# Patient Record
Sex: FEMALE | Race: WHITE | NOT HISPANIC OR LATINO | Employment: OTHER | URBAN - METROPOLITAN AREA
[De-identification: names, ages, dates, MRNs, and addresses within clinical notes are randomized per-mention and may not be internally consistent; named-entity substitution may affect disease eponyms.]

---

## 2023-10-15 RX ORDER — PAROXETINE HYDROCHLORIDE 20 MG/1
TABLET, FILM COATED ORAL
COMMUNITY
End: 2023-12-28 | Stop reason: ALTCHOICE

## 2023-10-15 RX ORDER — ALBUTEROL SULFATE 90 UG/1
AEROSOL, METERED RESPIRATORY (INHALATION)
Status: ON HOLD | COMMUNITY
End: 2024-01-11 | Stop reason: ALTCHOICE

## 2023-10-15 RX ORDER — FERROUS SULFATE 325(65) MG
325 TABLET ORAL DAILY
COMMUNITY

## 2023-10-15 RX ORDER — SIMVASTATIN 20 MG/1
20 TABLET, FILM COATED ORAL NIGHTLY
COMMUNITY
End: 2024-02-02 | Stop reason: ENTERED-IN-ERROR

## 2023-10-15 RX ORDER — CALC/MAG/B COMPLEX/D3/HERB 61
TABLET ORAL
COMMUNITY
End: 2023-12-28 | Stop reason: ALTCHOICE

## 2023-10-15 RX ORDER — DONEPEZIL HYDROCHLORIDE 5 MG/1
TABLET, FILM COATED ORAL
COMMUNITY
End: 2023-12-28 | Stop reason: ALTCHOICE

## 2023-10-15 RX ORDER — PAROXETINE HYDROCHLORIDE HEMIHYDRATE 37.5 MG/1
1 TABLET, FILM COATED, EXTENDED RELEASE ORAL EVERY MORNING
COMMUNITY

## 2023-10-15 RX ORDER — LISINOPRIL 40 MG/1
10 TABLET ORAL EVERY MORNING
COMMUNITY
End: 2024-02-02 | Stop reason: ENTERED-IN-ERROR

## 2023-10-15 RX ORDER — TRAZODONE HYDROCHLORIDE 50 MG/1
100 TABLET ORAL NIGHTLY
COMMUNITY

## 2023-10-15 RX ORDER — ARIPIPRAZOLE 15 MG/1
15 TABLET ORAL NIGHTLY
COMMUNITY

## 2023-10-15 RX ORDER — CETIRIZINE HYDROCHLORIDE 10 MG/1
10 TABLET ORAL EVERY MORNING
COMMUNITY
Start: 2019-11-06

## 2023-10-17 ENCOUNTER — OFFICE VISIT (OUTPATIENT)
Dept: ORTHOPEDIC SURGERY | Facility: CLINIC | Age: 48
End: 2023-10-17
Payer: COMMERCIAL

## 2023-10-17 ENCOUNTER — HOSPITAL ENCOUNTER (OUTPATIENT)
Dept: RADIOLOGY | Facility: HOSPITAL | Age: 48
Discharge: HOME | End: 2023-10-17
Payer: COMMERCIAL

## 2023-10-17 DIAGNOSIS — M25.551 RIGHT HIP PAIN: Primary | ICD-10-CM

## 2023-10-17 DIAGNOSIS — M25.551 RIGHT HIP PAIN: ICD-10-CM

## 2023-10-17 PROCEDURE — 73502 X-RAY EXAM HIP UNI 2-3 VIEWS: CPT | Mod: RT,FY

## 2023-10-17 PROCEDURE — 99205 OFFICE O/P NEW HI 60 MIN: CPT | Performed by: ORTHOPAEDIC SURGERY

## 2023-10-17 PROCEDURE — 73502 X-RAY EXAM HIP UNI 2-3 VIEWS: CPT | Mod: RIGHT SIDE | Performed by: RADIOLOGY

## 2023-10-17 ASSESSMENT — PAIN SCALES - GENERAL: PAINLEVEL_OUTOF10: 9

## 2023-10-17 ASSESSMENT — PAIN - FUNCTIONAL ASSESSMENT: PAIN_FUNCTIONAL_ASSESSMENT: 0-10

## 2023-10-17 NOTE — LETTER
October 17, 2023     Henry Olmos DO  [id number] [facility] [address]    Patient: Blanca Hamilton   YOB: 1975   Date of Visit: 10/17/2023       Dear Dr. Henry Olmos DO:    Thank you for referring Blanca Hamilton to me for evaluation. Below are my notes for this consultation.  If you have questions, please do not hesitate to call me. I look forward to following your patient along with you.       Sincerely,     Felix Jalloh MD      CC: No Recipients  ______________________________________________________________________________________    This is a consultation from Dr. Henry Olmos DO for   Chief Complaint   Patient presents with   • Right Hip - Injury, Pain       This is a 47 y.o. female who presents for evaluation of right hip pain.  Patient states that she had a long history of right hip pain, it is gotten gradually worse over time.  She has had problems with her hips for many years.  She had her left hip done several years ago and states that is doing well.  Over the past several months has had severe exacerbation of her right hip pain, it is mostly anterior and into the groin and radiates into her anterior thigh.  It makes it very difficult for her to get around she is mostly dependent on a wheelchair.  She had extensive travel nonsurgical management for her right hip including use of anti-inflammatories physical therapy activity modification use of assistive devices.  She has never had surgery on her hip.  Despite nonsurgical management she has severe and worsening pain which impacts her quality of life and activities of daily living and she like to consider total hip    Physical Exam    There has been no interval change in this patient's past medical, surgical, medications, allergies, family history or social history since the most recent visit to a provider within our department. 14 point review of systems was performed, reviewed, and negative except for pertinent  positives documented in the history of present illness.     Constitutional: well developed, well nourished female in no acute distress  Psychiatric: normal mood, appropriate affect  Eyes: sclera anicteric  HENT: normocephalic/atraumatic  CV: regular rate and rhythm   Respiratory: non labored breathing  Integumentary: no rash  Neurological: moves all extremities    Right hip exam: skin normal, no abrasions, wounds, or lacerations. nttp over greater trochanter. negative log roll, negative joycelyn's test. flexion to 90 degrees without pain. no pain with flexion abduction and external rotation, reproduction of severe hip and groin pain with flexion adduction and internal rotation. neurovascularly intact distally        Xrays were ordered by me, they were reviewed and independently interpreted by me today, they show severe degenerative disease bone-on-bone arthritis extensive protrusio    Impression/Plan: This is a 47 y.o. female with severe right hip arthritis that has failed nonoperative management.  Patient elected to proceed with right total hip.    I had an extensive discussion with the patient regarding her condition and possible treatment options. Nonsurgical treatment for right hip arthritis includes activity modification, weight loss, use of a cane or other assistive device, pain medications and nonsteroidal anti-inflammatory medications, joint injections, and physical therapy. The patient has attempted non-surgical treatment for this condition for greater than 6 months and it has failed. We discussed the risks benefits and alternatives of total hip arthroplasty. Benefits of joint replacement include: Relief of pain, improvement of function, and improved quality of life. Alternatives include observation and watchful waiting, and the nonsurgical modalities noted above.   We discussed the risks of complications as well as the risks of morbidity and mortality related to surgical treatment with total joint  replacement. We reviewed the fact that total joint replacement is major elective surgery with significant associated surgical and procedural risk factors as detailed below.   Risks include: Pain, blood loss, damage to nearby anatomical structures including but not limited to nerves or blood vessels muscles tendons and bone, failure surgery to ameliorate symptoms, persistence of pain surrounding the affected joint, mechanical failure of the prosthesis including but not limited to loosening of the prosthesis from bone ,breakage of the prosthesis and dislocation of the prosthesis, change in length or appearance of a limb, infection possibly necessitating further surgery, removal of the prosthesis, or limb amputation, the need for additional surgery for other reasons, blood clots, amputation, and death. No guarantees were implied or given.  All questions were answered and the patient voiced their understanding.     One specific issue for this patient is smoking. Smoking increases the risk of complications including wound healing and infection. We discussed that reducing smoking will mitigate these risks and that smoking cessation is recommended. We discussed strategies for smoking cessation and that the patient should discuss with her primary care physician strategies for additional assistance with smoking cessation. The patient is aware of the risks and would still like to proceed.      We discussed the complex nature of the patient's deformity and hip problem.  Although this is a primary hip replacement it is a very complex primary given the extensive protrusio.  I discussed with the patient that this would require additional time for surgery and there are extensive additional risks including but not limited to additional risk of infection instability neurovascular damage and problems with fixation.  Patient is aware of all this and still like to proceed.    A complete set of surgical instructions was given to the  "patient. The patient was educated regarding preoperative nutrition, preparation of their home for postoperative rehabilitation, choosing a care partner, medical and dental clearance, the cessation of medications that can cause bleeding or presenting to risk for infection, chlorhexidine bath, nasal swab and decontamination, post operative follow up, and need for medical equipment. Patient was also educated regarding the possibility of same day surgery and related criteria and protocols. The patient will attend our joint class further education, and thereafter they will return for a preoperative visit. A presurgery education booklet was also given to the patient.     surgical plan: Right total hip  implants: Foley  approach: Posterior  special equipment: None  DVT ppx aspirin  drugs to stop none  allergy to abx non  post operative abx:  ancef +/- vanc (per protocol)  special clearance needed none    BMI Readings from Last 1 Encounters:   02/18/20 29.35 kg/m²      Lab Results   Component Value Date    CREATININE 0.45 (L) 03/13/2020     Tobacco Use: Not on file      Computed MELD 3.0 unavailable. Necessary lab results were not found in the last year.  Computed MELD-Na unavailable. Necessary lab results were not found in the last year.       No results found for: \"HGBA1C\"  No results found for: \"STAPHMRSASCR\"  "

## 2023-10-17 NOTE — PROGRESS NOTES
This is a consultation from Dr. Henry Olmos DO for   Chief Complaint   Patient presents with    Right Hip - Injury, Pain       This is a 47 y.o. female who presents for evaluation of right hip pain.  Patient states that she had a long history of right hip pain, it is gotten gradually worse over time.  She has had problems with her hips for many years.  She had her left hip done several years ago and states that is doing well.  Over the past several months has had severe exacerbation of her right hip pain, it is mostly anterior and into the groin and radiates into her anterior thigh.  It makes it very difficult for her to get around she is mostly dependent on a wheelchair.  She had extensive travel nonsurgical management for her right hip including use of anti-inflammatories physical therapy activity modification use of assistive devices.  She has never had surgery on her hip.  Despite nonsurgical management she has severe and worsening pain which impacts her quality of life and activities of daily living and she like to consider total hip    Physical Exam    There has been no interval change in this patient's past medical, surgical, medications, allergies, family history or social history since the most recent visit to a provider within our department. 14 point review of systems was performed, reviewed, and negative except for pertinent positives documented in the history of present illness.     Constitutional: well developed, well nourished female in no acute distress  Psychiatric: normal mood, appropriate affect  Eyes: sclera anicteric  HENT: normocephalic/atraumatic  CV: regular rate and rhythm   Respiratory: non labored breathing  Integumentary: no rash  Neurological: moves all extremities    Right hip exam: skin normal, no abrasions, wounds, or lacerations. nttp over greater trochanter. negative log roll, negative joycelyn's test. flexion to 90 degrees without pain. no pain with flexion abduction and  external rotation, reproduction of severe hip and groin pain with flexion adduction and internal rotation. neurovascularly intact distally        Xrays were ordered by me, they were reviewed and independently interpreted by me today, they show severe degenerative disease bone-on-bone arthritis extensive protrusio    Impression/Plan: This is a 47 y.o. female with severe right hip arthritis that has failed nonoperative management.  Patient elected to proceed with right total hip.    I had an extensive discussion with the patient regarding her condition and possible treatment options. Nonsurgical treatment for right hip arthritis includes activity modification, weight loss, use of a cane or other assistive device, pain medications and nonsteroidal anti-inflammatory medications, joint injections, and physical therapy. The patient has attempted non-surgical treatment for this condition for greater than 6 months and it has failed. We discussed the risks benefits and alternatives of total hip arthroplasty. Benefits of joint replacement include: Relief of pain, improvement of function, and improved quality of life. Alternatives include observation and watchful waiting, and the nonsurgical modalities noted above.   We discussed the risks of complications as well as the risks of morbidity and mortality related to surgical treatment with total joint replacement. We reviewed the fact that total joint replacement is major elective surgery with significant associated surgical and procedural risk factors as detailed below.   Risks include: Pain, blood loss, damage to nearby anatomical structures including but not limited to nerves or blood vessels muscles tendons and bone, failure surgery to ameliorate symptoms, persistence of pain surrounding the affected joint, mechanical failure of the prosthesis including but not limited to loosening of the prosthesis from bone ,breakage of the prosthesis and dislocation of the prosthesis,  change in length or appearance of a limb, infection possibly necessitating further surgery, removal of the prosthesis, or limb amputation, the need for additional surgery for other reasons, blood clots, amputation, and death. No guarantees were implied or given.  All questions were answered and the patient voiced their understanding.     One specific issue for this patient is smoking. Smoking increases the risk of complications including wound healing and infection. We discussed that reducing smoking will mitigate these risks and that smoking cessation is recommended. We discussed strategies for smoking cessation and that the patient should discuss with her primary care physician strategies for additional assistance with smoking cessation. The patient is aware of the risks and would still like to proceed.      We discussed the complex nature of the patient's deformity and hip problem.  Although this is a primary hip replacement it is a very complex primary given the extensive protrusio.  I discussed with the patient that this would require additional time for surgery and there are extensive additional risks including but not limited to additional risk of infection instability neurovascular damage and problems with fixation.  Patient is aware of all this and still like to proceed.    A complete set of surgical instructions was given to the patient. The patient was educated regarding preoperative nutrition, preparation of their home for postoperative rehabilitation, choosing a care partner, medical and dental clearance, the cessation of medications that can cause bleeding or presenting to risk for infection, chlorhexidine bath, nasal swab and decontamination, post operative follow up, and need for medical equipment. Patient was also educated regarding the possibility of same day surgery and related criteria and protocols. The patient will attend our joint class further education, and thereafter they will return for a  "preoperative visit. A presurgery education booklet was also given to the patient.     surgical plan: Right total hip  implants: Solitario  approach: Posterior  special equipment: None  DVT ppx aspirin  drugs to stop none  allergy to abx non  post operative abx:  ancef +/- vanc (per protocol)  special clearance needed none    BMI Readings from Last 1 Encounters:   02/18/20 29.35 kg/m²      Lab Results   Component Value Date    CREATININE 0.45 (L) 03/13/2020     Tobacco Use: Not on file      Computed MELD 3.0 unavailable. Necessary lab results were not found in the last year.  Computed MELD-Na unavailable. Necessary lab results were not found in the last year.       No results found for: \"HGBA1C\"  No results found for: \"STAPHMRSASCR\"  "

## 2023-10-18 PROBLEM — M25.551 RIGHT HIP PAIN: Status: ACTIVE | Noted: 2023-10-17

## 2023-12-19 ENCOUNTER — PRE-ADMISSION TESTING (OUTPATIENT)
Dept: PREADMISSION TESTING | Facility: HOSPITAL | Age: 48
End: 2023-12-19
Payer: COMMERCIAL

## 2023-12-19 DIAGNOSIS — Z01.818 PREOP EXAMINATION: Primary | ICD-10-CM

## 2023-12-21 ENCOUNTER — ANESTHESIA EVENT (OUTPATIENT)
Dept: OPERATING ROOM | Facility: HOSPITAL | Age: 48
DRG: 467 | End: 2023-12-21
Payer: COMMERCIAL

## 2023-12-26 ENCOUNTER — OFFICE VISIT (OUTPATIENT)
Dept: ORTHOPEDIC SURGERY | Facility: CLINIC | Age: 48
End: 2023-12-26
Payer: COMMERCIAL

## 2023-12-26 ENCOUNTER — HOSPITAL ENCOUNTER (OUTPATIENT)
Dept: RADIOLOGY | Facility: HOSPITAL | Age: 48
Discharge: HOME | End: 2023-12-26
Payer: COMMERCIAL

## 2023-12-26 DIAGNOSIS — M25.551 RIGHT HIP PAIN: Primary | ICD-10-CM

## 2023-12-26 DIAGNOSIS — M25.551 RIGHT HIP PAIN: ICD-10-CM

## 2023-12-26 PROCEDURE — 99213 OFFICE O/P EST LOW 20 MIN: CPT | Performed by: ORTHOPAEDIC SURGERY

## 2023-12-26 PROCEDURE — 72100 X-RAY EXAM L-S SPINE 2/3 VWS: CPT | Performed by: RADIOLOGY

## 2023-12-26 PROCEDURE — 72100 X-RAY EXAM L-S SPINE 2/3 VWS: CPT

## 2023-12-26 NOTE — PROGRESS NOTES
Chief Complaint   Patient presents with    Right Hip - Follow-up     PRE OP RT ALEXA         This is a 47 y.o. year old female who presents for preoperative visit for her right hip.  Patient has severe degenerative disease of the affected joint. The patient complains of severe pain in the area, the pain is gradually getting worse. She has failed extensive nonsurgical treatment including anti-inflammatories physical therapy use of assistive devices activity modification cortisone injections. Despite this interventions the patient is worsening pain which impacts her quality of life and activities of daily living and they would like to proceed with joint replacement.    Physical Exam    There has been no interval change in this patient's past medical, surgical, medications, allergies, family history or social history since the most recent visit to a provider within our department. 14 point review of systems was performed, reviewed, and negative except for pertinent positives documented in the history of present illness.     Constitutional: well developed, well nourished female in no acute distress  Psychiatric: normal mood, appropriate affect  Eyes: sclera anicteric  HENT: normocephalic/atraumatic  CV: regular rate and rhythm   Respiratory: non labored breathing  Integumentary: no rash  Neurological: moves all extremities    No visits with results within 1 Month(s) from this visit.   Latest known visit with results is:   Legacy Encounter on 03/13/2020   Component Date Value Ref Range Status    WBC 03/13/2020 7.2  4.4 - 11.3 x10E9/L Final    RBC 03/13/2020 4.43  4.00 - 5.20 x10E12/L Final    Hemoglobin 03/13/2020 9.3 (L)  12.0 - 16.0 g/dL Final    Hematocrit 03/13/2020 33.0 (L)  36.0 - 46.0 % Final    MCV 03/13/2020 74 (L)  80 - 100 fL Final    MCHC 03/13/2020 28.2 (L)  32.0 - 36.0 g/dL Final    Platelets 03/13/2020 361  150 - 450 x10E9/L Final    RDW 03/13/2020 20.4 (H)  11.5 - 14.5 % Final    Neutrophils % 03/13/2020  66.5  40.0 - 80.0 % Final    Immature Granulocytes %, Automated 03/13/2020 0.4  0.0 - 0.9 % Final    Comment:  Immature Granulocyte Count (IG) includes promyelocytes,    myelocytes and metamyelocytes but does not include bands.   Percent differential counts (%) should be interpreted in the   context of the absolute cell counts (cells/L).      Lymphocytes % 03/13/2020 23.1  13.0 - 44.0 % Final    Monocytes % 03/13/2020 6.1  2.0 - 10.0 % Final    Eosinophils % 03/13/2020 3.5  0.0 - 6.0 % Final    Basophils % 03/13/2020 0.4  0.0 - 2.0 % Final    Neutrophils Absolute 03/13/2020 4.79  1.20 - 7.70 x10E9/L Final    Lymphocytes Absolute 03/13/2020 1.66  1.20 - 4.80 x10E9/L Final    Monocytes Absolute 03/13/2020 0.44  0.10 - 1.00 x10E9/L Final    Eosinophils Absolute 03/13/2020 0.25  0.00 - 0.70 x10E9/L Final    Basophils Absolute 03/13/2020 0.03  0.00 - 0.10 x10E9/L Final    Glucose 03/13/2020 127 (H)  74 - 99 mg/dL Final    Sodium 03/13/2020 137  136 - 145 mmol/L Final    Potassium 03/13/2020 3.8  3.5 - 5.3 mmol/L Final    Chloride 03/13/2020 106  98 - 107 mmol/L Final    Bicarbonate 03/13/2020 25  21 - 32 mmol/L Final    Anion Gap 03/13/2020 10  10 - 20 mmol/L Final    Urea Nitrogen 03/13/2020 7  6 - 23 mg/dL Final    Creatinine 03/13/2020 0.45 (L)  0.50 - 1.05 mg/dL Final    GLOMERULAR FILTRATION RATE-NON AFR* 03/13/2020 >60  >60 mL/min/1.73m2 Final    GLOMERULAR FILTRATION RATE-* 03/13/2020 >60  >60 mL/min/1.73m2 Final    Comment:  CALCULATIONS OF ESTIMATED GFR ARE PERFORMED   USING THE MDRD STUDY EQUATION FOR THE   IDMS-TRACEABLE CREATININE METHODS.   CLIN CHEM 2007;53:766-72      Calcium 03/13/2020 8.6  8.6 - 10.3 mg/dL Final    Color, Urine 03/13/2020 YELLOW  STRAW,YELLOW Final    Appearance, Urine 03/13/2020 CLEAR  CLEAR Final    Specific Gravity, Urine 03/13/2020 1.020  1.005 - 1.035 Final    pH, Urine 03/13/2020 5.0  5.0 - 8.0 Final    Protein, Urine 03/13/2020 NEGATIVE  NEGATIVE mg/dL Final    Glucose,  Urine 03/13/2020 NEGATIVE  NEGATIVE mg/dL Final    Blood, Urine 03/13/2020 NEGATIVE  NEGATIVE Final    Ketones, Urine 03/13/2020 NEGATIVE  NEGATIVE mg/dL Final    Bilirubin, Urine 03/13/2020 NEGATIVE  NEGATIVE Final    Urobilinogen, Urine 03/13/2020 <2.0  0.0 - 1.9 mg/dL Final    Nitrite, Urine 03/13/2020 NEGATIVE  NEGATIVE Final    Leukocyte Esterase, Urine 03/13/2020 NEGATIVE  NEGATIVE Final    RBC Morphology 03/13/2020 See Below   Final    Teardrop Cells 03/13/2020 Few   Final    Stomatocytes 03/13/2020 Many   Final    ABO GROUP (TYPE) IN BLOOD 03/13/2020 A   Final    Rh 03/13/2020 POSITIVE   Final    ANTIBODY SCREEN 03/13/2020 NEGATIVE   Final         Right hip exam: skin normal, no abrasions, wounds, or lacerations. nttp over greater trochanter. negative log roll, negative joycelyn's test. flexion to 90 degrees without pain.  She has very limited range of motion significant pain with flexion abduction and external rotation, severe hip pain with flexion adduction and internal rotation. neurovascularly intact distally        Preoperative labs reviewed, no findings which would preclude surgery    Impression plan: This is a 47 y.o. yo femalewith severe end-stage degenerative disease of the right hip that has failed nonoperative management.  Once again I discussed with the patient in detail the risks benefits and alternatives of total joint replacement. For the full details of that discussion see my previous note. The patient has obtained appropriate medical and dental clearance, and her labs have been reviewed. We will plan to proceed with surgery.    BMI Readings from Last 1 Encounters:   02/18/20 29.35 kg/m²     Lab Results   Component Value Date    CREATININE 0.45 (L) 03/13/2020     Tobacco Use: Not on file      Computed MELD 3.0 unavailable. Necessary lab results were not found in the last year.  Computed MELD-Na unavailable. Necessary lab results were not found in the last year.       No results found for:  "\"HGBA1C\"  No results found for: \"STAPHMRSASCR\"  "

## 2023-12-28 ENCOUNTER — PHARMACY VISIT (OUTPATIENT)
Dept: PHARMACY | Facility: CLINIC | Age: 48
End: 2023-12-28

## 2023-12-28 ENCOUNTER — PRE-ADMISSION TESTING (OUTPATIENT)
Dept: PREADMISSION TESTING | Facility: HOSPITAL | Age: 48
End: 2023-12-28
Payer: COMMERCIAL

## 2023-12-28 VITALS
HEIGHT: 68 IN | SYSTOLIC BLOOD PRESSURE: 116 MMHG | DIASTOLIC BLOOD PRESSURE: 75 MMHG | TEMPERATURE: 97.5 F | RESPIRATION RATE: 18 BRPM | BODY MASS INDEX: 30.2 KG/M2 | HEART RATE: 86 BPM | OXYGEN SATURATION: 98 %

## 2023-12-28 DIAGNOSIS — Z01.818 PREOP EXAMINATION: Primary | ICD-10-CM

## 2023-12-28 LAB
ALBUMIN SERPL BCP-MCNC: 3.9 G/DL (ref 3.4–5)
ALP SERPL-CCNC: 97 U/L (ref 33–110)
ALT SERPL W P-5'-P-CCNC: 9 U/L (ref 7–45)
ANION GAP SERPL CALC-SCNC: 14 MMOL/L (ref 10–20)
APPEARANCE UR: ABNORMAL
AST SERPL W P-5'-P-CCNC: 15 U/L (ref 9–39)
BASOPHILS # BLD AUTO: 0.03 X10*3/UL (ref 0–0.1)
BASOPHILS NFR BLD AUTO: 0.5 %
BILIRUB SERPL-MCNC: 0.4 MG/DL (ref 0–1.2)
BILIRUB UR STRIP.AUTO-MCNC: NEGATIVE MG/DL
BUN SERPL-MCNC: 8 MG/DL (ref 6–23)
CALCIUM SERPL-MCNC: 8.8 MG/DL (ref 8.6–10.3)
CHLORIDE SERPL-SCNC: 103 MMOL/L (ref 98–107)
CO2 SERPL-SCNC: 25 MMOL/L (ref 21–32)
COLOR UR: YELLOW
CREAT SERPL-MCNC: 0.4 MG/DL (ref 0.5–1.05)
EOSINOPHIL # BLD AUTO: 0.19 X10*3/UL (ref 0–0.7)
EOSINOPHIL NFR BLD AUTO: 3.2 %
ERYTHROCYTE [DISTWIDTH] IN BLOOD BY AUTOMATED COUNT: 19.2 % (ref 11.5–14.5)
GFR SERPL CREATININE-BSD FRML MDRD: >90 ML/MIN/1.73M*2
GLUCOSE SERPL-MCNC: 70 MG/DL (ref 74–99)
GLUCOSE UR STRIP.AUTO-MCNC: NEGATIVE MG/DL
HCT VFR BLD AUTO: 33.3 % (ref 36–46)
HGB BLD-MCNC: 10.1 G/DL (ref 12–16)
IMM GRANULOCYTES # BLD AUTO: 0.02 X10*3/UL (ref 0–0.7)
IMM GRANULOCYTES NFR BLD AUTO: 0.3 % (ref 0–0.9)
KETONES UR STRIP.AUTO-MCNC: NEGATIVE MG/DL
LEUKOCYTE ESTERASE UR QL STRIP.AUTO: NEGATIVE
LYMPHOCYTES # BLD AUTO: 2.04 X10*3/UL (ref 1.2–4.8)
LYMPHOCYTES NFR BLD AUTO: 34.7 %
MCH RBC QN AUTO: 22.7 PG (ref 26–34)
MCHC RBC AUTO-ENTMCNC: 30.3 G/DL (ref 32–36)
MCV RBC AUTO: 75 FL (ref 80–100)
MONOCYTES # BLD AUTO: 0.57 X10*3/UL (ref 0.1–1)
MONOCYTES NFR BLD AUTO: 9.7 %
NEUTROPHILS # BLD AUTO: 3.03 X10*3/UL (ref 1.2–7.7)
NEUTROPHILS NFR BLD AUTO: 51.6 %
NITRITE UR QL STRIP.AUTO: NEGATIVE
NRBC BLD-RTO: 0 /100 WBCS (ref 0–0)
PH UR STRIP.AUTO: 7 [PH]
PLATELET # BLD AUTO: 322 X10*3/UL (ref 150–450)
POTASSIUM SERPL-SCNC: 4.5 MMOL/L (ref 3.5–5.3)
PROT SERPL-MCNC: 6.5 G/DL (ref 6.4–8.2)
PROT UR STRIP.AUTO-MCNC: ABNORMAL MG/DL
RBC # BLD AUTO: 4.44 X10*6/UL (ref 4–5.2)
RBC # UR STRIP.AUTO: NEGATIVE /UL
RBC #/AREA URNS AUTO: NORMAL /HPF
SODIUM SERPL-SCNC: 137 MMOL/L (ref 136–145)
SP GR UR STRIP.AUTO: 1.02
SQUAMOUS #/AREA URNS AUTO: NORMAL /HPF
UROBILINOGEN UR STRIP.AUTO-MCNC: 2 MG/DL
WBC # BLD AUTO: 5.9 X10*3/UL (ref 4.4–11.3)
WBC #/AREA URNS AUTO: NORMAL /HPF

## 2023-12-28 PROCEDURE — 99204 OFFICE O/P NEW MOD 45 MIN: CPT | Performed by: PHYSICIAN ASSISTANT

## 2023-12-28 PROCEDURE — 81001 URINALYSIS AUTO W/SCOPE: CPT

## 2023-12-28 PROCEDURE — 36415 COLL VENOUS BLD VENIPUNCTURE: CPT

## 2023-12-28 PROCEDURE — 85025 COMPLETE CBC W/AUTO DIFF WBC: CPT

## 2023-12-28 PROCEDURE — 93005 ELECTROCARDIOGRAM TRACING: CPT

## 2023-12-28 PROCEDURE — 80053 COMPREHEN METABOLIC PANEL: CPT

## 2023-12-28 PROCEDURE — RXMED WILLOW AMBULATORY MEDICATION CHARGE

## 2023-12-28 PROCEDURE — 87081 CULTURE SCREEN ONLY: CPT | Mod: GEALAB

## 2023-12-28 PROCEDURE — 93010 ELECTROCARDIOGRAM REPORT: CPT | Performed by: INTERNAL MEDICINE

## 2023-12-28 RX ORDER — PAROXETINE HYDROCHLORIDE HEMIHYDRATE 37.5 MG/1
37.5 TABLET, FILM COATED, EXTENDED RELEASE ORAL EVERY MORNING
COMMUNITY
End: 2024-02-02 | Stop reason: ENTERED-IN-ERROR

## 2023-12-28 RX ORDER — TRANEXAMIC ACID 100 MG/ML
1000 INJECTION, SOLUTION INTRAVENOUS ONCE
Status: CANCELLED | OUTPATIENT
Start: 2023-12-28 | End: 2023-12-28

## 2023-12-28 RX ORDER — OMEPRAZOLE 40 MG/1
40 CAPSULE, DELAYED RELEASE ORAL DAILY
COMMUNITY

## 2023-12-28 RX ORDER — MONTELUKAST SODIUM 10 MG/1
10 TABLET ORAL EVERY MORNING
COMMUNITY

## 2023-12-28 RX ORDER — CHLORHEXIDINE GLUCONATE ORAL RINSE 1.2 MG/ML
15 SOLUTION DENTAL DAILY
Qty: 473 ML | Refills: 0 | Status: SHIPPED | OUTPATIENT
Start: 2023-12-28 | End: 2024-01-08 | Stop reason: HOSPADM

## 2023-12-28 RX ORDER — CEFAZOLIN SODIUM 2 G/50ML
2 SOLUTION INTRAVENOUS ONCE
Status: CANCELLED | OUTPATIENT
Start: 2023-12-28 | End: 2023-12-28

## 2023-12-28 ASSESSMENT — CHADS2 SCORE
PRIOR STROKE OR TIA OR THROMBOEMBOLISM: NO
DIABETES: NO
CHF: NO
AGE GREATER THAN OR EQUAL TO 75: NO
HYPERTENSION: YES
CHADS2 SCORE: 1

## 2023-12-28 ASSESSMENT — DUKE ACTIVITY SCORE INDEX (DASI)
CAN YOU WALK INDOORS, SUCH AS AROUND YOUR HOUSE: YES
CAN YOU DO YARD WORK LIKE RAKING LEAVES, WEEDING OR PUSHING A MOWER: YES
TOTAL_SCORE: 23.45
CAN YOU CLIMB A FLIGHT OF STAIRS OR WALK UP A HILL: YES
CAN YOU HAVE SEXUAL RELATIONS: NO
DASI METS SCORE: 5.6
CAN YOU PARTICIPATE IN MODERATE RECREATIONAL ACTIVITIES LIKE GOLF, BOWLING, DANCING, DOUBLES TENNIS OR THROWING A BASEBALL OR FOOTBALL: NO
CAN YOU DO HEAVY WORK AROUND THE HOUSE LIKE SCRUBBING FLOORS OR LIFTING AND MOVING HEAVY FURNITURE: NO
CAN YOU RUN A SHORT DISTANCE: NO
CAN YOU DO LIGHT WORK AROUND THE HOUSE LIKE DUSTING OR WASHING DISHES: YES
CAN YOU DO MODERATE WORK AROUND THE HOUSE LIKE VACUUMING, SWEEPING FLOORS OR CARRYING GROCERIES: YES
CAN YOU PARTICIPATE IN STRENOUS SPORTS LIKE SWIMMING, SINGLES TENNIS, FOOTBALL, BASKETBALL, OR SKIING: NO
CAN YOU TAKE CARE OF YOURSELF (EAT, DRESS, BATHE, OR USE TOILET): YES
CAN YOU WALK A BLOCK OR TWO ON LEVEL GROUND: YES

## 2023-12-28 ASSESSMENT — ENCOUNTER SYMPTOMS
CONSTITUTIONAL NEGATIVE: 1
ARTHRALGIAS: 1
RESPIRATORY NEGATIVE: 1
INABILITY TO BEAR WEIGHT: 1
GASTROINTESTINAL NEGATIVE: 1
LOSS OF MOTION: 1
LIMITED RANGE OF MOTION: 1
NEUROLOGICAL NEGATIVE: 1
MYALGIAS: 1
NECK NEGATIVE: 1
CARDIOVASCULAR NEGATIVE: 1
HIP PAIN: 1

## 2023-12-28 ASSESSMENT — LIFESTYLE VARIABLES: SMOKING_STATUS: SMOKER

## 2023-12-28 NOTE — PREPROCEDURE INSTRUCTIONS
Medication List            Accurate as of December 28, 2023  2:21 PM. Always use your most recent med list.                Abilify 15 mg tablet  Generic drug: ARIPiprazole  Medication Adjustments for Surgery: Continue until night before surgery     chlorhexidine 0.12 % solution  Commonly known as: Peridex  Use 15 mL in the mouth or throat once daily. Swish and spit one capful the night before surgery and morning  of surgery     ferrous sulfate (325 mg ferrous sulfate) tablet  Medication Adjustments for Surgery: Stop 7 days before surgery     lisinopril 40 mg tablet  Medication Adjustments for Surgery: Stop 1 day before surgery     montelukast 10 mg tablet  Commonly known as: Singulair  Medication Adjustments for Surgery: Take morning of surgery with sip of water, no other fluids     omeprazole 40 mg DR capsule  Commonly known as: PriLOSEC  Medication Adjustments for Surgery: Take morning of surgery with sip of water, no other fluids     * Paxil CR 25 mg 24 hr tablet  Generic drug: PARoxetine CR  Medication Adjustments for Surgery: Continue until night before surgery     * PARoxetine CR 37.5 mg 24 hr tablet  Commonly known as: Paxil-CR  Medication Adjustments for Surgery: Take morning of surgery with sip of water, no other fluids     ProAir HFA 90 mcg/actuation inhaler  Generic drug: albuterol  Medication Adjustments for Surgery: Other (Comment)  Notes to patient: Take as needed     simvastatin 20 mg tablet  Commonly known as: Zocor  Medication Adjustments for Surgery: Continue until night before surgery     traZODone 50 mg tablet  Commonly known as: Desyrel  Medication Adjustments for Surgery: Continue until night before surgery     ZyrTEC 10 mg tablet  Generic drug: cetirizine  Medication Adjustments for Surgery: Stop 1 day before surgery           * This list has 2 medication(s) that are the same as other medications prescribed for you. Read the directions carefully, and ask your doctor or other care provider  to review them with you.                              SURGERY PRE-OPERATIVE INSTRUCTIONS    *You will receive a phone call the day before your procedure  after 2pm, (or the Friday before your surgery if scheduled on a Monday.) Generally the hospital will be calling you with this information after that time.    *You are not to eat after midnight the night before the surgery. You may have 8oz of a clear liquid up until 2 hours prior to arriving to the hospital. The exception is with medications you were instructed to take day of surgery.    *You may take tylenol for pain/discomfort as needed.     *Stop taking all aspirin products, ibuprofen (motrin/advil), naproxen (aleve/naprosyn) for one week prior to surgery.    *Stop taking all vitamins and supplements one week prior to surgery.     *You should not have alcoholic beverages for 24 hours before surgery.     *You should not smoke 24 hours prior to surgery.     *To help prevent surgical infections bathe/shower with Dial soap the evening before surgery.    *You can wear deodorant but no lotion, powder, or perfume/cologne. You should remove all make-up and nail polish at home.    *If you wear glasses, please bring a case for the glasses with you.    *You will be asked to remove dentures and contacts.     *Please leave all valuables at home.    *You should wear loose, comfortable clothing that will accommodate bandages and/or casts.    *You should notify your doctor of any change in your condition (fever, cold, rash, etc). Surgery may need to be re-scheduled until a time you are in better health.    *A responsible adult is required to accompany you to and from the hospital if you are receiving anesthesia or a sedative. Patients are not permitted to drive for 24 hours after anesthesia.     *You can use the [employer] parking if you wish.     *If you have any further questions please call [facility] [phone].
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
## 2023-12-28 NOTE — PREPROCEDURE INSTRUCTIONS
Medication List            Accurate as of December 28, 2023  2:23 PM. Always use your most recent med list.                Abilify 15 mg tablet  Generic drug: ARIPiprazole  Medication Adjustments for Surgery: Continue until night before surgery     chlorhexidine 0.12 % solution  Commonly known as: Peridex  Use 15 mL in the mouth or throat once daily. Swish and spit one capful the night before surgery and morning  of surgery     ferrous sulfate (325 mg ferrous sulfate) tablet  Medication Adjustments for Surgery: Stop 7 days before surgery     lisinopril 40 mg tablet  Medication Adjustments for Surgery: Stop 1 day before surgery     montelukast 10 mg tablet  Commonly known as: Singulair  Medication Adjustments for Surgery: Take morning of surgery with sip of water, no other fluids     omeprazole 40 mg DR capsule  Commonly known as: PriLOSEC  Medication Adjustments for Surgery: Take morning of surgery with sip of water, no other fluids     * Paxil CR 25 mg 24 hr tablet  Generic drug: PARoxetine CR  Medication Adjustments for Surgery: Continue until night before surgery     * PARoxetine CR 37.5 mg 24 hr tablet  Commonly known as: Paxil-CR  Medication Adjustments for Surgery: Take morning of surgery with sip of water, no other fluids     ProAir HFA 90 mcg/actuation inhaler  Generic drug: albuterol  Medication Adjustments for Surgery: Other (Comment)  Notes to patient: Take as needed     simvastatin 20 mg tablet  Commonly known as: Zocor  Medication Adjustments for Surgery: Continue until night before surgery     traZODone 50 mg tablet  Commonly known as: Desyrel  Medication Adjustments for Surgery: Continue until night before surgery     ZyrTEC 10 mg tablet  Generic drug: cetirizine  Medication Adjustments for Surgery: Stop 1 day before surgery           * This list has 2 medication(s) that are the same as other medications prescribed for you. Read the directions carefully, and ask your doctor or other care provider  to review them with you.                              SURGERY PRE-OPERATIVE INSTRUCTIONS    *You will receive a phone call the day before your procedure  after 2pm, (or the Friday before your surgery if scheduled on a Monday.) Generally the hospital will be calling you with this information after that time.    *You are not to eat after midnight the night before the surgery. You may have 8oz of a clear liquid up until 2 hours prior to arriving to the hospital. The exception is with medications you were instructed to take day of surgery.    *You may take tylenol for pain/discomfort as needed.     *Stop taking all aspirin products, ibuprofen (motrin/advil), naproxen (aleve/naprosyn) for one week prior to surgery.    *Stop taking all vitamins and supplements one week prior to surgery.     *You should not have alcoholic beverages for 24 hours before surgery.     *You should not smoke 24 hours prior to surgery.     *To help prevent surgical infections bathe/shower with Dial soap the evening before surgery.    *You can wear deodorant but no lotion, powder, or perfume/cologne. You should remove all make-up and nail polish at home.    *If you wear glasses, please bring a case for the glasses with you.    *You will be asked to remove dentures and contacts.     *Please leave all valuables at home.    *You should wear loose, comfortable clothing that will accommodate bandages and/or casts.    *You should notify your doctor of any change in your condition (fever, cold, rash, etc). Surgery may need to be re-scheduled until a time you are in better health.    *A responsible adult is required to accompany you to and from the hospital if you are receiving anesthesia or a sedative. Patients are not permitted to drive for 24 hours after anesthesia.     *You can use the [employer] if you wish.     *If you have any further questions please call Located within [facility] [phone].     CHG BODY WASH INSTRUCTIONS    *Begin using your CHG soap five days  prior to your scheduled surgery.  Allow the CHG soap to sit on skin for 3 minutes. Do not wash with regular soap after you have used the CHG soap. Pat yourself dry with a clean, fresh towel.    *Wash your face with normal soap and water. Apply the CHG solution to a clean, wet washcloth. Firmly lather your entire body from the neck down. Do not use on your face.     *Do not apply powders, deodorants, or lotions after using CHG wash.    *Dress in clean, freshly laundered night clothes.    *Be sure to sleep with clean, freshly laundered sheets.     *Be aware CHG wash may cause stains on fabrics. Rinse your washcloth and other linens that come in contact with CHG completely. Use non-chlorine detergents to launder items used.     *The morning of surgery is the fifth day, repeat the CHG wash and wear fresh laundered clothes.     *If you have any questions about the CHG soap, call [phone].MOUTHRINSE INSTRUCTIONS    *CHG oral rinse is used to kill a bacteria in the mouth known as Staphylococcus aureus. This reduces the risks of surgical site infections.     *Using dental rinse: use the CHG oral rinse after you brush your teeth the night before and the morning of the surgery. Follow all directions on your prescription label.     *Use 1 capful (15ml), swish and gargle for at least 30 seconds. Do not swallow. Spit rinse out.     *Do not rinse mouth with water, eat or drink after using CHG mouth rinse.     *Possible side effects: CHG rinse will stick to plaque on teeth. Brush and floss just before use. Teeth brushing will help to avoid staining of plaque during use.    *Any questions, please call [phone].           .
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
## 2023-12-29 LAB — HOLD SPECIMEN: NORMAL

## 2023-12-30 LAB — STAPHYLOCOCCUS SPEC CULT: NORMAL

## 2024-01-02 ENCOUNTER — APPOINTMENT (OUTPATIENT)
Dept: RADIOLOGY | Facility: HOSPITAL | Age: 49
DRG: 467 | End: 2024-01-02
Payer: COMMERCIAL

## 2024-01-02 ENCOUNTER — HOME HEALTH ADMISSION (OUTPATIENT)
Dept: HOME HEALTH SERVICES | Facility: HOME HEALTH | Age: 49
End: 2024-01-02
Payer: COMMERCIAL

## 2024-01-02 ENCOUNTER — HOSPITAL ENCOUNTER (INPATIENT)
Facility: HOSPITAL | Age: 49
LOS: 5 days | Discharge: HOME HEALTH | DRG: 467 | End: 2024-01-08
Attending: ORTHOPAEDIC SURGERY | Admitting: ORTHOPAEDIC SURGERY
Payer: COMMERCIAL

## 2024-01-02 ENCOUNTER — ANESTHESIA (OUTPATIENT)
Dept: OPERATING ROOM | Facility: HOSPITAL | Age: 49
DRG: 467 | End: 2024-01-02
Payer: COMMERCIAL

## 2024-01-02 DIAGNOSIS — M16.11 ARTHRITIS OF RIGHT HIP: ICD-10-CM

## 2024-01-02 DIAGNOSIS — M16.11 OSTEOARTHRITIS OF RIGHT HIP, UNSPECIFIED OSTEOARTHRITIS TYPE: Primary | ICD-10-CM

## 2024-01-02 DIAGNOSIS — M25.551 RIGHT HIP PAIN: ICD-10-CM

## 2024-01-02 PROBLEM — F32.A DEPRESSION: Status: ACTIVE | Noted: 2024-01-02

## 2024-01-02 PROBLEM — F41.9 ANXIETY: Status: ACTIVE | Noted: 2024-01-02

## 2024-01-02 PROBLEM — E78.5 HYPERLIPIDEMIA: Status: ACTIVE | Noted: 2024-01-02

## 2024-01-02 PROBLEM — I10 PRIMARY HYPERTENSION: Status: ACTIVE | Noted: 2024-01-02

## 2024-01-02 PROCEDURE — 2500000004 HC RX 250 GENERAL PHARMACY W/ HCPCS (ALT 636 FOR OP/ED): Performed by: ANESTHESIOLOGY

## 2024-01-02 PROCEDURE — 3600000018 HC OR TIME - INITIAL BASE CHARGE - PROCEDURE LEVEL SIX: Performed by: ORTHOPAEDIC SURGERY

## 2024-01-02 PROCEDURE — 7100000001 HC RECOVERY ROOM TIME - INITIAL BASE CHARGE: Performed by: ORTHOPAEDIC SURGERY

## 2024-01-02 PROCEDURE — 2500000002 HC RX 250 W HCPCS SELF ADMINISTERED DRUGS (ALT 637 FOR MEDICARE OP, ALT 636 FOR OP/ED): Performed by: NURSE PRACTITIONER

## 2024-01-02 PROCEDURE — RXMED WILLOW AMBULATORY MEDICATION CHARGE

## 2024-01-02 PROCEDURE — 2500000004 HC RX 250 GENERAL PHARMACY W/ HCPCS (ALT 636 FOR OP/ED): Performed by: NURSE ANESTHETIST, CERTIFIED REGISTERED

## 2024-01-02 PROCEDURE — 3700000002 HC GENERAL ANESTHESIA TIME - EACH INCREMENTAL 1 MINUTE: Performed by: ORTHOPAEDIC SURGERY

## 2024-01-02 PROCEDURE — A4217 STERILE WATER/SALINE, 500 ML: HCPCS | Performed by: ORTHOPAEDIC SURGERY

## 2024-01-02 PROCEDURE — 99221 1ST HOSP IP/OBS SF/LOW 40: CPT | Performed by: NURSE PRACTITIONER

## 2024-01-02 PROCEDURE — A27130 PR TOTAL HIP ARTHROPLASTY: Performed by: NURSE ANESTHETIST, CERTIFIED REGISTERED

## 2024-01-02 PROCEDURE — 2500000005 HC RX 250 GENERAL PHARMACY W/O HCPCS: Performed by: NURSE PRACTITIONER

## 2024-01-02 PROCEDURE — 2500000004 HC RX 250 GENERAL PHARMACY W/ HCPCS (ALT 636 FOR OP/ED): Performed by: NURSE PRACTITIONER

## 2024-01-02 PROCEDURE — 2780000003 HC OR 278 NO HCPCS: Performed by: ORTHOPAEDIC SURGERY

## 2024-01-02 PROCEDURE — 72170 X-RAY EXAM OF PELVIS: CPT | Mod: FOREIGN READ | Performed by: RADIOLOGY

## 2024-01-02 PROCEDURE — S4991 NICOTINE PATCH NONLEGEND: HCPCS | Performed by: NURSE PRACTITIONER

## 2024-01-02 PROCEDURE — C1776 JOINT DEVICE (IMPLANTABLE): HCPCS | Performed by: ORTHOPAEDIC SURGERY

## 2024-01-02 PROCEDURE — 2720000007 HC OR 272 NO HCPCS: Performed by: ORTHOPAEDIC SURGERY

## 2024-01-02 PROCEDURE — 2500000001 HC RX 250 WO HCPCS SELF ADMINISTERED DRUGS (ALT 637 FOR MEDICARE OP): Performed by: NURSE PRACTITIONER

## 2024-01-02 PROCEDURE — 27130 TOTAL HIP ARTHROPLASTY: CPT | Performed by: ORTHOPAEDIC SURGERY

## 2024-01-02 PROCEDURE — 2500000004 HC RX 250 GENERAL PHARMACY W/ HCPCS (ALT 636 FOR OP/ED): Performed by: ORTHOPAEDIC SURGERY

## 2024-01-02 PROCEDURE — 72170 X-RAY EXAM OF PELVIS: CPT | Mod: FR

## 2024-01-02 PROCEDURE — 7100000002 HC RECOVERY ROOM TIME - EACH INCREMENTAL 1 MINUTE: Performed by: ORTHOPAEDIC SURGERY

## 2024-01-02 PROCEDURE — 3700000001 HC GENERAL ANESTHESIA TIME - INITIAL BASE CHARGE: Performed by: ORTHOPAEDIC SURGERY

## 2024-01-02 PROCEDURE — C1713 ANCHOR/SCREW BN/BN,TIS/BN: HCPCS | Performed by: ORTHOPAEDIC SURGERY

## 2024-01-02 PROCEDURE — 0SR903Z REPLACEMENT OF RIGHT HIP JOINT WITH CERAMIC SYNTHETIC SUBSTITUTE, OPEN APPROACH: ICD-10-PCS | Performed by: ORTHOPAEDIC SURGERY

## 2024-01-02 PROCEDURE — 3600000017 HC OR TIME - EACH INCREMENTAL 1 MINUTE - PROCEDURE LEVEL SIX: Performed by: ORTHOPAEDIC SURGERY

## 2024-01-02 PROCEDURE — 7100000011 HC EXTENDED STAY RECOVERY HOURLY - NURSING UNIT

## 2024-01-02 PROCEDURE — 2500000005 HC RX 250 GENERAL PHARMACY W/O HCPCS: Performed by: NURSE ANESTHETIST, CERTIFIED REGISTERED

## 2024-01-02 DEVICE — 6.5MM LOW PROFILE HEX SCREW 20MM
Type: IMPLANTABLE DEVICE | Site: HIP | Status: FUNCTIONAL
Brand: TRIDENT II

## 2024-01-02 DEVICE — LINER- CEMENTLESS
Type: IMPLANTABLE DEVICE | Site: HIP | Status: FUNCTIONAL
Brand: MDM

## 2024-01-02 DEVICE — 127 DEGREE NECK ANGLE HIP STEM
Type: IMPLANTABLE DEVICE | Site: HIP | Status: FUNCTIONAL
Brand: ACCOLADE

## 2024-01-02 DEVICE — TRIDENT II TRITANIUM MULTIHOLE ACETABULAR SHELL 54E
Type: IMPLANTABLE DEVICE | Site: HIP | Status: FUNCTIONAL
Brand: TRIDENT II

## 2024-01-02 DEVICE — 6.5MM LOW PROFILE HEX SCREW 30MM
Type: IMPLANTABLE DEVICE | Site: HIP | Status: FUNCTIONAL
Brand: TRIDENT II

## 2024-01-02 DEVICE — CERAMIC V40 FEMORAL HEAD
Type: IMPLANTABLE DEVICE | Site: HIP | Status: FUNCTIONAL
Brand: BIOLOX

## 2024-01-02 RX ORDER — CEFADROXIL 500 MG/1
500 CAPSULE ORAL 2 TIMES DAILY
Qty: 14 CAPSULE | Refills: 0 | Status: SHIPPED | OUTPATIENT
Start: 2024-01-02 | End: 2024-01-13 | Stop reason: HOSPADM

## 2024-01-02 RX ORDER — DEXAMETHASONE SODIUM PHOSPHATE 4 MG/ML
INJECTION, SOLUTION INTRA-ARTICULAR; INTRALESIONAL; INTRAMUSCULAR; INTRAVENOUS; SOFT TISSUE AS NEEDED
Status: DISCONTINUED | OUTPATIENT
Start: 2024-01-02 | End: 2024-01-02

## 2024-01-02 RX ORDER — HYDROMORPHONE HYDROCHLORIDE 2 MG/ML
INJECTION, SOLUTION INTRAMUSCULAR; INTRAVENOUS; SUBCUTANEOUS AS NEEDED
Status: DISCONTINUED | OUTPATIENT
Start: 2024-01-02 | End: 2024-01-02

## 2024-01-02 RX ORDER — ONDANSETRON 4 MG/1
4 TABLET, FILM COATED ORAL EVERY 8 HOURS PRN
Status: DISCONTINUED | OUTPATIENT
Start: 2024-01-02 | End: 2024-01-08 | Stop reason: HOSPADM

## 2024-01-02 RX ORDER — NALOXONE HYDROCHLORIDE 0.4 MG/ML
0.2 INJECTION, SOLUTION INTRAMUSCULAR; INTRAVENOUS; SUBCUTANEOUS EVERY 5 MIN PRN
Status: DISCONTINUED | OUTPATIENT
Start: 2024-01-02 | End: 2024-01-08 | Stop reason: HOSPADM

## 2024-01-02 RX ORDER — ONDANSETRON HYDROCHLORIDE 2 MG/ML
4 INJECTION, SOLUTION INTRAVENOUS EVERY 8 HOURS PRN
Status: DISCONTINUED | OUTPATIENT
Start: 2024-01-02 | End: 2024-01-08 | Stop reason: HOSPADM

## 2024-01-02 RX ORDER — FERROUS SULFATE 325(65) MG
1 TABLET ORAL DAILY
Status: DISCONTINUED | OUTPATIENT
Start: 2024-01-03 | End: 2024-01-08 | Stop reason: HOSPADM

## 2024-01-02 RX ORDER — ONDANSETRON HYDROCHLORIDE 2 MG/ML
4 INJECTION, SOLUTION INTRAVENOUS ONCE AS NEEDED
Status: DISCONTINUED | OUTPATIENT
Start: 2024-01-02 | End: 2024-01-02 | Stop reason: HOSPADM

## 2024-01-02 RX ORDER — SODIUM CHLORIDE, SODIUM LACTATE, POTASSIUM CHLORIDE, CALCIUM CHLORIDE 600; 310; 30; 20 MG/100ML; MG/100ML; MG/100ML; MG/100ML
100 INJECTION, SOLUTION INTRAVENOUS CONTINUOUS
Status: DISCONTINUED | OUTPATIENT
Start: 2024-01-02 | End: 2024-01-02 | Stop reason: HOSPADM

## 2024-01-02 RX ORDER — PANTOPRAZOLE SODIUM 40 MG/1
40 TABLET, DELAYED RELEASE ORAL
Status: DISCONTINUED | OUTPATIENT
Start: 2024-01-03 | End: 2024-01-08 | Stop reason: HOSPADM

## 2024-01-02 RX ORDER — LIDOCAINE HYDROCHLORIDE 10 MG/ML
INJECTION, SOLUTION EPIDURAL; INFILTRATION; INTRACAUDAL; PERINEURAL AS NEEDED
Status: DISCONTINUED | OUTPATIENT
Start: 2024-01-02 | End: 2024-01-02

## 2024-01-02 RX ORDER — MONTELUKAST SODIUM 10 MG/1
10 TABLET ORAL NIGHTLY
Status: DISCONTINUED | OUTPATIENT
Start: 2024-01-02 | End: 2024-01-08 | Stop reason: HOSPADM

## 2024-01-02 RX ORDER — ACETAMINOPHEN 325 MG/1
650 TABLET ORAL EVERY 6 HOURS SCHEDULED
Status: DISCONTINUED | OUTPATIENT
Start: 2024-01-02 | End: 2024-01-08 | Stop reason: HOSPADM

## 2024-01-02 RX ORDER — TRANEXAMIC ACID 10 MG/ML
INJECTION, SOLUTION INTRAVENOUS AS NEEDED
Status: DISCONTINUED | OUTPATIENT
Start: 2024-01-02 | End: 2024-01-02

## 2024-01-02 RX ORDER — OXYCODONE HYDROCHLORIDE 10 MG/1
10 TABLET ORAL EVERY 4 HOURS PRN
Status: DISCONTINUED | OUTPATIENT
Start: 2024-01-02 | End: 2024-01-08 | Stop reason: HOSPADM

## 2024-01-02 RX ORDER — POLYETHYLENE GLYCOL 3350 17 G/17G
17 POWDER, FOR SOLUTION ORAL DAILY
Status: DISCONTINUED | OUTPATIENT
Start: 2024-01-02 | End: 2024-01-08 | Stop reason: HOSPADM

## 2024-01-02 RX ORDER — SODIUM CHLORIDE, SODIUM LACTATE, POTASSIUM CHLORIDE, CALCIUM CHLORIDE 600; 310; 30; 20 MG/100ML; MG/100ML; MG/100ML; MG/100ML
100 INJECTION, SOLUTION INTRAVENOUS CONTINUOUS
Status: ACTIVE | OUTPATIENT
Start: 2024-01-02 | End: 2024-01-03

## 2024-01-02 RX ORDER — CELECOXIB 400 MG/1
400 CAPSULE ORAL ONCE
Status: COMPLETED | OUTPATIENT
Start: 2024-01-02 | End: 2024-01-02

## 2024-01-02 RX ORDER — NAPROXEN SODIUM 220 MG/1
81 TABLET, FILM COATED ORAL 2 TIMES DAILY
Qty: 56 TABLET | Refills: 0 | Status: ON HOLD | OUTPATIENT
Start: 2024-01-02 | End: 2024-02-10

## 2024-01-02 RX ORDER — ONDANSETRON HYDROCHLORIDE 2 MG/ML
INJECTION, SOLUTION INTRAVENOUS AS NEEDED
Status: DISCONTINUED | OUTPATIENT
Start: 2024-01-02 | End: 2024-01-02

## 2024-01-02 RX ORDER — OXYCODONE AND ACETAMINOPHEN 5; 325 MG/1; MG/1
1 TABLET ORAL EVERY 4 HOURS PRN
Qty: 36 TABLET | Refills: 0 | Status: ON HOLD | OUTPATIENT
Start: 2024-01-02 | End: 2024-01-13

## 2024-01-02 RX ORDER — ROCURONIUM BROMIDE 10 MG/ML
INJECTION, SOLUTION INTRAVENOUS AS NEEDED
Status: DISCONTINUED | OUTPATIENT
Start: 2024-01-02 | End: 2024-01-02

## 2024-01-02 RX ORDER — ACETAMINOPHEN 325 MG/1
975 TABLET ORAL ONCE
Status: COMPLETED | OUTPATIENT
Start: 2024-01-02 | End: 2024-01-02

## 2024-01-02 RX ORDER — PROPOFOL 10 MG/ML
INJECTION, EMULSION INTRAVENOUS CONTINUOUS PRN
Status: DISCONTINUED | OUTPATIENT
Start: 2024-01-02 | End: 2024-01-02

## 2024-01-02 RX ORDER — OXYCODONE HYDROCHLORIDE 5 MG/1
5 TABLET ORAL EVERY 4 HOURS PRN
Status: DISCONTINUED | OUTPATIENT
Start: 2024-01-02 | End: 2024-01-02 | Stop reason: HOSPADM

## 2024-01-02 RX ORDER — DOCUSATE SODIUM 100 MG/1
100 CAPSULE, LIQUID FILLED ORAL 2 TIMES DAILY
Qty: 20 CAPSULE | Refills: 0 | Status: SHIPPED | OUTPATIENT
Start: 2024-01-02 | End: 2024-01-15

## 2024-01-02 RX ORDER — DOCUSATE SODIUM 100 MG/1
100 CAPSULE, LIQUID FILLED ORAL 2 TIMES DAILY
Status: DISCONTINUED | OUTPATIENT
Start: 2024-01-02 | End: 2024-01-08 | Stop reason: HOSPADM

## 2024-01-02 RX ORDER — ALBUTEROL SULFATE 90 UG/1
1 AEROSOL, METERED RESPIRATORY (INHALATION) EVERY 4 HOURS PRN
Status: DISCONTINUED | OUTPATIENT
Start: 2024-01-02 | End: 2024-01-08 | Stop reason: HOSPADM

## 2024-01-02 RX ORDER — PAROXETINE HYDROCHLORIDE 20 MG/1
40 TABLET, FILM COATED ORAL DAILY
Status: DISCONTINUED | OUTPATIENT
Start: 2024-01-02 | End: 2024-01-08 | Stop reason: HOSPADM

## 2024-01-02 RX ORDER — PAROXETINE HYDROCHLORIDE 20 MG/1
20 TABLET, FILM COATED ORAL DAILY
Status: DISCONTINUED | OUTPATIENT
Start: 2024-01-02 | End: 2024-01-08 | Stop reason: HOSPADM

## 2024-01-02 RX ORDER — LISINOPRIL 10 MG/1
10 TABLET ORAL DAILY
Status: DISCONTINUED | OUTPATIENT
Start: 2024-01-03 | End: 2024-01-08 | Stop reason: HOSPADM

## 2024-01-02 RX ORDER — SODIUM CHLORIDE, SODIUM LACTATE, POTASSIUM CHLORIDE, CALCIUM CHLORIDE 600; 310; 30; 20 MG/100ML; MG/100ML; MG/100ML; MG/100ML
100 INJECTION, SOLUTION INTRAVENOUS CONTINUOUS
Status: DISCONTINUED | OUTPATIENT
Start: 2024-01-02 | End: 2024-01-05

## 2024-01-02 RX ORDER — CEFAZOLIN 1 G/1
INJECTION, POWDER, FOR SOLUTION INTRAVENOUS AS NEEDED
Status: DISCONTINUED | OUTPATIENT
Start: 2024-01-02 | End: 2024-01-02

## 2024-01-02 RX ORDER — MIDAZOLAM HYDROCHLORIDE 1 MG/ML
INJECTION INTRAMUSCULAR; INTRAVENOUS AS NEEDED
Status: DISCONTINUED | OUTPATIENT
Start: 2024-01-02 | End: 2024-01-02

## 2024-01-02 RX ORDER — DROPERIDOL 2.5 MG/ML
0.62 INJECTION, SOLUTION INTRAMUSCULAR; INTRAVENOUS ONCE AS NEEDED
Status: DISCONTINUED | OUTPATIENT
Start: 2024-01-02 | End: 2024-01-02 | Stop reason: HOSPADM

## 2024-01-02 RX ORDER — SIMVASTATIN 20 MG/1
20 TABLET, FILM COATED ORAL NIGHTLY
Status: DISCONTINUED | OUTPATIENT
Start: 2024-01-02 | End: 2024-01-08 | Stop reason: HOSPADM

## 2024-01-02 RX ORDER — ASPIRIN 81 MG/1
81 TABLET ORAL 2 TIMES DAILY
Status: DISCONTINUED | OUTPATIENT
Start: 2024-01-03 | End: 2024-01-08 | Stop reason: HOSPADM

## 2024-01-02 RX ORDER — PROPOFOL 10 MG/ML
INJECTION, EMULSION INTRAVENOUS AS NEEDED
Status: DISCONTINUED | OUTPATIENT
Start: 2024-01-02 | End: 2024-01-02

## 2024-01-02 RX ORDER — IBUPROFEN 200 MG
1 TABLET ORAL DAILY
Status: DISCONTINUED | OUTPATIENT
Start: 2024-01-02 | End: 2024-01-08 | Stop reason: HOSPADM

## 2024-01-02 RX ORDER — FENTANYL CITRATE 50 UG/ML
INJECTION, SOLUTION INTRAMUSCULAR; INTRAVENOUS AS NEEDED
Status: DISCONTINUED | OUTPATIENT
Start: 2024-01-02 | End: 2024-01-02

## 2024-01-02 RX ORDER — LORATADINE 10 MG/1
10 TABLET ORAL DAILY
Status: DISCONTINUED | OUTPATIENT
Start: 2024-01-03 | End: 2024-01-08 | Stop reason: HOSPADM

## 2024-01-02 RX ORDER — OXYCODONE HYDROCHLORIDE 5 MG/1
5 TABLET ORAL EVERY 4 HOURS PRN
Status: DISCONTINUED | OUTPATIENT
Start: 2024-01-02 | End: 2024-01-08 | Stop reason: HOSPADM

## 2024-01-02 RX ORDER — NAPROXEN 500 MG/1
500 TABLET ORAL
Qty: 60 TABLET | Refills: 0 | Status: SHIPPED | OUTPATIENT
Start: 2024-01-02 | End: 2024-02-11 | Stop reason: HOSPADM

## 2024-01-02 RX ORDER — TRAZODONE HYDROCHLORIDE 50 MG/1
100 TABLET ORAL NIGHTLY
Status: DISCONTINUED | OUTPATIENT
Start: 2024-01-02 | End: 2024-01-08 | Stop reason: HOSPADM

## 2024-01-02 RX ORDER — CEFAZOLIN SODIUM 2 G/100ML
2 INJECTION, SOLUTION INTRAVENOUS EVERY 8 HOURS
Status: COMPLETED | OUTPATIENT
Start: 2024-01-02 | End: 2024-01-03

## 2024-01-02 RX ORDER — SODIUM CHLORIDE 0.9 G/100ML
IRRIGANT IRRIGATION AS NEEDED
Status: DISCONTINUED | OUTPATIENT
Start: 2024-01-02 | End: 2024-01-02 | Stop reason: HOSPADM

## 2024-01-02 RX ADMIN — SODIUM CHLORIDE 500 ML: 9 INJECTION, SOLUTION INTRAVENOUS at 17:55

## 2024-01-02 RX ADMIN — SODIUM CHLORIDE, POTASSIUM CHLORIDE, SODIUM LACTATE AND CALCIUM CHLORIDE 100 ML/HR: 600; 310; 30; 20 INJECTION, SOLUTION INTRAVENOUS at 17:58

## 2024-01-02 RX ADMIN — PAROXETINE HYDROCHLORIDE 20 MG: 20 TABLET, FILM COATED ORAL at 19:52

## 2024-01-02 RX ADMIN — PROPOFOL 50 MCG/KG/MIN: 10 INJECTION, EMULSION INTRAVENOUS at 13:00

## 2024-01-02 RX ADMIN — ROCURONIUM BROMIDE 20 MG: 10 INJECTION, SOLUTION INTRAVENOUS at 13:53

## 2024-01-02 RX ADMIN — ACETAMINOPHEN 975 MG: 325 TABLET ORAL at 10:40

## 2024-01-02 RX ADMIN — ARIPIPRAZOLE 15 MG: 5 TABLET ORAL at 19:52

## 2024-01-02 RX ADMIN — SODIUM CHLORIDE, POTASSIUM CHLORIDE, SODIUM LACTATE AND CALCIUM CHLORIDE: 600; 310; 30; 20 INJECTION, SOLUTION INTRAVENOUS at 13:19

## 2024-01-02 RX ADMIN — HYDROMORPHONE HYDROCHLORIDE 0.4 MG: 2 INJECTION, SOLUTION INTRAMUSCULAR; INTRAVENOUS; SUBCUTANEOUS at 12:55

## 2024-01-02 RX ADMIN — HYDROMORPHONE HYDROCHLORIDE 0.6 MG: 2 INJECTION, SOLUTION INTRAMUSCULAR; INTRAVENOUS; SUBCUTANEOUS at 12:48

## 2024-01-02 RX ADMIN — HYDROMORPHONE HYDROCHLORIDE 0.4 MG: 2 INJECTION, SOLUTION INTRAMUSCULAR; INTRAVENOUS; SUBCUTANEOUS at 14:57

## 2024-01-02 RX ADMIN — SUGAMMADEX 100 MG: 100 INJECTION, SOLUTION INTRAVENOUS at 14:37

## 2024-01-02 RX ADMIN — NICOTINE 1 PATCH: 14 PATCH, EXTENDED RELEASE TRANSDERMAL at 18:01

## 2024-01-02 RX ADMIN — TRANEXAMIC ACID 1000 MG: 10 INJECTION, SOLUTION INTRAVENOUS at 14:25

## 2024-01-02 RX ADMIN — DEXAMETHASONE SODIUM PHOSPHATE 8 MG: 4 INJECTION INTRA-ARTICULAR; INTRALESIONAL; INTRAMUSCULAR; INTRAVENOUS; SOFT TISSUE at 12:55

## 2024-01-02 RX ADMIN — SIMVASTATIN 20 MG: 20 TABLET, FILM COATED ORAL at 19:52

## 2024-01-02 RX ADMIN — SODIUM CHLORIDE, POTASSIUM CHLORIDE, SODIUM LACTATE AND CALCIUM CHLORIDE 100 ML/HR: 600; 310; 30; 20 INJECTION, SOLUTION INTRAVENOUS at 10:45

## 2024-01-02 RX ADMIN — TRANEXAMIC ACID 1000 MG: 10 INJECTION, SOLUTION INTRAVENOUS at 12:42

## 2024-01-02 RX ADMIN — SODIUM CHLORIDE, POTASSIUM CHLORIDE, SODIUM LACTATE AND CALCIUM CHLORIDE: 600; 310; 30; 20 INJECTION, SOLUTION INTRAVENOUS at 14:50

## 2024-01-02 RX ADMIN — SUGAMMADEX 100 MG: 100 INJECTION, SOLUTION INTRAVENOUS at 14:42

## 2024-01-02 RX ADMIN — FENTANYL CITRATE 100 MCG: 50 INJECTION, SOLUTION INTRAMUSCULAR; INTRAVENOUS at 12:30

## 2024-01-02 RX ADMIN — LIDOCAINE HYDROCHLORIDE 50 MG: 10 INJECTION, SOLUTION EPIDURAL; INFILTRATION; INTRACAUDAL; PERINEURAL at 12:30

## 2024-01-02 RX ADMIN — MIDAZOLAM HYDROCHLORIDE 2 MG: 1 INJECTION, SOLUTION INTRAMUSCULAR; INTRAVENOUS at 12:21

## 2024-01-02 RX ADMIN — MONTELUKAST 10 MG: 10 TABLET, FILM COATED ORAL at 19:52

## 2024-01-02 RX ADMIN — ACETAMINOPHEN 650 MG: 325 TABLET ORAL at 18:01

## 2024-01-02 RX ADMIN — PROPOFOL 150 MG: 10 INJECTION, EMULSION INTRAVENOUS at 12:30

## 2024-01-02 RX ADMIN — OXYCODONE HYDROCHLORIDE 10 MG: 10 TABLET ORAL at 19:50

## 2024-01-02 RX ADMIN — POVIDONE-IODINE 1 APPLICATION: 5 SOLUTION TOPICAL at 11:06

## 2024-01-02 RX ADMIN — ONDANSETRON 4 MG: 2 INJECTION, SOLUTION INTRAMUSCULAR; INTRAVENOUS at 12:30

## 2024-01-02 RX ADMIN — CELECOXIB 400 MG: 400 CAPSULE ORAL at 10:42

## 2024-01-02 RX ADMIN — ONDANSETRON 4 MG: 2 INJECTION, SOLUTION INTRAMUSCULAR; INTRAVENOUS at 14:33

## 2024-01-02 RX ADMIN — ROCURONIUM BROMIDE 100 MG: 10 INJECTION, SOLUTION INTRAVENOUS at 12:30

## 2024-01-02 RX ADMIN — CEFAZOLIN SODIUM 2 G: 2 INJECTION, SOLUTION INTRAVENOUS at 19:53

## 2024-01-02 RX ADMIN — CEFAZOLIN 2 G: 330 INJECTION, POWDER, FOR SOLUTION INTRAMUSCULAR; INTRAVENOUS at 12:36

## 2024-01-02 RX ADMIN — DOCUSATE SODIUM 100 MG: 100 CAPSULE, LIQUID FILLED ORAL at 19:52

## 2024-01-02 SDOH — SOCIAL STABILITY: SOCIAL INSECURITY: ARE YOU OR HAVE YOU BEEN THREATENED OR ABUSED PHYSICALLY, EMOTIONALLY, OR SEXUALLY BY ANYONE?: NO

## 2024-01-02 SDOH — SOCIAL STABILITY: SOCIAL INSECURITY: DO YOU FEEL ANYONE HAS EXPLOITED OR TAKEN ADVANTAGE OF YOU FINANCIALLY OR OF YOUR PERSONAL PROPERTY?: NO

## 2024-01-02 SDOH — SOCIAL STABILITY: SOCIAL INSECURITY: ARE THERE ANY APPARENT SIGNS OF INJURIES/BEHAVIORS THAT COULD BE RELATED TO ABUSE/NEGLECT?: NO

## 2024-01-02 SDOH — SOCIAL STABILITY: SOCIAL INSECURITY: DOES ANYONE TRY TO KEEP YOU FROM HAVING/CONTACTING OTHER FRIENDS OR DOING THINGS OUTSIDE YOUR HOME?: NO

## 2024-01-02 SDOH — HEALTH STABILITY: MENTAL HEALTH: CURRENT SMOKER: 0

## 2024-01-02 SDOH — SOCIAL STABILITY: SOCIAL INSECURITY: HAVE YOU HAD THOUGHTS OF HARMING ANYONE ELSE?: YES

## 2024-01-02 SDOH — SOCIAL STABILITY: SOCIAL INSECURITY: ABUSE: ADULT

## 2024-01-02 SDOH — SOCIAL STABILITY: SOCIAL INSECURITY: WERE YOU ABLE TO COMPLETE ALL THE BEHAVIORAL HEALTH SCREENINGS?: YES

## 2024-01-02 SDOH — SOCIAL STABILITY: SOCIAL INSECURITY: DO YOU FEEL UNSAFE GOING BACK TO THE PLACE WHERE YOU ARE LIVING?: NO

## 2024-01-02 SDOH — SOCIAL STABILITY: SOCIAL INSECURITY: HAS ANYONE EVER THREATENED TO HURT YOUR FAMILY OR YOUR PETS?: NO

## 2024-01-02 ASSESSMENT — LIFESTYLE VARIABLES
SUBSTANCE_ABUSE_PAST_12_MONTHS: NO
PRESCIPTION_ABUSE_PAST_12_MONTHS: NO
HOW OFTEN DO YOU HAVE A DRINK CONTAINING ALCOHOL: NEVER
SKIP TO QUESTIONS 9-10: 1
AUDIT-C TOTAL SCORE: 0
HOW MANY STANDARD DRINKS CONTAINING ALCOHOL DO YOU HAVE ON A TYPICAL DAY: PATIENT DOES NOT DRINK
AUDIT-C TOTAL SCORE: 0
HOW OFTEN DO YOU HAVE 6 OR MORE DRINKS ON ONE OCCASION: NEVER

## 2024-01-02 ASSESSMENT — COLUMBIA-SUICIDE SEVERITY RATING SCALE - C-SSRS
1. IN THE PAST MONTH, HAVE YOU WISHED YOU WERE DEAD OR WISHED YOU COULD GO TO SLEEP AND NOT WAKE UP?: NO
2. HAVE YOU ACTUALLY HAD ANY THOUGHTS OF KILLING YOURSELF?: NO
6. HAVE YOU EVER DONE ANYTHING, STARTED TO DO ANYTHING, OR PREPARED TO DO ANYTHING TO END YOUR LIFE?: NO

## 2024-01-02 ASSESSMENT — ENCOUNTER SYMPTOMS
JOINT SWELLING: 1
SHORTNESS OF BREATH: 0
MYALGIAS: 1
LIGHT-HEADEDNESS: 0
CONSTITUTIONAL NEGATIVE: 1
PSYCHIATRIC NEGATIVE: 1
NECK STIFFNESS: 0
PALPITATIONS: 0
DIZZINESS: 0

## 2024-01-02 ASSESSMENT — COGNITIVE AND FUNCTIONAL STATUS - GENERAL
WALKING IN HOSPITAL ROOM: A LITTLE
DAILY ACTIVITIY SCORE: 20
WALKING IN HOSPITAL ROOM: A LOT
PATIENT BASELINE BEDBOUND: NO
MOBILITY SCORE: 18
DRESSING REGULAR LOWER BODY CLOTHING: A LITTLE
HELP NEEDED FOR BATHING: A LITTLE
MOVING TO AND FROM BED TO CHAIR: A LITTLE
TOILETING: A LITTLE
DRESSING REGULAR LOWER BODY CLOTHING: A LOT
STANDING UP FROM CHAIR USING ARMS: A LITTLE
MOBILITY SCORE: 20
CLIMB 3 TO 5 STEPS WITH RAILING: A LITTLE
CLIMB 3 TO 5 STEPS WITH RAILING: A LOT
MOVING TO AND FROM BED TO CHAIR: A LITTLE
DAILY ACTIVITIY SCORE: 23
STANDING UP FROM CHAIR USING ARMS: A LITTLE

## 2024-01-02 ASSESSMENT — ACTIVITIES OF DAILY LIVING (ADL)
GROOMING: INDEPENDENT
HEARING - LEFT EAR: FUNCTIONAL
FEEDING YOURSELF: INDEPENDENT
LACK_OF_TRANSPORTATION: NO
PATIENT'S MEMORY ADEQUATE TO SAFELY COMPLETE DAILY ACTIVITIES?: YES
ADEQUATE_TO_COMPLETE_ADL: YES
BATHING: INDEPENDENT
JUDGMENT_ADEQUATE_SAFELY_COMPLETE_DAILY_ACTIVITIES: YES
TOILETING: INDEPENDENT
WALKS IN HOME: INDEPENDENT
HEARING - RIGHT EAR: FUNCTIONAL
ASSISTIVE_DEVICE: WHEELCHAIR
DRESSING YOURSELF: INDEPENDENT

## 2024-01-02 ASSESSMENT — PAIN SCALES - GENERAL
PAINLEVEL_OUTOF10: 0 - NO PAIN
PAINLEVEL_OUTOF10: 0 - NO PAIN
PAINLEVEL_OUTOF10: 8
PAINLEVEL_OUTOF10: 0 - NO PAIN
PAIN_LEVEL: 2
PAINLEVEL_OUTOF10: 0 - NO PAIN
PAINLEVEL_OUTOF10: 0 - NO PAIN
PAINLEVEL_OUTOF10: 4

## 2024-01-02 ASSESSMENT — PAIN - FUNCTIONAL ASSESSMENT: PAIN_FUNCTIONAL_ASSESSMENT: 0-10

## 2024-01-02 ASSESSMENT — PATIENT HEALTH QUESTIONNAIRE - PHQ9
1. LITTLE INTEREST OR PLEASURE IN DOING THINGS: NOT AT ALL
2. FEELING DOWN, DEPRESSED OR HOPELESS: NOT AT ALL
SUM OF ALL RESPONSES TO PHQ9 QUESTIONS 1 & 2: 0

## 2024-01-02 NOTE — DISCHARGE INSTRUCTIONS
TOTAL HIP AND KNEE REPLACEMENT DISCHARGE INSTRUCTIONS  Felix Jalloh MD      DRIVING & TRAVEL AFTER SURGERY   Patients should anticipate waiting at least 4-6 weeks before traveling long distances after surgery.  You will need to stop to walk around ever 1 hour during your travel to help with blood clot prevention.  Please call the office or your joint nurse to discuss prior to post-surgical travel.  Patients may not drive until cleared by the joint nurse or the office.    DENTAL PROCEDURES & CLEANINGS  You must wait a minimum of 3 months for elective dental appointments, including routine cleanings or dental work including bridges, crowns, extractions, etc..  For any dental visit - cleaning or dental procedures - patients must take an antibiotic 1 hour before the appointment.  Antibiotics are a lifelong need before dental appointments.  The antibiotic prescribed will be based on each patient's allergies.    WOUND CARE  If you experience continued drainage or bleeding, you may cover with abdominal pads (purchase at local drug store).  Knee replacements should wrap with an ace wrap.  Do not remove surgical dressing, it will be removed when you arrive in clinic for follow up visit.   Mesh dressing under the bandage will remain in place until it peels off on its own.  If it is loose, you may gently remove.  Do not remove if pulling causes resistance against the incision.      PAIN, SWELLING, BRUISING & CLICKING  Pain and swelling are a natural part of your recovery which is considered normal fur up to a year after surgery.  Symptoms may be treated with movement, ice, compression stockings, elevating your leg, and by following the pain medication regimen as prescribed.  Bruising is normal for several weeks after surgery and will run down the leg over time.  You may ice areas that are tender to help with discomfort.  You are required to wear the provided compression stockings, every day, for 4 weeks following surgery.   Remove the stockings at night and place them back on in the morning.  Pain and swelling may temporarily increase with an increase in activity or exercise.  Use ice after activity.  Audible clicking with movement or exercises is considered normal following joint replacement.  You may also feel decreased sensation or numbness near the incision site.  These are usually normal and may or may not fade over time.     PERSONAL HYGIENE  You may shower upon discharging from the hospital.  Soap and water is permitted to run over the surgical dressing, steri-strips and incision.  Do not scrub directly over these items.  DO NOT soak your incision in a bath, hot tub, pool or pond/lake for a minimum of 8 weeks following your surgery.  DO NOT use lotions, creams, ointments on your wound for a minimum of 6 weeks following your surgery. At that time you may use vitamin E to assist with softening of your incision.      RESTARTING HOME ROUTINE - DIET & MEDICATIONS  Post-operative constipation can result due to a combination of inactivity, anesthesia and pain medication. To help prevent this, you should increase your water and fiber intake. Physical activity such as walking will also help stimulate the bowels.   You may resume your normal diet when you discharge home.  Choose foods that help promote good bowel habits and prevent constipation, such as foods high in fiber.  You may restart your home medications the following day after your surgery UNLESS you have been given alternate instructions.  Follow the instructions given to you on your hospital discharge instructions for more information regarding your home medications.      IN-HOME PHYSICAL THERAPY & OUTPATIENT PHYSICAL THERAPY  In-home physical therapy will start 1-2 days after you get home from the hospital.    The home care agency will call within the first 24-48 hours to set up their first visit.  Please do not call your care team to inquire during this timeframe.  Continue  the exercises you were given in the hospital until you have been seen by in-home therapy.  Make sure to provide a phone number with the ability for the home care staff to leave a message if you do not answer your phone.    Outpatient physical therapy following knee replacement surgery should begin 2-3 weeks after surgery.  You should call to schedule this appointment ASAP if not already scheduled before surgery.  Waiting until you are ready for outpatient physical therapy will cause a delay in your care.  You may choose any outpatient physical therapy location.  Call the office for an order if needed.    EMERGENCIES - WHEN TO CONTACT THE SURGEON'S OFFICE IMMEDIATELY  Fever >101 with chills that has been present for at least 48 hours.   Excessive bleeding from incision that will not slow down. A small amount of drainage is normal and expected.  Once pressure is applied and the area is covered, do not continue to check the area regularly.  This will remove pressure and bleeding will continue.  Leave in place for 4-6 hours.  Signs of infection of incision-excessive drainage that is soaking through your dressing (especially if it is pus-like), redness that is spreading out from the edges of your incision, or increased warmth around the area.  Excruciating pain for which the pain medication, taken as instructed, is not helping.  Severe calf pain.  Go directly to the emergency room or call 911, if you are experiencing chest pain or difficulty breathing.    ICE & COLD THERAPY INSTRUCTIONS    To assist with pain control and post-op swelling, you should be using ice regularly throughout recovery, especially for the first 6 weeks, regardless of the cold therapy method you use.      Always make sure there is a layer of protection between the cold pad and your skin.    If you are using ICE PACKS or GEL PACKS, you will need to alternate 20 minutes on, 20 minutes off twice per hour.    If you are using an ICE MACHINE, please  follow the provided ice machine instructions.  These devices differ from ice or ice packs whereas the mechanism circulates water through tubing and a pad to provide longer periods of cold therapy to the desired site.  You can use your cold devices around the clock for optimal comfort.  We recommend using cold therapy after working with therapy or completing exercises on your own.  There is no set schedule in which you must follow while using cold therapy.  Below are a few points to remember when using a cold therapy device:    You do not need to need to use the 20 on, 20 off method.  Detach the pad from the cooler and ambulate at least once every hour.  You can check your skin under the pad at this time.  You may wear the cold therapy device during periods of sleep including overnight.  If you wake up during the night, you can check the skin at this time.  You do not need to wake up specifically to perform skin checks.  Empty the cooler and pad when device is not in use.  Follow 's instructions for cleaning your cold therapy device.      DISCHARGE MEDICATIONS    PAIN MEDICATION    __X__ Oxycodone-acetaminophen  Oxycodone-acetaminophen has been prescribed for post-operative pain control.    These medications may only be refilled if neededONCE every 7 days for a period of up to 6 weeks following surgery.  After 6 weeks, you will transition to acetaminophen and over -the- counter anti-inflammatories such as Ibuprofen, Advil or Aleve in conjunction with ICE/COLD THERAPY.   Side effects may be constipation and nausea, vomiting, sleepiness, dizziness, lightheadedness, headache, blurred vision, dry mouth sweating, itching (if you have itching, over-the -counter Benadryl can be used as needed).  You may NOT operate a motor vehicle while taking these medications or have been cleared by your care team.    Please call the office for a refill request.  The office staff and orthopedic nurses cannot refill  medications; messages should be left directly through the office.  Please do not call multiple times or call other members of the care team for medication needs, this will cause the refill to take longer.    Per State and Institutional policy, pain medications can only be refilled every 7 days for up to six weeks following surgery.   .    ___X____ Naproxen has been prescribed as an adjunct anti-inflammatory to assist in pain control.    Take one 500 mg tablet twice a day for 4 weeks.  You will not receive refills on this medication.   Side effects may include nausea.  May not be prescribed if you are on a more potent blood thinner than aspirin or have chronic kidney disease.    BLOOD THINNER    __X__ Blood Thinner   A blood thinner has been prescribed to prevent blood clots in your leg or lungs. Take as prescribed on the bottle for 4 weeks. You will not receive a refill on this medication.      STOOL SOFTENERS    __X__ Colace (Docusate Sodium)   Take this medication to help with constipation while using the Oxycodone for pain control.  You will not receive a refill on this medication.    Antibiotics    __X__ Cefadroxil or doxycycline  May be prescribed if needed for prophylaxis against infection   Take 7 day course of antibiotics until they are all gone  May not be prescribed if you do not meet criteria for elevated infection risk after surgery          You will not receive refills on the following medications:    Naproxen  Colace  Blood Thinner

## 2024-01-02 NOTE — ANESTHESIA PREPROCEDURE EVALUATION
Patient: Blanca Hamilton    Procedure Information       Date/Time: 01/02/24 1200    Procedure: ARTHROPLASTY TOTAL HIP POSTERIOR APPROACH (Right: Hip)    Location: [facility] / [facility]    Surgeons: Felix Jalloh MD            Relevant Problems   Anesthesia   (-) PONV (postoperative nausea and vomiting)      Cardiovascular   (+) Hyperlipidemia   (+) Primary hypertension      Neuro/Psych   (+) Anxiety   (+) Depression      Musculoskeletal   (+) Osteoarthritis of right hip, unspecified osteoarthritis type      Other   (+) Arthritis of right hip       Clinical information reviewed:                   NPO Detail:  No data recorded     Physical Exam    Airway  Mallampati: II  TM distance: >3 FB  Neck ROM: full     Cardiovascular - normal exam     Dental   (+) lower dentures, upper dentures     Pulmonary - normal exam     Abdominal - normal exam             Anesthesia Plan    ASA 3     spinal     The patient is not a current smoker.    Postoperative administration of opioids is intended.  Anesthetic plan and risks discussed with patient.  Use of blood products discussed with patient who.    Plan discussed with CRNA.

## 2024-01-02 NOTE — ANESTHESIA PROCEDURE NOTES
Airway  Date/Time: 1/2/2024 12:30 PM  Urgency: elective    Airway not difficult    Staffing  Performed: EUGENIA   Authorized by: ELVIRA Elizabeth    Performed by: ELVIRA Elizabeth  Patient location during procedure: OR    Indications and Patient Condition  Indications for airway management: anesthesia  Spontaneous Ventilation: absent  Sedation level: deep  Preoxygenated: yes  Patient position: sniffing  MILS maintained throughout  Mask difficulty assessment: 1 - vent by mask  Planned trial extubation    Final Airway Details  Final airway type: endotracheal airway      Successful airway: ETT  Cuffed: yes   Successful intubation technique: video laryngoscopy  Facilitating devices/methods: intubating stylet  Endotracheal tube insertion site: oral  Blade type: CRISTOBAL DEXTER.  Blade size: #3  Cormack-Lehane Classification: grade I - full view of glottis  Placement verified by: chest auscultation   Cuff volume (mL): 10  Measured from: teeth  ETT to teeth (cm): 21  Number of attempts at approach: 1  Number of other approaches attempted: 0

## 2024-01-02 NOTE — ANESTHESIA POSTPROCEDURE EVALUATION
Patient: Blanca Hamilton    Procedure Summary       Date: 01/02/24 Room / Location: [facility] / [facility]    Anesthesia Start: 1214 Anesthesia Stop: 1458    Procedure: ARTHROPLASTY TOTAL HIP POSTERIOR APPROACH (Right: Hip) Diagnosis:       Right hip pain      (Right hip pain [M25.551])    Surgeons: Felix Jalloh MD Responsible Provider: ELVIRA Elizabeth    Anesthesia Type: spinal ASA Status: 3            Anesthesia Type: spinal    Vitals Value Taken Time   /59 01/02/24 1516   Temp 36.1 °C (97 °F) 01/02/24 1500   Pulse 109 01/02/24 1525   Resp 16 01/02/24 1500   SpO2 94 % 01/02/24 1525   Vitals shown include unvalidated device data.    Anesthesia Post Evaluation    Patient location during evaluation: PACU  Patient participation: complete - patient participated  Level of consciousness: awake  Pain score: 2  Pain management: adequate  Multimodal analgesia pain management approach  Airway patency: patent  Two or more strategies used to mitigate risk of obstructive sleep apnea  Cardiovascular status: acceptable  Respiratory status: acceptable  Hydration status: acceptable  Postoperative Nausea and Vomiting: none    No notable events documented.

## 2024-01-02 NOTE — CONSULTS
Consults    Reason For Consult  Medical management     History Of Present Illness  Blanca Hamilton is a 48 y.o. female who underwent R ALEXA this morning. Patient had severe degenerative disease of the affected joint. She complained of severe pain in the area, the pain is gradually getting worse. She extensive nonsurgical treatment including anti-inflammatories physical therapy use of assistive devices activity modification cortisone injections. EBL 250cc. Mild tachycardia present after surgery. She is receiving IV fluids and denies lightheadedness, chest pain and SOB.        Past Medical History  She has a past medical history of Anxiety, Arthritis, Depression, GERD (gastroesophageal reflux disease), HTN (hypertension), Hyperlipidemia, Right hip pain, Sinusitis, and Transfusion history.    Surgical History  She has a past surgical history that includes Total hip arthroplasty (Left); Knee arthroscopy w/ debridement (Left); Tubal ligation; and Bladder suspension.     Social History  She reports that she has been smoking cigarettes. She has been smoking an average of 1 pack per day. She has never used smokeless tobacco. She reports that she does not currently use alcohol. She reports that she does not currently use drugs.    Family History  No family history on file.     Allergies  Patient has no known allergies.    Review of Systems  Review of Systems   Constitutional: Negative.    Respiratory:  Negative for shortness of breath.    Cardiovascular:  Negative for palpitations.   Musculoskeletal:  Positive for joint swelling and myalgias. Negative for neck stiffness.   Neurological:  Negative for dizziness and light-headedness.   Psychiatric/Behavioral: Negative.           Physical Exam  Physical Exam  HENT:      Head:      Comments: Excessive facial hair h/o virilization     Mouth/Throat:      Mouth: Mucous membranes are moist.   Eyes:      Extraocular Movements: Extraocular movements intact.   Cardiovascular:      Rate and  Rhythm: Tachycardia present.   Musculoskeletal:         General: Tenderness present.      Comments: Right hip surgical incision   Skin:     General: Skin is dry.   Neurological:      General: No focal deficit present.      Mental Status: She is alert.   Psychiatric:         Mood and Affect: Mood normal.             Last Recorded Vitals  /83   Pulse (!) 117   Temp 36.1 °C (97 °F)   Resp 16   Wt 89 kg (196 lb 3.4 oz)   SpO2 100%     Relevant Results  No results found for this or any previous visit (from the past 24 hour(s)).     === 12/26/23 ===    XR LUMBAR SPINE 2-3 VIEWS    - Impression -  1. As above.    MACRO:  None.    Signed by: Yolie Tate 12/27/2023 10:56 PM  Dictation workstation:   [id number]     Scheduled medications  acetaminophen, 650 mg, oral, q6h DARLENE  ARIPiprazole, 15 mg, oral, Daily  [START ON 1/3/2024] aspirin, 81 mg, oral, BID  ceFAZolin, 2 g, intravenous, q8h  docusate sodium, 100 mg, oral, BID  [START ON 1/3/2024] ferrous sulfate (325 mg ferrous sulfate), 1 tablet, oral, Daily  [START ON 1/3/2024] lisinopril, 10 mg, oral, Daily  [START ON 1/3/2024] loratadine, 10 mg, oral, Daily  montelukast, 10 mg, oral, Nightly  nicotine, 1 patch, transdermal, Daily  [START ON 1/3/2024] pantoprazole, 40 mg, oral, Daily before breakfast  PARoxetine, 20 mg, oral, Daily  PARoxetine, 40 mg, oral, Daily  polyethylene glycol, 17 g, oral, Daily  simvastatin, 20 mg, oral, Nightly  traZODone, 100 mg, oral, Nightly      Continuous medications  lactated Ringer's, 100 mL/hr, Last Rate: 100 mL/hr (01/02/24 1450)  lactated Ringer's, 100 mL/hr  oxygen, 2 L/min      PRN medications  PRN medications: albuterol, HYDROmorphone, naloxone, ondansetron **OR** ondansetron, oxyCODONE, oxyCODONE     Patient Active Problem List   Diagnosis    Primary hypertension    Anxiety    Depression    Hyperlipidemia          Assessment/Plan     Blanca Hamilton is a 48 y.o. female who underwent R ALEXA this morning. Patient had severe  degenerative disease of the affected joint. She complained of severe pain in the area, the pain is gradually getting worse. She extensive nonsurgical treatment including anti-inflammatories physical therapy use of assistive devices activity modification cortisone injections. EBL 250cc. Mild tachycardia present after surgery. She is receiving IV fluids and denies lightheadedness, chest pain and SOB.     #Osteoarthritis s/p R ALEXA   Pain management  IS  Patient is wheelchair bound and lives alone. PT/OT evaluation required.    #Sinus tachycardia  Epinephrine used per protocol during surgery  Will continue IV fluids and monitor; bolus added  Consider CTA to rule out PE if not better   ml  EKG if irregular  Repeat H&H    #Tobacco use  Nicotine patch and smoking cessation counseling    #HTN will continue lisinopril on 1/3/24 and monitor BP    #GERD continued PPI    #HLD continue statin    #DVT prophylaxis to be discussed with ortho            DEVON Rios-CNP

## 2024-01-02 NOTE — OP NOTE
ARTHROPLASTY TOTAL HIP POSTERIOR APPROACH (R) Operative Note     Date: 2024  OR Location: [facility] OR    Name: Blanca Hamilton, : 1975, Age: 48 y.o., MRN: [id number], Sex: female    Diagnosis  Pre-op Diagnosis     * Right hip pain [M25.551] Post-op Diagnosis     * Right hip pain [M25.551]     Procedures  ARTHROPLASTY TOTAL HIP POSTERIOR APPROACH  31037 - [place] ARTHRP ACETBLR/PROX FEM PROSTC AGRFT/ALGRFT      Surgeons      * Felix Jalloh - Primary    Resident/Fellow/Other Assistant:  Surgeon(s) and Role: Lauri Vaughn, PGY 5    Procedure Summary  Anesthesia: Spinal  ASA: III  Anesthesia Staff: CRNA: DEVON Elizabeth-CRNA  Estimated Blood Loss: 500 mL  Intra-op Medications:   Medication Name Total Dose   EPINEPHrine (Adrenalin) 0.2 mL, ketorolac (Toradol) 30 mg, morphine PF (Duramorph) 2.5 mg, ropivacaine (Naropin) 30 mL in sodium chloride 0.9% 20 mL syringe 56 mL   sodium chloride 0.9 % irrigation solution 250 mL   lactated Ringer's infusion 85 mL              Anesthesia Record               Intraprocedure I/O Totals          Intake    LR 1000.00 mL    Total Intake 1000 mL       Output    Urine 500 mL    Est. Blood Loss 500 mL    Total Output 1000 mL       Net    Net Volume 0 mL          Specimen: No specimens collected     Staff:   Circulator: Diamond Schwartz RN; iLdia Granados RN  Relief Scrub: Pavel Dominguez  Scrub Person: Octavia Paz RN         Drains and/or Catheters:   Urethral Catheter (Active)   Site Assessment Clean;Skin intact 24 1331   Collection Container Standard drainage bag 24 1331   Securement Method Leg strap 24 1331   Reason for Continuing Urinary Catheterization surgical procedures: urological/gynecological, pelvic oncology, anal, prolonged surgical procedure 24 1331   Irrigant Other (Comment) 24 1331       Tourniquet Times:         Implants:  Implants       Type Name Action Serial No.      Joint Hip LINER, COCR, MDM, 42MM E - SNA - [id number]  Implanted NA     Joint Hip LARISA TRIDENT II TRITANIUM MULTIHOLE ACETABULAR SHELL 54MM Implanted NA     Screw SCREW, LOW PROFILE HEX, 6.5 X 20 MM - SNA - [id number] Implanted NA     Screw SCREW, LOW PROFILE HEX, 6.5 X 30 MM - SNA - [id number] Implanted NA     Joint INSERT, MDM X3 RESTORATION, 42OD 28ID X 6.9MM - SNA - [id number] Implanted NA     Joint STEM, FEM ACCOLADE II, 127 DEG, SZ 8 - SNA - [id number] Implanted NA     Joint STEM, FEM ACCOLADE II, 127 DEG, SZ 8 - [id number] Implanted      Joint HEAD, FEMUR V40 28MM -4MM BIOLOX DELTA - SNA - [id number] Wasted NA     Joint HEAD, FEMUR V40 28MM 0MM BIOLOX DELTA - SNA - [id number] Implanted NA              Findings: Stemless, fracture into the medial calcar.  Acetabular cup stable    Indications: Blanca Hamilton is an 48 y.o. female who is having surgery for periprosthetic right femur fracture    The patient was seen in the preoperative area. The risks, benefits, complications, treatment options, non-operative alternatives, expected recovery and outcomes were discussed with the patient. The possibilities of reaction to medication, pulmonary aspiration, injury to surrounding structures, bleeding, recurrent infection, the need for additional procedures, failure to diagnose a condition, and creating a complication requiring transfusion or operation were discussed with the patient. The patient concurred with the proposed plan, giving informed consent.  The site of surgery was properly noted/marked if necessary per policy. The patient has been actively warmed in preoperative area. Preoperative antibiotics have been ordered and given within 1 hours of incision. Venous thrombosis prophylaxis have been ordered including bilateral sequential compression devices and chemical prophylaxis    Procedure Details: Statement of medical necessity: This is a 48-year-old female who is 3 days out from complex right total hip arthroplasty with severe protrusio and femoral flexion deformity.  She  suffered a periprosthetic fracture around the right femur.  The risks benefits and alternatives of revision of the total hip and fixation of the fracture were explained to the patient and she signed informed consent    Description of procedure: Patient was brought to the operating room placed on the operating table in the supine position all bony prominences were well-padded appropriate preoperative antibiotics were given anesthesia was induced by the anesthesia team patient was placed in the lateral decubitus position with the right hip up.  She was prepped and draped in usual sterile fashion a timeout was performed patient was identified by name and medical record number and laterality and site of surgery were confirmed by all present.    We given making it standard posterior approach to the hip, we used the patient's previous approach and cut through the sutures.  We remove the sutures and the tensor fascia as well as the posterior capsular repair.  We immediately noted that there was a fracture of the significant portion of the medial calcar extending about 5 cm distally.  The remainder of the femoral shaft including the greater trochanter appeared to be intact.  The stem was grossly loose and was removed.  The entire area was irrigated to remove fracture hematoma.  We debrided the area around the fracture removing any partially organized hematoma.  We then set about reducing the fracture.  We placed cables proximal and distal to the lesser trochanter.  We brought the hip into external rotation in order to allow the fracture to reduce we then tightened the cables achieving an excellent reduction of the fracture.  We tightened him further as we once we are able to bring it into internal rotation noting that we are able to maintain a good reduction of the fracture.  We then set about reconstructing the femur.  We did this with a diaphyseal fitting modular fluted tapered stem.  We began by reaming for a distal  stem.  The patient had very poor bone stock and very wide canal but were able to get excellent fixation at a size 24.  Once we had excellent reamer contact we placed our final distal stem.  We noted that it was longitudinally and rotationally stable once we had impacted it into position.  We then began trialing the cone body, we reamed for the cone body and then placed the cone body trial and an appropriate amount of anteversion.  We placed the head and reduced the hip.  With the hip reduced with noted restoration of the patient's leg length and offset and excellent stability anteriorly and posteriorly.  Again it was difficult to trial anterior stability given the patient's extraordinary flexion contracture of the hip which was similar in her previous procedure.  This did this was limiting to us in terms of the reconstruction but we were able to get a very solid stable construct.  We then took fluoroscopic films to ensure the reduction and distal stem placement.  We remove the trials, irrigated with normal saline.  We placed the final cone body and impacted into position at the same rotation that we had used for the trial.  We drove him the screw and torqued it with the torque limiting screwdriver.  We then cleaned and dried the trunnion and impacted the final head into position.  We reduced the hip took the range of motion once more and found that we had excellent stability.  We irrigated again with normal saline and Irrisept solution.  We then closed the posterior capsule, this posterior capsular closure was done with #5 Ethibond via the medius tendon with locking sutures in the posterior capsule short external rotators and piriformis as 1 single soft tissue sleeve.  As noted in the previous operative report were unable to completely bring that tissue up because it had been in the patient's position of previously significantly medialized hip center.  We then irrigated again with normal saline we closed the tensor  fascia with #1 Vicryl and a running #2 Quill.  We closed the skin with 2-0 Vicryl in the subcu layer followed by 2-0 nylon and staples in the skin..  A Prevena negative pressure wound therapy device was placed.  Patient was awoken from anesthesia by the anesthesia and brought to the PICU PACU in stable condition    Postoperative plan: Patient will bear weight at 50% weightbearing outside with strict posterior hip precautions.  IV antibiotics while in house, p.o. antibiotic course after discharge.  Other standard supportive postoperative care    Statement of staff presence: I was present and scrubbed for all critical portions of this procedure  Complications:  None; patient tolerated the procedure well.    Disposition: PACU - hemodynamically stable.  Condition: stable         Additional Details: none    Attending Attestation: I was present and scrubbed for the key portions of the procedure.    Felix Jalloh  Phone Number: [phone]

## 2024-01-02 NOTE — CARE PLAN
The patient's goals for the shift include pain control    The clinical goals for the shift include pain control    Over the shift, the patient did not make progress toward the following goals. Barriers to progression include recent surgery. Recommendations to address these barriers include support.

## 2024-01-03 ENCOUNTER — APPOINTMENT (OUTPATIENT)
Dept: RADIOLOGY | Facility: HOSPITAL | Age: 49
DRG: 467 | End: 2024-01-03
Payer: COMMERCIAL

## 2024-01-03 PROBLEM — M16.11 ARTHRITIS OF RIGHT HIP: Status: ACTIVE | Noted: 2023-10-18

## 2024-01-03 PROBLEM — M16.11 OSTEOARTHRITIS OF RIGHT HIP, UNSPECIFIED OSTEOARTHRITIS TYPE: Status: ACTIVE | Noted: 2024-01-03

## 2024-01-03 LAB
ANION GAP SERPL CALC-SCNC: 10 MMOL/L (ref 10–20)
BUN SERPL-MCNC: 10 MG/DL (ref 6–23)
CALCIUM SERPL-MCNC: 7.9 MG/DL (ref 8.6–10.3)
CHLORIDE SERPL-SCNC: 105 MMOL/L (ref 98–107)
CO2 SERPL-SCNC: 26 MMOL/L (ref 21–32)
CREAT SERPL-MCNC: 0.42 MG/DL (ref 0.5–1.05)
ERYTHROCYTE [DISTWIDTH] IN BLOOD BY AUTOMATED COUNT: 19.1 % (ref 11.5–14.5)
GFR SERPL CREATININE-BSD FRML MDRD: >90 ML/MIN/1.73M*2
GLUCOSE SERPL-MCNC: 133 MG/DL (ref 74–99)
HCT VFR BLD AUTO: 24.3 % (ref 36–46)
HCT VFR BLD AUTO: 25.7 % (ref 36–46)
HGB BLD-MCNC: 7.3 G/DL (ref 12–16)
HGB BLD-MCNC: 7.6 G/DL (ref 12–16)
MCH RBC QN AUTO: 22.6 PG (ref 26–34)
MCHC RBC AUTO-ENTMCNC: 29.6 G/DL (ref 32–36)
MCV RBC AUTO: 76 FL (ref 80–100)
NRBC BLD-RTO: 0 /100 WBCS (ref 0–0)
PLATELET # BLD AUTO: 249 X10*3/UL (ref 150–450)
POTASSIUM SERPL-SCNC: 3.8 MMOL/L (ref 3.5–5.3)
RBC # BLD AUTO: 3.37 X10*6/UL (ref 4–5.2)
SODIUM SERPL-SCNC: 137 MMOL/L (ref 136–145)
WBC # BLD AUTO: 8.5 X10*3/UL (ref 4.4–11.3)

## 2024-01-03 PROCEDURE — 80048 BASIC METABOLIC PNL TOTAL CA: CPT | Performed by: NURSE PRACTITIONER

## 2024-01-03 PROCEDURE — 99233 SBSQ HOSP IP/OBS HIGH 50: CPT | Performed by: NURSE PRACTITIONER

## 2024-01-03 PROCEDURE — 85027 COMPLETE CBC AUTOMATED: CPT | Performed by: NURSE PRACTITIONER

## 2024-01-03 PROCEDURE — 73700 CT LOWER EXTREMITY W/O DYE: CPT | Mod: RT

## 2024-01-03 PROCEDURE — 2500000002 HC RX 250 W HCPCS SELF ADMINISTERED DRUGS (ALT 637 FOR MEDICARE OP, ALT 636 FOR OP/ED): Performed by: NURSE PRACTITIONER

## 2024-01-03 PROCEDURE — 99232 SBSQ HOSP IP/OBS MODERATE 35: CPT | Performed by: NURSE PRACTITIONER

## 2024-01-03 PROCEDURE — 36415 COLL VENOUS BLD VENIPUNCTURE: CPT | Performed by: NURSE PRACTITIONER

## 2024-01-03 PROCEDURE — 2500000001 HC RX 250 WO HCPCS SELF ADMINISTERED DRUGS (ALT 637 FOR MEDICARE OP): Performed by: NURSE PRACTITIONER

## 2024-01-03 PROCEDURE — 73552 X-RAY EXAM OF FEMUR 2/>: CPT | Mod: RT

## 2024-01-03 PROCEDURE — 1100000001 HC PRIVATE ROOM DAILY

## 2024-01-03 PROCEDURE — 85014 HEMATOCRIT: CPT | Performed by: NURSE PRACTITIONER

## 2024-01-03 PROCEDURE — 2500000004 HC RX 250 GENERAL PHARMACY W/ HCPCS (ALT 636 FOR OP/ED): Performed by: NURSE PRACTITIONER

## 2024-01-03 PROCEDURE — 73552 X-RAY EXAM OF FEMUR 2/>: CPT | Mod: RIGHT SIDE | Performed by: RADIOLOGY

## 2024-01-03 PROCEDURE — S4991 NICOTINE PATCH NONLEGEND: HCPCS | Performed by: NURSE PRACTITIONER

## 2024-01-03 PROCEDURE — 2500000005 HC RX 250 GENERAL PHARMACY W/O HCPCS: Performed by: NURSE PRACTITIONER

## 2024-01-03 RX ORDER — ENOXAPARIN SODIUM 100 MG/ML
40 INJECTION SUBCUTANEOUS EVERY 24 HOURS
Status: DISCONTINUED | OUTPATIENT
Start: 2024-01-03 | End: 2024-01-08 | Stop reason: HOSPADM

## 2024-01-03 RX ORDER — FAMOTIDINE 20 MG/1
20 TABLET, FILM COATED ORAL 2 TIMES DAILY
Status: DISCONTINUED | OUTPATIENT
Start: 2024-01-03 | End: 2024-01-08 | Stop reason: HOSPADM

## 2024-01-03 RX ADMIN — ARIPIPRAZOLE 15 MG: 5 TABLET ORAL at 21:56

## 2024-01-03 RX ADMIN — NICOTINE 1 PATCH: 14 PATCH, EXTENDED RELEASE TRANSDERMAL at 08:20

## 2024-01-03 RX ADMIN — OXYCODONE HYDROCHLORIDE 10 MG: 10 TABLET ORAL at 05:25

## 2024-01-03 RX ADMIN — DOCUSATE SODIUM 100 MG: 100 CAPSULE, LIQUID FILLED ORAL at 08:19

## 2024-01-03 RX ADMIN — ONDANSETRON HYDROCHLORIDE 4 MG: 4 TABLET, FILM COATED ORAL at 12:09

## 2024-01-03 RX ADMIN — PANTOPRAZOLE SODIUM 40 MG: 40 TABLET, DELAYED RELEASE ORAL at 08:19

## 2024-01-03 RX ADMIN — FAMOTIDINE 20 MG: 20 TABLET ORAL at 21:56

## 2024-01-03 RX ADMIN — ACETAMINOPHEN 650 MG: 325 TABLET ORAL at 18:58

## 2024-01-03 RX ADMIN — SIMVASTATIN 20 MG: 20 TABLET, FILM COATED ORAL at 21:56

## 2024-01-03 RX ADMIN — DOCUSATE SODIUM 100 MG: 100 CAPSULE, LIQUID FILLED ORAL at 21:56

## 2024-01-03 RX ADMIN — CEFAZOLIN SODIUM 2 G: 2 INJECTION, SOLUTION INTRAVENOUS at 04:25

## 2024-01-03 RX ADMIN — LORATADINE 10 MG: 10 TABLET ORAL at 08:19

## 2024-01-03 RX ADMIN — FAMOTIDINE 20 MG: 20 TABLET ORAL at 14:47

## 2024-01-03 RX ADMIN — ACETAMINOPHEN 650 MG: 325 TABLET ORAL at 05:25

## 2024-01-03 RX ADMIN — FERROUS SULFATE TAB 325 MG (65 MG ELEMENTAL FE) 1 TABLET: 325 (65 FE) TAB at 08:20

## 2024-01-03 RX ADMIN — PAROXETINE HYDROCHLORIDE 20 MG: 20 TABLET, FILM COATED ORAL at 21:55

## 2024-01-03 RX ADMIN — LISINOPRIL 10 MG: 10 TABLET ORAL at 08:19

## 2024-01-03 RX ADMIN — ASPIRIN 81 MG: 81 TABLET, COATED ORAL at 08:19

## 2024-01-03 RX ADMIN — PAROXETINE HYDROCHLORIDE 40 MG: 20 TABLET, FILM COATED ORAL at 08:20

## 2024-01-03 RX ADMIN — MONTELUKAST 10 MG: 10 TABLET, FILM COATED ORAL at 21:56

## 2024-01-03 ASSESSMENT — COGNITIVE AND FUNCTIONAL STATUS - GENERAL
DAILY ACTIVITIY SCORE: 18
DAILY ACTIVITIY SCORE: 18
CLIMB 3 TO 5 STEPS WITH RAILING: A LOT
DRESSING REGULAR LOWER BODY CLOTHING: A LOT
DRESSING REGULAR UPPER BODY CLOTHING: A LITTLE
MOVING TO AND FROM BED TO CHAIR: A LITTLE
MOBILITY SCORE: 18
CLIMB 3 TO 5 STEPS WITH RAILING: A LOT
STANDING UP FROM CHAIR USING ARMS: A LITTLE
WALKING IN HOSPITAL ROOM: A LOT
MOVING TO AND FROM BED TO CHAIR: A LITTLE
HELP NEEDED FOR BATHING: A LOT
DRESSING REGULAR LOWER BODY CLOTHING: A LOT
WALKING IN HOSPITAL ROOM: A LOT
STANDING UP FROM CHAIR USING ARMS: A LITTLE
TOILETING: A LITTLE
MOBILITY SCORE: 18
DRESSING REGULAR UPPER BODY CLOTHING: A LITTLE
TOILETING: A LITTLE
HELP NEEDED FOR BATHING: A LOT

## 2024-01-03 ASSESSMENT — PAIN SCALES - GENERAL
PAINLEVEL_OUTOF10: 10 - WORST POSSIBLE PAIN
PAINLEVEL_OUTOF10: 5 - MODERATE PAIN
PAINLEVEL_OUTOF10: 5 - MODERATE PAIN
PAINLEVEL_OUTOF10: 0 - NO PAIN

## 2024-01-03 ASSESSMENT — PAIN - FUNCTIONAL ASSESSMENT
PAIN_FUNCTIONAL_ASSESSMENT: 0-10

## 2024-01-03 NOTE — PROGRESS NOTES
"Blanca Hamilton is a 48 y.o. female on day 0 of admission presenting with Right hip pain.    Subjective   Significant pain when attempting to ambulate to bathroom overnight.  Imaging reviewed with Dr. Jalloh and patient appears to have a fracture of calcar and subsidence of stem.     Objective     Physical Exam  Vitals reviewed.   HENT:      Head: Normocephalic and atraumatic.   Eyes:      Extraocular Movements: Extraocular movements intact.      Conjunctiva/sclera: Conjunctivae normal.      Pupils: Pupils are equal, round, and reactive to light.   Cardiovascular:      Rate and Rhythm: Normal rate and regular rhythm.      Pulses: Normal pulses.   Pulmonary:      Effort: Pulmonary effort is normal.      Breath sounds: Normal breath sounds.   Abdominal:      General: Bowel sounds are normal.      Palpations: Abdomen is soft.   Musculoskeletal:      Comments: Diffusely TTP over right lateral thigh.  Right posterior hip dressing C/D/I, surrounding tissues soft and compressible.  Motion and sensations intact, leg and foot warm and well perfused.     Skin:     General: Skin is warm and dry.      Capillary Refill: Capillary refill takes less than 2 seconds.   Neurological:      General: No focal deficit present.      Mental Status: She is alert and oriented to person, place, and time.   Psychiatric:         Mood and Affect: Mood normal.       Last Recorded Vitals  Blood pressure 106/63, pulse 83, temperature 36.3 °C (97.3 °F), temperature source Tympanic, resp. rate 16, height 1.727 m (5' 7.99\"), weight 89 kg (196 lb 3.4 oz), SpO2 99 %.    Intake/Output last 3 Shifts:  I/O last 3 completed shifts:  In: 2923.3 (32.8 mL/kg) [P.O.:240; I.V.:1983.3 (22.3 mL/kg); IV Piggyback:700]  Out: 350 (3.9 mL/kg) [Urine:100 (0 mL/kg/hr); Blood:250]  Weight: 89 kg     Relevant Results  XR femur right 2+ views    Result Date: 1/3/2024  Interpreted By:  Schoenberger, Joseph, STUDY: XR FEMUR RIGHT 2+ VIEWS; ;  1/3/2024 8:46 am   INDICATION: " Signs/Symptoms:concern for fracture in setting of right ALEXA.   COMPARISON: None.   ACCESSION NUMBER(S): [id number]   ORDERING CLINICIAN: NIK ERICKSON   FINDINGS: All the surgical changes associated with a recent right total hip arthroplasty are noted. The apparent satisfactory positioning of the prosthetic components. Soft tissue air in keeping with recent operative status. The findings are similar to the exam from the prior day but new from exam performed 10/17/2023. There is considerable acetabular protrusio on the prior exam though the positioning of the acetabular component on this exam appear satisfactory. There is slight angulation of the cortex at the inferomedial the or junction of the neck with the lesser trochanter/intra trochanteric femur. However, upon other views of the hip, a definite periprosthetic fracture is not convincingly identified.       Findings are similar tip the yesterday's exam. No definite more distal femur fracture is seen. Expected findings post total hip arthroplasty. Slight angulation at the cortex on the AP radiograph associated the lesser trochanter of uncertain significance. See discussion above.     MACRO: None   Signed by: Joseph Schoenberger 1/3/2024 9:10 AM Dictation workstation:   [id number]    XR pelvis 1-2 views    Result Date: 1/2/2024  STUDY: Pelvis Radiographs; 01/02/2024 2:37 PM INDICATION: Post-operative for right total hip arthroplasty. COMPARISON: None Available. ACCESSION NUMBER(S): [id number] ORDERING CLINICIAN: NIK ERICKSON TECHNIQUE:  One view(s) of the pelvis. FINDINGS:  This exam is positioned with both legs displaced to the right and the superior portion of the pelvis is not included.  There are bilateral total hip prosthesis.  Alignment for both prosthesis appears normal in this oblique view.    Bilateral total hip prosthesis in place.  No acute abnormalities are identified in this oblique view of the lower pelvis and hips.. Signed by Ross Caraballo  MD    ECG 12 Lead    Result Date: 12/29/2023  Undetermined rhythm Otherwise normal ECG When compared with ECG of 05-NOV-2019 13:54, Current undetermined rhythm precludes rhythm comparison, needs review    XR lumbar spine 2-3 views    Result Date: 12/27/2023  Interpreted By:  Yolie Tate, STUDY: Lumbar Spine, three views.   INDICATION: Signs/Symptoms:pre operative evaluation for spinal mobility.   COMPARISON: None.   ACCESSION NUMBER(S): [id number]   ORDERING CLINICIAN: JON HERNANDEZ   FINDINGS: Alignment is within normal limits. Mild L5-S1 spondylosis with mild disc height loss. Mild facet joint arthropathy from L3-4 to L5-S1 noted. Vertebral body heights are preserved. Posterior elements are intact.       1. As above.   MACRO: None.   Signed by: Yolie Tate 12/27/2023 10:56 PM Dictation workstation:   [id number]    Scheduled medications  acetaminophen, 650 mg, oral, q6h DARLENE  ARIPiprazole, 15 mg, oral, Daily  aspirin, 81 mg, oral, BID  docusate sodium, 100 mg, oral, BID  ferrous sulfate (325 mg ferrous sulfate), 1 tablet, oral, Daily  lisinopril, 10 mg, oral, Daily  loratadine, 10 mg, oral, Daily  montelukast, 10 mg, oral, Nightly  nicotine, 1 patch, transdermal, Daily  pantoprazole, 40 mg, oral, Daily before breakfast  PARoxetine, 20 mg, oral, Daily  PARoxetine, 40 mg, oral, Daily  polyethylene glycol, 17 g, oral, Daily  simvastatin, 20 mg, oral, Nightly  traZODone, 100 mg, oral, Nightly      Continuous medications  lactated Ringer's, 100 mL/hr, Last Rate: 100 mL/hr (01/02/24 1843)  lactated Ringer's, 100 mL/hr, Last Rate: 100 mL/hr (01/02/24 1843)  oxygen, 2 L/min      PRN medications  PRN medications: albuterol, HYDROmorphone, naloxone, ondansetron **OR** ondansetron, oxyCODONE, oxyCODONE    Results for orders placed or performed during the hospital encounter of 01/02/24 (from the past 24 hour(s))   CBC   Result Value Ref Range    WBC 8.5 4.4 - 11.3 x10*3/uL    nRBC 0.0 0.0 - 0.0 /100 WBCs    RBC  3.37 (L) 4.00 - 5.20 x10*6/uL    Hemoglobin 7.6 (L) 12.0 - 16.0 g/dL    Hematocrit 25.7 (L) 36.0 - 46.0 %    MCV 76 (L) 80 - 100 fL    MCH 22.6 (L) 26.0 - 34.0 pg    MCHC 29.6 (L) 32.0 - 36.0 g/dL    RDW 19.1 (H) 11.5 - 14.5 %    Platelets 249 150 - 450 x10*3/uL   Basic metabolic panel   Result Value Ref Range    Glucose 133 (H) 74 - 99 mg/dL    Sodium 137 136 - 145 mmol/L    Potassium 3.8 3.5 - 5.3 mmol/L    Chloride 105 98 - 107 mmol/L    Bicarbonate 26 21 - 32 mmol/L    Anion Gap 10 10 - 20 mmol/L    Urea Nitrogen 10 6 - 23 mg/dL    Creatinine 0.42 (L) 0.50 - 1.05 mg/dL    eGFR >90 >60 mL/min/1.73m*2    Calcium 7.9 (L) 8.6 - 10.3 mg/dL       Assessment/Plan   Active Problems:    Primary hypertension    Anxiety    Depression    Hyperlipidemia    48 year old female, POD 1 from right total hip arthroplasty, now with fracture where stem is located.    - imaging reviewed, labs reviewed HgB 7.6  - NWB RLE  - will obtain formal femur x-rays and CT scan of right hip  - pain control with scheduled Tylenol, PRN Oxycodone, breakthrough Dilaudid  - regular diet  - NPO at midnight on 1/4/24  - plan for revision on Friday 1/5/24 with Dr. Jalloh for fracture  - continue SCDs, Aspirin HELD for the OR         I spent 30 minutes in the professional and overall care of this patient.    Ester Fitch, APRN-CNP

## 2024-01-03 NOTE — PROGRESS NOTES
"Blanca Hamilton is a 48 y.o. female on day 0 of admission presenting with Right hip pain.      Subjective   The patient had an episode of emesis this morning. She reports vomiting when \"the acid builds up\".        Objective     Last Recorded Vitals  /69   Pulse 86   Temp 36.8 °C (98.2 °F)   Resp 16   Wt 89 kg (196 lb 3.4 oz)   SpO2 97%   Intake/Output last 3 Shifts:    Intake/Output Summary (Last 24 hours) at 1/3/2024 1608  Last data filed at 1/3/2024 0839  Gross per 24 hour   Intake 1543.34 ml   Output 100 ml   Net 1443.34 ml       Admission Weight  Weight:  (230 ibs) (01/02/24 1010)    Daily Weight  01/02/24 : 89 kg (196 lb 3.4 oz)    Image Results  CT hip right wo IV contrast  Narrative: Interpreted By:  Niesha Núñez,   STUDY:  CT HIP RIGHT WO IV CONTRAST;  1/3/2024 10:16 am      INDICATION:  Signs/Symptoms:concern for fracture in setting of post ALEXA.      COMPARISON:  Right femur radiographs dated 01/03/2024.      ACCESSION NUMBER(S):  [id number]      ORDERING CLINICIAN:  NIK ERICKSON      TECHNIQUE:  CT imaging of the  right hip was obtained  without administration of  intravenous contrast medium. Coronal and sagittal reformatted images  were performed. 3D reformatted images were created and reviewed.      FINDINGS:  OSSEOUS STRUCTURES:  Postsurgical changes of right total hip arthroplasty are noted. There  is an oblique spiral periprosthetic fracture extending from the  medial cortex of proximal femoral metaphysis below the level of  lesser trochanter and extends obliquely towards the posterior aspect  of the greater trochanter. There is associated subsidence of the  femoral stem component. However no significant perihardware lucency  is noted.      The acetabular screw tips extend beyond the posterosuperior  acetabular cortex into subjacent soft tissues (best seen on sagittal  image 66/129 and axial image 26/134). There is a prominent lucency in  the subchondral bone plate of superior acetabular " margin, suggestive  of prominent subchondral cyst. There is also significant thinning of  the medial acetabular wall with suggestion of focal cortical  discontinuity in the center (as seen on axial image 31/134). This is  similar compared to prior pre-surgical CT dated 01/20/2022 and is  likely related to significant osseous remodeling as a sequela of  chronic degenerative changes. Otherwise majority of the acetabular  component appears grossly intact. The rest of the visualized right  lateral pelvis appears grossly intact.      SOFT TISSUES:  There is reticular subcutaneous soft tissue edema throughout the  lateral aspect of proximal right thigh with moderate amount of  ill-defined fluid extending along the expected region of the incision  tract from the incision site into deeper soft tissues. There is large  amount of soft tissue gas extending throughout the visualized medial  compartment musculature of the proximal right thigh and also  extending along the iliopsoas musculature. This is likely favored to  be related to recent postsurgical status. There is ill-defined fluid  tracking along the deep fascia of the lateral aspect of proximal  thigh. Constellation of these findings are likely favored to be  related to recent postsurgical status. Partially included intrapelvic  soft tissues appear grossly unremarkable within limits of background  streak artifacts.      Impression: 1. Status post right total hip arthroplasty with an oblique spiral  periprosthetic (likely intertrochanteric) fracture extending through  proximal femoral metaphysis as described above.  2. Severe thinning of the medial wall of acetabulum with focal  cortical discontinuity and prominent subchondral cyst along the  superior margin of the acetabular rim as described above. These  changes are similar compared to immediate prior pre-surgical CT dated  02/20/2022 and is likely related to sequela of significant acetabular  remodeling from chronic  degenerative changes. There is protrusion of  acetabular screw tips beyond the posterosuperior cortex into  subjacent soft tissues. Recommend correlation with patient's  symptomatology associated with this.  3. Soft tissue changes related to recent postsurgical status with  small volume ill-defined fluid tracking along the subcutaneous soft  tissues of lateral thigh and diffuse soft tissue gas as described  above. This is most likely favored to be related to postsurgical  hematoma/seroma of indeterminate sterility, especially given recent  postsurgical status. Cellulitis with abscess formation can also be  considered in the differential in appropriate clinical setting.      MACRO:  Critical Finding:  See findings. Notification was initiated on  1/3/2024 at 11:04 am by Dr. Niesha Núñez.  (**-YCF-**)  Instructions:      Signed by: Niesha Núñez 1/3/2024 11:05 AM  Dictation workstation:   [id number]  XR femur right 2+ views  Narrative: Interpreted By:  Schoenberger, Joseph,   STUDY:  XR FEMUR RIGHT 2+ VIEWS; ;  1/3/2024 8:46 am      INDICATION:  Signs/Symptoms:concern for fracture in setting of right ALEXA.      COMPARISON:  None.      ACCESSION NUMBER(S):  [id number]      ORDERING CLINICIAN:  NIK ERICKSON      FINDINGS:  All the surgical changes associated with a recent right total hip  arthroplasty are noted. The apparent satisfactory positioning of the  prosthetic components. Soft tissue air in keeping with recent  operative status. The findings are similar to the exam from the prior  day but new from exam performed 10/17/2023. There is considerable  acetabular protrusio on the prior exam though the positioning of the  acetabular component on this exam appear satisfactory. There is  slight angulation of the cortex at the inferomedial the or junction  of the neck with the lesser trochanter/intra trochanteric femur.  However, upon other views of the hip, a definite periprosthetic  fracture is not convincingly  identified.      Impression: Findings are similar tip the yesterday's exam. No definite more  distal femur fracture is seen. Expected findings post total hip  arthroplasty. Slight angulation at the cortex on the AP radiograph  associated the lesser trochanter of uncertain significance. See  discussion above.          MACRO:  None      Signed by: Joseph Schoenberger 1/3/2024 9:10 AM  Dictation workstation:   [id number]      Physical Exam  Physical Exam  HENT:      Head:      Comments: Excessive facial hair h/o virilization     Mouth/Throat:      Mouth: Mucous membranes are moist.   Eyes:      Extraocular Movements: Extraocular movements intact.   GI: No abdominal pain, BS normoactive, no rebound tenderness  Cardiovascular:      Rate and Rhythm: Tachycardia present.   Musculoskeletal:         General: Tenderness present.      Comments: Right hip surgical incision   Skin:     General: Skin is dry.   Neurological:      General: No focal deficit present.      Mental Status: She is alert.   Psychiatric:         Mood and Affect: Mood normal.           Relevant Results             Results for orders placed or performed during the hospital encounter of 01/02/24 (from the past 24 hour(s))   CBC   Result Value Ref Range    WBC 8.5 4.4 - 11.3 x10*3/uL    nRBC 0.0 0.0 - 0.0 /100 WBCs    RBC 3.37 (L) 4.00 - 5.20 x10*6/uL    Hemoglobin 7.6 (L) 12.0 - 16.0 g/dL    Hematocrit 25.7 (L) 36.0 - 46.0 %    MCV 76 (L) 80 - 100 fL    MCH 22.6 (L) 26.0 - 34.0 pg    MCHC 29.6 (L) 32.0 - 36.0 g/dL    RDW 19.1 (H) 11.5 - 14.5 %    Platelets 249 150 - 450 x10*3/uL   Basic metabolic panel   Result Value Ref Range    Glucose 133 (H) 74 - 99 mg/dL    Sodium 137 136 - 145 mmol/L    Potassium 3.8 3.5 - 5.3 mmol/L    Chloride 105 98 - 107 mmol/L    Bicarbonate 26 21 - 32 mmol/L    Anion Gap 10 10 - 20 mmol/L    Urea Nitrogen 10 6 - 23 mg/dL    Creatinine 0.42 (L) 0.50 - 1.05 mg/dL    eGFR >90 >60 mL/min/1.73m*2    Calcium 7.9 (L) 8.6 - 10.3 mg/dL     Scheduled medications  acetaminophen, 650 mg, oral, q6h DARLENE  ARIPiprazole, 15 mg, oral, Daily  [Held by provider] aspirin, 81 mg, oral, BID  docusate sodium, 100 mg, oral, BID  famotidine, 20 mg, oral, BID  ferrous sulfate (325 mg ferrous sulfate), 1 tablet, oral, Daily  lisinopril, 10 mg, oral, Daily  loratadine, 10 mg, oral, Daily  montelukast, 10 mg, oral, Nightly  nicotine, 1 patch, transdermal, Daily  pantoprazole, 40 mg, oral, Daily before breakfast  PARoxetine, 20 mg, oral, Daily  PARoxetine, 40 mg, oral, Daily  polyethylene glycol, 17 g, oral, Daily  simvastatin, 20 mg, oral, Nightly  traZODone, 100 mg, oral, Nightly      Continuous medications  lactated Ringer's, 100 mL/hr, Last Rate: 100 mL/hr (01/02/24 1843)  lactated Ringer's, 100 mL/hr, Last Rate: 100 mL/hr (01/02/24 1843)  oxygen, 2 L/min      PRN medications  PRN medications: albuterol, HYDROmorphone, naloxone, ondansetron **OR** ondansetron, oxyCODONE, oxyCODONE     Assessment/Plan                  Active Problems:    Primary hypertension    Anxiety    Depression    Hyperlipidemia    Osteoarthritis of right hip, unspecified osteoarthritis type    Arthritis of right hip    Blanca Hamilton is a 48 y.o. female who underwent R ALEXA this morning. Patient had severe degenerative disease of the affected joint. She complained of severe pain in the area, the pain is gradually getting worse. She extensive nonsurgical treatment including anti-inflammatories physical therapy use of assistive devices activity modification cortisone injections. EBL 250cc. Mild tachycardia present after surgery. She is receiving IV fluids and denies lightheadedness, chest pain and SOB.      #Osteoarthritis s/p R ALEXA   Pain management  IS  Patient is wheelchair bound and lives alone. PT/OT evaluation required.  To return to OR on Friday due to a fracture where the stem is located.    #Acute blood loss anemia s/p R ALEXA  Type and screen    ml   Transfuse for hemoglobin  <7  Holding anticoagulation until the hemoglobin is stable     #Sinus tachycardia (Resolved)  Epinephrine used per protocol during surgery  Will continue IV fluids and monitor; bolus added  Consider CTA to rule out PE if not better  Blood loss as seen above  EKG if irregular      #Tobacco use  Nicotine patch and smoking cessation counseling     #HTN will continue lisinopril on 1/3/24 and monitor BP     #GERD continued PPI and added H2 blocker.      #HLD continue statin     #DVT prophylaxis- holding lovenox subcutaneous until hemoglobin stabilizes. Risk score is high at 12. Continue SCDs.    Dispo: Patient POD 1 from right total hip arthroplasty, now with fracture where stem is located. Plan for revision on Friday 1/5/24 with Dr. Jalloh for fracture.               Leonarda Bond, APRN-CNP

## 2024-01-03 NOTE — PROGRESS NOTES
Physical Therapy                 Therapy Communication Note    Patient Name: Blanca Hamilton  MRN: [id number]  Today's Date: 1/3/2024     Discipline: Physical Therapy    Missed Visit Reason: Missed Visit Reason: Patient placed on medical hold (Spoke with Pt's RN to clear Pt for PT, RN states that Pt is unable to stand with 2 person assist, states Pt scheduled to return to surgery 1/5 for furter intervention. No evaluation.)    Missed Time: Attempt    Comment:

## 2024-01-03 NOTE — CARE PLAN
Post op imaging reviewed. Fracture of calcar and subsidence of stem noted. Unfortunately will require revision of her stem and stabilization of fracture. NWB RLE for now, CT hip, XR femur, plan for return to OR fri.

## 2024-01-03 NOTE — PROGRESS NOTES
Occupational Therapy                 Therapy Communication Note    Patient Name: Blanca Hamilton  MRN: [id number]  Today's Date: 1/3/2024     Discipline: Occupational Therapy    Missed Visit Reason: Missed Visit Reason: Patient placed on medical hold (Per PT report, RN states that Pt is unable to stand with 2 person assist, and that Pt is scheduled to return to surgery on 1/5 for further intervention.)    Missed Time: Attempt  10:08 am

## 2024-01-03 NOTE — CARE PLAN
Problem: Pain - Adult  Goal: Verbalizes/displays adequate comfort level or baseline comfort level  Outcome: Progressing     Problem: Safety - Adult  Goal: Free from fall injury  Outcome: Progressing     Problem: Discharge Planning  Goal: Discharge to home or other facility with appropriate resources  Outcome: Progressing     Problem: Chronic Conditions and Co-morbidities  Goal: Patient's chronic conditions and co-morbidity symptoms are monitored and maintained or improved  Outcome: Progressing   The patient's goals for the shift include      The clinical goals for the shift include pain control and ambulation

## 2024-01-04 ENCOUNTER — ANESTHESIA EVENT (OUTPATIENT)
Dept: OPERATING ROOM | Facility: HOSPITAL | Age: 49
DRG: 467 | End: 2024-01-04
Payer: COMMERCIAL

## 2024-01-04 LAB
ABO GROUP (TYPE) IN BLOOD: NORMAL
ABO GROUP (TYPE) IN BLOOD: NORMAL
ANION GAP SERPL CALC-SCNC: 10 MMOL/L (ref 10–20)
ANTIBODY SCREEN: NORMAL
BLOOD EXPIRATION DATE: NORMAL
BUN SERPL-MCNC: 9 MG/DL (ref 6–23)
CALCIUM SERPL-MCNC: 7.9 MG/DL (ref 8.6–10.3)
CHLORIDE SERPL-SCNC: 102 MMOL/L (ref 98–107)
CO2 SERPL-SCNC: 27 MMOL/L (ref 21–32)
CREAT SERPL-MCNC: 0.35 MG/DL (ref 0.5–1.05)
DISPENSE STATUS: NORMAL
ERYTHROCYTE [DISTWIDTH] IN BLOOD BY AUTOMATED COUNT: 19.5 % (ref 11.5–14.5)
FERRITIN SERPL-MCNC: 36 NG/ML (ref 8–150)
GFR SERPL CREATININE-BSD FRML MDRD: >90 ML/MIN/1.73M*2
GLUCOSE SERPL-MCNC: 93 MG/DL (ref 74–99)
HCT VFR BLD AUTO: 24.7 % (ref 36–46)
HGB BLD-MCNC: 7.4 G/DL (ref 12–16)
IRON SATN MFR SERPL: 3 % (ref 25–45)
IRON SERPL-MCNC: 10 UG/DL (ref 35–150)
MCH RBC QN AUTO: 22.6 PG (ref 26–34)
MCHC RBC AUTO-ENTMCNC: 30 G/DL (ref 32–36)
MCV RBC AUTO: 75 FL (ref 80–100)
NRBC BLD-RTO: 0 /100 WBCS (ref 0–0)
PLATELET # BLD AUTO: 232 X10*3/UL (ref 150–450)
POTASSIUM SERPL-SCNC: 3.2 MMOL/L (ref 3.5–5.3)
PRODUCT BLOOD TYPE: 9500
PRODUCT CODE: NORMAL
RBC # BLD AUTO: 3.28 X10*6/UL (ref 4–5.2)
RH FACTOR (ANTIGEN D): NORMAL
RH FACTOR (ANTIGEN D): NORMAL
SODIUM SERPL-SCNC: 136 MMOL/L (ref 136–145)
TIBC SERPL-MCNC: 338 UG/DL (ref 240–445)
UIBC SERPL-MCNC: 328 UG/DL (ref 110–370)
UNIT ABO: NORMAL
UNIT NUMBER: NORMAL
UNIT RH: NORMAL
UNIT VOLUME: 350
WBC # BLD AUTO: 8.2 X10*3/UL (ref 4.4–11.3)
XM INTEP: NORMAL

## 2024-01-04 PROCEDURE — 2500000001 HC RX 250 WO HCPCS SELF ADMINISTERED DRUGS (ALT 637 FOR MEDICARE OP): Performed by: NURSE PRACTITIONER

## 2024-01-04 PROCEDURE — 86920 COMPATIBILITY TEST SPIN: CPT

## 2024-01-04 PROCEDURE — 36430 TRANSFUSION BLD/BLD COMPNT: CPT

## 2024-01-04 PROCEDURE — 2500000002 HC RX 250 W HCPCS SELF ADMINISTERED DRUGS (ALT 637 FOR MEDICARE OP, ALT 636 FOR OP/ED): Performed by: NURSE PRACTITIONER

## 2024-01-04 PROCEDURE — 2500000004 HC RX 250 GENERAL PHARMACY W/ HCPCS (ALT 636 FOR OP/ED): Performed by: NURSE PRACTITIONER

## 2024-01-04 PROCEDURE — 99232 SBSQ HOSP IP/OBS MODERATE 35: CPT | Performed by: NURSE PRACTITIONER

## 2024-01-04 PROCEDURE — P9016 RBC LEUKOCYTES REDUCED: HCPCS

## 2024-01-04 PROCEDURE — 85027 COMPLETE CBC AUTOMATED: CPT | Performed by: NURSE PRACTITIONER

## 2024-01-04 PROCEDURE — 99232 SBSQ HOSP IP/OBS MODERATE 35: CPT | Performed by: FAMILY MEDICINE

## 2024-01-04 PROCEDURE — 86901 BLOOD TYPING SEROLOGIC RH(D): CPT | Performed by: NURSE PRACTITIONER

## 2024-01-04 PROCEDURE — S4991 NICOTINE PATCH NONLEGEND: HCPCS | Performed by: NURSE PRACTITIONER

## 2024-01-04 PROCEDURE — 1100000001 HC PRIVATE ROOM DAILY

## 2024-01-04 PROCEDURE — 82374 ASSAY BLOOD CARBON DIOXIDE: CPT | Performed by: NURSE PRACTITIONER

## 2024-01-04 PROCEDURE — 83540 ASSAY OF IRON: CPT | Performed by: FAMILY MEDICINE

## 2024-01-04 PROCEDURE — 82728 ASSAY OF FERRITIN: CPT | Performed by: FAMILY MEDICINE

## 2024-01-04 PROCEDURE — 36415 COLL VENOUS BLD VENIPUNCTURE: CPT | Performed by: NURSE PRACTITIONER

## 2024-01-04 RX ORDER — SODIUM CHLORIDE, SODIUM LACTATE, POTASSIUM CHLORIDE, CALCIUM CHLORIDE 600; 310; 30; 20 MG/100ML; MG/100ML; MG/100ML; MG/100ML
100 INJECTION, SOLUTION INTRAVENOUS CONTINUOUS
Status: CANCELLED | OUTPATIENT
Start: 2024-01-04

## 2024-01-04 RX ORDER — POTASSIUM CHLORIDE 20 MEQ/1
40 TABLET, EXTENDED RELEASE ORAL ONCE
Status: COMPLETED | OUTPATIENT
Start: 2024-01-04 | End: 2024-01-04

## 2024-01-04 RX ADMIN — ACETAMINOPHEN 650 MG: 325 TABLET ORAL at 17:48

## 2024-01-04 RX ADMIN — TRAZODONE HYDROCHLORIDE 100 MG: 50 TABLET ORAL at 22:00

## 2024-01-04 RX ADMIN — FAMOTIDINE 20 MG: 20 TABLET ORAL at 22:00

## 2024-01-04 RX ADMIN — DOCUSATE SODIUM 100 MG: 100 CAPSULE, LIQUID FILLED ORAL at 22:00

## 2024-01-04 RX ADMIN — LORATADINE 10 MG: 10 TABLET ORAL at 09:20

## 2024-01-04 RX ADMIN — ACETAMINOPHEN 650 MG: 325 TABLET ORAL at 00:41

## 2024-01-04 RX ADMIN — PANTOPRAZOLE SODIUM 40 MG: 40 TABLET, DELAYED RELEASE ORAL at 06:00

## 2024-01-04 RX ADMIN — MONTELUKAST 10 MG: 10 TABLET, FILM COATED ORAL at 22:00

## 2024-01-04 RX ADMIN — FERROUS SULFATE TAB 325 MG (65 MG ELEMENTAL FE) 1 TABLET: 325 (65 FE) TAB at 09:20

## 2024-01-04 RX ADMIN — ACETAMINOPHEN 650 MG: 325 TABLET ORAL at 12:16

## 2024-01-04 RX ADMIN — POTASSIUM CHLORIDE 40 MEQ: 1500 TABLET, EXTENDED RELEASE ORAL at 09:19

## 2024-01-04 RX ADMIN — ACETAMINOPHEN 650 MG: 325 TABLET ORAL at 06:00

## 2024-01-04 RX ADMIN — PAROXETINE HYDROCHLORIDE 40 MG: 20 TABLET, FILM COATED ORAL at 09:20

## 2024-01-04 RX ADMIN — FAMOTIDINE 20 MG: 20 TABLET ORAL at 09:20

## 2024-01-04 RX ADMIN — DOCUSATE SODIUM 100 MG: 100 CAPSULE, LIQUID FILLED ORAL at 09:20

## 2024-01-04 RX ADMIN — ARIPIPRAZOLE 15 MG: 5 TABLET ORAL at 21:59

## 2024-01-04 RX ADMIN — SIMVASTATIN 20 MG: 20 TABLET, FILM COATED ORAL at 22:01

## 2024-01-04 RX ADMIN — NICOTINE 1 PATCH: 14 PATCH, EXTENDED RELEASE TRANSDERMAL at 09:16

## 2024-01-04 RX ADMIN — PAROXETINE HYDROCHLORIDE 20 MG: 20 TABLET, FILM COATED ORAL at 22:02

## 2024-01-04 ASSESSMENT — COGNITIVE AND FUNCTIONAL STATUS - GENERAL
MOVING TO AND FROM BED TO CHAIR: A LITTLE
DAILY ACTIVITIY SCORE: 18
TOILETING: A LITTLE
TURNING FROM BACK TO SIDE WHILE IN FLAT BAD: A LITTLE
DRESSING REGULAR UPPER BODY CLOTHING: A LITTLE
WALKING IN HOSPITAL ROOM: A LOT
CLIMB 3 TO 5 STEPS WITH RAILING: A LOT
MOBILITY SCORE: 18
HELP NEEDED FOR BATHING: A LOT
WALKING IN HOSPITAL ROOM: A LOT
DAILY ACTIVITIY SCORE: 18
STANDING UP FROM CHAIR USING ARMS: A LITTLE
CLIMB 3 TO 5 STEPS WITH RAILING: A LOT
STANDING UP FROM CHAIR USING ARMS: A LITTLE
HELP NEEDED FOR BATHING: A LOT
DRESSING REGULAR UPPER BODY CLOTHING: A LITTLE
DRESSING REGULAR LOWER BODY CLOTHING: A LOT
MOBILITY SCORE: 17
MOVING TO AND FROM BED TO CHAIR: A LITTLE
DRESSING REGULAR LOWER BODY CLOTHING: A LOT
TOILETING: A LITTLE

## 2024-01-04 ASSESSMENT — PAIN SCALES - GENERAL
PAINLEVEL_OUTOF10: 8
PAINLEVEL_OUTOF10: 5 - MODERATE PAIN
PAINLEVEL_OUTOF10: 4
PAINLEVEL_OUTOF10: 5 - MODERATE PAIN

## 2024-01-04 ASSESSMENT — PAIN DESCRIPTION - ORIENTATION
ORIENTATION: RIGHT
ORIENTATION: RIGHT

## 2024-01-04 ASSESSMENT — PAIN - FUNCTIONAL ASSESSMENT
PAIN_FUNCTIONAL_ASSESSMENT: 0-10

## 2024-01-04 ASSESSMENT — PAIN DESCRIPTION - LOCATION
LOCATION: HIP
LOCATION: HIP

## 2024-01-04 NOTE — CARE PLAN
Problem: Pain - Adult  Goal: Verbalizes/displays adequate comfort level or baseline comfort level  Outcome: Progressing     Problem: Safety - Adult  Goal: Free from fall injury  Outcome: Progressing     Problem: Discharge Planning  Goal: Discharge to home or other facility with appropriate resources  Outcome: Progressing     Problem: Chronic Conditions and Co-morbidities  Goal: Patient's chronic conditions and co-morbidity symptoms are monitored and maintained or improved  Outcome: Progressing     Problem: Fall/Injury  Goal: Not fall by end of shift  Outcome: Progressing  Goal: Be free from injury by end of the shift  Outcome: Progressing  Goal: Verbalize understanding of personal risk factors for fall in the hospital  Outcome: Progressing  Goal: Verbalize understanding of risk factor reduction measures to prevent injury from fall in the home  Outcome: Progressing  Goal: Use assistive devices by end of the shift  Outcome: Progressing  Goal: Pace activities to prevent fatigue by end of the shift  Outcome: Progressing   The patient's goals for the shift include  being able to sleep at least 6 hours    The clinical goals for the shift include pain well managed throughout shift    Over the shift, the patient did not make progress toward the following goals. Barriers to progression include pain. Recommendations to address these barriers include pain meds, repositioning, and rest.

## 2024-01-04 NOTE — CARE PLAN
The patient's goals for the shift include      The clinical goals for the shift include pain control and a rise in Hgb      Problem: Pain - Adult  Goal: Verbalizes/displays adequate comfort level or baseline comfort level  Outcome: Progressing     Problem: Safety - Adult  Goal: Free from fall injury  Outcome: Progressing     Problem: Discharge Planning  Goal: Discharge to home or other facility with appropriate resources  Outcome: Progressing

## 2024-01-04 NOTE — PROGRESS NOTES
Blanca Hamilton is a 48 y.o. female on day 1 of admission presenting with Right hip pain.      Subjective   Patient resting in bed, reports fatigue but otherwise feels ok    Objective     Last Recorded Vitals  BP 94/57   Pulse 102   Temp 36.5 °C (97.7 °F) (Temporal)   Resp 16   Wt 89 kg (196 lb 3.4 oz)   SpO2 94%   Intake/Output last 3 Shifts:    Intake/Output Summary (Last 24 hours) at 1/4/2024 1042  Last data filed at 1/4/2024 0604  Gross per 24 hour   Intake 120 ml   Output --   Net 120 ml       Admission Weight  Weight:  (230 ibs) (01/02/24 1010)    Daily Weight  01/02/24 : 89 kg (196 lb 3.4 oz)        Physical Exam  Constitutional: alert and oriented x 3, awake, cooperative, no acute distress  Skin: warm and dry  Head/Neck: Normocephalic, atraumatic  Eyes: clear sclera  ENMT: mucous membranes moist  Cardio: Regular rhythm, rapid rate  Resp: CTA bilaterally, good respiratory effort  Gastrointestinal: Soft, nontender, nondistended  Musculoskeletal: no joint swelling, ROM limited BLE RLE>LLE   Neuro: alert and oriented x 3  Psychological: Appropriate mood and behavior    Relevant Results  Scheduled medications  acetaminophen, 650 mg, oral, q6h DARLENE  ARIPiprazole, 15 mg, oral, Daily  [Held by provider] aspirin, 81 mg, oral, BID  docusate sodium, 100 mg, oral, BID  [Held by provider] enoxaparin, 40 mg, subcutaneous, q24h  famotidine, 20 mg, oral, BID  ferrous sulfate (325 mg ferrous sulfate), 1 tablet, oral, Daily  lisinopril, 10 mg, oral, Daily  loratadine, 10 mg, oral, Daily  montelukast, 10 mg, oral, Nightly  nicotine, 1 patch, transdermal, Daily  pantoprazole, 40 mg, oral, Daily before breakfast  PARoxetine, 20 mg, oral, Daily  PARoxetine, 40 mg, oral, Daily  polyethylene glycol, 17 g, oral, Daily  simvastatin, 20 mg, oral, Nightly  traZODone, 100 mg, oral, Nightly      Continuous medications  lactated Ringer's, 100 mL/hr, Last Rate: 100 mL/hr (01/02/24 1843)  oxygen, 2 L/min      PRN medications  PRN  medications: albuterol, HYDROmorphone, naloxone, ondansetron **OR** ondansetron, oxyCODONE, oxyCODONE    Results for orders placed or performed during the hospital encounter of 01/02/24 (from the past 24 hour(s))   Hemoglobin and hematocrit, blood   Result Value Ref Range    Hemoglobin 7.3 (L) 12.0 - 16.0 g/dL    Hematocrit 24.3 (L) 36.0 - 46.0 %   VERIFY ABO/Rh Group Test   Result Value Ref Range    ABO TYPE A     Rh TYPE POS    Type and screen   Result Value Ref Range    ABO TYPE A     Rh TYPE POS     ANTIBODY SCREEN NEG    CBC   Result Value Ref Range    WBC 8.2 4.4 - 11.3 x10*3/uL    nRBC 0.0 0.0 - 0.0 /100 WBCs    RBC 3.28 (L) 4.00 - 5.20 x10*6/uL    Hemoglobin 7.4 (L) 12.0 - 16.0 g/dL    Hematocrit 24.7 (L) 36.0 - 46.0 %    MCV 75 (L) 80 - 100 fL    MCH 22.6 (L) 26.0 - 34.0 pg    MCHC 30.0 (L) 32.0 - 36.0 g/dL    RDW 19.5 (H) 11.5 - 14.5 %    Platelets 232 150 - 450 x10*3/uL   Basic Metabolic Panel   Result Value Ref Range    Glucose 93 74 - 99 mg/dL    Sodium 136 136 - 145 mmol/L    Potassium 3.2 (L) 3.5 - 5.3 mmol/L    Chloride 102 98 - 107 mmol/L    Bicarbonate 27 21 - 32 mmol/L    Anion Gap 10 10 - 20 mmol/L    Urea Nitrogen 9 6 - 23 mg/dL    Creatinine 0.35 (L) 0.50 - 1.05 mg/dL    eGFR >90 >60 mL/min/1.73m*2    Calcium 7.9 (L) 8.6 - 10.3 mg/dL   Iron and TIBC   Result Value Ref Range    Iron 10 (L) 35 - 150 ug/dL    UIBC 328 110 - 370 ug/dL    TIBC 338 240 - 445 ug/dL    % Saturation 3 (L) 25 - 45 %   Ferritin   Result Value Ref Range    Ferritin 36 8 - 150 ng/mL   Prepare RBC: 1 Units   Result Value Ref Range    PRODUCT CODE J4545E29     Unit Number [id number]     Unit ABO O     Unit RH NEG     XM INTEP COMP     Dispense Status IS     Blood Expiration Date January 05, 2024 23:59 EST     PRODUCT BLOOD TYPE 9500     UNIT VOLUME 350           Assessment/Plan   49yo CF with PMH of HTN, HLD, GERD, tobacco abuse, and severe degenerative disease that presented for scheduled right ALEXA. Medicine was  consulted for medical management of chronic conditions.    Degenerative disease  - s/p right ALEXA on 1/2 complicated by periprosthetic femur fracture  - ortho following, plan to return to OR tomorrow for fracture stabilization  - per ortho, NWB RLE  - continue multimodal pain regimen  - mgmt per primary surgical service    Acute on chronic anemia  - in setting of above  - transfuse 1u pRBC in preparation for surgery tomorrow    Hypertension  - continue home lisinopril    Hyperlipidemia  - continue home statin    Tobacco abuse  - educated about tobacco cessation    DVT Proph: no pharmacologics in setting of above    Dispo: Patient requires close inpatient monitoring in setting of right ALEXA needing fracture stabilization, discharge planning pending post-op course.    Active Problems:    Primary hypertension    Anxiety    Depression    Hyperlipidemia    Osteoarthritis of right hip, unspecified osteoarthritis type    Arthritis of right hip      Carole Alas DO

## 2024-01-04 NOTE — PROGRESS NOTES
Occupational Therapy                 Therapy Communication Note    Patient Name: Blanca Hamilton  MRN: [id number]  Today's Date: 1/4/2024     Discipline: Occupational Therapy    Missed Visit Reason: Missed Visit Reason:  (Pt is going for surgery on 1/5. No OT eval until after.)    Missed Time: Attempt  Time: 08:30   Paramedian Forehead Flap Text: A decision was made to reconstruct the defect utilizing an interpolation axial flap and a staged reconstruction.  A telfa template was made of the defect.  This telfa template was then used to outline the paramedian forehead pedicle flap.  The donor area for the pedicle flap was then injected with anesthesia.  The flap was excised through the skin and subcutaneous tissue down to the layer of the underlying musculature.  The pedicle flap was carefully excised within this deep plane to maintain its blood supply.  The edges of the donor site were undermined.   The donor site was closed in a primary fashion.  The pedicle was then rotated into position and sutured.  Once the tube was sutured into place, adequate blood supply was confirmed with blanching and refill.  The pedicle was then wrapped with xeroform gauze and dressed appropriately with a telfa and gauze bandage to ensure continued blood supply and protect the attached pedicle.

## 2024-01-04 NOTE — PROGRESS NOTES
"Blanca Hamilton is a 48 y.o. female on day 1 of admission presenting with Right hip pain.    Subjective   No acute overnight events.  States pain is controlled.  Denies chest pain, shortness of breath.  Tachycardic with hypotension this morning 110.  Hemoglobin 7.3.  Planning to return to the OR tomorrow with ortho for fracture stabilization of right femur.       Objective     Physical Exam  HENT:      Head: Normocephalic and atraumatic.   Eyes:      Extraocular Movements: Extraocular movements intact.      Conjunctiva/sclera: Conjunctivae normal.      Pupils: Pupils are equal, round, and reactive to light.   Cardiovascular:      Rate and Rhythm: Regular rhythm. Tachycardia present.      Pulses: Normal pulses.   Pulmonary:      Breath sounds: Normal breath sounds.   Abdominal:      General: Bowel sounds are normal.      Palpations: Abdomen is soft.   Musculoskeletal:      Comments: Right hip dressing C/D/I, surrounding tissues soft and compressible.  TTP over right thigh.  Leg and foot warm and well perfused.     Skin:     General: Skin is warm and dry.      Capillary Refill: Capillary refill takes less than 2 seconds.   Neurological:      General: No focal deficit present.      Mental Status: She is alert and oriented to person, place, and time.         Last Recorded Vitals  Blood pressure 102/57, pulse 98, temperature 36.5 °C (97.7 °F), resp. rate 16, height 1.727 m (5' 7.99\"), weight 89 kg (196 lb 3.4 oz), SpO2 93 %.  Intake/Output last 3 Shifts:  I/O last 3 completed shifts:  In: 680 (7.6 mL/kg) [P.O.:480; IV Piggyback:200]  Out: 100 (1.1 mL/kg) [Urine:100 (0 mL/kg/hr)]  Weight: 89 kg     Relevant Results  CT hip right wo IV contrast    Result Date: 1/3/2024  Interpreted By:  Niesha Núñez, STUDY: CT HIP RIGHT WO IV CONTRAST;  1/3/2024 10:16 am   INDICATION: Signs/Symptoms:concern for fracture in setting of post ALEXA.   COMPARISON: Right femur radiographs dated 01/03/2024.   ACCESSION NUMBER(S): [id number]   " ORDERING CLINICIAN: NIK ERICKSON   TECHNIQUE: CT imaging of the  right hip was obtained  without administration of intravenous contrast medium. Coronal and sagittal reformatted images were performed. 3D reformatted images were created and reviewed.   FINDINGS: OSSEOUS STRUCTURES: Postsurgical changes of right total hip arthroplasty are noted. There is an oblique spiral periprosthetic fracture extending from the medial cortex of proximal femoral metaphysis below the level of lesser trochanter and extends obliquely towards the posterior aspect of the greater trochanter. There is associated subsidence of the femoral stem component. However no significant perihardware lucency is noted.   The acetabular screw tips extend beyond the posterosuperior acetabular cortex into subjacent soft tissues (best seen on sagittal image 66/129 and axial image 26/134). There is a prominent lucency in the subchondral bone plate of superior acetabular margin, suggestive of prominent subchondral cyst. There is also significant thinning of the medial acetabular wall with suggestion of focal cortical discontinuity in the center (as seen on axial image 31/134). This is similar compared to prior pre-surgical CT dated 01/20/2022 and is likely related to significant osseous remodeling as a sequela of chronic degenerative changes. Otherwise majority of the acetabular component appears grossly intact. The rest of the visualized right lateral pelvis appears grossly intact.   SOFT TISSUES: There is reticular subcutaneous soft tissue edema throughout the lateral aspect of proximal right thigh with moderate amount of ill-defined fluid extending along the expected region of the incision tract from the incision site into deeper soft tissues. There is large amount of soft tissue gas extending throughout the visualized medial compartment musculature of the proximal right thigh and also extending along the iliopsoas musculature. This is likely favored to  be related to recent postsurgical status. There is ill-defined fluid tracking along the deep fascia of the lateral aspect of proximal thigh. Constellation of these findings are likely favored to be related to recent postsurgical status. Partially included intrapelvic soft tissues appear grossly unremarkable within limits of background streak artifacts.       1. Status post right total hip arthroplasty with an oblique spiral periprosthetic (likely intertrochanteric) fracture extending through proximal femoral metaphysis as described above. 2. Severe thinning of the medial wall of acetabulum with focal cortical discontinuity and prominent subchondral cyst along the superior margin of the acetabular rim as described above. These changes are similar compared to immediate prior pre-surgical CT dated 02/20/2022 and is likely related to sequela of significant acetabular remodeling from chronic degenerative changes. There is protrusion of acetabular screw tips beyond the posterosuperior cortex into subjacent soft tissues. Recommend correlation with patient's symptomatology associated with this. 3. Soft tissue changes related to recent postsurgical status with small volume ill-defined fluid tracking along the subcutaneous soft tissues of lateral thigh and diffuse soft tissue gas as described above. This is most likely favored to be related to postsurgical hematoma/seroma of indeterminate sterility, especially given recent postsurgical status. Cellulitis with abscess formation can also be considered in the differential in appropriate clinical setting.   MACRO: Critical Finding:  See findings. Notification was initiated on 1/3/2024 at 11:04 am by Dr. Niesha Núñez.  (**-YCF-**) Instructions:   Signed by: Niesha Núñez 1/3/2024 11:05 AM Dictation workstation:   [id number]    XR femur right 2+ views    Result Date: 1/3/2024  Interpreted By:  Schoenberger, Joseph, STUDY: XR FEMUR RIGHT 2+ VIEWS; ;  1/3/2024 8:46 am    INDICATION: Signs/Symptoms:concern for fracture in setting of right ALEXA.   COMPARISON: None.   ACCESSION NUMBER(S): [id number]   ORDERING CLINICIAN: NIK ERICKSON   FINDINGS: All the surgical changes associated with a recent right total hip arthroplasty are noted. The apparent satisfactory positioning of the prosthetic components. Soft tissue air in keeping with recent operative status. The findings are similar to the exam from the prior day but new from exam performed 10/17/2023. There is considerable acetabular protrusio on the prior exam though the positioning of the acetabular component on this exam appear satisfactory. There is slight angulation of the cortex at the inferomedial the or junction of the neck with the lesser trochanter/intra trochanteric femur. However, upon other views of the hip, a definite periprosthetic fracture is not convincingly identified.       Findings are similar tip the yesterday's exam. No definite more distal femur fracture is seen. Expected findings post total hip arthroplasty. Slight angulation at the cortex on the AP radiograph associated the lesser trochanter of uncertain significance. See discussion above.     MACRO: None   Signed by: Joseph Schoenberger 1/3/2024 9:10 AM Dictation workstation:   [id number]    XR pelvis 1-2 views    Result Date: 1/2/2024  STUDY: Pelvis Radiographs; 01/02/2024 2:37 PM INDICATION: Post-operative for right total hip arthroplasty. COMPARISON: None Available. ACCESSION NUMBER(S): [id number] ORDERING CLINICIAN: NIK ERICKSON TECHNIQUE:  One view(s) of the pelvis. FINDINGS:  This exam is positioned with both legs displaced to the right and the superior portion of the pelvis is not included.  There are bilateral total hip prosthesis.  Alignment for both prosthesis appears normal in this oblique view.    Bilateral total hip prosthesis in place.  No acute abnormalities are identified in this oblique view of the lower pelvis and hips.. Signed by  Ross Caraballo MD    ECG 12 Lead    Result Date: 12/29/2023  Undetermined rhythm Otherwise normal ECG When compared with ECG of 05-NOV-2019 13:54, Current undetermined rhythm precludes rhythm comparison, needs review    XR lumbar spine 2-3 views    Result Date: 12/27/2023  Interpreted By:  Yolie Tate, STUDY: Lumbar Spine, three views.   INDICATION: Signs/Symptoms:pre operative evaluation for spinal mobility.   COMPARISON: None.   ACCESSION NUMBER(S): [id number]   ORDERING CLINICIAN: JON HERNANDEZ   FINDINGS: Alignment is within normal limits. Mild L5-S1 spondylosis with mild disc height loss. Mild facet joint arthropathy from L3-4 to L5-S1 noted. Vertebral body heights are preserved. Posterior elements are intact.       1. As above.   MACRO: None.   Signed by: Yolie Tate 12/27/2023 10:56 PM Dictation workstation:   [id number]    Scheduled medications  acetaminophen, 650 mg, oral, q6h DARLENE  ARIPiprazole, 15 mg, oral, Daily  [Held by provider] aspirin, 81 mg, oral, BID  docusate sodium, 100 mg, oral, BID  [Held by provider] enoxaparin, 40 mg, subcutaneous, q24h  famotidine, 20 mg, oral, BID  ferrous sulfate (325 mg ferrous sulfate), 1 tablet, oral, Daily  lisinopril, 10 mg, oral, Daily  loratadine, 10 mg, oral, Daily  montelukast, 10 mg, oral, Nightly  nicotine, 1 patch, transdermal, Daily  pantoprazole, 40 mg, oral, Daily before breakfast  PARoxetine, 20 mg, oral, Daily  PARoxetine, 40 mg, oral, Daily  polyethylene glycol, 17 g, oral, Daily  potassium chloride CR, 40 mEq, oral, Once  simvastatin, 20 mg, oral, Nightly  traZODone, 100 mg, oral, Nightly      Continuous medications  lactated Ringer's, 100 mL/hr, Last Rate: 100 mL/hr (01/02/24 1843)  oxygen, 2 L/min      PRN medications  PRN medications: albuterol, HYDROmorphone, naloxone, ondansetron **OR** ondansetron, oxyCODONE, oxyCODONE  Results for orders placed or performed during the hospital encounter of 01/02/24 (from the past 24 hour(s))    Hemoglobin and hematocrit, blood   Result Value Ref Range    Hemoglobin 7.3 (L) 12.0 - 16.0 g/dL    Hematocrit 24.3 (L) 36.0 - 46.0 %   Type and screen   Result Value Ref Range    ABO TYPE A     Rh TYPE POS     ANTIBODY SCREEN NEG    CBC   Result Value Ref Range    WBC 8.2 4.4 - 11.3 x10*3/uL    nRBC 0.0 0.0 - 0.0 /100 WBCs    RBC 3.28 (L) 4.00 - 5.20 x10*6/uL    Hemoglobin 7.4 (L) 12.0 - 16.0 g/dL    Hematocrit 24.7 (L) 36.0 - 46.0 %    MCV 75 (L) 80 - 100 fL    MCH 22.6 (L) 26.0 - 34.0 pg    MCHC 30.0 (L) 32.0 - 36.0 g/dL    RDW 19.5 (H) 11.5 - 14.5 %    Platelets 232 150 - 450 x10*3/uL   Basic Metabolic Panel   Result Value Ref Range    Glucose 93 74 - 99 mg/dL    Sodium 136 136 - 145 mmol/L    Potassium 3.2 (L) 3.5 - 5.3 mmol/L    Chloride 102 98 - 107 mmol/L    Bicarbonate 27 21 - 32 mmol/L    Anion Gap 10 10 - 20 mmol/L    Urea Nitrogen 9 6 - 23 mg/dL    Creatinine 0.35 (L) 0.50 - 1.05 mg/dL    eGFR >90 >60 mL/min/1.73m*2    Calcium 7.9 (L) 8.6 - 10.3 mg/dL       Assessment/Plan   Active Problems:    Primary hypertension    Anxiety    Depression    Hyperlipidemia    Osteoarthritis of right hip, unspecified osteoarthritis type    Arthritis of right hip    48 year old female, POD 2 from right ALEXA now with periprosthetic femur fracture.    - NPO at midnight for OR tomorrow with ortho for fracture stabilization  - NWB RLE  - transfuse 1 unit PRBC due to tachycardia/hypotension and significant hemoglobin drop from initial OR with plan to return to OR tomorrow  - replace KCL - ordered  - pain controlled on current regimen, will continue  - SCDs, hold DVT prophylaxis for OR tomorrow           I spent 30 minutes in the professional and overall care of this patient.    Ester Fitch, APRN-CNP

## 2024-01-04 NOTE — PROGRESS NOTES
01/04/24 1059   Discharge Planning   Living Arrangements Alone   Support Systems None   Assistance Needed walker, cane, does not drive uses medical transport   Type of Residence Private residence   Home or Post Acute Services Post acute facilities (Rehab/SNF/etc)   Patient expects to be discharged to: TBD, pending OR and PT/OT evals   Does the patient need discharge transport arranged? Yes   RoundTrip coordination needed? Yes   Has discharge transport been arranged? No     01/04/2024 1102: Plan for OR on 1/5, PT/OT evals postop.

## 2024-01-05 ENCOUNTER — PHARMACY VISIT (OUTPATIENT)
Dept: PHARMACY | Facility: CLINIC | Age: 49
End: 2024-01-05
Payer: COMMERCIAL

## 2024-01-05 ENCOUNTER — ANESTHESIA (OUTPATIENT)
Dept: OPERATING ROOM | Facility: HOSPITAL | Age: 49
DRG: 467 | End: 2024-01-05
Payer: COMMERCIAL

## 2024-01-05 ENCOUNTER — APPOINTMENT (OUTPATIENT)
Dept: RADIOLOGY | Facility: HOSPITAL | Age: 49
DRG: 467 | End: 2024-01-05
Payer: COMMERCIAL

## 2024-01-05 PROBLEM — M16.11 OSTEOARTHRITIS OF RIGHT HIP, UNSPECIFIED OSTEOARTHRITIS TYPE: Status: RESOLVED | Noted: 2024-01-03 | Resolved: 2024-01-05

## 2024-01-05 PROBLEM — M16.11 ARTHRITIS OF RIGHT HIP: Status: RESOLVED | Noted: 2023-10-18 | Resolved: 2024-01-05

## 2024-01-05 LAB
ALBUMIN SERPL BCP-MCNC: 2.9 G/DL (ref 3.4–5)
ANION GAP SERPL CALC-SCNC: 10 MMOL/L (ref 10–20)
APTT PPP: 26 SECONDS (ref 27–38)
BASOPHILS # BLD AUTO: 0.01 X10*3/UL (ref 0–0.1)
BASOPHILS NFR BLD AUTO: 0.1 %
BUN SERPL-MCNC: 10 MG/DL (ref 6–23)
CALCIUM SERPL-MCNC: 7.9 MG/DL (ref 8.6–10.3)
CHLORIDE SERPL-SCNC: 103 MMOL/L (ref 98–107)
CO2 SERPL-SCNC: 26 MMOL/L (ref 21–32)
CREAT SERPL-MCNC: 0.31 MG/DL (ref 0.5–1.05)
EOSINOPHIL # BLD AUTO: 0.03 X10*3/UL (ref 0–0.7)
EOSINOPHIL NFR BLD AUTO: 0.4 %
ERYTHROCYTE [DISTWIDTH] IN BLOOD BY AUTOMATED COUNT: 20.9 % (ref 11.5–14.5)
GFR SERPL CREATININE-BSD FRML MDRD: >90 ML/MIN/1.73M*2
GLUCOSE SERPL-MCNC: 89 MG/DL (ref 74–99)
HCT VFR BLD AUTO: 26.2 % (ref 36–46)
HGB BLD-MCNC: 7.9 G/DL (ref 12–16)
IMM GRANULOCYTES # BLD AUTO: 0.04 X10*3/UL (ref 0–0.7)
IMM GRANULOCYTES NFR BLD AUTO: 0.5 % (ref 0–0.9)
INR PPP: 1.2 (ref 0.9–1.1)
LYMPHOCYTES # BLD AUTO: 1.1 X10*3/UL (ref 1.2–4.8)
LYMPHOCYTES NFR BLD AUTO: 13 %
MAGNESIUM SERPL-MCNC: 1.69 MG/DL (ref 1.6–2.4)
MCH RBC QN AUTO: 23.7 PG (ref 26–34)
MCHC RBC AUTO-ENTMCNC: 30.2 G/DL (ref 32–36)
MCV RBC AUTO: 78 FL (ref 80–100)
MONOCYTES # BLD AUTO: 0.71 X10*3/UL (ref 0.1–1)
MONOCYTES NFR BLD AUTO: 8.4 %
NEUTROPHILS # BLD AUTO: 6.58 X10*3/UL (ref 1.2–7.7)
NEUTROPHILS NFR BLD AUTO: 77.6 %
NRBC BLD-RTO: 0 /100 WBCS (ref 0–0)
PHOSPHATE SERPL-MCNC: 1.8 MG/DL (ref 2.5–4.9)
PLATELET # BLD AUTO: 217 X10*3/UL (ref 150–450)
POTASSIUM SERPL-SCNC: 3.4 MMOL/L (ref 3.5–5.3)
PROTHROMBIN TIME: 13.3 SECONDS (ref 9.8–12.8)
RBC # BLD AUTO: 3.34 X10*6/UL (ref 4–5.2)
SODIUM SERPL-SCNC: 136 MMOL/L (ref 136–145)
WBC # BLD AUTO: 8.5 X10*3/UL (ref 4.4–11.3)

## 2024-01-05 PROCEDURE — 0SP90JZ REMOVAL OF SYNTHETIC SUBSTITUTE FROM RIGHT HIP JOINT, OPEN APPROACH: ICD-10-PCS | Performed by: ORTHOPAEDIC SURGERY

## 2024-01-05 PROCEDURE — 3600000017 HC OR TIME - EACH INCREMENTAL 1 MINUTE - PROCEDURE LEVEL SIX: Performed by: ORTHOPAEDIC SURGERY

## 2024-01-05 PROCEDURE — C1713 ANCHOR/SCREW BN/BN,TIS/BN: HCPCS | Performed by: ORTHOPAEDIC SURGERY

## 2024-01-05 PROCEDURE — 3700000001 HC GENERAL ANESTHESIA TIME - INITIAL BASE CHARGE: Performed by: ORTHOPAEDIC SURGERY

## 2024-01-05 PROCEDURE — 83735 ASSAY OF MAGNESIUM: CPT | Performed by: INTERNAL MEDICINE

## 2024-01-05 PROCEDURE — 76000 FLUOROSCOPY <1 HR PHYS/QHP: CPT

## 2024-01-05 PROCEDURE — C1776 JOINT DEVICE (IMPLANTABLE): HCPCS | Performed by: ORTHOPAEDIC SURGERY

## 2024-01-05 PROCEDURE — 2500000001 HC RX 250 WO HCPCS SELF ADMINISTERED DRUGS (ALT 637 FOR MEDICARE OP): Performed by: ORTHOPAEDIC SURGERY

## 2024-01-05 PROCEDURE — 2500000005 HC RX 250 GENERAL PHARMACY W/O HCPCS: Performed by: NURSE PRACTITIONER

## 2024-01-05 PROCEDURE — 0SR90JZ REPLACEMENT OF RIGHT HIP JOINT WITH SYNTHETIC SUBSTITUTE, OPEN APPROACH: ICD-10-PCS | Performed by: ORTHOPAEDIC SURGERY

## 2024-01-05 PROCEDURE — 7100000002 HC RECOVERY ROOM TIME - EACH INCREMENTAL 1 MINUTE: Performed by: ORTHOPAEDIC SURGERY

## 2024-01-05 PROCEDURE — 2500000004 HC RX 250 GENERAL PHARMACY W/ HCPCS (ALT 636 FOR OP/ED): Performed by: ORTHOPAEDIC SURGERY

## 2024-01-05 PROCEDURE — 1100000001 HC PRIVATE ROOM DAILY

## 2024-01-05 PROCEDURE — 72170 X-RAY EXAM OF PELVIS: CPT

## 2024-01-05 PROCEDURE — 80069 RENAL FUNCTION PANEL: CPT | Performed by: INTERNAL MEDICINE

## 2024-01-05 PROCEDURE — 3700000002 HC GENERAL ANESTHESIA TIME - EACH INCREMENTAL 1 MINUTE: Performed by: ORTHOPAEDIC SURGERY

## 2024-01-05 PROCEDURE — 72170 X-RAY EXAM OF PELVIS: CPT | Performed by: RADIOLOGY

## 2024-01-05 PROCEDURE — 2500000004 HC RX 250 GENERAL PHARMACY W/ HCPCS (ALT 636 FOR OP/ED): Performed by: NURSE PRACTITIONER

## 2024-01-05 PROCEDURE — 2500000005 HC RX 250 GENERAL PHARMACY W/O HCPCS: Performed by: NURSE ANESTHETIST, CERTIFIED REGISTERED

## 2024-01-05 PROCEDURE — 2500000004 HC RX 250 GENERAL PHARMACY W/ HCPCS (ALT 636 FOR OP/ED): Performed by: NURSE ANESTHETIST, CERTIFIED REGISTERED

## 2024-01-05 PROCEDURE — 85025 COMPLETE CBC W/AUTO DIFF WBC: CPT | Performed by: INTERNAL MEDICINE

## 2024-01-05 PROCEDURE — 99231 SBSQ HOSP IP/OBS SF/LOW 25: CPT | Performed by: NURSE PRACTITIONER

## 2024-01-05 PROCEDURE — 3600000018 HC OR TIME - INITIAL BASE CHARGE - PROCEDURE LEVEL SIX: Performed by: ORTHOPAEDIC SURGERY

## 2024-01-05 PROCEDURE — 99233 SBSQ HOSP IP/OBS HIGH 50: CPT | Performed by: ORTHOPAEDIC SURGERY

## 2024-01-05 PROCEDURE — RXMED WILLOW AMBULATORY MEDICATION CHARGE

## 2024-01-05 PROCEDURE — P9045 ALBUMIN (HUMAN), 5%, 250 ML: HCPCS | Mod: JZ | Performed by: NURSE ANESTHETIST, CERTIFIED REGISTERED

## 2024-01-05 PROCEDURE — 99233 SBSQ HOSP IP/OBS HIGH 50: CPT | Performed by: NURSE PRACTITIONER

## 2024-01-05 PROCEDURE — 85610 PROTHROMBIN TIME: CPT | Performed by: INTERNAL MEDICINE

## 2024-01-05 PROCEDURE — A27134 PR REVISE TOTAL HIP REPLACEMENT: Performed by: NURSE ANESTHETIST, CERTIFIED REGISTERED

## 2024-01-05 PROCEDURE — 7100000001 HC RECOVERY ROOM TIME - INITIAL BASE CHARGE: Performed by: ORTHOPAEDIC SURGERY

## 2024-01-05 PROCEDURE — 27138 REVISE HIP JOINT REPLACEMENT: CPT | Performed by: ORTHOPAEDIC SURGERY

## 2024-01-05 PROCEDURE — 2780000003 HC OR 278 NO HCPCS: Performed by: ORTHOPAEDIC SURGERY

## 2024-01-05 PROCEDURE — 2720000007 HC OR 272 NO HCPCS: Performed by: ORTHOPAEDIC SURGERY

## 2024-01-05 PROCEDURE — 36415 COLL VENOUS BLD VENIPUNCTURE: CPT | Performed by: INTERNAL MEDICINE

## 2024-01-05 DEVICE — IMPLANTABLE DEVICE
Type: IMPLANTABLE DEVICE | Site: HIP | Status: FUNCTIONAL
Brand: CABLE-READY®

## 2024-01-05 DEVICE — MODULAR HIP SYSTEM
Type: IMPLANTABLE DEVICE | Site: HIP | Status: FUNCTIONAL
Brand: RESTORATION

## 2024-01-05 DEVICE — RESTORATION ADM/MDM X3 INSERT
Type: IMPLANTABLE DEVICE | Site: HIP | Status: FUNCTIONAL
Brand: RESTORATION ADM/MDM X3 INSERT

## 2024-01-05 RX ORDER — ROCURONIUM BROMIDE 10 MG/ML
INJECTION, SOLUTION INTRAVENOUS AS NEEDED
Status: DISCONTINUED | OUTPATIENT
Start: 2024-01-05 | End: 2024-01-05

## 2024-01-05 RX ORDER — MAGNESIUM SULFATE HEPTAHYDRATE 40 MG/ML
2 INJECTION, SOLUTION INTRAVENOUS ONCE
Status: COMPLETED | OUTPATIENT
Start: 2024-01-05 | End: 2024-01-05

## 2024-01-05 RX ORDER — SODIUM CHLORIDE, SODIUM LACTATE, POTASSIUM CHLORIDE, CALCIUM CHLORIDE 600; 310; 30; 20 MG/100ML; MG/100ML; MG/100ML; MG/100ML
100 INJECTION, SOLUTION INTRAVENOUS CONTINUOUS
Status: DISCONTINUED | OUTPATIENT
Start: 2024-01-05 | End: 2024-01-05 | Stop reason: HOSPADM

## 2024-01-05 RX ORDER — ALBUTEROL SULFATE 0.83 MG/ML
2.5 SOLUTION RESPIRATORY (INHALATION) ONCE AS NEEDED
Status: DISCONTINUED | OUTPATIENT
Start: 2024-01-05 | End: 2024-01-05 | Stop reason: HOSPADM

## 2024-01-05 RX ORDER — FENTANYL CITRATE 50 UG/ML
INJECTION, SOLUTION INTRAMUSCULAR; INTRAVENOUS AS NEEDED
Status: DISCONTINUED | OUTPATIENT
Start: 2024-01-05 | End: 2024-01-05

## 2024-01-05 RX ORDER — SODIUM CHLORIDE, SODIUM LACTATE, POTASSIUM CHLORIDE, CALCIUM CHLORIDE 600; 310; 30; 20 MG/100ML; MG/100ML; MG/100ML; MG/100ML
100 INJECTION, SOLUTION INTRAVENOUS CONTINUOUS
Status: DISCONTINUED | OUTPATIENT
Start: 2024-01-05 | End: 2024-01-07

## 2024-01-05 RX ORDER — CEFAZOLIN SODIUM 2 G/100ML
2 INJECTION, SOLUTION INTRAVENOUS EVERY 8 HOURS
Status: DISCONTINUED | OUTPATIENT
Start: 2024-01-05 | End: 2024-01-08 | Stop reason: HOSPADM

## 2024-01-05 RX ORDER — ACETAMINOPHEN 325 MG/1
650 TABLET ORAL EVERY 4 HOURS PRN
Status: DISCONTINUED | OUTPATIENT
Start: 2024-01-05 | End: 2024-01-05 | Stop reason: HOSPADM

## 2024-01-05 RX ORDER — DROPERIDOL 2.5 MG/ML
0.62 INJECTION, SOLUTION INTRAMUSCULAR; INTRAVENOUS ONCE AS NEEDED
Status: DISCONTINUED | OUTPATIENT
Start: 2024-01-05 | End: 2024-01-05 | Stop reason: HOSPADM

## 2024-01-05 RX ORDER — LABETALOL HYDROCHLORIDE 5 MG/ML
5 INJECTION, SOLUTION INTRAVENOUS ONCE AS NEEDED
Status: DISCONTINUED | OUTPATIENT
Start: 2024-01-05 | End: 2024-01-05 | Stop reason: HOSPADM

## 2024-01-05 RX ORDER — NORETHINDRONE AND ETHINYL ESTRADIOL 0.5-0.035
KIT ORAL AS NEEDED
Status: DISCONTINUED | OUTPATIENT
Start: 2024-01-05 | End: 2024-01-05

## 2024-01-05 RX ORDER — CEFAZOLIN 1 G/1
INJECTION, POWDER, FOR SOLUTION INTRAVENOUS AS NEEDED
Status: DISCONTINUED | OUTPATIENT
Start: 2024-01-05 | End: 2024-01-05

## 2024-01-05 RX ORDER — HYDROMORPHONE HYDROCHLORIDE 2 MG/ML
INJECTION, SOLUTION INTRAMUSCULAR; INTRAVENOUS; SUBCUTANEOUS AS NEEDED
Status: DISCONTINUED | OUTPATIENT
Start: 2024-01-05 | End: 2024-01-05

## 2024-01-05 RX ORDER — DEXAMETHASONE SODIUM PHOSPHATE 4 MG/ML
INJECTION, SOLUTION INTRA-ARTICULAR; INTRALESIONAL; INTRAMUSCULAR; INTRAVENOUS; SOFT TISSUE AS NEEDED
Status: DISCONTINUED | OUTPATIENT
Start: 2024-01-05 | End: 2024-01-05

## 2024-01-05 RX ORDER — ONDANSETRON HYDROCHLORIDE 2 MG/ML
4 INJECTION, SOLUTION INTRAVENOUS ONCE AS NEEDED
Status: DISCONTINUED | OUTPATIENT
Start: 2024-01-05 | End: 2024-01-05 | Stop reason: HOSPADM

## 2024-01-05 RX ORDER — OXYCODONE HYDROCHLORIDE 5 MG/1
5 TABLET ORAL EVERY 4 HOURS PRN
Status: DISCONTINUED | OUTPATIENT
Start: 2024-01-05 | End: 2024-01-05 | Stop reason: HOSPADM

## 2024-01-05 RX ORDER — PROPOFOL 10 MG/ML
INJECTION, EMULSION INTRAVENOUS AS NEEDED
Status: DISCONTINUED | OUTPATIENT
Start: 2024-01-05 | End: 2024-01-05

## 2024-01-05 RX ORDER — PHENYLEPHRINE HCL IN 0.9% NACL 0.4MG/10ML
SYRINGE (ML) INTRAVENOUS AS NEEDED
Status: DISCONTINUED | OUTPATIENT
Start: 2024-01-05 | End: 2024-01-05

## 2024-01-05 RX ORDER — ONDANSETRON HYDROCHLORIDE 2 MG/ML
INJECTION, SOLUTION INTRAVENOUS AS NEEDED
Status: DISCONTINUED | OUTPATIENT
Start: 2024-01-05 | End: 2024-01-05

## 2024-01-05 RX ORDER — TRANEXAMIC ACID 10 MG/ML
INJECTION, SOLUTION INTRAVENOUS AS NEEDED
Status: DISCONTINUED | OUTPATIENT
Start: 2024-01-05 | End: 2024-01-05

## 2024-01-05 RX ORDER — HYDRALAZINE HYDROCHLORIDE 20 MG/ML
5 INJECTION INTRAMUSCULAR; INTRAVENOUS EVERY 30 MIN PRN
Status: DISCONTINUED | OUTPATIENT
Start: 2024-01-05 | End: 2024-01-05 | Stop reason: HOSPADM

## 2024-01-05 RX ORDER — ALBUMIN HUMAN 50 G/1000ML
SOLUTION INTRAVENOUS AS NEEDED
Status: DISCONTINUED | OUTPATIENT
Start: 2024-01-05 | End: 2024-01-05

## 2024-01-05 RX ORDER — OXYCODONE HYDROCHLORIDE 5 MG/1
10 TABLET ORAL EVERY 4 HOURS PRN
Status: DISCONTINUED | OUTPATIENT
Start: 2024-01-05 | End: 2024-01-05 | Stop reason: HOSPADM

## 2024-01-05 RX ORDER — MIDAZOLAM HYDROCHLORIDE 1 MG/ML
INJECTION INTRAMUSCULAR; INTRAVENOUS AS NEEDED
Status: DISCONTINUED | OUTPATIENT
Start: 2024-01-05 | End: 2024-01-05

## 2024-01-05 RX ORDER — LIDOCAINE HCL/PF 100 MG/5ML
SYRINGE (ML) INTRAVENOUS AS NEEDED
Status: DISCONTINUED | OUTPATIENT
Start: 2024-01-05 | End: 2024-01-05

## 2024-01-05 RX ADMIN — Medication 100 MCG: at 14:50

## 2024-01-05 RX ADMIN — Medication 40 MCG: at 13:53

## 2024-01-05 RX ADMIN — ROCURONIUM BROMIDE 10 MG: 10 INJECTION, SOLUTION INTRAVENOUS at 14:18

## 2024-01-05 RX ADMIN — MONTELUKAST 10 MG: 10 TABLET, FILM COATED ORAL at 20:45

## 2024-01-05 RX ADMIN — ARIPIPRAZOLE 15 MG: 5 TABLET ORAL at 20:45

## 2024-01-05 RX ADMIN — MAGNESIUM SULFATE HEPTAHYDRATE 2 G: 2 INJECTION, SOLUTION INTRAVENOUS at 08:18

## 2024-01-05 RX ADMIN — CEFAZOLIN 2 G: 1 INJECTION, POWDER, FOR SOLUTION INTRAMUSCULAR; INTRAVENOUS at 13:02

## 2024-01-05 RX ADMIN — Medication 80 MCG: at 13:48

## 2024-01-05 RX ADMIN — ALBUMIN HUMAN 250 ML: 0.05 INJECTION, SOLUTION INTRAVENOUS at 14:23

## 2024-01-05 RX ADMIN — ACETAMINOPHEN 650 MG: 325 TABLET ORAL at 06:24

## 2024-01-05 RX ADMIN — ACETAMINOPHEN 650 MG: 325 TABLET ORAL at 00:57

## 2024-01-05 RX ADMIN — SODIUM CHLORIDE, SODIUM LACTATE, POTASSIUM CHLORIDE, AND CALCIUM CHLORIDE: 600; 310; 30; 20 INJECTION, SOLUTION INTRAVENOUS at 12:33

## 2024-01-05 RX ADMIN — ONDANSETRON 4 MG: 2 INJECTION INTRAMUSCULAR; INTRAVENOUS at 14:48

## 2024-01-05 RX ADMIN — Medication 100 MCG: at 14:32

## 2024-01-05 RX ADMIN — DOCUSATE SODIUM 100 MG: 100 CAPSULE, LIQUID FILLED ORAL at 20:45

## 2024-01-05 RX ADMIN — ROCURONIUM BROMIDE 50 MG: 10 INJECTION, SOLUTION INTRAVENOUS at 12:55

## 2024-01-05 RX ADMIN — EPHEDRINE SULFATE 10 MG: 50 INJECTION, SOLUTION INTRAVENOUS at 14:53

## 2024-01-05 RX ADMIN — Medication 100 MCG: at 14:47

## 2024-01-05 RX ADMIN — PANTOPRAZOLE SODIUM 40 MG: 40 TABLET, DELAYED RELEASE ORAL at 06:24

## 2024-01-05 RX ADMIN — Medication 100 MCG: at 14:38

## 2024-01-05 RX ADMIN — TRANEXAMIC ACID 1000 MG: 10 INJECTION, SOLUTION INTRAVENOUS at 14:54

## 2024-01-05 RX ADMIN — SODIUM CHLORIDE, SODIUM LACTATE, POTASSIUM CHLORIDE, AND CALCIUM CHLORIDE: 600; 310; 30; 20 INJECTION, SOLUTION INTRAVENOUS at 14:47

## 2024-01-05 RX ADMIN — CEFAZOLIN SODIUM 2 G: 2 INJECTION, SOLUTION INTRAVENOUS at 20:45

## 2024-01-05 RX ADMIN — SIMVASTATIN 20 MG: 20 TABLET, FILM COATED ORAL at 20:45

## 2024-01-05 RX ADMIN — MIDAZOLAM HYDROCHLORIDE 2 MG: 1 INJECTION, SOLUTION INTRAMUSCULAR; INTRAVENOUS at 12:40

## 2024-01-05 RX ADMIN — PROPOFOL 30 MG: 10 INJECTION, EMULSION INTRAVENOUS at 15:21

## 2024-01-05 RX ADMIN — POTASSIUM PHOSPHATE, MONOBASIC POTASSIUM PHOSPHATE, DIBASIC 21 MMOL: 224; 236 INJECTION, SOLUTION, CONCENTRATE INTRAVENOUS at 09:52

## 2024-01-05 RX ADMIN — SODIUM CHLORIDE, POTASSIUM CHLORIDE, SODIUM LACTATE AND CALCIUM CHLORIDE 500 ML: 600; 310; 30; 20 INJECTION, SOLUTION INTRAVENOUS at 08:24

## 2024-01-05 RX ADMIN — FENTANYL CITRATE 50 MCG: 50 INJECTION, SOLUTION INTRAMUSCULAR; INTRAVENOUS at 12:55

## 2024-01-05 RX ADMIN — Medication 4 L/MIN: at 15:38

## 2024-01-05 RX ADMIN — HYDROMORPHONE HYDROCHLORIDE 0.4 MG: 2 INJECTION, SOLUTION INTRAMUSCULAR; INTRAVENOUS; SUBCUTANEOUS at 13:41

## 2024-01-05 RX ADMIN — Medication 80 MCG: at 13:32

## 2024-01-05 RX ADMIN — Medication 100 MCG: at 14:17

## 2024-01-05 RX ADMIN — PROPOFOL 140 MG: 10 INJECTION, EMULSION INTRAVENOUS at 12:55

## 2024-01-05 RX ADMIN — SODIUM CHLORIDE, POTASSIUM CHLORIDE, SODIUM LACTATE AND CALCIUM CHLORIDE 100 ML/HR: 600; 310; 30; 20 INJECTION, SOLUTION INTRAVENOUS at 08:25

## 2024-01-05 RX ADMIN — SUGAMMADEX 200 MG: 100 INJECTION, SOLUTION INTRAVENOUS at 15:18

## 2024-01-05 RX ADMIN — ACETAMINOPHEN 650 MG: 325 TABLET ORAL at 20:45

## 2024-01-05 RX ADMIN — TRANEXAMIC ACID 1000 MG: 10 INJECTION, SOLUTION INTRAVENOUS at 13:02

## 2024-01-05 RX ADMIN — Medication 120 MCG: at 13:27

## 2024-01-05 RX ADMIN — ROCURONIUM BROMIDE 20 MG: 10 INJECTION, SOLUTION INTRAVENOUS at 13:32

## 2024-01-05 RX ADMIN — LIDOCAINE HYDROCHLORIDE 50 MG: 20 INJECTION INTRAVENOUS at 12:55

## 2024-01-05 RX ADMIN — FENTANYL CITRATE 50 MCG: 50 INJECTION, SOLUTION INTRAMUSCULAR; INTRAVENOUS at 13:20

## 2024-01-05 RX ADMIN — Medication 100 MCG: at 14:20

## 2024-01-05 RX ADMIN — FAMOTIDINE 20 MG: 20 TABLET ORAL at 20:45

## 2024-01-05 RX ADMIN — DEXAMETHASONE SODIUM PHOSPHATE 8 MG: 4 INJECTION INTRA-ARTICULAR; INTRALESIONAL; INTRAMUSCULAR; INTRAVENOUS; SOFT TISSUE at 13:00

## 2024-01-05 RX ADMIN — Medication 100 MCG: at 14:34

## 2024-01-05 RX ADMIN — Medication 100 MCG: at 14:07

## 2024-01-05 RX ADMIN — HYDROMORPHONE HYDROCHLORIDE 0.6 MG: 2 INJECTION, SOLUTION INTRAMUSCULAR; INTRAVENOUS; SUBCUTANEOUS at 13:27

## 2024-01-05 RX ADMIN — SODIUM CHLORIDE, POTASSIUM CHLORIDE, SODIUM LACTATE AND CALCIUM CHLORIDE 100 ML/HR: 600; 310; 30; 20 INJECTION, SOLUTION INTRAVENOUS at 12:32

## 2024-01-05 RX ADMIN — Medication 100 MCG: at 14:29

## 2024-01-05 RX ADMIN — PROPOFOL 30 MG: 10 INJECTION, EMULSION INTRAVENOUS at 15:23

## 2024-01-05 RX ADMIN — TRAZODONE HYDROCHLORIDE 100 MG: 50 TABLET ORAL at 20:45

## 2024-01-05 RX ADMIN — Medication 80 MCG: at 13:56

## 2024-01-05 RX ADMIN — PAROXETINE HYDROCHLORIDE 20 MG: 20 TABLET, FILM COATED ORAL at 20:45

## 2024-01-05 RX ADMIN — ROCURONIUM BROMIDE 10 MG: 10 INJECTION, SOLUTION INTRAVENOUS at 14:39

## 2024-01-05 SDOH — HEALTH STABILITY: MENTAL HEALTH: CURRENT SMOKER: 0

## 2024-01-05 ASSESSMENT — COGNITIVE AND FUNCTIONAL STATUS - GENERAL
TOILETING: A LITTLE
PERSONAL GROOMING: A LITTLE
MOVING TO AND FROM BED TO CHAIR: A LITTLE
MOBILITY SCORE: 17
MOBILITY SCORE: 17
WALKING IN HOSPITAL ROOM: A LOT
DAILY ACTIVITIY SCORE: 17
CLIMB 3 TO 5 STEPS WITH RAILING: A LOT
HELP NEEDED FOR BATHING: A LOT
DRESSING REGULAR LOWER BODY CLOTHING: A LOT
DAILY ACTIVITIY SCORE: 18
CLIMB 3 TO 5 STEPS WITH RAILING: A LOT
TOILETING: A LITTLE
TURNING FROM BACK TO SIDE WHILE IN FLAT BAD: A LITTLE
DRESSING REGULAR LOWER BODY CLOTHING: A LOT
STANDING UP FROM CHAIR USING ARMS: A LITTLE
DRESSING REGULAR UPPER BODY CLOTHING: A LITTLE
DRESSING REGULAR UPPER BODY CLOTHING: A LITTLE
MOVING TO AND FROM BED TO CHAIR: A LITTLE
STANDING UP FROM CHAIR USING ARMS: A LITTLE
TURNING FROM BACK TO SIDE WHILE IN FLAT BAD: A LITTLE
HELP NEEDED FOR BATHING: A LOT
WALKING IN HOSPITAL ROOM: A LOT

## 2024-01-05 ASSESSMENT — PAIN - FUNCTIONAL ASSESSMENT
PAIN_FUNCTIONAL_ASSESSMENT: 0-10

## 2024-01-05 ASSESSMENT — PAIN SCALES - GENERAL
PAINLEVEL_OUTOF10: 0 - NO PAIN
PAINLEVEL_OUTOF10: 0 - NO PAIN
PAIN_LEVEL: 5
PAINLEVEL_OUTOF10: 1
PAINLEVEL_OUTOF10: 3
PAINLEVEL_OUTOF10: 0 - NO PAIN
PAINLEVEL_OUTOF10: 0 - NO PAIN
PAINLEVEL_OUTOF10: 8

## 2024-01-05 ASSESSMENT — PAIN DESCRIPTION - ORIENTATION: ORIENTATION: LEFT

## 2024-01-05 ASSESSMENT — PAIN DESCRIPTION - LOCATION: LOCATION: HIP

## 2024-01-05 NOTE — PROGRESS NOTES
Patient seen and examined this morning.  This is a 48-year-old female postop day 3 after complex primary right total hip with severe protrusio deformity and contracture complicated by periprosthetic femur fracture.  Patient complains of pain in the right lower extremity.  No numbness or tingling.  This is a new severe function or limb threatening issue for her.  She is unable to ambulate.    Physical exam: Right hip dressing in place no erythema no drainage, able to move all toes normal pulses neurovascular intact distally    X-rays and CT reviewed, they show subsidence of the femoral stem and medial calcar fracture.    Impression plan: 48-year-old female status post primary right total hip with severe protrusio deformity complicated by periprosthetic femoral calcar fracture.  I discussed the situation in detail with the patient including risks benefits alternatives of various treatment options.  Patient has severe osteopenia and deformed bone.  Unfortunately she is at high risk for fracture.  Nonsurgical management of this would likely result in long-term bedrest and is unlikely to result in a stable stem on that side.  It would avoid immediate risks of revision surgery but I would not recommend this.  The goal of revision surgery would be to establish a stable stem and revise the fixation and stabilize the fracture with a goal of function and ambulation.  Risks are significant, for list of risks as listed below however there is increased risk with revision surgery especially of infection instability periprosthetic fracture neurovascular damage.  Patient is aware of all this.  We discussed it in detail and she elected to proceed with revision of her right total hip.  We discussed that the acetabular component may need to be revised as well if needed.  After complete discussion of risks and benefits patient signed informed consent we will plan to proceed today.  We discussed the risks of complications as well as the  risks of morbidity and mortality related to surgical treatment with total joint replacement. We reviewed the fact that total joint replacement is major urgent surgery with significant associated surgical and procedural risk factors as detailed below.   Risks include: Pain, blood loss, damage to nearby anatomical structures including but not limited to nerves or blood vessels muscles tendons and bone, failure surgery to ameliorate symptoms, persistence of pain surrounding the affected joint, mechanical failure of the prosthesis including but not limited to loosening of the prosthesis from bone ,breakage of the prosthesis and dislocation of the prosthesis, change in length or appearance of a limb, infection possibly necessitating further surgery, removal of the prosthesis, or limb amputation, the need for additional surgery for other reasons, blood clots, amputation, and death. No guarantees were implied or given.  All questions were answered and the patient voiced their understanding.

## 2024-01-05 NOTE — PROGRESS NOTES
Blanca Hamilton is a 48 y.o. female on day 2 of admission presenting with Right hip pain.      Subjective   The patient has just returned from OR.    Objective     Last Recorded Vitals  /65   Pulse 101   Temp 36.5 °C (97.7 °F) (Temporal)   Resp 15   Wt 89 kg (196 lb 3.4 oz)   SpO2 98%   Intake/Output last 3 Shifts:    Intake/Output Summary (Last 24 hours) at 1/5/2024 1716  Last data filed at 1/5/2024 1600  Gross per 24 hour   Intake 2700 ml   Output 1000 ml   Net 1700 ml         Admission Weight  Weight:  (230 ibs) (01/02/24 1010)    Daily Weight  01/02/24 : 89 kg (196 lb 3.4 oz)        Physical Exam  Constitutional: alert and oriented x 3, awake, cooperative, no acute distress  Skin: warm and dry  Head/Neck: Normocephalic, atraumatic  Eyes: clear sclera  ENMT: mucous membranes moist  Cardio: Regular rhythm, rapid rate  Resp: CTA bilaterally, good respiratory effort  Gastrointestinal: Soft, nontender, nondistended  Musculoskeletal: no joint swelling, ROM limited BLE RLE>LLE   Neuro: alert and oriented x 3  Psychological: Appropriate mood and behavior    Relevant Results  Scheduled medications  acetaminophen, 650 mg, oral, q6h DARLENE  ARIPiprazole, 15 mg, oral, Daily  [Held by provider] aspirin, 81 mg, oral, BID  ceFAZolin, 2 g, intravenous, q8h  docusate sodium, 100 mg, oral, BID  [Held by provider] enoxaparin, 40 mg, subcutaneous, q24h  famotidine, 20 mg, oral, BID  ferrous sulfate (325 mg ferrous sulfate), 1 tablet, oral, Daily  lisinopril, 10 mg, oral, Daily  loratadine, 10 mg, oral, Daily  montelukast, 10 mg, oral, Nightly  nicotine, 1 patch, transdermal, Daily  pantoprazole, 40 mg, oral, Daily before breakfast  PARoxetine, 20 mg, oral, Daily  PARoxetine, 40 mg, oral, Daily  polyethylene glycol, 17 g, oral, Daily  simvastatin, 20 mg, oral, Nightly  traZODone, 100 mg, oral, Nightly      Continuous medications  lactated Ringer's, 100 mL/hr, Last Rate: 100 mL/hr (01/05/24 1232)  oxygen, 2 L/min, Last Rate: 4  L/min (01/05/24 1538)      PRN medications  PRN medications: albuterol, HYDROmorphone, naloxone, ondansetron **OR** ondansetron, oxyCODONE, oxyCODONE    Results for orders placed or performed during the hospital encounter of 01/02/24 (from the past 24 hour(s))   CBC and Auto Differential   Result Value Ref Range    WBC 8.5 4.4 - 11.3 x10*3/uL    nRBC 0.0 0.0 - 0.0 /100 WBCs    RBC 3.34 (L) 4.00 - 5.20 x10*6/uL    Hemoglobin 7.9 (L) 12.0 - 16.0 g/dL    Hematocrit 26.2 (L) 36.0 - 46.0 %    MCV 78 (L) 80 - 100 fL    MCH 23.7 (L) 26.0 - 34.0 pg    MCHC 30.2 (L) 32.0 - 36.0 g/dL    RDW 20.9 (H) 11.5 - 14.5 %    Platelets 217 150 - 450 x10*3/uL    Neutrophils % 77.6 40.0 - 80.0 %    Immature Granulocytes %, Automated 0.5 0.0 - 0.9 %    Lymphocytes % 13.0 13.0 - 44.0 %    Monocytes % 8.4 2.0 - 10.0 %    Eosinophils % 0.4 0.0 - 6.0 %    Basophils % 0.1 0.0 - 2.0 %    Neutrophils Absolute 6.58 1.20 - 7.70 x10*3/uL    Immature Granulocytes Absolute, Automated 0.04 0.00 - 0.70 x10*3/uL    Lymphocytes Absolute 1.10 (L) 1.20 - 4.80 x10*3/uL    Monocytes Absolute 0.71 0.10 - 1.00 x10*3/uL    Eosinophils Absolute 0.03 0.00 - 0.70 x10*3/uL    Basophils Absolute 0.01 0.00 - 0.10 x10*3/uL   Renal Function Panel   Result Value Ref Range    Glucose 89 74 - 99 mg/dL    Sodium 136 136 - 145 mmol/L    Potassium 3.4 (L) 3.5 - 5.3 mmol/L    Chloride 103 98 - 107 mmol/L    Bicarbonate 26 21 - 32 mmol/L    Anion Gap 10 10 - 20 mmol/L    Urea Nitrogen 10 6 - 23 mg/dL    Creatinine 0.31 (L) 0.50 - 1.05 mg/dL    eGFR >90 >60 mL/min/1.73m*2    Calcium 7.9 (L) 8.6 - 10.3 mg/dL    Phosphorus 1.8 (L) 2.5 - 4.9 mg/dL    Albumin 2.9 (L) 3.4 - 5.0 g/dL   Magnesium   Result Value Ref Range    Magnesium 1.69 1.60 - 2.40 mg/dL   Coagulation Screen   Result Value Ref Range    Protime 13.3 (H) 9.8 - 12.8 seconds    INR 1.2 (H) 0.9 - 1.1    aPTT 26 (L) 27 - 38 seconds          Assessment/Plan   49yo CF with PMH of HTN, HLD, GERD, tobacco abuse, and severe  degenerative disease that presented for scheduled right ALEXA. Medicine was consulted for medical management of chronic conditions.    Degenerative disease  - s/p right ALEXA on 1/2 complicated by periprosthetic femur fracture  - ortho following, plan to return to OR tomorrow for fracture stabilization  - per ortho, NWB RLE  - continue multimodal pain regimen  - mgmt per primary surgical service    Hypokalemia   -Replaced will repeat level    Acute on chronic anemia  - in setting of above  - Transfused PRBCs    Hypertension  - continue home lisinopril    Hyperlipidemia  - continue home statin    Tobacco abuse  - educated about tobacco cessation    DVT Proph: no pharmacologics in setting of above      Active Problems:    Primary hypertension    Anxiety    Depression    Hyperlipidemia      Leonarda Bond, APRN-CNP

## 2024-01-05 NOTE — PROGRESS NOTES
01/05/24 1031   Discharge Planning   Living Arrangements Alone   Support Systems None   Assistance Needed walker, cane, does not drive - uses a medical transport company   Type of Residence Private residence   Home or Post Acute Services Post acute facilities (Rehab/SNF/etc)   Patient expects to be discharged to: TBD, pending OR and PT/OT evals   Does the patient need discharge transport arranged? Yes   RoundTrip coordination needed? Yes   Has discharge transport been arranged? No     01/05/2024 1032: Plan for the patient to go to the OR today. PT/OT to evaluate postop.

## 2024-01-05 NOTE — ANESTHESIA PROCEDURE NOTES
Airway  Date/Time: 1/5/2024 12:59 PM  Urgency: elective    Airway not difficult    Staffing  Performed: CRNA   Authorized by: ELVIRA Mccray    Performed by: ELVIRA Mccray  Patient location during procedure: OR    Indications and Patient Condition  Indications for airway management: anesthesia  Spontaneous Ventilation: absent  Sedation level: deep  Preoxygenated: yes  Patient position: sniffing  Mask difficulty assessment: 1 - vent by mask    Final Airway Details  Final airway type: endotracheal airway      Successful airway: ETT  Cuffed: yes   Successful intubation technique: video laryngoscopy  Facilitating devices/methods: intubating stylet  Endotracheal tube insertion site: oral  Blade type: imedo.  Blade size: #4  ETT size (mm): 7.5  Cormack-Lehane Classification: grade I - full view of glottis  Placement verified by: chest auscultation, capnometry and palpation of cuff   Cuff volume (mL): 8  Measured from: lips  ETT to lips (cm): 21  Number of attempts at approach: 1    Additional Comments  Lips and teeth remain in pre-anesthetic condition s/p intubation.

## 2024-01-05 NOTE — CARE PLAN
The patient's goals for the shift include pain management      Problem: Pain - Adult  Goal: Verbalizes/displays adequate comfort level or baseline comfort level  Outcome: Progressing     Problem: Safety - Adult  Goal: Free from fall injury  Outcome: Progressing     Problem: Discharge Planning  Goal: Discharge to home or other facility with appropriate resources  Outcome: Progressing     Problem: Chronic Conditions and Co-morbidities  Goal: Patient's chronic conditions and co-morbidity symptoms are monitored and maintained or improved  Outcome: Progressing     Problem: Fall/Injury  Goal: Not fall by end of shift  Outcome: Progressing       The clinical goals for the shift include patient will have pain managed to allow for at least 5 hours of sleep.

## 2024-01-05 NOTE — PROGRESS NOTES
Physical Therapy                 Therapy Communication Note    Patient Name: Blanca Hamilton  MRN: [id number]  Today's Date: 1/5/2024     Discipline: Physical Therapy    Missed Visit Reason: Missed Visit Reason: Reschedule (Pt anticipting OR at 1230 this date, no evaluation completed prior to surgery. Hold until post-op for PT assessment. Discussed with RN at 0848)    Missed Time: Attempt    Comment:

## 2024-01-05 NOTE — ANESTHESIA PREPROCEDURE EVALUATION
Patient: Blanca Hamilton    Procedure Information       Date/Time: 01/05/24 1840    Procedure: REVISION ARTHROPLASTY TOTAL HIP (Right: Hip) - revision oanh, posterior approach, wang rest mod, cables    Location: [facility] / [facility]    Surgeons: Felix Jalloh MD            Relevant Problems   Anesthesia   (-) PONV (postoperative nausea and vomiting)      Cardiovascular   (+) Hyperlipidemia   (+) Primary hypertension      Neuro/Psych   (+) Anxiety   (+) Depression      Musculoskeletal  Right hip fracture s/p arthroplasty 3 days ago       Clinical information reviewed:   Tobacco  Allergies  Meds  Problems  Med Hx  Surg Hx   Fam Hx  Soc   Hx        NPO Detail:  No data recorded     Physical Exam    Airway  Mallampati: II  TM distance: <3 FB  Neck ROM: full     Cardiovascular    Dental    Pulmonary    Abdominal            Anesthesia Plan    ASA 3     general     The patient is not a current smoker.    intravenous induction   Anesthetic plan and risks discussed with patient.    Plan discussed with attending.

## 2024-01-05 NOTE — PROGRESS NOTES
"Blanca Hamilton is a 48 y.o. female on day 2 of admission presenting with Right hip pain.    Subjective   No acute overnight events.  Pain controlled.  NPO currently for the OR today for revision of right total hip arthroplasty.       Objective     Physical Exam  Vitals reviewed.   Constitutional:       Appearance: She is ill-appearing.   HENT:      Head: Normocephalic and atraumatic.      Mouth/Throat:      Mouth: Mucous membranes are moist.   Eyes:      Extraocular Movements: Extraocular movements intact.      Conjunctiva/sclera: Conjunctivae normal.      Pupils: Pupils are equal, round, and reactive to light.   Cardiovascular:      Rate and Rhythm: Normal rate and regular rhythm.      Pulses: Normal pulses.   Pulmonary:      Effort: Pulmonary effort is normal.      Breath sounds: Normal breath sounds.   Abdominal:      General: Bowel sounds are normal.      Palpations: Abdomen is soft.   Musculoskeletal:      Cervical back: Neck supple.      Comments: Right hip dressing C/D/I, surrounding tissues soft and compressible, leg and foot warm and well perfused, motion and sensations intact   Skin:     General: Skin is warm and dry.      Capillary Refill: Capillary refill takes less than 2 seconds.   Neurological:      General: No focal deficit present.      Mental Status: She is alert and oriented to person, place, and time.       Last Recorded Vitals  Blood pressure 101/64, pulse 95, temperature 36.3 °C (97.3 °F), temperature source Temporal, resp. rate 18, height 1.727 m (5' 7.99\"), weight 89 kg (196 lb 3.4 oz), SpO2 96 %.  Intake/Output last 3 Shifts:  I/O last 3 completed shifts:  In: 662 (7.4 mL/kg) [P.O.:220; Blood:442]  Out: - (0 mL/kg)   Weight: 89 kg     Relevant Results  CT hip right wo IV contrast    Result Date: 1/3/2024  Interpreted By:  Niesha Núñez, STUDY: CT HIP RIGHT WO IV CONTRAST;  1/3/2024 10:16 am   INDICATION: Signs/Symptoms:concern for fracture in setting of post ALEXA.   COMPARISON: Right femur " radiographs dated 01/03/2024.   ACCESSION NUMBER(S): [id number]   ORDERING CLINICIAN: NIK ERICKSON   TECHNIQUE: CT imaging of the  right hip was obtained  without administration of intravenous contrast medium. Coronal and sagittal reformatted images were performed. 3D reformatted images were created and reviewed.   FINDINGS: OSSEOUS STRUCTURES: Postsurgical changes of right total hip arthroplasty are noted. There is an oblique spiral periprosthetic fracture extending from the medial cortex of proximal femoral metaphysis below the level of lesser trochanter and extends obliquely towards the posterior aspect of the greater trochanter. There is associated subsidence of the femoral stem component. However no significant perihardware lucency is noted.   The acetabular screw tips extend beyond the posterosuperior acetabular cortex into subjacent soft tissues (best seen on sagittal image 66/129 and axial image 26/134). There is a prominent lucency in the subchondral bone plate of superior acetabular margin, suggestive of prominent subchondral cyst. There is also significant thinning of the medial acetabular wall with suggestion of focal cortical discontinuity in the center (as seen on axial image 31/134). This is similar compared to prior pre-surgical CT dated 01/20/2022 and is likely related to significant osseous remodeling as a sequela of chronic degenerative changes. Otherwise majority of the acetabular component appears grossly intact. The rest of the visualized right lateral pelvis appears grossly intact.   SOFT TISSUES: There is reticular subcutaneous soft tissue edema throughout the lateral aspect of proximal right thigh with moderate amount of ill-defined fluid extending along the expected region of the incision tract from the incision site into deeper soft tissues. There is large amount of soft tissue gas extending throughout the visualized medial compartment musculature of the proximal right thigh and also  extending along the iliopsoas musculature. This is likely favored to be related to recent postsurgical status. There is ill-defined fluid tracking along the deep fascia of the lateral aspect of proximal thigh. Constellation of these findings are likely favored to be related to recent postsurgical status. Partially included intrapelvic soft tissues appear grossly unremarkable within limits of background streak artifacts.       1. Status post right total hip arthroplasty with an oblique spiral periprosthetic (likely intertrochanteric) fracture extending through proximal femoral metaphysis as described above. 2. Severe thinning of the medial wall of acetabulum with focal cortical discontinuity and prominent subchondral cyst along the superior margin of the acetabular rim as described above. These changes are similar compared to immediate prior pre-surgical CT dated 02/20/2022 and is likely related to sequela of significant acetabular remodeling from chronic degenerative changes. There is protrusion of acetabular screw tips beyond the posterosuperior cortex into subjacent soft tissues. Recommend correlation with patient's symptomatology associated with this. 3. Soft tissue changes related to recent postsurgical status with small volume ill-defined fluid tracking along the subcutaneous soft tissues of lateral thigh and diffuse soft tissue gas as described above. This is most likely favored to be related to postsurgical hematoma/seroma of indeterminate sterility, especially given recent postsurgical status. Cellulitis with abscess formation can also be considered in the differential in appropriate clinical setting.   MACRO: Critical Finding:  See findings. Notification was initiated on 1/3/2024 at 11:04 am by Dr. Niesha Núñez.  (**-YCF-**) Instructions:   Signed by: Niesha Núñez 1/3/2024 11:05 AM Dictation workstation:   [id number]    XR femur right 2+ views    Result Date: 1/3/2024  Interpreted By:  Schoenberger,  Ross, STUDY: XR FEMUR RIGHT 2+ VIEWS; ;  1/3/2024 8:46 am   INDICATION: Signs/Symptoms:concern for fracture in setting of right ALEXA.   COMPARISON: None.   ACCESSION NUMBER(S): [id number]   ORDERING CLINICIAN: NIK ERICKSON   FINDINGS: All the surgical changes associated with a recent right total hip arthroplasty are noted. The apparent satisfactory positioning of the prosthetic components. Soft tissue air in keeping with recent operative status. The findings are similar to the exam from the prior day but new from exam performed 10/17/2023. There is considerable acetabular protrusio on the prior exam though the positioning of the acetabular component on this exam appear satisfactory. There is slight angulation of the cortex at the inferomedial the or junction of the neck with the lesser trochanter/intra trochanteric femur. However, upon other views of the hip, a definite periprosthetic fracture is not convincingly identified.       Findings are similar tip the yesterday's exam. No definite more distal femur fracture is seen. Expected findings post total hip arthroplasty. Slight angulation at the cortex on the AP radiograph associated the lesser trochanter of uncertain significance. See discussion above.     MACRO: None   Signed by: Joseph Schoenberger 1/3/2024 9:10 AM Dictation workstation:   [id number]    XR pelvis 1-2 views    Result Date: 1/2/2024  STUDY: Pelvis Radiographs; 01/02/2024 2:37 PM INDICATION: Post-operative for right total hip arthroplasty. COMPARISON: None Available. ACCESSION NUMBER(S): [id number] ORDERING CLINICIAN: NIK ERICKSON TECHNIQUE:  One view(s) of the pelvis. FINDINGS:  This exam is positioned with both legs displaced to the right and the superior portion of the pelvis is not included.  There are bilateral total hip prosthesis.  Alignment for both prosthesis appears normal in this oblique view.    Bilateral total hip prosthesis in place.  No acute abnormalities are identified in  this oblique view of the lower pelvis and hips.. Signed by Ross Caraballo MD    ECG 12 Lead    Result Date: 12/29/2023  Undetermined rhythm Otherwise normal ECG When compared with ECG of 05-NOV-2019 13:54, Current undetermined rhythm precludes rhythm comparison, needs review    XR lumbar spine 2-3 views    Result Date: 12/27/2023  Interpreted By:  Yolie Tate, STUDY: Lumbar Spine, three views.   INDICATION: Signs/Symptoms:pre operative evaluation for spinal mobility.   COMPARISON: None.   ACCESSION NUMBER(S): [id number]   ORDERING CLINICIAN: JON HERNANDEZ   FINDINGS: Alignment is within normal limits. Mild L5-S1 spondylosis with mild disc height loss. Mild facet joint arthropathy from L3-4 to L5-S1 noted. Vertebral body heights are preserved. Posterior elements are intact.       1. As above.   MACRO: None.   Signed by: Yolie Tate 12/27/2023 10:56 PM Dictation workstation:   [id number]      Scheduled medications  acetaminophen, 650 mg, oral, q6h DARLENE  ARIPiprazole, 15 mg, oral, Daily  [Held by provider] aspirin, 81 mg, oral, BID  docusate sodium, 100 mg, oral, BID  [Held by provider] enoxaparin, 40 mg, subcutaneous, q24h  famotidine, 20 mg, oral, BID  ferrous sulfate (325 mg ferrous sulfate), 1 tablet, oral, Daily  lisinopril, 10 mg, oral, Daily  loratadine, 10 mg, oral, Daily  montelukast, 10 mg, oral, Nightly  nicotine, 1 patch, transdermal, Daily  pantoprazole, 40 mg, oral, Daily before breakfast  PARoxetine, 20 mg, oral, Daily  PARoxetine, 40 mg, oral, Daily  polyethylene glycol, 17 g, oral, Daily  simvastatin, 20 mg, oral, Nightly  traZODone, 100 mg, oral, Nightly      Continuous medications  lactated Ringer's, 100 mL/hr, Last Rate: 100 mL/hr (01/02/24 1843)  oxygen, 2 L/min      PRN medications  PRN medications: albuterol, HYDROmorphone, naloxone, ondansetron **OR** ondansetron, oxyCODONE, oxyCODONE    Results for orders placed or performed during the hospital encounter of 01/02/24 (from the past  24 hour(s))   Prepare RBC: 1 Units   Result Value Ref Range    PRODUCT CODE P2553J15     Unit Number [id number]     Unit ABO O     Unit RH NEG     XM INTEP COMP     Dispense Status TR     Blood Expiration Date January 05, 2024 23:59 EST     PRODUCT BLOOD TYPE 9500     UNIT VOLUME 350    CBC and Auto Differential   Result Value Ref Range    WBC 8.5 4.4 - 11.3 x10*3/uL    nRBC 0.0 0.0 - 0.0 /100 WBCs    RBC 3.34 (L) 4.00 - 5.20 x10*6/uL    Hemoglobin 7.9 (L) 12.0 - 16.0 g/dL    Hematocrit 26.2 (L) 36.0 - 46.0 %    MCV 78 (L) 80 - 100 fL    MCH 23.7 (L) 26.0 - 34.0 pg    MCHC 30.2 (L) 32.0 - 36.0 g/dL    RDW 20.9 (H) 11.5 - 14.5 %    Platelets 217 150 - 450 x10*3/uL    Neutrophils %      Immature Granulocytes %, Automated      Lymphocytes %      Monocytes %      Eosinophils %      Basophils %      Neutrophils Absolute      Lymphocytes Absolute      Monocytes Absolute      Eosinophils Absolute      Basophils Absolute     Coagulation Screen   Result Value Ref Range    Protime 13.3 (H) 9.8 - 12.8 seconds    INR 1.2 (H) 0.9 - 1.1    aPTT 26 (L) 27 - 38 seconds       Assessment/Plan   Active Problems:    Primary hypertension    Anxiety    Depression    Hyperlipidemia    48 year old female, POD 3 from right ALEXA now with periprosthetic femur fracture.     - NPO since midnight for OR with ortho for fracture stabilization - revision of right total hip arthroplasty   - start LR at 100mL/hr  - NWB RLE   - 1 unit PRBC on 1/4/23 -> HgB 7.9 this morning  - pain controlled on current regimen, will continue  - SCDs, hold DVT prophylaxis for OR   - check CBC post-op  - can restart Aspirin post op day 1 if HgB stable  - can have regular diet post-op with BR    Discussed with Dr. Jalloh         I spent 25 minutes in the professional and overall care of this patient.    Ester Fitch, APRN-CNP

## 2024-01-05 NOTE — ANESTHESIA POSTPROCEDURE EVALUATION
Patient: Blanca Hamilton    Procedure Summary       Date: 01/05/24 Room / Location: [facility] OR 03 / [facility]    Anesthesia Start: 1233 Anesthesia Stop: 1542    Procedure: REVISION ARTHROPLASTY RIGHT TOTAL HIP WITH LARISA INSTRUMENTATION AND PROSTHESIS UNDER FLOUROSCOPY, ELVIS CABLES (Right: Hip) Diagnosis:       Arthritis of right hip      (Arthritis of right hip [M16.11])    Surgeons: Felix Jalloh MD Responsible Provider: ELVIRA Mccray    Anesthesia Type: general ASA Status: 3            Anesthesia Type: general    Vitals Value Taken Time   /65 01/05/24 1645   Temp 36.5 °C (97.7 °F) 01/05/24 1538   Pulse 101 01/05/24 1645   Resp 15 01/05/24 1645   SpO2 98 % 01/05/24 1645       Anesthesia Post Evaluation    Patient location during evaluation: PACU  Patient participation: complete - patient participated  Level of consciousness: awake and alert  Pain score: 5  Pain management: adequate  Airway patency: patent  Cardiovascular status: acceptable  Respiratory status: acceptable and nasal cannula  Hydration status: acceptable  Postoperative Nausea and Vomiting: none        There were no known notable events for this encounter.

## 2024-01-05 NOTE — PROGRESS NOTES
Occupational Therapy                 Therapy Communication Note    Patient Name: Blanca Hamilton  MRN: [id number]  Today's Date: 1/5/2024     Discipline: Occupational Therapy    Missed Visit Reason: Missed Visit Reason: Reschedule (Pt anticipting OR at 1230 this date, no evaluation completed prior to surgery. Hold until post-op for OT assessment.)    Missed Time: Attempt  Time: 10:11

## 2024-01-06 LAB
ANION GAP SERPL CALC-SCNC: 9 MMOL/L (ref 10–20)
BUN SERPL-MCNC: 8 MG/DL (ref 6–23)
CALCIUM SERPL-MCNC: 7.6 MG/DL (ref 8.6–10.3)
CHLORIDE SERPL-SCNC: 102 MMOL/L (ref 98–107)
CO2 SERPL-SCNC: 29 MMOL/L (ref 21–32)
CREAT SERPL-MCNC: 0.3 MG/DL (ref 0.5–1.05)
ERYTHROCYTE [DISTWIDTH] IN BLOOD BY AUTOMATED COUNT: 21.2 % (ref 11.5–14.5)
GFR SERPL CREATININE-BSD FRML MDRD: >90 ML/MIN/1.73M*2
GLUCOSE SERPL-MCNC: 85 MG/DL (ref 74–99)
HCT VFR BLD AUTO: 21.4 % (ref 36–46)
HGB BLD-MCNC: 6.5 G/DL (ref 12–16)
MCH RBC QN AUTO: 23.7 PG (ref 26–34)
MCHC RBC AUTO-ENTMCNC: 30.4 G/DL (ref 32–36)
MCV RBC AUTO: 78 FL (ref 80–100)
NRBC BLD-RTO: 0 /100 WBCS (ref 0–0)
PLATELET # BLD AUTO: 184 X10*3/UL (ref 150–450)
POTASSIUM SERPL-SCNC: 3.7 MMOL/L (ref 3.5–5.3)
RBC # BLD AUTO: 2.74 X10*6/UL (ref 4–5.2)
SODIUM SERPL-SCNC: 136 MMOL/L (ref 136–145)
WBC # BLD AUTO: 7.2 X10*3/UL (ref 4.4–11.3)

## 2024-01-06 PROCEDURE — P9040 RBC LEUKOREDUCED IRRADIATED: HCPCS

## 2024-01-06 PROCEDURE — 1100000001 HC PRIVATE ROOM DAILY

## 2024-01-06 PROCEDURE — 2500000001 HC RX 250 WO HCPCS SELF ADMINISTERED DRUGS (ALT 637 FOR MEDICARE OP): Performed by: ORTHOPAEDIC SURGERY

## 2024-01-06 PROCEDURE — 99024 POSTOP FOLLOW-UP VISIT: CPT | Performed by: ORTHOPAEDIC SURGERY

## 2024-01-06 PROCEDURE — 2500000004 HC RX 250 GENERAL PHARMACY W/ HCPCS (ALT 636 FOR OP/ED): Performed by: ORTHOPAEDIC SURGERY

## 2024-01-06 PROCEDURE — 80048 BASIC METABOLIC PNL TOTAL CA: CPT | Performed by: ORTHOPAEDIC SURGERY

## 2024-01-06 PROCEDURE — 36415 COLL VENOUS BLD VENIPUNCTURE: CPT | Performed by: ORTHOPAEDIC SURGERY

## 2024-01-06 PROCEDURE — 99233 SBSQ HOSP IP/OBS HIGH 50: CPT | Performed by: NURSE PRACTITIONER

## 2024-01-06 PROCEDURE — 36430 TRANSFUSION BLD/BLD COMPNT: CPT

## 2024-01-06 PROCEDURE — 85027 COMPLETE CBC AUTOMATED: CPT | Performed by: ORTHOPAEDIC SURGERY

## 2024-01-06 RX ADMIN — MONTELUKAST 10 MG: 10 TABLET, FILM COATED ORAL at 20:22

## 2024-01-06 RX ADMIN — FERROUS SULFATE TAB 325 MG (65 MG ELEMENTAL FE) 1 TABLET: 325 (65 FE) TAB at 08:07

## 2024-01-06 RX ADMIN — PAROXETINE HYDROCHLORIDE 20 MG: 20 TABLET, FILM COATED ORAL at 20:23

## 2024-01-06 RX ADMIN — TRAZODONE HYDROCHLORIDE 100 MG: 50 TABLET ORAL at 20:22

## 2024-01-06 RX ADMIN — FAMOTIDINE 20 MG: 20 TABLET ORAL at 08:07

## 2024-01-06 RX ADMIN — CEFAZOLIN SODIUM 2 G: 2 INJECTION, SOLUTION INTRAVENOUS at 12:21

## 2024-01-06 RX ADMIN — PAROXETINE HYDROCHLORIDE 40 MG: 20 TABLET, FILM COATED ORAL at 08:07

## 2024-01-06 RX ADMIN — ARIPIPRAZOLE 15 MG: 5 TABLET ORAL at 21:04

## 2024-01-06 RX ADMIN — FAMOTIDINE 20 MG: 20 TABLET ORAL at 20:23

## 2024-01-06 RX ADMIN — SODIUM CHLORIDE, POTASSIUM CHLORIDE, SODIUM LACTATE AND CALCIUM CHLORIDE 100 ML/HR: 600; 310; 30; 20 INJECTION, SOLUTION INTRAVENOUS at 23:12

## 2024-01-06 RX ADMIN — OXYCODONE HYDROCHLORIDE 5 MG: 5 TABLET ORAL at 08:05

## 2024-01-06 RX ADMIN — CEFAZOLIN SODIUM 2 G: 2 INJECTION, SOLUTION INTRAVENOUS at 20:24

## 2024-01-06 RX ADMIN — ACETAMINOPHEN 650 MG: 325 TABLET ORAL at 08:04

## 2024-01-06 RX ADMIN — POLYETHYLENE GLYCOL 3350 17 G: 17 POWDER, FOR SOLUTION ORAL at 08:07

## 2024-01-06 RX ADMIN — CEFAZOLIN SODIUM 2 G: 2 INJECTION, SOLUTION INTRAVENOUS at 05:18

## 2024-01-06 RX ADMIN — ACETAMINOPHEN 650 MG: 325 TABLET ORAL at 20:23

## 2024-01-06 RX ADMIN — DOCUSATE SODIUM 100 MG: 100 CAPSULE, LIQUID FILLED ORAL at 08:07

## 2024-01-06 RX ADMIN — SODIUM CHLORIDE, POTASSIUM CHLORIDE, SODIUM LACTATE AND CALCIUM CHLORIDE 100 ML/HR: 600; 310; 30; 20 INJECTION, SOLUTION INTRAVENOUS at 13:23

## 2024-01-06 RX ADMIN — ACETAMINOPHEN 650 MG: 325 TABLET ORAL at 13:21

## 2024-01-06 RX ADMIN — LORATADINE 10 MG: 10 TABLET ORAL at 08:07

## 2024-01-06 RX ADMIN — OXYCODONE HYDROCHLORIDE 10 MG: 10 TABLET ORAL at 13:22

## 2024-01-06 RX ADMIN — PANTOPRAZOLE SODIUM 40 MG: 40 TABLET, DELAYED RELEASE ORAL at 08:09

## 2024-01-06 RX ADMIN — SIMVASTATIN 20 MG: 20 TABLET, FILM COATED ORAL at 20:23

## 2024-01-06 RX ADMIN — SODIUM CHLORIDE, POTASSIUM CHLORIDE, SODIUM LACTATE AND CALCIUM CHLORIDE 100 ML/HR: 600; 310; 30; 20 INJECTION, SOLUTION INTRAVENOUS at 01:10

## 2024-01-06 ASSESSMENT — COGNITIVE AND FUNCTIONAL STATUS - GENERAL
CLIMB 3 TO 5 STEPS WITH RAILING: A LOT
DRESSING REGULAR LOWER BODY CLOTHING: A LOT
TOILETING: A LITTLE
MOBILITY SCORE: 17
STANDING UP FROM CHAIR USING ARMS: A LITTLE
DAILY ACTIVITIY SCORE: 17
HELP NEEDED FOR BATHING: A LOT
DRESSING REGULAR UPPER BODY CLOTHING: A LITTLE
PERSONAL GROOMING: A LITTLE
MOVING TO AND FROM BED TO CHAIR: A LITTLE
WALKING IN HOSPITAL ROOM: A LOT
TURNING FROM BACK TO SIDE WHILE IN FLAT BAD: A LITTLE

## 2024-01-06 ASSESSMENT — PAIN SCALES - GENERAL
PAINLEVEL_OUTOF10: 5 - MODERATE PAIN
PAINLEVEL_OUTOF10: 7
PAINLEVEL_OUTOF10: 5 - MODERATE PAIN
PAINLEVEL_OUTOF10: 7
PAINLEVEL_OUTOF10: 3

## 2024-01-06 ASSESSMENT — PAIN DESCRIPTION - ORIENTATION
ORIENTATION: RIGHT

## 2024-01-06 ASSESSMENT — PAIN DESCRIPTION - LOCATION
LOCATION: HIP

## 2024-01-06 NOTE — PROGRESS NOTES
Physical Therapy                 Therapy Communication Note    Patient Name: Blanca Hamilton  MRN: [id number]  Today's Date: 1/6/2024     Discipline: Physical Therapy    Missed Visit Reason: Reschedule. Pt with H/H of 6.5/21.4 and currently receiving transfusion. Will follow-up with pt this afternoon once transfusion is complete. No PT eval at this time. RN notified.     Missed Time: Attempt

## 2024-01-06 NOTE — CARE PLAN
The patient's goals for the shift include get a goo sleep.    The clinical goals for the shift include right hip dressing will remain dry & intact and pain will be controlled <4 this shift

## 2024-01-06 NOTE — OP NOTE
Felix Jalloh MD  Physician  Specialty: Orthopaedic Surgery     Op Note      Signed     Date of Service: 2024 12:54 PM            ARTHROPLASTY TOTAL HIP POSTERIOR APPROACH (R) Operative Note     Date: 2024                        OR Location: A OR     Name: Blanca Hamilton, : 1975, Age: 48 y.o., MRN: [id number], Sex: female     Diagnosis  Pre-op Diagnosis     * Right hip pain [M25.551] Post-op Diagnosis     * Right hip pain [M25.551]      Procedures  ARTHROPLASTY TOTAL HIP POSTERIOR APPROACH  11494 - GA ARTHRP ACETBLR/PROX FEM PROSTC AGRFT/ALGRFT        Surgeons      * Felix Jalloh - Primary     Resident/Fellow/Other Assistant:  Surgeon(s) and Role: Lauri Vaughn, PGY 5, Enrike Assistant     Procedure Summary  Anesthesia: Spinal                ASA: III  Anesthesia Staff: CRNA: DEVON Elizabeth-CRNA  Estimated Blood Loss: 250 mL  Intra-op Medications:   Medication Name Total Dose   EPINEPHrine (Adrenalin) 0.2 mL, ketorolac (Toradol) 30 mg, morphine PF (Duramorph) 2.5 mg, ropivacaine (Naropin) 30 mL in sodium chloride 0.9% 20 mL syringe 56 mL   sodium chloride 0.9 % irrigation solution 250 mL   lactated Ringer's infusion 85 mL                   Anesthesia Record                   Intraprocedure I/O Totals                    Intake     Propofol Drip 0.00 mL     The total shown is the total volume documented since Anesthesia Start was filed.     lactated Ringer's infusion 1000.00 mL     Total Intake 1000 mL           Output     Est. Blood Loss 250 mL     Total Output 250 mL           Net     Net Volume 750 mL             Specimen: No specimens collected      Staff:   Circulator: Diamond Schwartz RN; Lidia Granados RN  Scrub Person: Octavia Paz RN           Drains and/or Catheters: * None in log *     Tourniquet Times:          Implants:  Implants         Type Name Action Serial No.       Joint Hip LINER, COCR, MDM, 42MM E - [id number] Implanted         Joint Hip LARISA TRIDENT  II TRITANIUM MULTIHOLE ACETABULAR SHELL 54MM Implanted         Screw SCREW, LOW PROFILE HEX, 6.5 X 20 MM - [id number] Implanted         Screw SCREW, LOW PROFILE HEX, 6.5 X 30 MM - [id number] Implanted         Joint INSERT, MDM X3 RESTORATION, 42OD 28ID X 6.9MM - [id number] Implanted         Joint STEM, FEM ACCOLADE II, 127 DEG, SZ 8 - [id number] Implanted         Joint STEM, FEM ACCOLADE II, 127 DEG, SZ 8 - [id number] Implanted         Joint HEAD, FEMUR V40 28MM -4MM BIOLOX DELTA - [id number] Wasted         Joint HEAD, FEMUR V40 28MM 0MM BIOLOX DELTA - [id number] Implanted                    Findings: 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 female who is having surgery for Right hip pain [M25.551].      The patient was seen in the preoperative area. The risks, benefits, complications, treatment options, non-operative alternatives, expected recovery and outcomes were discussed with the patient. The possibilities of reaction to medication, pulmonary aspiration, injury to surrounding structures, bleeding, recurrent infection, the need for additional procedures, failure to diagnose a condition, and creating a complication requiring transfusion or operation were discussed with the patient. The patient concurred with the proposed plan, giving informed consent.  The site of surgery was properly noted/marked if necessary per policy. The patient has been actively warmed in preoperative area. Preoperative antibiotics have been ordered and given within 1 hours of incision. 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  The risks benefits and alternatives of total hip arthroplasty were explained  to the patient he signed informed consent.     Justification for 22 modifier: This patient has a severely deformed hip with severe protrusio acetabuli and a severe hip flexion contracture of greater than 40 degrees.  Each of these aspects made the surgery significantly more difficult than the usual and there was significant additional time and effort required especially with exposure as well as bony resections and component implantation.  Because of that extensive additional time and effort a 22 modifier is justified     Description of procedure: Patient was brought to the operating room placed on the operating table in supine position bony prominences well-padded propria preoperative antibiotics were given anesthesia was induced by the anesthesia team patient was prepped and draped in the usual sterile fashion in the lateral decubitus position with the right, operative hip up.  A timeout was performed patient was identified by name and medical record number and laterality and site of surgery were confirmed by all present.  Prior to beginning we noticed that the patient had a severe flexion contracture.  We made a standard posterior approach to the hip, we obtained hemostasis with Bovie electrocautery.  We incised the tensor fascia in line with the incision.  We began our posterior approach taking down the short external rotators and posterior capsule as 1 single soft tissue sleeve.  Because of the patient's protrusio, we were unable to get much internal rotation of the femur at the initial time of the approach.  Once bed completely taken down the posterior structures we are still unable to dislocate the hip, this would be expected given the degree of deformity.  Since you are unable to dislocate the hip safely we performed an in situ femoral neck osteotomy using a reciprocating saw.  Once that was completed we brought the hip into internal rotation and retracted the femur out of the way.  We were able to access the  cut end of the femoral head neck however even with a corkscrew device were still unable to remove the femoral head.  Unfortunate this required us to section it and take it out piece by piece.  We were able to get the femoral head out and once we did we placed periacetabular retractors with excellent visualization.  We noted that there was a significant deep cavity in the bone medially and based on her preoperative imaging this was the area that was medial to her ilio pectineal aligned.  We therefore began our reaming more lateral to that and an area which was anatomically where the acetabulum should be.  We reamed sequentially till we had excellent contact and cortical bite into the bone anterior and posterior.  We then used the patient's femoral head and neck metaphyseal bone to create a significant amount of autograft.  Autograft was placed in the medial defect.  We then impacted our final acetabular cup into position ensuring that we had excellent anteversion and abduction within excepted parameters.  We did have excellent initial fixation we augmented this with 2 periacetabular screws.  We then impacted the dual mobility liner into place.  We then turned our attention to the femur, we used a box osteotome and a Charnley awl to access the lateral femur and remove the lateral bone.  We then began sequential broaching up until we had our final broach which was longitudinally and rotationally stable.  We we reduced the hip, this took significant amount of effort given the patient's previous deformity however we were able to reduce the hip using a -4 head and once it was in place we took the hip through range of motion we did find excellent stability both anteriorly and posteriorly as well as increase in the patient's length and offset relative to her previous deformity which was expected and planned for.  On comparison with the contralateral limb, this limb did appear to be longer however again that was expected  given the significant deformity of that limb and shortening of it from previous procedures.  We also noted that there was remaining flexion contracture.  This was likely due to soft tissue contracture in the anterior structures and not something we could control during this procedure.  We were able to manipulate the limb to get it extended to almost neutral position but were not able to get it beyond that.  It is likely that this contracture will remain however we were able to get an excellent stable hip construct.  We then remove the trial components we irrigated with normal saline we impacted the final femoral stem of the place and it was stable.  We cleaned and dried the trunnion and impacted the final femoral head.  We then reduced the hip took the range of motion once more and found that we had excellent stability and kinematics of the hip.  We then irrigated with normal saline irrigated with Irrisept solution.  Performed a posterior capsular closure using #5 Ethibond through the medius tendon and the posterior capsule and short external rotators as 1 single soft tissue sleeve with a locking suture.  In the more proximal areas of the capsular repair we are unable to bring the capsule completely up to the greater trochanter.  This was expected given that tissue had contracted in the patient's previous position which was significantly more medialized.  We did complete the repair even with his gap.  We then injected the periacetabular and superficial soft tissues with a mixture of Duramorph epinephrine Toradol and ropivacaine.  We closed the tensor fascia with #1 Vicryl and a running #2 Quill.  We closed the skin with 2-0 Vicryl 4 oh V-Loc and Prineo mesh and glue dressing.  We placed a Mepilex Ag silver impregnated dressing over this.  Patient was then awoken from anesthesia by the anesthesia and brought to PACU in stable condition     Postoperative plan: Patient will bear weight as tolerated with strict  posterior hip precautions.  Antibiotics DVT prophylaxis pain control and other standard postoperative supportive care     Statement of medical necessity: I was present and scrubbed for all critical portions of this procedure  Complications:  None; patient tolerated the procedure well.    Disposition: PACU - hemodynamically stable.  Condition: stable            Additional Details: none     Attending Attestation: I was present and scrubbed for the key portions of the procedure.     Felix Jalloh  Phone Number: [phone]

## 2024-01-06 NOTE — PROGRESS NOTES
Blanca Hamilton is a 48 y.o. female on day 3 of admission presenting with Right hip pain.      Subjective   The patient has just returned from OR.    Objective     Last Recorded Vitals  BP 98/59   Pulse (!) 112   Temp 36.6 °C (97.9 °F) (Temporal)   Resp 18   Wt 89 kg (196 lb 3.4 oz)   SpO2 94%   Intake/Output last 3 Shifts:    Intake/Output Summary (Last 24 hours) at 1/6/2024 0841  Last data filed at 1/6/2024 0621  Gross per 24 hour   Intake 4856.67 ml   Output 1000 ml   Net 3856.67 ml         Admission Weight  Weight:  (230 ibs) (01/02/24 1010)    Daily Weight  01/02/24 : 89 kg (196 lb 3.4 oz)        Physical Exam  Constitutional: alert and oriented x 3, awake, cooperative, no acute distress  Skin: warm and dry  Head/Neck: Normocephalic, atraumatic  Eyes: clear sclera  ENMT: mucous membranes moist  Cardio: Regular rhythm, rapid rate  Resp: CTA bilaterally, good respiratory effort  Gastrointestinal: Soft, nontender, nondistended  Musculoskeletal: no joint swelling, ROM limited BLE RLE>LLE   Neuro: alert and oriented x 3  Psychological: Appropriate mood and behavior    Relevant Results  Scheduled medications  acetaminophen, 650 mg, oral, q6h DARLENE  ARIPiprazole, 15 mg, oral, Daily  [Held by provider] aspirin, 81 mg, oral, BID  ceFAZolin, 2 g, intravenous, q8h  docusate sodium, 100 mg, oral, BID  [Held by provider] enoxaparin, 40 mg, subcutaneous, q24h  famotidine, 20 mg, oral, BID  ferrous sulfate (325 mg ferrous sulfate), 1 tablet, oral, Daily  lisinopril, 10 mg, oral, Daily  loratadine, 10 mg, oral, Daily  montelukast, 10 mg, oral, Nightly  nicotine, 1 patch, transdermal, Daily  pantoprazole, 40 mg, oral, Daily before breakfast  PARoxetine, 20 mg, oral, Daily  PARoxetine, 40 mg, oral, Daily  polyethylene glycol, 17 g, oral, Daily  simvastatin, 20 mg, oral, Nightly  traZODone, 100 mg, oral, Nightly      Continuous medications  lactated Ringer's, 100 mL/hr, Last Rate: 100 mL/hr (01/06/24 0554)  oxygen, 2 L/min, Last  Rate: 4 L/min (01/05/24 1538)      PRN medications  PRN medications: albuterol, HYDROmorphone, naloxone, ondansetron **OR** ondansetron, oxyCODONE, oxyCODONE    Results for orders placed or performed during the hospital encounter of 01/02/24 (from the past 24 hour(s))   CBC   Result Value Ref Range    WBC 7.2 4.4 - 11.3 x10*3/uL    nRBC 0.0 0.0 - 0.0 /100 WBCs    RBC 2.74 (L) 4.00 - 5.20 x10*6/uL    Hemoglobin 6.5 (LL) 12.0 - 16.0 g/dL    Hematocrit 21.4 (L) 36.0 - 46.0 %    MCV 78 (L) 80 - 100 fL    MCH 23.7 (L) 26.0 - 34.0 pg    MCHC 30.4 (L) 32.0 - 36.0 g/dL    RDW 21.2 (H) 11.5 - 14.5 %    Platelets 184 150 - 450 x10*3/uL   Basic metabolic panel   Result Value Ref Range    Glucose 85 74 - 99 mg/dL    Sodium 136 136 - 145 mmol/L    Potassium 3.7 3.5 - 5.3 mmol/L    Chloride 102 98 - 107 mmol/L    Bicarbonate 29 21 - 32 mmol/L    Anion Gap 9 (L) 10 - 20 mmol/L    Urea Nitrogen 8 6 - 23 mg/dL    Creatinine 0.30 (L) 0.50 - 1.05 mg/dL    eGFR >90 >60 mL/min/1.73m*2    Calcium 7.6 (L) 8.6 - 10.3 mg/dL   Prepare RBC: 1 Units   Result Value Ref Range    PRODUCT CODE Q9011B75     Unit Number [id number]     Unit ABO A     Unit RH NEG     XM INTEP COMP     Dispense Status IS     Blood Expiration Date January 11, 2024 23:59 EST     PRODUCT BLOOD TYPE 0600     UNIT VOLUME 350           Assessment/Plan   49yo CF with PMH of HTN, HLD, GERD, tobacco abuse, and severe degenerative disease that presented for scheduled right ALEXA. Medicine was consulted for medical management of chronic conditions.    Degenerative disease  - s/p right ALEXA on 1/2/24 complicated by periprosthetic femur fracture  -s/p REVISION ARTHROPLASTY RIGHT TOTAL HIP WITH LARISA INSTRUMENTATION AND PROSTHESIS UNDER FLOUROSCOPY, ELVIS CABLES (Right) on 1/5/24  - ortho following, plan to return to OR tomorrow for fracture stabilization  - per ortho, NWB RLE  - continue multimodal pain regimen  - mgmt per primary surgical service, will take over as primary on  1/6/24  -Pending PT/OT evaluation; patient wants to go home but she is wheelchair bound and lives alone.     Acute on chronic anemia  - in setting of above  -Hypotensive and tachycardic this morning  -Hemoglobin is 6.5 this morning  - Transfusing 1 unit of PRBCs  -Repeat hemoglobin after the unit    Hypokalemia   -Replaced will repeat level    Hypertension  - continue home lisinopril    Hyperlipidemia  - continue home statin    Tobacco abuse  - educated about tobacco cessation    DVT Proph: no pharmacologics in setting of above      Active Problems:    Primary hypertension    Anxiety    Depression    Hyperlipidemia      Leonarda Bond, APRN-CNP

## 2024-01-06 NOTE — CARE PLAN
Uneventful night. Pt rested comfortably. Minimal c/o pain this shift. Right hip dressing remains dry & intact. Pt continues to progress towards meeting goals. Possible DC today. VSS. Will continue to monitor.

## 2024-01-06 NOTE — PROGRESS NOTES
48 y.o. female s/p Procedure(s) (LRB):  REVISION ARTHROPLASTY RIGHT TOTAL HIP WITH LARISA INSTRUMENTATION AND PROSTHESIS UNDER FLOUROSCOPY, ELVIS CABLES (Right)    Patient seen and examined  Pain controlled  No acute events      Physical exam  RLE  5/5 df/pf ankle/great toe  [place] s/s/sp/dp/t  2+ dp pulse    Post op XR reviewed, expected post operative changes    48 y.o. female s/p Procedure(s) (LRB):  REVISION ARTHROPLASTY RIGHT TOTAL HIP WITH LARISA INSTRUMENTATION AND PROSTHESIS UNDER FLOUROSCOPY, ELVIS CABLES (Right)  - 50% WB RLE  - Strict posterior precautions  - dvt ppx. Scd for now, ok to start pharmacologic anticoagulation today as long as h/h stable  - ancef q8h while in house  - discharge after clears PT/Medicine. Patient would like to go home. I discussed with her that a rehab stay might be ideal given the difficulties with ambulation she is likely to encounter post operatively. Will review further after PT evaluation

## 2024-01-07 LAB
ABO GROUP (TYPE) IN BLOOD: NORMAL
ANION GAP SERPL CALC-SCNC: 9 MMOL/L (ref 10–20)
ANTIBODY SCREEN: NORMAL
BLOOD EXPIRATION DATE: NORMAL
BUN SERPL-MCNC: 9 MG/DL (ref 6–23)
CALCIUM SERPL-MCNC: 7.3 MG/DL (ref 8.6–10.3)
CHLORIDE SERPL-SCNC: 102 MMOL/L (ref 98–107)
CO2 SERPL-SCNC: 28 MMOL/L (ref 21–32)
CREAT SERPL-MCNC: 0.28 MG/DL (ref 0.5–1.05)
DISPENSE STATUS: NORMAL
ERYTHROCYTE [DISTWIDTH] IN BLOOD BY AUTOMATED COUNT: 21.6 % (ref 11.5–14.5)
GFR SERPL CREATININE-BSD FRML MDRD: >90 ML/MIN/1.73M*2
GLUCOSE SERPL-MCNC: 83 MG/DL (ref 74–99)
HCT VFR BLD AUTO: 21.2 % (ref 36–46)
HGB BLD-MCNC: 6.4 G/DL (ref 12–16)
MAGNESIUM SERPL-MCNC: 1.61 MG/DL (ref 1.6–2.4)
MCH RBC QN AUTO: 24.2 PG (ref 26–34)
MCHC RBC AUTO-ENTMCNC: 30.2 G/DL (ref 32–36)
MCV RBC AUTO: 80 FL (ref 80–100)
NRBC BLD-RTO: 0 /100 WBCS (ref 0–0)
PLATELET # BLD AUTO: 204 X10*3/UL (ref 150–450)
POTASSIUM SERPL-SCNC: 3.4 MMOL/L (ref 3.5–5.3)
PRODUCT BLOOD TYPE: 600
PRODUCT CODE: NORMAL
RBC # BLD AUTO: 2.64 X10*6/UL (ref 4–5.2)
RH FACTOR (ANTIGEN D): NORMAL
SODIUM SERPL-SCNC: 136 MMOL/L (ref 136–145)
UNIT ABO: NORMAL
UNIT NUMBER: NORMAL
UNIT RH: NORMAL
UNIT VOLUME: 350
WBC # BLD AUTO: 6.3 X10*3/UL (ref 4.4–11.3)
XM INTEP: NORMAL

## 2024-01-07 PROCEDURE — 80048 BASIC METABOLIC PNL TOTAL CA: CPT | Performed by: NURSE PRACTITIONER

## 2024-01-07 PROCEDURE — 2500000004 HC RX 250 GENERAL PHARMACY W/ HCPCS (ALT 636 FOR OP/ED): Performed by: ORTHOPAEDIC SURGERY

## 2024-01-07 PROCEDURE — 86901 BLOOD TYPING SEROLOGIC RH(D): CPT | Performed by: NURSE PRACTITIONER

## 2024-01-07 PROCEDURE — 36415 COLL VENOUS BLD VENIPUNCTURE: CPT | Performed by: NURSE PRACTITIONER

## 2024-01-07 PROCEDURE — 83735 ASSAY OF MAGNESIUM: CPT | Performed by: STUDENT IN AN ORGANIZED HEALTH CARE EDUCATION/TRAINING PROGRAM

## 2024-01-07 PROCEDURE — 97161 PT EVAL LOW COMPLEX 20 MIN: CPT | Mod: GP

## 2024-01-07 PROCEDURE — 99232 SBSQ HOSP IP/OBS MODERATE 35: CPT | Performed by: STUDENT IN AN ORGANIZED HEALTH CARE EDUCATION/TRAINING PROGRAM

## 2024-01-07 PROCEDURE — 2500000001 HC RX 250 WO HCPCS SELF ADMINISTERED DRUGS (ALT 637 FOR MEDICARE OP): Performed by: ORTHOPAEDIC SURGERY

## 2024-01-07 PROCEDURE — 85027 COMPLETE CBC AUTOMATED: CPT | Performed by: NURSE PRACTITIONER

## 2024-01-07 PROCEDURE — P9016 RBC LEUKOCYTES REDUCED: HCPCS

## 2024-01-07 PROCEDURE — 1100000001 HC PRIVATE ROOM DAILY

## 2024-01-07 PROCEDURE — 99233 SBSQ HOSP IP/OBS HIGH 50: CPT | Performed by: NURSE PRACTITIONER

## 2024-01-07 PROCEDURE — 97166 OT EVAL MOD COMPLEX 45 MIN: CPT | Mod: GO

## 2024-01-07 PROCEDURE — 2500000004 HC RX 250 GENERAL PHARMACY W/ HCPCS (ALT 636 FOR OP/ED): Performed by: STUDENT IN AN ORGANIZED HEALTH CARE EDUCATION/TRAINING PROGRAM

## 2024-01-07 PROCEDURE — 36430 TRANSFUSION BLD/BLD COMPNT: CPT

## 2024-01-07 RX ORDER — POTASSIUM CHLORIDE 20 MEQ/1
40 TABLET, EXTENDED RELEASE ORAL ONCE
Status: COMPLETED | OUTPATIENT
Start: 2024-01-07 | End: 2024-01-07

## 2024-01-07 RX ORDER — POTASSIUM CHLORIDE 1.5 G/1.58G
40 POWDER, FOR SOLUTION ORAL DAILY
Status: DISCONTINUED | OUTPATIENT
Start: 2024-01-07 | End: 2024-01-08 | Stop reason: HOSPADM

## 2024-01-07 RX ADMIN — PAROXETINE HYDROCHLORIDE 40 MG: 20 TABLET, FILM COATED ORAL at 08:36

## 2024-01-07 RX ADMIN — OXYCODONE HYDROCHLORIDE 5 MG: 5 TABLET ORAL at 04:02

## 2024-01-07 RX ADMIN — ACETAMINOPHEN 650 MG: 325 TABLET ORAL at 08:36

## 2024-01-07 RX ADMIN — PANTOPRAZOLE SODIUM 40 MG: 40 TABLET, DELAYED RELEASE ORAL at 08:00

## 2024-01-07 RX ADMIN — FERROUS SULFATE TAB 325 MG (65 MG ELEMENTAL FE) 1 TABLET: 325 (65 FE) TAB at 09:00

## 2024-01-07 RX ADMIN — DOCUSATE SODIUM 100 MG: 100 CAPSULE, LIQUID FILLED ORAL at 20:07

## 2024-01-07 RX ADMIN — ACETAMINOPHEN 650 MG: 325 TABLET ORAL at 02:00

## 2024-01-07 RX ADMIN — OXYCODONE HYDROCHLORIDE 10 MG: 10 TABLET ORAL at 12:56

## 2024-01-07 RX ADMIN — LORATADINE 10 MG: 10 TABLET ORAL at 08:36

## 2024-01-07 RX ADMIN — SIMVASTATIN 20 MG: 20 TABLET, FILM COATED ORAL at 20:07

## 2024-01-07 RX ADMIN — OXYCODONE HYDROCHLORIDE 5 MG: 5 TABLET ORAL at 08:35

## 2024-01-07 RX ADMIN — ARIPIPRAZOLE 15 MG: 5 TABLET ORAL at 20:06

## 2024-01-07 RX ADMIN — ACETAMINOPHEN 650 MG: 325 TABLET ORAL at 20:06

## 2024-01-07 RX ADMIN — CEFAZOLIN SODIUM 2 G: 2 INJECTION, SOLUTION INTRAVENOUS at 12:23

## 2024-01-07 RX ADMIN — ACETAMINOPHEN 650 MG: 325 TABLET ORAL at 14:00

## 2024-01-07 RX ADMIN — POLYETHYLENE GLYCOL 3350 17 G: 17 POWDER, FOR SOLUTION ORAL at 08:36

## 2024-01-07 RX ADMIN — CEFAZOLIN SODIUM 2 G: 2 INJECTION, SOLUTION INTRAVENOUS at 05:37

## 2024-01-07 RX ADMIN — FAMOTIDINE 20 MG: 20 TABLET ORAL at 08:36

## 2024-01-07 RX ADMIN — FAMOTIDINE 20 MG: 20 TABLET ORAL at 20:06

## 2024-01-07 RX ADMIN — MONTELUKAST 10 MG: 10 TABLET, FILM COATED ORAL at 20:07

## 2024-01-07 RX ADMIN — POTASSIUM CHLORIDE 40 MEQ: 1500 TABLET, EXTENDED RELEASE ORAL at 09:29

## 2024-01-07 RX ADMIN — DOCUSATE SODIUM 100 MG: 100 CAPSULE, LIQUID FILLED ORAL at 08:36

## 2024-01-07 RX ADMIN — PAROXETINE HYDROCHLORIDE 20 MG: 20 TABLET, FILM COATED ORAL at 20:06

## 2024-01-07 RX ADMIN — CEFAZOLIN SODIUM 2 G: 2 INJECTION, SOLUTION INTRAVENOUS at 21:07

## 2024-01-07 RX ADMIN — TRAZODONE HYDROCHLORIDE 100 MG: 50 TABLET ORAL at 20:07

## 2024-01-07 ASSESSMENT — PAIN - FUNCTIONAL ASSESSMENT
PAIN_FUNCTIONAL_ASSESSMENT: 0-10

## 2024-01-07 ASSESSMENT — ACTIVITIES OF DAILY LIVING (ADL)
BATHING_ASSISTANCE: MODERATE
ADL_ASSISTANCE: INDEPENDENT

## 2024-01-07 ASSESSMENT — COGNITIVE AND FUNCTIONAL STATUS - GENERAL
WALKING IN HOSPITAL ROOM: TOTAL
STANDING UP FROM CHAIR USING ARMS: TOTAL
STANDING UP FROM CHAIR USING ARMS: A LITTLE
CLIMB 3 TO 5 STEPS WITH RAILING: TOTAL
CLIMB 3 TO 5 STEPS WITH RAILING: A LOT
MOBILITY SCORE: 12
TOILETING: A LITTLE
WALKING IN HOSPITAL ROOM: A LOT
TOILETING: A LITTLE
HELP NEEDED FOR BATHING: A LOT
DRESSING REGULAR UPPER BODY CLOTHING: A LITTLE
HELP NEEDED FOR BATHING: A LOT
PERSONAL GROOMING: A LITTLE
DRESSING REGULAR LOWER BODY CLOTHING: A LOT
DRESSING REGULAR LOWER BODY CLOTHING: A LOT
MOVING TO AND FROM BED TO CHAIR: TOTAL
TURNING FROM BACK TO SIDE WHILE IN FLAT BAD: A LITTLE
DAILY ACTIVITIY SCORE: 17
MOVING TO AND FROM BED TO CHAIR: A LITTLE
DRESSING REGULAR UPPER BODY CLOTHING: A LITTLE
PERSONAL GROOMING: A LITTLE

## 2024-01-07 ASSESSMENT — PAIN DESCRIPTION - LOCATION
LOCATION: HIP

## 2024-01-07 ASSESSMENT — PAIN SCALES - GENERAL
PAINLEVEL_OUTOF10: 5 - MODERATE PAIN
PAINLEVEL_OUTOF10: 4
PAINLEVEL_OUTOF10: 5 - MODERATE PAIN
PAINLEVEL_OUTOF10: 6
PAINLEVEL_OUTOF10: 1

## 2024-01-07 ASSESSMENT — PAIN DESCRIPTION - ORIENTATION
ORIENTATION: RIGHT

## 2024-01-07 NOTE — PROGRESS NOTES
Blanca Hamilton is a 48 y.o. female on day 4 of admission presenting with Right hip pain.      Subjective   The patient is sitting up in bed without complaints of lightheadedness, SOB and chest pain. Discussed inpatient    Objective     Last Recorded Vitals  /69   Pulse 89   Temp 36.5 °C (97.7 °F) (Temporal)   Resp 16   Wt 89 kg (196 lb 3.4 oz)   SpO2 95%   Intake/Output last 3 Shifts:    Intake/Output Summary (Last 24 hours) at 1/7/2024 1446  Last data filed at 1/7/2024 1000  Gross per 24 hour   Intake 290 ml   Output 1000 ml   Net -710 ml         Admission Weight  Weight:  (230 ibs) (01/02/24 1010)    Daily Weight  01/02/24 : 89 kg (196 lb 3.4 oz)        Physical Exam  Constitutional: alert and oriented x 3, awake, cooperative, no acute distress  Skin: warm and dry  Head/Neck: Normocephalic, atraumatic  Eyes: clear sclera  ENMT: mucous membranes moist  Cardio: Regular rhythm, rapid rate  Resp: CTA bilaterally, good respiratory effort  Gastrointestinal: Soft, nontender, nondistended  Musculoskeletal: no joint swelling, ROM limited BLE RLE>LLE, right hip with wound vac no hematoma or bleeding into the vac  Neuro: alert and oriented x 3  Psychological: Appropriate mood and behavior    Relevant Results  Scheduled medications  acetaminophen, 650 mg, oral, q6h DARLENE  ARIPiprazole, 15 mg, oral, Daily  [Held by provider] aspirin, 81 mg, oral, BID  ceFAZolin, 2 g, intravenous, q8h  docusate sodium, 100 mg, oral, BID  [Held by provider] enoxaparin, 40 mg, subcutaneous, q24h  famotidine, 20 mg, oral, BID  ferrous sulfate (325 mg ferrous sulfate), 1 tablet, oral, Daily  [Held by provider] lisinopril, 10 mg, oral, Daily  loratadine, 10 mg, oral, Daily  montelukast, 10 mg, oral, Nightly  nicotine, 1 patch, transdermal, Daily  pantoprazole, 40 mg, oral, Daily before breakfast  PARoxetine, 20 mg, oral, Daily  PARoxetine, 40 mg, oral, Daily  polyethylene glycol, 17 g, oral, Daily  potassium chloride, 40 mEq, oral,  Daily  simvastatin, 20 mg, oral, Nightly  traZODone, 100 mg, oral, Nightly      Continuous medications  oxygen, 2 L/min, Last Rate: 4 L/min (01/05/24 1538)      PRN medications  PRN medications: albuterol, HYDROmorphone, naloxone, ondansetron **OR** ondansetron, oxyCODONE, oxyCODONE    Results for orders placed or performed during the hospital encounter of 01/02/24 (from the past 24 hour(s))   CBC   Result Value Ref Range    WBC 6.3 4.4 - 11.3 x10*3/uL    nRBC 0.0 0.0 - 0.0 /100 WBCs    RBC 2.64 (L) 4.00 - 5.20 x10*6/uL    Hemoglobin 6.4 (LL) 12.0 - 16.0 g/dL    Hematocrit 21.2 (L) 36.0 - 46.0 %    MCV 80 80 - 100 fL    MCH 24.2 (L) 26.0 - 34.0 pg    MCHC 30.2 (L) 32.0 - 36.0 g/dL    RDW 21.6 (H) 11.5 - 14.5 %    Platelets 204 150 - 450 x10*3/uL   Basic Metabolic Panel   Result Value Ref Range    Glucose 83 74 - 99 mg/dL    Sodium 136 136 - 145 mmol/L    Potassium 3.4 (L) 3.5 - 5.3 mmol/L    Chloride 102 98 - 107 mmol/L    Bicarbonate 28 21 - 32 mmol/L    Anion Gap 9 (L) 10 - 20 mmol/L    Urea Nitrogen 9 6 - 23 mg/dL    Creatinine 0.28 (L) 0.50 - 1.05 mg/dL    eGFR >90 >60 mL/min/1.73m*2    Calcium 7.3 (L) 8.6 - 10.3 mg/dL   Magnesium   Result Value Ref Range    Magnesium 1.61 1.60 - 2.40 mg/dL   Prepare RBC: 2 Units   Result Value Ref Range    PRODUCT CODE R8687C81     Unit Number [id number]     Unit ABO A     Unit RH POS     XM INTEP COMP     Dispense Status IS     Blood Expiration Date January 31, 2024 23:59 EST     PRODUCT BLOOD TYPE 6200     UNIT VOLUME 350     PRODUCT CODE D8779L01     Unit Number [id number]     Unit ABO A     Unit RH POS     XM INTEP COMP     Dispense Status XM     Blood Expiration Date January 31, 2024 23:59 EST     PRODUCT BLOOD TYPE 6200     UNIT VOLUME 350    Type and screen   Result Value Ref Range    ABO TYPE A     Rh TYPE POS     ANTIBODY SCREEN NEG           Assessment/Plan   47yo CF with PMH of HTN, HLD, GERD, tobacco abuse, and severe degenerative disease that presented  for scheduled right ALEXA. Medicine was consulted for medical management of chronic conditions.    Degenerative disease  - s/p right ALEAX on 1/2/24 complicated by periprosthetic femur fracture  -s/p REVISION ARTHROPLASTY RIGHT TOTAL HIP WITH LARISA INSTRUMENTATION AND PROSTHESIS UNDER FLOUROSCOPY, ELVIS CABLES (Right) on 1/5/24  - ortho following, plan to return to OR tomorrow for fracture stabilization  - per ortho, NWB RLE  - mgmt per primary surgical service since 1/6/24  -PT/OT recommends moderate intensity therapy; patient wants to go home but she is wheelchair bound and lives alone. She continues to decline rehab and prefers C.    Acute on chronic anemia  - in setting of above  -Hypotensive and tachycardic this morning  -Hemoglobin is 6.4 this morning  - Transfusing 2 unit of PRBCs  -Repeat hemoglobin after the transfusion    Hypokalemia   -Replaced will repeat level    Hypertension  - continue home lisinopril    Hyperlipidemia  - continue home statin    Tobacco abuse  - educated about tobacco cessation    DVT Proph: no pharmacologics in setting of above    Dispo: Anticipate discharging to home with [facility] once stabilized.       Leonarda Bond, APRN-CNP

## 2024-01-07 NOTE — PROGRESS NOTES
Physical Therapy    Physical Therapy Evaluation    Patient Name: Blanca Hamilton  MRN: [id number]  Today's Date: 1/7/2024   Time Calculation  Start Time: 1229  Stop Time: 1247  Time Calculation (min): 18 min    Assessment/Plan   PT Assessment  PT Assessment Results: Decreased strength, Decreased endurance, Decreased mobility, Impaired judgement, Decreased safety awareness, Pain, Orthopedic restrictions  Rehab Prognosis: Good  Evaluation/Treatment Tolerance: Patient limited by pain  Medical Staff Made Aware: Yes  End of Session Communication: Bedside nurse  Assessment Comment: Pt is a 47 yo female who is s/o R ALEXA and revision during this hospital stay. She is now 50% Wbing in RLE and has strict posterior hip precautions. Prior to admission, pt was independent with mobility with use of WC for locomotion, and recieves assist 3x/week for household responsibilities. Pt presents with decreased strength, decreased mobility, and limitation secondary to R hip pain. Pt may benefit from PT services at this time for strengthening and mobility training to return towards prior level of function.  End of Session Patient Position: Bed, 3 rail up  IP OR SWING BED PT PLAN  Inpatient or Swing Bed: Inpatient  PT Plan  Treatment/Interventions: Bed mobility, Transfer training, Gait training, Therapeutic exercise, Therapeutic activity, Home exercise program  PT Plan: Skilled PT  PT Frequency: 4 times per week  PT Discharge Recommendations: Moderate intensity level of continued care  Equipment Recommended upon Discharge: Wheeled walker  PT Recommended Transfer Status: Total assist  PT - OK to Discharge: Yes      Subjective   General Visit Information:  General  Reason for Referral: S/p REVISION ARTHROPLASTY RIGHT TOTAL HIP WITH LARISA INSTRUMENTATION AND PROSTHESIS UNDER FLOUROSCOPY, ELVIS CABLES (Right) after initial R ALEXA on 1/2/23  Referred By: REVISION ARTHROPLASTY RIGHT TOTAL HIP WITH LARISA INSTRUMENTATION AND PROSTHESIS UNDER  FLOUROSCOPY, ELVIS CABLES (Right)  Past Medical History Relevant to Rehab: PMH:anxiety, depression, GERD, HTN, HLD  Family/Caregiver Present: No  Co-Treatment: OT  Co-Treatment Reason: To maximize pt mobility safely  Prior to Session Communication: Bedside nurse  Patient Position Received:  (Sitting EOB)  General Comment: pt very anxious with mobility secondary to increase in R hip pain. IV, wound vac present  Home Living:  Home Living  Type of Home: Apartment  Lives With: Alone  Home Adaptive Equipment: Wheelchair-manual, Walker rolling or standard  Home Layout: One level  Home Access: Level entry  Bathroom Shower/Tub: Walk-in shower  Bathroom Toilet: Adaptive toilet seating  Bathroom Equipment: Grab bars in shower, Shower chair with back  Home Living Comments: ADA compliant bathroom  Prior Level of Function:  Prior Function Per Pt/Caregiver Report  Receives Help From:  (home health for household tasks)  Ambulatory Assistance:  (Manual WC use for over 1 year. Pivot transfers to WC)  Precautions:  Precautions  LE Weight Bearing Status:  (RLE 50% Wbing)  Medical Precautions: Fall precautions  Post-Surgical Precautions: Right hip precautions (Pt verbalized understanding, did not demonstrate understanding)    Objective   Pain:  Pain Assessment  Pain Assessment: 0-10  Pain Score: 5 - Moderate pain  Pain Type: Surgical pain  Pain Location: Hip  Pain Orientation: Right  Pain Interventions: Repositioned  Cognition:  Cognition  Overall Cognitive Status: Within Functional Limits    General Assessments:  General Observation  General Observation: pt with forward leaning posture at hips sitting EOB, returned to bed laying on R side with hips flexed, max verbal education on posterior hip precautions provided    Functional Assessments:  Bed Mobility  Bed Mobility: Yes  Bed Mobility 1  Bed Mobility 1: Scooting  Level of Assistance 1: Close supervision  Bed Mobility 2  Bed Mobility  2: Sitting to supine  Level of Assistance 2:  Minimum assistance (x1)  Bed Mobility Comments 2: Assist at LE's    Transfers  Transfer: Yes  Transfer 1  Transfer From 1: Bed to, Sit to  Transfer to 1: Stand  Technique 1: Sit to stand  Transfer Device 1: Walker  Transfer Level of Assistance 1: Maximum assistance (x2)  Trials/Comments 1: pt unable to stand fully erect secondary to weakness and RLE pain         Outcome Measures:  [facility] Basic Mobility  Turning from your back to your side while in a flat bed without using bedrails: None  Moving from lying on your back to sitting on the side of a flat bed without using bedrails: None  Moving to and from bed to chair (including a wheelchair): Total  Standing up from a chair using your arms (e.g. wheelchair or bedside chair): Total  To walk in hospital room: Total  Climbing 3-5 steps with railing: Total  Basic Mobility - Total Score: 12    Encounter Problems       Encounter Problems (Active)       Mobility       STG - Patient will ambulate at least 5' with FWW maintaining Wbing precautions and posterior hip precautions, CGA, FWW use       Start:  01/07/24    Expected End:  01/21/24               Pain - Adult          Safety       LTG - Patient will adhere to hip precautions and 50% Wbing through RLE during ADL's and transfers       Start:  01/07/24    Expected End:  01/21/24               Transfers       STG - Transfer from bed to wheelchair with minAx1, FWW use       Start:  01/07/24    Expected End:  01/21/24            STG - Patient to transfer to and from sit to supine mod I       Start:  01/07/24    Expected End:  01/21/24            STG - Patient will transfer sit to and from stand with minAx2, FWW use       Start:  01/07/24    Expected End:  01/21/24                   Education Documentation  Handouts, taught by Joan Pabon PT at 1/7/2024  1:09 PM.  Learner: Patient  Readiness: Acceptance  Method: Explanation  Response: Verbalizes Understanding, Needs Reinforcement    Precautions, taught by Joan  Cm PT at 1/7/2024  1:09 PM.  Learner: Patient  Readiness: Acceptance  Method: Explanation  Response: Verbalizes Understanding, Needs Reinforcement    Body Mechanics, taught by Joan Pabon PT at 1/7/2024  1:09 PM.  Learner: Patient  Readiness: Acceptance  Method: Explanation  Response: Verbalizes Understanding, Needs Reinforcement    Mobility Training, taught by Joan Pabon, PT at 1/7/2024  1:09 PM.  Learner: Patient  Readiness: Acceptance  Method: Explanation  Response: Verbalizes Understanding, Needs Reinforcement    Education Comments  No comments found.

## 2024-01-07 NOTE — CARE PLAN
Problem: Pain - Adult  Goal: Verbalizes/displays adequate comfort level or baseline comfort level  Outcome: Progressing     Problem: Safety - Adult  Goal: Free from fall injury  Outcome: Progressing     Problem: Discharge Planning  Goal: Discharge to home or other facility with appropriate resources  Outcome: Progressing     Problem: Chronic Conditions and Co-morbidities  Goal: Patient's chronic conditions and co-morbidity symptoms are monitored and maintained or improved  Outcome: Progressing     Problem: Fall/Injury  Goal: Not fall by end of shift  Outcome: Progressing  Goal: Be free from injury by end of the shift  Outcome: Progressing  Goal: Verbalize understanding of personal risk factors for fall in the hospital  Outcome: Progressing  Goal: Verbalize understanding of risk factor reduction measures to prevent injury from fall in the home  Outcome: Progressing  Goal: Use assistive devices by end of the shift  Outcome: Progressing  Goal: Pace activities to prevent fatigue by end of the shift  Outcome: Progressing   The patient's goals for the shift include  being able to sleep this shift.     The clinical goals for the shift include Patient will have pain, well controlled this shift.     Over the shift, the patient did not make progress toward the following goals. Barriers to progression include pain. Recommendations to address these barriers include pain meds, repositioning, and rest.

## 2024-01-07 NOTE — PROGRESS NOTES
Occupational Therapy    Evaluation    Patient Name: Blanca Hamilton  MRN: [id number]  Today's Date: 1/7/2024  Time Calculation  Start Time: 1230  Stop Time: 1247  Time Calculation (min): 17 min    Assessment  IP OT Assessment  Evaluation/Treatment Tolerance: Patient limited by pain  Medical Staff Made Aware: Yes  End of Session Communication: Bedside nurse  End of Session Patient Position: Bed, 3 rail up  Assessment Comment: Pt is a 49 yo female who is s/o R ALEXA and revision during this hospital stay. She is now 50% Wbing in RLE and has strict posterior hip precautions. Prior to admission, pt was independent with mobility with use of WC, independent with ADLs,  and recieves assist 3x/week for household responsibilities. Pt presents with decreased strength, decreased mobility, and limitation secondary to R hip pain. Pt may benefit from OT services at this time for strengthening, functional mobility and ADL training to return towards prior level of function.   Plan:  Treatment Interventions: ADL retraining, Functional transfer training (Posterior precautions and use of adaptive equipment)  OT Frequency: 3 times per week  OT Discharge Recommendations: Moderate intensity level of continued care  OT - OK to Discharge: Yes    Subjective   Current Problem:  1. Osteoarthritis of right hip, unspecified osteoarthritis type  Referral to Home Health    aspirin 81 mg chewable tablet    docusate sodium (Colace) 100 mg capsule    oxyCODONE-acetaminophen (Percocet) 5-325 mg tablet    naproxen (Naprosyn) 500 mg tablet    cefadroxil (Duricef) 500 mg capsule    CANCELED: Case Request Operating Room: Revision Arthroplasty Total Hip    CANCELED: Case Request Operating Room: Revision Arthroplasty Total Hip      2. Right hip pain        3. Arthritis of right hip  Case Request Operating Room: Revision Arthroplasty Total Hip    Case Request Operating Room: Revision Arthroplasty Total Hip        General:  General  Reason for Referral: - s/p right  ALEXA on 1/2/24 complicated by periprosthetic femur fracture  -s/p REVISION ARTHROPLASTY RIGHT TOTAL HIP WITH LARISA INSTRUMENTATION AND PROSTHESIS UNDER FLOUROSCOPY, ELVIS CABLES (Right) on 1/5/24  Past Medical History Relevant to Rehab: past medical history of Anxiety, Arthritis, Depression, GERD (gastroesophageal reflux disease), HTN (hypertension), Hyperlipidemia, Right hip pain, Sinusitis, and Transfusion history  Missed Visit: Yes  Missed Visit Reason: Patient placed on medical hold (Per PT report, RN states that Pt is unable to stand with 2 person assist, and that Pt is scheduled to return to surgery on 1/5 for further intervention.)  Family/Caregiver Present: No  Co-Treatment: PT  Co-Treatment Reason: To maximize pt success and safety  Prior to Session Communication: Bedside nurse (Per RN report, Pt received blood transfusion this AM and is able to work with therapy)  Patient Position Received: Bed, 3 rail up (Sitting EOB)  Preferred Learning Style: auditory, written  General Comment: Pt pleasant and agreeable to assessment. Requires increased reinforcement of posterior hip precautions  Precautions:  LE Weight Bearing Status: Right Partial Weight Bearing (50%)  Medical Precautions: Fall precautions  Post-Surgical Precautions: Right hip precautions  Precautions Comment: Pt unable to recite posterior precautions, and requires increased verbal cueing to adhere by the precautions  Vital Signs:     Pain:  Pain Assessment  Pain Assessment: 0-10  Pain Score: 5 - Moderate pain  Pain Type: Surgical pain  Pain Location: Hip  Pain Orientation: Right    Objective   Cognition:  Overall Cognitive Status:  (A/Ox4)  Safety/Judgement: Exceptions to WFL           Home Living:  Type of Home: Apartment  Lives With: Alone  Home Adaptive Equipment: Walker rolling or standard, Wheelchair-manual, Reacher, Sock aid  Home Layout: One level  Home Access: Ramped entrance  Bathroom Shower/Tub: Walk-in shower  Bathroom Toilet: Adaptive  toilet seating  Bathroom Equipment: Grab bars in shower, Shower chair with back  Home Living Comments: ADA compliant bathroom   Prior Function:  Level of [place]: Independent with ADLs and functional transfers, Needs assistance with homemaking  Receives Help From:  (Home health aide assists with household tasks)  ADL Assistance: Independent  Homemaking Assistance: Needs assistance  Ambulatory Assistance:  (Manual WC use for over 1 year; pivot transfers to and from WC)  IADL History:     ADL:  Equipment: Reacher, Sock aid, Long-handled shoe horn, Long-handled sponge  Eating Assistance: Independent  Grooming Assistance: Stand by  Grooming Deficit: Setup  Bathing Assistance: Moderate  Bathing Deficit: Right lower leg including foot, Left lower leg including foot, Use of adaptive equipment  UE Dressing Assistance: Independent  LE Dressing Assistance: Moderate  LE Dressing Deficit:  (Use of AE when completing LE dressing to maintain hip precautions)  Toileting Assistance with Device: Moderate  Toileting Deficit: Clothing management up, Clothing management down, Bedside commode, Grab bar use  ADL Comments: Pt would benefit from hip kit trial to ensure proper and safe use while maintaining posterior precautions.  Activity Tolerance:     Bed Mobility/Transfers: Bed Mobility  Bed Mobility: Yes  Bed Mobility 1  Bed Mobility 1: Scooting  Level of Assistance 1: Close supervision  Bed Mobility 2  Bed Mobility  2: Sitting to supine  Level of Assistance 2: Minimum assistance (x1)  Bed Mobility Comments 2: Assist with BLEs    Transfers  Transfer: Yes  Transfer 1  Transfer From 1: Bed to, Sit to  Transfer to 1: Stand  Technique 1: Sit to stand  Transfer Device 1: Walker  Transfer Level of Assistance 1: Maximum assistance (x2)  Trials/Comments 1: Pt unable to stand fully erect secondary to weakness and RLE pain       Extremities: RUE   RUE : Within Functional Limits and LUE   LUE: Within Functional Limits    Outcome Measures:  [facility] Daily Activity  Putting on and taking off regular lower body clothing: A lot  Bathing (including washing, rinsing, drying): A lot  Putting on and taking off regular upper body clothing: A little  Toileting, which includes using toilet, bedpan or urinal: A little  Taking care of personal grooming such as brushing teeth: A little  Eating Meals: None  Daily Activity - Total Score: 17      Education Documentation  Handouts, taught by Portia Ramirez OT at 1/7/2024  1:14 PM.  Learner: Patient  Readiness: Acceptance  Method: Explanation  Response: Needs Reinforcement  Comment: Pt would benefit from further education, demonstration, and practice of functional mobility and use of AE for completing ADLs while maintaining posterior hip precautions    Body Mechanics, taught by Portia Ramirez OT at 1/7/2024  1:14 PM.  Learner: Patient  Readiness: Acceptance  Method: Explanation  Response: Needs Reinforcement  Comment: Pt would benefit from further education, demonstration, and practice of functional mobility and use of AE for completing ADLs while maintaining posterior hip precautions    Precautions, taught by Portia Ramirez OT at 1/7/2024  1:14 PM.  Learner: Patient  Readiness: Acceptance  Method: Explanation  Response: Needs Reinforcement  Comment: Pt would benefit from further education, demonstration, and practice of functional mobility and use of AE for completing ADLs while maintaining posterior hip precautions    ADL Training, taught by Portia Ramirez OT at 1/7/2024  1:14 PM.  Learner: Patient  Readiness: Acceptance  Method: Explanation  Response: Needs Reinforcement  Comment: Pt would benefit from further education, demonstration, and practice of functional mobility and use of AE for completing ADLs while maintaining posterior hip precautions      Goals:   Encounter Problems       Encounter Problems (Active)       ADLs       Patient with complete lower body dressing with stand by assist level of assistance oksana  and doffing all LE clothes  with reacher, shoe horn, and sock-aid while edge of bed  (Progressing)       Start:  01/07/24    Expected End:  01/21/24            Patient will complete toileting including hygiene clothing management/hygiene with stand by assist level of assistance and raised toilet seat, grab bars, and bedside commode. (Progressing)       Start:  01/07/24    Expected End:  01/21/24               COGNITION/SAFETY       Patient will recall and adhere to hip precautions during all functional mobility/ADL tasks in order to demonstrate improved understanding and promote healing post op (Progressing)       Start:  01/07/24    Expected End:  01/21/24            Patient will recall and adhere to weight bearing and /or ROM restrictions with all ADL and functional mobility in order to promote healing and safety with functional tasks (Progressing)       Start:  01/07/24    Expected End:  01/21/24               Mobility       STG - Patient will ambulate at least 5' with FWW maintaining Wbing precautions and posterior hip precautions, CGA, FWW use       Start:  01/07/24    Expected End:  01/21/24                     TRANSFERS       Patient will complete functional transfer to bedside chair/wheelchair with front wheeled walker with minimal assist  level of assistance. (Progressing)       Start:  01/07/24    Expected End:  01/21/24

## 2024-01-08 ENCOUNTER — DOCUMENTATION (OUTPATIENT)
Dept: HOME HEALTH SERVICES | Facility: HOME HEALTH | Age: 49
End: 2024-01-08
Payer: COMMERCIAL

## 2024-01-08 VITALS
TEMPERATURE: 97.7 F | RESPIRATION RATE: 18 BRPM | SYSTOLIC BLOOD PRESSURE: 102 MMHG | WEIGHT: 196.21 LBS | BODY MASS INDEX: 29.74 KG/M2 | HEART RATE: 89 BPM | OXYGEN SATURATION: 93 % | HEIGHT: 68 IN | DIASTOLIC BLOOD PRESSURE: 70 MMHG

## 2024-01-08 PROBLEM — E78.5 HYPERLIPIDEMIA: Status: RESOLVED | Noted: 2024-01-02 | Resolved: 2024-01-08

## 2024-01-08 PROBLEM — F41.9 ANXIETY: Status: RESOLVED | Noted: 2024-01-02 | Resolved: 2024-01-08

## 2024-01-08 PROBLEM — F32.A DEPRESSION: Status: RESOLVED | Noted: 2024-01-02 | Resolved: 2024-01-08

## 2024-01-08 PROBLEM — I10 PRIMARY HYPERTENSION: Status: RESOLVED | Noted: 2024-01-02 | Resolved: 2024-01-08

## 2024-01-08 LAB
ALBUMIN SERPL BCP-MCNC: 2.5 G/DL (ref 3.4–5)
ALP SERPL-CCNC: 56 U/L (ref 33–110)
ALT SERPL W P-5'-P-CCNC: 13 U/L (ref 7–45)
ANION GAP SERPL CALC-SCNC: 9 MMOL/L (ref 10–20)
AST SERPL W P-5'-P-CCNC: 23 U/L (ref 9–39)
BASOPHILS # BLD AUTO: 0.02 X10*3/UL (ref 0–0.1)
BASOPHILS NFR BLD AUTO: 0.3 %
BILIRUB SERPL-MCNC: 0.8 MG/DL (ref 0–1.2)
BLOOD EXPIRATION DATE: NORMAL
BUN SERPL-MCNC: 7 MG/DL (ref 6–23)
CALCIUM SERPL-MCNC: 7.5 MG/DL (ref 8.6–10.3)
CHLORIDE SERPL-SCNC: 104 MMOL/L (ref 98–107)
CO2 SERPL-SCNC: 26 MMOL/L (ref 21–32)
CREAT SERPL-MCNC: 0.3 MG/DL (ref 0.5–1.05)
DISPENSE STATUS: NORMAL
EOSINOPHIL # BLD AUTO: 0.22 X10*3/UL (ref 0–0.7)
EOSINOPHIL NFR BLD AUTO: 3 %
ERYTHROCYTE [DISTWIDTH] IN BLOOD BY AUTOMATED COUNT: 20.2 % (ref 11.5–14.5)
GFR SERPL CREATININE-BSD FRML MDRD: >90 ML/MIN/1.73M*2
GLUCOSE SERPL-MCNC: 85 MG/DL (ref 74–99)
HCT VFR BLD AUTO: 28.4 % (ref 36–46)
HGB BLD-MCNC: 9 G/DL (ref 12–16)
IMM GRANULOCYTES # BLD AUTO: 0.15 X10*3/UL (ref 0–0.7)
IMM GRANULOCYTES NFR BLD AUTO: 2.1 % (ref 0–0.9)
LYMPHOCYTES # BLD AUTO: 1.51 X10*3/UL (ref 1.2–4.8)
LYMPHOCYTES NFR BLD AUTO: 20.7 %
MCH RBC QN AUTO: 25.6 PG (ref 26–34)
MCHC RBC AUTO-ENTMCNC: 31.7 G/DL (ref 32–36)
MCV RBC AUTO: 81 FL (ref 80–100)
MONOCYTES # BLD AUTO: 0.78 X10*3/UL (ref 0.1–1)
MONOCYTES NFR BLD AUTO: 10.7 %
NEUTROPHILS # BLD AUTO: 4.63 X10*3/UL (ref 1.2–7.7)
NEUTROPHILS NFR BLD AUTO: 63.2 %
NRBC BLD-RTO: 0 /100 WBCS (ref 0–0)
PLATELET # BLD AUTO: 216 X10*3/UL (ref 150–450)
POLYCHROMASIA BLD QL SMEAR: NORMAL
POTASSIUM SERPL-SCNC: 4 MMOL/L (ref 3.5–5.3)
PRODUCT BLOOD TYPE: 6200
PRODUCT CODE: NORMAL
PROT SERPL-MCNC: 4.9 G/DL (ref 6.4–8.2)
RBC # BLD AUTO: 3.52 X10*6/UL (ref 4–5.2)
RBC MORPH BLD: NORMAL
SODIUM SERPL-SCNC: 135 MMOL/L (ref 136–145)
UNIT ABO: NORMAL
UNIT NUMBER: NORMAL
UNIT RH: NORMAL
UNIT VOLUME: 350
WBC # BLD AUTO: 7.3 X10*3/UL (ref 4.4–11.3)
XM INTEP: NORMAL

## 2024-01-08 PROCEDURE — 2500000001 HC RX 250 WO HCPCS SELF ADMINISTERED DRUGS (ALT 637 FOR MEDICARE OP): Performed by: ORTHOPAEDIC SURGERY

## 2024-01-08 PROCEDURE — 36415 COLL VENOUS BLD VENIPUNCTURE: CPT | Performed by: STUDENT IN AN ORGANIZED HEALTH CARE EDUCATION/TRAINING PROGRAM

## 2024-01-08 PROCEDURE — 85025 COMPLETE CBC W/AUTO DIFF WBC: CPT | Performed by: STUDENT IN AN ORGANIZED HEALTH CARE EDUCATION/TRAINING PROGRAM

## 2024-01-08 PROCEDURE — 2500000004 HC RX 250 GENERAL PHARMACY W/ HCPCS (ALT 636 FOR OP/ED): Performed by: ORTHOPAEDIC SURGERY

## 2024-01-08 PROCEDURE — 2500000001 HC RX 250 WO HCPCS SELF ADMINISTERED DRUGS (ALT 637 FOR MEDICARE OP): Performed by: NURSE PRACTITIONER

## 2024-01-08 PROCEDURE — 2500000002 HC RX 250 W HCPCS SELF ADMINISTERED DRUGS (ALT 637 FOR MEDICARE OP, ALT 636 FOR OP/ED): Performed by: ORTHOPAEDIC SURGERY

## 2024-01-08 PROCEDURE — 99232 SBSQ HOSP IP/OBS MODERATE 35: CPT | Performed by: NURSE PRACTITIONER

## 2024-01-08 PROCEDURE — 99239 HOSP IP/OBS DSCHRG MGMT >30: CPT | Performed by: NURSE PRACTITIONER

## 2024-01-08 PROCEDURE — 99239 HOSP IP/OBS DSCHRG MGMT >30: CPT | Performed by: STUDENT IN AN ORGANIZED HEALTH CARE EDUCATION/TRAINING PROGRAM

## 2024-01-08 PROCEDURE — S4991 NICOTINE PATCH NONLEGEND: HCPCS | Performed by: ORTHOPAEDIC SURGERY

## 2024-01-08 PROCEDURE — 80053 COMPREHEN METABOLIC PANEL: CPT | Performed by: STUDENT IN AN ORGANIZED HEALTH CARE EDUCATION/TRAINING PROGRAM

## 2024-01-08 RX ORDER — SENNOSIDES 8.6 MG/1
1 TABLET ORAL NIGHTLY
Status: DISCONTINUED | OUTPATIENT
Start: 2024-01-08 | End: 2024-01-08 | Stop reason: HOSPADM

## 2024-01-08 RX ADMIN — FERROUS SULFATE TAB 325 MG (65 MG ELEMENTAL FE) 1 TABLET: 325 (65 FE) TAB at 09:00

## 2024-01-08 RX ADMIN — PAROXETINE HYDROCHLORIDE 40 MG: 20 TABLET, FILM COATED ORAL at 08:33

## 2024-01-08 RX ADMIN — CEFAZOLIN SODIUM 2 G: 2 INJECTION, SOLUTION INTRAVENOUS at 05:03

## 2024-01-08 RX ADMIN — ACETAMINOPHEN 650 MG: 325 TABLET ORAL at 08:33

## 2024-01-08 RX ADMIN — PANTOPRAZOLE SODIUM 40 MG: 40 TABLET, DELAYED RELEASE ORAL at 08:41

## 2024-01-08 RX ADMIN — LORATADINE 10 MG: 10 TABLET ORAL at 08:33

## 2024-01-08 RX ADMIN — POTASSIUM CHLORIDE 40 MEQ: 1.5 POWDER, FOR SOLUTION ORAL at 08:33

## 2024-01-08 RX ADMIN — ACETAMINOPHEN 650 MG: 325 TABLET ORAL at 02:18

## 2024-01-08 RX ADMIN — ACETAMINOPHEN 650 MG: 325 TABLET ORAL at 13:37

## 2024-01-08 RX ADMIN — FAMOTIDINE 20 MG: 20 TABLET ORAL at 08:33

## 2024-01-08 RX ADMIN — OXYCODONE HYDROCHLORIDE 5 MG: 5 TABLET ORAL at 08:32

## 2024-01-08 RX ADMIN — NICOTINE 1 PATCH: 14 PATCH, EXTENDED RELEASE TRANSDERMAL at 08:34

## 2024-01-08 RX ADMIN — OXYCODONE HYDROCHLORIDE 5 MG: 5 TABLET ORAL at 13:37

## 2024-01-08 ASSESSMENT — COGNITIVE AND FUNCTIONAL STATUS - GENERAL
DAILY ACTIVITIY SCORE: 18
CLIMB 3 TO 5 STEPS WITH RAILING: A LOT
STANDING UP FROM CHAIR USING ARMS: A LITTLE
DRESSING REGULAR LOWER BODY CLOTHING: A LITTLE
DRESSING REGULAR UPPER BODY CLOTHING: A LITTLE
MOBILITY SCORE: 17
TURNING FROM BACK TO SIDE WHILE IN FLAT BAD: A LITTLE
MOVING TO AND FROM BED TO CHAIR: A LITTLE
HELP NEEDED FOR BATHING: A LOT
PERSONAL GROOMING: A LITTLE
WALKING IN HOSPITAL ROOM: A LOT
TOILETING: A LITTLE

## 2024-01-08 ASSESSMENT — PAIN SCALES - GENERAL
PAINLEVEL_OUTOF10: 3
PAINLEVEL_OUTOF10: 5 - MODERATE PAIN

## 2024-01-08 ASSESSMENT — PAIN DESCRIPTION - ORIENTATION: ORIENTATION: RIGHT

## 2024-01-08 ASSESSMENT — PAIN - FUNCTIONAL ASSESSMENT
PAIN_FUNCTIONAL_ASSESSMENT: 0-10

## 2024-01-08 ASSESSMENT — PAIN DESCRIPTION - LOCATION: LOCATION: HIP

## 2024-01-08 NOTE — PROGRESS NOTES
"Blanca Hamilton is a 48 y.o. female on day 5 of admission presenting with Right hip pain.    Subjective   Pain controlled, no paresthesia pain.  Has not moved her bowels recently.  Denies abdominal pain, nausea, or vomiting.    Objective     Physical Exam  HENT:      Head: Normocephalic and atraumatic.   Eyes:      Extraocular Movements: Extraocular movements intact.      Conjunctiva/sclera: Conjunctivae normal.      Pupils: Pupils are equal, round, and reactive to light.   Cardiovascular:      Rate and Rhythm: Normal rate and regular rhythm.      Pulses: Normal pulses.   Pulmonary:      Breath sounds: Normal breath sounds.   Abdominal:      General: Bowel sounds are normal.      Palpations: Abdomen is soft.   Musculoskeletal:      Comments: Right hip vac to continuous suction, no output, surrounding tissues soft and compressible.  Motion and sensations intact.  Leg and foot warm and well perfused   Skin:     General: Skin is warm and dry.      Capillary Refill: Capillary refill takes less than 2 seconds.   Neurological:      General: No focal deficit present.      Mental Status: She is alert and oriented to person, place, and time.       Last Recorded Vitals  Blood pressure 102/70, pulse 89, temperature 36.5 °C (97.7 °F), resp. rate 18, height 1.727 m (5' 7.99\"), weight 89 kg (196 lb 3.4 oz), SpO2 93 %.  Intake/Output last 3 Shifts:  I/O last 3 completed shifts:  In: 35954.7 (120.5 mL/kg) [P.O.:180; Blood:86987.7; IV Piggyback:400]  Out: 1850 (20.8 mL/kg) [Urine:1850 (0.6 mL/kg/hr)]  Weight: 89 kg     Relevant Results  XR pelvis 1-2 views    Result Date: 1/5/2024  Interpreted By:  Smooth Hernandez, STUDY: XR PELVIS 1-2 VIEWS; ;  1/5/2024 4:41 pm   INDICATION: Signs/Symptoms:post op right hip.   COMPARISON: 01/02/2024   ACCESSION NUMBER(S): [id number]   ORDERING CLINICIAN: NIK ERICKSON   FINDINGS: There is been interval cerclage wire fixation of right femoral stem component of patient's right hip arthroplasty for " previously-seen periprosthetic fracture.       Interval cerclage wire fixation of the femoral stem component of the patient's right hip arthroplasty for previously seen periprosthetic fracture.     MACRO: None   Signed by: Smooth Hernandez 1/5/2024 6:08 PM Dictation workstation:   [id number]    FL less than 1 hour    Result Date: 1/5/2024  These images are not reportable by radiology and will not be interpreted by  Radiologists.    CT hip right wo IV contrast    Result Date: 1/3/2024  Interpreted By:  Niesha Núñez, STUDY: CT HIP RIGHT WO IV CONTRAST;  1/3/2024 10:16 am   INDICATION: Signs/Symptoms:concern for fracture in setting of post ALEXA.   COMPARISON: Right femur radiographs dated 01/03/2024.   ACCESSION NUMBER(S): [id number]   ORDERING CLINICIAN: NIK ERICKSON   TECHNIQUE: CT imaging of the  right hip was obtained  without administration of intravenous contrast medium. Coronal and sagittal reformatted images were performed. 3D reformatted images were created and reviewed.   FINDINGS: OSSEOUS STRUCTURES: Postsurgical changes of right total hip arthroplasty are noted. There is an oblique spiral periprosthetic fracture extending from the medial cortex of proximal femoral metaphysis below the level of lesser trochanter and extends obliquely towards the posterior aspect of the greater trochanter. There is associated subsidence of the femoral stem component. However no significant perihardware lucency is noted.   The acetabular screw tips extend beyond the posterosuperior acetabular cortex into subjacent soft tissues (best seen on sagittal image 66/129 and axial image 26/134). There is a prominent lucency in the subchondral bone plate of superior acetabular margin, suggestive of prominent subchondral cyst. There is also significant thinning of the medial acetabular wall with suggestion of focal cortical discontinuity in the center (as seen on axial image 31/134). This is similar compared to prior pre-surgical  CT dated 01/20/2022 and is likely related to significant osseous remodeling as a sequela of chronic degenerative changes. Otherwise majority of the acetabular component appears grossly intact. The rest of the visualized right lateral pelvis appears grossly intact.   SOFT TISSUES: There is reticular subcutaneous soft tissue edema throughout the lateral aspect of proximal right thigh with moderate amount of ill-defined fluid extending along the expected region of the incision tract from the incision site into deeper soft tissues. There is large amount of soft tissue gas extending throughout the visualized medial compartment musculature of the proximal right thigh and also extending along the iliopsoas musculature. This is likely favored to be related to recent postsurgical status. There is ill-defined fluid tracking along the deep fascia of the lateral aspect of proximal thigh. Constellation of these findings are likely favored to be related to recent postsurgical status. Partially included intrapelvic soft tissues appear grossly unremarkable within limits of background streak artifacts.       1. Status post right total hip arthroplasty with an oblique spiral periprosthetic (likely intertrochanteric) fracture extending through proximal femoral metaphysis as described above. 2. Severe thinning of the medial wall of acetabulum with focal cortical discontinuity and prominent subchondral cyst along the superior margin of the acetabular rim as described above. These changes are similar compared to immediate prior pre-surgical CT dated 02/20/2022 and is likely related to sequela of significant acetabular remodeling from chronic degenerative changes. There is protrusion of acetabular screw tips beyond the posterosuperior cortex into subjacent soft tissues. Recommend correlation with patient's symptomatology associated with this. 3. Soft tissue changes related to recent postsurgical status with small volume ill-defined fluid  tracking along the subcutaneous soft tissues of lateral thigh and diffuse soft tissue gas as described above. This is most likely favored to be related to postsurgical hematoma/seroma of indeterminate sterility, especially given recent postsurgical status. Cellulitis with abscess formation can also be considered in the differential in appropriate clinical setting.   MACRO: Critical Finding:  See findings. Notification was initiated on 1/3/2024 at 11:04 am by Dr. Niesha Núñez.  (**-YCF-**) Instructions:   Signed by: Niesha Núñez 1/3/2024 11:05 AM Dictation workstation:   [id number]    XR femur right 2+ views    Result Date: 1/3/2024  Interpreted By:  Schoenberger, Joseph, STUDY: XR FEMUR RIGHT 2+ VIEWS; ;  1/3/2024 8:46 am   INDICATION: Signs/Symptoms:concern for fracture in setting of right ALEXA.   COMPARISON: None.   ACCESSION NUMBER(S): [id number]   ORDERING CLINICIAN: NIK ERICKSON   FINDINGS: All the surgical changes associated with a recent right total hip arthroplasty are noted. The apparent satisfactory positioning of the prosthetic components. Soft tissue air in keeping with recent operative status. The findings are similar to the exam from the prior day but new from exam performed 10/17/2023. There is considerable acetabular protrusio on the prior exam though the positioning of the acetabular component on this exam appear satisfactory. There is slight angulation of the cortex at the inferomedial the or junction of the neck with the lesser trochanter/intra trochanteric femur. However, upon other views of the hip, a definite periprosthetic fracture is not convincingly identified.       Findings are similar tip the yesterday's exam. No definite more distal femur fracture is seen. Expected findings post total hip arthroplasty. Slight angulation at the cortex on the AP radiograph associated the lesser trochanter of uncertain significance. See discussion above.     MACRO: None   Signed by: Ross  Schoenberger 1/3/2024 9:10 AM Dictation workstation:   [id number]    XR pelvis 1-2 views    Result Date: 1/2/2024  STUDY: Pelvis Radiographs; 01/02/2024 2:37 PM INDICATION: Post-operative for right total hip arthroplasty. COMPARISON: None Available. ACCESSION NUMBER(S): [id number] ORDERING CLINICIAN: NIK ERICKSON TECHNIQUE:  One view(s) of the pelvis. FINDINGS:  This exam is positioned with both legs displaced to the right and the superior portion of the pelvis is not included.  There are bilateral total hip prosthesis.  Alignment for both prosthesis appears normal in this oblique view.    Bilateral total hip prosthesis in place.  No acute abnormalities are identified in this oblique view of the lower pelvis and hips.. Signed by Ross Caraballo MD    ECG 12 Lead    Result Date: 12/29/2023  Undetermined rhythm Otherwise normal ECG When compared with ECG of 05-NOV-2019 13:54, Current undetermined rhythm precludes rhythm comparison, needs review    XR lumbar spine 2-3 views    Result Date: 12/27/2023  Interpreted By:  Yolie Tate, STUDY: Lumbar Spine, three views.   INDICATION: Signs/Symptoms:pre operative evaluation for spinal mobility.   COMPARISON: None.   ACCESSION NUMBER(S): [id number]   ORDERING CLINICIAN: JON HERNANDEZ   FINDINGS: Alignment is within normal limits. Mild L5-S1 spondylosis with mild disc height loss. Mild facet joint arthropathy from L3-4 to L5-S1 noted. Vertebral body heights are preserved. Posterior elements are intact.       1. As above.   MACRO: None.   Signed by: Yolie Tate 12/27/2023 10:56 PM Dictation workstation:   [id number]      Scheduled medications  acetaminophen, 650 mg, oral, q6h DARLENE  ARIPiprazole, 15 mg, oral, Daily  [Held by provider] aspirin, 81 mg, oral, BID  ceFAZolin, 2 g, intravenous, q8h  docusate sodium, 100 mg, oral, BID  [Held by provider] enoxaparin, 40 mg, subcutaneous, q24h  famotidine, 20 mg, oral, BID  ferrous sulfate (325 mg ferrous sulfate), 1  tablet, oral, Daily  [Held by provider] lisinopril, 10 mg, oral, Daily  loratadine, 10 mg, oral, Daily  montelukast, 10 mg, oral, Nightly  nicotine, 1 patch, transdermal, Daily  pantoprazole, 40 mg, oral, Daily before breakfast  PARoxetine, 20 mg, oral, Daily  PARoxetine, 40 mg, oral, Daily  polyethylene glycol, 17 g, oral, Daily  potassium chloride, 40 mEq, oral, Daily  simvastatin, 20 mg, oral, Nightly  traZODone, 100 mg, oral, Nightly      Continuous medications  oxygen, 2 L/min, Last Rate: 4 L/min (01/05/24 1538)      PRN medications  PRN medications: albuterol, HYDROmorphone, naloxone, ondansetron **OR** ondansetron, oxyCODONE, oxyCODONE    Results for orders placed or performed during the hospital encounter of 01/02/24 (from the past 24 hour(s))   Prepare RBC: 2 Units   Result Value Ref Range    PRODUCT CODE M0041Z42     Unit Number [id number]     Unit ABO A     Unit RH POS     XM INTEP COMP     Dispense Status IS     Blood Expiration Date January 31, 2024 23:59 EST     PRODUCT BLOOD TYPE 6200     UNIT VOLUME 350     PRODUCT CODE K0270Q93     Unit Number [id number]     Unit ABO A     Unit RH POS     XM INTEP COMP     Dispense Status RE     Blood Expiration Date January 31, 2024 23:59 EST     PRODUCT BLOOD TYPE 6200     UNIT VOLUME 350    Type and screen   Result Value Ref Range    ABO TYPE A     Rh TYPE POS     ANTIBODY SCREEN NEG    Comprehensive Metabolic Panel   Result Value Ref Range    Glucose 85 74 - 99 mg/dL    Sodium 135 (L) 136 - 145 mmol/L    Potassium 4.0 3.5 - 5.3 mmol/L    Chloride 104 98 - 107 mmol/L    Bicarbonate 26 21 - 32 mmol/L    Anion Gap 9 (L) 10 - 20 mmol/L    Urea Nitrogen 7 6 - 23 mg/dL    Creatinine 0.30 (L) 0.50 - 1.05 mg/dL    eGFR >90 >60 mL/min/1.73m*2    Calcium 7.5 (L) 8.6 - 10.3 mg/dL    Albumin 2.5 (L) 3.4 - 5.0 g/dL    Alkaline Phosphatase 56 33 - 110 U/L    Total Protein 4.9 (L) 6.4 - 8.2 g/dL    AST 23 9 - 39 U/L    Bilirubin, Total 0.8 0.0 - 1.2 mg/dL    ALT 13 7  - 45 U/L   CBC and Auto Differential   Result Value Ref Range    WBC 7.3 4.4 - 11.3 x10*3/uL    nRBC 0.0 0.0 - 0.0 /100 WBCs    RBC 3.52 (L) 4.00 - 5.20 x10*6/uL    Hemoglobin 9.0 (L) 12.0 - 16.0 g/dL    Hematocrit 28.4 (L) 36.0 - 46.0 %    MCV 81 80 - 100 fL    MCH 25.6 (L) 26.0 - 34.0 pg    MCHC 31.7 (L) 32.0 - 36.0 g/dL    RDW 20.2 (H) 11.5 - 14.5 %    Platelets 216 150 - 450 x10*3/uL    Neutrophils % 63.2 40.0 - 80.0 %    Immature Granulocytes %, Automated 2.1 (H) 0.0 - 0.9 %    Lymphocytes % 20.7 13.0 - 44.0 %    Monocytes % 10.7 2.0 - 10.0 %    Eosinophils % 3.0 0.0 - 6.0 %    Basophils % 0.3 0.0 - 2.0 %    Neutrophils Absolute 4.63 1.20 - 7.70 x10*3/uL    Immature Granulocytes Absolute, Automated 0.15 0.00 - 0.70 x10*3/uL    Lymphocytes Absolute 1.51 1.20 - 4.80 x10*3/uL    Monocytes Absolute 0.78 0.10 - 1.00 x10*3/uL    Eosinophils Absolute 0.22 0.00 - 0.70 x10*3/uL    Basophils Absolute 0.02 0.00 - 0.10 x10*3/uL   Morphology   Result Value Ref Range    RBC Morphology See Below     Polychromasia Mild        Assessment/Plan   Active Problems:    Primary hypertension    Anxiety    Depression    Hyperlipidemia    48 year old female, POD 3 from revision of right total hip arthroplasty    - 50 percent weight bearing RLE  - strict posterior hip precautions  - incisional VAC in place 10-14 days -> change over to Prevena before DC  - HgB stable this morning at 9.0  - will need 4 weeks DVT prophylaxis   - pain controlled on current regimen  - no BM since admission -> add Senna   - continue home medications  - may require rehab stay, pending further PT evaluation          I spent 25 minutes in the professional and overall care of this patient.    Ester Fitch, APRN-CNP

## 2024-01-08 NOTE — HH CARE COORDINATION
Home Care received a Referral for Physical Therapy. We have processed the referral for a Start of Care on 1/9.     If you have any questions or concerns, please feel free to contact us at [phone]. Follow the prompts, enter your five digit zip code, and you will be directed to your care team on [facility].

## 2024-01-08 NOTE — CARE PLAN
The patient's goals for the shift include  pain control and discharge    The clinical goals for the shift include Patient will have wel controlled pain at 5 5or less this shift.    Over the shift, the patient did not make progress toward the following goals. Barriers to progression include recent surgery. Recommendations to address these barriers include support and medication.

## 2024-01-08 NOTE — CARE PLAN
Problem: Pain - Adult  Goal: Verbalizes/displays adequate comfort level or baseline comfort level  Outcome: Progressing     Problem: Safety - Adult  Goal: Free from fall injury  Outcome: Progressing     Problem: Discharge Planning  Goal: Discharge to home or other facility with appropriate resources  Outcome: Progressing     Problem: Chronic Conditions and Co-morbidities  Goal: Patient's chronic conditions and co-morbidity symptoms are monitored and maintained or improved  Outcome: Progressing     Problem: Fall/Injury  Goal: Not fall by end of shift  Outcome: Progressing  Goal: Be free from injury by end of the shift  Outcome: Progressing  Goal: Verbalize understanding of personal risk factors for fall in the hospital  Outcome: Progressing  Goal: Verbalize understanding of risk factor reduction measures to prevent injury from fall in the home  Outcome: Progressing  Goal: Use assistive devices by end of the shift  Outcome: Progressing  Goal: Pace activities to prevent fatigue by end of the shift  Outcome: Progressing   The patient's goals for the shift include  Patient will be able to sleep at least 6 hours this shift.    The clinical goals for the shift include Pain will be well controlled at a 5 or less this hsift.     Over the shift, the patient did not make progress toward the following goals. Barriers to progression include pain Recommendations to address these barriers include pain meds, rest, and repositioning.

## 2024-01-08 NOTE — PROGRESS NOTES
Physical Therapy                 Therapy Communication Note    Patient Name: Blanca Hamilton  MRN: [id number]  Today's Date: 1/8/2024     Discipline: Physical Therapy    Missed Visit Reason: Missed Visit Reason: Other (Comment) (Per nurse patient is in the process of being DC. Patient was scheduled for DC at 2:30pm.)    Missed Time: Attempt  @ 1445    Comment:

## 2024-01-08 NOTE — DISCHARGE SUMMARY
Discharge Diagnosis  Right hip pain    Issues Requiring Follow-Up  Post op follow up     Discharge Meds     Your medication list        START taking these medications        Instructions Last Dose Given Next Dose Due   aspirin 81 mg chewable tablet      Chew 1 tablet (81 mg) 2 times a day for 28 days.       cefadroxil 500 mg capsule  Commonly known as: Duricef      Take 1 capsule (500 mg) by mouth 2 times a day for 7 days.       docusate sodium 100 mg capsule  Commonly known as: Colace      Take 1 capsule (100 mg) by mouth 2 times a day for 10 days.       naproxen 500 mg tablet  Commonly known as: Naprosyn      Take 1 tablet (500 mg) by mouth 2 times a day with meals.       oxyCODONE-acetaminophen 5-325 mg tablet  Commonly known as: Percocet      Take 1 tablet by mouth every 4 hours if needed for severe pain (7 - 10) for up to 6 days.              CONTINUE taking these medications        Instructions Last Dose Given Next Dose Due   Abilify 15 mg tablet  Generic drug: ARIPiprazole           ferrous sulfate (325 mg ferrous sulfate) tablet           lisinopril 40 mg tablet           montelukast 10 mg tablet  Commonly known as: Singulair           omeprazole 40 mg DR capsule  Commonly known as: PriLOSEC           Paxil CR 25 mg 24 hr tablet  Generic drug: PARoxetine CR           PARoxetine CR 37.5 mg 24 hr tablet  Commonly known as: Paxil-CR           ProAir HFA 90 mcg/actuation inhaler  Generic drug: albuterol           simvastatin 20 mg tablet  Commonly known as: Zocor           traZODone 50 mg tablet  Commonly known as: Desyrel           ZyrTEC 10 mg tablet  Generic drug: cetirizine                  STOP taking these medications      chlorhexidine 0.12 % solution  Commonly known as: Peridex                  Where to Get Your Medications        These medications were sent to [facility]  [id number] [facility] [address]      Hours: 9 AM to 5 PM Mon-Fri Phone: [phone]   aspirin 81 mg chewable  tablet  cefadroxil 500 mg capsule  docusate sodium 100 mg capsule  naproxen 500 mg tablet  oxyCODONE-acetaminophen 5-325 mg tablet         Test Results Pending At Discharge  Pending Labs       No current pending labs.            Hospital Course   47yo CF with PMH of HTN, HLD, GERD, tobacco abuse, and severe degenerative disease that presented for scheduled right ALEXA. Medicine was consulted for medical management of chronic conditions.     Degenerative disease  - s/p right ALEXA on 1/2/24 complicated by periprosthetic femur fracture  -s/p REVISION ARTHROPLASTY RIGHT TOTAL HIP WITH LARISA INSTRUMENTATION AND PROSTHESIS UNDER FLOUROSCOPY, ELVIS CABLES (Right) on 1/5/24  -Pain management  -Prevera wound vac for 10-14 days  -PT/OT recommends moderate intensity therapy; patient wants to go home but she is wheelchair bound and lives alone. She continues to decline rehab and request HHC/PT/OT.     Acute on chronic anemia  - in setting of above  -Hemoglobin is 9.0/28.4  - Transfused with 3 units of PRBCs    DVT Proph: Anticoagulation for one month       Pertinent Physical Exam At Time of Discharge  Physical Exam    Outpatient Follow-Up  Future Appointments   Date Time Provider Department Center   1/16/2024 10:30 AM AMY [id number] [facility]         DEVON Rios-CNP

## 2024-01-08 NOTE — PROGRESS NOTES
01/08/24 0947   Discharge Planning   Living Arrangements Alone   Support Systems Friends/neighbors   Assistance Needed walker, cane, WC, does not drive - uses medical transport company, shower chair   Type of Residence Private residence   Home or Post Acute Services In home services   Type of Home Care Services Home nursing visits;Home OT;Home PT   Patient expects to be discharged to: Home new [facility]   Does the patient need discharge transport arranged? Yes   RoundTrip coordination needed? Yes   Has discharge transport been arranged? No     01/08/2024 0949: Patient evaluated by PT/OT, [facility] 12/17, recommending moderate intensity. Spoke to the patient at the bedside and she is refusing SNF. Agreeable to [facility], preferenced [facility]. MD aware to send internal referral. Patient said she will arrange transport.     01/08/2024 1252: Patient now requesting [facility] to arrange transport. Call placed to [facility] via number patient provided. Per [facility], patient needs to call [facility] first. Transport requested via Roundtrip for 1315, confirmed pickup time of 1530.

## 2024-01-09 ENCOUNTER — HOME CARE VISIT (OUTPATIENT)
Dept: HOME HEALTH SERVICES | Facility: HOME HEALTH | Age: 49
End: 2024-01-09
Payer: COMMERCIAL

## 2024-01-09 VITALS
TEMPERATURE: 98.9 F | WEIGHT: 230 LBS | SYSTOLIC BLOOD PRESSURE: 120 MMHG | HEART RATE: 88 BPM | DIASTOLIC BLOOD PRESSURE: 65 MMHG | RESPIRATION RATE: 18 BRPM | HEIGHT: 68 IN | BODY MASS INDEX: 34.86 KG/M2 | OXYGEN SATURATION: 98 %

## 2024-01-09 PROCEDURE — G0151 HHCP-SERV OF PT,EA 15 MIN: HCPCS

## 2024-01-09 PROCEDURE — 0023 HH SOC

## 2024-01-09 ASSESSMENT — PAIN SCALES - PAIN ASSESSMENT IN ADVANCED DEMENTIA (PAINAD)
CONSOLABILITY: 0
CONSOLABILITY: 0 - NO NEED TO CONSOLE.

## 2024-01-09 ASSESSMENT — ACTIVITIES OF DAILY LIVING (ADL)
AMBULATION ASSISTANCE: ONE PERSON
CURRENT_FUNCTION: ONE PERSON
OASIS_M1830: 05
ENTERING_EXITING_HOME: TOTAL DEPENDENCE

## 2024-01-09 ASSESSMENT — ENCOUNTER SYMPTOMS
SUBJECTIVE PAIN PROGRESSION: GRADUALLY IMPROVING
HYPERTENSION: 1
PERSON REPORTING PAIN: PATIENT
LOWEST PAIN SEVERITY IN PAST 24 HOURS: 4/10
PAIN: 1
HIGHEST PAIN SEVERITY IN PAST 24 HOURS: 8/10
MUSCLE WEAKNESS: 1
PAIN SEVERITY GOAL: 0/10
LOWER EXTREMITY EDEMA: 1
PAIN LOCATION: RIGHT HIP
HYPOTENSION: 1

## 2024-01-09 NOTE — OP NOTE
ARTHROPLASTY TOTAL HIP POSTERIOR APPROACH (R) Operative Note     Date: 2024                        OR Location: A OR     Name: Blanca Hamilton, : 1975, Age: 48 y.o., MRN: [id number], Sex: female     Diagnosis  Pre-op Diagnosis     * Right hip pain [M25.551] Post-op Diagnosis     * Right hip pain [M25.551]      Procedures  ARTHROPLASTY TOTAL HIP POSTERIOR APPROACH  31700 - GA ARTHRP ACETBLR/PROX FEM PROSTC AGRFT/ALGRFT        Surgeons      * Felix Jalloh - Primary     Resident/Fellow/Other Assistant:  Surgeon(s) and Role: Lauri Vaughn, PGY 5, Enrike Assistant     Procedure Summary  Anesthesia: Spinal                ASA: III  Anesthesia Staff: CRNA: DEVON Elizabeth-CRNA  Estimated Blood Loss: 250 mL  Intra-op Medications:   Medication Name Total Dose   EPINEPHrine (Adrenalin) 0.2 mL, ketorolac (Toradol) 30 mg, morphine PF (Duramorph) 2.5 mg, ropivacaine (Naropin) 30 mL in sodium chloride 0.9% 20 mL syringe 56 mL   sodium chloride 0.9 % irrigation solution 250 mL   lactated Ringer's infusion 85 mL                   Anesthesia Record                   Intraprocedure I/O Totals                    Intake     Propofol Drip 0.00 mL     The total shown is the total volume documented since Anesthesia Start was filed.     lactated Ringer's infusion 1000.00 mL     Total Intake 1000 mL           Output     Est. Blood Loss 250 mL     Total Output 250 mL           Net     Net Volume 750 mL             Specimen: No specimens collected      Staff:   Circulator: Diamond Schwartz RN; Lidia Granados RN  Scrub Person: Octavia Paz RN           Drains and/or Catheters: * None in log *     Tourniquet Times:          Implants:  Implants         Type Name Action Serial No.       Joint Hip LINER, COCR, MDM, 42MM E - [id number] Implanted         Joint Hip LARISA TRIDENT II TRITANIUM MULTIHOLE ACETABULAR SHELL 54MM Implanted         Screw SCREW, LOW PROFILE HEX, 6.5 X 20 MM - [id number] Implanted         Screw SCREW,  LOW PROFILE HEX, 6.5 X 30 MM - [id number] Implanted         Joint INSERT, MDM X3 RESTORATION, 42OD 28ID X 6.9MM - [id number] Implanted         Joint STEM, FEM ACCOLADE II, 127 DEG, SZ 8 - [id number] Implanted         Joint STEM, FEM ACCOLADE II, 127 DEG, SZ 8 - [id number] Implanted         Joint HEAD, FEMUR V40 28MM -4MM BIOLOX DELTA - [id number] Wasted         Joint HEAD, FEMUR V40 28MM 0MM BIOLOX DELTA - [id number] Implanted                    Findings: 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 female who is having surgery for Right hip pain [M25.551].      The patient was seen in the preoperative area. The risks, benefits, complications, treatment options, non-operative alternatives, expected recovery and outcomes were discussed with the patient. The possibilities of reaction to medication, pulmonary aspiration, injury to surrounding structures, bleeding, recurrent infection, the need for additional procedures, failure to diagnose a condition, and creating a complication requiring transfusion or operation were discussed with the patient. The patient concurred with the proposed plan, giving informed consent.  The site of surgery was properly noted/marked if necessary per policy. The patient has been actively warmed in preoperative area. Preoperative antibiotics have been ordered and given within 1 hours of incision. 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  The risks benefits and alternatives of total hip arthroplasty were explained to the patient he signed informed consent.     Justification for 22 modifier: This patient has a severely deformed hip with severe protrusio  acetabuli and a severe hip flexion contracture of greater than 40 degrees.  Each of these aspects made the surgery significantly more difficult than the usual and there was significant additional time and effort required especially with exposure as well as bony resections and component implantation.  Because of that extensive additional time and effort a 22 modifier is justified     Description of procedure: Patient was brought to the operating room placed on the operating table in supine position bony prominences well-padded propria preoperative antibiotics were given anesthesia was induced by the anesthesia team patient was prepped and draped in the usual sterile fashion in the lateral decubitus position with the right, operative hip up.  A timeout was performed patient was identified by name and medical record number and laterality and site of surgery were confirmed by all present.  Prior to beginning we noticed that the patient had a severe flexion contracture.  We made a standard posterior approach to the hip, we obtained hemostasis with Bovie electrocautery.  We incised the tensor fascia in line with the incision.  We began our posterior approach taking down the short external rotators and posterior capsule as 1 single soft tissue sleeve.  Because of the patient's protrusio, we were unable to get much internal rotation of the femur at the initial time of the approach.  Once bed completely taken down the posterior structures we are still unable to dislocate the hip, this would be expected given the degree of deformity.  Since you are unable to dislocate the hip safely we performed an in situ femoral neck osteotomy using a reciprocating saw.  Once that was completed we brought the hip into internal rotation and retracted the femur out of the way.  We were able to access the cut end of the femoral head neck however even with a corkscrew device were still unable to remove the femoral head.  Unfortunate this  required us to section it and take it out piece by piece.  We were able to get the femoral head out and once we did we placed periacetabular retractors with excellent visualization.  We noted that there was a significant deep cavity in the bone medially and based on her preoperative imaging this was the area that was medial to her ilio pectineal aligned.  We therefore began our reaming more lateral to that and an area which was anatomically where the acetabulum should be.  We reamed sequentially till we had excellent contact and cortical bite into the bone anterior and posterior.  We then used the patient's femoral head and neck metaphyseal bone to create a significant amount of autograft.  Autograft was placed in the medial defect.  We then impacted our final acetabular cup into position ensuring that we had excellent anteversion and abduction within excepted parameters.  We did have excellent initial fixation we augmented this with 2 periacetabular screws.  We then impacted the dual mobility liner into place.  We then turned our attention to the femur, we used a box osteotome and a Charnley awl to access the lateral femur and remove the lateral bone.  We then began sequential broaching up until we had our final broach which was longitudinally and rotationally stable.  We we reduced the hip, this took significant amount of effort given the patient's previous deformity however we were able to reduce the hip using a -4 head and once it was in place we took the hip through range of motion we did find excellent stability both anteriorly and posteriorly as well as increase in the patient's length and offset relative to her previous deformity which was expected and planned for.  On comparison with the contralateral limb, this limb did appear to be longer however again that was expected given the significant deformity of that limb and shortening of it from previous procedures.  We also noted that there was remaining  flexion contracture.  This was likely due to soft tissue contracture in the anterior structures and not something we could control during this procedure.  We were able to manipulate the limb to get it extended to almost neutral position but were not able to get it beyond that.  It is likely that this contracture will remain however we were able to get an excellent stable hip construct.  We then remove the trial components we irrigated with normal saline we impacted the final femoral stem of the place and it was stable.  We cleaned and dried the trunnion and impacted the final femoral head.  We then reduced the hip took the range of motion once more and found that we had excellent stability and kinematics of the hip.  We then irrigated with normal saline irrigated with Irrisept solution.  Performed a posterior capsular closure using #5 Ethibond through the medius tendon and the posterior capsule and short external rotators as 1 single soft tissue sleeve with a locking suture.  In the more proximal areas of the capsular repair we are unable to bring the capsule completely up to the greater trochanter.  This was expected given that tissue had contracted in the patient's previous position which was significantly more medialized.  We did complete the repair even with his gap.  We then injected the periacetabular and superficial soft tissues with a mixture of Duramorph epinephrine Toradol and ropivacaine.  We closed the tensor fascia with #1 Vicryl and a running #2 Quill.  We closed the skin with 2-0 Vicryl 4 oh V-Loc and Prineo mesh and glue dressing.  We placed a Mepilex Ag silver impregnated dressing over this.  Patient was then awoken from anesthesia by the anesthesia and brought to PACU in stable condition     Postoperative plan: Patient will bear weight as tolerated with strict posterior hip precautions.  Antibiotics DVT prophylaxis pain control and other standard postoperative supportive care     Statement of  medical necessity: I was present and scrubbed for all critical portions of this procedure  Complications:  None; patient tolerated the procedure well.    Disposition: PACU - hemodynamically stable.  Condition: stable            Additional Details: none     Attending Attestation: I was present and scrubbed for the key portions of the procedure.     Felix Jalloh  Phone Number: [phone]

## 2024-01-09 NOTE — OP NOTE
REVISION ARTHROPLASTY RIGHT TOTAL HIP WITH LARISA INSTRUMENTATION AND PROSTHESIS UNDER FLOUROSCOPY, ELVIS CABLES (R) Operative Note     Date: 2024  OR Location: [facility] OR    Name: Blanca Hamilton, : 1975, Age: 48 y.o., MRN: [id number], Sex: female    Diagnosis  Pre-op Diagnosis     * Arthritis of right hip [M16.11] Post-op Diagnosis     * Arthritis of right hip [M16.11]     Procedures  REVISION ARTHROPLASTY RIGHT TOTAL HIP WITH LARISA INSTRUMENTATION AND PROSTHESIS UNDER FLOUROSCOPY, ELVIS CABLES  87247 - [place] REVJ TOT HIP ARTHRP [facility] W/WO AGRFT/ALGRFT      Surgeons      * Felix Jalloh - Primary    Resident/Fellow/Other Assistant:  Surgeon(s) and Role:    Procedure Summary  Anesthesia: General  ASA: III  Anesthesia Staff: CRNA: DEVON Mccray-CRNA  Estimated Blood Loss: 500mL  Intra-op Medications:   Medication Name Total Dose   potassium phosphates 21 mmol in dextrose 5 % in water (D5W) 250 mL IV Cannot be calculated   lactated Ringer's infusion 246.67 mL              Anesthesia Record               Intraprocedure I/O Totals          Intake    LR 1800.00 mL    albumin human 5 % 250.00 mL    tranexamic acid in NaCl,iso-os 1,000 mg/100 mL (10 mg/mL) 200.00 mL    Total Intake 2250 mL       Output    Urine 500 mL    Est. Blood Loss 500 mL    Total Output 1000 mL       Net    Net Volume 1250 mL          Specimen: No specimens collected     Staff:   Circulator: Portia Gomez RN         Drains and/or Catheters:   [REMOVED] Urethral Catheter (Removed)   Site Assessment Clean;Skin intact 24   Collection Container Standard drainage bag 24   Securement Method Securing device (Describe) 24   Reason for Continuing Urinary Catheterization surgical procedures: urological/gynecological, pelvic oncology, anal, prolonged surgical procedure 24   Output (mL) 400 mL 24 0400   Irrigant Other (Comment) 24 1331   Urethral Catheter Output (mL) 350 mL 24 0518        Tourniquet Times:         Implants:  Implants       Type Name Action Serial No.      Screw CABLE, CABLE-READY PLATE, GREATER TROCHANTER, 1.8 X 635 MM, COBALT CHROME - [id number] Implanted      Joint STEM, FEMUR DIST 24 X 155 CONICAL REST MOD - [id number] Implanted      Joint HEAD, FEMUR V40 28MM -4MM BIOLOX DELTA - [id number] Implanted      Joint INSERT, MDM X3 RESTORATION, 42OD 28ID X 6.9MM - [id number] Implanted      Joint BODY, CONE PROX 25/+10 REST MOD - [id number] Implanted               Findings: calcar fracture, stem loose    Indications: Blanca Hamilton is an 48 y.o. female who is having surgery for periprosthetic femur fracture right femur    The patient was seen in the preoperative area. The risks, benefits, complications, treatment options, non-operative alternatives, expected recovery and outcomes were discussed with the patient. The possibilities of reaction to medication, pulmonary aspiration, injury to surrounding structures, bleeding, recurrent infection, the need for additional procedures, failure to diagnose a condition, and creating a complication requiring transfusion or operation were discussed with the patient. The patient concurred with the proposed plan, giving informed consent.  The site of surgery was properly noted/marked if necessary per policy. The patient has been actively warmed in preoperative area. Preoperative antibiotics have been ordered and given within 1 hours of incision. Venous thrombosis prophylaxis have been ordered including bilateral sequential compression devices and chemical prophylaxis    Procedure Details: Procedure Details: Statement of medical necessity: This is a 48-year-old female who is 3 days out from complex right total hip arthroplasty with severe protrusio and femoral flexion deformity.  She suffered a periprosthetic fracture around the right femur.  The risks benefits and alternatives of revision of the total hip and fixation of the fracture were explained to  the patient and she signed informed consent     Description of procedure: Patient was brought to the operating room placed on the operating table in the supine position all bony prominences were well-padded appropriate preoperative antibiotics were given anesthesia was induced by the anesthesia team patient was placed in the lateral decubitus position with the right hip up.  She was prepped and draped in usual sterile fashion a timeout was performed patient was identified by name and medical record number and laterality and site of surgery were confirmed by all present.     We given making it standard posterior approach to the hip, we used the patient's previous approach and cut through the sutures.  We remove the sutures and the tensor fascia as well as the posterior capsular repair.  We immediately noted that there was a fracture of the significant portion of the medial calcar extending about 5 cm distally.  The remainder of the femoral shaft including the greater trochanter appeared to be intact.  The stem was grossly loose and was removed.  The entire area was irrigated to remove fracture hematoma.  We debrided the area around the fracture removing any partially organized hematoma.  We then set about reducing the fracture.  We placed cables proximal and distal to the lesser trochanter.  We brought the hip into external rotation in order to allow the fracture to reduce we then tightened the cables achieving an excellent reduction of the fracture.  We tightened him further as we once we are able to bring it into internal rotation noting that we are able to maintain a good reduction of the fracture.  We then set about reconstructing the femur.  We did this with a diaphyseal fitting modular fluted tapered stem.  We began by reaming for a distal stem.  The patient had very poor bone stock and very wide canal but were able to get excellent fixation at a size 24.  Once we had excellent reamer contact we placed our  final distal stem.  We noted that it was longitudinally and rotationally stable once we had impacted it into position.  We then began trialing the cone body, we reamed for the cone body and then placed the cone body trial and an appropriate amount of anteversion.  We placed the head and reduced the hip.  With the hip reduced with noted restoration of the patient's leg length and offset and excellent stability anteriorly and posteriorly.  Again it was difficult to trial anterior stability given the patient's extraordinary flexion contracture of the hip which was similar in her previous procedure.  This did this was limiting to us in terms of the reconstruction but we were able to get a very solid stable construct.  We then took fluoroscopic films to ensure the reduction and distal stem placement.  We remove the trials, irrigated with normal saline.  We placed the final cone body and impacted into position at the same rotation that we had used for the trial.  We drove him the screw and torqued it with the torque limiting screwdriver.  We then cleaned and dried the trunnion and impacted the final head into position.  We reduced the hip took the range of motion once more and found that we had excellent stability.  We irrigated again with normal saline and Irrisept solution.  We then closed the posterior capsule, this posterior capsular closure was done with #5 Ethibond via the medius tendon with locking sutures in the posterior capsule short external rotators and piriformis as 1 single soft tissue sleeve.  As noted in the previous operative report were unable to completely bring that tissue up because it had been in the patient's position of previously significantly medialized hip center.  We then irrigated again with normal saline we closed the tensor fascia with #1 Vicryl and a running #2 Quill.  We closed the skin with 2-0 Vicryl in the subcu layer followed by 2-0 nylon and staples in the skin..  A Prevena negative  pressure wound therapy device was placed.  Patient was awoken from anesthesia by the anesthesia and brought to the PICU PACU in stable condition     Postoperative plan: Patient will bear weight at 50% weightbearing outside with strict posterior hip precautions.  IV antibiotics while in house, p.o. antibiotic course after discharge.  Other standard supportive postoperative care     Statement of staff presence: I was present and scrubbed for all critical portions of this procedure  Complications:  None; patient tolerated the procedure well.    Disposition: PACU - hemodynamically stable.  Condition: stable         Additional Details: none    Attending Attestation: I was present and scrubbed for the entire procedure.    Felix Jalloh  Phone Number: [phone]

## 2024-01-10 ENCOUNTER — HOME CARE VISIT (OUTPATIENT)
Dept: HOME HEALTH SERVICES | Facility: HOME HEALTH | Age: 49
End: 2024-01-10
Payer: COMMERCIAL

## 2024-01-10 VITALS — DIASTOLIC BLOOD PRESSURE: 62 MMHG | TEMPERATURE: 99.1 F | SYSTOLIC BLOOD PRESSURE: 110 MMHG

## 2024-01-10 PROCEDURE — G0152 HHCP-SERV OF OT,EA 15 MIN: HCPCS

## 2024-01-10 ASSESSMENT — ENCOUNTER SYMPTOMS
PAIN SEVERITY GOAL: 0/10
PAIN LOCATION - PAIN SEVERITY: 5/10
PAIN LOCATION: RIGHT HIP
SUBJECTIVE PAIN PROGRESSION: UNCHANGED
PERSON REPORTING PAIN: PATIENT
LOWEST PAIN SEVERITY IN PAST 24 HOURS: 5/10
PAIN LOCATION - RELIEVING FACTORS: PAIN MEDS
PAIN LOCATION - PAIN QUALITY: ACHY
HIGHEST PAIN SEVERITY IN PAST 24 HOURS: 8/10
PAIN: 1

## 2024-01-11 ENCOUNTER — APPOINTMENT (OUTPATIENT)
Dept: CARDIOLOGY | Facility: HOSPITAL | Age: 49
End: 2024-01-11
Payer: COMMERCIAL

## 2024-01-11 ENCOUNTER — APPOINTMENT (OUTPATIENT)
Dept: RADIOLOGY | Facility: HOSPITAL | Age: 49
End: 2024-01-11
Payer: COMMERCIAL

## 2024-01-11 ENCOUNTER — HOME CARE VISIT (OUTPATIENT)
Dept: HOME HEALTH SERVICES | Facility: HOME HEALTH | Age: 49
End: 2024-01-11
Payer: COMMERCIAL

## 2024-01-11 ENCOUNTER — HOSPITAL ENCOUNTER (OUTPATIENT)
Facility: HOSPITAL | Age: 49
Setting detail: OBSERVATION
Discharge: NURSING, REHAB OR LONG-TERM CARE | End: 2024-01-13
Attending: INTERNAL MEDICINE | Admitting: NURSE PRACTITIONER
Payer: COMMERCIAL

## 2024-01-11 VITALS
BODY MASS INDEX: 35.25 KG/M2 | TEMPERATURE: 98.4 F | OXYGEN SATURATION: 99 % | SYSTOLIC BLOOD PRESSURE: 108 MMHG | WEIGHT: 238 LBS | HEIGHT: 69 IN | RESPIRATION RATE: 16 BRPM | DIASTOLIC BLOOD PRESSURE: 78 MMHG

## 2024-01-11 DIAGNOSIS — R00.0 TACHYCARDIA: ICD-10-CM

## 2024-01-11 DIAGNOSIS — Z74.09 IMPAIRED FUNCTIONAL MOBILITY AND ACTIVITY TOLERANCE: Primary | ICD-10-CM

## 2024-01-11 DIAGNOSIS — M16.11 OSTEOARTHRITIS OF RIGHT HIP, UNSPECIFIED OSTEOARTHRITIS TYPE: ICD-10-CM

## 2024-01-11 DIAGNOSIS — R94.31 ABNORMAL ECG: ICD-10-CM

## 2024-01-11 DIAGNOSIS — R53.81 PHYSICAL DECONDITIONING: ICD-10-CM

## 2024-01-11 DIAGNOSIS — R60.0 LOCALIZED EDEMA: ICD-10-CM

## 2024-01-11 DIAGNOSIS — Z98.890 STATUS POST HIP SURGERY: ICD-10-CM

## 2024-01-11 LAB
ALBUMIN SERPL BCP-MCNC: 3 G/DL (ref 3.4–5)
ALP SERPL-CCNC: 79 U/L (ref 33–110)
ALT SERPL W P-5'-P-CCNC: 13 U/L (ref 7–45)
ANION GAP SERPL CALC-SCNC: 10 MMOL/L (ref 10–20)
APPEARANCE UR: CLEAR
AST SERPL W P-5'-P-CCNC: 14 U/L (ref 9–39)
ATRIAL RATE: 87 BPM
BASOPHILS # BLD AUTO: 0.04 X10*3/UL (ref 0–0.1)
BASOPHILS NFR BLD AUTO: 0.4 %
BILIRUB SERPL-MCNC: 0.7 MG/DL (ref 0–1.2)
BILIRUB UR STRIP.AUTO-MCNC: ABNORMAL MG/DL
BUN SERPL-MCNC: 4 MG/DL (ref 6–23)
CALCIUM SERPL-MCNC: 7.9 MG/DL (ref 8.6–10.3)
CHLORIDE SERPL-SCNC: 103 MMOL/L (ref 98–107)
CO2 SERPL-SCNC: 25 MMOL/L (ref 21–32)
COLOR UR: ABNORMAL
CREAT SERPL-MCNC: 0.28 MG/DL (ref 0.5–1.05)
EGFRCR SERPLBLD CKD-EPI 2021: >90 ML/MIN/1.73M*2
EOSINOPHIL # BLD AUTO: 0.33 X10*3/UL (ref 0–0.7)
EOSINOPHIL NFR BLD AUTO: 3.6 %
ERYTHROCYTE [DISTWIDTH] IN BLOOD BY AUTOMATED COUNT: 23 % (ref 11.5–14.5)
FLUAV RNA RESP QL NAA+PROBE: NOT DETECTED
FLUBV RNA RESP QL NAA+PROBE: NOT DETECTED
GLUCOSE SERPL-MCNC: 91 MG/DL (ref 74–99)
GLUCOSE UR STRIP.AUTO-MCNC: NEGATIVE MG/DL
HCT VFR BLD AUTO: 32.4 % (ref 36–46)
HGB BLD-MCNC: 10.1 G/DL (ref 12–16)
IMM GRANULOCYTES # BLD AUTO: 0.38 X10*3/UL (ref 0–0.7)
IMM GRANULOCYTES NFR BLD AUTO: 4.2 % (ref 0–0.9)
KETONES UR STRIP.AUTO-MCNC: NEGATIVE MG/DL
LEUKOCYTE ESTERASE UR QL STRIP.AUTO: NEGATIVE
LYMPHOCYTES # BLD AUTO: 1.75 X10*3/UL (ref 1.2–4.8)
LYMPHOCYTES NFR BLD AUTO: 19.3 %
MAGNESIUM SERPL-MCNC: 1.87 MG/DL (ref 1.6–2.4)
MCH RBC QN AUTO: 25.9 PG (ref 26–34)
MCHC RBC AUTO-ENTMCNC: 31.2 G/DL (ref 32–36)
MCV RBC AUTO: 83 FL (ref 80–100)
MONOCYTES # BLD AUTO: 0.94 X10*3/UL (ref 0.1–1)
MONOCYTES NFR BLD AUTO: 10.4 %
NEUTROPHILS # BLD AUTO: 5.62 X10*3/UL (ref 1.2–7.7)
NEUTROPHILS NFR BLD AUTO: 62.1 %
NITRITE UR QL STRIP.AUTO: NEGATIVE
NRBC BLD-RTO: 0 /100 WBCS (ref 0–0)
P AXIS: 69 DEGREES
P OFFSET: 185 MS
P ONSET: 163 MS
PH UR STRIP.AUTO: 7 [PH]
PLATELET # BLD AUTO: 361 X10*3/UL (ref 150–450)
POTASSIUM SERPL-SCNC: 4 MMOL/L (ref 3.5–5.3)
PR INTERVAL: 112 MS
PROT SERPL-MCNC: 5.5 G/DL (ref 6.4–8.2)
PROT UR STRIP.AUTO-MCNC: NEGATIVE MG/DL
Q ONSET: 219 MS
QRS COUNT: 14 BEATS
QRS DURATION: 68 MS
QT INTERVAL: 354 MS
QTC CALCULATION(BAZETT): 425 MS
QTC FREDERICIA: 400 MS
R AXIS: 31 DEGREES
RBC # BLD AUTO: 3.9 X10*6/UL (ref 4–5.2)
RBC # UR STRIP.AUTO: NEGATIVE /UL
SARS-COV-2 RNA RESP QL NAA+PROBE: NOT DETECTED
SODIUM SERPL-SCNC: 134 MMOL/L (ref 136–145)
SP GR UR STRIP.AUTO: 1.02
T AXIS: 17 DEGREES
T OFFSET: 396 MS
UROBILINOGEN UR STRIP.AUTO-MCNC: 4 MG/DL
VENTRICULAR RATE: 87 BPM
WBC # BLD AUTO: 9.1 X10*3/UL (ref 4.4–11.3)

## 2024-01-11 PROCEDURE — 93971 EXTREMITY STUDY: CPT

## 2024-01-11 PROCEDURE — 96375 TX/PRO/DX INJ NEW DRUG ADDON: CPT

## 2024-01-11 PROCEDURE — 2500000004 HC RX 250 GENERAL PHARMACY W/ HCPCS (ALT 636 FOR OP/ED): Mod: MUE | Performed by: NURSE PRACTITIONER

## 2024-01-11 PROCEDURE — 93005 ELECTROCARDIOGRAM TRACING: CPT

## 2024-01-11 PROCEDURE — 93010 ELECTROCARDIOGRAM REPORT: CPT | Performed by: STUDENT IN AN ORGANIZED HEALTH CARE EDUCATION/TRAINING PROGRAM

## 2024-01-11 PROCEDURE — 87636 SARSCOV2 & INF A&B AMP PRB: CPT | Performed by: HEALTH CARE PROVIDER

## 2024-01-11 PROCEDURE — 81003 URINALYSIS AUTO W/O SCOPE: CPT | Performed by: HEALTH CARE PROVIDER

## 2024-01-11 PROCEDURE — G0299 HHS/HOSPICE OF RN EA 15 MIN: HCPCS

## 2024-01-11 PROCEDURE — 2500000001 HC RX 250 WO HCPCS SELF ADMINISTERED DRUGS (ALT 637 FOR MEDICARE OP): Performed by: NURSE PRACTITIONER

## 2024-01-11 PROCEDURE — 83735 ASSAY OF MAGNESIUM: CPT | Performed by: HEALTH CARE PROVIDER

## 2024-01-11 PROCEDURE — 80053 COMPREHEN METABOLIC PANEL: CPT | Performed by: HEALTH CARE PROVIDER

## 2024-01-11 PROCEDURE — 99285 EMERGENCY DEPT VISIT HI MDM: CPT

## 2024-01-11 PROCEDURE — 2500000004 HC RX 250 GENERAL PHARMACY W/ HCPCS (ALT 636 FOR OP/ED): Performed by: NURSE PRACTITIONER

## 2024-01-11 PROCEDURE — 85025 COMPLETE CBC W/AUTO DIFF WBC: CPT | Performed by: HEALTH CARE PROVIDER

## 2024-01-11 PROCEDURE — 36415 COLL VENOUS BLD VENIPUNCTURE: CPT | Performed by: HEALTH CARE PROVIDER

## 2024-01-11 PROCEDURE — G0378 HOSPITAL OBSERVATION PER HR: HCPCS

## 2024-01-11 RX ORDER — ONDANSETRON HYDROCHLORIDE 2 MG/ML
4 INJECTION, SOLUTION INTRAVENOUS EVERY 8 HOURS PRN
Status: DISCONTINUED | OUTPATIENT
Start: 2024-01-11 | End: 2024-01-14 | Stop reason: HOSPADM

## 2024-01-11 RX ORDER — CETIRIZINE HYDROCHLORIDE 10 MG/1
10 TABLET ORAL DAILY
Status: DISCONTINUED | OUTPATIENT
Start: 2024-01-12 | End: 2024-01-12

## 2024-01-11 RX ORDER — OMEPRAZOLE 40 MG/1
40 CAPSULE, DELAYED RELEASE ORAL DAILY
Status: DISCONTINUED | OUTPATIENT
Start: 2024-01-12 | End: 2024-01-12

## 2024-01-11 RX ORDER — DOCUSATE SODIUM 100 MG/1
100 CAPSULE, LIQUID FILLED ORAL 2 TIMES DAILY
Status: DISCONTINUED | OUTPATIENT
Start: 2024-01-11 | End: 2024-01-14 | Stop reason: HOSPADM

## 2024-01-11 RX ORDER — PANTOPRAZOLE SODIUM 40 MG/10ML
40 INJECTION, POWDER, LYOPHILIZED, FOR SOLUTION INTRAVENOUS
Status: DISCONTINUED | OUTPATIENT
Start: 2024-01-12 | End: 2024-01-11

## 2024-01-11 RX ORDER — MONTELUKAST SODIUM 10 MG/1
10 TABLET ORAL DAILY
Status: DISCONTINUED | OUTPATIENT
Start: 2024-01-12 | End: 2024-01-14 | Stop reason: HOSPADM

## 2024-01-11 RX ORDER — ACETAMINOPHEN 650 MG/1
650 SUPPOSITORY RECTAL EVERY 4 HOURS PRN
Status: DISCONTINUED | OUTPATIENT
Start: 2024-01-11 | End: 2024-01-14 | Stop reason: HOSPADM

## 2024-01-11 RX ORDER — ACETAMINOPHEN 325 MG/1
650 TABLET ORAL EVERY 4 HOURS PRN
Status: DISCONTINUED | OUTPATIENT
Start: 2024-01-11 | End: 2024-01-14 | Stop reason: HOSPADM

## 2024-01-11 RX ORDER — ONDANSETRON HYDROCHLORIDE 2 MG/ML
INJECTION, SOLUTION INTRAVENOUS
Status: COMPLETED
Start: 2024-01-11 | End: 2024-01-11

## 2024-01-11 RX ORDER — OXYCODONE AND ACETAMINOPHEN 5; 325 MG/1; MG/1
1 TABLET ORAL EVERY 4 HOURS PRN
Status: DISCONTINUED | OUTPATIENT
Start: 2024-01-11 | End: 2024-01-14 | Stop reason: HOSPADM

## 2024-01-11 RX ORDER — ACETAMINOPHEN 160 MG/5ML
650 SOLUTION ORAL EVERY 4 HOURS PRN
Status: DISCONTINUED | OUTPATIENT
Start: 2024-01-11 | End: 2024-01-14 | Stop reason: HOSPADM

## 2024-01-11 RX ORDER — NAPROXEN 250 MG/1
500 TABLET ORAL
Status: DISCONTINUED | OUTPATIENT
Start: 2024-01-12 | End: 2024-01-14 | Stop reason: HOSPADM

## 2024-01-11 RX ORDER — ARIPIPRAZOLE 5 MG/1
15 TABLET ORAL NIGHTLY
Status: DISCONTINUED | OUTPATIENT
Start: 2024-01-11 | End: 2024-01-14 | Stop reason: HOSPADM

## 2024-01-11 RX ORDER — CEFADROXIL 500 MG/1
500 CAPSULE ORAL 2 TIMES DAILY
Status: DISCONTINUED | OUTPATIENT
Start: 2024-01-11 | End: 2024-01-12

## 2024-01-11 RX ORDER — LISINOPRIL 10 MG/1
10 TABLET ORAL DAILY
Status: DISCONTINUED | OUTPATIENT
Start: 2024-01-12 | End: 2024-01-14 | Stop reason: HOSPADM

## 2024-01-11 RX ORDER — PAROXETINE HYDROCHLORIDE HEMIHYDRATE 25 MG/1
25 TABLET, FILM COATED, EXTENDED RELEASE ORAL NIGHTLY
Status: DISCONTINUED | OUTPATIENT
Start: 2024-01-11 | End: 2024-01-12

## 2024-01-11 RX ORDER — POLYETHYLENE GLYCOL 3350 17 G/17G
17 POWDER, FOR SOLUTION ORAL DAILY PRN
Status: DISCONTINUED | OUTPATIENT
Start: 2024-01-11 | End: 2024-01-14 | Stop reason: HOSPADM

## 2024-01-11 RX ORDER — TRAZODONE HYDROCHLORIDE 100 MG/1
100 TABLET ORAL NIGHTLY
Status: DISCONTINUED | OUTPATIENT
Start: 2024-01-11 | End: 2024-01-14 | Stop reason: HOSPADM

## 2024-01-11 RX ORDER — PAROXETINE HYDROCHLORIDE HEMIHYDRATE 37.5 MG/1
37.5 TABLET, FILM COATED, EXTENDED RELEASE ORAL EVERY MORNING
Status: DISCONTINUED | OUTPATIENT
Start: 2024-01-12 | End: 2024-01-12

## 2024-01-11 RX ORDER — ENOXAPARIN SODIUM 100 MG/ML
40 INJECTION SUBCUTANEOUS EVERY 24 HOURS
Status: DISCONTINUED | OUTPATIENT
Start: 2024-01-11 | End: 2024-01-14 | Stop reason: HOSPADM

## 2024-01-11 RX ORDER — ONDANSETRON 4 MG/1
4 TABLET, FILM COATED ORAL EVERY 8 HOURS PRN
Status: DISCONTINUED | OUTPATIENT
Start: 2024-01-11 | End: 2024-01-14 | Stop reason: HOSPADM

## 2024-01-11 RX ORDER — FERROUS SULFATE 325(65) MG
1 TABLET ORAL DAILY
Status: DISCONTINUED | OUTPATIENT
Start: 2024-01-12 | End: 2024-01-14 | Stop reason: HOSPADM

## 2024-01-11 RX ORDER — PANTOPRAZOLE SODIUM 40 MG/1
40 TABLET, DELAYED RELEASE ORAL
Status: DISCONTINUED | OUTPATIENT
Start: 2024-01-12 | End: 2024-01-11

## 2024-01-11 RX ORDER — SIMVASTATIN 10 MG/1
20 TABLET, FILM COATED ORAL NIGHTLY
Status: DISCONTINUED | OUTPATIENT
Start: 2024-01-11 | End: 2024-01-14 | Stop reason: HOSPADM

## 2024-01-11 RX ORDER — NAPROXEN SODIUM 220 MG/1
81 TABLET, FILM COATED ORAL 2 TIMES DAILY
Status: DISCONTINUED | OUTPATIENT
Start: 2024-01-11 | End: 2024-01-14 | Stop reason: HOSPADM

## 2024-01-11 RX ADMIN — ENOXAPARIN SODIUM 40 MG: 40 INJECTION SUBCUTANEOUS at 20:58

## 2024-01-11 RX ADMIN — OXYCODONE HYDROCHLORIDE AND ACETAMINOPHEN 1 TABLET: 5; 325 TABLET ORAL at 22:28

## 2024-01-11 RX ADMIN — ACETAMINOPHEN 650 MG: 325 TABLET ORAL at 20:58

## 2024-01-11 RX ADMIN — ONDANSETRON 4 MG: 2 INJECTION INTRAMUSCULAR; INTRAVENOUS at 20:52

## 2024-01-11 RX ADMIN — TRAZODONE HYDROCHLORIDE 100 MG: 100 TABLET ORAL at 22:28

## 2024-01-11 RX ADMIN — ARIPIPRAZOLE 15 MG: 5 TABLET ORAL at 22:27

## 2024-01-11 RX ADMIN — DOCUSATE SODIUM 100 MG: 100 CAPSULE, LIQUID FILLED ORAL at 22:28

## 2024-01-11 RX ADMIN — ASPIRIN 81 MG CHEWABLE TABLET 81 MG: 81 TABLET CHEWABLE at 22:28

## 2024-01-11 RX ADMIN — SIMVASTATIN 20 MG: 10 TABLET, FILM COATED ORAL at 22:28

## 2024-01-11 SDOH — ECONOMIC STABILITY: HOUSING INSECURITY
HOME SAFETY: THE APARTMENT IS SCARCE- ONLY ONE CHAIR IN LIVING ROOM, THERE IS EMPTY BAGS OF CHIPS, FOOD AND OPEN CANS OF FOOD WITH SILVERWARE SCATTERED THROUGH OUT THE LIVING AREA. PATIENT OPENED THE DOOR IN A WHEELCHAIR WITH A T-SHIRT THAT WAS COVERED WITH FOOD

## 2024-01-11 SDOH — ECONOMIC STABILITY: HOUSING INSECURITY: HOME SAFETY: STAINS, ONLY HAD A COAT LAID ACROSS HER LAP.

## 2024-01-11 SDOH — SOCIAL STABILITY: SOCIAL INSECURITY: DO YOU FEEL UNSAFE GOING BACK TO THE PLACE WHERE YOU ARE LIVING?: NO

## 2024-01-11 SDOH — SOCIAL STABILITY: SOCIAL INSECURITY: ABUSE: ADULT

## 2024-01-11 SDOH — SOCIAL STABILITY: SOCIAL INSECURITY: DO YOU FEEL ANYONE HAS EXPLOITED OR TAKEN ADVANTAGE OF YOU FINANCIALLY OR OF YOUR PERSONAL PROPERTY?: NO

## 2024-01-11 SDOH — SOCIAL STABILITY: SOCIAL INSECURITY: ARE YOU OR HAVE YOU BEEN THREATENED OR ABUSED PHYSICALLY, EMOTIONALLY, OR SEXUALLY BY ANYONE?: YES

## 2024-01-11 SDOH — SOCIAL STABILITY: SOCIAL INSECURITY: DOES ANYONE TRY TO KEEP YOU FROM HAVING/CONTACTING OTHER FRIENDS OR DOING THINGS OUTSIDE YOUR HOME?: NO

## 2024-01-11 SDOH — SOCIAL STABILITY: SOCIAL INSECURITY: ARE THERE ANY APPARENT SIGNS OF INJURIES/BEHAVIORS THAT COULD BE RELATED TO ABUSE/NEGLECT?: NO

## 2024-01-11 SDOH — SOCIAL STABILITY: SOCIAL INSECURITY: HAVE YOU HAD THOUGHTS OF HARMING ANYONE ELSE?: NO

## 2024-01-11 SDOH — SOCIAL STABILITY: SOCIAL INSECURITY: HAS ANYONE EVER THREATENED TO HURT YOUR FAMILY OR YOUR PETS?: NO

## 2024-01-11 SDOH — SOCIAL STABILITY: SOCIAL INSECURITY: WERE YOU ABLE TO COMPLETE ALL THE BEHAVIORAL HEALTH SCREENINGS?: YES

## 2024-01-11 ASSESSMENT — ENCOUNTER SYMPTOMS
PAIN LOCATION - RELIEVING FACTORS: PAIN MEDICATION
PAIN LOCATION - PAIN SEVERITY: 4/10
PERSON REPORTING PAIN: PATIENT
APPETITE LEVEL: FAIR
LOWER EXTREMITY EDEMA: 1
PAIN LOCATION - PAIN QUALITY: JUST THERE
MUSCLE WEAKNESS: 1
SUBJECTIVE PAIN PROGRESSION: GRADUALLY IMPROVING
PAIN: 1
PAIN LOCATION: RIGHT HIP
HIGHEST PAIN SEVERITY IN PAST 24 HOURS: 4/10

## 2024-01-11 ASSESSMENT — COGNITIVE AND FUNCTIONAL STATUS - GENERAL
TURNING FROM BACK TO SIDE WHILE IN FLAT BAD: A LOT
DRESSING REGULAR UPPER BODY CLOTHING: A LITTLE
PERSONAL GROOMING: A LITTLE
MOVING TO AND FROM BED TO CHAIR: A LOT
MOBILITY SCORE: 10
PATIENT BASELINE BEDBOUND: NO
TOILETING: A LOT
HELP NEEDED FOR BATHING: A LOT
CLIMB 3 TO 5 STEPS WITH RAILING: TOTAL
DRESSING REGULAR LOWER BODY CLOTHING: A LOT
STANDING UP FROM CHAIR USING ARMS: A LOT
MOVING FROM LYING ON BACK TO SITTING ON SIDE OF FLAT BED WITH BEDRAILS: A LOT
WALKING IN HOSPITAL ROOM: TOTAL
DAILY ACTIVITIY SCORE: 16

## 2024-01-11 ASSESSMENT — LIFESTYLE VARIABLES
PRESCIPTION_ABUSE_PAST_12_MONTHS: NO
HOW MANY STANDARD DRINKS CONTAINING ALCOHOL DO YOU HAVE ON A TYPICAL DAY: PATIENT DOES NOT DRINK
HOW OFTEN DO YOU HAVE 6 OR MORE DRINKS ON ONE OCCASION: NEVER
SUBSTANCE_ABUSE_PAST_12_MONTHS: NO
SKIP TO QUESTIONS 9-10: 1
AUDIT-C TOTAL SCORE: 0
HOW OFTEN DO YOU HAVE A DRINK CONTAINING ALCOHOL: NEVER
AUDIT-C TOTAL SCORE: 0

## 2024-01-11 ASSESSMENT — ACTIVITIES OF DAILY LIVING (ADL)
HEARING - RIGHT EAR: FUNCTIONAL
BATHING: NEEDS ASSISTANCE
JUDGMENT_ADEQUATE_SAFELY_COMPLETE_DAILY_ACTIVITIES: YES
BATHING: NEEDS ASSISTANCE
JUDGMENT_ADEQUATE_SAFELY_COMPLETE_DAILY_ACTIVITIES: YES
HEARING - LEFT EAR: FUNCTIONAL
ADEQUATE_TO_COMPLETE_ADL: YES
ASSISTIVE_DEVICE: EYEGLASSES;WHEELCHAIR
WALKS IN HOME: DEPENDENT
GROOMING: NEEDS ASSISTANCE
HEARING - LEFT EAR: FUNCTIONAL
WALKS IN HOME: NEEDS ASSISTANCE
TOILETING: NEEDS ASSISTANCE
FEEDING YOURSELF: NEEDS ASSISTANCE
GROOMING: NEEDS ASSISTANCE
PATIENT'S MEMORY ADEQUATE TO SAFELY COMPLETE DAILY ACTIVITIES?: YES
DRESSING YOURSELF: NEEDS ASSISTANCE
HEARING - RIGHT EAR: FUNCTIONAL
DRESSING YOURSELF: NEEDS ASSISTANCE
ADEQUATE_TO_COMPLETE_ADL: YES
PATIENT'S MEMORY ADEQUATE TO SAFELY COMPLETE DAILY ACTIVITIES?: YES
FEEDING YOURSELF: INDEPENDENT
LACK_OF_TRANSPORTATION: YES

## 2024-01-11 ASSESSMENT — PAIN SCALES - GENERAL
PAINLEVEL_OUTOF10: 3
PAINLEVEL_OUTOF10: 3
PAINLEVEL_OUTOF10: 0 - NO PAIN
PAINLEVEL_OUTOF10: 6

## 2024-01-11 ASSESSMENT — PATIENT HEALTH QUESTIONNAIRE - PHQ9
2. FEELING DOWN, DEPRESSED OR HOPELESS: NEARLY EVERY DAY
SUM OF ALL RESPONSES TO PHQ9 QUESTIONS 1 & 2: 6
1. LITTLE INTEREST OR PLEASURE IN DOING THINGS: NEARLY EVERY DAY

## 2024-01-11 ASSESSMENT — PAIN DESCRIPTION - LOCATION
LOCATION: HIP
LOCATION: HIP

## 2024-01-11 ASSESSMENT — COLUMBIA-SUICIDE SEVERITY RATING SCALE - C-SSRS
2. HAVE YOU ACTUALLY HAD ANY THOUGHTS OF KILLING YOURSELF?: NO
6. HAVE YOU EVER DONE ANYTHING, STARTED TO DO ANYTHING, OR PREPARED TO DO ANYTHING TO END YOUR LIFE?: NO
1. IN THE PAST MONTH, HAVE YOU WISHED YOU WERE DEAD OR WISHED YOU COULD GO TO SLEEP AND NOT WAKE UP?: NO

## 2024-01-11 ASSESSMENT — PAIN - FUNCTIONAL ASSESSMENT
PAIN_FUNCTIONAL_ASSESSMENT: 0-10

## 2024-01-11 ASSESSMENT — PAIN DESCRIPTION - ORIENTATION
ORIENTATION: RIGHT
ORIENTATION: RIGHT

## 2024-01-11 NOTE — ED NOTES
Patient comes to ED by ambulance wanting rehab on right hip. Has wound vac that has not been functional x2 days. Patient had right hip revision this month on the 2nd and 4th.     Harriet Larry RN  01/11/24 3506

## 2024-01-11 NOTE — PROGRESS NOTES
Patient evaluated at bedside. AAOX3. Lives alone in ground level apartment. There are no steps to enter the residence. DME: walker, cane, wheelchair, shower chair, grab bars, life alert button. She has a HHA 4 days a week for 3 hours per day through [facility]. They assist with bathing, shopping and cleaning. She is active with [facility] RN, PT/OT. She has a friend, Nanda, who will help her with dressing or other ADL's as needed when she is alone. She was recently at [facility] for a revision of her left hip. PT/OT recommended MOD level of therapy at that time and patient declined services deciding to go home with [facility]. Since that time, she has decided that she is not able to care for herself as she thought she would be able to and feels that she needs to go to SNF. Patient will be admitted Observation and evaluated in the morning my PT/OT. She will require a precert in order to go to SNF due to her payor source. Preference list given to patient and instructed to select her top 3 preferences for SNF.  Patient does not drive and relies on community transportation. Patient denies any falls within the past 6 months. PCP: Henry Olmos last seen > 1 year. Pharmacy: [facility] in [place].  Her medications come prefilled in bubble packs. Oncoming TCC to continue to follow for any transition care needs or changes in current plan.
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
## 2024-01-12 ENCOUNTER — APPOINTMENT (OUTPATIENT)
Dept: RADIOLOGY | Facility: HOSPITAL | Age: 49
End: 2024-01-12
Payer: COMMERCIAL

## 2024-01-12 ENCOUNTER — HOME CARE VISIT (OUTPATIENT)
Dept: HOME HEALTH SERVICES | Facility: HOME HEALTH | Age: 49
End: 2024-01-12
Payer: COMMERCIAL

## 2024-01-12 ENCOUNTER — APPOINTMENT (OUTPATIENT)
Dept: CARDIOLOGY | Facility: HOSPITAL | Age: 49
End: 2024-01-12
Payer: COMMERCIAL

## 2024-01-12 PROBLEM — F51.04 PSYCHOPHYSIOLOGICAL INSOMNIA: Status: ACTIVE | Noted: 2024-01-12

## 2024-01-12 PROBLEM — E78.49 OTHER HYPERLIPIDEMIA: Status: ACTIVE | Noted: 2024-01-02

## 2024-01-12 PROBLEM — J45.20 MILD INTERMITTENT ASTHMA WITHOUT COMPLICATION (HHS-HCC): Status: ACTIVE | Noted: 2024-01-12

## 2024-01-12 PROBLEM — F33.9 RECURRENT MAJOR DEPRESSIVE DISORDER (CMS-HCC): Status: ACTIVE | Noted: 2024-01-12

## 2024-01-12 PROBLEM — T78.40XA ALLERGIES: Status: ACTIVE | Noted: 2024-01-12

## 2024-01-12 PROBLEM — R00.0 TACHYCARDIA: Status: ACTIVE | Noted: 2024-01-12

## 2024-01-12 PROBLEM — K21.9 GASTROESOPHAGEAL REFLUX DISEASE WITHOUT ESOPHAGITIS: Status: ACTIVE | Noted: 2024-01-12

## 2024-01-12 PROBLEM — D50.8 IRON DEFICIENCY ANEMIA SECONDARY TO INADEQUATE DIETARY IRON INTAKE: Status: ACTIVE | Noted: 2024-01-12

## 2024-01-12 PROBLEM — Z98.890 STATUS POST HIP SURGERY: Status: ACTIVE | Noted: 2024-01-12

## 2024-01-12 PROBLEM — K59.09 OTHER CONSTIPATION: Status: ACTIVE | Noted: 2024-01-12

## 2024-01-12 LAB
ANION GAP SERPL CALC-SCNC: 13 MMOL/L (ref 10–20)
AORTIC VALVE PEAK VELOCITY: 1.67
AV PEAK GRADIENT: 11.2
AVA (PEAK VEL): 2.3
BNP SERPL-MCNC: 16 PG/ML (ref 0–99)
BUN SERPL-MCNC: 6 MG/DL (ref 6–23)
CALCIUM SERPL-MCNC: 7.9 MG/DL (ref 8.6–10.3)
CHLORIDE SERPL-SCNC: 103 MMOL/L (ref 98–107)
CO2 SERPL-SCNC: 27 MMOL/L (ref 21–32)
CREAT SERPL-MCNC: 0.31 MG/DL (ref 0.5–1.05)
CRP SERPL-MCNC: 4.87 MG/DL
EGFRCR SERPLBLD CKD-EPI 2021: >90 ML/MIN/1.73M*2
EJECTION FRACTION APICAL 4 CHAMBER: 68.6
EJECTION FRACTION: 66
ERYTHROCYTE [DISTWIDTH] IN BLOOD BY AUTOMATED COUNT: 23.2 % (ref 11.5–14.5)
ERYTHROCYTE [SEDIMENTATION RATE] IN BLOOD BY WESTERGREN METHOD: 9 MM/H (ref 0–20)
GLUCOSE SERPL-MCNC: 81 MG/DL (ref 74–99)
HCT VFR BLD AUTO: 30.6 % (ref 36–46)
HGB BLD-MCNC: 9.3 G/DL (ref 12–16)
HOLD SPECIMEN: NORMAL
LEFT ATRIUM VOLUME AREA LENGTH INDEX BSA: 16.8
LEFT VENTRICLE INTERNAL DIMENSION DIASTOLE: 4.15 (ref 3.5–6)
LEFT VENTRICULAR OUTFLOW TRACT DIAMETER: 2
MCH RBC QN AUTO: 26.3 PG (ref 26–34)
MCHC RBC AUTO-ENTMCNC: 30.4 G/DL (ref 32–36)
MCV RBC AUTO: 86 FL (ref 80–100)
MITRAL VALVE E/A RATIO: 1.26
MITRAL VALVE E/E' RATIO: 8.25
NRBC BLD-RTO: 0 /100 WBCS (ref 0–0)
PLATELET # BLD AUTO: 371 X10*3/UL (ref 150–450)
POTASSIUM SERPL-SCNC: 4.2 MMOL/L (ref 3.5–5.3)
RBC # BLD AUTO: 3.54 X10*6/UL (ref 4–5.2)
RIGHT VENTRICLE FREE WALL PEAK S': 17.4
SODIUM SERPL-SCNC: 139 MMOL/L (ref 136–145)
TRICUSPID ANNULAR PLANE SYSTOLIC EXCURSION: 2.7
WBC # BLD AUTO: 7.5 X10*3/UL (ref 4.4–11.3)

## 2024-01-12 PROCEDURE — 86140 C-REACTIVE PROTEIN: CPT | Performed by: PHYSICIAN ASSISTANT

## 2024-01-12 PROCEDURE — 85652 RBC SED RATE AUTOMATED: CPT | Performed by: PHYSICIAN ASSISTANT

## 2024-01-12 PROCEDURE — 73552 X-RAY EXAM OF FEMUR 2/>: CPT | Mod: RT

## 2024-01-12 PROCEDURE — G0378 HOSPITAL OBSERVATION PER HR: HCPCS

## 2024-01-12 PROCEDURE — 36415 COLL VENOUS BLD VENIPUNCTURE: CPT | Performed by: NURSE PRACTITIONER

## 2024-01-12 PROCEDURE — 2500000001 HC RX 250 WO HCPCS SELF ADMINISTERED DRUGS (ALT 637 FOR MEDICARE OP)

## 2024-01-12 PROCEDURE — 97166 OT EVAL MOD COMPLEX 45 MIN: CPT | Mod: GO | Performed by: OCCUPATIONAL THERAPIST

## 2024-01-12 PROCEDURE — 2500000004 HC RX 250 GENERAL PHARMACY W/ HCPCS (ALT 636 FOR OP/ED): Performed by: NURSE PRACTITIONER

## 2024-01-12 PROCEDURE — 93306 TTE W/DOPPLER COMPLETE: CPT

## 2024-01-12 PROCEDURE — 99223 1ST HOSP IP/OBS HIGH 75: CPT | Performed by: INTERNAL MEDICINE

## 2024-01-12 PROCEDURE — 85027 COMPLETE CBC AUTOMATED: CPT | Performed by: NURSE PRACTITIONER

## 2024-01-12 PROCEDURE — 2500000004 HC RX 250 GENERAL PHARMACY W/ HCPCS (ALT 636 FOR OP/ED)

## 2024-01-12 PROCEDURE — 73552 X-RAY EXAM OF FEMUR 2/>: CPT | Mod: RIGHT SIDE | Performed by: RADIOLOGY

## 2024-01-12 PROCEDURE — 93971 EXTREMITY STUDY: CPT | Performed by: RADIOLOGY

## 2024-01-12 PROCEDURE — 99222 1ST HOSP IP/OBS MODERATE 55: CPT | Performed by: SURGERY

## 2024-01-12 PROCEDURE — 83880 ASSAY OF NATRIURETIC PEPTIDE: CPT

## 2024-01-12 PROCEDURE — 80048 BASIC METABOLIC PNL TOTAL CA: CPT | Performed by: NURSE PRACTITIONER

## 2024-01-12 PROCEDURE — 96365 THER/PROPH/DIAG IV INF INIT: CPT

## 2024-01-12 PROCEDURE — 97162 PT EVAL MOD COMPLEX 30 MIN: CPT | Mod: GP | Performed by: PHYSICAL THERAPIST

## 2024-01-12 PROCEDURE — 2500000001 HC RX 250 WO HCPCS SELF ADMINISTERED DRUGS (ALT 637 FOR MEDICARE OP): Performed by: NURSE PRACTITIONER

## 2024-01-12 PROCEDURE — 93306 TTE W/DOPPLER COMPLETE: CPT | Performed by: INTERNAL MEDICINE

## 2024-01-12 RX ORDER — PANTOPRAZOLE SODIUM 40 MG/1
40 TABLET, DELAYED RELEASE ORAL
Status: DISCONTINUED | OUTPATIENT
Start: 2024-01-12 | End: 2024-01-14 | Stop reason: HOSPADM

## 2024-01-12 RX ORDER — PAROXETINE HYDROCHLORIDE 20 MG/1
30 TABLET, FILM COATED ORAL NIGHTLY
Status: DISCONTINUED | OUTPATIENT
Start: 2024-01-12 | End: 2024-01-14 | Stop reason: HOSPADM

## 2024-01-12 RX ORDER — METOPROLOL SUCCINATE 25 MG/1
25 TABLET, EXTENDED RELEASE ORAL DAILY
Status: DISCONTINUED | OUTPATIENT
Start: 2024-01-12 | End: 2024-01-14 | Stop reason: HOSPADM

## 2024-01-12 RX ORDER — CEFAZOLIN SODIUM 1 G/50ML
1 SOLUTION INTRAVENOUS EVERY 8 HOURS
Status: DISCONTINUED | OUTPATIENT
Start: 2024-01-12 | End: 2024-01-14 | Stop reason: HOSPADM

## 2024-01-12 RX ORDER — SODIUM CHLORIDE 9 MG/ML
INJECTION, SOLUTION INTRAVENOUS
Status: COMPLETED
Start: 2024-01-12 | End: 2024-01-12

## 2024-01-12 RX ORDER — LORATADINE 10 MG/1
10 TABLET ORAL DAILY
Status: DISCONTINUED | OUTPATIENT
Start: 2024-01-12 | End: 2024-01-14 | Stop reason: HOSPADM

## 2024-01-12 RX ORDER — PAROXETINE HYDROCHLORIDE 20 MG/1
40 TABLET, FILM COATED ORAL DAILY
Status: DISCONTINUED | OUTPATIENT
Start: 2024-01-12 | End: 2024-01-14 | Stop reason: HOSPADM

## 2024-01-12 RX ADMIN — PAROXETINE HYDROCHLORIDE 40 MG: 20 TABLET, FILM COATED ORAL at 09:27

## 2024-01-12 RX ADMIN — ARIPIPRAZOLE 15 MG: 5 TABLET ORAL at 22:21

## 2024-01-12 RX ADMIN — DOCUSATE SODIUM 100 MG: 100 CAPSULE, LIQUID FILLED ORAL at 22:21

## 2024-01-12 RX ADMIN — NAPROXEN 500 MG: 250 TABLET ORAL at 17:27

## 2024-01-12 RX ADMIN — FERROUS SULFATE TAB 325 MG (65 MG ELEMENTAL FE) 1 TABLET: 325 (65 FE) TAB at 05:44

## 2024-01-12 RX ADMIN — NAPROXEN 500 MG: 250 TABLET ORAL at 09:27

## 2024-01-12 RX ADMIN — ASPIRIN 81 MG CHEWABLE TABLET 81 MG: 81 TABLET CHEWABLE at 22:22

## 2024-01-12 RX ADMIN — LORATADINE 10 MG: 10 TABLET ORAL at 09:27

## 2024-01-12 RX ADMIN — METOPROLOL SUCCINATE 25 MG: 25 TABLET, EXTENDED RELEASE ORAL at 09:27

## 2024-01-12 RX ADMIN — PAROXETINE HYDROCHLORIDE 30 MG: 20 TABLET, FILM COATED ORAL at 22:21

## 2024-01-12 RX ADMIN — DOCUSATE SODIUM 100 MG: 100 CAPSULE, LIQUID FILLED ORAL at 09:27

## 2024-01-12 RX ADMIN — CEFAZOLIN SODIUM 1 G: 1 SOLUTION INTRAVENOUS at 12:10

## 2024-01-12 RX ADMIN — SIMVASTATIN 20 MG: 10 TABLET, FILM COATED ORAL at 22:22

## 2024-01-12 RX ADMIN — ASPIRIN 81 MG CHEWABLE TABLET 81 MG: 81 TABLET CHEWABLE at 09:27

## 2024-01-12 RX ADMIN — CEFAZOLIN SODIUM 1 G: 1 SOLUTION INTRAVENOUS at 22:21

## 2024-01-12 RX ADMIN — ENOXAPARIN SODIUM 40 MG: 40 INJECTION SUBCUTANEOUS at 22:22

## 2024-01-12 RX ADMIN — MONTELUKAST 10 MG: 10 TABLET, FILM COATED ORAL at 09:27

## 2024-01-12 RX ADMIN — PANTOPRAZOLE SODIUM 40 MG: 40 TABLET, DELAYED RELEASE ORAL at 09:27

## 2024-01-12 RX ADMIN — TRAZODONE HYDROCHLORIDE 100 MG: 100 TABLET ORAL at 22:22

## 2024-01-12 RX ADMIN — LISINOPRIL 10 MG: 10 TABLET ORAL at 09:27

## 2024-01-12 ASSESSMENT — ENCOUNTER SYMPTOMS
WEAKNESS: 1
JOINT SWELLING: 1
RESPIRATORY NEGATIVE: 1
FEVER: 0
NERVOUS/ANXIOUS: 1
LIGHT-HEADEDNESS: 0
BLOOD IN STOOL: 0
STRIDOR: 0
FREQUENCY: 0
CARDIOVASCULAR NEGATIVE: 1
WOUND: 1
ENDOCRINE NEGATIVE: 1
APPETITE CHANGE: 0
CHEST TIGHTNESS: 0
FATIGUE: 1
VOMITING: 0
ARTHRALGIAS: 1
POLYPHAGIA: 0
DIARRHEA: 0
WHEEZING: 0
PALPITATIONS: 0
APNEA: 0
COLOR CHANGE: 0
ABDOMINAL PAIN: 0
NECK STIFFNESS: 0
ACTIVITY CHANGE: 0
BACK PAIN: 0
GASTROINTESTINAL NEGATIVE: 1
NECK PAIN: 0
COUGH: 0
POLYDIPSIA: 0
MYALGIAS: 0
SHORTNESS OF BREATH: 0
NAUSEA: 0
FATIGUE: 0
HEMATOLOGIC/LYMPHATIC NEGATIVE: 1
CHILLS: 0
ABDOMINAL DISTENTION: 0
EYES NEGATIVE: 1
DIZZINESS: 0

## 2024-01-12 ASSESSMENT — COGNITIVE AND FUNCTIONAL STATUS - GENERAL
DRESSING REGULAR UPPER BODY CLOTHING: A LITTLE
DRESSING REGULAR LOWER BODY CLOTHING: TOTAL
TOILETING: A LOT
MOVING FROM LYING ON BACK TO SITTING ON SIDE OF FLAT BED WITH BEDRAILS: A LITTLE
EATING MEALS: A LITTLE
MOVING TO AND FROM BED TO CHAIR: TOTAL
STANDING UP FROM CHAIR USING ARMS: A LOT
MOBILITY SCORE: 11
TURNING FROM BACK TO SIDE WHILE IN FLAT BAD: A LITTLE
PERSONAL GROOMING: A LITTLE
CLIMB 3 TO 5 STEPS WITH RAILING: TOTAL
HELP NEEDED FOR BATHING: A LOT
WALKING IN HOSPITAL ROOM: TOTAL
DAILY ACTIVITIY SCORE: 14

## 2024-01-12 ASSESSMENT — ACTIVITIES OF DAILY LIVING (ADL)
ADL_ASSISTANCE: NEEDS ASSISTANCE
ADL_ASSISTANCE: INDEPENDENT

## 2024-01-12 ASSESSMENT — PAIN - FUNCTIONAL ASSESSMENT
PAIN_FUNCTIONAL_ASSESSMENT: 0-10

## 2024-01-12 ASSESSMENT — PAIN SCALES - GENERAL
PAINLEVEL_OUTOF10: 4
PAINLEVEL_OUTOF10: 0 - NO PAIN
PAINLEVEL_OUTOF10: 4
PAINLEVEL_OUTOF10: 0 - NO PAIN
PAINLEVEL_OUTOF10: 0 - NO PAIN

## 2024-01-12 NOTE — NURSING NOTE
1/11/24 1925: patient arrived to [facility] into room 216. AxOx4. C/o pain to Right hip. VSS. Bed alarm on, call bell in reach.     1/11/24 2228: pt given prn percocet for c/o pain in R hip  6/10. Wound vac dressing removed per LEXII Graham APRN-CNP for assessment of wound site. Placed ABD pad secured site for Dr. Srinivasan consult in morning.     1/11/24 2329: pt -130 at this time. No c/o chest pain, no c/o SOB. EKG done and sent to DAT Graham via secure chat. Cardiology consulted. DVT ultrasound ordered for pt R leg d/t increased edema to R leg 2+, compared to L leg 1+. Pt reported a decrease in pain.     1/12/24 0620: new dressing placed to R hip by Dr. Srinivasan. Small amount of serous sanguinous fluid draining from incision site.

## 2024-01-12 NOTE — CONSULTS
Inpatient consult to Cardiology  Consult performed by: DEVON Watts-CNP  Consult ordered by: Rosina Edwards MD  Reason for consult: tachycardia          Reason For Consult  tachycardia    History Of Present Illness  Blanca Hamilton is a 48 y.o. female presenting with hip pain. She had a right revision hip arthroplasty on 1/5/24 by Dr. Jalloh. She was discharged home with a suction dressing with home health care. She was unable to care for herself at home and called the squad to bring her to the ER. She presented to the ER tachycardic. She denies having any fever, chills, or undue pain at surgery site.   Upon arrival to the ER a 12 lead ECG was performed and showed NSR without ST elevation or depression. Her CBC showed WBC 9.1, H 10.1, H 32.4, Plt 361. Her BMP showed Na 134, K 4.0, Cl 103, BUN 10, Cr 0.28 and magnesium 1.87.     Her heart rate was 107 when eating breakfast, denied having any pain at that time    Past Medical History  She has a past medical history of Anxiety, Arthritis, Depression, GERD (gastroesophageal reflux disease), HTN (hypertension), Hyperlipidemia, Right hip pain, Sinusitis, and Transfusion history.    Surgical History  She has a past surgical history that includes Total hip arthroplasty (Left); Knee arthroscopy w/ debridement (Left); Tubal ligation; and Bladder suspension.     Social History  She reports that she has been smoking cigarettes. She has been smoking an average of 1 pack per day. She has never used smokeless tobacco. She reports that she does not currently use alcohol. She reports that she does not currently use drugs.    Family History  No family history on file.     Allergies  Patient has no known allergies.    Review of Systems  Review of Systems   Constitutional:  Negative for activity change, appetite change, chills, fatigue and fever.   HENT:  Negative for congestion.    Respiratory:  Negative for apnea, cough, chest tightness, shortness of breath, wheezing and  stridor.    Cardiovascular:  Negative for chest pain, palpitations and leg swelling.   Gastrointestinal:  Negative for abdominal distention, abdominal pain, blood in stool, diarrhea, nausea and vomiting.   Endocrine: Negative for cold intolerance, heat intolerance, polydipsia, polyphagia and polyuria.   Genitourinary:  Negative for frequency and urgency.   Musculoskeletal:  Positive for arthralgias, gait problem and joint swelling. Negative for back pain, myalgias, neck pain and neck stiffness.   Skin:  Negative for color change and pallor.   Neurological:  Negative for dizziness and light-headedness.   Psychiatric/Behavioral:  Negative for behavioral problems.          Physical Exam  Physical Exam  Constitutional:       Appearance: Normal appearance.   HENT:      Head: Normocephalic and atraumatic.      Nose: Nose normal. No congestion.   Eyes:      Extraocular Movements: Extraocular movements intact.      Pupils: Pupils are equal, round, and reactive to light.   Cardiovascular:      Rate and Rhythm: Regular rhythm. Tachycardia present.      Pulses: Normal pulses.      Heart sounds: No murmur heard.     No friction rub. No gallop.      Comments: Abnormal S1 sound  Pulmonary:      Effort: Pulmonary effort is normal. No tachypnea, bradypnea, accessory muscle usage or respiratory distress.      Breath sounds: Normal breath sounds. No stridor. No wheezing, rhonchi or rales.   Chest:      Chest wall: No tenderness.   Abdominal:      General: Bowel sounds are normal. There is no distension.      Palpations: Abdomen is soft.      Tenderness: There is no abdominal tenderness.   Musculoskeletal:         General: Swelling present. Normal range of motion.      Cervical back: Normal range of motion and neck supple.      Right lower leg: Edema present.      Comments: Dressing to right hip appears clean dry and intact   Skin:     General: Skin is warm and dry.      Capillary Refill: Capillary refill takes less than 2 seconds.    Neurological:      General: No focal deficit present.      Mental Status: She is alert and oriented to person, place, and time. Mental status is at baseline.   Psychiatric:         Mood and Affect: Mood normal.         Behavior: Behavior normal.             Last Recorded Vitals  /70 (BP Location: Right arm, Patient Position: Lying)   Pulse 94   Temp 36.1 °C (97 °F) (Temporal)   Resp 18   Wt 90.2 kg (198 lb 13.7 oz)   SpO2 97%     Relevant Results  Results for orders placed or performed during the hospital encounter of 01/11/24 (from the past 24 hour(s))   CBC and Auto Differential   Result Value Ref Range    WBC 9.1 4.4 - 11.3 x10*3/uL    nRBC 0.0 0.0 - 0.0 /100 WBCs    RBC 3.90 (L) 4.00 - 5.20 x10*6/uL    Hemoglobin 10.1 (L) 12.0 - 16.0 g/dL    Hematocrit 32.4 (L) 36.0 - 46.0 %    MCV 83 80 - 100 fL    MCH 25.9 (L) 26.0 - 34.0 pg    MCHC 31.2 (L) 32.0 - 36.0 g/dL    RDW 23.0 (H) 11.5 - 14.5 %    Platelets 361 150 - 450 x10*3/uL    Neutrophils % 62.1 40.0 - 80.0 %    Immature Granulocytes %, Automated 4.2 (H) 0.0 - 0.9 %    Lymphocytes % 19.3 13.0 - 44.0 %    Monocytes % 10.4 2.0 - 10.0 %    Eosinophils % 3.6 0.0 - 6.0 %    Basophils % 0.4 0.0 - 2.0 %    Neutrophils Absolute 5.62 1.20 - 7.70 x10*3/uL    Immature Granulocytes Absolute, Automated 0.38 0.00 - 0.70 x10*3/uL    Lymphocytes Absolute 1.75 1.20 - 4.80 x10*3/uL    Monocytes Absolute 0.94 0.10 - 1.00 x10*3/uL    Eosinophils Absolute 0.33 0.00 - 0.70 x10*3/uL    Basophils Absolute 0.04 0.00 - 0.10 x10*3/uL   Magnesium   Result Value Ref Range    Magnesium 1.87 1.60 - 2.40 mg/dL   Comprehensive metabolic panel   Result Value Ref Range    Glucose 91 74 - 99 mg/dL    Sodium 134 (L) 136 - 145 mmol/L    Potassium 4.0 3.5 - 5.3 mmol/L    Chloride 103 98 - 107 mmol/L    Bicarbonate 25 21 - 32 mmol/L    Anion Gap 10 10 - 20 mmol/L    Urea Nitrogen 4 (L) 6 - 23 mg/dL    Creatinine 0.28 (L) 0.50 - 1.05 mg/dL    eGFR >90 >60 mL/min/1.73m*2    Calcium 7.9 (L)  8.6 - 10.3 mg/dL    Albumin 3.0 (L) 3.4 - 5.0 g/dL    Alkaline Phosphatase 79 33 - 110 U/L    Total Protein 5.5 (L) 6.4 - 8.2 g/dL    AST 14 9 - 39 U/L    Bilirubin, Total 0.7 0.0 - 1.2 mg/dL    ALT 13 7 - 45 U/L   SARS-CoV-2 RT PCR   Result Value Ref Range    Coronavirus 2019, PCR Not Detected Not Detected   Influenza A, and B PCR   Result Value Ref Range    Flu A Result Not Detected Not Detected    Flu B Result Not Detected Not Detected   ECG 12 lead   Result Value Ref Range    Ventricular Rate 87 BPM    Atrial Rate 87 BPM    OH Interval 112 ms    QRS Duration 68 ms    QT Interval 354 ms    QTC Calculation(Bazett) 425 ms    P Axis 69 degrees    R Axis 31 degrees    T Axis 17 degrees    QRS Count 14 beats    Q Onset 219 ms    P Onset 163 ms    P Offset 185 ms    T Offset 396 ms    QTC Fredericia 400 ms   Urinalysis with Reflex Culture and Microscopic   Result Value Ref Range    Color, Urine Liz (N) Straw, Yellow    Appearance, Urine Clear Clear    Specific Gravity, Urine 1.018 1.005 - 1.035    pH, Urine 7.0 5.0, 5.5, 6.0, 6.5, 7.0, 7.5, 8.0    Protein, Urine NEGATIVE NEGATIVE mg/dL    Glucose, Urine NEGATIVE NEGATIVE mg/dL    Blood, Urine NEGATIVE NEGATIVE    Ketones, Urine NEGATIVE NEGATIVE mg/dL    Bilirubin, Urine SMALL (1+) (A) NEGATIVE    Urobilinogen, Urine 4.0 (N) <2.0 mg/dL    Nitrite, Urine NEGATIVE NEGATIVE    Leukocyte Esterase, Urine NEGATIVE NEGATIVE   Extra Urine Gray Tube   Result Value Ref Range    Extra Tube Hold for add-ons.         === 01/02/24 ===    XR PELVIS 1-2 VIEWS    - Impression -  Interval cerclage wire fixation of the femoral stem component of the  patient's right hip arthroplasty for previously seen periprosthetic  fracture.      MACRO:  None    Signed by: Smooth Hernandez 1/5/2024 6:08 PM  Dictation workstation:   [id number]     No echocardiogram results found for the past 12 months     Patient Active Problem List   Diagnosis    Impaired functional mobility and activity tolerance           Assessment/Plan     Degenerative disease  - s/p right ALEXA on 1/2 complicated by periprosthetic femur fracture  -s/p REVISION ARTHROPLASTY RIGHT TOTAL HIP WITH LARISA INSTRUMENTATION AND PROSTHESIS UNDER FLOUROSCOPY, ELVIS CABLES (Right) on 1/5/24  -Pain management  -Dressing to right hip  - continue multimodal pain regimen    Tachycardia  - heart rate 107 at rest  - denies pain  - Recommend echocardiogram  - monitor on telemetry  - Start metoprolol succinate 25 once daily  - echocardiogram pending     chronic anemia  - stable  - continue to monitor    Hypertension  - continue home lisinopril     Hyperlipidemia  - continue home statin     Tobacco abuse  - educated about tobacco cessation    Sujatha Michaels, APRN-CNP

## 2024-01-12 NOTE — PROGRESS NOTES
TCC spoke with patient regarding anticipation for Mod level/SNF placement. SNF choice (s) is/are:   [facility], [facility] and [facility].  Will proceed with placement if recommended MOD level by therapy.  TCC following.     9:59 am  Therapy recommends MOD level- DSC to send referral to SNF to above choices.  Patient ready to admit.  Once accepting facility, Precert will be initiated.  Will update team during IDT rounds. TCC following.    10:58 AM Patient wants to be able to smoke- DSC sending referrals to facilities that allow patient smoking:   [facility], [facility], [facility] - now with ADOD 1/13     11:45 AM  [facility] and [facility] [place] both can accept patient.  Patient prefers [facility] first.  Direct Submission team to initiate precert with ADOD for 1/13.  TCC following.     1:18 pm   Patient has authorization to admit to [facility] 1/13.  Saturday DSC will need to be contacted by provider after Concepción is signed by MD prior to discharge to send 7000 to SNF and set up transportation.  Updated Sandra Ovalles NP.      Nurse to nurse report:  [phone].     4:24 pm PASSR completed on patient since under observation.  No 7000 will be needed on discharge.  Contact weekend DSC to send final orders and signed [place].

## 2024-01-12 NOTE — CONSULTS
HPI  This consult quested on the patient with a right hip wound.  This is a 48-year-old patient who underwent a right revision hip arthroplasty on 1/5/2024 at [facility] by Dr. Felix Jalloh.  Postoperative she was discharged to home with a suction dressing with home health care.  She apparently was not able to manage at home and came to the emergency room.  She was complaining of some right lower extremity pain.  White blood cell count was 9.1.  CT scan performed on 1/3/2024 confirmed soft tissue changes within the medial compartment of the proximal right thigh with gas within the wound.  This was 2 days after her operation.  She was also mildly tachycardic.  She denies any fevers or chills.  She is having no undue pain in the right thigh or site of surgery.  Duplex performed 1/11/2024 was unremarkable for deep vein thrombosis.  Past Medical History:   Diagnosis Date    Anxiety     Arthritis     Depression     GERD (gastroesophageal reflux disease)     HTN (hypertension)     Hyperlipidemia     Right hip pain     Sinusitis     Transfusion history     s/p L ALEXA          Current Facility-Administered Medications:     acetaminophen (Tylenol) tablet 650 mg, 650 mg, oral, q4h PRN **OR** acetaminophen (Tylenol) oral liquid 650 mg, 650 mg, nasogastric tube, q4h PRN **OR** acetaminophen (Tylenol) suppository 650 mg, 650 mg, rectal, q4h PRN, DEVON Segura-CNP    acetaminophen (Tylenol) tablet 650 mg, 650 mg, oral, q4h PRN, 650 mg at 01/11/24 2058 **OR** acetaminophen (Tylenol) oral liquid 650 mg, 650 mg, oral, q4h PRN **OR** acetaminophen (Tylenol) suppository 650 mg, 650 mg, rectal, q4h PRN, DEVON Segura-CNP    ARIPiprazole (Abilify) tablet 15 mg, 15 mg, oral, Nightly, DEVON Holbrook-CNP, 15 mg at 01/11/24 2227    aspirin chewable tablet 81 mg, 81 mg, oral, BID, Gabby Graham, APRN-CNP, 81 mg at 01/11/24 2228    cefadroxil (Duricef) capsule 500 mg, 500 mg, oral, BID, Gabby Graham,  APRN-CNP    cetirizine (ZyrTEC) tablet 10 mg, 10 mg, oral, Daily, CHINTAN Holbrook    docusate sodium (Colace) capsule 100 mg, 100 mg, oral, BID, CHINTAN Holbrook, 100 mg at 01/11/24 2228    enoxaparin (Lovenox) syringe 40 mg, 40 mg, subcutaneous, q24h, CHINTAN Segura, 40 mg at 01/11/24 2058    ferrous sulfate (325 mg ferrous sulfate) tablet 1 tablet, 1 tablet, oral, Daily, CHINTAN Holbrook, 1 tablet at 01/12/24 0544    flu vaccine (IIV4) age 6 months and greater, preservative free, 0.5 mL, intramuscular, During hospitalization, CHINTAN Holbrook    lisinopril tablet 10 mg, 10 mg, oral, Daily, CHINTAN Holbrook    montelukast (Singulair) tablet 10 mg, 10 mg, oral, Daily, CHINTAN Holbrook    naproxen (Naprosyn) tablet 500 mg, 500 mg, oral, BID with meals, CHINTAN Holbrook    omeprazole (PriLOSEC) DR capsule 40 mg, 40 mg, oral, Daily, CHINTAN Holbrook    ondansetron (Zofran) tablet 4 mg, 4 mg, oral, q8h PRN **OR** ondansetron (Zofran) injection 4 mg, 4 mg, intravenous, q8h PRN, CHINTAN Segura, 4 mg at 01/11/24 2052    oxyCODONE-acetaminophen (Percocet) 5-325 mg per tablet 1 tablet, 1 tablet, oral, q4h PRN, CHINTAN Holbrook, 1 tablet at 01/11/24 2228    PARoxetine CR (Paxil-CR) 24 hr tablet 25 mg, 25 mg, oral, Nightly, CHINTAN Holbrook    PARoxetine CR (Paxil-CR) 24 hr tablet 37.5 mg, 37.5 mg, oral, q AM, CHINTAN Holbrook    pneumococcal conjugate vaccine, 13-valent (PREVNAR 13), 0.5 mL, intramuscular, During hospitalization, CHINTAN Holbrook    polyethylene glycol (Glycolax, Miralax) packet 17 g, 17 g, oral, Daily PRN, CHINTAN Segura    simvastatin (Zocor) tablet 20 mg, 20 mg, oral, Nightly, CHINTAN Holbrook, 20 mg at 01/11/24 2228    traZODone (Desyrel) tablet 100 mg, 100 mg, oral, Nightly, CHINTAN Holbrook, 100 mg at 01/11/24  2228     No Known Allergies     Review of Systems  Review of Systems   Skin:  Positive for wound.       Objective     Vital signs for last 24 hours:  Temp:  [36.1 °C (97 °F)-36.9 °C (98.4 °F)] 36.1 °C (97 °F)  Heart Rate:  [] 94  Resp:  [16-19] 18  BP: (106-128)/(53-79) 128/70    Intake/Output this shift:  I/O this shift:  In: -   Out: 1250 [Urine:1250]    Physical Exam  Physical Exam  Vitals reviewed. Exam conducted with a chaperone present.   Constitutional:       Appearance: Normal appearance.   HENT:      Head: Normocephalic.      Nose: Nose normal.      Mouth/Throat:      Pharynx: Oropharynx is clear.   Cardiovascular:      Rate and Rhythm: Normal rate and regular rhythm.      Heart sounds: Normal heart sounds.   Pulmonary:      Effort: Pulmonary effort is normal.      Breath sounds: Normal breath sounds.   Abdominal:      General: Abdomen is flat.      Palpations: Abdomen is soft. There is no mass.      Tenderness: There is no abdominal tenderness. There is no guarding.   Musculoskeletal:         General: Normal range of motion.      Cervical back: Normal range of motion.      Right lower leg: Swelling present. Edema present.      Comments: In place.  Removed.  Some edema of the wound.  Staples in place.  Minimally tender.  No obvious cellulitis.  Dressing in place.   Skin:     General: Skin is warm.   Neurological:      General: No focal deficit present.   Psychiatric:         Mood and Affect: Mood normal.         Labs & Radiology    IMPRESSION:  No deep venous thrombosis of the  right lower extremity.        Impression  Status post right revision hip arthroplasty with some edema of the wound.  No obvious evidence of infection.  Plan   Dressing change.  Recommend consulting orthopedic surgery to follow the patient and communicate with original surgeon.  I did make an attempt to contact him but the he is unavailable until January 22, 2024.  Recommend starting intravenous Ancef 1 g 3 times daily as  prophylaxis.

## 2024-01-12 NOTE — CONSULTS
Reason For Consult  Right hip surgical wound    History Of Present Illness  Blanca Hamilton is a 48 y.o. female admitted to [facility]  s/p total hip arthroplasty on 1/2 with Dr. Jalloh.  Patient had complication of periprosthetic femur fracture and had to undergo total hip revision on 1/5.  She was discharged home on oral antibiotics and a Prevena pump dressing.  She was recommended to go to rehab but insisted on home health.  She presented to the emergency room for help being accepted at a rehab facility because she realized she cannot take care of herself.  She has right hip pain.  3/10 in severity.  She denies any fevers or wound problems but states that the wound had been covered so she is not able to see if there is any drainage or dehiscence.  According to her her instructions were 50% weightbearing.  She had been doing this with home health for a bit but it was very difficult for her to walk even with a walker.  She was placed on IV antibiotics.   wanted orthopedic input and sent me a picture of the wound before applying new dressing Mepilex border this morning.     Past Medical History  She has a past medical history of Anxiety, Arthritis, Depression, GERD (gastroesophageal reflux disease), HTN (hypertension), Hyperlipidemia, Right hip pain, Sinusitis, and Transfusion history.    Surgical History  She has a past surgical history that includes Total hip arthroplasty (Left); Knee arthroscopy w/ debridement (Left); Tubal ligation; and Bladder suspension.     Social History  She reports that she has been smoking cigarettes. She has been smoking an average of 1 pack per day. She has never used smokeless tobacco. She reports that she does not currently use alcohol. She reports that she does not currently use drugs.    Family History  No family history on file.     Allergies  Patient has no known allergies.    Review of Systems    A complete review of systems was conducted, pertinent only to the HPI noted  "above.     Constitutional: None     Eyes: No additions to above history     Ears, Nose, Throat: No additions to above history     Cardiovascular: No additions to above history     Respiratory: No additions to above history     GI: No additions to above history     : No additions to above history     Skin/Neuro: No additions to above history     Endocrine/Heme/Lymph: No additions to above history     Immunologic: No additions to above history     Psychiatric: No additions to above history     Musculoskeletal: see above       Physical Exam  General: Well developed, awake/alert/oriented x3, no distress, alert and cooperative.    Skin: Warm and dry    Eyes: EOMI    Head/neck: No apparent injury    Cardiac: Palpable pulses    Respiratory: Normal rise and fall of chest    GI: No obvious abdominal distension    Extremities: No edema or deformity    Neuro: AxOx3    Psych: Appropriate mood    Focused Musculoskeletal:  Right hip: Posterior lateral incision appears well-approximated with staples and sutures, there is some serosanguineous oozing but no purulent drainage.  No surrounding erythema.  Mepilex border dressing intact, only slightly saturated.  Tenderness to palpation of the femur and buttock region.  ROM not significantly assessable due to patient apprehension and pain.  Distally she has 2+ pulses, no tenderness to palpation of the calf.  Sensation intact to light touch in all distributions.       Last Recorded Vitals  Blood pressure 128/70, pulse (!) 139, temperature 36.1 °C (97 °F), temperature source Temporal, resp. rate 18, height 1.753 m (5' 9\"), weight 90.2 kg (198 lb 13.7 oz), SpO2 97 %.    Relevant Results      Scheduled medications  ARIPiprazole, 15 mg, oral, Nightly  aspirin, 81 mg, oral, BID  ceFAZolin, 1 g, intravenous, q8h  docusate sodium, 100 mg, oral, BID  enoxaparin, 40 mg, subcutaneous, q24h  ferrous sulfate (325 mg ferrous sulfate), 1 tablet, oral, Daily  influenza, 0.5 mL, intramuscular, During " hospitalization  lisinopril, 10 mg, oral, Daily  loratadine, 10 mg, oral, Daily  metoprolol succinate XL, 25 mg, oral, Daily  montelukast, 10 mg, oral, Daily  naproxen, 500 mg, oral, BID with meals  pantoprazole, 40 mg, oral, Daily before breakfast  PARoxetine, 30 mg, oral, Nightly  PARoxetine, 40 mg, oral, Daily  pneumococcal conjugate, 0.5 mL, intramuscular, During hospitalization  simvastatin, 20 mg, oral, Nightly  traZODone, 100 mg, oral, Nightly      Continuous medications     PRN medications  PRN medications: acetaminophen **OR** acetaminophen **OR** acetaminophen, acetaminophen **OR** acetaminophen **OR** acetaminophen, ondansetron **OR** ondansetron, oxyCODONE-acetaminophen, polyethylene glycol  Results for orders placed or performed during the hospital encounter of 01/11/24 (from the past 24 hour(s))   CBC and Auto Differential   Result Value Ref Range    WBC 9.1 4.4 - 11.3 x10*3/uL    nRBC 0.0 0.0 - 0.0 /100 WBCs    RBC 3.90 (L) 4.00 - 5.20 x10*6/uL    Hemoglobin 10.1 (L) 12.0 - 16.0 g/dL    Hematocrit 32.4 (L) 36.0 - 46.0 %    MCV 83 80 - 100 fL    MCH 25.9 (L) 26.0 - 34.0 pg    MCHC 31.2 (L) 32.0 - 36.0 g/dL    RDW 23.0 (H) 11.5 - 14.5 %    Platelets 361 150 - 450 x10*3/uL    Neutrophils % 62.1 40.0 - 80.0 %    Immature Granulocytes %, Automated 4.2 (H) 0.0 - 0.9 %    Lymphocytes % 19.3 13.0 - 44.0 %    Monocytes % 10.4 2.0 - 10.0 %    Eosinophils % 3.6 0.0 - 6.0 %    Basophils % 0.4 0.0 - 2.0 %    Neutrophils Absolute 5.62 1.20 - 7.70 x10*3/uL    Immature Granulocytes Absolute, Automated 0.38 0.00 - 0.70 x10*3/uL    Lymphocytes Absolute 1.75 1.20 - 4.80 x10*3/uL    Monocytes Absolute 0.94 0.10 - 1.00 x10*3/uL    Eosinophils Absolute 0.33 0.00 - 0.70 x10*3/uL    Basophils Absolute 0.04 0.00 - 0.10 x10*3/uL   Magnesium   Result Value Ref Range    Magnesium 1.87 1.60 - 2.40 mg/dL   Comprehensive metabolic panel   Result Value Ref Range    Glucose 91 74 - 99 mg/dL    Sodium 134 (L) 136 - 145 mmol/L     Potassium 4.0 3.5 - 5.3 mmol/L    Chloride 103 98 - 107 mmol/L    Bicarbonate 25 21 - 32 mmol/L    Anion Gap 10 10 - 20 mmol/L    Urea Nitrogen 4 (L) 6 - 23 mg/dL    Creatinine 0.28 (L) 0.50 - 1.05 mg/dL    eGFR >90 >60 mL/min/1.73m*2    Calcium 7.9 (L) 8.6 - 10.3 mg/dL    Albumin 3.0 (L) 3.4 - 5.0 g/dL    Alkaline Phosphatase 79 33 - 110 U/L    Total Protein 5.5 (L) 6.4 - 8.2 g/dL    AST 14 9 - 39 U/L    Bilirubin, Total 0.7 0.0 - 1.2 mg/dL    ALT 13 7 - 45 U/L   SARS-CoV-2 RT PCR   Result Value Ref Range    Coronavirus 2019, PCR Not Detected Not Detected   Influenza A, and B PCR   Result Value Ref Range    Flu A Result Not Detected Not Detected    Flu B Result Not Detected Not Detected   ECG 12 lead   Result Value Ref Range    Ventricular Rate 87 BPM    Atrial Rate 87 BPM    CA Interval 112 ms    QRS Duration 68 ms    QT Interval 354 ms    QTC Calculation(Bazett) 425 ms    P Axis 69 degrees    R Axis 31 degrees    T Axis 17 degrees    QRS Count 14 beats    Q Onset 219 ms    P Onset 163 ms    P Offset 185 ms    T Offset 396 ms    QTC Fredericia 400 ms   Urinalysis with Reflex Culture and Microscopic   Result Value Ref Range    Color, Urine Liz (N) Straw, Yellow    Appearance, Urine Clear Clear    Specific Gravity, Urine 1.018 1.005 - 1.035    pH, Urine 7.0 5.0, 5.5, 6.0, 6.5, 7.0, 7.5, 8.0    Protein, Urine NEGATIVE NEGATIVE mg/dL    Glucose, Urine NEGATIVE NEGATIVE mg/dL    Blood, Urine NEGATIVE NEGATIVE    Ketones, Urine NEGATIVE NEGATIVE mg/dL    Bilirubin, Urine SMALL (1+) (A) NEGATIVE    Urobilinogen, Urine 4.0 (N) <2.0 mg/dL    Nitrite, Urine NEGATIVE NEGATIVE    Leukocyte Esterase, Urine NEGATIVE NEGATIVE   Extra Urine Gray Tube   Result Value Ref Range    Extra Tube Hold for add-ons.         Assessment/Plan     S/P ALEXA 1/2 and revision ALEXA 1/5    -No leukocytosis, will check ESR and CRP  -Wound does not appear acutely infected  -Will obtain x-rays to ensure no hardware displacement, periprosthetic  fractures, abscess, dislocation, etc.  -Some of her pain may just be attributed to 1 week postop for 2 major surgeries.  Continue naproxen, pain meds per medicine team.    -IV antibiotics prophylactically  -Mepilex border dressing intact, will change as needed  -Dr. Jalloh is out of office currently, will coordinate with on-call surgeon at [facility] and appreciate recommendations  -50% weightbearing/posterior hip precautions    MARGE JiménezC

## 2024-01-12 NOTE — CARE PLAN
The patient's goals for the shift include To try to stay calm.    The clinical goals for the shift include Pt will maintain temperature greater than 36 and less than 38 degrees celcius.    Over the shift, the patient remained safe, and VSS. Given tylenol, and percocet for c/o pain in R hip. New dressing placed, with small amount of sanguinous drainage. Incontinent of urine, and purwick placed. Given Zofran for vomiting when admitted earlier in the night.
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
## 2024-01-12 NOTE — PROGRESS NOTES
Occupational Therapy    Evaluation    Patient Name: Blanca Hamilton  MRN: [id number]  Today's Date: 1/12/2024  Time Calculation  Start Time: 0844  Stop Time: 0904  Time Calculation (min): 20 min    Assessment  IP OT Assessment  OT Assessment: Pt is 49 y/o female with complicated R hip repair and failing at home. Pt now in agreement that she requires higher intensity rehab to return safely to home environment. Pt presents with weakness in legs, decreased ability to dress/bath/toilet self, decreased healing requiring further rehab.  Prognosis: Good  Barriers to Discharge: Decreased caregiver support  Evaluation/Treatment Tolerance: Patient limited by pain  Medical Staff Made Aware: Yes  End of Session Communication: Bedside nurse  End of Session Patient Position: Bed, 2 rail up, Alarm on  Plan:  Treatment Interventions: ADL retraining, Functional transfer training, Endurance training, Patient/family training  OT Frequency: 4 times per week  OT Discharge Recommendations: Moderate intensity level of continued care  OT Recommended Transfer Status: Assist of 2  OT - OK to Discharge: Yes Based on completed evaluation and care plan recommendations, no barriers to discharge to next site of care      Subjective   Current Problem:  1. Impaired functional mobility and activity tolerance  Lower extremity venous duplex right    Lower extremity venous duplex right    Transthoracic Echo (TTE) Complete    Transthoracic Echo (TTE) Complete      2. Physical deconditioning  Lower extremity venous duplex right    Lower extremity venous duplex right      3. Localized edema  Lower extremity venous duplex right    Lower extremity venous duplex right      4. Abnormal ECG  Transthoracic Echo (TTE) Complete    Transthoracic Echo (TTE) Complete        General:  General  Reason for Referral: ADL and safety assessment, placement for SNF  Referred By: Gabby Flor CNP  Past Medical History Relevant to Rehab: HTN, HLD, GERD, tobacco use, severe  degenerative disease, chronic anemia, OA, anxiety, depression  Family/Caregiver Present: No  Co-Treatment: PT  Co-Treatment Reason: safety and assist level for maximum participation  Prior to Session Communication: Bedside nurse  Patient Position Received: Bed, 2 rail up, Alarm on  Preferred Learning Style: auditory, visual  General Comment: s/p R ALEXA 1/2/24 complicate by periprosthetic fx, revision 1/5, refused SNF recommentations and went home with HHC at 50% WB RLE and post hip prec with wound vac. HHC reported noncompliance with wound vac, WB and hip precautions. Pt not performing well at home and came to ER d/t weakness and placement in SNF  Precautions:  Hearing/Visual Limitations: glasses  LE Weight Bearing Status: Right Partial Weight Bearing (50%)  Medical Precautions: Fall precautions, Other (comment)  Post-Surgical Precautions: Right hip precautions (posterior)  Vital Signs:  Heart Rate: (!) 134 (nursing aware, stated patient was going to be placed on tele and had cardiology consult)  Heart Rate Source: Monitor  SpO2: 98 %  Pain:  Pain Assessment  Pain Assessment: 0-10  Pain Score: 4  Pain Type: Surgical pain  Pain Location: Hip  Pain Orientation: Right  Pain Interventions: Repositioned, Emotional support  Response to Interventions: pain reduced    Objective   Cognition:  Overall Cognitive Status: Within Functional Limits (tearful during session, extremely anxious and fearful for transfers)  Orientation Level: Oriented X4  Insight:  (poor insight)     Home Living:  Type of Home: Apartment  Lives With: Alone  Home Adaptive Equipment: Walker rolling or standard, Wheelchair-manual, Reacher, Long-handled sponge, Sock aid  Home Layout: One level  Home Access: Ramped entrance  Bathroom Shower/Tub: Walk-in shower  Bathroom Toilet: Handicapped height  Bathroom Equipment: Grab bars in shower, Shower chair with back, Grab bars around toilet  Home Living Comments: pt has caregiver 4x wk fo r3 hours a day to assist  with bathing, shopping, cleaning   Prior Function:  Level of [place]: Independent with ADLs and functional transfers, Needs assistance with homemaking  Receives Help From: Personal care attendant  ADL Assistance: Independent  Homemaking Assistance: Needs assistance  Ambulatory Assistance:  (nonambulatory for over a year)  Hand Dominance: Right  IADL History:  Current License: No  Mode of Transportation: Other (Comment) (community transport)  ADL:  Equipment: Reacher, Sock aid, Long-handled shoe horn  Eating Assistance: Stand by  Eating Deficit: Setup  Grooming Assistance: Stand by  Grooming Deficit: Setup  UE Dressing Assistance: Stand by  UE Dressing Deficit: Setup, Supervision/safety  LE Dressing Assistance: Total (mod A for pants, unable to pull up over hips in standing)  Toileting Assistance with Device: Total  ADL Comments: pt with B knee flexion d/t posture in wheelchair, fearful of movement  Activity Tolerance:  Endurance: Tolerates less than 10 min exercise with changes in vital signs  Bed Mobility/Transfers: Bed Mobility  Bed Mobility: Yes  Bed Mobility 1  Bed Mobility 1: Rolling left, Supine to sitting, Sitting to supine  Level of Assistance 1: Contact guard    Transfers  Transfer: Yes  Transfer 1  Technique 1: Sit to stand, Stand to sit  Transfer Device 1: Walker  Transfer Level of Assistance 1: Moderate assistance, +2, Maximum tactile cues, Maximum verbal cues    Sitting Balance:  Static Sitting Balance  Static Sitting-Balance Support: Bilateral upper extremity supported, Feet supported  Static Sitting-Level of Assistance: Close supervision (cues for avoiding hip flexion)    IADL's:   Current License: No  Mode of Transportation: Other (Comment) (community transport)  Vision: Vision - Basic Assessment  Current Vision: Wears glasses all the time  Sensation:  Light Touch: No apparent deficits    Hand Function:  Hand Function  Gross Grasp: Functional  Coordination: Functional  Extremities: RUE   RUE :  Within Functional Limits and LUE   LUE: Within Functional Limits    Outcome Measures: [facility] Daily Activity  Putting on and taking off regular lower body clothing: Total  Bathing (including washing, rinsing, drying): A lot  Putting on and taking off regular upper body clothing: A little  Toileting, which includes using toilet, bedpan or urinal: A lot  Taking care of personal grooming such as brushing teeth: A little  Eating Meals: A little  Daily Activity - Total Score: 14    Education Documentation  Body Mechanics, taught by Mick Brooks OT at 1/12/2024  9:40 AM.  Learner: Patient  Readiness: Acceptance  Method: Explanation, Demonstration  Response: Needs Reinforcement  Comment: educated on transfer tech for compliance with hip precautions, hip precautions and WB status    Precautions, taught by Mick Brooks OT at 1/12/2024  9:40 AM.  Learner: Patient  Readiness: Acceptance  Method: Explanation, Demonstration  Response: Needs Reinforcement  Comment: educated on transfer tech for compliance with hip precautions, hip precautions and WB status    Goals:   Encounter Problems       Encounter Problems (Active)       ADLs       Patient with complete lower body dressing with supervision level of assistance donning and doffing all LE clothes  with reacher, shoe horn, sock-aid, dressing stick , and elastic shoe laces        Start:  01/12/24    Expected End:  02/11/24            Patient will complete toileting including hygiene clothing management/hygiene with set-up and supervision level of assistance and raised toilet seat, grab bars, and shower chair.       Start:  01/12/24    Expected End:  02/11/24               COGNITION/SAFETY       Patient will recall and adhere to hip precautions during all functional mobility/ADL tasks in order to demonstrate improved understanding and promote healing post op       Start:  01/12/24    Expected End:  02/11/24            Patient will recall and adhere to weight bearing and /or ROM  restrictions with all ADL and functional mobility in order to promote healing and safety with functional tasks       Start:  01/12/24    Expected End:  02/11/24               TRANSFERS       Patient will perform bed mobility supervision level of assistance in order to improve safety and independence with mobility       Start:  01/12/24    Expected End:  02/11/24            Patient will complete functional transfer to chair/toilet/wheelchair with front wheeled walker with supervision and stand by assist level of assistance.       Start:  01/12/24    Expected End:  02/11/24

## 2024-01-12 NOTE — CARE PLAN
The patient's goals for the shift include To try to stay calm.    The clinical goals for the shift include Pt will maintain temperature greater than 36 and less than 38 degrees celcius.      Problem: Pain - Adult  Goal: Verbalizes/displays adequate comfort level or baseline comfort level  Outcome: Progressing     Problem: Safety - Adult  Goal: Free from fall injury  Outcome: Progressing     Problem: Discharge Planning  Goal: Discharge to home or other facility with appropriate resources  Outcome: Progressing     Problem: Chronic Conditions and Co-morbidities  Goal: Patient's chronic conditions and co-morbidity symptoms are monitored and maintained or improved  Outcome: Progressing     Problem: Nutrition  Goal: Less than 5 days NPO/clear liquids  Outcome: Progressing  Goal: Oral intake greater than 50%  Outcome: Progressing  Goal: Oral intake greater 75%  Outcome: Progressing  Goal: Consume prescribed supplement  Outcome: Progressing  Goal: Adequate PO fluid intake  Outcome: Progressing  Goal: Nutrition support goals are met within 48 hrs  Outcome: Progressing  Goal: Nutrition support is meeting 75% of nutrient needs  Outcome: Progressing  Goal: Tube feed tolerance  Outcome: Progressing  Goal: BG  mg/dL  Outcome: Progressing  Goal: Lab values WNL  Outcome: Progressing  Goal: Electrolytes WNL  Outcome: Progressing  Goal: Promote healing  Outcome: Progressing  Goal: Maintain stable weight  Outcome: Progressing  Goal: Reduce weight from edema/fluid  Outcome: Progressing  Goal: Gradual weight gain  Outcome: Progressing  Goal: Improve ostomy output  Outcome: Progressing     Problem: Skin  Goal: Decreased wound size/increased tissue granulation at next dressing change  Outcome: Progressing  Goal: Participates in plan/prevention/treatment measures  Outcome: Progressing  Goal: Prevent/manage excess moisture  Outcome: Progressing  Goal: Prevent/minimize sheer/friction injuries  Outcome: Progressing  Goal:  Promote/optimize nutrition  Outcome: Progressing  Goal: Promote skin healing  Outcome: Progressing     Problem: Fall/Injury  Goal: Not fall by end of shift  Outcome: Progressing  Goal: Be free from injury by end of the shift  Outcome: Progressing  Goal: Verbalize understanding of personal risk factors for fall in the hospital  Outcome: Progressing  Goal: Verbalize understanding of risk factor reduction measures to prevent injury from fall in the home  Outcome: Progressing  Goal: Use assistive devices by end of the shift  Outcome: Progressing  Goal: Pace activities to prevent fatigue by end of the shift  Outcome: Progressing     Problem: Pain  Goal: Takes deep breaths with improved pain control throughout the shift  Outcome: Progressing  Goal: Turns in bed with improved pain control throughout the shift  Outcome: Progressing  Goal: Walks with improved pain control throughout the shift  Outcome: Progressing  Goal: Performs ADL's with improved pain control throughout shift  Outcome: Progressing  Goal: Participates in PT with improved pain control throughout the shift  Outcome: Progressing  Goal: Free from opioid side effects throughout the shift  Outcome: Progressing  Goal: Free from acute confusion related to pain meds throughout the shift  Outcome: Progressing     Goal met. IV ATB therapy started. Patient tolerated ATB well. Changed dressing to Rt hip

## 2024-01-12 NOTE — PROGRESS NOTES
Physical Therapy    Physical Therapy Evaluation    Patient Name: Blanca Hamilton  MRN: [id number]  Today's Date: 1/12/2024   Time Calculation  Start Time: 0845  Stop Time: 0904  Time Calculation (min): 19 min    Assessment/Plan   PT Assessment  PT Assessment Results: Decreased strength, Decreased range of motion, Decreased endurance, Impaired balance, Decreased mobility, Decreased safety awareness, Pain  Rehab Prognosis: Good  Evaluation/Treatment Tolerance: Patient limited by pain, Other (Comment) (limited by fear of falling with standing)  Medical Staff Made Aware: Yes  End of Session Communication: Bedside nurse  IP OR SWING BED PT PLAN  Inpatient or Swing Bed: Inpatient  PT Plan  Treatment/Interventions: Bed mobility, Transfer training, Gait training, Balance training, Strengthening, Therapeutic exercise, Range of motion, Therapeutic activity  PT Plan: Skilled PT  PT Frequency: 4 times per week  PT Discharge Recommendations: Moderate intensity level of continued care  PT Recommended Transfer Status: Assist x2  PT - OK to Discharge: Yes Based on completed evaluation and care plan recommendations, no barriers to discharge to next site of care        Subjective   General Visit Information:  General  Reason for Referral: Impaired functional mobility and activity tolerance  Referred By: Gabby Flor CNP  Past Medical History Relevant to Rehab:  (PMH includes: Pt is s/p right ALEXA on 1-2-2024.  She sustained a periprosthetic fracture and had a ALEXA revision on 1-5-2024.HTN, HLD, tobacco use, severe degenerative disease, chronic anemia, OA, anxiety, depression)  Prior to Session Communication: Bedside nurse  Patient Position Received: Bed, 2 rail up  Home Living:  Home Living  Type of Home: Apartment  Lives With: Alone  Home Adaptive Equipment:  (RW, wheel chair, sock aide, reacher, long handled sponge, walk in shower with seat)  Home Layout: One level  Home Access: Ramped entrance  Bathroom Shower/Tub: Walk-in  shower  Prior Level of Function:  Prior Function Per Pt/Caregiver Report  Level of [place]:  (Prior to ALEXA surgery on 1-2-2024 patient was independent using wheelchair for mobility, independent with transfers and bed mobility.  Does not drive. She has a home health aide 4 times per week for 4 hours each day to assist with ADLs such)  Receives Help From: Other (Comment) (home health aide)  ADL Assistance: Needs assistance (bathing, cooking, shopping, laundry)  Dressing:  (independent)  Feeding:  (independent)  Hand Dominance: Right  Precautions:  Precautions  LE Weight Bearing Status: Right Partial Weight Bearing (50% RLE)  Post-Surgical Precautions: Right hip precautions (posterior)  Vital Signs:  Vital Signs  Heart Rate: (!) 139  Heart Rate Source: Other (Comment) ([place])  SpO2: 97 %    Objective   Pain:  Pain Assessment  Pain Assessment: 0-10  Pain Score: 4  Pain Location:  (right hip/surgical region)  Cognition:  Cognition  Overall Cognitive Status: Within Functional Limits (AOx3)  Attention: Within Functional Limits      Strength  Strength Comments:  (bilateral LEs grossly observed at least 3/5 during bed mobility and sit to stand, stand to sit)  Postural Control  Postural Control: Within Functional Limits    Dynamic Sitting Balance  Dynamic Sitting-Balance Support: Bilateral upper extremity supported    Static Standing Balance  Static Standing-Balance Support: Bilateral upper extremity supported  Static Standing-Level of Assistance: Moderate assistance (x 1-2)    Functional Assessments:     Bed Mobility  Bed Mobility: Yes  Bed Mobility 1  Bed Mobility 1: Supine to sitting  Level of Assistance 1: Minimum assistance, Contact guard  Bed Mobility Comments 1:  (verbal cues to maintain precautions)  Bed Mobility 2  Bed Mobility  2: Sitting to supine  Level of Assistance 2: Contact guard  Bed Mobility Comments 2:  (verbal cues to maintain precautions)  Bed Mobility 3  Bed Mobility 3: Rolling left  Level of  Assistance 3: Contact guard, Maximum assistance  Bed Mobility Comments 3:  (verbal cues to maintain precautions)    Transfers  Transfer: Yes  Transfer 1  Transfer From 1: Sit to  Transfer to 1: Stand  Transfer Device 1: Walker  Transfer Level of Assistance 1: Moderate assistance (1-2)  Trials/Comments 1:  (verbal cues to maintain precautions)  Transfers 2  Transfer From 2: Stand to  Transfer to 2: Sit  Transfer Device 2: Walker  Transfer Level of Assistance 2: Moderate assistance  Trials/Comments 2:  (verbal cues to maintain precautions)    Ambulation/Gait Training  Ambulation/Gait Training Performed:  (unable during evaluation)      Outcome Measures:  [facility] Basic Mobility  Turning from your back to your side while in a flat bed without using bedrails: A little  Moving from lying on your back to sitting on the side of a flat bed without using bedrails: A little  Moving to and from bed to chair (including a wheelchair): Total  Standing up from a chair using your arms (e.g. wheelchair or bedside chair): A lot  To walk in hospital room: Total  Climbing 3-5 steps with railing: Total  Basic Mobility - Total Score: 11    Encounter Problems       Encounter Problems (Active)       Mobility       Gait       Start:  01/12/24    Expected End:  01/26/24       Pt will ambulate at least 5 feet with walker and modAssist while maintaining weight bearing and hip precautions.            Pain - Adult          Safety       Safety       Start:  01/12/24    Expected End:  01/26/24       Patient will adhere to hip precautions during ADL's and transfers            Transfers       Transfer       Start:  01/12/24    Expected End:  01/26/24       Pt will be able to transfer from bed to chair with ModAssist and walker         Bed mobility       Start:  01/12/24    Expected End:  01/26/24       Patient will perform bed mobility independently         transfer       Start:  01/12/24    Expected End:  01/26/24       Patient will transfer sit to  and from stand with MinAssist and walker          hip precautions       Start:  01/12/24    Expected End:  01/26/24       Patient will follow hip precautions during transfers independently                Education Documentation  Precautions, taught by Cleo Rachel PT at 1/12/2024  9:37 AM.  Learner: Patient  Readiness: Acceptance  Method: Explanation, Demonstration  Response: Verbalizes Understanding, Demonstrated Understanding, Needs Reinforcement  Comment: Safe and proper use of walker with sit to stand, stand to sit.  Safety and following hip precautions with bed mobility, sit to stand and stand to sit.Hip precautions    Body Mechanics, taught by Cleo Rachel PT at 1/12/2024  9:37 AM.  Learner: Patient  Readiness: Acceptance  Method: Explanation, Demonstration  Response: Verbalizes Understanding, Demonstrated Understanding, Needs Reinforcement  Comment: Safe and proper use of walker with sit to stand, stand to sit.  Safety and following hip precautions with bed mobility, sit to stand and stand to sit.Hip precautions    Mobility Training, taught by Cleo Rachel PT at 1/12/2024  9:37 AM.  Learner: Patient  Readiness: Acceptance  Method: Explanation, Demonstration  Response: Verbalizes Understanding, Demonstrated Understanding, Needs Reinforcement  Comment: Safe and proper use of walker with sit to stand, stand to sit.  Safety and following hip precautions with bed mobility, sit to stand and stand to sit.Hip precautions               Complex Repair And Flap Additional Text (Will Appearing After The Standard Complex Repair Text): The complex repair was not sufficient to completely close the primary defect. The remaining additional defect was repaired with the flap mentioned below.

## 2024-01-13 ENCOUNTER — HOME CARE VISIT (OUTPATIENT)
Dept: HOME HEALTH SERVICES | Facility: HOME HEALTH | Age: 49
End: 2024-01-13
Payer: COMMERCIAL

## 2024-01-13 VITALS
WEIGHT: 198.85 LBS | BODY MASS INDEX: 29.45 KG/M2 | DIASTOLIC BLOOD PRESSURE: 53 MMHG | HEIGHT: 69 IN | SYSTOLIC BLOOD PRESSURE: 98 MMHG | TEMPERATURE: 98.2 F | RESPIRATION RATE: 19 BRPM | OXYGEN SATURATION: 95 % | HEART RATE: 93 BPM

## 2024-01-13 LAB
ANION GAP SERPL CALC-SCNC: 11 MMOL/L (ref 10–20)
BUN SERPL-MCNC: 10 MG/DL (ref 6–23)
CALCIUM SERPL-MCNC: 7.5 MG/DL (ref 8.6–10.3)
CHLORIDE SERPL-SCNC: 104 MMOL/L (ref 98–107)
CO2 SERPL-SCNC: 25 MMOL/L (ref 21–32)
CREAT SERPL-MCNC: 0.29 MG/DL (ref 0.5–1.05)
EGFRCR SERPLBLD CKD-EPI 2021: >90 ML/MIN/1.73M*2
ERYTHROCYTE [DISTWIDTH] IN BLOOD BY AUTOMATED COUNT: 22.9 % (ref 11.5–14.5)
GLUCOSE SERPL-MCNC: 88 MG/DL (ref 74–99)
HCT VFR BLD AUTO: 27.7 % (ref 36–46)
HGB BLD-MCNC: 8.5 G/DL (ref 12–16)
MCH RBC QN AUTO: 26.2 PG (ref 26–34)
MCHC RBC AUTO-ENTMCNC: 30.7 G/DL (ref 32–36)
MCV RBC AUTO: 86 FL (ref 80–100)
NRBC BLD-RTO: 0 /100 WBCS (ref 0–0)
PLATELET # BLD AUTO: 367 X10*3/UL (ref 150–450)
POTASSIUM SERPL-SCNC: 3.9 MMOL/L (ref 3.5–5.3)
RBC # BLD AUTO: 3.24 X10*6/UL (ref 4–5.2)
SODIUM SERPL-SCNC: 136 MMOL/L (ref 136–145)
WBC # BLD AUTO: 7.7 X10*3/UL (ref 4.4–11.3)

## 2024-01-13 PROCEDURE — 99238 HOSP IP/OBS DSCHRG MGMT 30/<: CPT | Performed by: INTERNAL MEDICINE

## 2024-01-13 PROCEDURE — G0378 HOSPITAL OBSERVATION PER HR: HCPCS

## 2024-01-13 PROCEDURE — 2500000004 HC RX 250 GENERAL PHARMACY W/ HCPCS (ALT 636 FOR OP/ED)

## 2024-01-13 PROCEDURE — 2500000001 HC RX 250 WO HCPCS SELF ADMINISTERED DRUGS (ALT 637 FOR MEDICARE OP): Performed by: NURSE PRACTITIONER

## 2024-01-13 PROCEDURE — 97535 SELF CARE MNGMENT TRAINING: CPT | Mod: GO

## 2024-01-13 PROCEDURE — 85027 COMPLETE CBC AUTOMATED: CPT | Performed by: NURSE PRACTITIONER

## 2024-01-13 PROCEDURE — 97110 THERAPEUTIC EXERCISES: CPT | Mod: GP,CQ

## 2024-01-13 PROCEDURE — 36415 COLL VENOUS BLD VENIPUNCTURE: CPT | Performed by: NURSE PRACTITIONER

## 2024-01-13 PROCEDURE — 80048 BASIC METABOLIC PNL TOTAL CA: CPT | Performed by: NURSE PRACTITIONER

## 2024-01-13 PROCEDURE — 2500000004 HC RX 250 GENERAL PHARMACY W/ HCPCS (ALT 636 FOR OP/ED): Performed by: NURSE PRACTITIONER

## 2024-01-13 RX ORDER — SODIUM CHLORIDE 9 MG/ML
INJECTION, SOLUTION INTRAVENOUS
Status: COMPLETED
Start: 2024-01-13 | End: 2024-01-13

## 2024-01-13 RX ORDER — OXYCODONE AND ACETAMINOPHEN 5; 325 MG/1; MG/1
1 TABLET ORAL EVERY 4 HOURS PRN
Qty: 36 TABLET | Refills: 0 | Status: ON HOLD
Start: 2024-01-13 | End: 2024-02-10 | Stop reason: SDUPTHER

## 2024-01-13 RX ORDER — METOPROLOL SUCCINATE 25 MG/1
25 TABLET, EXTENDED RELEASE ORAL DAILY
Qty: 30 TABLET | Refills: 0
Start: 2024-01-14 | End: 2024-02-14

## 2024-01-13 RX ORDER — CEFADROXIL 500 MG/1
500 CAPSULE ORAL 2 TIMES DAILY
Qty: 14 CAPSULE | Refills: 0
Start: 2024-01-13 | End: 2024-01-20

## 2024-01-13 RX ADMIN — ARIPIPRAZOLE 15 MG: 5 TABLET ORAL at 20:42

## 2024-01-13 RX ADMIN — CEFAZOLIN SODIUM 1 G: 1 SOLUTION INTRAVENOUS at 06:29

## 2024-01-13 RX ADMIN — NAPROXEN 500 MG: 250 TABLET ORAL at 16:51

## 2024-01-13 RX ADMIN — ASPIRIN 81 MG CHEWABLE TABLET 81 MG: 81 TABLET CHEWABLE at 09:04

## 2024-01-13 RX ADMIN — DOCUSATE SODIUM 100 MG: 100 CAPSULE, LIQUID FILLED ORAL at 20:41

## 2024-01-13 RX ADMIN — PAROXETINE HYDROCHLORIDE 40 MG: 20 TABLET, FILM COATED ORAL at 09:03

## 2024-01-13 RX ADMIN — PAROXETINE HYDROCHLORIDE 30 MG: 20 TABLET, FILM COATED ORAL at 20:41

## 2024-01-13 RX ADMIN — OXYCODONE HYDROCHLORIDE AND ACETAMINOPHEN 1 TABLET: 5; 325 TABLET ORAL at 08:36

## 2024-01-13 RX ADMIN — DOCUSATE SODIUM 100 MG: 100 CAPSULE, LIQUID FILLED ORAL at 09:03

## 2024-01-13 RX ADMIN — MONTELUKAST 10 MG: 10 TABLET, FILM COATED ORAL at 09:04

## 2024-01-13 RX ADMIN — PANTOPRAZOLE SODIUM 40 MG: 40 TABLET, DELAYED RELEASE ORAL at 05:56

## 2024-01-13 RX ADMIN — SIMVASTATIN 20 MG: 10 TABLET, FILM COATED ORAL at 20:42

## 2024-01-13 RX ADMIN — CEFAZOLIN SODIUM 1 G: 1 SOLUTION INTRAVENOUS at 15:08

## 2024-01-13 RX ADMIN — ASPIRIN 81 MG CHEWABLE TABLET 81 MG: 81 TABLET CHEWABLE at 20:42

## 2024-01-13 RX ADMIN — NAPROXEN 500 MG: 250 TABLET ORAL at 09:04

## 2024-01-13 RX ADMIN — FERROUS SULFATE TAB 325 MG (65 MG ELEMENTAL FE) 1 TABLET: 325 (65 FE) TAB at 05:56

## 2024-01-13 RX ADMIN — LORATADINE 10 MG: 10 TABLET ORAL at 09:11

## 2024-01-13 ASSESSMENT — PAIN - FUNCTIONAL ASSESSMENT
PAIN_FUNCTIONAL_ASSESSMENT: 0-10
PAIN_FUNCTIONAL_ASSESSMENT: 0-10

## 2024-01-13 ASSESSMENT — COGNITIVE AND FUNCTIONAL STATUS - GENERAL
HELP NEEDED FOR BATHING: A LOT
MOVING FROM LYING ON BACK TO SITTING ON SIDE OF FLAT BED WITH BEDRAILS: A LOT
STANDING UP FROM CHAIR USING ARMS: A LOT
WALKING IN HOSPITAL ROOM: A LOT
DRESSING REGULAR LOWER BODY CLOTHING: A LOT
TOILETING: TOTAL
MOBILITY SCORE: 11
TURNING FROM BACK TO SIDE WHILE IN FLAT BAD: A LOT
PERSONAL GROOMING: A LITTLE
DRESSING REGULAR UPPER BODY CLOTHING: A LITTLE
MOVING TO AND FROM BED TO CHAIR: A LOT
DAILY ACTIVITIY SCORE: 15
CLIMB 3 TO 5 STEPS WITH RAILING: TOTAL

## 2024-01-13 ASSESSMENT — PAIN SCALES - GENERAL
PAINLEVEL_OUTOF10: 4
PAINLEVEL_OUTOF10: 5 - MODERATE PAIN

## 2024-01-13 ASSESSMENT — ACTIVITIES OF DAILY LIVING (ADL)
HOME_MANAGEMENT_TIME_ENTRY: 50
BATHING_LEVEL_OF_ASSISTANCE: SETUP;MAXIMUM ASSISTANCE

## 2024-01-13 NOTE — CARE PLAN
The patient's goals for the shift include To try to stay calm.    The clinical goals for the shift include Patient will have tolerable pain level.    Over the shift, the patient did make progress toward the following goals.     0745 Assumed care of patient. Assessment completed. Right hip dressing saturated. New dressing applied. Sutures and staples intact. Patient tolerated well. Call light within reach.    0836 Patient medicated for pain. Physical therapy in room to see patient. Call light within reach.    0900 CNP at bedside. Discussed saturated dressing and Naprosyn order twice daily. Okay to continue with Naprosyn.     0930 AM medications given. Pain level now 4/10, down from 7/10. Metoprolol and Lisinopril re timed due to SBP of 99.    1100 SBP still 99. Discussed with patient, agrees to hold Metoprolol and Lisinopril at this time.     1300 Patient eating lunch. Call light within reach.    1500 Small amount of drainage on dressing. Denies concerns. Call light within reach.    1740 Called report to [facility], spoke to Isabella. Patient updated on plan.    1900 Awaiting transport to [facility]. Denies concerns.
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
## 2024-01-13 NOTE — NURSING NOTE
1/12/24 1956: pt BP 86/46 manually, HR 92. Patient is asymptomatic. DAT OSORIO-CNP notified, NNO.

## 2024-01-13 NOTE — CARE PLAN
The patient's goals for the shift include To try to stay calm.    The clinical goals for the shift include patient will tolerate IV antibiotics and remain afebrile    Over the shift, the patient remained safe, and VSS. No c/o pain during the night. Tolerated IV antibiotics, and remained afebrile.

## 2024-01-13 NOTE — TREATMENT PLAN
I am out of office today and did not physically see the patient.    S/P ALEXA 1/2 and revision ALEXA 1/5    Plan:     -CRP elevated, No leukocytosis, negative ESR  -Wound does not appear acutely infected, although oozy  -Dressing saturated per nursing, to be changed  -Xrays similar to post-op films, no concerns for complications  -Prophylactic IV antibiotics -recommend PO cefadroxil for additional  7 days on discharge  -Follow-up with Dr. Jalloh outpatient  -Daily PT   -50% weightbearing/posterior hip precautions  -I discussed this case with on call surgeon at [facility] Dr. Hathaway

## 2024-01-13 NOTE — PROGRESS NOTES
Blanca Hamilton is a 48 y.o. female on day 0 of admission presenting with Impaired functional mobility and activity tolerance.      Subjective     Blanca was sitting up in bed denies having any symptoms. Telemetry was reviewed, she is maintaining NSR.     Review of systems:  Constitutional: negative for fever, chills, or malaise  Neuro: negative for dizziness, headache, numbness, tingling  ENT: Negative for nasal congestion or sore throat  Resp: negative for shortness of breath, cough, or wheezing  CV: negative for chest pain, palpitations  GI: negative for abd pain, nausea, vomiting or diarrhea  : negative for dysuria, frequency, or urgency  Skin: negative for lesions, wounds, or rash  Musculoskeletal: Negative for weakness, myalgia, or arthralgia  Endocrine: Negative for polyuria or polydipsia         Objective   Constitutional: Well developed, awake/alert/oriented x3, no distress, alert and cooperative  Eyes: PERRL, EOMI, clear sclera  ENMT: mucous membranes moist, no apparent injury, no lesions seen  Head/Neck: Neck supple, no apparent injury, thyroid without mass or tenderness, No JVD, trachea midline, no bruits  Respiratory/Thorax: Patent airways, CTAB, normal breath sounds with good chest expansion, thorax symmetric  Cardiovascular: Regular, rate and rhythm, no murmurs, 2+ equal pulses of the extremities, normal S 1and S 2  Gastrointestinal: Nondistended, soft, non-tender, no rebound tenderness or guarding, no masses palpable, no organomegaly, +BS, no bruits  Musculoskeletal: ROM intact, no joint swelling, normal strength  Extremities: normal extremities, no cyanosis edema, contusions or wounds, no clubbing  Neurological: alert and oriented x3, intact senses, motor, response and reflexes, normal strength  Lymphatic: No significant lymphadenopathy  Psychological: Appropriate mood and behavior  Skin: Warm and dry, no lesions, no rashes      Last Recorded Vitals  BP 99/65   Pulse 85   Temp 36.7 °C (98.1 °F)  "(Temporal)   Resp 19   Ht 1.753 m (5' 9.02\")   Wt 90.2 kg (198 lb 13.7 oz)   SpO2 93%   BMI 29.35 kg/m²     Intake/Output last 3 Shifts:  I/O last 3 completed shifts:  In: 300 (3.3 mL/kg) [P.O.:200; IV Piggyback:100]  Out: 1550 (17.2 mL/kg) [Urine:1550 (0.5 mL/kg/hr)]  Weight: 90.2 kg   No intake/output data recorded.    Relevant Results  Scheduled medications  ARIPiprazole, 15 mg, oral, Nightly  aspirin, 81 mg, oral, BID  ceFAZolin, 1 g, intravenous, q8h  docusate sodium, 100 mg, oral, BID  enoxaparin, 40 mg, subcutaneous, q24h  ferrous sulfate (325 mg ferrous sulfate), 1 tablet, oral, Daily  influenza, 0.5 mL, intramuscular, During hospitalization  lisinopril, 10 mg, oral, Daily  loratadine, 10 mg, oral, Daily  metoprolol succinate XL, 25 mg, oral, Daily  montelukast, 10 mg, oral, Daily  naproxen, 500 mg, oral, BID with meals  pantoprazole, 40 mg, oral, Daily before breakfast  PARoxetine, 30 mg, oral, Nightly  PARoxetine, 40 mg, oral, Daily  pneumococcal conjugate, 0.5 mL, intramuscular, During hospitalization  simvastatin, 20 mg, oral, Nightly  traZODone, 100 mg, oral, Nightly      Continuous medications     PRN medications  PRN medications: acetaminophen **OR** acetaminophen **OR** acetaminophen, acetaminophen **OR** acetaminophen **OR** acetaminophen, ondansetron **OR** ondansetron, oxyCODONE-acetaminophen, polyethylene glycol    Results for orders placed or performed during the hospital encounter of 01/11/24 (from the past 24 hour(s))   Transthoracic Echo (TTE) Complete   Result Value Ref Range    AV pk cooper 1.67     LVOT diam 2.00     LV biplane EF 66     MV avg E/e' ratio 8.25     MV E/A ratio 1.26     LA vol index A/L 16.8     Tricuspid annular plane systolic excursion 2.7     RV free wall pk S' 17.40     LVIDd 4.15     Aortic Valve Area by Continuity of Peak Velocity 2.30     AV pk grad 11.2     LV A4C EF 68.6    CBC   Result Value Ref Range    WBC 7.7 4.4 - 11.3 x10*3/uL    nRBC 0.0 0.0 - 0.0 /100 " WBCs    RBC 3.24 (L) 4.00 - 5.20 x10*6/uL    Hemoglobin 8.5 (L) 12.0 - 16.0 g/dL    Hematocrit 27.7 (L) 36.0 - 46.0 %    MCV 86 80 - 100 fL    MCH 26.2 26.0 - 34.0 pg    MCHC 30.7 (L) 32.0 - 36.0 g/dL    RDW 22.9 (H) 11.5 - 14.5 %    Platelets 367 150 - 450 x10*3/uL   Basic metabolic panel   Result Value Ref Range    Glucose 88 74 - 99 mg/dL    Sodium 136 136 - 145 mmol/L    Potassium 3.9 3.5 - 5.3 mmol/L    Chloride 104 98 - 107 mmol/L    Bicarbonate 25 21 - 32 mmol/L    Anion Gap 11 10 - 20 mmol/L    Urea Nitrogen 10 6 - 23 mg/dL    Creatinine 0.29 (L) 0.50 - 1.05 mg/dL    eGFR >90 >60 mL/min/1.73m*2    Calcium 7.5 (L) 8.6 - 10.3 mg/dL       Transthoracic Echo (TTE) Complete   Final Result      XR femur right 2+ views   Final Result   Interval fixation of periprosthetic fracture through the proximal   femur.             MACRO:   None        Signed by: Regino Bell 1/13/2024 8:36 AM   Dictation workstation:   [id number]      Lower extremity venous duplex right   Final Result   No deep venous thrombosis of the  right lower extremity.        MACRO:   None        Signed by: Marlys Mahajan 1/12/2024 12:50 AM   Dictation workstation:   [id number]          Transthoracic Echo (TTE) Complete    Result Date: 1/12/2024   [facility], [address] [place] [address]              Tel [phone] and Fax [phone] TRANSTHORACIC ECHOCARDIOGRAM REPORT  Patient Name:      ZULEIKA Mcnamara Physician:    [id number] Jim Loredo MD Study Date:        1/12/2024            Ordering Provider:    [id number] RAMBO LEVI MRN/PID:           [id number]             Fellow: Accession#:        [id number]         Nurse: Date of Birth/Age: 1975 / 48      Sonographer:          Katherine Man RDCS                    years Gender:            F                    Additional Staff:  Height:            175.26 cm            Admit Date: Weight:            89.81 kg             Admission Status:     Inpatient -                                                               Routine BSA:               2.06 m2              Encounter#:           [id number]                                         Department Location:  [facility] Blood Pressure: 128 /70 mmHg Study Type:    TRANSTHORACIC ECHO (TTE) COMPLETE Diagnosis/ICD: Abnormal electrocardiogram [ECG] [EKG]-R94.31 Indication:    Impaired functional mobility CPT Code:      Echo Complete w Full Doppler-02481 Patient History: Pertinent History: HTN. Tachycardia. Study Detail: The following Echo studies were performed: 2D, M-Mode, Doppler and               color flow. Technically challenging study due to the patient's               lack of cooperation, poor acoustic windows and body habitus. The               patient was awake.  PHYSICIAN INTERPRETATION: Left Ventricle: The left ventricular systolic function is normal, with an estimated ejection fraction of 60-65%. There are no regional wall motion abnormalities. The left ventricular cavity size is normal. Spectral Doppler shows a normal pattern of left ventricular diastolic filling. Left Atrium: The left atrium is normal in size. Right Ventricle: The right ventricle is normal in size. There is normal right ventricular global systolic function. Right Atrium: The right atrium is normal in size. Aortic Valve: The aortic valve is trileaflet. There is mild aortic valve cusp calcification. There is mild to moderate aortic valve thickening. There is evidence of mildly elevated transaortic gradients consistent with sclerosis of the aortic valve. There is no evidence of aortic valve regurgitation. The peak instantaneous gradient of the aortic valve is 11.2 mmHg. Mitral Valve: The mitral valve is normal in structure. There is no evidence of  mitral valve regurgitation. Tricuspid Valve: The tricuspid valve is structurally normal. There is mild tricuspid regurgitation. Pulmonic Valve: The pulmonic valve is not well visualized. There is physiologic pulmonic valve regurgitation. Pericardium: There is no pericardial effusion noted. Aorta: The aortic root was not well visualized.  CONCLUSIONS:  1. Left ventricular systolic function is normal with a 60-65% estimated ejection fraction.  2. Aortic valve sclerosis. QUANTITATIVE DATA SUMMARY: 2D MEASUREMENTS:                          Normal Ranges: Ao Root d:     2.90 cm   (2.0-3.7cm) LAs:           2.50 cm   (2.7-4.0cm) IVSd:          0.96 cm   (0.6-1.1cm) LVPWd:         1.09 cm   (0.6-1.1cm) LVIDd:         4.15 cm   (3.9-5.9cm) LVIDs:         2.89 cm LV Mass Index: 67.8 g/m2 LV % FS        30.4 % LA VOLUME:                               Normal Ranges: LA Vol A4C:        35.4 ml    (22+/-6mL/m2) LA Vol A2C:        29.9 ml LA Vol BP:         34.5 ml LA Vol Index A4C:  17.2ml/m2 LA Vol Index A2C:  14.5 ml/m2 LA Vol Index BP:   16.8 ml/m2 LA Area A4C:       15.0 cm2 LA Area A2C:       13.0 cm2 LA Major Axis A4C: 5.4 cm LA Major Axis A2C: 4.8 cm LA Volume Index:   15.0 ml/m2 RA VOLUME BY A/L METHOD:                               Normal Ranges: RA Vol A4C:        39.8 ml    (8.3-19.5ml) RA Vol Index A4C:  19.4 ml/m2 RA Area A4C:       15.0 cm2 RA Major Axis A4C: 4.8 cm M-MODE MEASUREMENTS:                  Normal Ranges: Ao Root: 3.00 cm (2.0-3.7cm) LAs:     3.90 cm (2.7-4.0cm) AORTA MEASUREMENTS:                    Normal Ranges: Asc Ao, d: 3.10 cm (2.1-3.4cm) LV SYSTOLIC FUNCTION BY 2D PLANIMETRY (MOD):                     Normal Ranges: EF-A4C View: 68.6 % (>=55%) EF-A2C View: 62.4 % EF-Biplane:  66.3 % LV DIASTOLIC FUNCTION:                               Normal Ranges: MV Peak E:        0.82 m/s    (0.7-1.2 m/s) MV Peak A:        0.65 m/s    (0.42-0.7 m/s) E/A Ratio:        1.26        (1.0-2.2) MV e'              0.10 m/s    (>8.0) MV lateral e'     0.12 m/s MV medial e'      0.08 m/s MV A Dur:         92.00 msec E/e' Ratio:       8.25        (<8.0) PulmV Sys Fam:    47.60 cm/s PulmV Oro Fam:   32.30 cm/s PulmV S/D Fam:    1.50 PulmV A Revs Fam: 32.70 cm/s PulmV A Revs Dur: 100.00 msec MITRAL VALVE:                 Normal Ranges: MV DT: 170 msec (150-240msec) AORTIC VALVE:                          Normal Ranges: AoV Vmax:      1.67 m/s  (<=1.7m/s) AoV Peak P.2 mmHg (<20mmHg) LVOT Max Fam:  1.22 m/s  (<=1.1m/s) LVOT VTI:      27.60 cm LVOT Diameter: 2.00 cm   (1.8-2.4cm) AoV Area,Vmax: 2.30 cm2  (2.5-4.5cm2)  RIGHT VENTRICLE: RV Basal 3.50 cm RV Mid   2.40 cm RV Major 7.0 cm TAPSE:   27.0 mm RV s'    0.17 m/s PULMONIC VALVE:                      Normal Ranges: PV Max Fam: 1.2 m/s  (0.6-0.9m/s) PV Max P.3 mmHg Pulmonary Veins: PulmV A Revs Dur: 100.00 msec PulmV A Revs Fam: 32.70 cm/s PulmV Oro Fam:   32.30 cm/s PulmV S/D Fam:    1.50 PulmV Sys Fam:    47.60 cm/s  [id number] Jim Loredo MD Electronically signed on 2024 at 11:44:14 PM  ** Final **           Assessment/Plan   Principal Problem:    Impaired functional mobility and activity tolerance  Active Problems:    Primary hypertension    Other hyperlipidemia    Status post hip surgery    Psychophysiological insomnia    Recurrent major depressive disorder (CMS/HCC)    Gastroesophageal reflux disease without esophagitis    Allergies    Iron deficiency anemia secondary to inadequate dietary iron intake    Other constipation    Tachycardia    Mild intermittent asthma without complication      Degenerative disease  - s/p right ALEXA on  complicated by periprosthetic femur fracture  -s/p REVISION ARTHROPLASTY RIGHT TOTAL HIP WITH VISUALPLANT INSTRUMENTATION AND PROSTHESIS UNDER FLOUROSCOPY, ELVIS CABLES (Right) on 24  -Pain management  -Dressing to right hip  - continue multimodal pain regimen     Tachycardia  - heart rate 107 at rest  - denies pain  -  Recommend echocardiogram  - monitor on telemetry  - Start metoprolol succinate 25 once daily  - echocardiogram pending     chronic anemia  - stable  - continue to monitor     Hypertension  - continue home lisinopril     Hyperlipidemia  - continue home statin     Tobacco abuse  - educated about tobacco cessation    DEVON Choudhury-CNP

## 2024-01-13 NOTE — PROGRESS NOTES
Occupational Therapy    Occupational Therapy Treatment    Name: Blanca Hamilton  MRN: [id number]  : 1975  Date: 24  Time Calculation  Start Time: 08  Stop Time: 0855  Time Calculation (min): 50 min    Assessment:  OT Assessment: Good participation in self care with education on safety, ALEXA precautions, compensation techniques for self care & use of adaptive equipment(reacher & sock aide) for compliance with ALEXA precautions.  The pt. displayed inconsistencies with sit to stand transfers requiring modAx2 & cues with a FWW to modAx1 & cues with Close SBA of a second person with a FWW likely due to her increased anxiety during transfers/standing.  Minimal improvement with LE dressing with use of adaptive equipment, however the pt. requuired maxA & cues for clothing managment over her hips due to requiring B UE support with modA & cues for static standing balance at this time/.  Prognosis: Good  Evaluation/Treatment Tolerance: Pt. tolerated the session well with increased time & encouragement provided.  Medical Staff Made Aware: Yes  End of Session Communication: Bedside nurse  End of Session Patient Position: Bed, 2 rail up, Alarm off, not on at start of session  Plan:  Treatment Interventions: ADL retraining, Functional transfer training, Patient/family training, Equipment evaluation/education, Neuromuscular reeducation, Compensatory technique education, Continued evaluation  OT Frequency: 4 times per week  OT Discharge Recommendations: Moderate intensity level of continued care  OT Recommended Transfer Status: Assist of 2  OT - OK to Discharge: Yes Based on completed evaluation and care plan recommendations, no barriers to discharge to next site of care      Subjective   Previous Visit Info:  OT Last Visit  OT Received On: 24  General:  General  Reason for Referral: Impaired self care skills & functional transfers due to R ALEXA with periprosthetic fracture.  Past Medical History Relevant to Rehab: HTN,  HLD, GERD, tobacco use, severe degenerative disease, chronic anemia, OA, anxiety, depression  Family/Caregiver Present: No  Prior to Session Communication: Bedside nurse  Patient Position Received: Bed, 2 rail up, Alarm off, not on at start of session  General Comment: The pt.'s R hip dressing was observed intact, however with increased bleeding observed through the dressing.  Pt.'s nurse was notified of this & changed the pt.'s R hip dressing.  Precautions:  Precautions Comment: safety precautions, fall risk, posterio ALEXA precautions, PWB(50%) on R LE, telemetry  Vitals:  Vital Signs  Heart Rate: 102  Heart Rate Source: Monitor  SpO2: 97 %  Patient Position: Lying  Pain Assessment:  Pain Assessment  Pain Assessment: 0-10  Pain Score: 5 - Moderate pain  Pain Location: Hip  Pain Orientation:  (R hip)     Objective   Activities of Daily Living: Grooming  Grooming Level of Assistance: Setup  Grooming Where Assessed: Bed level    UE Bathing  UE Bathing Level of Assistance: Setup  UE Bathing Where Assessed: Bed level    LE Bathing  LE Bathing Level of Assistance: Setup, Maximum assistance  LE Bathing Where Assessed:  Edge of bed  LE Bathing Comments: The pt. was able to bathe her proximal B LE's with S & cues. Recommended a long handled sponge to bathe her distal LE's.  Pt. required asssitance for perineal hygiene care due to requiring modA & cues with B UE support for static standing balance with PWB on her R LE.  Verbal cues too facilitate a more upright posture.    UE Dressing  UE Dressing Level of Assistance: Minimum assistance  UE Dressing Where Assessed: Bed level  UE Dressing Comments: Patrice & cues to don a hospital gown    LE Dressing  LE Dressing: Yes  LE Dressing Adaptive Equipment: Reacher, Sock aide  Sock Level of Assistance: Close SBA & cues to don B slipper socks with use of the sock aide.  Adult Briefs Level of Assistance: Close SBA & cues to begin the pull up brief over her B LE's with a reacher.  Pt.  required maxA & cues to don the pull up brief over her hips.  LE Dressing Where Assessed: Edge of bed     Bed Mobility/Transfers: Bed Mobility  Bed Mobility: Yes  Bed Mobility 1  Bed Mobility 1: Supine to sitting, Sitting to supine  Level of Assistance 1: Supine to sit with Patrice & cuess for compliance with ALEXA precautions.  Patrice x2 & cues for sit to supine.    Transfer 1  Technique 1: Sit to stand, Stand to sit  Trials/Comments 1: Repeated sit to stand transfers from the bed with a FWW & PWB on R LE with modAx2 & cues for the first transfer & modAx1 & cues & Close SBA for the second transfer with a FWW    Sitting Balance:  Static Sitting Balance  Static Sitting-Comment/Number of Minutes: Close S static sitting on the edge of the bed during ADL performance.  Standing Balance:  Static Standing Balance  Static Standing-Comment/Number of Minutes: ModA & cues for static standing balance with PWB(50%) on R LE with a FWW    Outcome Measures:  [facility] Daily Activity  Putting on and taking off regular lower body clothing: A lot  Bathing (including washing, rinsing, drying): A lot  Putting on and taking off regular upper body clothing: A little  Toileting, which includes using toilet, bedpan or urinal: Total  Taking care of personal grooming such as brushing teeth: A little  Eating Meals: None  Daily Activity - Total Score: 15        Education Documentation  Body Mechanics, taught by Mera Donohue OT at 1/13/2024 11:58 AM.  Learner: Patient  Readiness: Acceptance  Method: Explanation, Demonstration  Response: Demonstrated Understanding, Needs Reinforcement    Precautions, taught by Mera Donohue OT at 1/13/2024 11:58 AM.  Learner: Patient  Readiness: Acceptance  Method: Explanation, Demonstration  Response: Demonstrated Understanding, Needs Reinforcement      Goals:  Encounter Problems       Encounter Problems (Active)       ADLs       Patient with complete lower body dressing with supervision level of assistance donning  and doffing all LE clothes  with reacher, shoe horn, sock-aid, dressing stick , and elastic shoe laces  (Progressing)       Start:  01/12/24    Expected End:  02/11/24            Patient will complete toileting including hygiene clothing management/hygiene with set-up and supervision level of assistance and raised toilet seat, grab bars, and shower chair. (Progressing)       Start:  01/12/24    Expected End:  02/11/24               COGNITION/SAFETY       Patient will recall and adhere to hip precautions during all functional mobility/ADL tasks in order to demonstrate improved understanding and promote healing post op (Progressing)       Start:  01/12/24    Expected End:  02/11/24            Patient will recall and adhere to weight bearing and /or ROM restrictions with all ADL and functional mobility in order to promote healing and safety with functional tasks (Progressing)       Start:  01/12/24    Expected End:  02/11/24

## 2024-01-13 NOTE — PROGRESS NOTES
Physical Therapy    Physical Therapy Treatment    Patient Name: Blanca Hamilton  MRN: [id number]  Today's Date: 1/13/2024  Time Calculation  Start Time: 0932  Stop Time: 0946  Time Calculation (min): 14 min       Assessment/Plan   PT Assessment  PT Assessment Results: Decreased strength, Decreased range of motion, Decreased mobility, Pain, Orthopedic restrictions  End of Session Communication: Bedside nurse  End of Session Patient Position: Bed, 3 rail up, Alarm off, not on at start of session (Attempted to turn on alarm, patient refused stating that it goes off with each little movement, states that it is on only at night, informed nurse.)  PT Plan  Inpatient/Swing Bed or Outpatient: Inpatient  PT Plan  Treatment/Interventions: Therapeutic exercise  PT Plan: Skilled PT  PT Frequency: 4 times per week  PT Discharge Recommendations: Moderate intensity level of continued care  PT Recommended Transfer Status: Assist x2  PT - OK to Discharge: Yes    General Visit Information:   PT  Visit  PT Received On: 01/13/24  General  General Comment: Patient agreeable to bed exercises, however, states that she was up with OT already and cannot handle getting up a second time today.  Precautions:  Precautions  LE Weight Bearing Status: Right Partial Weight Bearing (50/5 wt-bearing RLE)  Medical Precautions: Fall precautions  Post-Surgical Precautions: Right hip precautions  Precautions Comment: Reviewed hip precautions with patient, able to verbalize.    Objective   Pain:  Pain Assessment  Pain Assessment: 0-10  Pain Score: 5 - Moderate pain  Pain Location: Hip  Pain Orientation: Right  Pain Interventions:  (Patient was medicated prior to session.)  Cognition:  Cognition  Overall Cognitive Status: Within Functional Limits  Treatments:  Therapeutic Exercise  Therapeutic Exercise Performed: Yes  Therapeutic Exercise Activity 1: quad sets x 10 with verbal and tactile cues required for proper performance  Therapeutic Exercise Activity 2:  ankle pumps x 20  Therapeutic Exercise Activity 3: saqs x 10  Therapeutic Exercise Activity 4: heel slides supine with min assist required x 10  Therapeutic Exercise Activity 5: hip abduction/adduction supine x 10 with min assist required to perform    Outcome Measures:  [facility] Basic Mobility  Turning from your back to your side while in a flat bed without using bedrails: A lot  Moving from lying on your back to sitting on the side of a flat bed without using bedrails: A lot  Moving to and from bed to chair (including a wheelchair): A lot  Standing up from a chair using your arms (e.g. wheelchair or bedside chair): A lot  To walk in hospital room: A lot  Climbing 3-5 steps with railing: Total  Basic Mobility - Total Score: 11    Education Documentation  Home Exercise Program, taught by Belen Dover PTA at 1/13/2024 11:43 AM.  Learner: Patient  Readiness: Acceptance  Method: Explanation  Response: Verbalizes Understanding  Comment: Educated patient on proper performance of LE strengthening exercises, required min assist for 2 and vcs for the rest.    Precautions, taught by Belen Dover PTA at 1/13/2024 11:43 AM.  Learner: Patient  Readiness: Acceptance  Method: Explanation  Response: Verbalizes Understanding  Comment: Educated patient on right hip precautions, patient able to verbalize hip precautions without prompting.    Education Comments  No comments found.        Encounter Problems       Encounter Problems (Active)       Mobility       Gait       Start:  01/12/24    Expected End:  01/26/24       Pt will ambulate at least 5 feet with walker and modAssist while maintaining weight bearing and hip precautions.            Pain - Adult          Safety       Safety       Start:  01/12/24    Expected End:  01/26/24       Patient will adhere to hip precautions during ADL's and transfers            Transfers       Transfer       Start:  01/12/24    Expected End:  01/26/24       Pt will be able to transfer  from bed to chair with ModAssist and walker         Bed mobility       Start:  01/12/24    Expected End:  01/26/24       Patient will perform bed mobility independently         transfer       Start:  01/12/24    Expected End:  01/26/24       Patient will transfer sit to and from stand with MinAssist and walker          hip precautions       Start:  01/12/24    Expected End:  01/26/24       Patient will follow hip precautions during transfers independently

## 2024-01-13 NOTE — DISCHARGE SUMMARY
Discharge Diagnosis  Impaired functional mobility and activity tolerance    Issues Requiring Follow-Up  Follow up with Orthopedic Surgeon asap    Discharge Meds     Your medication list        START taking these medications        Instructions Last Dose Given Next Dose Due   metoprolol succinate XL 25 mg 24 hr tablet  Commonly known as: Toprol-XL  Start taking on: January 14, 2024      Take 1 tablet (25 mg) by mouth once daily. Do not crush or chew. Do not start before January 14, 2024.              CONTINUE taking these medications        Instructions Last Dose Given Next Dose Due   Abilify 15 mg tablet  Generic drug: ARIPiprazole           aspirin 81 mg chewable tablet      Chew 1 tablet (81 mg) 2 times a day for 28 days.       cefadroxil 500 mg capsule  Commonly known as: Duricef      Take 1 capsule (500 mg) by mouth 2 times a day for 7 days.       docusate sodium 100 mg capsule  Commonly known as: Colace      Take 1 capsule (100 mg) by mouth 2 times a day for 10 days.       ferrous sulfate (325 mg ferrous sulfate) tablet           lisinopril 40 mg tablet           montelukast 10 mg tablet  Commonly known as: Singulair           naproxen 500 mg tablet  Commonly known as: Naprosyn      Take 1 tablet (500 mg) by mouth 2 times a day with meals.       omeprazole 40 mg DR capsule  Commonly known as: PriLOSEC           oxyCODONE-acetaminophen 5-325 mg tablet  Commonly known as: Percocet      Take 1 tablet by mouth every 4 hours if needed for severe pain (7 - 10).       Paxil CR 25 mg 24 hr tablet  Generic drug: PARoxetine CR           PARoxetine CR 37.5 mg 24 hr tablet  Commonly known as: Paxil-CR           simvastatin 20 mg tablet  Commonly known as: Zocor           traZODone 50 mg tablet  Commonly known as: Desyrel           ZyrTEC 10 mg tablet  Generic drug: cetirizine                     Where to Get Your Medications        Information about where to get these medications is not yet available    Ask your nurse or  doctor about these medications  cefadroxil 500 mg capsule  metoprolol succinate XL 25 mg 24 hr tablet  oxyCODONE-acetaminophen 5-325 mg tablet         Test Results Pending At Discharge  Pending Labs       No current pending labs.            Hospital Course  #Impaired functional mobility and activity tolerance    #Status post hip surgery  - Naproxen 500 mg PO BID  - Percocet PRN PO severe pain  - Ancef 1 gm Q 8 hours (Day 2) > Changed to Duricef x 7 days PO      Active Problems:    #Primary hypertension    #Other hyperlipidemia    #Tachycardia  - Continue Lisinopril 10 mg PO Daily  - Started Metoprolol 25 mg PO Daily  - ECHO   1. Left ventricular systolic function is normal with a 60-65% estimated ejection fraction.   2. Aortic valve sclerosis.  - Continue home Zocor 20 mg PO HS  - Cardiology Consulted, appreciate recs          #Psychophysiological insomnia    #Recurrent major depressive disorder (CMS/HCC)  - Continue home Abilify 15 mg PO HS  - Continue home Paxil PO  - Continue home Trazodone 100 mg PO HS       #Gastroesophageal reflux disease without esophagitis  - Continue home Pepcid 20 mg PO BID  - Protonix 40 mg PO daily       #Allergies  - Continue home Claritin 10 mg PO daily       #Iron deficiency anemia secondary to inadequate dietary iron intake  - Continue home Iron 325 mg PO Daily       #Other constipation  - Continue home Colace 100 mg PO BID       #Asthma  - Continue home Singulair 10 mg PO daily        DVT Prophylaxis: Lovenox  Fluids: N/A  Nutrition: Regular     Code Status: Full Code    Pertinent Physical Exam At Time of Discharge  Physical Exam  Constitutional:       Appearance: She is obese.   HENT:      Head: Normocephalic and atraumatic.      Nose: Nose normal.      Mouth/Throat:      Mouth: Mucous membranes are moist.   Eyes:      Extraocular Movements: Extraocular movements intact.   Cardiovascular:      Rate and Rhythm: Normal rate and regular rhythm.      Pulses: Normal pulses.      Heart  sounds: Normal heart sounds.   Pulmonary:      Effort: Pulmonary effort is normal.      Breath sounds: Normal breath sounds.   Abdominal:      General: Bowel sounds are normal.      Palpations: Abdomen is soft.   Musculoskeletal:      Cervical back: Normal range of motion.      Right lower leg: Edema present.   Skin:     General: Skin is warm and dry.      Capillary Refill: Capillary refill takes less than 2 seconds.      Comments: Right Hip surgical incision   Neurological:      Mental Status: She is alert. Mental status is at baseline.      Motor: Weakness present.      Gait: Gait abnormal.   Psychiatric:         Mood and Affect: Mood normal.         Behavior: Behavior normal.         Outpatient Follow-Up  Future Appointments   Date Time Provider Department Center   1/15/2024 To Be Determined Reva Holdengallo, [facility]   1/15/2024 To Be Determined Taylor Schreiber, [facility]   1/16/2024 10:30 AM AM[facility] [id number]   1/17/2024 To Be Determined Reva Holdengallo, [facility]   1/18/2024 To Be Determined Taylor Schreiber, [facility]   1/22/2024 To Be Determined Reva Jacintohadley, [facility]   1/22/2024 To Be Determined Taylor Schreiber, [facility]   1/24/2024 To Be Determined Reva Vargas, [facility]   1/25/2024 To Be Determined Taylor Schreiber, [facility]   1/29/2024 To Be Determined Revawhitney Jacintohadley, [facility]   1/29/2024 To Be Determined Taylor Schreiber, [facility]   2/1/2024 To Be Determined Corinna Maurer, PT [facility]   2/2/2024 To Be Determined Gatito Garcia, OT [facility]       Disposition: Patient was stable to be discharged to [facility].  She was discharged on Duricef.  She will follow up with her orthopedic surgeon and have dressing changes until follow up.  Will also follow up with PCP.       Total cumulative time spent in preparation of this discharge including documentation  review, coordination of care with the medical team including PT/SW/care coordinators and treating consultants, discussion with patient and pertinent family members and finalization of prescriptions, follow-up appointments, and this discharge summary was approximately 45 minutes.       DEVON Harris-CNP

## 2024-01-14 NOTE — NURSING NOTE
Pt was discharged to [facility] via community care by stretcher. All belongings sent with patient. IV removed and heart monitor taken off.

## 2024-01-15 ENCOUNTER — HOME CARE VISIT (OUTPATIENT)
Dept: HOME HEALTH SERVICES | Facility: HOME HEALTH | Age: 49
End: 2024-01-15
Payer: COMMERCIAL

## 2024-01-15 DIAGNOSIS — Z96.641 STATUS POST TOTAL REPLACEMENT OF RIGHT HIP: Primary | ICD-10-CM

## 2024-01-15 LAB
ATRIAL RATE: 87 BPM
P AXIS: 69 DEGREES
P OFFSET: 185 MS
P ONSET: 163 MS
PR INTERVAL: 112 MS
Q ONSET: 219 MS
QRS COUNT: 14 BEATS
QRS DURATION: 68 MS
QT INTERVAL: 354 MS
QTC CALCULATION(BAZETT): 425 MS
QTC FREDERICIA: 400 MS
R AXIS: 31 DEGREES
T AXIS: 17 DEGREES
T OFFSET: 396 MS
VENTRICULAR RATE: 87 BPM

## 2024-01-16 ENCOUNTER — HOSPITAL ENCOUNTER (OUTPATIENT)
Dept: CARDIOLOGY | Facility: HOSPITAL | Age: 49
Discharge: HOME | End: 2024-01-16
Payer: COMMERCIAL

## 2024-01-16 PROCEDURE — 93005 ELECTROCARDIOGRAM TRACING: CPT

## 2024-01-18 LAB
ATRIAL RATE: 73 BPM
P AXIS: 43 DEGREES
P OFFSET: 193 MS
P ONSET: 152 MS
PR INTERVAL: 136 MS
Q ONSET: 220 MS
QRS COUNT: 12 BEATS
QRS DURATION: 70 MS
QT INTERVAL: 378 MS
QTC CALCULATION(BAZETT): 416 MS
QTC FREDERICIA: 403 MS
R AXIS: 34 DEGREES
T AXIS: 37 DEGREES
T OFFSET: 409 MS
VENTRICULAR RATE: 73 BPM

## 2024-01-25 DIAGNOSIS — Z98.890 STATUS POST HIP SURGERY: Primary | ICD-10-CM

## 2024-01-26 ENCOUNTER — OFFICE VISIT (OUTPATIENT)
Dept: ORTHOPEDIC SURGERY | Facility: CLINIC | Age: 49
End: 2024-01-26
Payer: COMMERCIAL

## 2024-01-26 ENCOUNTER — HOSPITAL ENCOUNTER (OUTPATIENT)
Dept: RADIOLOGY | Facility: HOSPITAL | Age: 49
Discharge: HOME | End: 2024-01-26
Payer: COMMERCIAL

## 2024-01-26 DIAGNOSIS — Z96.641 STATUS POST TOTAL REPLACEMENT OF RIGHT HIP: ICD-10-CM

## 2024-01-26 DIAGNOSIS — M25.551 RIGHT HIP PAIN: ICD-10-CM

## 2024-01-26 DIAGNOSIS — Z96.641 STATUS POST RIGHT HIP REPLACEMENT: ICD-10-CM

## 2024-01-26 PROCEDURE — 73502 X-RAY EXAM HIP UNI 2-3 VIEWS: CPT | Mod: RT

## 2024-01-26 PROCEDURE — 99024 POSTOP FOLLOW-UP VISIT: CPT

## 2024-01-26 PROCEDURE — 73502 X-RAY EXAM HIP UNI 2-3 VIEWS: CPT | Mod: RIGHT SIDE | Performed by: RADIOLOGY

## 2024-01-26 RX ORDER — DOXYCYCLINE 100 MG/1
100 CAPSULE ORAL 2 TIMES DAILY
Qty: 14 CAPSULE | Refills: 0 | Status: SHIPPED | OUTPATIENT
Start: 2024-01-26 | End: 2024-02-11 | Stop reason: HOSPADM

## 2024-01-26 NOTE — PROGRESS NOTES
History of Present Illness  Chief Complaint   Patient presents with    Right Hip - Post-op     1/5/24 RT TOTAL HIP REVISION     Patient returns today denying any significant pain in the hip joint, improved from prior.  Still has surgical pain.  She went home after surgery, but was struggling on her own so is now in a nursing facility.  She has been working with PT a the facility, she is partial weightbearing.     Review of Systems   GENERAL: Negative for malaise, significant weight loss, fever  MUSCULOSKELETAL: see HPI  NEURO:  Negative     Examination  Hip Musculoskeletal Exam  Gait    Assistive device: wheelchair    Inspection    Right      Erythema: moderate        Ecchymosis: mild        Edema: mild        Incision: clean, dry, intact and erythema      Incision comment: still areas where skin looks friable      Incisional drainage: none    Palpation    Right      Increased warmth: mild      Tenderness: none    Neurovascular    Right        Right hip neurovascular exam is normal.        Assessment:  Patient status post side: right total hip revision     Plan  Patient viewed with Dr Jalloh given issues she has had with mobility and healing.  Staples and sutures left in place, will removed next week.  Incision was redressed and instructions sent for dressing to be changed daily at the SNF.  Patient to continue to work with PT  Wound is healing slowly.  Course of doxycycline entered and prescription sent with patient to be filled.  Follow-up: in 1 week    Maggi Cowan PA-C  01/26/24

## 2024-01-31 LAB
ATRIAL RATE: 117 BPM
P AXIS: 18 DEGREES
P OFFSET: 197 MS
P ONSET: 168 MS
PR INTERVAL: 100 MS
Q ONSET: 218 MS
QRS COUNT: 19 BEATS
QRS DURATION: 70 MS
QT INTERVAL: 318 MS
QTC CALCULATION(BAZETT): 443 MS
QTC FREDERICIA: 397 MS
R AXIS: 43 DEGREES
T AXIS: 57 DEGREES
T OFFSET: 377 MS
VENTRICULAR RATE: 117 BPM

## 2024-02-02 ENCOUNTER — HOSPITAL ENCOUNTER (OUTPATIENT)
Dept: RADIOLOGY | Facility: HOSPITAL | Age: 49
Discharge: HOME | DRG: 466 | End: 2024-02-02
Payer: COMMERCIAL

## 2024-02-02 ENCOUNTER — APPOINTMENT (OUTPATIENT)
Dept: ORTHOPEDIC SURGERY | Facility: CLINIC | Age: 49
End: 2024-02-02
Payer: COMMERCIAL

## 2024-02-02 ENCOUNTER — HOSPITAL ENCOUNTER (INPATIENT)
Facility: HOSPITAL | Age: 49
LOS: 9 days | Discharge: NURSING, REHAB OR LONG-TERM CARE | DRG: 466 | End: 2024-02-11
Attending: EMERGENCY MEDICINE | Admitting: STUDENT IN AN ORGANIZED HEALTH CARE EDUCATION/TRAINING PROGRAM
Payer: COMMERCIAL

## 2024-02-02 ENCOUNTER — OFFICE VISIT (OUTPATIENT)
Dept: ORTHOPEDIC SURGERY | Facility: CLINIC | Age: 49
End: 2024-02-02
Payer: COMMERCIAL

## 2024-02-02 ENCOUNTER — APPOINTMENT (OUTPATIENT)
Dept: CARDIOLOGY | Facility: HOSPITAL | Age: 49
DRG: 466 | End: 2024-02-02
Payer: COMMERCIAL

## 2024-02-02 DIAGNOSIS — T81.49XA WOUND INFECTION AFTER SURGERY: Primary | ICD-10-CM

## 2024-02-02 DIAGNOSIS — M25.551 RIGHT HIP PAIN: Primary | ICD-10-CM

## 2024-02-02 DIAGNOSIS — M16.11 OSTEOARTHRITIS OF RIGHT HIP, UNSPECIFIED OSTEOARTHRITIS TYPE: ICD-10-CM

## 2024-02-02 DIAGNOSIS — M25.551 RIGHT HIP PAIN: ICD-10-CM

## 2024-02-02 DIAGNOSIS — K59.09 OTHER CONSTIPATION: ICD-10-CM

## 2024-02-02 DIAGNOSIS — F17.200 TOBACCO USE DISORDER: ICD-10-CM

## 2024-02-02 LAB
ALBUMIN SERPL BCP-MCNC: 3.3 G/DL (ref 3.4–5)
ALP SERPL-CCNC: 124 U/L (ref 33–110)
ALT SERPL W P-5'-P-CCNC: 12 U/L (ref 7–45)
ANION GAP SERPL CALC-SCNC: 12 MMOL/L (ref 10–20)
AST SERPL W P-5'-P-CCNC: 14 U/L (ref 9–39)
BASOPHILS # BLD MANUAL: 0 X10*3/UL (ref 0–0.1)
BASOPHILS NFR BLD MANUAL: 0 %
BILIRUB SERPL-MCNC: 0.5 MG/DL (ref 0–1.2)
BUN SERPL-MCNC: 18 MG/DL (ref 6–23)
CALCIUM SERPL-MCNC: 8.4 MG/DL (ref 8.6–10.3)
CHLORIDE SERPL-SCNC: 96 MMOL/L (ref 98–107)
CO2 SERPL-SCNC: 25 MMOL/L (ref 21–32)
CREAT SERPL-MCNC: 0.55 MG/DL (ref 0.5–1.05)
CRP SERPL-MCNC: 23.75 MG/DL
DOHLE BOD BLD QL SMEAR: PRESENT
EGFRCR SERPLBLD CKD-EPI 2021: >90 ML/MIN/1.73M*2
EOSINOPHIL # BLD MANUAL: 0.52 X10*3/UL (ref 0–0.7)
EOSINOPHIL NFR BLD MANUAL: 5 %
ERYTHROCYTE [DISTWIDTH] IN BLOOD BY AUTOMATED COUNT: 23 % (ref 11.5–14.5)
ERYTHROCYTE [SEDIMENTATION RATE] IN BLOOD BY WESTERGREN METHOD: 24 MM/H (ref 0–20)
GLUCOSE SERPL-MCNC: 107 MG/DL (ref 74–99)
HCT VFR BLD AUTO: 31.2 % (ref 36–46)
HGB BLD-MCNC: 9.6 G/DL (ref 12–16)
IMM GRANULOCYTES # BLD AUTO: 0.05 X10*3/UL (ref 0–0.7)
IMM GRANULOCYTES NFR BLD AUTO: 0.5 % (ref 0–0.9)
LYMPHOCYTES # BLD MANUAL: 0.1 X10*3/UL (ref 1.2–4.8)
LYMPHOCYTES NFR BLD MANUAL: 1 %
MAGNESIUM SERPL-MCNC: 1.65 MG/DL (ref 1.6–2.4)
MCH RBC QN AUTO: 28.7 PG (ref 26–34)
MCHC RBC AUTO-ENTMCNC: 30.8 G/DL (ref 32–36)
MCV RBC AUTO: 93 FL (ref 80–100)
METAMYELOCYTES # BLD MANUAL: 0.1 X10*3/UL
METAMYELOCYTES NFR BLD MANUAL: 1 %
MONOCYTES # BLD MANUAL: 0.42 X10*3/UL (ref 0.1–1)
MONOCYTES NFR BLD MANUAL: 4 %
NEUTROPHILS # BLD MANUAL: 9.26 X10*3/UL (ref 1.2–7.7)
NEUTS BAND # BLD MANUAL: 3.33 X10*3/UL (ref 0–0.7)
NEUTS BAND NFR BLD MANUAL: 32 %
NEUTS SEG # BLD MANUAL: 5.93 X10*3/UL (ref 1.2–7)
NEUTS SEG NFR BLD MANUAL: 57 %
NEUTS VAC BLD QL SMEAR: PRESENT
NRBC BLD-RTO: 0 /100 WBCS (ref 0–0)
OVALOCYTES BLD QL SMEAR: ABNORMAL
PHOSPHATE SERPL-MCNC: 2.5 MG/DL (ref 2.5–4.9)
PLATELET # BLD AUTO: 237 X10*3/UL (ref 150–450)
POTASSIUM SERPL-SCNC: 3.7 MMOL/L (ref 3.5–5.3)
PROT SERPL-MCNC: 5.9 G/DL (ref 6.4–8.2)
RBC # BLD AUTO: 3.34 X10*6/UL (ref 4–5.2)
RBC MORPH BLD: ABNORMAL
SODIUM SERPL-SCNC: 129 MMOL/L (ref 136–145)
STOMATOCYTES BLD QL SMEAR: ABNORMAL
TOTAL CELLS COUNTED BLD: 100
WBC # BLD AUTO: 10.4 X10*3/UL (ref 4.4–11.3)

## 2024-02-02 PROCEDURE — 36415 COLL VENOUS BLD VENIPUNCTURE: CPT

## 2024-02-02 PROCEDURE — 99222 1ST HOSP IP/OBS MODERATE 55: CPT | Performed by: NURSE PRACTITIONER

## 2024-02-02 PROCEDURE — 2500000004 HC RX 250 GENERAL PHARMACY W/ HCPCS (ALT 636 FOR OP/ED)

## 2024-02-02 PROCEDURE — 2500000004 HC RX 250 GENERAL PHARMACY W/ HCPCS (ALT 636 FOR OP/ED): Performed by: STUDENT IN AN ORGANIZED HEALTH CARE EDUCATION/TRAINING PROGRAM

## 2024-02-02 PROCEDURE — 84075 ASSAY ALKALINE PHOSPHATASE: CPT

## 2024-02-02 PROCEDURE — 85652 RBC SED RATE AUTOMATED: CPT

## 2024-02-02 PROCEDURE — 85007 BL SMEAR W/DIFF WBC COUNT: CPT

## 2024-02-02 PROCEDURE — 84100 ASSAY OF PHOSPHORUS: CPT

## 2024-02-02 PROCEDURE — 99285 EMERGENCY DEPT VISIT HI MDM: CPT | Performed by: EMERGENCY MEDICINE

## 2024-02-02 PROCEDURE — 2500000004 HC RX 250 GENERAL PHARMACY W/ HCPCS (ALT 636 FOR OP/ED): Performed by: NURSE PRACTITIONER

## 2024-02-02 PROCEDURE — 87205 SMEAR GRAM STAIN: CPT | Mod: GEALAB

## 2024-02-02 PROCEDURE — 93005 ELECTROCARDIOGRAM TRACING: CPT

## 2024-02-02 PROCEDURE — 2500000001 HC RX 250 WO HCPCS SELF ADMINISTERED DRUGS (ALT 637 FOR MEDICARE OP): Performed by: NURSE PRACTITIONER

## 2024-02-02 PROCEDURE — A4217 STERILE WATER/SALINE, 500 ML: HCPCS

## 2024-02-02 PROCEDURE — 99215 OFFICE O/P EST HI 40 MIN: CPT | Performed by: ORTHOPAEDIC SURGERY

## 2024-02-02 PROCEDURE — 99221 1ST HOSP IP/OBS SF/LOW 40: CPT | Performed by: STUDENT IN AN ORGANIZED HEALTH CARE EDUCATION/TRAINING PROGRAM

## 2024-02-02 PROCEDURE — 83735 ASSAY OF MAGNESIUM: CPT

## 2024-02-02 PROCEDURE — 96365 THER/PROPH/DIAG IV INF INIT: CPT

## 2024-02-02 PROCEDURE — 86140 C-REACTIVE PROTEIN: CPT

## 2024-02-02 PROCEDURE — 85027 COMPLETE CBC AUTOMATED: CPT

## 2024-02-02 PROCEDURE — 1100000001 HC PRIVATE ROOM DAILY

## 2024-02-02 RX ORDER — LORATADINE 10 MG/1
10 TABLET ORAL DAILY
Status: DISCONTINUED | OUTPATIENT
Start: 2024-02-02 | End: 2024-02-11 | Stop reason: HOSPADM

## 2024-02-02 RX ORDER — PAROXETINE HYDROCHLORIDE HEMIHYDRATE 25 MG/1
25 TABLET, FILM COATED, EXTENDED RELEASE ORAL EVERY MORNING
COMMUNITY
End: 2024-02-11 | Stop reason: HOSPADM

## 2024-02-02 RX ORDER — METOPROLOL SUCCINATE 25 MG/1
25 TABLET, EXTENDED RELEASE ORAL DAILY
Status: DISCONTINUED | OUTPATIENT
Start: 2024-02-02 | End: 2024-02-11 | Stop reason: HOSPADM

## 2024-02-02 RX ORDER — LISINOPRIL 10 MG/1
10 TABLET ORAL EVERY MORNING
COMMUNITY
End: 2024-03-05 | Stop reason: HOSPADM

## 2024-02-02 RX ORDER — MONTELUKAST SODIUM 10 MG/1
10 TABLET ORAL EVERY MORNING
Status: DISCONTINUED | OUTPATIENT
Start: 2024-02-03 | End: 2024-02-11 | Stop reason: HOSPADM

## 2024-02-02 RX ORDER — ENOXAPARIN SODIUM 100 MG/ML
40 INJECTION SUBCUTANEOUS EVERY 24 HOURS
Status: DISCONTINUED | OUTPATIENT
Start: 2024-02-02 | End: 2024-02-11 | Stop reason: HOSPADM

## 2024-02-02 RX ORDER — NAPROXEN SODIUM 220 MG/1
81 TABLET, FILM COATED ORAL 2 TIMES DAILY
Status: DISCONTINUED | OUTPATIENT
Start: 2024-02-02 | End: 2024-02-11 | Stop reason: HOSPADM

## 2024-02-02 RX ORDER — TRAZODONE HYDROCHLORIDE 50 MG/1
100 TABLET ORAL NIGHTLY
Status: DISCONTINUED | OUTPATIENT
Start: 2024-02-02 | End: 2024-02-11 | Stop reason: HOSPADM

## 2024-02-02 RX ORDER — LISINOPRIL 10 MG/1
10 TABLET ORAL EVERY MORNING
Status: DISCONTINUED | OUTPATIENT
Start: 2024-02-03 | End: 2024-02-11 | Stop reason: HOSPADM

## 2024-02-02 RX ORDER — IBUPROFEN 200 MG
1 TABLET ORAL DAILY
Status: DISCONTINUED | OUTPATIENT
Start: 2024-02-02 | End: 2024-02-11 | Stop reason: HOSPADM

## 2024-02-02 RX ORDER — LANOLIN ALCOHOL/MO/W.PET/CERES
400 CREAM (GRAM) TOPICAL DAILY
Status: DISCONTINUED | OUTPATIENT
Start: 2024-02-02 | End: 2024-02-11 | Stop reason: HOSPADM

## 2024-02-02 RX ORDER — SODIUM CHLORIDE 9 MG/ML
50 INJECTION, SOLUTION INTRAVENOUS CONTINUOUS
Status: DISCONTINUED | OUTPATIENT
Start: 2024-02-02 | End: 2024-02-02

## 2024-02-02 RX ORDER — ACETAMINOPHEN 325 MG/1
650 TABLET ORAL EVERY 4 HOURS PRN
Status: DISCONTINUED | OUTPATIENT
Start: 2024-02-02 | End: 2024-02-11 | Stop reason: HOSPADM

## 2024-02-02 RX ORDER — NAPROXEN 500 MG/1
500 TABLET ORAL
Status: DISCONTINUED | OUTPATIENT
Start: 2024-02-02 | End: 2024-02-11 | Stop reason: HOSPADM

## 2024-02-02 RX ORDER — FERROUS SULFATE 325(65) MG
1 TABLET ORAL DAILY
Status: DISCONTINUED | OUTPATIENT
Start: 2024-02-02 | End: 2024-02-11 | Stop reason: HOSPADM

## 2024-02-02 RX ORDER — POLYETHYLENE GLYCOL 3350 17 G/17G
17 POWDER, FOR SOLUTION ORAL DAILY
Status: DISCONTINUED | OUTPATIENT
Start: 2024-02-02 | End: 2024-02-11 | Stop reason: HOSPADM

## 2024-02-02 RX ORDER — ACETAMINOPHEN 160 MG/5ML
650 SOLUTION ORAL EVERY 4 HOURS PRN
Status: DISCONTINUED | OUTPATIENT
Start: 2024-02-02 | End: 2024-02-11 | Stop reason: HOSPADM

## 2024-02-02 RX ORDER — PAROXETINE HYDROCHLORIDE 20 MG/1
20 TABLET, FILM COATED ORAL DAILY
Status: DISCONTINUED | OUTPATIENT
Start: 2024-02-02 | End: 2024-02-03

## 2024-02-02 RX ORDER — CEFAZOLIN SODIUM 2 G/100ML
2 INJECTION, SOLUTION INTRAVENOUS EVERY 8 HOURS
Status: DISCONTINUED | OUTPATIENT
Start: 2024-02-02 | End: 2024-02-02

## 2024-02-02 RX ORDER — CEFTRIAXONE 1 G/50ML
1 INJECTION, SOLUTION INTRAVENOUS ONCE
Status: DISCONTINUED | OUTPATIENT
Start: 2024-02-02 | End: 2024-02-02

## 2024-02-02 RX ORDER — PAROXETINE HYDROCHLORIDE 20 MG/1
40 TABLET, FILM COATED ORAL DAILY
Status: DISCONTINUED | OUTPATIENT
Start: 2024-02-02 | End: 2024-02-11 | Stop reason: HOSPADM

## 2024-02-02 RX ORDER — ACETAMINOPHEN 325 MG/1
975 TABLET ORAL EVERY 8 HOURS
Status: DISCONTINUED | OUTPATIENT
Start: 2024-02-02 | End: 2024-02-11 | Stop reason: HOSPADM

## 2024-02-02 RX ORDER — ACETAMINOPHEN 650 MG/1
650 SUPPOSITORY RECTAL EVERY 4 HOURS PRN
Status: DISCONTINUED | OUTPATIENT
Start: 2024-02-02 | End: 2024-02-11 | Stop reason: HOSPADM

## 2024-02-02 RX ORDER — PAROXETINE HYDROCHLORIDE HEMIHYDRATE 25 MG/1
25 TABLET, FILM COATED, EXTENDED RELEASE ORAL NIGHTLY
Status: ON HOLD | COMMUNITY
End: 2024-02-02 | Stop reason: DRUGHIGH

## 2024-02-02 RX ORDER — OXYCODONE AND ACETAMINOPHEN 5; 325 MG/1; MG/1
1 TABLET ORAL EVERY 4 HOURS PRN
Status: DISCONTINUED | OUTPATIENT
Start: 2024-02-02 | End: 2024-02-11 | Stop reason: HOSPADM

## 2024-02-02 RX ORDER — VANCOMYCIN HYDROCHLORIDE 1 G/200ML
1000 INJECTION, SOLUTION INTRAVENOUS EVERY 12 HOURS
Status: DISCONTINUED | OUTPATIENT
Start: 2024-02-03 | End: 2024-02-04 | Stop reason: DRUGHIGH

## 2024-02-02 RX ORDER — PANTOPRAZOLE SODIUM 40 MG/1
40 TABLET, DELAYED RELEASE ORAL
Status: DISCONTINUED | OUTPATIENT
Start: 2024-02-03 | End: 2024-02-04

## 2024-02-02 RX ORDER — CEFTRIAXONE 2 G/50ML
2 INJECTION, SOLUTION INTRAVENOUS EVERY 24 HOURS
Status: DISCONTINUED | OUTPATIENT
Start: 2024-02-02 | End: 2024-02-04

## 2024-02-02 RX ADMIN — PAROXETINE HYDROCHLORIDE 40 MG: 20 TABLET, FILM COATED ORAL at 18:16

## 2024-02-02 RX ADMIN — LORATADINE 10 MG: 10 TABLET ORAL at 18:21

## 2024-02-02 RX ADMIN — METOPROLOL SUCCINATE 25 MG: 25 TABLET, EXTENDED RELEASE ORAL at 18:16

## 2024-02-02 RX ADMIN — PAROXETINE HYDROCHLORIDE 20 MG: 20 TABLET, FILM COATED ORAL at 18:21

## 2024-02-02 RX ADMIN — ASPIRIN 81 MG CHEWABLE TABLET 81 MG: 81 TABLET CHEWABLE at 20:42

## 2024-02-02 RX ADMIN — CEFTRIAXONE SODIUM 2 G: 2 INJECTION, SOLUTION INTRAVENOUS at 20:42

## 2024-02-02 RX ADMIN — TRAZODONE HYDROCHLORIDE 100 MG: 50 TABLET ORAL at 20:42

## 2024-02-02 RX ADMIN — ACETAMINOPHEN 975 MG: 325 TABLET ORAL at 15:09

## 2024-02-02 RX ADMIN — SODIUM CHLORIDE 1000 ML: 9 INJECTION, SOLUTION INTRAVENOUS at 15:12

## 2024-02-02 RX ADMIN — ACETAMINOPHEN 650 MG: 325 TABLET ORAL at 20:48

## 2024-02-02 RX ADMIN — VANCOMYCIN HYDROCHLORIDE 1500 MG: 5 INJECTION, POWDER, LYOPHILIZED, FOR SOLUTION INTRAVENOUS at 14:57

## 2024-02-02 RX ADMIN — CEFAZOLIN SODIUM 2 G: 2 INJECTION, SOLUTION INTRAVENOUS at 13:31

## 2024-02-02 RX ADMIN — FERROUS SULFATE TAB 325 MG (65 MG ELEMENTAL FE) 1 TABLET: 325 (65 FE) TAB at 18:16

## 2024-02-02 SDOH — SOCIAL STABILITY: SOCIAL INSECURITY: ARE YOU OR HAVE YOU BEEN THREATENED OR ABUSED PHYSICALLY, EMOTIONALLY, OR SEXUALLY BY ANYONE?: NO

## 2024-02-02 SDOH — SOCIAL STABILITY: SOCIAL INSECURITY: HAVE YOU HAD THOUGHTS OF HARMING ANYONE ELSE?: NO

## 2024-02-02 SDOH — SOCIAL STABILITY: SOCIAL INSECURITY: DO YOU FEEL UNSAFE GOING BACK TO THE PLACE WHERE YOU ARE LIVING?: NO

## 2024-02-02 SDOH — SOCIAL STABILITY: SOCIAL INSECURITY: HAS ANYONE EVER THREATENED TO HURT YOUR FAMILY OR YOUR PETS?: NO

## 2024-02-02 SDOH — SOCIAL STABILITY: SOCIAL INSECURITY: DO YOU FEEL ANYONE HAS EXPLOITED OR TAKEN ADVANTAGE OF YOU FINANCIALLY OR OF YOUR PERSONAL PROPERTY?: NO

## 2024-02-02 SDOH — SOCIAL STABILITY: SOCIAL INSECURITY: WERE YOU ABLE TO COMPLETE ALL THE BEHAVIORAL HEALTH SCREENINGS?: YES

## 2024-02-02 SDOH — SOCIAL STABILITY: SOCIAL INSECURITY: ARE THERE ANY APPARENT SIGNS OF INJURIES/BEHAVIORS THAT COULD BE RELATED TO ABUSE/NEGLECT?: NO

## 2024-02-02 SDOH — SOCIAL STABILITY: SOCIAL INSECURITY: ABUSE: ADULT

## 2024-02-02 SDOH — SOCIAL STABILITY: SOCIAL INSECURITY: DOES ANYONE TRY TO KEEP YOU FROM HAVING/CONTACTING OTHER FRIENDS OR DOING THINGS OUTSIDE YOUR HOME?: NO

## 2024-02-02 ASSESSMENT — PAIN SCALES - GENERAL
PAINLEVEL_OUTOF10: 6
PAINLEVEL_OUTOF10: 5 - MODERATE PAIN
PAINLEVEL_OUTOF10: 0 - NO PAIN

## 2024-02-02 ASSESSMENT — COGNITIVE AND FUNCTIONAL STATUS - GENERAL
DAILY ACTIVITIY SCORE: 16
DAILY ACTIVITIY SCORE: 16
HELP NEEDED FOR BATHING: A LOT
PERSONAL GROOMING: A LITTLE
EATING MEALS: A LITTLE
MOVING TO AND FROM BED TO CHAIR: A LOT
MOVING FROM LYING ON BACK TO SITTING ON SIDE OF FLAT BED WITH BEDRAILS: A LOT
STANDING UP FROM CHAIR USING ARMS: TOTAL
CLIMB 3 TO 5 STEPS WITH RAILING: TOTAL
EATING MEALS: A LITTLE
DRESSING REGULAR UPPER BODY CLOTHING: A LITTLE
DRESSING REGULAR UPPER BODY CLOTHING: A LITTLE
WALKING IN HOSPITAL ROOM: TOTAL
TOILETING: A LITTLE
MOBILITY SCORE: 9
PATIENT BASELINE BEDBOUND: NO
WALKING IN HOSPITAL ROOM: TOTAL
HELP NEEDED FOR BATHING: A LOT
TURNING FROM BACK TO SIDE WHILE IN FLAT BAD: A LOT
MOVING FROM LYING ON BACK TO SITTING ON SIDE OF FLAT BED WITH BEDRAILS: A LOT
DRESSING REGULAR LOWER BODY CLOTHING: A LOT
DRESSING REGULAR LOWER BODY CLOTHING: A LOT
STANDING UP FROM CHAIR USING ARMS: TOTAL
MOBILITY SCORE: 9
TURNING FROM BACK TO SIDE WHILE IN FLAT BAD: A LOT
MOVING TO AND FROM BED TO CHAIR: A LOT
TOILETING: A LITTLE
PERSONAL GROOMING: A LITTLE
CLIMB 3 TO 5 STEPS WITH RAILING: TOTAL

## 2024-02-02 ASSESSMENT — ENCOUNTER SYMPTOMS
FEVER: 0
CONSTIPATION: 0
FATIGUE: 0
NAUSEA: 0
ABDOMINAL PAIN: 0
DIARRHEA: 0
SHORTNESS OF BREATH: 0
CONFUSION: 0
APPETITE CHANGE: 0
NUMBNESS: 0
VOMITING: 0
SPEECH DIFFICULTY: 0
DYSURIA: 0
DIFFICULTY URINATING: 0
COLOR CHANGE: 1
HEADACHES: 0
WOUND: 1
ARTHRALGIAS: 1
CHOKING: 0

## 2024-02-02 ASSESSMENT — ACTIVITIES OF DAILY LIVING (ADL)
JUDGMENT_ADEQUATE_SAFELY_COMPLETE_DAILY_ACTIVITIES: YES
LACK_OF_TRANSPORTATION: NO
GROOMING: NEEDS ASSISTANCE
DRESSING YOURSELF: NEEDS ASSISTANCE
HEARING - LEFT EAR: FUNCTIONAL
PATIENT'S MEMORY ADEQUATE TO SAFELY COMPLETE DAILY ACTIVITIES?: YES
FEEDING YOURSELF: NEEDS ASSISTANCE
BATHING: NEEDS ASSISTANCE
ADEQUATE_TO_COMPLETE_ADL: YES
WALKS IN HOME: DEPENDENT
ASSISTIVE_DEVICE: EYEGLASSES
TOILETING: NEEDS ASSISTANCE
HEARING - RIGHT EAR: FUNCTIONAL
LACK_OF_TRANSPORTATION: NO

## 2024-02-02 ASSESSMENT — LIFESTYLE VARIABLES
HOW OFTEN DO YOU HAVE A DRINK CONTAINING ALCOHOL: NEVER
EVER FELT BAD OR GUILTY ABOUT YOUR DRINKING: NO
HAVE YOU EVER FELT YOU SHOULD CUT DOWN ON YOUR DRINKING: NO
HOW MANY STANDARD DRINKS CONTAINING ALCOHOL DO YOU HAVE ON A TYPICAL DAY: PATIENT DOES NOT DRINK
SKIP TO QUESTIONS 9-10: 1
AUDIT-C TOTAL SCORE: 0
HAVE PEOPLE ANNOYED YOU BY CRITICIZING YOUR DRINKING: NO
AUDIT-C TOTAL SCORE: 0
HOW OFTEN DO YOU HAVE 6 OR MORE DRINKS ON ONE OCCASION: NEVER
EVER HAD A DRINK FIRST THING IN THE MORNING TO STEADY YOUR NERVES TO GET RID OF A HANGOVER: NO

## 2024-02-02 ASSESSMENT — PAIN - FUNCTIONAL ASSESSMENT
PAIN_FUNCTIONAL_ASSESSMENT: 0-10

## 2024-02-02 ASSESSMENT — PAIN DESCRIPTION - DESCRIPTORS: DESCRIPTORS: ACHING

## 2024-02-02 ASSESSMENT — COLUMBIA-SUICIDE SEVERITY RATING SCALE - C-SSRS
1. IN THE PAST MONTH, HAVE YOU WISHED YOU WERE DEAD OR WISHED YOU COULD GO TO SLEEP AND NOT WAKE UP?: NO
6. HAVE YOU EVER DONE ANYTHING, STARTED TO DO ANYTHING, OR PREPARED TO DO ANYTHING TO END YOUR LIFE?: NO
2. HAVE YOU ACTUALLY HAD ANY THOUGHTS OF KILLING YOURSELF?: NO

## 2024-02-02 ASSESSMENT — PATIENT HEALTH QUESTIONNAIRE - PHQ9
2. FEELING DOWN, DEPRESSED OR HOPELESS: NOT AT ALL
1. LITTLE INTEREST OR PLEASURE IN DOING THINGS: NOT AT ALL
SUM OF ALL RESPONSES TO PHQ9 QUESTIONS 1 & 2: 0

## 2024-02-02 ASSESSMENT — PAIN DESCRIPTION - ORIENTATION: ORIENTATION: RIGHT

## 2024-02-02 ASSESSMENT — PAIN DESCRIPTION - LOCATION: LOCATION: HIP

## 2024-02-02 ASSESSMENT — PAIN DESCRIPTION - PAIN TYPE: TYPE: ACUTE PAIN

## 2024-02-02 NOTE — PROGRESS NOTES
This is a consultation from Dr. Henry Olmos DO for   Chief Complaint   Patient presents with    Right Hip - Follow-up     1/5/24 RT TOTAL HIP REVISION       This is a 48 y.o. female who presents for follow-up for her right hip.  Patient had a right total hip done about a month ago, it was complicated initially by periprosthetic fracture of the right femur.  She comes in for follow-up today.  Patient states has been drainage from the hip since I saw her last.  She has been on antibiotics.  She denies any fevers or chills.  Her pain is under control.  She has been nursing home.    Physical Exam    There has been no interval change in this patient's past medical, surgical, medications, allergies, family history or social history since the most recent visit to a provider within our department. 14 point review of systems was performed, reviewed, and negative except for pertinent positives documented in the history of present illness.     Constitutional: well developed, well nourished female in no acute distress  Psychiatric: normal mood, appropriate affect  Eyes: sclera anicteric  HENT: normocephalic/atraumatic  CV: regular rate and rhythm   Respiratory: non labored breathing  Integumentary: no rash  Neurological: moves all extremities    Right hip examination: Incision approximated approximately, there is purulent drainage from the distal aspect of the incision and some erythema surrounding that.  No significant pain with range of motion        Procedures      Impression/Plan: This is a 48 y.o. female status post revision right total hip for periprosthetic fracture now with purulent drainage and a superficial wound infection.  I discussed the situation in detail with the patient including the risk benefits and alternatives of various treatment options.  Given the significant drainage and the extensive surgery that she had at this point I recommend operative debridement in order to prevent worsening deep infection.   The benefit of this would be clearance of infection and prevention of further worsening infection and the necessity for further procedures.  The risks are extensive, the risks are listed below however there are significantly increased risks with revision surgery especially risk of recurrent or persistent infection periprosthetic fracture instability.  The patient is aware of this and she would still like to go ahead.  We discussed the risks of complications as well as the risks of morbidity and mortality related to surgical treatment with total joint replacement. We reviewed the fact that total joint replacement is major elective surgery with significant associated surgical and procedural risk factors as detailed below.   Risks include: Pain, blood loss, damage to nearby anatomical structures including but not limited to nerves or blood vessels muscles tendons and bone, failure surgery to ameliorate symptoms, persistence of pain surrounding the affected joint, mechanical failure of the prosthesis including but not limited to loosening of the prosthesis from bone ,breakage of the prosthesis and dislocation of the prosthesis, change in length or appearance of a limb, infection possibly necessitating further surgery, removal of the prosthesis, or limb amputation, the need for additional surgery for other reasons, blood clots, amputation, and death. No guarantees were implied or given.  All questions were answered and the patient voiced their understanding.     Will plan to get her admitted to the hospital and start IV antibiotics.  She may bear weight as tolerated.  Plan is for I&D and with exchange of modular components followed by extended antibiotics.    BMI Readings from Last 1 Encounters:   02/02/24 30.56 kg/m²      Lab Results   Component Value Date    CREATININE 0.29 (L) 01/13/2024     Tobacco Use: High Risk (2/2/2024)    Patient History     Smoking Tobacco Use: Every Day     Smokeless Tobacco Use: Never      "Passive Exposure: Not on file      Computed MELD 3.0 unavailable. Necessary lab results were not found in the last year.  Computed MELD-Na unavailable. Necessary lab results were not found in the last year.       No results found for: \"HGBA1C\"  Lab Results   Component Value Date    STAPHMRSASCR No Staphylococcus aureus isolated 12/28/2023     "
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
## 2024-02-02 NOTE — PROGRESS NOTES
02/02/24 1506   Discharge Planning   Living Arrangements Alone;Other (Comment)  (presented from [facility] skilled)   Support Systems Friends/neighbors   Assistance Needed A&OX3; requires assist with ADLs and wheelchair - patient stated she doesn't walk; room air at baseline and currently on room air   Type of Residence Skilled nursing facility;Private residence   Number of Stairs to Enter Residence 0   Number of Stairs Within Residence 0   Do you have animals or pets at home? Yes   Type of Animals or Pets Cat   Who is requesting discharge planning? Provider   Patient expects to be discharged to: return to [facility] upon precert (may need surgery? wound vac? IV antibiotic?)   Does the patient need discharge transport arranged? Yes   RoundTrip coordination needed? Yes   Has discharge transport been arranged? No   Financial Resource Strain   How hard is it for you to pay for the very basics like food, housing, medical care, and heating? Not hard   Housing Stability   In the last 12 months, was there a time when you were not able to pay the mortgage or rent on time? N   In the last 12 months, how many places have you lived? 1   In the last 12 months, was there a time when you did not have a steady place to sleep or slept in a shelter (including now)? N   Transportation Needs   In the past 12 months, has lack of transportation kept you from medical appointments or from getting medications? no   In the past 12 months, has lack of transportation kept you from meetings, work, or from getting things needed for daily living? No        02/05/24 1212   Discharge Planning   Living Arrangements Alone;Other (Comment)  (presented from [facility] skilled)   Support Systems Friends/neighbors   Assistance Needed A&OX3; requires assist with ADLs and wheelchair - patient stated she doesn't walk; room air at baseline and currently on room air   Type of  Residence Skilled nursing facility;Private residence   Number of Stairs to Enter Residence 0   Number of Stairs Within Residence 0   Do you have animals or pets at home? Yes   Type of Animals or Pets Cat   Who is requesting discharge planning? Provider   Financial Resource Strain   How hard is it for you to pay for the very basics like food, housing, medical care, and heating? Not hard   Housing Stability   In the last 12 months, was there a time when you were not able to pay the mortgage or rent on time? N   In the last 12 months, how many places have you lived? 1   In the last 12 months, was there a time when you did not have a steady place to sleep or slept in a shelter (including now)? N   Transportation Needs   In the past 12 months, has lack of transportation kept you from medical appointments or from getting medications? no   In the past 12 months, has lack of transportation kept you from meetings, work, or from getting things needed for daily living? No      02/09/24 0650   Discharge Planning   Living Arrangements Alone;Other (Comment)  (presented from [facility] skilled)   Support Systems Friends/neighbors   Assistance Needed A&OX3; requires assist with ADLs and wheelchair - patient stated she doesn't walk; room air at baseline and currently on room air   Type of Residence Skilled nursing facility;Private residence   Number of Stairs to Enter Residence 0   Number of Stairs Within Residence 0   Do you have animals or pets at home? Yes   Type of Animals or Pets Cat   Who is requesting discharge planning? Provider   Patient expects to be discharged to: [facility]   Financial Resource Strain   How hard is it for you to pay for the very basics like food, housing, medical care, and heating? Not hard   Housing Stability   In the last 12 months, was there a time when you were not able to pay the mortgage or rent on time? N   In the last 12 months, how many places have you  lived? 1   In the last 12 months, was there a time when you did not have a steady place to sleep or slept in a shelter (including now)? N   Transportation Needs   In the past 12 months, has lack of transportation kept you from medical appointments or from getting medications? no   In the past 12 months, has lack of transportation kept you from meetings, work, or from getting things needed for daily living? No     02/02/2024 1509pm  Spoke with patient in ED. Patient stated she is from [facility] skilled and verified by phone call to facility. Patient with [employer] insurance so precert needed to transition back to facility. Referral sent via [facility] to facility for them to follow for return.     02/05/2024 1212pm  Spoke with patient bedside. Patient willing to return to [facility] SN[facility] when medically ready. Will continue to follow.     02/09/2024 0652am  Precert started 02/08/2024 for return to [facility] SNF and auth obtained late yesterday. Per attending physicians, patient ready to transition back to facility today. In anticipation of needed discharge documentation and dc, transport requested for 11am stretcher transport. Prevena wound vac needs to be applied prior to discharge. Awaiting transport time conformation and discharge documentation.     02/09/2024 0815am  Per request of medical team, 11am transport cancelled and now requesting a 4pm stretcher.     02/09/2024 0836am  No discharge today per ortho request. Transport cancelled for today. Facility and floor made aware.

## 2024-02-02 NOTE — Clinical Note
Patient came to the ED from Dr. Jalloh's office with right hip arthroplasty on infection, effusion for high ESR CRP, hyponatremia, wound culture and blood culture ordered orthopedics wound care and ID consulted, 1 dose of vancomycin and cefazolin gi smiley with 1 L of normal saline bolus.

## 2024-02-02 NOTE — H&P
History Of Present Illness  Blanca Hamilton is a 48 y.o. female with past medical history significant for GERD, HTN, HLD who presented to the hospital at the recommendation of orthopedic surgery due to concern for infected right hip.  Patient underwent right total hip arthroplasty on 1/2/2024, postop course was complicated by femoral fracture requiring revision on 1/5/24.  She was discharged to SNF and had been doing okay.  She presented for postop follow-up at the end of January was started on doxycycline, she went back to the office today and was instructed to come to the ER for concern for joint infection.  She is complaining of pain.  She denies fever or chills.  No other significant symptoms.  She is having a large amount of drainage from the site.    Past medical history as above  Past surgical history left total hip arthroplasty, left periprosthetic femur repair, left knee arthroplasty    Past social history: Has been wheelchair dependent for several years.  She reports smoking 1 to 2 packs/day.  Denies alcohol or illicit drug use.  Currently living at skilled nursing facility during postoperative period    Family History  No family history on file.     Allergies  Patient has no known allergies.    Review of Systems   Constitutional:  Negative for appetite change, fatigue and fever.   HENT:  Negative for congestion.    Respiratory:  Negative for choking and shortness of breath.    Cardiovascular:  Negative for chest pain and leg swelling.   Gastrointestinal:  Negative for abdominal pain, constipation, diarrhea, nausea and vomiting.   Genitourinary:  Negative for difficulty urinating and dysuria.   Musculoskeletal:  Positive for arthralgias and gait problem.        Right hip pain, right hip incision drainage and erythema   Skin:  Positive for color change and wound.   Neurological:  Negative for speech difficulty, numbness and headaches.   Psychiatric/Behavioral:  Negative for confusion.         Physical  Exam  Constitutional: pt is restless, repositioning frequently due to pain   Eyes: clear sclera, making eye contact   Respiratory/Thorax: CTA bilaterally, no acute respiratory distress  Cardiovascular: mildly tachycardic, regular rhythm  Gastrointestinal: Nondistended, soft, non-tender  Musculoskeletal: chronic appearing edema BLE   Neurological: alert, answering questions appropriately, speech is clear and fluent   Psychological: Appropriate mood and behavior  Skin: Warm and dry, right hip incision with surrounding erythema and significant serosanguinous drainage    Last Recorded Vitals  /61   Pulse (!) 105   Temp 37 °C (98.6 °F) (Tympanic)   Resp (!) 22   Wt 83.3 kg (183 lb 10.3 oz)   SpO2 98%     Relevant Results  Scheduled medications  acetaminophen, 975 mg, oral, q8h  HYDROmorphone, 0.2 mg, intravenous, Once  sodium chloride, 1,000 mL, intravenous, Once  vancomycin, 1,500 mg, intravenous, Once      Continuous medications     PRN medications  PRN medications: HYDROmorphone    Results for orders placed or performed during the hospital encounter of 02/02/24 (from the past 24 hour(s))   CBC and Auto Differential   Result Value Ref Range    WBC 10.4 4.4 - 11.3 x10*3/uL    nRBC 0.0 0.0 - 0.0 /100 WBCs    RBC 3.34 (L) 4.00 - 5.20 x10*6/uL    Hemoglobin 9.6 (L) 12.0 - 16.0 g/dL    Hematocrit 31.2 (L) 36.0 - 46.0 %    MCV 93 80 - 100 fL    MCH 28.7 26.0 - 34.0 pg    MCHC 30.8 (L) 32.0 - 36.0 g/dL    RDW 23.0 (H) 11.5 - 14.5 %    Platelets 237 150 - 450 x10*3/uL    Immature Granulocytes %, Automated 0.5 0.0 - 0.9 %    Immature Granulocytes Absolute, Automated 0.05 0.00 - 0.70 x10*3/uL   Comprehensive metabolic panel   Result Value Ref Range    Glucose 107 (H) 74 - 99 mg/dL    Sodium 129 (L) 136 - 145 mmol/L    Potassium 3.7 3.5 - 5.3 mmol/L    Chloride 96 (L) 98 - 107 mmol/L    Bicarbonate 25 21 - 32 mmol/L    Anion Gap 12 10 - 20 mmol/L    Urea Nitrogen 18 6 - 23 mg/dL    Creatinine 0.55 0.50 - 1.05 mg/dL     eGFR >90 >60 mL/min/1.73m*2    Calcium 8.4 (L) 8.6 - 10.3 mg/dL    Albumin 3.3 (L) 3.4 - 5.0 g/dL    Alkaline Phosphatase 124 (H) 33 - 110 U/L    Total Protein 5.9 (L) 6.4 - 8.2 g/dL    AST 14 9 - 39 U/L    Bilirubin, Total 0.5 0.0 - 1.2 mg/dL    ALT 12 7 - 45 U/L   Phosphorus   Result Value Ref Range    Phosphorus 2.5 2.5 - 4.9 mg/dL   Magnesium   Result Value Ref Range    Magnesium 1.65 1.60 - 2.40 mg/dL   C-Reactive Protein   Result Value Ref Range    C-Reactive Protein 23.75 (H) <1.00 mg/dL   Sedimentation Rate   Result Value Ref Range    Sedimentation Rate 24 (H) 0 - 20 mm/h   Manual Differential   Result Value Ref Range    Neutrophils %, Manual 57.0 40.0 - 80.0 %    Bands %, Manual 32.0 0.0 - 5.0 %    Lymphocytes %, Manual 1.0 13.0 - 44.0 %    Monocytes %, Manual 4.0 2.0 - 10.0 %    Eosinophils %, Manual 5.0 0.0 - 6.0 %    Basophils %, Manual 0.0 0.0 - 2.0 %    Metamyelocytes %, Manual 1.0 0.0 - 0.0 %    Seg Neutrophils Absolute, Manual 5.93 1.20 - 7.00 x10*3/uL    Bands Absolute, Manual 3.33 (H) 0.00 - 0.70 x10*3/uL    Lymphocytes Absolute, Manual 0.10 (L) 1.20 - 4.80 x10*3/uL    Monocytes Absolute, Manual 0.42 0.10 - 1.00 x10*3/uL    Eosinophils Absolute, Manual 0.52 0.00 - 0.70 x10*3/uL    Basophils Absolute, Manual 0.00 0.00 - 0.10 x10*3/uL    Metamyelocytes Absolute, Manual 0.10 0.00 - 0.00 x10*3/uL    Total Cells Counted 100     Neutrophils Absolute, Manual 9.26 (H) 1.20 - 7.70 x10*3/uL    RBC Morphology See Below     Ovalocytes Few     Stomatocytes Few     Dohle Bodies Present     Vacuolated Neutrophils Present        ECG 12 lead    Result Date: 1/31/2024  Poor data quality, interpretation may be adversely affected Sinus tachycardia with short KY with occasional Premature ventricular complexes ST & T wave abnormality, consider lateral ischemia Abnormal ECG When compared with ECG of 11-JAN-2024 12:24, Premature ventricular complexes are now Present T wave inversion more evident in Lateral leads Confirmed  by Michael Nguyen ([id number]) on 1/31/2024 10:27:35 AM    XR hip right with pelvis when performed 2 or 3 views    Result Date: 1/27/2024  Interpreted By:  Yolie Tate, STUDY: Single view pelvis. Right hip, two views.   INDICATION: Signs/Symptoms:POST OP-RIGHT TOTAL HIP.   COMPARISON: 10/17/2023.   ACCESSION NUMBER(S): [id number]   ORDERING CLINICIAN: ETHEL MORALES   FINDINGS: Status post right total hip arthroplasty with cerclage wire fixation of the proximal femur. Hardware is intact without perihardware fractures or lucencies.   There is fragmentation of the greater trochanter which is likely postsurgical in etiology with attention on follow-up recommended.   Right acetabular protrusio. No malalignment.   Changes of left hip arthroplasty noted with prominent osteolysis of the medial acetabular wall similar to prior.       1. As above.   MACRO: None.   Signed by: Yolie Tate 1/27/2024 10:30 AM Dictation workstation:   [id number]  Lower extremity venous duplex right    Result Date: 1/12/2024  Interpreted By:  Marlys Mahajan, STUDY: [facility] US LOWER EXTREMITY VENOUS DUPLEX RIGHT  1/12/2024 12:10 am   INDICATION: 47 y/o   F with  Signs/Symptoms:RLE pain and edema. Status post right total hip arthroplasty.. LMP:  Unknown.   COMPARISON: None.   ACCESSION NUMBER(S): [id number]   ORDERING CLINICIAN: AMI BARNETT   TECHNIQUE: Routine ultrasound of the  right lower extremity was performed with duplex Doppler (color and spectral) evaluation.   Static images were obtained for remote interpretation.   FINDINGS: THIGH VEINS:  The common femoral, femoral, popliteal, proximal medial saphenous, and deep femoral veins are patent and free of thrombus. The veins are normally compressible.  They demonstrate normal phasic flow and augmentation response.   CALF VEINS:  The paired peroneal and posterior tibial calf veins are patent.       No deep venous thrombosis of the  right lower extremity.   MACRO: None   Signed by: Marlys  Zaina 1/12/2024 12:50 AM Dictation workstation:   [id number]     Assessment/Plan   Principal Problem:    Right hip pain  Active Problems:    Wound infection after surgery    Right prosthetic hip joint infection  Initial right ALEXA on 1/2/2024.  Revision on 1/5/2024 due to periprosthetic fracture.  Now with increased drainage and erythema from incision site.  CRP 24.  Wound culture sent from the emergency department  Significant drainage appreciated.  Wound care consult requested.  Orthopedic surgery planning joint washout next week, hopefully 2/5/24  Will consult ID for assistance with guidance of antibiotic choice and duration  Plan to continue with vancomycin and ceftriaxone for empiric coverage, started 2/2    Moderate hyponatremia, asymptomatic  Trend.  If further decline will obtain urine studies to assess etiology.    Pain regimen adjusted, suspect could be component of pain induced SIADH    Tobacco dependence  Continue nicotine patch.  Stress cessation to allow for adequate healing postop    Anxiety and depression  Dyslipidemia  GERD  Hypertension  Plan to continue home medications as appropriate once medication reconciliation is completed    CODE STATUS: Full code, confirmed on admission.    Dispo: >72 hours     Isabel Pantoja DO

## 2024-02-02 NOTE — CONSULTS
Reason For Consult  Right hip infection     History Of Present Illness  Blanca Hamilton is a 48 y.o. female with a past medical history of hypertension, GERD, hyperlipidemia, depression, anxiety who underwent a right total hip arthroplasty on 1/2/24 with Dr. Jalloh.  Post-operative course was complicated by a fracture of calcar and subsidence of stem.  She underwent a revision of that hip on 1/5/24.  She declined SNF at that time and was discharged home.  Per reports from PT documentation, patient with poor hygiene and living conditions.  She was then placed at SNF.  She was seen in the ortho clinic on 1/26/24 and was given course of Doxycycline.  She had a post-operative appointment with Dr. Jalloh today and purulent drainage was noted at the distal part of her incision.  There is moderate erythema surrounding incision, small amount of induration.  She was sent to the ER by Dr. Jalloh for admission and IV ABX.  Plan for washout on Monday or Tuesday next week.  Complaining of mild to moderate right hip and thigh pain.  Denies fever/chills, nausea/vomiting/chest pain/shortness of breath.      Past Medical History  She has a past medical history of Anxiety, Arthritis, Depression, GERD (gastroesophageal reflux disease), HTN (hypertension), Hyperlipidemia, Right hip pain, Sinusitis, and Transfusion history.    Surgical History  She has a past surgical history that includes Total hip arthroplasty (Left); Knee arthroscopy w/ debridement (Left); Tubal ligation; and Bladder suspension.     Social History  She reports that she has been smoking cigarettes. She has been smoking an average of 1 pack per day. She has never used smokeless tobacco. She reports that she does not currently use alcohol. She reports that she does not currently use drugs.    Family History  No family history on file.     Allergies  Patient has no known allergies.    Review of Systems  12 point ROS completed and no additional findings noted aside from what  is listed in the HPI.     Physical Exam  Physical Exam  Vitals reviewed.   HENT:      Head: Normocephalic and atraumatic.   Eyes:      Extraocular Movements: Extraocular movements intact.      Conjunctiva/sclera: Conjunctivae normal.      Pupils: Pupils are equal, round, and reactive to light.   Cardiovascular:      Rate and Rhythm: Normal rate and regular rhythm.      Pulses: Normal pulses.   Pulmonary:      Breath sounds: Normal breath sounds.   Abdominal:      General: Bowel sounds are normal.      Palpations: Abdomen is soft.   Musculoskeletal:      Comments: Right hip and lateral thigh incision with staples, moderate erythema, moderate amount of purulent drainage and malodorous small, warm to touch.  Leg and foot warm and well perfused, motion and sensations intact     Skin:     General: Skin is warm and dry.      Capillary Refill: Capillary refill takes less than 2 seconds.   Neurological:      General: No focal deficit present.      Mental Status: She is alert and oriented to person, place, and time.        Last Recorded Vitals  There were no vitals taken for this visit.    Relevant Results      Scheduled medications    Continuous medications    PRN medications    No results found for this or any previous visit (from the past 24 hour(s)).     Assessment/Plan   48 year old female, with right hip surgical site infection  - admit to medicine   - consult ID appreciate recs  - start [facility], pharmacy to dose  - Ancef 2 grams q8h   - plan for the OR for right hip wash out next week with Dr. Jalloh on Monday or Tuesday  - weight bearing as tolerated RLE  - will need extended antibiotics at DC  - ortho will continue to follow     Discussed with Dr. Shubham Fitch, APRN-CNP

## 2024-02-02 NOTE — PROGRESS NOTES
Pharmacy Medication History Review    Blanca Hamilton is a 48 y.o. female admitted for Right hip pain. Pharmacy reviewed the patient's [id number] medications and allergies for accuracy.    The list below reflectives the updated PTA list. Please review each medication in order reconciliation for additional clarification and justification.  Prior to Admission medications    Medication Sig Start Date End Date Taking? Authorizing Provider   ARIPiprazole (Abilify) 15 mg tablet Take 1 tablet (15 mg) by mouth once daily at bedtime.    Historical Provider, MD   aspirin 81 mg chewable tablet Chew 1 tablet (81 mg) 2 times a day for 28 days. 1/2/24 2/2/24  DEVON Bruno-CNP   cetirizine (ZyrTEC) 10 mg tablet Take 1 tablet (10 mg) by mouth once daily in the morning. 11/6/19   Historical Provider, MD   doxycycline (Vibramycin) 100 mg capsule Take 1 capsule (100 mg) by mouth 2 times a day for 7 days. Take with at least 8 ounces (large glass) of water, do not lie down for 30 minutes after  Patient taking differently: Take 1 capsule (100 mg) by mouth 2 times a day. Start date:1/27/24 x 7 days 1/26/24 2/2/24  Maggi Cowan PA-C   ferrous sulfate 325 (65 Fe) MG tablet Take 1 tablet by mouth early in the morning..    Historical Provider, MD   lisinopril 10 mg tablet Take 1 tablet (10 mg) by mouth once daily in the morning.    Historical Provider, MD   metoprolol succinate XL (Toprol-XL) 25 mg 24 hr tablet Take 1 tablet (25 mg) by mouth once daily. Do not crush or chew. Do not start before January 14, 2024. 1/14/24 2/13/24  DEVON Harris-CNP   montelukast (Singulair) 10 mg tablet Take 1 tablet (10 mg) by mouth once daily in the morning.    Historical Provider, MD   naproxen (Naprosyn) 500 mg tablet Take 1 tablet (500 mg) by mouth 2 times a day with meals. 1/2/24 2/4/24  DEVON Bruno-CNP   omeprazole (PriLOSEC) 40 mg DR capsule Take 1 capsule (40 mg) by mouth once daily. Do not crush or chew.    Historical  Provider, MD   oxyCODONE-acetaminophen (Percocet) 5-325 mg tablet Take 1 tablet by mouth every 4 hours if needed for severe pain (7 - 10). 1/13/24   DEVON Harris-CNP   PARoxetine CR (Paxil-CR) 25 mg 24 hr tablet Take 1 tablet (25 mg) by mouth once daily at bedtime. Do not crush, chew, or split.    Historical Provider, MD   PARoxetine CR (Paxil-CR) 37.5 mg 24 hr tablet Take 1 tablet (37.5 mg) by mouth once daily in the morning.    Historical Provider, MD   traZODone (Desyrel) 50 mg tablet Take 2 tablets (100 mg) by mouth once daily at bedtime.    Historical Provider, MD   lisinopril 40 mg tablet Take 10 mg by mouth once daily in the morning.  2/2/24  Historical Provider, MD   PARoxetine CR (Paxil-CR) 37.5 mg 24 hr tablet Take 1 tablet (37.5 mg) by mouth once daily in the morning. Do not crush, chew, or split.  2/2/24  Historical Provider, MD   simvastatin (Zocor) 20 mg tablet Take 1 tablet (20 mg) by mouth once daily at bedtime.  2/2/24  Historical Provider, MD        The list below reflectives the updated allergy list. Please review each documented allergy for additional clarification and justification.  Allergies  Reviewed by Chris Iniguez RN on 2/2/2024   No Known Allergies         Below are additional concerns with the patient's PTA list.      Adelina Yi CPhT

## 2024-02-02 NOTE — ED TRIAGE NOTES
Patient has a right total hip replacement 1 month ago, patient was at a follow up with the surgeon today and he sent her to the ED to get admitted for an infection at the surgical site.

## 2024-02-03 LAB
ANION GAP SERPL CALC-SCNC: 12 MMOL/L (ref 10–20)
ANION GAP SERPL CALC-SCNC: 8 MMOL/L (ref 10–20)
BUN SERPL-MCNC: 13 MG/DL (ref 6–23)
BUN SERPL-MCNC: 14 MG/DL (ref 6–23)
CALCIUM SERPL-MCNC: 7.2 MG/DL (ref 8.6–10.3)
CALCIUM SERPL-MCNC: 7.6 MG/DL (ref 8.6–10.3)
CHLORIDE SERPL-SCNC: 97 MMOL/L (ref 98–107)
CHLORIDE SERPL-SCNC: 99 MMOL/L (ref 98–107)
CHOLEST SERPL-MCNC: 91 MG/DL (ref 0–199)
CHOLESTEROL/HDL RATIO: 2.9
CO2 SERPL-SCNC: 24 MMOL/L (ref 21–32)
CO2 SERPL-SCNC: 24 MMOL/L (ref 21–32)
CREAT SERPL-MCNC: 0.35 MG/DL (ref 0.5–1.05)
CREAT SERPL-MCNC: 0.36 MG/DL (ref 0.5–1.05)
EGFRCR SERPLBLD CKD-EPI 2021: >90 ML/MIN/1.73M*2
EGFRCR SERPLBLD CKD-EPI 2021: >90 ML/MIN/1.73M*2
ERYTHROCYTE [DISTWIDTH] IN BLOOD BY AUTOMATED COUNT: 22 % (ref 11.5–14.5)
GLUCOSE SERPL-MCNC: 80 MG/DL (ref 74–99)
GLUCOSE SERPL-MCNC: 86 MG/DL (ref 74–99)
HCT VFR BLD AUTO: 29 % (ref 36–46)
HDLC SERPL-MCNC: 31.4 MG/DL
HGB BLD-MCNC: 9.1 G/DL (ref 12–16)
LDLC SERPL CALC-MCNC: 49 MG/DL
MAGNESIUM SERPL-MCNC: 1.51 MG/DL (ref 1.6–2.4)
MCH RBC QN AUTO: 28.2 PG (ref 26–34)
MCHC RBC AUTO-ENTMCNC: 31.4 G/DL (ref 32–36)
MCV RBC AUTO: 90 FL (ref 80–100)
NON HDL CHOLESTEROL: 60 MG/DL (ref 0–149)
NRBC BLD-RTO: 0.3 /100 WBCS (ref 0–0)
PLATELET # BLD AUTO: 205 X10*3/UL (ref 150–450)
POTASSIUM SERPL-SCNC: 3.4 MMOL/L (ref 3.5–5.3)
POTASSIUM SERPL-SCNC: 3.8 MMOL/L (ref 3.5–5.3)
RBC # BLD AUTO: 3.23 X10*6/UL (ref 4–5.2)
SODIUM SERPL-SCNC: 126 MMOL/L (ref 136–145)
SODIUM SERPL-SCNC: 131 MMOL/L (ref 136–145)
TRIGL SERPL-MCNC: 53 MG/DL (ref 0–149)
VLDL: 11 MG/DL (ref 0–40)
WBC # BLD AUTO: 7.6 X10*3/UL (ref 4.4–11.3)

## 2024-02-03 PROCEDURE — 99233 SBSQ HOSP IP/OBS HIGH 50: CPT | Performed by: NURSE PRACTITIONER

## 2024-02-03 PROCEDURE — 80048 BASIC METABOLIC PNL TOTAL CA: CPT | Performed by: NURSE PRACTITIONER

## 2024-02-03 PROCEDURE — 1100000001 HC PRIVATE ROOM DAILY

## 2024-02-03 PROCEDURE — 2500000004 HC RX 250 GENERAL PHARMACY W/ HCPCS (ALT 636 FOR OP/ED): Performed by: NURSE PRACTITIONER

## 2024-02-03 PROCEDURE — 2500000001 HC RX 250 WO HCPCS SELF ADMINISTERED DRUGS (ALT 637 FOR MEDICARE OP): Performed by: NURSE PRACTITIONER

## 2024-02-03 PROCEDURE — 36415 COLL VENOUS BLD VENIPUNCTURE: CPT | Performed by: STUDENT IN AN ORGANIZED HEALTH CARE EDUCATION/TRAINING PROGRAM

## 2024-02-03 PROCEDURE — 83735 ASSAY OF MAGNESIUM: CPT | Performed by: STUDENT IN AN ORGANIZED HEALTH CARE EDUCATION/TRAINING PROGRAM

## 2024-02-03 PROCEDURE — 2500000002 HC RX 250 W HCPCS SELF ADMINISTERED DRUGS (ALT 637 FOR MEDICARE OP, ALT 636 FOR OP/ED): Performed by: STUDENT IN AN ORGANIZED HEALTH CARE EDUCATION/TRAINING PROGRAM

## 2024-02-03 PROCEDURE — 2500000004 HC RX 250 GENERAL PHARMACY W/ HCPCS (ALT 636 FOR OP/ED): Performed by: STUDENT IN AN ORGANIZED HEALTH CARE EDUCATION/TRAINING PROGRAM

## 2024-02-03 PROCEDURE — 85027 COMPLETE CBC AUTOMATED: CPT | Performed by: STUDENT IN AN ORGANIZED HEALTH CARE EDUCATION/TRAINING PROGRAM

## 2024-02-03 PROCEDURE — 80061 LIPID PANEL: CPT | Performed by: NURSE PRACTITIONER

## 2024-02-03 PROCEDURE — 36415 COLL VENOUS BLD VENIPUNCTURE: CPT | Performed by: NURSE PRACTITIONER

## 2024-02-03 PROCEDURE — 80048 BASIC METABOLIC PNL TOTAL CA: CPT | Performed by: STUDENT IN AN ORGANIZED HEALTH CARE EDUCATION/TRAINING PROGRAM

## 2024-02-03 PROCEDURE — S4991 NICOTINE PATCH NONLEGEND: HCPCS | Performed by: STUDENT IN AN ORGANIZED HEALTH CARE EDUCATION/TRAINING PROGRAM

## 2024-02-03 RX ORDER — POTASSIUM CHLORIDE 1.5 G/1.58G
40 POWDER, FOR SOLUTION ORAL ONCE
Status: COMPLETED | OUTPATIENT
Start: 2024-02-03 | End: 2024-02-03

## 2024-02-03 RX ORDER — MAGNESIUM SULFATE HEPTAHYDRATE 40 MG/ML
2 INJECTION, SOLUTION INTRAVENOUS ONCE
Status: COMPLETED | OUTPATIENT
Start: 2024-02-03 | End: 2024-02-03

## 2024-02-03 RX ORDER — SODIUM CHLORIDE 9 MG/ML
75 INJECTION, SOLUTION INTRAVENOUS CONTINUOUS
Status: DISCONTINUED | OUTPATIENT
Start: 2024-02-03 | End: 2024-02-04

## 2024-02-03 RX ORDER — PAROXETINE HYDROCHLORIDE 20 MG/1
20 TABLET, FILM COATED ORAL DAILY
Status: DISCONTINUED | OUTPATIENT
Start: 2024-02-03 | End: 2024-02-03

## 2024-02-03 RX ADMIN — PAROXETINE HYDROCHLORIDE 40 MG: 20 TABLET, FILM COATED ORAL at 10:46

## 2024-02-03 RX ADMIN — VANCOMYCIN HYDROCHLORIDE 1000 MG: 1 INJECTION, SOLUTION INTRAVENOUS at 10:30

## 2024-02-03 RX ADMIN — SODIUM CHLORIDE 75 ML/HR: 9 INJECTION, SOLUTION INTRAVENOUS at 18:25

## 2024-02-03 RX ADMIN — ACETAMINOPHEN 975 MG: 325 TABLET ORAL at 18:22

## 2024-02-03 RX ADMIN — ENOXAPARIN SODIUM 40 MG: 40 INJECTION SUBCUTANEOUS at 18:23

## 2024-02-03 RX ADMIN — POTASSIUM CHLORIDE 40 MEQ: 1.5 POWDER, FOR SOLUTION ORAL at 10:47

## 2024-02-03 RX ADMIN — NAPROXEN 500 MG: 500 TABLET ORAL at 18:30

## 2024-02-03 RX ADMIN — MAGNESIUM SULFATE HEPTAHYDRATE 2 G: 2 INJECTION, SOLUTION INTRAVENOUS at 11:35

## 2024-02-03 RX ADMIN — PAROXETINE HYDROCHLORIDE 20 MG: 20 TABLET, FILM COATED ORAL at 09:27

## 2024-02-03 RX ADMIN — NAPROXEN 500 MG: 500 TABLET ORAL at 10:45

## 2024-02-03 RX ADMIN — LORATADINE 10 MG: 10 TABLET ORAL at 09:25

## 2024-02-03 RX ADMIN — MONTELUKAST 10 MG: 10 TABLET, FILM COATED ORAL at 09:26

## 2024-02-03 RX ADMIN — ASPIRIN 81 MG CHEWABLE TABLET 81 MG: 81 TABLET CHEWABLE at 22:30

## 2024-02-03 RX ADMIN — TRAZODONE HYDROCHLORIDE 100 MG: 50 TABLET ORAL at 22:26

## 2024-02-03 RX ADMIN — CEFTRIAXONE SODIUM 2 G: 2 INJECTION, SOLUTION INTRAVENOUS at 22:30

## 2024-02-03 RX ADMIN — ASPIRIN 81 MG CHEWABLE TABLET 81 MG: 81 TABLET CHEWABLE at 09:25

## 2024-02-03 RX ADMIN — VANCOMYCIN HYDROCHLORIDE 1000 MG: 1 INJECTION, SOLUTION INTRAVENOUS at 23:26

## 2024-02-03 RX ADMIN — NICOTINE 1 PATCH: 21 PATCH, EXTENDED RELEASE TRANSDERMAL at 09:27

## 2024-02-03 RX ADMIN — SODIUM CHLORIDE 1000 ML: 9 INJECTION, SOLUTION INTRAVENOUS at 09:36

## 2024-02-03 RX ADMIN — FERROUS SULFATE TAB 325 MG (65 MG ELEMENTAL FE) 1 TABLET: 325 (65 FE) TAB at 10:45

## 2024-02-03 RX ADMIN — SODIUM CHLORIDE 75 ML/HR: 9 INJECTION, SOLUTION INTRAVENOUS at 11:41

## 2024-02-03 RX ADMIN — PAROXETINE HYDROCHLORIDE 20 MG: 20 TABLET, FILM COATED ORAL at 09:00

## 2024-02-03 RX ADMIN — SODIUM CHLORIDE 1000 ML: 9 INJECTION, SOLUTION INTRAVENOUS at 15:10

## 2024-02-03 RX ADMIN — Medication 400 MG: at 09:25

## 2024-02-03 RX ADMIN — ARIPIPRAZOLE 15 MG: 5 TABLET ORAL at 22:26

## 2024-02-03 ASSESSMENT — COGNITIVE AND FUNCTIONAL STATUS - GENERAL
DRESSING REGULAR UPPER BODY CLOTHING: A LITTLE
STANDING UP FROM CHAIR USING ARMS: TOTAL
DRESSING REGULAR LOWER BODY CLOTHING: A LOT
EATING MEALS: A LITTLE
DAILY ACTIVITIY SCORE: 16
TURNING FROM BACK TO SIDE WHILE IN FLAT BAD: A LOT
MOBILITY SCORE: 9
WALKING IN HOSPITAL ROOM: TOTAL
MOVING FROM LYING ON BACK TO SITTING ON SIDE OF FLAT BED WITH BEDRAILS: A LOT
CLIMB 3 TO 5 STEPS WITH RAILING: TOTAL
HELP NEEDED FOR BATHING: A LOT
MOVING TO AND FROM BED TO CHAIR: A LOT
PERSONAL GROOMING: A LITTLE
TOILETING: A LITTLE

## 2024-02-03 ASSESSMENT — PAIN - FUNCTIONAL ASSESSMENT
PAIN_FUNCTIONAL_ASSESSMENT: 0-10
PAIN_FUNCTIONAL_ASSESSMENT: 0-10

## 2024-02-03 ASSESSMENT — PAIN SCALES - GENERAL
PAINLEVEL_OUTOF10: 0 - NO PAIN
PAINLEVEL_OUTOF10: 0 - NO PAIN

## 2024-02-03 NOTE — PROGRESS NOTES
"Blanca Hamilton is a 48 y.o. female on day 1 of admission presenting with Right hip pain.    Subjective   Interval History: no fever, no new complaints        Review of Systems    Objective   Range of Vitals (last 24 hours)  Heart Rate:  []   Temp:  [36.3 °C (97.3 °F)-37 °C (98.6 °F)]   Resp:  [15-25]   BP: ()/(50-70)   Height:  [165.1 cm (5' 5\")]   Weight:  [83.3 kg (183 lb 10.3 oz)]   SpO2:  [91 %-100 %]   Daily Weight  02/02/24 : 83.3 kg (183 lb 10.3 oz)    Body mass index is 30.56 kg/m².    Physical Exam  Constitutional:       Appearance: Normal appearance.   HENT:      Head: Normocephalic and atraumatic.      Mouth/Throat:      Mouth: Mucous membranes are moist.      Pharynx: Oropharynx is clear.   Eyes:      Pupils: Pupils are equal, round, and reactive to light.   Cardiovascular:      Rate and Rhythm: Normal rate and regular rhythm.      Heart sounds: Normal heart sounds.   Pulmonary:      Effort: Pulmonary effort is normal.      Breath sounds: Normal breath sounds.   Abdominal:      General: Abdomen is flat. Bowel sounds are normal.      Palpations: Abdomen is soft.   Musculoskeletal:      Cervical back: Normal range of motion.      Comments: Rt hip incision with some redness and drainage   Neurological:      Mental Status: She is alert.         Antibiotics  ceFAZolin in dextrose (iso-os) (Ancef) IVPB 2 g  vancomycin (Vancocin) in dextrose 5 % water (D5W) 500 mL IV 1,500 mg  cefTRIAXone (Rocephin) IVPB 1 g  sodium chloride 0.9% infusion  sodium chloride 0.9 % bolus 1,000 mL  HYDROmorphone (Dilaudid) injection 0.2 mg  lisonpril (Prinivil, Zestril) tablet  PARoxetine (Paxil-CR) 24 hr tablet  acetaminophen (Tylenol) tablet 650 mg  acetaminophen (Tylenol) oral liquid 650 mg  acetaminophen (Tylenol) suppository 650 mg  polyethylene glycol (Glycolax, Miralax) packet 17 g  enoxaparin (Lovenox) syringe 40 mg  cefTRIAXone (Rocephin) 2 g IV in dextrose 5% 50 mL  HYDROmorphone (Dilaudid) injection 0.2 " mg  acetaminophen (Tylenol) tablet 975 mg  magnesium oxide (Mag-Ox) tablet 400 mg  nicotine (Nicoderm CQ) 21 mg/24 hr patch 1 patch  ARIPiprazole (Abilify) tablet 15 mg  aspirin chewable tablet 81 mg  loratadine (Claritin) tablet 10 mg  ferrous sulfate (325 mg ferrous sulfate) tablet 1 tablet  lisinopril tablet 10 mg  metoprolol succinate XL (Toprol-XL) 24 hr tablet 25 mg  montelukast (Singulair) tablet 10 mg  naproxen (Naprosyn) tablet 500 mg  pantoprazole (ProtoNix) EC tablet 40 mg  oxyCODONE-acetaminophen (Percocet) 5-325 mg per tablet 1 tablet  PARoxetine (Paxil) tablet 20 mg  PARoxetine (Paxil) tablet 40 mg  traZODone (Desyrel) tablet 100 mg  PARoxetine (Paxil-CR) 24 hr tablet  vancomycin (Vancocin) 1,000 mg in dextrose 5% water 200 mL  PARoxetine (Paxil) tablet 20 mg  sodium chloride 0.9% infusion  sodium chloride 0.9 % bolus 1,000 mL  magnesium sulfate IV 2 g  potassium chloride (Klor-Con) packet 40 mEq      Relevant Results  Labs  Results from last 72 hours   Lab Units 02/03/24  0531 02/02/24  1255   WBC AUTO x10*3/uL 7.6 10.4   HEMOGLOBIN g/dL 9.1* 9.6*   HEMATOCRIT % 29.0* 31.2*   PLATELETS AUTO x10*3/uL 205 237   LYMPHO PCT MAN %  --  1.0   MONO PCT MAN %  --  4.0   EOSINO PCT MAN %  --  5.0     Results from last 72 hours   Lab Units 02/03/24  0531 02/02/24  1255   SODIUM mmol/L 126* 129*   POTASSIUM mmol/L 3.4* 3.7   CHLORIDE mmol/L 97* 96*   CO2 mmol/L 24 25   BUN mg/dL 14 18   CREATININE mg/dL 0.36* 0.55   GLUCOSE mg/dL 86 107*   CALCIUM mg/dL 7.6* 8.4*   ANION GAP mmol/L 8* 12   EGFR mL/min/1.73m*2 >90 >90   PHOSPHORUS mg/dL  --  2.5     Results from last 72 hours   Lab Units 02/02/24  1255   ALK PHOS U/L 124*   BILIRUBIN TOTAL mg/dL 0.5   PROTEIN TOTAL g/dL 5.9*   ALT U/L 12   AST U/L 14   ALBUMIN g/dL 3.3*     Estimated Creatinine Clearance: 125 mL/min (A) (by C-G formula based on SCr of 0.36 mg/dL (L)).  C-Reactive Protein   Date Value Ref Range Status   02/02/2024 23.75 (H) <1.00 mg/dL Final    01/12/2024 4.87 (H) <1.00 mg/dL Final     Microbiology  Reviewed  Imaging  reviewed        Assessment/Plan      Right hip surgical wound infection, for surgery     Recommendations :  Rocephin and Vancomycin pending the cultures  Discussed with the medical team    I spent minutes in the professional and overall care of this patient.      Frank Modi MD

## 2024-02-03 NOTE — CARE PLAN
The patient's goals for the shift include patient will get adequate sleep    The clinical goals for the shift include patient pain will be controlled

## 2024-02-03 NOTE — PROGRESS NOTES
Blanca Hamilton is a 48 y.o. female on day 1 of admission presenting with Right hip pain.      Subjective   The patient is in bed without complaints of pain or discomfort.        Objective     Last Recorded Vitals  BP 86/50   Pulse 104   Temp 35.8 °C (96.5 °F)   Resp 15   Wt 83.3 kg (183 lb 10.3 oz)   SpO2 94%   Intake/Output last 3 Shifts:    Intake/Output Summary (Last 24 hours) at 2/3/2024 1455  Last data filed at 2/3/2024 1359  Gross per 24 hour   Intake 1440 ml   Output --   Net 1440 ml       Admission Weight  Weight: 83.3 kg (183 lb 10.3 oz) (02/02/24 1210)    Daily Weight  02/02/24 : 83.3 kg (183 lb 10.3 oz)    Image Results      Physical Exam  HENT:      Mouth/Throat:      Mouth: Mucous membranes are dry.   Eyes:      Extraocular Movements: Extraocular movements intact.   Cardiovascular:      Rate and Rhythm: Regular rhythm.   Pulmonary:      Breath sounds: Normal breath sounds.   Abdominal:      Palpations: Abdomen is soft.   Musculoskeletal:      Cervical back: Normal range of motion.      Right hip: Tenderness present.      Comments: Open surgical incision region with purulent drainage, tenderness, erythema and warmth    Neurological:      Mental Status: She is alert and oriented to person, place, and time.   Psychiatric:         Mood and Affect: Mood normal.         Relevant Results      Results for orders placed or performed during the hospital encounter of 02/02/24 (from the past 24 hour(s))   Basic metabolic panel   Result Value Ref Range    Glucose 86 74 - 99 mg/dL    Sodium 126 (L) 136 - 145 mmol/L    Potassium 3.4 (L) 3.5 - 5.3 mmol/L    Chloride 97 (L) 98 - 107 mmol/L    Bicarbonate 24 21 - 32 mmol/L    Anion Gap 8 (L) 10 - 20 mmol/L    Urea Nitrogen 14 6 - 23 mg/dL    Creatinine 0.36 (L) 0.50 - 1.05 mg/dL    eGFR >90 >60 mL/min/1.73m*2    Calcium 7.6 (L) 8.6 - 10.3 mg/dL   CBC   Result Value Ref Range    WBC 7.6 4.4 - 11.3 x10*3/uL    nRBC 0.3 (H) 0.0 - 0.0 /100 WBCs    RBC 3.23 (L) 4.00 - 5.20  x10*6/uL    Hemoglobin 9.1 (L) 12.0 - 16.0 g/dL    Hematocrit 29.0 (L) 36.0 - 46.0 %    MCV 90 80 - 100 fL    MCH 28.2 26.0 - 34.0 pg    MCHC 31.4 (L) 32.0 - 36.0 g/dL    RDW 22.0 (H) 11.5 - 14.5 %    Platelets 205 150 - 450 x10*3/uL   Magnesium   Result Value Ref Range    Magnesium 1.51 (L) 1.60 - 2.40 mg/dL           Scheduled medications  acetaminophen, 975 mg, oral, q8h  ARIPiprazole, 15 mg, oral, Nightly  aspirin, 81 mg, oral, BID  cefTRIAXone, 2 g, intravenous, q24h  enoxaparin, 40 mg, subcutaneous, q24h  ferrous sulfate (325 mg ferrous sulfate), 1 tablet, oral, Daily  [Held by provider] lisinopril, 10 mg, oral, q AM  loratadine, 10 mg, oral, Daily  magnesium oxide, 400 mg, oral, Daily  [Held by provider] metoprolol succinate XL, 25 mg, oral, Daily  montelukast, 10 mg, oral, q AM  naproxen, 500 mg, oral, BID with meals  nicotine, 1 patch, transdermal, Daily  pantoprazole, 40 mg, oral, Daily before breakfast  PARoxetine, 40 mg, oral, Daily  polyethylene glycol, 17 g, oral, Daily  sodium chloride, 1,000 mL, intravenous, Once  traZODone, 100 mg, oral, Nightly  vancomycin, 1,000 mg, intravenous, q12h      Continuous medications  sodium chloride 0.9%, 75 mL/hr, Last Rate: 75 mL/hr (02/03/24 1141)      PRN medications  PRN medications: acetaminophen **OR** acetaminophen **OR** acetaminophen, HYDROmorphone, oxyCODONE-acetaminophen   Assessment/Plan                  Principal Problem:    Right hip pain  Active Problems:    Wound infection after surgery    Blanca Hamilton is a 48 y.o. female with past medical history significant for GERD, HTN, HLD who presented to the hospital at the recommendation of orthopedic surgery due to concern for infected right hip.       Right prosthetic hip joint infection  Initial right ALEXA on 1/2/2024.  Revision on 1/5/2024 due to periprosthetic fracture.  Now with increased drainage and erythema from incision site.  CRP 24.  Wound culture sent from the emergency department  Significant  drainage appreciated.    Wound care consult  Orthopedic surgery planning joint washout next week, hopefully 2/5/24  Will consult ID for assistance with guidance of antibiotic choice and duration  Plan to continue with vancomycin and ceftriaxone for empiric coverage, started 2/2    Hypotension   Likely multifactorial with the above and hypovolemia   The patient responded to 1 L NS bolus and after hours it returned. Adding an additional 1 liter normal saline bolus and continuous fluids until stabilized  Monitor for anemia with repeat H&H  Holding antihypertensives  Monitor for SIRS    Hypomagnesemia   Hypokalemia  Both replaced and scheduled  Repeat levels    Moderate hyponatremia, asymptomatic  Trend.  If further decline will obtain urine studies to assess etiology.    Pain regimen adjusted, suspect could be component of pain induced SIADH     Tobacco dependence  Continue nicotine patch.  Stress cessation to allow for adequate healing postop     Anxiety and depression  Continue home medication    Dyslipidemia  Lipid panel    GERD  Continue pantoprazole    DVT prophylaxis   Lovenox subcutaneous (hold prior to surgery)    Dispo: To OR on 2/5/24 or 2/6/24.               Leonarda Bond, APRN-CNP

## 2024-02-03 NOTE — PROGRESS NOTES
"Vancomycin Dosing by Pharmacy- INITIAL    Blanca Hamilton is a 48 y.o. year old female who Pharmacy has been consulted for vancomycin dosing for stable. Based on the patient's indication and renal status this patient will be dosed based on a goal AUC of 400-600.     Renal function is currently stable.    Visit Vitals  BP 88/50 (BP Location: Right arm, Patient Position: Lying)   Pulse 102   Temp 36.7 °C (98.1 °F)   Resp 15        Lab Results   Component Value Date    CREATININE 0.55 02/02/2024    CREATININE 0.29 (L) 01/13/2024    CREATININE 0.31 (L) 01/12/2024    CREATININE 0.28 (L) 01/11/2024        Patient weight is No results found for: \"PTWEIGHT\"    No results found for: \"CULTURE\"     No intake/output data recorded.  [unfilled]    No results found for: \"PATIENTTEMP\"       Assessment/Plan     Patient will not be given a loading dose.  Will initiate vancomycin maintenance,  1000 mg every 12 hours.    This dosing regimen is predicted by [employer] to result in the following pharmacokinetic parameters:  Spot[employer] was not working at the time.  1g q12h. Based on experience    Follow-up level will be ordered on 2/4 at am labs unless clinically indicated sooner.  Will continue to monitor renal function daily while on vancomycin and order serum creatinine at least every 48 hours if not already ordered.  Follow for continued vancomycin needs, clinical response, and signs/symptoms of toxicity.       Edward Neff, PharmD       "

## 2024-02-03 NOTE — CONSULTS
Consults  Referred by SABRA Pantoja MD: Henry Olmos DO    Reason For Consult  Surgical wound infection    History Of Present Illness  Blanca Hamilton is a 48 y.o. female, hx of HTN, hx og GERD, hx of Rt hip arthroplasty 1/2, she needed a revision 1/5 for periprosthetic fracture, she was recently started on Doxycycline for wound infection, she was admitted for increasing purulent drainage and wound redness, no fever, minimal pain, no sob, no emesis, no rash.     Past Medical History  She has a past medical history of Anxiety, Arthritis, Depression, GERD (gastroesophageal reflux disease), HTN (hypertension), Hyperlipidemia, Right hip pain, Sinusitis, and Transfusion history.    Surgical History  She has a past surgical history that includes Total hip arthroplasty (Left); Knee arthroscopy w/ debridement (Left); Tubal ligation; and Bladder suspension.     Social History     Occupational History    Not on file   Tobacco Use    Smoking status: Every Day     Packs/day: 1     Types: Cigarettes    Smokeless tobacco: Never   Substance and Sexual Activity    Alcohol use: Not Currently    Drug use: Not Currently    Sexual activity: Not on file     Travel History   Travel since 01/02/24    No documented travel since 01/02/24          Family History  No family history on file., no immunodeficiency  Allergies  Patient has no known allergies.     Immunization History   Administered Date(s) Administered    Flu vaccine, quadrivalent, no egg protein, age 6 month or greater (FLUCELVAX) 01/20/2023    Influenza, Unspecified 11/05/2018    Influenza, injectable, quadrivalent 10/19/2017    Tdap vaccine, age 7 year and older (BOOSTRIX, ADACEL) 10/19/2017     Medications  Home medications:  Medications Prior to Admission   Medication Sig Dispense Refill Last Dose    ARIPiprazole (Abilify) 15 mg tablet Take 1 tablet (15 mg) by mouth once daily at bedtime.       aspirin 81 mg chewable tablet Chew 1 tablet (81 mg) 2 times a day for 28  days. 56 tablet 0     cetirizine (ZyrTEC) 10 mg tablet Take 1 tablet (10 mg) by mouth once daily in the morning.       doxycycline (Vibramycin) 100 mg capsule Take 1 capsule (100 mg) by mouth 2 times a day for 7 days. Take with at least 8 ounces (large glass) of water, do not lie down for 30 minutes after (Patient taking differently: Take 1 capsule (100 mg) by mouth 2 times a day. Start date:1/27/24 x 7 days) 14 capsule 0     ferrous sulfate 325 (65 Fe) MG tablet Take 1 tablet by mouth early in the morning..       lisinopril 10 mg tablet Take 1 tablet (10 mg) by mouth once daily in the morning.       metoprolol succinate XL (Toprol-XL) 25 mg 24 hr tablet Take 1 tablet (25 mg) by mouth once daily. Do not crush or chew. Do not start before January 14, 2024. 30 tablet 0     montelukast (Singulair) 10 mg tablet Take 1 tablet (10 mg) by mouth once daily in the morning.       naproxen (Naprosyn) 500 mg tablet Take 1 tablet (500 mg) by mouth 2 times a day with meals. 60 tablet 0     omeprazole (PriLOSEC) 40 mg DR capsule Take 1 capsule (40 mg) by mouth once daily. Do not crush or chew.       oxyCODONE-acetaminophen (Percocet) 5-325 mg tablet Take 1 tablet by mouth every 4 hours if needed for severe pain (7 - 10). 36 tablet 0     PARoxetine CR (Paxil-CR) 25 mg 24 hr tablet Take 1 tablet (25 mg) by mouth once daily in the morning. Do not crush, chew, or split.       PARoxetine CR (Paxil-CR) 37.5 mg 24 hr tablet Take 1 tablet (37.5 mg) by mouth once daily in the morning.       traZODone (Desyrel) 50 mg tablet Take 2 tablets (100 mg) by mouth once daily at bedtime.        Current medications:  Scheduled medications  acetaminophen, 975 mg, oral, q8h  ARIPiprazole, 15 mg, oral, Nightly  aspirin, 81 mg, oral, BID  cefTRIAXone, 2 g, intravenous, q24h  enoxaparin, 40 mg, subcutaneous, q24h  ferrous sulfate (325 mg ferrous sulfate), 1 tablet, oral, Daily  HYDROmorphone, 0.2 mg, intravenous, Once  [START ON 2/3/2024] lisinopril, 10  "mg, oral, q AM  loratadine, 10 mg, oral, Daily  magnesium oxide, 400 mg, oral, Daily  metoprolol succinate XL, 25 mg, oral, Daily  [START ON 2/3/2024] montelukast, 10 mg, oral, q AM  naproxen, 500 mg, oral, BID with meals  nicotine, 1 patch, transdermal, Daily  [START ON 2/3/2024] pantoprazole, 40 mg, oral, Daily before breakfast  PARoxetine, 20 mg, oral, Daily  PARoxetine, 40 mg, oral, Daily  polyethylene glycol, 17 g, oral, Daily  traZODone, 100 mg, oral, Nightly      Continuous medications     PRN medications  PRN medications: acetaminophen **OR** acetaminophen **OR** acetaminophen, HYDROmorphone, oxyCODONE-acetaminophen    Review of Systems   All other systems reviewed and are negative.       Objective  Range of Vitals (last 24 hours)  Heart Rate:  []   Temp:  [36.7 °C (98.1 °F)-37 °C (98.6 °F)]   Resp:  [15-25]   BP: ()/(50-70)   Height:  [165.1 cm (5' 5\")]   Weight:  [83.3 kg (183 lb 10.3 oz)]   SpO2:  [94 %-100 %]   Daily Weight  02/02/24 : 83.3 kg (183 lb 10.3 oz)    Body mass index is 30.56 kg/m².     Physical Exam  Constitutional:       Appearance: Normal appearance.   HENT:      Head: Normocephalic and atraumatic.      Mouth/Throat:      Mouth: Mucous membranes are moist.      Pharynx: Oropharynx is clear.   Eyes:      Pupils: Pupils are equal, round, and reactive to light.   Cardiovascular:      Rate and Rhythm: Normal rate and regular rhythm.      Heart sounds: Normal heart sounds.   Pulmonary:      Effort: Pulmonary effort is normal.      Breath sounds: Normal breath sounds.   Abdominal:      General: Abdomen is flat. Bowel sounds are normal.      Palpations: Abdomen is soft.   Musculoskeletal:      Cervical back: Normal range of motion.      Comments: Rt hip lower incision redness with drainage   Neurological:      Mental Status: She is alert.          Relevant Results  Outside Hospital Results  reviewed  Labs  Results from last 72 hours   Lab Units 02/02/24  1255   WBC AUTO x10*3/uL 10.4 " "  HEMOGLOBIN g/dL 9.6*   HEMATOCRIT % 31.2*   PLATELETS AUTO x10*3/uL 237   LYMPHO PCT MAN % 1.0   MONO PCT MAN % 4.0   EOSINO PCT MAN % 5.0     Results from last 72 hours   Lab Units 02/02/24  1255   SODIUM mmol/L 129*   POTASSIUM mmol/L 3.7   CHLORIDE mmol/L 96*   CO2 mmol/L 25   BUN mg/dL 18   CREATININE mg/dL 0.55   GLUCOSE mg/dL 107*   CALCIUM mg/dL 8.4*   ANION GAP mmol/L 12   EGFR mL/min/1.73m*2 >90   PHOSPHORUS mg/dL 2.5     Results from last 72 hours   Lab Units 02/02/24  1255   ALK PHOS U/L 124*   BILIRUBIN TOTAL mg/dL 0.5   PROTEIN TOTAL g/dL 5.9*   ALT U/L 12   AST U/L 14   ALBUMIN g/dL 3.3*     Estimated Creatinine Clearance: 125 mL/min (by C-G formula based on SCr of 0.55 mg/dL).  C-Reactive Protein   Date Value Ref Range Status   02/02/2024 23.75 (H) <1.00 mg/dL Final   01/12/2024 4.87 (H) <1.00 mg/dL Final     Sedimentation Rate   Date Value Ref Range Status   02/02/2024 24 (H) 0 - 20 mm/h Final   01/12/2024 9 0 - 20 mm/h Final     No results found for: \"HIV1X2\", \"HIVCONF\", \"UPCKII0LB\"  No results found for: \"HEPCABINIT\", \"HEPCAB\", \"HCVPCRQUANT\"  Microbiology  Reviewed  Imaging  Reviewed       Assessment/Plan     Right hip surgical wound infection    Recommendations :  Rocephin and Vancomycin pending the cultures  I&D is planned    I spent  minutes in the professional and overall care of this patient.      Frank Modi MD  "

## 2024-02-04 ENCOUNTER — APPOINTMENT (OUTPATIENT)
Dept: RADIOLOGY | Facility: HOSPITAL | Age: 49
DRG: 466 | End: 2024-02-04
Payer: COMMERCIAL

## 2024-02-04 LAB
ABO GROUP (TYPE) IN BLOOD: NORMAL
ANION GAP SERPL CALC-SCNC: 11 MMOL/L (ref 10–20)
ANION GAP SERPL CALC-SCNC: 17 MMOL/L (ref 10–20)
ANTIBODY SCREEN: NORMAL
BUN SERPL-MCNC: 11 MG/DL (ref 6–23)
BUN SERPL-MCNC: 12 MG/DL (ref 6–23)
CALCIUM SERPL-MCNC: 7.4 MG/DL (ref 8.6–10.3)
CALCIUM SERPL-MCNC: 7.8 MG/DL (ref 8.6–10.3)
CHLORIDE SERPL-SCNC: 100 MMOL/L (ref 98–107)
CHLORIDE SERPL-SCNC: 104 MMOL/L (ref 98–107)
CO2 SERPL-SCNC: 21 MMOL/L (ref 21–32)
CO2 SERPL-SCNC: 23 MMOL/L (ref 21–32)
CREAT SERPL-MCNC: 0.35 MG/DL (ref 0.5–1.05)
CREAT SERPL-MCNC: 0.4 MG/DL (ref 0.5–1.05)
EGFRCR SERPLBLD CKD-EPI 2021: >90 ML/MIN/1.73M*2
EGFRCR SERPLBLD CKD-EPI 2021: >90 ML/MIN/1.73M*2
ERYTHROCYTE [DISTWIDTH] IN BLOOD BY AUTOMATED COUNT: 21.9 % (ref 11.5–14.5)
ERYTHROCYTE [DISTWIDTH] IN BLOOD BY AUTOMATED COUNT: 22 % (ref 11.5–14.5)
GLUCOSE BLD MANUAL STRIP-MCNC: 75 MG/DL (ref 74–99)
GLUCOSE SERPL-MCNC: 84 MG/DL (ref 74–99)
GLUCOSE SERPL-MCNC: 84 MG/DL (ref 74–99)
HCT VFR BLD AUTO: 28 % (ref 36–46)
HCT VFR BLD AUTO: 30.6 % (ref 36–46)
HGB BLD-MCNC: 8.7 G/DL (ref 12–16)
HGB BLD-MCNC: 9.4 G/DL (ref 12–16)
HGB RETIC QN: 23 PG (ref 28–38)
IMMATURE RETIC FRACTION: 25.2 %
LACTATE SERPL-SCNC: 0.8 MMOL/L (ref 0.4–2)
LACTATE SERPL-SCNC: 2.4 MMOL/L (ref 0.4–2)
MAGNESIUM SERPL-MCNC: 1.76 MG/DL (ref 1.6–2.4)
MCH RBC QN AUTO: 28.1 PG (ref 26–34)
MCH RBC QN AUTO: 28.2 PG (ref 26–34)
MCHC RBC AUTO-ENTMCNC: 30.7 G/DL (ref 32–36)
MCHC RBC AUTO-ENTMCNC: 31.1 G/DL (ref 32–36)
MCV RBC AUTO: 91 FL (ref 80–100)
MCV RBC AUTO: 91 FL (ref 80–100)
NRBC BLD-RTO: 0 /100 WBCS (ref 0–0)
NRBC BLD-RTO: 0 /100 WBCS (ref 0–0)
PLATELET # BLD AUTO: 161 X10*3/UL (ref 150–450)
PLATELET # BLD AUTO: 207 X10*3/UL (ref 150–450)
POTASSIUM SERPL-SCNC: 3.6 MMOL/L (ref 3.5–5.3)
POTASSIUM SERPL-SCNC: 3.8 MMOL/L (ref 3.5–5.3)
RBC # BLD AUTO: 3.08 X10*6/UL (ref 4–5.2)
RBC # BLD AUTO: 3.35 X10*6/UL (ref 4–5.2)
RETICS #: 0.06 X10*6/UL (ref 0.02–0.08)
RETICS/RBC NFR AUTO: 1.9 % (ref 0.5–2)
RH FACTOR (ANTIGEN D): NORMAL
SODIUM SERPL-SCNC: 134 MMOL/L (ref 136–145)
SODIUM SERPL-SCNC: 134 MMOL/L (ref 136–145)
VANCOMYCIN SERPL-MCNC: 6.9 UG/ML (ref 5–20)
WBC # BLD AUTO: 2.2 X10*3/UL (ref 4.4–11.3)
WBC # BLD AUTO: 5.3 X10*3/UL (ref 4.4–11.3)

## 2024-02-04 PROCEDURE — 80048 BASIC METABOLIC PNL TOTAL CA: CPT | Performed by: NURSE PRACTITIONER

## 2024-02-04 PROCEDURE — 2500000002 HC RX 250 W HCPCS SELF ADMINISTERED DRUGS (ALT 637 FOR MEDICARE OP, ALT 636 FOR OP/ED): Performed by: STUDENT IN AN ORGANIZED HEALTH CARE EDUCATION/TRAINING PROGRAM

## 2024-02-04 PROCEDURE — 82947 ASSAY GLUCOSE BLOOD QUANT: CPT

## 2024-02-04 PROCEDURE — 99233 SBSQ HOSP IP/OBS HIGH 50: CPT | Performed by: NURSE PRACTITIONER

## 2024-02-04 PROCEDURE — 36415 COLL VENOUS BLD VENIPUNCTURE: CPT | Performed by: NURSE PRACTITIONER

## 2024-02-04 PROCEDURE — 83735 ASSAY OF MAGNESIUM: CPT | Performed by: STUDENT IN AN ORGANIZED HEALTH CARE EDUCATION/TRAINING PROGRAM

## 2024-02-04 PROCEDURE — 85027 COMPLETE CBC AUTOMATED: CPT | Performed by: STUDENT IN AN ORGANIZED HEALTH CARE EDUCATION/TRAINING PROGRAM

## 2024-02-04 PROCEDURE — 85027 COMPLETE CBC AUTOMATED: CPT | Performed by: NURSE PRACTITIONER

## 2024-02-04 PROCEDURE — 74018 RADEX ABDOMEN 1 VIEW: CPT

## 2024-02-04 PROCEDURE — 2500000004 HC RX 250 GENERAL PHARMACY W/ HCPCS (ALT 636 FOR OP/ED): Performed by: NURSE PRACTITIONER

## 2024-02-04 PROCEDURE — 1100000001 HC PRIVATE ROOM DAILY

## 2024-02-04 PROCEDURE — 2500000004 HC RX 250 GENERAL PHARMACY W/ HCPCS (ALT 636 FOR OP/ED): Performed by: STUDENT IN AN ORGANIZED HEALTH CARE EDUCATION/TRAINING PROGRAM

## 2024-02-04 PROCEDURE — 83605 ASSAY OF LACTIC ACID: CPT | Performed by: NURSE PRACTITIONER

## 2024-02-04 PROCEDURE — 74018 RADEX ABDOMEN 1 VIEW: CPT | Performed by: RADIOLOGY

## 2024-02-04 PROCEDURE — 87040 BLOOD CULTURE FOR BACTERIA: CPT | Mod: GEALAB | Performed by: NURSE PRACTITIONER

## 2024-02-04 PROCEDURE — S4991 NICOTINE PATCH NONLEGEND: HCPCS | Performed by: STUDENT IN AN ORGANIZED HEALTH CARE EDUCATION/TRAINING PROGRAM

## 2024-02-04 PROCEDURE — 80048 BASIC METABOLIC PNL TOTAL CA: CPT | Performed by: STUDENT IN AN ORGANIZED HEALTH CARE EDUCATION/TRAINING PROGRAM

## 2024-02-04 PROCEDURE — 2500000001 HC RX 250 WO HCPCS SELF ADMINISTERED DRUGS (ALT 637 FOR MEDICARE OP): Performed by: STUDENT IN AN ORGANIZED HEALTH CARE EDUCATION/TRAINING PROGRAM

## 2024-02-04 PROCEDURE — 36415 COLL VENOUS BLD VENIPUNCTURE: CPT | Performed by: STUDENT IN AN ORGANIZED HEALTH CARE EDUCATION/TRAINING PROGRAM

## 2024-02-04 PROCEDURE — 85045 AUTOMATED RETICULOCYTE COUNT: CPT | Performed by: NURSE PRACTITIONER

## 2024-02-04 PROCEDURE — 86920 COMPATIBILITY TEST SPIN: CPT

## 2024-02-04 PROCEDURE — 80202 ASSAY OF VANCOMYCIN: CPT | Performed by: STUDENT IN AN ORGANIZED HEALTH CARE EDUCATION/TRAINING PROGRAM

## 2024-02-04 PROCEDURE — 86901 BLOOD TYPING SEROLOGIC RH(D): CPT | Performed by: NURSE PRACTITIONER

## 2024-02-04 PROCEDURE — C9113 INJ PANTOPRAZOLE SODIUM, VIA: HCPCS | Performed by: NURSE PRACTITIONER

## 2024-02-04 PROCEDURE — 2500000001 HC RX 250 WO HCPCS SELF ADMINISTERED DRUGS (ALT 637 FOR MEDICARE OP): Performed by: NURSE PRACTITIONER

## 2024-02-04 RX ORDER — FAMOTIDINE 20 MG/1
40 TABLET, FILM COATED ORAL 2 TIMES DAILY
Status: DISCONTINUED | OUTPATIENT
Start: 2024-02-04 | End: 2024-02-11 | Stop reason: HOSPADM

## 2024-02-04 RX ORDER — SODIUM CHLORIDE 9 MG/ML
100 INJECTION, SOLUTION INTRAVENOUS CONTINUOUS
Status: DISCONTINUED | OUTPATIENT
Start: 2024-02-04 | End: 2024-02-04

## 2024-02-04 RX ORDER — SODIUM CHLORIDE 9 MG/ML
100 INJECTION, SOLUTION INTRAVENOUS CONTINUOUS
Status: DISCONTINUED | OUTPATIENT
Start: 2024-02-04 | End: 2024-02-10

## 2024-02-04 RX ORDER — POTASSIUM CHLORIDE 1.5 G/1.58G
40 POWDER, FOR SOLUTION ORAL 2 TIMES DAILY
Status: DISCONTINUED | OUTPATIENT
Start: 2024-02-04 | End: 2024-02-08

## 2024-02-04 RX ORDER — PANTOPRAZOLE SODIUM 40 MG/10ML
40 INJECTION, POWDER, LYOPHILIZED, FOR SOLUTION INTRAVENOUS 2 TIMES DAILY
Status: DISCONTINUED | OUTPATIENT
Start: 2024-02-04 | End: 2024-02-11 | Stop reason: HOSPADM

## 2024-02-04 RX ADMIN — POLYETHYLENE GLYCOL 3350 17 G: 17 POWDER, FOR SOLUTION ORAL at 09:32

## 2024-02-04 RX ADMIN — FERROUS SULFATE TAB 325 MG (65 MG ELEMENTAL FE) 1 TABLET: 325 (65 FE) TAB at 09:37

## 2024-02-04 RX ADMIN — NAPROXEN 500 MG: 500 TABLET ORAL at 16:36

## 2024-02-04 RX ADMIN — ACETAMINOPHEN 650 MG: 160 SOLUTION ORAL at 21:41

## 2024-02-04 RX ADMIN — VANCOMYCIN HYDROCHLORIDE 1000 MG: 1 INJECTION, SOLUTION INTRAVENOUS at 11:23

## 2024-02-04 RX ADMIN — LORATADINE 10 MG: 10 TABLET ORAL at 09:32

## 2024-02-04 RX ADMIN — ASPIRIN 81 MG CHEWABLE TABLET 81 MG: 81 TABLET CHEWABLE at 21:40

## 2024-02-04 RX ADMIN — PANTOPRAZOLE SODIUM 40 MG: 40 TABLET, DELAYED RELEASE ORAL at 06:32

## 2024-02-04 RX ADMIN — ARIPIPRAZOLE 15 MG: 5 TABLET ORAL at 21:40

## 2024-02-04 RX ADMIN — Medication 400 MG: at 09:32

## 2024-02-04 RX ADMIN — NAPROXEN 500 MG: 500 TABLET ORAL at 09:39

## 2024-02-04 RX ADMIN — SODIUM CHLORIDE 75 ML/HR: 9 INJECTION, SOLUTION INTRAVENOUS at 09:39

## 2024-02-04 RX ADMIN — PANTOPRAZOLE SODIUM 40 MG: 40 INJECTION, POWDER, FOR SOLUTION INTRAVENOUS at 15:35

## 2024-02-04 RX ADMIN — POTASSIUM CHLORIDE 40 MEQ: 1.5 POWDER, FOR SOLUTION ORAL at 09:37

## 2024-02-04 RX ADMIN — PAROXETINE HYDROCHLORIDE 40 MG: 20 TABLET, FILM COATED ORAL at 09:32

## 2024-02-04 RX ADMIN — NICOTINE 1 PATCH: 21 PATCH, EXTENDED RELEASE TRANSDERMAL at 09:32

## 2024-02-04 RX ADMIN — TRAZODONE HYDROCHLORIDE 100 MG: 50 TABLET ORAL at 21:40

## 2024-02-04 RX ADMIN — VANCOMYCIN HYDROCHLORIDE 1.25 G: 10 INJECTION, POWDER, LYOPHILIZED, FOR SOLUTION INTRAVENOUS at 21:50

## 2024-02-04 RX ADMIN — FAMOTIDINE 40 MG: 20 TABLET ORAL at 21:40

## 2024-02-04 RX ADMIN — ASPIRIN 81 MG CHEWABLE TABLET 81 MG: 81 TABLET CHEWABLE at 09:32

## 2024-02-04 RX ADMIN — POTASSIUM CHLORIDE 40 MEQ: 1.5 POWDER, FOR SOLUTION ORAL at 21:40

## 2024-02-04 RX ADMIN — ACETAMINOPHEN 975 MG: 325 TABLET ORAL at 06:31

## 2024-02-04 RX ADMIN — PANTOPRAZOLE SODIUM 40 MG: 40 INJECTION, POWDER, FOR SOLUTION INTRAVENOUS at 21:41

## 2024-02-04 RX ADMIN — MONTELUKAST 10 MG: 10 TABLET, FILM COATED ORAL at 09:32

## 2024-02-04 ASSESSMENT — COGNITIVE AND FUNCTIONAL STATUS - GENERAL
MOVING TO AND FROM BED TO CHAIR: A LOT
DRESSING REGULAR UPPER BODY CLOTHING: A LITTLE
MOBILITY SCORE: 11
DRESSING REGULAR LOWER BODY CLOTHING: A LOT
DRESSING REGULAR UPPER BODY CLOTHING: A LITTLE
DAILY ACTIVITIY SCORE: 16
WALKING IN HOSPITAL ROOM: TOTAL
PERSONAL GROOMING: A LITTLE
TOILETING: A LITTLE
EATING MEALS: A LITTLE
WALKING IN HOSPITAL ROOM: TOTAL
MOVING FROM LYING ON BACK TO SITTING ON SIDE OF FLAT BED WITH BEDRAILS: A LITTLE
CLIMB 3 TO 5 STEPS WITH RAILING: TOTAL
CLIMB 3 TO 5 STEPS WITH RAILING: TOTAL
HELP NEEDED FOR BATHING: A LOT
MOVING TO AND FROM BED TO CHAIR: A LOT
PERSONAL GROOMING: A LITTLE
DRESSING REGULAR UPPER BODY CLOTHING: A LITTLE
TOILETING: A LITTLE
EATING MEALS: A LITTLE
TURNING FROM BACK TO SIDE WHILE IN FLAT BAD: A LOT
MOVING FROM LYING ON BACK TO SITTING ON SIDE OF FLAT BED WITH BEDRAILS: A LOT
PERSONAL GROOMING: A LITTLE
HELP NEEDED FOR BATHING: A LOT
TURNING FROM BACK TO SIDE WHILE IN FLAT BAD: A LOT
DRESSING REGULAR LOWER BODY CLOTHING: A LOT
MOVING FROM LYING ON BACK TO SITTING ON SIDE OF FLAT BED WITH BEDRAILS: A LOT
MOBILITY SCORE: 10
TURNING FROM BACK TO SIDE WHILE IN FLAT BAD: A LOT
STANDING UP FROM CHAIR USING ARMS: A LOT
MOVING TO AND FROM BED TO CHAIR: A LOT
MOBILITY SCORE: 10
WALKING IN HOSPITAL ROOM: TOTAL
DRESSING REGULAR LOWER BODY CLOTHING: A LOT
STANDING UP FROM CHAIR USING ARMS: A LOT
HELP NEEDED FOR BATHING: A LOT
DAILY ACTIVITIY SCORE: 16
STANDING UP FROM CHAIR USING ARMS: A LOT
CLIMB 3 TO 5 STEPS WITH RAILING: TOTAL
TOILETING: A LITTLE

## 2024-02-04 ASSESSMENT — PAIN SCALES - GENERAL
PAINLEVEL_OUTOF10: 0 - NO PAIN
PAINLEVEL_OUTOF10: 0 - NO PAIN

## 2024-02-04 ASSESSMENT — PAIN - FUNCTIONAL ASSESSMENT
PAIN_FUNCTIONAL_ASSESSMENT: 0-10
PAIN_FUNCTIONAL_ASSESSMENT: 0-10

## 2024-02-04 NOTE — NURSING NOTE
Patient seen in bed, denies pain. Leonarda SINGH packed wound today, no SOI per nurse, 4 cm approximately to packing.  Yaneli NP contacted, change daily, order placed. Wound RN to see patient on Monday.

## 2024-02-04 NOTE — PROGRESS NOTES
Blanca Hamilton is a 48 y.o. female on day 2 of admission presenting with Right hip pain.      Subjective   Called to the bedside due to tremors and projectile vomiting after an uneventful morning. Patient reports chills and denies abdominal pain        Objective     Last Recorded Vitals  /83 (BP Location: Left arm, Patient Position: Sitting)   Pulse 100   Temp 36.9 °C (98.4 °F) (Temporal)   Resp 18   Wt 83.3 kg (183 lb 10.3 oz)   SpO2 95%   Intake/Output last 3 Shifts:    Intake/Output Summary (Last 24 hours) at 2/4/2024 1602  Last data filed at 2/4/2024 1338  Gross per 24 hour   Intake 2680.5 ml   Output 150 ml   Net 2530.5 ml         Admission Weight  Weight: 83.3 kg (183 lb 10.3 oz) (02/02/24 1210)    Daily Weight  02/02/24 : 83.3 kg (183 lb 10.3 oz)    Image Results      Physical Exam  HENT:      Mouth/Throat:      Mouth: Mucous membranes are dry.   Eyes:      Extraocular Movements: Extraocular movements intact.   Cardiovascular:      Rate and Rhythm: Regular rhythm.   Pulmonary:      Breath sounds: Normal breath sounds.   Abdominal:      Palpations: Abdomen is soft.      Tenderness: There is no abdominal tenderness.   Musculoskeletal:      Cervical back: Normal range of motion.      Right hip: Tenderness present.      Comments: Open surgical incision region with purulent drainage, tenderness, erythema and warmth    Neurological:      Mental Status: She is alert and oriented to person, place, and time.   Psychiatric:         Mood and Affect: Mood normal.         Relevant Results      Results for orders placed or performed during the hospital encounter of 02/02/24 (from the past 24 hour(s))   Basic metabolic panel   Result Value Ref Range    Glucose 84 74 - 99 mg/dL    Sodium 134 (L) 136 - 145 mmol/L    Potassium 3.6 3.5 - 5.3 mmol/L    Chloride 104 98 - 107 mmol/L    Bicarbonate 23 21 - 32 mmol/L    Anion Gap 11 10 - 20 mmol/L    Urea Nitrogen 11 6 - 23 mg/dL    Creatinine 0.35 (L) 0.50 - 1.05 mg/dL     eGFR >90 >60 mL/min/1.73m*2    Calcium 7.4 (L) 8.6 - 10.3 mg/dL   CBC   Result Value Ref Range    WBC 5.3 4.4 - 11.3 x10*3/uL    nRBC 0.0 0.0 - 0.0 /100 WBCs    RBC 3.08 (L) 4.00 - 5.20 x10*6/uL    Hemoglobin 8.7 (L) 12.0 - 16.0 g/dL    Hematocrit 28.0 (L) 36.0 - 46.0 %    MCV 91 80 - 100 fL    MCH 28.2 26.0 - 34.0 pg    MCHC 31.1 (L) 32.0 - 36.0 g/dL    RDW 21.9 (H) 11.5 - 14.5 %    Platelets 207 150 - 450 x10*3/uL   Magnesium   Result Value Ref Range    Magnesium 1.76 1.60 - 2.40 mg/dL   Vancomycin   Result Value Ref Range    Vancomycin 6.9 5.0 - 20.0 ug/mL   POCT GLUCOSE   Result Value Ref Range    POCT Glucose 75 74 - 99 mg/dL   Lactate   Result Value Ref Range    Lactate 2.4 (H) 0.4 - 2.0 mmol/L   CBC   Result Value Ref Range    WBC 2.2 (L) 4.4 - 11.3 x10*3/uL    nRBC 0.0 0.0 - 0.0 /100 WBCs    RBC 3.35 (L) 4.00 - 5.20 x10*6/uL    Hemoglobin 9.4 (L) 12.0 - 16.0 g/dL    Hematocrit 30.6 (L) 36.0 - 46.0 %    MCV 91 80 - 100 fL    MCH 28.1 26.0 - 34.0 pg    MCHC 30.7 (L) 32.0 - 36.0 g/dL    RDW 22.0 (H) 11.5 - 14.5 %    Platelets 161 150 - 450 x10*3/uL   Basic Metabolic Panel   Result Value Ref Range    Glucose 84 74 - 99 mg/dL    Sodium 134 (L) 136 - 145 mmol/L    Potassium 3.8 3.5 - 5.3 mmol/L    Chloride 100 98 - 107 mmol/L    Bicarbonate 21 21 - 32 mmol/L    Anion Gap 17 10 - 20 mmol/L    Urea Nitrogen 12 6 - 23 mg/dL    Creatinine 0.40 (L) 0.50 - 1.05 mg/dL    eGFR >90 >60 mL/min/1.73m*2    Calcium 7.8 (L) 8.6 - 10.3 mg/dL   Type and screen   Result Value Ref Range    ABO TYPE A     Rh TYPE POS     ANTIBODY SCREEN NEG            Scheduled medications  acetaminophen, 975 mg, oral, q8h  ARIPiprazole, 15 mg, oral, Nightly  aspirin, 81 mg, oral, BID  [Held by provider] enoxaparin, 40 mg, subcutaneous, q24h  famotidine, 40 mg, oral, BID  ferrous sulfate (325 mg ferrous sulfate), 1 tablet, oral, Daily  [Held by provider] lisinopril, 10 mg, oral, q AM  loratadine, 10 mg, oral, Daily  magnesium oxide, 400 mg, oral,  Daily  [Held by provider] metoprolol succinate XL, 25 mg, oral, Daily  montelukast, 10 mg, oral, q AM  naproxen, 500 mg, oral, BID with meals  nicotine, 1 patch, transdermal, Daily  pantoprazole, 40 mg, intravenous, BID  PARoxetine, 40 mg, oral, Daily  polyethylene glycol, 17 g, oral, Daily  potassium chloride, 40 mEq, oral, BID  traZODone, 100 mg, oral, Nightly  vancomycin, 1,250 mg, intravenous, q8h      Continuous medications  sodium chloride 0.9%, 75 mL/hr, Last Rate: 75 mL/hr (02/04/24 1338)  sodium chloride 0.9%, 100 mL/hr      PRN medications  PRN medications: acetaminophen **OR** acetaminophen **OR** acetaminophen, HYDROmorphone, oxyCODONE-acetaminophen   Assessment/Plan                  Principal Problem:    Right hip pain  Active Problems:    Wound infection after surgery    Blanca Hamilton is a 48 y.o. female with past medical history significant for GERD, HTN, HLD who presented to the hospital at the recommendation of orthopedic surgery due to concern for infected right hip.       Right prosthetic hip joint infection  Initial right ALEXA on 1/2/2024.  Revision on 1/5/2024 due to periprosthetic fracture.  Now with increased drainage and erythema from incision site.  CRP 24.  Wound culture sent from the emergency department  Significant drainage appreciated.    Wound care consult  Orthopedic surgery planning joint washout next week, hopefully 2/5/24  Will consult ID for assistance with guidance of antibiotic choice and duration  Plan to continue with vancomycin and ceftriaxone for empiric coverage, started 2/2  NPO after MN for possible OR    Lactic acidosis  Emesis  Lactate level 2.4  Denies abdominal pain  Will continue antiemetic  Adding H2 inhibitor and continuing PPI  Normal saline bolus   Monitor for SIRS  Fluid liquid diet until MN  KUB added  Consider GI consult for chronic emesis and GERD treatment recommendations     Chronic anemia  Hemoglobin improved at 9.5, from this mornings 8.7  Monitor for  bleeding   Holding anticoagulation tonight for possible OR in the morning    Hypotension- improving   Likely multifactorial with the above and hypovolemia   The patient responded to 1 L NS bolus and after hours it returned. Adding an additional 1 liter normal saline bolus and continuous fluids until stabilized  Monitor for anemia with repeat H&H  Holding antihypertensives  Monitor for SIRS    Hypomagnesemia (resolved)  Hypokalemia (resolved)  Both scheduled  Repeat levels    Moderate hyponatremia, improved  Trend.  If further decline will obtain urine studies to assess etiology.    Pain regimen adjusted, suspect could be component of pain induced SIADH     Tobacco dependence  Continue nicotine patch.  Stress cessation to allow for adequate healing postop     Anxiety and depression  Continue home medication    Dyslipidemia  Lipid panel    GERD  Continue pantoprazole    DVT prophylaxis   Lovenox subcutaneous (hold prior to surgery)    Dispo: To OR on 2/5/24 or 2/6/24.               Leonarda Bond APRN-CNP

## 2024-02-04 NOTE — PROGRESS NOTES
Blanca Hamilton is a 48 y.o. female on day 2 of admission presenting with Right hip pain.    Subjective   Interval History: no fever, no new complaints        Review of Systems    Objective   Range of Vitals (last 24 hours)  Heart Rate:  []   Temp:  [35.8 °C (96.5 °F)-36.5 °C (97.7 °F)]   Resp:  [18]   BP: ()/(44-61)   SpO2:  [92 %-98 %]   Daily Weight  02/02/24 : 83.3 kg (183 lb 10.3 oz)    Body mass index is 30.56 kg/m².    Physical Exam  Constitutional:       Appearance: Normal appearance.   HENT:      Head: Normocephalic and atraumatic.      Mouth/Throat:      Mouth: Mucous membranes are moist.      Pharynx: Oropharynx is clear.   Eyes:      Pupils: Pupils are equal, round, and reactive to light.   Cardiovascular:      Rate and Rhythm: Normal rate and regular rhythm.      Heart sounds: Normal heart sounds.   Pulmonary:      Effort: Pulmonary effort is normal.      Breath sounds: Normal breath sounds.   Abdominal:      General: Abdomen is flat. Bowel sounds are normal.      Palpations: Abdomen is soft.   Musculoskeletal:      Cervical back: Normal range of motion.      Comments: Rt hip incision with some redness and drainage   Neurological:      Mental Status: She is alert.         Antibiotics  ceFAZolin in dextrose (iso-os) (Ancef) IVPB 2 g  vancomycin (Vancocin) in dextrose 5 % water (D5W) 500 mL IV 1,500 mg  cefTRIAXone (Rocephin) IVPB 1 g  sodium chloride 0.9% infusion  sodium chloride 0.9 % bolus 1,000 mL  HYDROmorphone (Dilaudid) injection 0.2 mg  lisonpril (Prinivil, Zestril) tablet  PARoxetine (Paxil-CR) 24 hr tablet  acetaminophen (Tylenol) tablet 650 mg  acetaminophen (Tylenol) oral liquid 650 mg  acetaminophen (Tylenol) suppository 650 mg  polyethylene glycol (Glycolax, Miralax) packet 17 g  enoxaparin (Lovenox) syringe 40 mg  cefTRIAXone (Rocephin) 2 g IV in dextrose 5% 50 mL  HYDROmorphone (Dilaudid) injection 0.2 mg  acetaminophen (Tylenol) tablet 975 mg  magnesium oxide (Mag-Ox) tablet 400  mg  nicotine (Nicoderm CQ) 21 mg/24 hr patch 1 patch  ARIPiprazole (Abilify) tablet 15 mg  aspirin chewable tablet 81 mg  loratadine (Claritin) tablet 10 mg  ferrous sulfate (325 mg ferrous sulfate) tablet 1 tablet  lisinopril tablet 10 mg  metoprolol succinate XL (Toprol-XL) 24 hr tablet 25 mg  montelukast (Singulair) tablet 10 mg  naproxen (Naprosyn) tablet 500 mg  pantoprazole (ProtoNix) EC tablet 40 mg  oxyCODONE-acetaminophen (Percocet) 5-325 mg per tablet 1 tablet  PARoxetine (Paxil) tablet 20 mg  PARoxetine (Paxil) tablet 40 mg  traZODone (Desyrel) tablet 100 mg  PARoxetine (Paxil-CR) 24 hr tablet  vancomycin (Vancocin) 1,000 mg in dextrose 5% water 200 mL  PARoxetine (Paxil) tablet 20 mg  sodium chloride 0.9% infusion  sodium chloride 0.9 % bolus 1,000 mL  magnesium sulfate IV 2 g  potassium chloride (Klor-Con) packet 40 mEq      Relevant Results  Labs  Results from last 72 hours   Lab Units 02/04/24  0557 02/03/24  0531 02/02/24  1255   WBC AUTO x10*3/uL 5.3 7.6 10.4   HEMOGLOBIN g/dL 8.7* 9.1* 9.6*   HEMATOCRIT % 28.0* 29.0* 31.2*   PLATELETS AUTO x10*3/uL 207 205 237   LYMPHO PCT MAN %  --   --  1.0   MONO PCT MAN %  --   --  4.0   EOSINO PCT MAN %  --   --  5.0       Results from last 72 hours   Lab Units 02/04/24  0557 02/03/24  1424 02/03/24  0531 02/02/24  1255   SODIUM mmol/L 134* 131* 126* 129*   POTASSIUM mmol/L 3.6 3.8 3.4* 3.7   CHLORIDE mmol/L 104 99 97* 96*   CO2 mmol/L 23 24 24 25   BUN mg/dL 11 13 14 18   CREATININE mg/dL 0.35* 0.35* 0.36* 0.55   GLUCOSE mg/dL 84 80 86 107*   CALCIUM mg/dL 7.4* 7.2* 7.6* 8.4*   ANION GAP mmol/L 11 12 8* 12   EGFR mL/min/1.73m*2 >90 >90 >90 >90   PHOSPHORUS mg/dL  --   --   --  2.5       Results from last 72 hours   Lab Units 02/02/24  1255   ALK PHOS U/L 124*   BILIRUBIN TOTAL mg/dL 0.5   PROTEIN TOTAL g/dL 5.9*   ALT U/L 12   AST U/L 14   ALBUMIN g/dL 3.3*       Estimated Creatinine Clearance: 125 mL/min (A) (by C-G formula based on SCr of 0.35 mg/dL  (L)).  C-Reactive Protein   Date Value Ref Range Status   02/02/2024 23.75 (H) <1.00 mg/dL Final   01/12/2024 4.87 (H) <1.00 mg/dL Final     Microbiology  Reviewed  Imaging  reviewed        Assessment/Plan      Right hip surgical wound infection, for surgery, the culture with Staph aureus (s) pending     Recommendations :  Continue Vancomycin pending the cultures  Discussed with the medical team    I spent minutes in the professional and overall care of this patient.      Frank Modi MD

## 2024-02-04 NOTE — PROGRESS NOTES
"Vancomycin Dosing by Pharmacy- FOLLOW UP    Blanca Hamilton is a 48 y.o. year old female who Pharmacy has been consulted for vancomycin dosing for other / surgical wound infection . Based on the patient's indication and renal status this patient is being dosed based on a goal AUC of 400-600.     Renal function is currently stable.    Current vancomycin dose: 1000 mg given every 12 hours    Estimated vancomycin AUC on current dose: 255 mg/L.hr     Visit Vitals  /58 (BP Location: Left arm, Patient Position: Sitting)   Pulse 99   Temp 36.1 °C (97 °F) (Temporal)   Resp 18        Lab Results   Component Value Date    CREATININE 0.35 (L) 02/04/2024    CREATININE 0.35 (L) 02/03/2024    CREATININE 0.36 (L) 02/03/2024    CREATININE 0.55 02/02/2024        Patient weight is 83.3 kg    No results found for: \"CULTURE\"     I/O last 3 completed shifts:  In: 4596.8 (55.2 mL/kg) [P.O.:480; I.V.:916.8 (11 mL/kg); IV Piggyback:3200]  Out: 150 (1.8 mL/kg) [Urine:150 (0.1 mL/kg/hr)]  Weight: 83.3 kg   [unfilled]    No results found for: \"PATIENTTEMP\"     Assessment/Plan    Below goal AUC. Orders placed for new vancomcyin regimen of 1250 every 8 hours to begin at 2/4 8pm.     This dosing regimen is predicted by [employer] to result in the following pharmacokinetic parameters:  Regimen: 1250 mg IV every 8 hours.  Start time: 19:23 on 02/04/2024  Exposure target: AUC24 (range)400-600 mg/L.hr   AUC24,ss: 478 mg/L.hr  Probability of AUC24 > 400: 84 %  Ctrough,ss: 12.2 mg/L  Probability of Ctrough,ss > 20: 2 %  Probability of nephrotoxicity (Lodise BRENDA 2009): 7 %      The next level will be obtained on 2/6 at 6am. May be obtained sooner if clinically indicated.   Will continue to monitor renal function daily while on vancomycin and order serum creatinine at least every 48 hours if not already ordered.  Follow for continued vancomycin needs, clinical response, and signs/symptoms of toxicity.       Laura Flores, [facility]           "

## 2024-02-04 NOTE — CARE PLAN
The patient's goals for the shift include pt will have controlled pain    The clinical goals for the shift include Pt. will be safe and free of injury through night      Problem: Skin  Goal: Prevent/minimize sheer/friction injuries  Outcome: Progressing  Goal: Promote/optimize nutrition  Outcome: Progressing  Goal: Promote skin healing  Outcome: Progressing  Flowsheets (Taken 2/4/2024 1027)  Promote skin healing: Turn/reposition every 2 hours/use positioning/transfer devices     Problem: Pain  Goal: My pain/discomfort is manageable  Outcome: Progressing     Problem: Daily Care  Goal: Daily care needs are met  Outcome: Progressing     Problem: Psychosocial Needs  Goal: Demonstrates ability to cope with hospitalization/illness  Outcome: Progressing  Goal: Collaborate with me, my family, and caregiver to identify my specific goals  Outcome: Progressing     Problem: Discharge Barriers  Goal: My discharge needs are met  Outcome: Progressing

## 2024-02-05 ENCOUNTER — ANESTHESIA EVENT (OUTPATIENT)
Dept: OPERATING ROOM | Facility: HOSPITAL | Age: 49
DRG: 466 | End: 2024-02-05
Payer: COMMERCIAL

## 2024-02-05 ENCOUNTER — ANESTHESIA (OUTPATIENT)
Dept: OPERATING ROOM | Facility: HOSPITAL | Age: 49
DRG: 466 | End: 2024-02-05
Payer: COMMERCIAL

## 2024-02-05 LAB
ANION GAP SERPL CALC-SCNC: 9 MMOL/L (ref 10–20)
BACTERIA SPEC CULT: ABNORMAL
BUN SERPL-MCNC: 7 MG/DL (ref 6–23)
CALCIUM SERPL-MCNC: 7.3 MG/DL (ref 8.6–10.3)
CHLORIDE SERPL-SCNC: 103 MMOL/L (ref 98–107)
CO2 SERPL-SCNC: 24 MMOL/L (ref 21–32)
CREAT SERPL-MCNC: 0.3 MG/DL (ref 0.5–1.05)
EGFRCR SERPLBLD CKD-EPI 2021: >90 ML/MIN/1.73M*2
ERYTHROCYTE [DISTWIDTH] IN BLOOD BY AUTOMATED COUNT: 21.6 % (ref 11.5–14.5)
GLUCOSE SERPL-MCNC: 89 MG/DL (ref 74–99)
GRAM STN SPEC: ABNORMAL
GRAM STN SPEC: ABNORMAL
HCT VFR BLD AUTO: 27.4 % (ref 36–46)
HGB BLD-MCNC: 8.4 G/DL (ref 12–16)
LACTATE SERPL-SCNC: 0.8 MMOL/L (ref 0.4–2)
MAGNESIUM SERPL-MCNC: 1.56 MG/DL (ref 1.6–2.4)
MCH RBC QN AUTO: 28 PG (ref 26–34)
MCHC RBC AUTO-ENTMCNC: 30.7 G/DL (ref 32–36)
MCV RBC AUTO: 91 FL (ref 80–100)
NRBC BLD-RTO: 0 /100 WBCS (ref 0–0)
PLATELET # BLD AUTO: 182 X10*3/UL (ref 150–450)
POTASSIUM SERPL-SCNC: 3.6 MMOL/L (ref 3.5–5.3)
RBC # BLD AUTO: 3 X10*6/UL (ref 4–5.2)
SODIUM SERPL-SCNC: 132 MMOL/L (ref 136–145)
WBC # BLD AUTO: 4.3 X10*3/UL (ref 4.4–11.3)

## 2024-02-05 PROCEDURE — 2500000004 HC RX 250 GENERAL PHARMACY W/ HCPCS (ALT 636 FOR OP/ED): Performed by: NURSE PRACTITIONER

## 2024-02-05 PROCEDURE — 2500000002 HC RX 250 W HCPCS SELF ADMINISTERED DRUGS (ALT 637 FOR MEDICARE OP, ALT 636 FOR OP/ED): Performed by: STUDENT IN AN ORGANIZED HEALTH CARE EDUCATION/TRAINING PROGRAM

## 2024-02-05 PROCEDURE — 2500000004 HC RX 250 GENERAL PHARMACY W/ HCPCS (ALT 636 FOR OP/ED): Performed by: INTERNAL MEDICINE

## 2024-02-05 PROCEDURE — 83605 ASSAY OF LACTIC ACID: CPT | Performed by: NURSE PRACTITIONER

## 2024-02-05 PROCEDURE — C9113 INJ PANTOPRAZOLE SODIUM, VIA: HCPCS | Performed by: NURSE PRACTITIONER

## 2024-02-05 PROCEDURE — 83735 ASSAY OF MAGNESIUM: CPT | Performed by: STUDENT IN AN ORGANIZED HEALTH CARE EDUCATION/TRAINING PROGRAM

## 2024-02-05 PROCEDURE — S4991 NICOTINE PATCH NONLEGEND: HCPCS | Performed by: STUDENT IN AN ORGANIZED HEALTH CARE EDUCATION/TRAINING PROGRAM

## 2024-02-05 PROCEDURE — 80048 BASIC METABOLIC PNL TOTAL CA: CPT | Performed by: STUDENT IN AN ORGANIZED HEALTH CARE EDUCATION/TRAINING PROGRAM

## 2024-02-05 PROCEDURE — 36415 COLL VENOUS BLD VENIPUNCTURE: CPT | Performed by: NURSE PRACTITIONER

## 2024-02-05 PROCEDURE — 2500000004 HC RX 250 GENERAL PHARMACY W/ HCPCS (ALT 636 FOR OP/ED): Performed by: STUDENT IN AN ORGANIZED HEALTH CARE EDUCATION/TRAINING PROGRAM

## 2024-02-05 PROCEDURE — 1100000001 HC PRIVATE ROOM DAILY

## 2024-02-05 PROCEDURE — 99233 SBSQ HOSP IP/OBS HIGH 50: CPT | Performed by: NURSE PRACTITIONER

## 2024-02-05 PROCEDURE — 2500000001 HC RX 250 WO HCPCS SELF ADMINISTERED DRUGS (ALT 637 FOR MEDICARE OP): Performed by: NURSE PRACTITIONER

## 2024-02-05 PROCEDURE — 85027 COMPLETE CBC AUTOMATED: CPT | Performed by: STUDENT IN AN ORGANIZED HEALTH CARE EDUCATION/TRAINING PROGRAM

## 2024-02-05 RX ORDER — MAGNESIUM SULFATE HEPTAHYDRATE 40 MG/ML
2 INJECTION, SOLUTION INTRAVENOUS ONCE
Status: COMPLETED | OUTPATIENT
Start: 2024-02-05 | End: 2024-02-05

## 2024-02-05 RX ORDER — CEFAZOLIN SODIUM 2 G/50ML
2 SOLUTION INTRAVENOUS EVERY 8 HOURS
Status: DISCONTINUED | OUTPATIENT
Start: 2024-02-05 | End: 2024-02-05

## 2024-02-05 RX ORDER — CEFAZOLIN SODIUM 2 G/100ML
2 INJECTION, SOLUTION INTRAVENOUS EVERY 8 HOURS
Status: DISCONTINUED | OUTPATIENT
Start: 2024-02-05 | End: 2024-02-11 | Stop reason: HOSPADM

## 2024-02-05 RX ADMIN — LORATADINE 10 MG: 10 TABLET ORAL at 09:09

## 2024-02-05 RX ADMIN — NAPROXEN 500 MG: 500 TABLET ORAL at 17:56

## 2024-02-05 RX ADMIN — ACETAMINOPHEN 975 MG: 325 TABLET ORAL at 15:53

## 2024-02-05 RX ADMIN — CEFAZOLIN SODIUM 2 G: 2 INJECTION, SOLUTION INTRAVENOUS at 19:55

## 2024-02-05 RX ADMIN — FAMOTIDINE 40 MG: 20 TABLET ORAL at 09:09

## 2024-02-05 RX ADMIN — MAGNESIUM SULFATE HEPTAHYDRATE 2 G: 2 INJECTION, SOLUTION INTRAVENOUS at 09:13

## 2024-02-05 RX ADMIN — ACETAMINOPHEN 975 MG: 325 TABLET ORAL at 21:37

## 2024-02-05 RX ADMIN — MONTELUKAST 10 MG: 10 TABLET, FILM COATED ORAL at 09:13

## 2024-02-05 RX ADMIN — SODIUM CHLORIDE 500 ML: 9 INJECTION, SOLUTION INTRAVENOUS at 00:39

## 2024-02-05 RX ADMIN — FAMOTIDINE 40 MG: 20 TABLET ORAL at 20:03

## 2024-02-05 RX ADMIN — TRAZODONE HYDROCHLORIDE 100 MG: 50 TABLET ORAL at 20:03

## 2024-02-05 RX ADMIN — ASPIRIN 81 MG CHEWABLE TABLET 81 MG: 81 TABLET CHEWABLE at 09:09

## 2024-02-05 RX ADMIN — POTASSIUM CHLORIDE 40 MEQ: 1.5 POWDER, FOR SOLUTION ORAL at 20:03

## 2024-02-05 RX ADMIN — PANTOPRAZOLE SODIUM 40 MG: 40 INJECTION, POWDER, FOR SOLUTION INTRAVENOUS at 09:08

## 2024-02-05 RX ADMIN — CEFAZOLIN SODIUM 2 G: 2 INJECTION, SOLUTION INTRAVENOUS at 11:37

## 2024-02-05 RX ADMIN — ARIPIPRAZOLE 15 MG: 5 TABLET ORAL at 20:03

## 2024-02-05 RX ADMIN — NAPROXEN 500 MG: 500 TABLET ORAL at 09:09

## 2024-02-05 RX ADMIN — VANCOMYCIN HYDROCHLORIDE 1.25 G: 10 INJECTION, POWDER, LYOPHILIZED, FOR SOLUTION INTRAVENOUS at 04:27

## 2024-02-05 RX ADMIN — NICOTINE 1 PATCH: 21 PATCH, EXTENDED RELEASE TRANSDERMAL at 10:24

## 2024-02-05 RX ADMIN — ASPIRIN 81 MG CHEWABLE TABLET 81 MG: 81 TABLET CHEWABLE at 20:03

## 2024-02-05 RX ADMIN — PANTOPRAZOLE SODIUM 40 MG: 40 INJECTION, POWDER, FOR SOLUTION INTRAVENOUS at 20:03

## 2024-02-05 RX ADMIN — PAROXETINE HYDROCHLORIDE 40 MG: 20 TABLET, FILM COATED ORAL at 09:08

## 2024-02-05 RX ADMIN — Medication 400 MG: at 09:09

## 2024-02-05 ASSESSMENT — COGNITIVE AND FUNCTIONAL STATUS - GENERAL
TURNING FROM BACK TO SIDE WHILE IN FLAT BAD: A LOT
DRESSING REGULAR UPPER BODY CLOTHING: A LITTLE
CLIMB 3 TO 5 STEPS WITH RAILING: TOTAL
PERSONAL GROOMING: A LITTLE
WALKING IN HOSPITAL ROOM: TOTAL
DRESSING REGULAR UPPER BODY CLOTHING: A LITTLE
WALKING IN HOSPITAL ROOM: TOTAL
MOVING FROM LYING ON BACK TO SITTING ON SIDE OF FLAT BED WITH BEDRAILS: A LITTLE
MOBILITY SCORE: 11
STANDING UP FROM CHAIR USING ARMS: A LOT
EATING MEALS: A LITTLE
HELP NEEDED FOR BATHING: A LOT
HELP NEEDED FOR BATHING: A LOT
MOVING TO AND FROM BED TO CHAIR: A LOT
DAILY ACTIVITIY SCORE: 16
TURNING FROM BACK TO SIDE WHILE IN FLAT BAD: A LOT
DRESSING REGULAR LOWER BODY CLOTHING: A LOT
TOILETING: A LITTLE
MOVING TO AND FROM BED TO CHAIR: A LOT
DAILY ACTIVITIY SCORE: 17
STANDING UP FROM CHAIR USING ARMS: A LOT
MOBILITY SCORE: 11
CLIMB 3 TO 5 STEPS WITH RAILING: TOTAL
DRESSING REGULAR LOWER BODY CLOTHING: A LOT
PERSONAL GROOMING: A LITTLE
TOILETING: A LITTLE
MOVING FROM LYING ON BACK TO SITTING ON SIDE OF FLAT BED WITH BEDRAILS: A LITTLE

## 2024-02-05 ASSESSMENT — PAIN SCALES - GENERAL
PAINLEVEL_OUTOF10: 0 - NO PAIN
PAINLEVEL_OUTOF10: 3
PAINLEVEL_OUTOF10: 0 - NO PAIN

## 2024-02-05 ASSESSMENT — ACTIVITIES OF DAILY LIVING (ADL): LACK_OF_TRANSPORTATION: NO

## 2024-02-05 ASSESSMENT — PAIN DESCRIPTION - LOCATION: LOCATION: HIP

## 2024-02-05 ASSESSMENT — PAIN DESCRIPTION - ORIENTATION: ORIENTATION: RIGHT

## 2024-02-05 NOTE — PROGRESS NOTES
Blanca Hamilton is a 48 y.o. female on day 3 of admission presenting with Right hip pain.    Subjective   Interval History: no fever, no new complaints        Review of Systems    Objective   Range of Vitals (last 24 hours)  Heart Rate:  []   Temp:  [36 °C (96.8 °F)-36.9 °C (98.4 °F)]   Resp:  [18]   BP: ()/(51-83)   SpO2:  [94 %-99 %]   Daily Weight  02/02/24 : 83.3 kg (183 lb 10.3 oz)    Body mass index is 30.56 kg/m².    Physical Exam  Constitutional:       Appearance: Normal appearance.   HENT:      Head: Normocephalic and atraumatic.      Mouth/Throat:      Mouth: Mucous membranes are moist.      Pharynx: Oropharynx is clear.   Eyes:      Pupils: Pupils are equal, round, and reactive to light.   Cardiovascular:      Rate and Rhythm: Normal rate and regular rhythm.      Heart sounds: Normal heart sounds.   Pulmonary:      Effort: Pulmonary effort is normal.      Breath sounds: Normal breath sounds.   Abdominal:      General: Abdomen is flat. Bowel sounds are normal.      Palpations: Abdomen is soft.   Musculoskeletal:      Cervical back: Normal range of motion.      Comments: Rt hip incision with some redness and drainage   Neurological:      Mental Status: She is alert.         Antibiotics  ceFAZolin in dextrose (iso-os) (Ancef) IVPB 2 g  vancomycin (Vancocin) in dextrose 5 % water (D5W) 500 mL IV 1,500 mg  cefTRIAXone (Rocephin) IVPB 1 g  sodium chloride 0.9% infusion  sodium chloride 0.9 % bolus 1,000 mL  HYDROmorphone (Dilaudid) injection 0.2 mg  lisonpril (Prinivil, Zestril) tablet  PARoxetine (Paxil-CR) 24 hr tablet  acetaminophen (Tylenol) tablet 650 mg  acetaminophen (Tylenol) oral liquid 650 mg  acetaminophen (Tylenol) suppository 650 mg  polyethylene glycol (Glycolax, Miralax) packet 17 g  enoxaparin (Lovenox) syringe 40 mg  cefTRIAXone (Rocephin) 2 g IV in dextrose 5% 50 mL  HYDROmorphone (Dilaudid) injection 0.2 mg  acetaminophen (Tylenol) tablet 975 mg  magnesium oxide (Mag-Ox) tablet 400  mg  nicotine (Nicoderm CQ) 21 mg/24 hr patch 1 patch  ARIPiprazole (Abilify) tablet 15 mg  aspirin chewable tablet 81 mg  loratadine (Claritin) tablet 10 mg  ferrous sulfate (325 mg ferrous sulfate) tablet 1 tablet  lisinopril tablet 10 mg  metoprolol succinate XL (Toprol-XL) 24 hr tablet 25 mg  montelukast (Singulair) tablet 10 mg  naproxen (Naprosyn) tablet 500 mg  pantoprazole (ProtoNix) EC tablet 40 mg  oxyCODONE-acetaminophen (Percocet) 5-325 mg per tablet 1 tablet  PARoxetine (Paxil) tablet 20 mg  PARoxetine (Paxil) tablet 40 mg  traZODone (Desyrel) tablet 100 mg  PARoxetine (Paxil-CR) 24 hr tablet  vancomycin (Vancocin) 1,000 mg in dextrose 5% water 200 mL  PARoxetine (Paxil) tablet 20 mg  sodium chloride 0.9% infusion  sodium chloride 0.9 % bolus 1,000 mL  magnesium sulfate IV 2 g  potassium chloride (Klor-Con) packet 40 mEq      Relevant Results  Labs  Results from last 72 hours   Lab Units 02/05/24 0517 02/04/24 1422 02/04/24  0557 02/03/24  0531 02/02/24  1255   WBC AUTO x10*3/uL 4.3* 2.2* 5.3   < > 10.4   HEMOGLOBIN g/dL 8.4* 9.4* 8.7*   < > 9.6*   HEMATOCRIT % 27.4* 30.6* 28.0*   < > 31.2*   PLATELETS AUTO x10*3/uL 182 161 207   < > 237   LYMPHO PCT MAN %  --   --   --   --  1.0   MONO PCT MAN %  --   --   --   --  4.0   EOSINO PCT MAN %  --   --   --   --  5.0    < > = values in this interval not displayed.       Results from last 72 hours   Lab Units 02/05/24 0517 02/04/24 1422 02/04/24  0557 02/03/24  0531 02/02/24  1255   SODIUM mmol/L 132* 134* 134*   < > 129*   POTASSIUM mmol/L 3.6 3.8 3.6   < > 3.7   CHLORIDE mmol/L 103 100 104   < > 96*   CO2 mmol/L 24 21 23   < > 25   BUN mg/dL 7 12 11   < > 18   CREATININE mg/dL 0.30* 0.40* 0.35*   < > 0.55   GLUCOSE mg/dL 89 84 84   < > 107*   CALCIUM mg/dL 7.3* 7.8* 7.4*   < > 8.4*   ANION GAP mmol/L 9* 17 11   < > 12   EGFR mL/min/1.73m*2 >90 >90 >90   < > >90   PHOSPHORUS mg/dL  --   --   --   --  2.5    < > = values in this interval not displayed.        Results from last 72 hours   Lab Units 02/02/24  1255   ALK PHOS U/L 124*   BILIRUBIN TOTAL mg/dL 0.5   PROTEIN TOTAL g/dL 5.9*   ALT U/L 12   AST U/L 14   ALBUMIN g/dL 3.3*       Estimated Creatinine Clearance: 125 mL/min (A) (by C-G formula based on SCr of 0.3 mg/dL (L)).  C-Reactive Protein   Date Value Ref Range Status   02/02/2024 23.75 (H) <1.00 mg/dL Final   01/12/2024 4.87 (H) <1.00 mg/dL Final     Microbiology  Reviewed  Imaging  reviewed        Assessment/Plan    Right hip surgical wound infection, for surgery, the culture with MSSA, for surgery  Emesis, xray no evidence of obstruction     Recommendations :  Will change the antibiotics to Cefazolin  Discussed with the medical team    I spent minutes in the professional and overall care of this patient.      Frank Modi MD

## 2024-02-05 NOTE — CARE PLAN
The patient's goals for the shift include patient will get adequate sleep    The clinical goals for the shift include pain control

## 2024-02-05 NOTE — PROGRESS NOTES
Blanca Hamilton is a 48 y.o. female on day 3 of admission presenting with Right hip pain.      Subjective   The patient has no complains of pain or discomfort this morning.      Objective     Last Recorded Vitals  /66 (BP Location: Left arm, Patient Position: Lying)   Pulse 90   Temp 36.3 °C (97.3 °F) (Temporal)   Resp 18   Wt 83.3 kg (183 lb 10.3 oz)   SpO2 98%   Intake/Output last 3 Shifts:    Intake/Output Summary (Last 24 hours) at 2/5/2024 1628  Last data filed at 2/5/2024 1207  Gross per 24 hour   Intake 2022.5 ml   Output --   Net 2022.5 ml         Admission Weight  Weight: 83.3 kg (183 lb 10.3 oz) (02/02/24 1210)    Daily Weight  02/02/24 : 83.3 kg (183 lb 10.3 oz)    Image Results      Physical Exam  HENT:      Mouth/Throat:      Mouth: Mucous membranes are dry.   Eyes:      Extraocular Movements: Extraocular movements intact.   Cardiovascular:      Rate and Rhythm: Regular rhythm.   Pulmonary:      Breath sounds: Normal breath sounds.   Abdominal:      Palpations: Abdomen is soft.      Tenderness: There is no abdominal tenderness.   Musculoskeletal:      Cervical back: Normal range of motion.      Right hip: Tenderness present.      Comments: Open surgical incision region with purulent drainage, tenderness, erythema and warmth    Neurological:      Mental Status: She is alert and oriented to person, place, and time.   Psychiatric:         Mood and Affect: Mood normal.         Relevant Results      Results for orders placed or performed during the hospital encounter of 02/02/24 (from the past 24 hour(s))   Lactate   Result Value Ref Range    Lactate 0.8 0.4 - 2.0 mmol/L   Basic metabolic panel   Result Value Ref Range    Glucose 89 74 - 99 mg/dL    Sodium 132 (L) 136 - 145 mmol/L    Potassium 3.6 3.5 - 5.3 mmol/L    Chloride 103 98 - 107 mmol/L    Bicarbonate 24 21 - 32 mmol/L    Anion Gap 9 (L) 10 - 20 mmol/L    Urea Nitrogen 7 6 - 23 mg/dL    Creatinine 0.30 (L) 0.50 - 1.05 mg/dL    eGFR >90 >60  mL/min/1.73m*2    Calcium 7.3 (L) 8.6 - 10.3 mg/dL   CBC   Result Value Ref Range    WBC 4.3 (L) 4.4 - 11.3 x10*3/uL    nRBC 0.0 0.0 - 0.0 /100 WBCs    RBC 3.00 (L) 4.00 - 5.20 x10*6/uL    Hemoglobin 8.4 (L) 12.0 - 16.0 g/dL    Hematocrit 27.4 (L) 36.0 - 46.0 %    MCV 91 80 - 100 fL    MCH 28.0 26.0 - 34.0 pg    MCHC 30.7 (L) 32.0 - 36.0 g/dL    RDW 21.6 (H) 11.5 - 14.5 %    Platelets 182 150 - 450 x10*3/uL   Magnesium   Result Value Ref Range    Magnesium 1.56 (L) 1.60 - 2.40 mg/dL   Lactate   Result Value Ref Range    Lactate 0.8 0.4 - 2.0 mmol/L           Scheduled medications  acetaminophen, 975 mg, oral, q8h  ARIPiprazole, 15 mg, oral, Nightly  aspirin, 81 mg, oral, BID  ceFAZolin, 2 g, intravenous, q8h  [Held by provider] enoxaparin, 40 mg, subcutaneous, q24h  famotidine, 40 mg, oral, BID  ferrous sulfate (325 mg ferrous sulfate), 1 tablet, oral, Daily  [Held by provider] lisinopril, 10 mg, oral, q AM  loratadine, 10 mg, oral, Daily  magnesium oxide, 400 mg, oral, Daily  [Held by provider] metoprolol succinate XL, 25 mg, oral, Daily  montelukast, 10 mg, oral, q AM  naproxen, 500 mg, oral, BID with meals  nicotine, 1 patch, transdermal, Daily  pantoprazole, 40 mg, intravenous, BID  PARoxetine, 40 mg, oral, Daily  polyethylene glycol, 17 g, oral, Daily  potassium chloride, 40 mEq, oral, BID  traZODone, 100 mg, oral, Nightly      Continuous medications  sodium chloride 0.9%, 100 mL/hr, Last Rate: 100 mL/hr (02/05/24 2178)      PRN medications  PRN medications: acetaminophen **OR** acetaminophen **OR** acetaminophen, HYDROmorphone, oxyCODONE-acetaminophen   Assessment/Plan          Principal Problem:    Right hip pain  Active Problems:    Wound infection after surgery    Blanca Hamilton is a 48 y.o. female with past medical history significant for GERD, HTN, HLD who presented to the hospital at the recommendation of orthopedic surgery due to concern for infected right hip.       Right prosthetic hip joint  infection  Initial right ALEXA on 1/2/2024.  Revision on 1/5/2024 due to periprosthetic fracture.  Now with increased drainage and erythema from incision site.  CRP 24.  Wound culture sent from the emergency department  Significant drainage appreciated.    Wound care consult  Orthopedic surgery planning joint washout next week, hopefully 2/5/24  Will consult ID for assistance with guidance of antibiotic choice and duration  Plan to continue with vancomycin and ceftriaxone for empiric coverage, started 2/2  NPO after MN for right hip revision in the morning    Lactic acidosis  Emesis  Lactate level 2.4  Denies abdominal pain  Will continue antiemetic  Adding H2 inhibitor and continuing PPI  Normal saline bolus   Monitor for SIRS  KUB unremarkable  Order CT abdomen if emesis returns after surgery    Chronic anemia  Hemoglobin improved at 9.5, from this mornings 8.7  Monitor for bleeding     Hypotension- improving   Likely multifactorial with the above and hypovolemia   The patient responded to 1 L NS bolus and after hours it returned. Adding an additional 1 liter normal saline bolus and continuous fluids until stabilized  Monitor for anemia with repeat H&H  Holding antihypertensives  Monitor for SIRS    Hypomagnesemia (resolved)  Hypokalemia (resolved)  Both scheduled  Repeat levels    Moderate hyponatremia, improved  Trend.  If further decline will obtain urine studies to assess etiology.    Pain regimen adjusted, suspect could be component of pain induced SIADH     Tobacco dependence  Continue nicotine patch.  Stress cessation to allow for adequate healing postop     Anxiety and depression  Continue home medication    Dyslipidemia  Lipid panel    GERD  Continue pantoprazole    DVT prophylaxis   Lovenox subcutaneous (hold prior to surgery)    Dispo: To OR for right hip revision on 2/6/24.              Leonarda Bond, APRN-CNP

## 2024-02-05 NOTE — SIGNIFICANT EVENT
Updated patient that we will are unable to get OR room for today and that her surgery will now be tomorrow 2/6/24.  Patient can eat and drink up until midnight.  She is to be NPO at midnight for the OR tomorrow with Dr. Jalloh for revision of right hip arthroplasty.  Patient verbalized understanding.     Ester Fitch, DEVON-CNP

## 2024-02-05 NOTE — CONSULTS
Vancomycin Dosing by Pharmacy- Cessation of Therapy    Consult to pharmacy for vancomycin dosing has been discontinued by the prescriber, pharmacy will sign off at this time.    Please call pharmacy if there are further questions or re-enter a consult if vancomycin is resumed.     Tere Robertson, PharmD

## 2024-02-05 NOTE — PROGRESS NOTES
Patient seen and examined this morning.  Continue drainage from right lower extremity.  Pain under control.    Physical exam: Right hip with drainage from the distal aspect of the incision and dehiscence.  Neurovascular intact distally    Plan is for I&D of the right hip with revision total hip exchange of modular components, placement of antibiotic beads and closure.  I discussed the situation in detail with the patient including the risks benefits and alternatives of the various treatment options and the surgery.  Full discussion documentation is in my note from Friday.  Patient signed informed consent to proceed.  Working on getting into the OR today or tomorrow if possible.

## 2024-02-06 LAB
ANION GAP SERPL CALC-SCNC: 9 MMOL/L (ref 10–20)
BUN SERPL-MCNC: 6 MG/DL (ref 6–23)
CALCIUM SERPL-MCNC: 7.2 MG/DL (ref 8.6–10.3)
CHLORIDE SERPL-SCNC: 106 MMOL/L (ref 98–107)
CO2 SERPL-SCNC: 25 MMOL/L (ref 21–32)
CREAT SERPL-MCNC: 0.24 MG/DL (ref 0.5–1.05)
EGFRCR SERPLBLD CKD-EPI 2021: >90 ML/MIN/1.73M*2
ERYTHROCYTE [DISTWIDTH] IN BLOOD BY AUTOMATED COUNT: 22 % (ref 11.5–14.5)
ERYTHROCYTE [DISTWIDTH] IN BLOOD BY AUTOMATED COUNT: 22.1 % (ref 11.5–14.5)
GLUCOSE SERPL-MCNC: 77 MG/DL (ref 74–99)
HCT VFR BLD AUTO: 26.9 % (ref 36–46)
HCT VFR BLD AUTO: 27.4 % (ref 36–46)
HGB BLD-MCNC: 8.4 G/DL (ref 12–16)
HGB BLD-MCNC: 8.6 G/DL (ref 12–16)
MCH RBC QN AUTO: 28.1 PG (ref 26–34)
MCH RBC QN AUTO: 28.3 PG (ref 26–34)
MCHC RBC AUTO-ENTMCNC: 30.7 G/DL (ref 32–36)
MCHC RBC AUTO-ENTMCNC: 32 G/DL (ref 32–36)
MCV RBC AUTO: 89 FL (ref 80–100)
MCV RBC AUTO: 92 FL (ref 80–100)
NRBC BLD-RTO: 0 /100 WBCS (ref 0–0)
NRBC BLD-RTO: 0 /100 WBCS (ref 0–0)
PLATELET # BLD AUTO: 216 X10*3/UL (ref 150–450)
PLATELET # BLD AUTO: 224 X10*3/UL (ref 150–450)
POTASSIUM SERPL-SCNC: 3.9 MMOL/L (ref 3.5–5.3)
RBC # BLD AUTO: 2.99 X10*6/UL (ref 4–5.2)
RBC # BLD AUTO: 3.04 X10*6/UL (ref 4–5.2)
SODIUM SERPL-SCNC: 136 MMOL/L (ref 136–145)
WBC # BLD AUTO: 2.6 X10*3/UL (ref 4.4–11.3)
WBC # BLD AUTO: 3.2 X10*3/UL (ref 4.4–11.3)

## 2024-02-06 PROCEDURE — 2500000004 HC RX 250 GENERAL PHARMACY W/ HCPCS (ALT 636 FOR OP/ED): Performed by: INTERNAL MEDICINE

## 2024-02-06 PROCEDURE — 0SP90JZ REMOVAL OF SYNTHETIC SUBSTITUTE FROM RIGHT HIP JOINT, OPEN APPROACH: ICD-10-PCS | Performed by: ORTHOPAEDIC SURGERY

## 2024-02-06 PROCEDURE — 2500000004 HC RX 250 GENERAL PHARMACY W/ HCPCS (ALT 636 FOR OP/ED): Performed by: NURSE ANESTHETIST, CERTIFIED REGISTERED

## 2024-02-06 PROCEDURE — 2500000001 HC RX 250 WO HCPCS SELF ADMINISTERED DRUGS (ALT 637 FOR MEDICARE OP): Performed by: NURSE PRACTITIONER

## 2024-02-06 PROCEDURE — 1100000001 HC PRIVATE ROOM DAILY

## 2024-02-06 PROCEDURE — C1763 CONN TISS, NON-HUMAN: HCPCS | Performed by: ORTHOPAEDIC SURGERY

## 2024-02-06 PROCEDURE — 2500000002 HC RX 250 W HCPCS SELF ADMINISTERED DRUGS (ALT 637 FOR MEDICARE OP, ALT 636 FOR OP/ED): Performed by: STUDENT IN AN ORGANIZED HEALTH CARE EDUCATION/TRAINING PROGRAM

## 2024-02-06 PROCEDURE — C9113 INJ PANTOPRAZOLE SODIUM, VIA: HCPCS | Performed by: NURSE PRACTITIONER

## 2024-02-06 PROCEDURE — 0QS604Z REPOSITION RIGHT UPPER FEMUR WITH INTERNAL FIXATION DEVICE, OPEN APPROACH: ICD-10-PCS | Performed by: ORTHOPAEDIC SURGERY

## 2024-02-06 PROCEDURE — 87186 SC STD MICRODIL/AGAR DIL: CPT | Mod: GEALAB | Performed by: ORTHOPAEDIC SURGERY

## 2024-02-06 PROCEDURE — 85027 COMPLETE CBC AUTOMATED: CPT | Performed by: NURSE PRACTITIONER

## 2024-02-06 PROCEDURE — 27134 REVISE HIP JOINT REPLACEMENT: CPT | Performed by: ORTHOPAEDIC SURGERY

## 2024-02-06 PROCEDURE — 2500000004 HC RX 250 GENERAL PHARMACY W/ HCPCS (ALT 636 FOR OP/ED): Performed by: NURSE PRACTITIONER

## 2024-02-06 PROCEDURE — 7100000002 HC RECOVERY ROOM TIME - EACH INCREMENTAL 1 MINUTE: Performed by: ORTHOPAEDIC SURGERY

## 2024-02-06 PROCEDURE — 2720000007 HC OR 272 NO HCPCS: Performed by: ORTHOPAEDIC SURGERY

## 2024-02-06 PROCEDURE — 3700000001 HC GENERAL ANESTHESIA TIME - INITIAL BASE CHARGE: Performed by: ORTHOPAEDIC SURGERY

## 2024-02-06 PROCEDURE — 99233 SBSQ HOSP IP/OBS HIGH 50: CPT | Performed by: STUDENT IN AN ORGANIZED HEALTH CARE EDUCATION/TRAINING PROGRAM

## 2024-02-06 PROCEDURE — P9045 ALBUMIN (HUMAN), 5%, 250 ML: HCPCS | Mod: JZ | Performed by: NURSE ANESTHETIST, CERTIFIED REGISTERED

## 2024-02-06 PROCEDURE — 2500000004 HC RX 250 GENERAL PHARMACY W/ HCPCS (ALT 636 FOR OP/ED): Performed by: STUDENT IN AN ORGANIZED HEALTH CARE EDUCATION/TRAINING PROGRAM

## 2024-02-06 PROCEDURE — 7100000001 HC RECOVERY ROOM TIME - INITIAL BASE CHARGE: Performed by: ORTHOPAEDIC SURGERY

## 2024-02-06 PROCEDURE — 85027 COMPLETE CBC AUTOMATED: CPT | Performed by: STUDENT IN AN ORGANIZED HEALTH CARE EDUCATION/TRAINING PROGRAM

## 2024-02-06 PROCEDURE — 2500000004 HC RX 250 GENERAL PHARMACY W/ HCPCS (ALT 636 FOR OP/ED): Performed by: ORTHOPAEDIC SURGERY

## 2024-02-06 PROCEDURE — 36415 COLL VENOUS BLD VENIPUNCTURE: CPT | Performed by: STUDENT IN AN ORGANIZED HEALTH CARE EDUCATION/TRAINING PROGRAM

## 2024-02-06 PROCEDURE — 3600000018 HC OR TIME - INITIAL BASE CHARGE - PROCEDURE LEVEL SIX: Performed by: ORTHOPAEDIC SURGERY

## 2024-02-06 PROCEDURE — 2500000005 HC RX 250 GENERAL PHARMACY W/O HCPCS: Performed by: NURSE ANESTHETIST, CERTIFIED REGISTERED

## 2024-02-06 PROCEDURE — C1713 ANCHOR/SCREW BN/BN,TIS/BN: HCPCS | Performed by: ORTHOPAEDIC SURGERY

## 2024-02-06 PROCEDURE — A4217 STERILE WATER/SALINE, 500 ML: HCPCS | Performed by: ORTHOPAEDIC SURGERY

## 2024-02-06 PROCEDURE — 36415 COLL VENOUS BLD VENIPUNCTURE: CPT | Performed by: NURSE PRACTITIONER

## 2024-02-06 PROCEDURE — 2780000003 HC OR 278 NO HCPCS: Performed by: ORTHOPAEDIC SURGERY

## 2024-02-06 PROCEDURE — C1776 JOINT DEVICE (IMPLANTABLE): HCPCS | Performed by: ORTHOPAEDIC SURGERY

## 2024-02-06 PROCEDURE — A27134 PR REVISE TOTAL HIP REPLACEMENT: Performed by: NURSE ANESTHETIST, CERTIFIED REGISTERED

## 2024-02-06 PROCEDURE — 2500000004 HC RX 250 GENERAL PHARMACY W/ HCPCS (ALT 636 FOR OP/ED): Mod: JZ | Performed by: STUDENT IN AN ORGANIZED HEALTH CARE EDUCATION/TRAINING PROGRAM

## 2024-02-06 PROCEDURE — 80048 BASIC METABOLIC PNL TOTAL CA: CPT | Performed by: NURSE PRACTITIONER

## 2024-02-06 PROCEDURE — S4991 NICOTINE PATCH NONLEGEND: HCPCS | Performed by: STUDENT IN AN ORGANIZED HEALTH CARE EDUCATION/TRAINING PROGRAM

## 2024-02-06 PROCEDURE — 0SR90JZ REPLACEMENT OF RIGHT HIP JOINT WITH SYNTHETIC SUBSTITUTE, OPEN APPROACH: ICD-10-PCS | Performed by: ORTHOPAEDIC SURGERY

## 2024-02-06 PROCEDURE — 0QB60ZZ EXCISION OF RIGHT UPPER FEMUR, OPEN APPROACH: ICD-10-PCS | Performed by: ORTHOPAEDIC SURGERY

## 2024-02-06 PROCEDURE — 3700000002 HC GENERAL ANESTHESIA TIME - EACH INCREMENTAL 1 MINUTE: Performed by: ORTHOPAEDIC SURGERY

## 2024-02-06 PROCEDURE — 3600000017 HC OR TIME - EACH INCREMENTAL 1 MINUTE - PROCEDURE LEVEL SIX: Performed by: ORTHOPAEDIC SURGERY

## 2024-02-06 PROCEDURE — P9045 ALBUMIN (HUMAN), 5%, 250 ML: HCPCS | Mod: JZ | Performed by: STUDENT IN AN ORGANIZED HEALTH CARE EDUCATION/TRAINING PROGRAM

## 2024-02-06 DEVICE — STIMULAN KIT, RAPID CURE, 10CC: Type: IMPLANTABLE DEVICE | Site: HIP | Status: FUNCTIONAL

## 2024-02-06 DEVICE — RESTORATION ADM/MDM X3 INSERT
Type: IMPLANTABLE DEVICE | Site: HIP | Status: FUNCTIONAL
Brand: RESTORATION ADM/MDM X3 INSERT

## 2024-02-06 DEVICE — USE ONLY WITH V40 TAPER STEMS
Type: IMPLANTABLE DEVICE | Site: HIP | Status: FUNCTIONAL
Brand: SLEEVE

## 2024-02-06 DEVICE — LINER- CEMENTLESS
Type: IMPLANTABLE DEVICE | Site: HIP | Status: FUNCTIONAL
Brand: MDM

## 2024-02-06 DEVICE — IMPLANTABLE DEVICE
Type: IMPLANTABLE DEVICE | Site: HIP | Status: FUNCTIONAL
Brand: CABLE-READY®

## 2024-02-06 DEVICE — CERAMIC C-TAPER FEMORAL HEAD
Type: IMPLANTABLE DEVICE | Site: HIP | Status: FUNCTIONAL
Brand: BIOLOX

## 2024-02-06 RX ORDER — SODIUM CHLORIDE, SODIUM LACTATE, POTASSIUM CHLORIDE, CALCIUM CHLORIDE 600; 310; 30; 20 MG/100ML; MG/100ML; MG/100ML; MG/100ML
100 INJECTION, SOLUTION INTRAVENOUS CONTINUOUS
Status: DISCONTINUED | OUTPATIENT
Start: 2024-02-06 | End: 2024-02-06 | Stop reason: HOSPADM

## 2024-02-06 RX ORDER — LIDOCAINE HCL/PF 100 MG/5ML
SYRINGE (ML) INTRAVENOUS AS NEEDED
Status: DISCONTINUED | OUTPATIENT
Start: 2024-02-06 | End: 2024-02-06

## 2024-02-06 RX ORDER — ROCURONIUM BROMIDE 10 MG/ML
INJECTION, SOLUTION INTRAVENOUS AS NEEDED
Status: DISCONTINUED | OUTPATIENT
Start: 2024-02-06 | End: 2024-02-06

## 2024-02-06 RX ORDER — PHENYLEPHRINE HCL IN 0.9% NACL 0.4MG/10ML
SYRINGE (ML) INTRAVENOUS AS NEEDED
Status: DISCONTINUED | OUTPATIENT
Start: 2024-02-06 | End: 2024-02-06

## 2024-02-06 RX ORDER — FENTANYL CITRATE 50 UG/ML
INJECTION, SOLUTION INTRAMUSCULAR; INTRAVENOUS AS NEEDED
Status: DISCONTINUED | OUTPATIENT
Start: 2024-02-06 | End: 2024-02-06

## 2024-02-06 RX ORDER — PROPOFOL 10 MG/ML
INJECTION, EMULSION INTRAVENOUS AS NEEDED
Status: DISCONTINUED | OUTPATIENT
Start: 2024-02-06 | End: 2024-02-06

## 2024-02-06 RX ORDER — DEXAMETHASONE SODIUM PHOSPHATE 4 MG/ML
INJECTION, SOLUTION INTRA-ARTICULAR; INTRALESIONAL; INTRAMUSCULAR; INTRAVENOUS; SOFT TISSUE AS NEEDED
Status: DISCONTINUED | OUTPATIENT
Start: 2024-02-06 | End: 2024-02-06

## 2024-02-06 RX ORDER — ONDANSETRON HYDROCHLORIDE 2 MG/ML
INJECTION, SOLUTION INTRAVENOUS AS NEEDED
Status: DISCONTINUED | OUTPATIENT
Start: 2024-02-06 | End: 2024-02-06

## 2024-02-06 RX ORDER — HYDROMORPHONE HYDROCHLORIDE 2 MG/ML
INJECTION, SOLUTION INTRAMUSCULAR; INTRAVENOUS; SUBCUTANEOUS AS NEEDED
Status: DISCONTINUED | OUTPATIENT
Start: 2024-02-06 | End: 2024-02-06

## 2024-02-06 RX ORDER — MIDAZOLAM HYDROCHLORIDE 1 MG/ML
INJECTION INTRAMUSCULAR; INTRAVENOUS AS NEEDED
Status: DISCONTINUED | OUTPATIENT
Start: 2024-02-06 | End: 2024-02-06

## 2024-02-06 RX ORDER — ALBUMIN HUMAN 50 G/1000ML
SOLUTION INTRAVENOUS AS NEEDED
Status: DISCONTINUED | OUTPATIENT
Start: 2024-02-06 | End: 2024-02-06

## 2024-02-06 RX ORDER — OXYCODONE HYDROCHLORIDE 5 MG/1
5 TABLET ORAL EVERY 4 HOURS PRN
Status: DISCONTINUED | OUTPATIENT
Start: 2024-02-06 | End: 2024-02-06 | Stop reason: HOSPADM

## 2024-02-06 RX ORDER — SODIUM CHLORIDE, SODIUM LACTATE, POTASSIUM CHLORIDE, CALCIUM CHLORIDE 600; 310; 30; 20 MG/100ML; MG/100ML; MG/100ML; MG/100ML
INJECTION, SOLUTION INTRAVENOUS CONTINUOUS PRN
Status: DISCONTINUED | OUTPATIENT
Start: 2024-02-06 | End: 2024-02-06

## 2024-02-06 RX ORDER — CEFAZOLIN 1 G/1
INJECTION, POWDER, FOR SOLUTION INTRAVENOUS AS NEEDED
Status: DISCONTINUED | OUTPATIENT
Start: 2024-02-06 | End: 2024-02-06

## 2024-02-06 RX ORDER — ONDANSETRON HYDROCHLORIDE 2 MG/ML
4 INJECTION, SOLUTION INTRAVENOUS ONCE AS NEEDED
Status: DISCONTINUED | OUTPATIENT
Start: 2024-02-06 | End: 2024-02-06 | Stop reason: HOSPADM

## 2024-02-06 RX ORDER — DROPERIDOL 2.5 MG/ML
0.62 INJECTION, SOLUTION INTRAMUSCULAR; INTRAVENOUS ONCE AS NEEDED
Status: DISCONTINUED | OUTPATIENT
Start: 2024-02-06 | End: 2024-02-06 | Stop reason: HOSPADM

## 2024-02-06 RX ORDER — ALBUMIN HUMAN 50 G/1000ML
12.5 SOLUTION INTRAVENOUS ONCE
Status: COMPLETED | OUTPATIENT
Start: 2024-02-06 | End: 2024-02-06

## 2024-02-06 RX ORDER — VANCOMYCIN HYDROCHLORIDE 1 G/20ML
INJECTION, POWDER, LYOPHILIZED, FOR SOLUTION INTRAVENOUS AS NEEDED
Status: DISCONTINUED | OUTPATIENT
Start: 2024-02-06 | End: 2024-02-06 | Stop reason: HOSPADM

## 2024-02-06 RX ADMIN — ACETAMINOPHEN 975 MG: 325 TABLET ORAL at 05:08

## 2024-02-06 RX ADMIN — LIDOCAINE HYDROCHLORIDE 40 MG: 20 INJECTION, SOLUTION INTRAVENOUS at 17:51

## 2024-02-06 RX ADMIN — TRAZODONE HYDROCHLORIDE 100 MG: 50 TABLET ORAL at 22:04

## 2024-02-06 RX ADMIN — ROCURONIUM BROMIDE 10 MG: 10 INJECTION, SOLUTION INTRAVENOUS at 16:54

## 2024-02-06 RX ADMIN — SODIUM CHLORIDE, POTASSIUM CHLORIDE, SODIUM LACTATE AND CALCIUM CHLORIDE: 600; 310; 30; 20 INJECTION, SOLUTION INTRAVENOUS at 18:16

## 2024-02-06 RX ADMIN — Medication 80 MCG: at 15:58

## 2024-02-06 RX ADMIN — SUGAMMADEX 200 MG: 100 INJECTION, SOLUTION INTRAVENOUS at 17:52

## 2024-02-06 RX ADMIN — DEXAMETHASONE SODIUM PHOSPHATE 8 MG: 4 INJECTION INTRA-ARTICULAR; INTRALESIONAL; INTRAMUSCULAR; INTRAVENOUS; SOFT TISSUE at 15:15

## 2024-02-06 RX ADMIN — SODIUM CHLORIDE, POTASSIUM CHLORIDE, SODIUM LACTATE AND CALCIUM CHLORIDE: 600; 310; 30; 20 INJECTION, SOLUTION INTRAVENOUS at 17:20

## 2024-02-06 RX ADMIN — SODIUM CHLORIDE 100 ML/HR: 9 INJECTION, SOLUTION INTRAVENOUS at 21:51

## 2024-02-06 RX ADMIN — Medication 400 MG: at 08:50

## 2024-02-06 RX ADMIN — ACETAMINOPHEN 975 MG: 325 TABLET ORAL at 22:04

## 2024-02-06 RX ADMIN — Medication 80 MCG: at 16:23

## 2024-02-06 RX ADMIN — ALBUMIN HUMAN 250 ML: 0.05 INJECTION, SOLUTION INTRAVENOUS at 15:55

## 2024-02-06 RX ADMIN — FENTANYL CITRATE 50 MCG: 50 INJECTION, SOLUTION INTRAMUSCULAR; INTRAVENOUS at 15:53

## 2024-02-06 RX ADMIN — CEFAZOLIN SODIUM 2 G: 2 INJECTION, SOLUTION INTRAVENOUS at 02:59

## 2024-02-06 RX ADMIN — ONDANSETRON 4 MG: 2 INJECTION, SOLUTION INTRAMUSCULAR; INTRAVENOUS at 15:13

## 2024-02-06 RX ADMIN — LIDOCAINE HYDROCHLORIDE 60 MG: 20 INJECTION, SOLUTION INTRAVENOUS at 15:14

## 2024-02-06 RX ADMIN — NICOTINE 1 PATCH: 21 PATCH, EXTENDED RELEASE TRANSDERMAL at 08:50

## 2024-02-06 RX ADMIN — PAROXETINE HYDROCHLORIDE 40 MG: 20 TABLET, FILM COATED ORAL at 08:50

## 2024-02-06 RX ADMIN — FAMOTIDINE 40 MG: 20 TABLET ORAL at 08:50

## 2024-02-06 RX ADMIN — MONTELUKAST 10 MG: 10 TABLET, FILM COATED ORAL at 08:50

## 2024-02-06 RX ADMIN — PROPOFOL 150 MG: 10 INJECTION, EMULSION INTRAVENOUS at 15:15

## 2024-02-06 RX ADMIN — HYDROMORPHONE HYDROCHLORIDE 0.2 MG: 2 INJECTION, SOLUTION INTRAMUSCULAR; INTRAVENOUS; SUBCUTANEOUS at 15:53

## 2024-02-06 RX ADMIN — ARIPIPRAZOLE 15 MG: 5 TABLET ORAL at 22:13

## 2024-02-06 RX ADMIN — CEFAZOLIN SODIUM 2 G: 2 INJECTION, SOLUTION INTRAVENOUS at 22:33

## 2024-02-06 RX ADMIN — ROCURONIUM BROMIDE 50 MG: 10 INJECTION, SOLUTION INTRAVENOUS at 15:15

## 2024-02-06 RX ADMIN — PANTOPRAZOLE SODIUM 40 MG: 40 INJECTION, POWDER, FOR SOLUTION INTRAVENOUS at 08:50

## 2024-02-06 RX ADMIN — Medication 80 MCG: at 16:16

## 2024-02-06 RX ADMIN — Medication 80 MCG: at 15:38

## 2024-02-06 RX ADMIN — CEFAZOLIN SODIUM 2 G: 2 INJECTION, SOLUTION INTRAVENOUS at 11:40

## 2024-02-06 RX ADMIN — LORATADINE 10 MG: 10 TABLET ORAL at 08:50

## 2024-02-06 RX ADMIN — FENTANYL CITRATE 25 MCG: 50 INJECTION, SOLUTION INTRAMUSCULAR; INTRAVENOUS at 15:14

## 2024-02-06 RX ADMIN — MIDAZOLAM HYDROCHLORIDE 2 MG: 1 INJECTION, SOLUTION INTRAMUSCULAR; INTRAVENOUS at 15:07

## 2024-02-06 RX ADMIN — FENTANYL CITRATE 25 MCG: 50 INJECTION, SOLUTION INTRAMUSCULAR; INTRAVENOUS at 15:07

## 2024-02-06 RX ADMIN — PANTOPRAZOLE SODIUM 40 MG: 40 INJECTION, POWDER, FOR SOLUTION INTRAVENOUS at 22:05

## 2024-02-06 RX ADMIN — SODIUM CHLORIDE, POTASSIUM CHLORIDE, SODIUM LACTATE AND CALCIUM CHLORIDE: 600; 310; 30; 20 INJECTION, SOLUTION INTRAVENOUS at 15:08

## 2024-02-06 RX ADMIN — FERROUS SULFATE TAB 325 MG (65 MG ELEMENTAL FE) 1 TABLET: 325 (65 FE) TAB at 05:07

## 2024-02-06 RX ADMIN — CEFAZOLIN 2 G: 330 INJECTION, POWDER, FOR SOLUTION INTRAMUSCULAR; INTRAVENOUS at 15:33

## 2024-02-06 RX ADMIN — ALBUMIN HUMAN 12.5 G: 0.05 INJECTION, SOLUTION INTRAVENOUS at 18:31

## 2024-02-06 RX ADMIN — POTASSIUM CHLORIDE 40 MEQ: 1.5 POWDER, FOR SOLUTION ORAL at 22:04

## 2024-02-06 RX ADMIN — Medication 80 MCG: at 16:43

## 2024-02-06 RX ADMIN — NAPROXEN 500 MG: 500 TABLET ORAL at 08:50

## 2024-02-06 RX ADMIN — FAMOTIDINE 40 MG: 20 TABLET ORAL at 22:04

## 2024-02-06 RX ADMIN — ALBUMIN HUMAN 250 ML: 0.05 INJECTION, SOLUTION INTRAVENOUS at 17:04

## 2024-02-06 ASSESSMENT — PAIN SCALES - GENERAL
PAINLEVEL_OUTOF10: 7
PAINLEVEL_OUTOF10: 0 - NO PAIN

## 2024-02-06 ASSESSMENT — COGNITIVE AND FUNCTIONAL STATUS - GENERAL
TURNING FROM BACK TO SIDE WHILE IN FLAT BAD: A LITTLE
DRESSING REGULAR UPPER BODY CLOTHING: A LITTLE
STANDING UP FROM CHAIR USING ARMS: A LOT
DRESSING REGULAR LOWER BODY CLOTHING: A LOT
MOBILITY SCORE: 13
TOILETING: A LOT
WALKING IN HOSPITAL ROOM: TOTAL
PERSONAL GROOMING: A LITTLE
DAILY ACTIVITIY SCORE: 16
MOVING TO AND FROM BED TO CHAIR: A LOT
DRESSING REGULAR UPPER BODY CLOTHING: A LITTLE
TOILETING: A LOT
MOVING TO AND FROM BED TO CHAIR: A LOT
CLIMB 3 TO 5 STEPS WITH RAILING: TOTAL
TURNING FROM BACK TO SIDE WHILE IN FLAT BAD: A LITTLE
STANDING UP FROM CHAIR USING ARMS: A LOT
HELP NEEDED FOR BATHING: A LOT
DRESSING REGULAR LOWER BODY CLOTHING: A LOT
HELP NEEDED FOR BATHING: A LOT
PERSONAL GROOMING: A LITTLE
WALKING IN HOSPITAL ROOM: TOTAL
CLIMB 3 TO 5 STEPS WITH RAILING: TOTAL

## 2024-02-06 ASSESSMENT — PAIN - FUNCTIONAL ASSESSMENT: PAIN_FUNCTIONAL_ASSESSMENT: 0-10

## 2024-02-06 NOTE — OP NOTE
REVISION ARTHROPLASTY TOTAL HIP (R) Operative Note     Date: 2024 - 2024  OR Location: [facility] OR    Name: Blanca Hamilton, : 1975, Age: 48 y.o., MRN: [id number], Sex: female    Diagnosis  Pre-op Diagnosis     * Right hip pain [M25.551] Post-op Diagnosis     * Right hip pain [M25.551]     Procedures  REVISION ARTHROPLASTY TOTAL HIP  69458 - CT REVJ TOT HIP ARTHRP [facility] W/WO AGRFT/ALGRFT  Open reduction internal fixation of a right greater trochanteric fracture    Surgeons      * Felix Jalloh - Primary    Resident/Fellow/Other Assistant:  Surgeon(s) and Role: Smooth    Procedure Summary  Anesthesia: Consult  ASA: III  Anesthesia Staff: CRNA: Susana Moralez, DEVON-CRNA; DEVON Flores-CRNA  Estimated Blood Loss: 500 mL  Intra-op Medications:   Administrations occurring from 1500 to 1730 on 24:   Medication Name Total Dose   gentamicin (Garamycin) 80 mg in sodium chloride 0.9 % 1,000 mL irrigation 80 mg   vancomycin (Vancocin) vial for injection 1 g              Anesthesia Record               Intraprocedure I/O Totals          Output    Urine 700 mL    Total Output 700 mL          Specimen:   ID Type Source Tests Collected by Time   A : RIGHT HIP CULTURE #1 Swab HIP ARTHROPLASTY RIGHT TISSUE/WOUND CULTURE/SMEAR Felix Jalloh MD 2024 1648   B : RIGHT HIP CULTURE #2 [place]ab HIP ARTHROPLASTY RIGHT TISSUE/WOUND CULTURE/SMEAR Felix Jalloh MD 2024 1648   C : RIGHT HIP CULTURE #3 Swab HIP ARTHROPLASTY RIGHT TISSUE/WOUND CULTURE/SMEAR Felix Jalloh MD 2024 1648        Staff:   Circulator: Lidia Granados RN  Relief Circulator: Mera Aguilar RN  Relief Scrub: Sophia Cummings; Tha Dave  Scrub Person: Pavel Dominguez         Drains and/or Catheters:   Urethral Catheter Straight-tip 16 Fr. (Active)       Tourniquet Times:         Implants:  Implants       Type Name Action Serial No.      Graft STIMULAN KIT, RAPID CURE, 10CC - [id number] Implanted      Joint Hip  LINER, COCR, MDM, 42MM E - [id number] Implanted      Joint SLEEVE FOR BIOLOX HEAD, TI - [id number] Implanted      Joint Hip BIOLOCDELTA CERAMIC C-TAPER FEMORAL HEAD Implanted      Joint INSERT, MDM X3 RESTORATION, 42OD 28ID X 6.9MM - [id number] Implanted      Screw CABLE ASSEMBLY, CABLE READY, 1.8 X 559 MM, STAINLESS STEEL - [id number] Implanted      Screw CABLE, CABLE-READY PLATE, GREATER TROCHANTER, 1.8 X 635 MM, COBALT CHROME - [id number] Implanted      Screw REATTACHMENT DEVICE, GREATER TROCHANTER, INTEGRAL, CABLE-READY, SHORT, 50.8 CM - [id number] Wasted               Findings: Extensive purulence tracking down to the joint, extensive necrosis of subcutaneous fat and dehiscence of fascia layer    Indications: Blanca Hamilton is an 48 y.o. female who is having surgery for periprosthetic joint infection after right total hip arthroplasty    The patient was seen in the preoperative area. The risks, benefits, complications, treatment options, non-operative alternatives, expected recovery and outcomes were discussed with the patient. The possibilities of reaction to medication, pulmonary aspiration, injury to surrounding structures, bleeding, recurrent infection, the need for additional procedures, failure to diagnose a condition, and creating a complication requiring transfusion or operation were discussed with the patient. The patient concurred with the proposed plan, giving informed consent.  The site of surgery was properly noted/marked if necessary per policy. The patient has been actively warmed in preoperative area. Preoperative antibiotics have been ordered and given within 1 hours of incision. 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   This was complicated by periprosthetic femur fracture which was fixed about 3 days later.  Following that we had recommended the patient go to a subacute nursing facility however she refused and went home.  Unfortunately her home situation was not conducive to healing and there were significant hygienic issues.  She was eventually brought into a nursing home up at that time there was significant drainage from her hip.  When she was seen in clinic last week drainage had increased and there was dehiscence of her distal part of her wound.  The risk benefits and alternatives of irrigation debridement and exchange of modular components for treatment of her acute infection were discussed with the patient and she signed informed consent.    Description of procedure: Patient was brought to the operating room placed on the operating table in supine position all bony prominences well-padded appropriate preoperative antibiotics were given anesthesia was induced by the anesthesia team patient was transitioned to the lateral decubitus position with the operative hip up.  She was prepped and draped in usual sterile fashion a timeout was performed patient was identified by name and medical record number and laterality and site of surgery confirmed by all present.  Began by making an incision through the patient's previous incision standard posterior approach to the hip.  Extensive purulence was noted tracking from the area of her dehiscence directly down to the joint with extensive dehiscence and rupture of the fascial closure.  This went directly down to the joint.  Also noted was fracturing of the greater trochanter which had contracted and pulled away anteriorly.  We took multiple cultures.  We then dislocated the hip remove the femoral head intact.  We remove the liner.  We then performed an extensive irrigation and debridement.  We irrigated extensively with about 9 L of normal saline via pulse lavage, additionally we did  various intervals and the saline used Irrisept solution back to sure solution and hydrogen peroxide each which was done separately and irrigated out.  We also extensively debrided the soft tissues and bone in the area using curettes and rongeurs.  We removed all nonviable tissue.  After this extensive debridement and irrigation we changed suction tips gloves.  We then retrial we placed and impacted a new liner into position.  We trialed ahead and transition to a 12+4 to get excellent fit.  Once the with the +4 head trial in place we had excellent stability anteriorly and posteriorly.  The patient still had extensive contracture of the hip as noted in previous findings.  We then passed 2 cables around the fractured greater trochanter.  We then cleaned and dried the trunnion and impacted the final femoral head into position.  We reduced the final femoral head took the range of motion once more noted that we had excellent stability.  We then tightened the cables and use this to reduce the greater trochanter to the extent that was it that was possible.  We did this only with cables in order to prevent adding too much additional hardware into this situation with her infection.  I felt this was a good way to improve reduction and prevent escape of the trochanter without introducing a large amount of hardware.  We then placed stimulant beads into the joint which were made with 1 g of vancomycin 80 mg gentamicin.  We were unable to repair anything in the posterior capsule, this tissue was completely nonviable.  Even after her previous surgeries we had been unable to repair this because of the extensive increase in offset by repairing and reconstructing her protrusio.  We closed the fascial layer with interrupted #1 PDS and #1 looped PDS in a running fashion.  We closed the subcu with 2-0 PDS and the skin with staples and nylon.  A Prevena negative pressure wound therapy device was placed.  Patient was woken up anesthesia by  the anesthesia team brought to the PACU in stable condition    Postoperative plan: Patient will weight-bear only for transfers initially.  Continue antibiotics driven by cultures.  Other standard postoperative supportive care    Same as staff presence: I was present and scrubbed for all critical portions of this procedure.    Complications:  None; patient tolerated the procedure well.    Disposition: PACU - hemodynamically stable.  Condition: stable         Additional Details: none    Attending Attestation: I was present and scrubbed for the key portions of the procedure.    Felix Jalloh  Phone Number: [phone]

## 2024-02-06 NOTE — ANESTHESIA PROCEDURE NOTES
Airway  Date/Time: 2/6/2024 3:17 PM  Urgency: elective    Airway not difficult    Staffing  Performed: CRNA   Authorized by: ELVIRA Betancur    Performed by: ELVIRA Betancur  Patient location during procedure: OR    Indications and Patient Condition  Indications for airway management: anesthesia and airway protection  Spontaneous Ventilation: absent  Preoxygenated: yes  Mask difficulty assessment: 1 - vent by mask  Planned trial extubation    Final Airway Details  Final airway type: endotracheal airway      Successful airway: ETT  Cuffed: yes   Successful intubation technique: video laryngoscopy  Facilitating devices/methods: intubating stylet  Endotracheal tube insertion site: oral  Blade size: #3 (Funes #3)  ETT size (mm): 7.0  Cormack-Lehane Classification: grade I - full view of glottis  Placement verified by: chest auscultation, capnometry and palpation of cuff   Measured from: lips  ETT to lips (cm): 21  Number of attempts at approach: 1    Additional Comments  Easy intubation.  Lips in preanesthetic condition.

## 2024-02-06 NOTE — PROGRESS NOTES
Blanca Hamilton is a 48 y.o. female on day 4 of admission presenting with Right hip pain.    Subjective   Interval History: no fever, no new complaints        Review of Systems    Objective   Range of Vitals (last 24 hours)  Heart Rate:  [82-90]   Temp:  [36.2 °C (97.2 °F)-36.6 °C (97.9 °F)]   Resp:  [16-18]   BP: (103-121)/(60-77)   SpO2:  [96 %-98 %]   Daily Weight  02/02/24 : 83.3 kg (183 lb 10.3 oz)    Body mass index is 30.56 kg/m².    Physical Exam  Constitutional:       Appearance: Normal appearance.   HENT:      Head: Normocephalic and atraumatic.      Mouth/Throat:      Mouth: Mucous membranes are moist.      Pharynx: Oropharynx is clear.   Eyes:      Pupils: Pupils are equal, round, and reactive to light.   Cardiovascular:      Rate and Rhythm: Normal rate and regular rhythm.      Heart sounds: Normal heart sounds.   Pulmonary:      Effort: Pulmonary effort is normal.      Breath sounds: Normal breath sounds.   Abdominal:      General: Abdomen is flat. Bowel sounds are normal.      Palpations: Abdomen is soft.   Musculoskeletal:      Cervical back: Normal range of motion.      Comments: Rt hip incision with some redness and drainage   Neurological:      Mental Status: She is alert.         Antibiotics  ceFAZolin in dextrose (iso-os) (Ancef) IVPB 2 g  vancomycin (Vancocin) in dextrose 5 % water (D5W) 500 mL IV 1,500 mg  cefTRIAXone (Rocephin) IVPB 1 g  sodium chloride 0.9% infusion  sodium chloride 0.9 % bolus 1,000 mL  HYDROmorphone (Dilaudid) injection 0.2 mg  lisonpril (Prinivil, Zestril) tablet  PARoxetine (Paxil-CR) 24 hr tablet  acetaminophen (Tylenol) tablet 650 mg  acetaminophen (Tylenol) oral liquid 650 mg  acetaminophen (Tylenol) suppository 650 mg  polyethylene glycol (Glycolax, Miralax) packet 17 g  enoxaparin (Lovenox) syringe 40 mg  cefTRIAXone (Rocephin) 2 g IV in dextrose 5% 50 mL  HYDROmorphone (Dilaudid) injection 0.2 mg  acetaminophen (Tylenol) tablet 975 mg  magnesium oxide (Mag-Ox) tablet  400 mg  nicotine (Nicoderm CQ) 21 mg/24 hr patch 1 patch  ARIPiprazole (Abilify) tablet 15 mg  aspirin chewable tablet 81 mg  loratadine (Claritin) tablet 10 mg  ferrous sulfate (325 mg ferrous sulfate) tablet 1 tablet  lisinopril tablet 10 mg  metoprolol succinate XL (Toprol-XL) 24 hr tablet 25 mg  montelukast (Singulair) tablet 10 mg  naproxen (Naprosyn) tablet 500 mg  pantoprazole (ProtoNix) EC tablet 40 mg  oxyCODONE-acetaminophen (Percocet) 5-325 mg per tablet 1 tablet  PARoxetine (Paxil) tablet 20 mg  PARoxetine (Paxil) tablet 40 mg  traZODone (Desyrel) tablet 100 mg  PARoxetine (Paxil-CR) 24 hr tablet  vancomycin (Vancocin) 1,000 mg in dextrose 5% water 200 mL  PARoxetine (Paxil) tablet 20 mg  sodium chloride 0.9% infusion  sodium chloride 0.9 % bolus 1,000 mL  magnesium sulfate IV 2 g  potassium chloride (Klor-Con) packet 40 mEq      Relevant Results  Labs  Results from last 72 hours   Lab Units 02/06/24  0719 02/05/24  0517 02/04/24  1422   WBC AUTO x10*3/uL 3.2* 4.3* 2.2*   HEMOGLOBIN g/dL 8.6* 8.4* 9.4*   HEMATOCRIT % 26.9* 27.4* 30.6*   PLATELETS AUTO x10*3/uL 216 182 161       Results from last 72 hours   Lab Units 02/06/24  0719 02/05/24  0517 02/04/24  1422   SODIUM mmol/L 136 132* 134*   POTASSIUM mmol/L 3.9 3.6 3.8   CHLORIDE mmol/L 106 103 100   CO2 mmol/L 25 24 21   BUN mg/dL 6 7 12   CREATININE mg/dL 0.24* 0.30* 0.40*   GLUCOSE mg/dL 77 89 84   CALCIUM mg/dL 7.2* 7.3* 7.8*   ANION GAP mmol/L 9* 9* 17   EGFR mL/min/1.73m*2 >90 >90 >90             Estimated Creatinine Clearance: 125 mL/min (A) (by C-G formula based on SCr of 0.24 mg/dL (L)).  C-Reactive Protein   Date Value Ref Range Status   02/02/2024 23.75 (H) <1.00 mg/dL Final   01/12/2024 4.87 (H) <1.00 mg/dL Final     Microbiology  Reviewed  Imaging  reviewed        Assessment/Plan    Right hip surgical wound infection, for surgery, the culture with MSSA, for surgery  Emesis, xray no evidence of obstruction     Recommendations :  Continue   Cefazolin  Discussed with the medical team    I spent minutes in the professional and overall care of this patient.      Frank Modi MD

## 2024-02-06 NOTE — ANESTHESIA POSTPROCEDURE EVALUATION
Patient: Blanca Hamilton    Procedure Summary       Date: 02/06/24 Room / Location: [facility] OR 03 / [facility]    Anesthesia Start: 1509 Anesthesia Stop: 1815    Procedure: REVISION ARTHROPLASTY TOTAL HIP (Right: Hip) Diagnosis:       Right hip pain      (Right hip pain [M25.551])    Surgeons: Felix Jalloh MD Responsible Provider: ELVIRA Betancur    Anesthesia Type: general ASA Status: 3            Anesthesia Type: general    Vitals Value Taken Time   /69 02/06/24 1812   Temp 36.2 02/06/24 1816   Pulse 115 02/06/24 1812   Resp 15 02/06/24 1816   SpO2 99 02/06/24 1816   Vitals shown include unvalidated device data.    Anesthesia Post Evaluation    No notable events documented.

## 2024-02-06 NOTE — PROGRESS NOTES
Patient: Blanca Hamilton  Room/bed: 133/133-A  Admitted on: 2/2/2024    Age: 48 y.o.   Gender: female  Code Status:  Full Code   Admitting Dx: Wound infection after surgery [T81.49XA]    MRN: [id number]  PCP: Henry Olmos DO       Subjective   Seen and examined in her room this AM. Awake and alert. No new complaints today. Denies N/V overnight. Anticipates OR this afternoon. No acute pain.    Objective    Physical Exam   Constitutional: A&O x 3; NAD; calm and cooperative  Eyes: EOM's intact  HEENT: Normocephalic, Atraumatic. Oral mucosa moist.   Neck: Supple. No JVD, lymphadenopathy.   Lungs: CTAB with fair air movement. Respirations even and unlabored on room air.   Heart: RRR  Abdomen: Softly distended with mild tenderness; +BS  MS/Extremities: LUND equally x 4 with RLE weakness related to hip infection. Right hip dressing is C/D/I.  No edema. Peripheral pulses intact bilaterally.   Neuro: A&O x3; no focal deficits; gross motor and sensation intact. Cognitive dysfunction.  Skin: Warm and dry. No rashes or lesions  Psych: Normal affect.      Temp:  [36.2 °C (97.2 °F)-36.6 °C (97.9 °F)] 36.6 °C (97.9 °F)  Heart Rate:  [82-90] 82  Resp:  [16-18] 16  BP: (103-121)/(60-77) 121/77    Vitals:    02/02/24 1210   Weight: 83.3 kg (183 lb 10.3 oz)     I/Os    Intake/Output Summary (Last 24 hours) at 2/6/2024 1146  Last data filed at 2/6/2024 0500  Gross per 24 hour   Intake 780 ml   Output 750 ml   Net 30 ml       Labs:   Results from last 72 hours   Lab Units 02/06/24  0719 02/05/24  0517 02/04/24  1422   SODIUM mmol/L 136 132* 134*   POTASSIUM mmol/L 3.9 3.6 3.8   CHLORIDE mmol/L 106 103 100   CO2 mmol/L 25 24 21   BUN mg/dL 6 7 12   CREATININE mg/dL 0.24* 0.30* 0.40*   GLUCOSE mg/dL 77 89 84   CALCIUM mg/dL 7.2* 7.3* 7.8*   ANION GAP mmol/L 9* 9* 17   EGFR mL/min/1.73m*2 >90 >90 >90      Results from last 72 hours   Lab Units 02/06/24  0719 02/05/24  0517 02/04/24  1422   WBC AUTO x10*3/uL 3.2* 4.3* 2.2*   HEMOGLOBIN g/dL  8.6* 8.4* 9.4*   HEMATOCRIT % 26.9* 27.4* 30.6*   PLATELETS AUTO x10*3/uL 216 182 161      Lab Results   Component Value Date    CALCIUM 7.2 (L) 02/06/2024    PHOS 2.5 02/02/2024      Lab Results   Component Value Date    CRP 23.75 (H) 02/02/2024        Micro/ID:   Susceptibility data from last 90 days.  Collected Specimen Info Organism Clindamycin Erythromycin Oxacillin Tetracycline Trimethoprim/Sulfamethoxazole Vancomycin   02/02/24 Tissue/Biopsy from Wound/Tissue Methicillin Susceptible Staphylococcus aureus (MSSA) S S S R S S     .ID  Lab Results   Component Value Date    BLOODCULT No growth at 1 day 02/04/2024    BLOODCULT No growth at 1 day 02/04/2024       Images:  KUB FINDINGS:  Nonobstructive bowel gas pattern. Partially visualized bilateral hip  arthroplasties.      Impression: 1.  Nonobstructive bowel gas pattern.     Meds    Scheduled medications  acetaminophen, 975 mg, oral, q8h  ARIPiprazole, 15 mg, oral, Nightly  [Held by provider] aspirin, 81 mg, oral, BID  ceFAZolin, 2 g, intravenous, q8h  [Held by provider] enoxaparin, 40 mg, subcutaneous, q24h  famotidine, 40 mg, oral, BID  ferrous sulfate (325 mg ferrous sulfate), 1 tablet, oral, Daily  [Held by provider] lisinopril, 10 mg, oral, q AM  loratadine, 10 mg, oral, Daily  magnesium oxide, 400 mg, oral, Daily  [Held by provider] metoprolol succinate XL, 25 mg, oral, Daily  montelukast, 10 mg, oral, q AM  naproxen, 500 mg, oral, BID with meals  nicotine, 1 patch, transdermal, Daily  pantoprazole, 40 mg, intravenous, BID  PARoxetine, 40 mg, oral, Daily  polyethylene glycol, 17 g, oral, Daily  potassium chloride, 40 mEq, oral, BID  traZODone, 100 mg, oral, Nightly      Continuous medications  sodium chloride 0.9%, 100 mL/hr, Last Rate: 100 mL/hr (02/05/24 0348)      PRN medications  PRN medications: acetaminophen **OR** acetaminophen **OR** acetaminophen, HYDROmorphone, oxyCODONE-acetaminophen     Assessment and Plan    Blanca Hamilton is a 48 y.o. female  with a medical history of GERD, HTN, and Depression who presented with a right hip prosthetic joint infection.     Right Prosthetic Hip Joint Infection  -S/p ALEXA on 1/2 with subsequent revision on 1/5 for prosthetic fracture  -Cultures positive for MSSA  -ID is following - on Cefazolin  -Today will undergo operative procedure with Dr. Jalloh - revision of right ALEXA  -Medicate for pain  -Preliminary BC negative.   -Monitor ESR, CRP  -Afebrile. No leukocytosis.     Abdominal Pain with N/V  -No recurrence of N/V overnight  -KUB unremarkable with nonobstructive bowel pattern  -Zofran as needed, PPI, Pepcid  -Will monitor    Hypertension  -Has been hypotensive and agents on hold (Lisinopril, Metoprolol)  -SBP has improved.   -Will continue to hold and resume postoperatively as BP supports.     Anxiety/Depression  -On home regimen: Paxil, Abilify, Trazadone    Acute on Chronic Anemia  -Likely related to infection, hemodilution  -Hgb is stable at 8.6  -Transfuse as needed to keep >7  -On Iron supplementation  -CBC in AM    Tobacco Use Disorder  -On Nicotine Patch    DVT Prophylaxis  -Lovenox    Fluids/Electrolytes/Nutrition  -Laboratory data reviewed.   -Electrolytes stable and replaced as needed.   -No current nutritional issues.     Disposition  -Plan of care discussed with attending, Dr. Ramírez.  -Discharge plan pending postop course, PT/OT evals, etc. From SNF.       DEVON Montalvo-CNP

## 2024-02-06 NOTE — CARE PLAN
The patient's goals for the shift include patient will get adequate sleep    The clinical goals for the shift include Patient shall hve pain well controlled throughout shift    Over the shift, the patient did not make progress toward the following goals. Barriers to progression include . Recommendations to address these barriers include   Problem: Skin  Goal: Prevent/minimize sheer/friction injuries  Outcome: Progressing  Goal: Promote/optimize nutrition  Outcome: Progressing  Goal: Promote skin healing  2/5/2024 2250 by Luis Miguel Hernandez RN  Outcome: Progressing  Flowsheets (Taken 2/5/2024 2250)  Promote skin healing: Assess skin/pad under line(s)/device(s)  2/5/2024 2249 by Luis Miguel Hernandez RN  Flowsheets (Taken 2/4/2024 1027 by Leonarda Hernandez LPN)  Promote skin healing: Turn/reposition every 2 hours/use positioning/transfer devices   .

## 2024-02-06 NOTE — ANESTHESIA PREPROCEDURE EVALUATION
Patient: Blanca Hamilton    Procedure Information       Date/Time: 02/06/24 1500    Procedure: REVISION ARTHROPLASTY TOTAL HIP (Right: Hip) - i/d with head liner exchange, wang components, posterior approach, stimulan beads with 1g vanc 80mg gent, pds nylon staples prevena    Location: [facility] / [facility]    Surgeons: Felix Jalloh MD            Relevant Problems   Anesthesia (within normal limits)      Cardiovascular   (+) Other hyperlipidemia   (+) Primary hypertension      GI   (+) Gastroesophageal reflux disease without esophagitis      Neuro/Psych   (+) Recurrent major depressive disorder (CMS/HCC)      Pulmonary   (+) Mild intermittent asthma without complication      Hematology   (+) Iron deficiency anemia secondary to inadequate dietary iron intake      Infectious Disease   (+) Wound infection after surgery       Clinical information reviewed:   Tobacco  Allergies  Meds   Med Hx  Surg Hx   Fam Hx  Soc Hx        NPO Detail:  No data recorded     Physical Exam    Airway  Mallampati: II  TM distance: >3 FB  Neck ROM: full     Cardiovascular   Rhythm: regular  Rate: normal     Dental - normal exam     Pulmonary - normal exam     Abdominal - normal exam             Anesthesia Plan    History of general anesthesia?: yes  History of complications of general anesthesia?: no    ASA 3     general     intravenous induction   Postoperative administration of opioids is intended.  Trial extubation is planned.  Anesthetic plan and risks discussed with patient.  Use of blood products discussed with patient who.    Plan discussed with CRNA and attending.

## 2024-02-06 NOTE — NURSING NOTE
Patient seen by Ortho team with plan for OR tomorrow.  Aleyda SINGH changed dressing.  Will follow.

## 2024-02-06 NOTE — PROGRESS NOTES
"Blanca Hamilton is a 48 y.o. female on day 4 of admission presenting with Right hip pain.    Subjective   48 Year old female patient of Dr. Jalloh scheduled for Right Total Hip Arthroplasty Revision today.        Objective     Physical Exam  Vitals reviewed.   HENT:      Head: Normocephalic and atraumatic.   Eyes:      Extraocular Movements: Extraocular movements intact.      Conjunctiva/sclera: Conjunctivae normal.      Pupils: Pupils are equal, round, and reactive to light.   Cardiovascular:      Rate and Rhythm: Normal rate and regular rhythm.      Pulses: Normal pulses.   Pulmonary:      Breath sounds: Normal breath sounds.   Abdominal:      General: Bowel sounds are normal.      Palpations: Abdomen is soft.   Musculoskeletal:      Comments: Dressing intact to R Hip with serosanguinous drainage , erythema surrounding site.    Skin:     General: Skin is warm and dry.      Capillary Refill: Capillary refill takes less than 2 seconds.   Neurological:      Mental Status: She is alert. Mental status is at baseline.   Psychiatric:         Mood and Affect: Mood normal.         Behavior: Behavior normal.         Thought Content: Thought content normal.         Last Recorded Vitals  Blood pressure 121/77, pulse 82, temperature 36.6 °C (97.9 °F), temperature source Tympanic, resp. rate 16, height 1.651 m (5' 5\"), weight 83.3 kg (183 lb 10.3 oz), SpO2 96 %.  Intake/Output last 3 Shifts:  I/O last 3 completed shifts:  In: 2471.7 (29.7 mL/kg) [P.O.:600; I.V.:1071.7 (12.9 mL/kg); IV Piggyback:800]  Out: 750 (9 mL/kg) [Urine:750 (0.3 mL/kg/hr)]  Weight: 83.3 kg     Relevant Results      Scheduled medications  acetaminophen, 975 mg, oral, q8h  ARIPiprazole, 15 mg, oral, Nightly  [Held by provider] aspirin, 81 mg, oral, BID  ceFAZolin, 2 g, intravenous, q8h  [Held by provider] enoxaparin, 40 mg, subcutaneous, q24h  famotidine, 40 mg, oral, BID  ferrous sulfate (325 mg ferrous sulfate), 1 tablet, oral, Daily  [Held by provider] " lisinopril, 10 mg, oral, q AM  loratadine, 10 mg, oral, Daily  magnesium oxide, 400 mg, oral, Daily  [Held by provider] metoprolol succinate XL, 25 mg, oral, Daily  montelukast, 10 mg, oral, q AM  naproxen, 500 mg, oral, BID with meals  nicotine, 1 patch, transdermal, Daily  pantoprazole, 40 mg, intravenous, BID  PARoxetine, 40 mg, oral, Daily  polyethylene glycol, 17 g, oral, Daily  potassium chloride, 40 mEq, oral, BID  traZODone, 100 mg, oral, Nightly      Continuous medications  sodium chloride 0.9%, 100 mL/hr, Last Rate: 100 mL/hr (02/05/24 0348)      PRN medications  PRN medications: acetaminophen **OR** acetaminophen **OR** acetaminophen, HYDROmorphone, oxyCODONE-acetaminophen  Results for orders placed or performed during the hospital encounter of 02/02/24 (from the past 24 hour(s))   Basic Metabolic Panel   Result Value Ref Range    Glucose 77 74 - 99 mg/dL    Sodium 136 136 - 145 mmol/L    Potassium 3.9 3.5 - 5.3 mmol/L    Chloride 106 98 - 107 mmol/L    Bicarbonate 25 21 - 32 mmol/L    Anion Gap 9 (L) 10 - 20 mmol/L    Urea Nitrogen 6 6 - 23 mg/dL    Creatinine 0.24 (L) 0.50 - 1.05 mg/dL    eGFR >90 >60 mL/min/1.73m*2    Calcium 7.2 (L) 8.6 - 10.3 mg/dL   CBC   Result Value Ref Range    WBC 3.2 (L) 4.4 - 11.3 x10*3/uL    nRBC 0.0 0.0 - 0.0 /100 WBCs    RBC 3.04 (L) 4.00 - 5.20 x10*6/uL    Hemoglobin 8.6 (L) 12.0 - 16.0 g/dL    Hematocrit 26.9 (L) 36.0 - 46.0 %    MCV 89 80 - 100 fL    MCH 28.3 26.0 - 34.0 pg    MCHC 32.0 32.0 - 36.0 g/dL    RDW 22.0 (H) 11.5 - 14.5 %    Platelets 216 150 - 450 x10*3/uL                            Assessment/Plan   Principal Problem:    Right hip pain  Active Problems:    Wound infection after surgery  Pain controlled  -NPO for OR today         I spent 30 minutes in the professional and overall care of this patient.      Leandra Hubbard, APRN-CNP

## 2024-02-06 NOTE — CARE PLAN
The patient's goals for the shift include patient will get adequate sleep    The clinical goals for the shift include patient's cellulitis and labs will be improved

## 2024-02-07 ENCOUNTER — APPOINTMENT (OUTPATIENT)
Dept: RADIOLOGY | Facility: HOSPITAL | Age: 49
DRG: 466 | End: 2024-02-07
Payer: COMMERCIAL

## 2024-02-07 LAB
ALBUMIN SERPL BCP-MCNC: 2.5 G/DL (ref 3.4–5)
ALP SERPL-CCNC: 79 U/L (ref 33–110)
ALT SERPL W P-5'-P-CCNC: 5 U/L (ref 7–45)
ANION GAP SERPL CALC-SCNC: 7 MMOL/L (ref 10–20)
AST SERPL W P-5'-P-CCNC: 7 U/L (ref 9–39)
ATRIAL RATE: 206 BPM
BILIRUB SERPL-MCNC: 0.2 MG/DL (ref 0–1.2)
BLOOD EXPIRATION DATE: NORMAL
BUN SERPL-MCNC: 7 MG/DL (ref 6–23)
CALCIUM SERPL-MCNC: 7.6 MG/DL (ref 8.6–10.3)
CHLORIDE SERPL-SCNC: 104 MMOL/L (ref 98–107)
CO2 SERPL-SCNC: 28 MMOL/L (ref 21–32)
CREAT SERPL-MCNC: 0.33 MG/DL (ref 0.5–1.05)
DISPENSE STATUS: NORMAL
EGFRCR SERPLBLD CKD-EPI 2021: >90 ML/MIN/1.73M*2
ERYTHROCYTE [DISTWIDTH] IN BLOOD BY AUTOMATED COUNT: 21.9 % (ref 11.5–14.5)
ERYTHROCYTE [DISTWIDTH] IN BLOOD BY AUTOMATED COUNT: 21.9 % (ref 11.5–14.5)
GLUCOSE SERPL-MCNC: 97 MG/DL (ref 74–99)
HCT VFR BLD AUTO: 22.9 % (ref 36–46)
HCT VFR BLD AUTO: 23.9 % (ref 36–46)
HGB BLD-MCNC: 7 G/DL (ref 12–16)
HGB BLD-MCNC: 7.5 G/DL (ref 12–16)
MCH RBC QN AUTO: 28 PG (ref 26–34)
MCH RBC QN AUTO: 28.1 PG (ref 26–34)
MCHC RBC AUTO-ENTMCNC: 30.6 G/DL (ref 32–36)
MCHC RBC AUTO-ENTMCNC: 31.4 G/DL (ref 32–36)
MCV RBC AUTO: 90 FL (ref 80–100)
MCV RBC AUTO: 92 FL (ref 80–100)
NRBC BLD-RTO: 0 /100 WBCS (ref 0–0)
NRBC BLD-RTO: 0.4 /100 WBCS (ref 0–0)
P AXIS: -4 DEGREES
P OFFSET: 221 MS
P ONSET: 152 MS
PLATELET # BLD AUTO: 234 X10*3/UL (ref 150–450)
PLATELET # BLD AUTO: 281 X10*3/UL (ref 150–450)
POTASSIUM SERPL-SCNC: 4.4 MMOL/L (ref 3.5–5.3)
PRODUCT BLOOD TYPE: 6200
PRODUCT CODE: NORMAL
PROT SERPL-MCNC: 4.3 G/DL (ref 6.4–8.2)
Q ONSET: 223 MS
QRS COUNT: 17 BEATS
QRS DURATION: 160 MS
QT INTERVAL: 510 MS
QTC CALCULATION(BAZETT): 668 MS
QTC FREDERICIA: 611 MS
R AXIS: -2 DEGREES
RBC # BLD AUTO: 2.5 X10*6/UL (ref 4–5.2)
RBC # BLD AUTO: 2.67 X10*6/UL (ref 4–5.2)
SODIUM SERPL-SCNC: 135 MMOL/L (ref 136–145)
T AXIS: -5 DEGREES
T OFFSET: 478 MS
UNIT ABO: NORMAL
UNIT NUMBER: NORMAL
UNIT RH: NORMAL
UNIT VOLUME: 350
VENTRICULAR RATE: 103 BPM
WBC # BLD AUTO: 4.1 X10*3/UL (ref 4.4–11.3)
WBC # BLD AUTO: 4.6 X10*3/UL (ref 4.4–11.3)
XM INTEP: NORMAL

## 2024-02-07 PROCEDURE — 2500000001 HC RX 250 WO HCPCS SELF ADMINISTERED DRUGS (ALT 637 FOR MEDICARE OP): Performed by: ORTHOPAEDIC SURGERY

## 2024-02-07 PROCEDURE — 73502 X-RAY EXAM HIP UNI 2-3 VIEWS: CPT | Mod: RIGHT SIDE | Performed by: RADIOLOGY

## 2024-02-07 PROCEDURE — 73502 X-RAY EXAM HIP UNI 2-3 VIEWS: CPT | Mod: RT

## 2024-02-07 PROCEDURE — 1100000001 HC PRIVATE ROOM DAILY

## 2024-02-07 PROCEDURE — C9113 INJ PANTOPRAZOLE SODIUM, VIA: HCPCS | Performed by: ORTHOPAEDIC SURGERY

## 2024-02-07 PROCEDURE — 85027 COMPLETE CBC AUTOMATED: CPT | Performed by: NURSE PRACTITIONER

## 2024-02-07 PROCEDURE — 2500000002 HC RX 250 W HCPCS SELF ADMINISTERED DRUGS (ALT 637 FOR MEDICARE OP, ALT 636 FOR OP/ED): Performed by: ORTHOPAEDIC SURGERY

## 2024-02-07 PROCEDURE — 99232 SBSQ HOSP IP/OBS MODERATE 35: CPT | Performed by: NURSE PRACTITIONER

## 2024-02-07 PROCEDURE — 99232 SBSQ HOSP IP/OBS MODERATE 35: CPT | Performed by: STUDENT IN AN ORGANIZED HEALTH CARE EDUCATION/TRAINING PROGRAM

## 2024-02-07 PROCEDURE — 2500000001 HC RX 250 WO HCPCS SELF ADMINISTERED DRUGS (ALT 637 FOR MEDICARE OP): Performed by: STUDENT IN AN ORGANIZED HEALTH CARE EDUCATION/TRAINING PROGRAM

## 2024-02-07 PROCEDURE — 36415 COLL VENOUS BLD VENIPUNCTURE: CPT | Performed by: NURSE PRACTITIONER

## 2024-02-07 PROCEDURE — 85027 COMPLETE CBC AUTOMATED: CPT | Performed by: ORTHOPAEDIC SURGERY

## 2024-02-07 PROCEDURE — 80053 COMPREHEN METABOLIC PANEL: CPT | Performed by: ORTHOPAEDIC SURGERY

## 2024-02-07 PROCEDURE — 2500000004 HC RX 250 GENERAL PHARMACY W/ HCPCS (ALT 636 FOR OP/ED): Performed by: ORTHOPAEDIC SURGERY

## 2024-02-07 PROCEDURE — 97162 PT EVAL MOD COMPLEX 30 MIN: CPT | Mod: GP

## 2024-02-07 PROCEDURE — S4991 NICOTINE PATCH NONLEGEND: HCPCS | Performed by: ORTHOPAEDIC SURGERY

## 2024-02-07 PROCEDURE — 36415 COLL VENOUS BLD VENIPUNCTURE: CPT | Performed by: ORTHOPAEDIC SURGERY

## 2024-02-07 RX ORDER — EAR PLUGS
1 EACH OTIC (EAR) 2 TIMES DAILY
Status: DISCONTINUED | OUTPATIENT
Start: 2024-02-07 | End: 2024-02-11 | Stop reason: HOSPADM

## 2024-02-07 RX ADMIN — FAMOTIDINE 40 MG: 20 TABLET ORAL at 21:57

## 2024-02-07 RX ADMIN — Medication 1 APPLICATION: at 21:00

## 2024-02-07 RX ADMIN — TRAZODONE HYDROCHLORIDE 100 MG: 50 TABLET ORAL at 21:55

## 2024-02-07 RX ADMIN — POTASSIUM CHLORIDE 40 MEQ: 1.5 POWDER, FOR SOLUTION ORAL at 13:03

## 2024-02-07 RX ADMIN — CEFAZOLIN SODIUM 2 G: 2 INJECTION, SOLUTION INTRAVENOUS at 13:03

## 2024-02-07 RX ADMIN — ACETAMINOPHEN 975 MG: 325 TABLET ORAL at 05:51

## 2024-02-07 RX ADMIN — FERROUS SULFATE TAB 325 MG (65 MG ELEMENTAL FE) 1 TABLET: 325 (65 FE) TAB at 05:52

## 2024-02-07 RX ADMIN — MONTELUKAST 10 MG: 10 TABLET, FILM COATED ORAL at 09:51

## 2024-02-07 RX ADMIN — NAPROXEN 500 MG: 500 TABLET ORAL at 09:51

## 2024-02-07 RX ADMIN — ACETAMINOPHEN 975 MG: 325 TABLET ORAL at 23:49

## 2024-02-07 RX ADMIN — POTASSIUM CHLORIDE 40 MEQ: 1.5 POWDER, FOR SOLUTION ORAL at 21:54

## 2024-02-07 RX ADMIN — SODIUM CHLORIDE 100 ML/HR: 9 INJECTION, SOLUTION INTRAVENOUS at 09:50

## 2024-02-07 RX ADMIN — ARIPIPRAZOLE 15 MG: 5 TABLET ORAL at 21:55

## 2024-02-07 RX ADMIN — SODIUM CHLORIDE 100 ML/HR: 9 INJECTION, SOLUTION INTRAVENOUS at 22:02

## 2024-02-07 RX ADMIN — FAMOTIDINE 40 MG: 20 TABLET ORAL at 09:51

## 2024-02-07 RX ADMIN — CEFAZOLIN SODIUM 2 G: 2 INJECTION, SOLUTION INTRAVENOUS at 22:05

## 2024-02-07 RX ADMIN — ACETAMINOPHEN 975 MG: 325 TABLET ORAL at 16:30

## 2024-02-07 RX ADMIN — PANTOPRAZOLE SODIUM 40 MG: 40 INJECTION, POWDER, FOR SOLUTION INTRAVENOUS at 22:02

## 2024-02-07 RX ADMIN — PANTOPRAZOLE SODIUM 40 MG: 40 INJECTION, POWDER, FOR SOLUTION INTRAVENOUS at 09:51

## 2024-02-07 RX ADMIN — LORATADINE 10 MG: 10 TABLET ORAL at 13:03

## 2024-02-07 RX ADMIN — Medication 400 MG: at 09:51

## 2024-02-07 RX ADMIN — NICOTINE 1 PATCH: 21 PATCH, EXTENDED RELEASE TRANSDERMAL at 09:50

## 2024-02-07 RX ADMIN — NAPROXEN 500 MG: 500 TABLET ORAL at 16:30

## 2024-02-07 RX ADMIN — CEFAZOLIN SODIUM 2 G: 2 INJECTION, SOLUTION INTRAVENOUS at 03:21

## 2024-02-07 RX ADMIN — PAROXETINE HYDROCHLORIDE 40 MG: 20 TABLET, FILM COATED ORAL at 09:51

## 2024-02-07 ASSESSMENT — COGNITIVE AND FUNCTIONAL STATUS - GENERAL
TURNING FROM BACK TO SIDE WHILE IN FLAT BAD: A LOT
WALKING IN HOSPITAL ROOM: TOTAL
WALKING IN HOSPITAL ROOM: TOTAL
CLIMB 3 TO 5 STEPS WITH RAILING: TOTAL
DRESSING REGULAR LOWER BODY CLOTHING: A LOT
HELP NEEDED FOR BATHING: A LOT
PERSONAL GROOMING: A LITTLE
MOVING FROM LYING ON BACK TO SITTING ON SIDE OF FLAT BED WITH BEDRAILS: A LOT
TOILETING: TOTAL
MOVING TO AND FROM BED TO CHAIR: A LOT
MOBILITY SCORE: 8
STANDING UP FROM CHAIR USING ARMS: A LOT
EATING MEALS: A LITTLE
CLIMB 3 TO 5 STEPS WITH RAILING: TOTAL
STANDING UP FROM CHAIR USING ARMS: TOTAL
DAILY ACTIVITIY SCORE: 13
MOBILITY SCORE: 10
MOVING TO AND FROM BED TO CHAIR: TOTAL
MOVING FROM LYING ON BACK TO SITTING ON SIDE OF FLAT BED WITH BEDRAILS: A LOT
DRESSING REGULAR UPPER BODY CLOTHING: A LOT
TURNING FROM BACK TO SIDE WHILE IN FLAT BAD: A LOT

## 2024-02-07 ASSESSMENT — PAIN - FUNCTIONAL ASSESSMENT
PAIN_FUNCTIONAL_ASSESSMENT: 0-10
PAIN_FUNCTIONAL_ASSESSMENT: 0-10

## 2024-02-07 ASSESSMENT — PAIN SCALES - GENERAL
PAINLEVEL_OUTOF10: 0 - NO PAIN
PAINLEVEL_OUTOF10: 3

## 2024-02-07 ASSESSMENT — ACTIVITIES OF DAILY LIVING (ADL): ADL_ASSISTANCE: NEEDS ASSISTANCE

## 2024-02-07 NOTE — PROGRESS NOTES
Patient: Blanca Hamilton  Room/bed: 133/133-A  Admitted on: 2/2/2024    Age: 48 y.o.   Gender: female  Code Status:  Full Code   Admitting Dx: Wound infection after surgery [T81.49XA]    MRN: [id number]  PCP: Henry Olmos DO       Subjective   Seen and examined in her room this AM. Awake and alert. Resting in bed. Reports right hip pain is minimal. She denies chest pain, breathing difficulties, abdominal pain, N/V/D/C, fever, or chills.      Objective    Physical Exam   Constitutional: A&O x 3; NAD; calm and cooperative  Eyes: EOM's intact  HEENT: Normocephalic, Atraumatic. Oral mucosa moist.   Neck: Supple. No JVD, lymphadenopathy.   Lungs: CTAB with fair air movement. Respirations even and unlabored on room air.   Heart: RRR  Abdomen: Softly distended; non-tender; +BS  : Slaughter is patent with yellow urine.   MS/Extremities: LUND equally x 4 with RLE weakness related to hip infection. Right hip VAC dressing is C/D/I.  No edema. Peripheral pulses intact bilaterally.   Neuro: A&O x3; no focal deficits; gross motor and sensation intact. Cognitive dysfunction.  Skin: Warm and dry. No rashes or lesions  Psych: Normal affect.      Temp:  [36.1 °C (97 °F)-36.7 °C (98.1 °F)] 36.1 °C (97 °F)  Heart Rate:  [] 103  Resp:  [13-18] 16  BP: (112-126)/(64-82) 112/66    Vitals:    02/02/24 1210   Weight: 83.3 kg (183 lb 10.3 oz)     I/Os    Intake/Output Summary (Last 24 hours) at 2/7/2024 1151  Last data filed at 2/7/2024 0400  Gross per 24 hour   Intake 7216.66 ml   Output 1675 ml   Net 5541.66 ml         Labs:   Results from last 72 hours   Lab Units 02/07/24  0650 02/06/24  0719 02/05/24  0517   SODIUM mmol/L 135* 136 132*   POTASSIUM mmol/L 4.4 3.9 3.6   CHLORIDE mmol/L 104 106 103   CO2 mmol/L 28 25 24   BUN mg/dL 7 6 7   CREATININE mg/dL 0.33* 0.24* 0.30*   GLUCOSE mg/dL 97 77 89   CALCIUM mg/dL 7.6* 7.2* 7.3*   ANION GAP mmol/L 7* 9* 9*   EGFR mL/min/1.73m*2 >90 >90 >90        Results from last 72 hours   Lab Units  02/07/24  0650 02/06/24  1829 02/06/24  0719   WBC AUTO x10*3/uL 4.1* 2.6* 3.2*   HEMOGLOBIN g/dL 7.0* 8.4* 8.6*   HEMATOCRIT % 22.9* 27.4* 26.9*   PLATELETS AUTO x10*3/uL 234 224 216        Lab Results   Component Value Date    CALCIUM 7.6 (L) 02/07/2024    PHOS 2.5 02/02/2024      Lab Results   Component Value Date    CRP 23.75 (H) 02/02/2024        Micro/ID:   Susceptibility data from last 90 days.  Collected Specimen Info Organism Clindamycin Erythromycin Oxacillin Tetracycline Trimethoprim/Sulfamethoxazole Vancomycin   02/02/24 Tissue/Biopsy from Wound/Tissue Methicillin Susceptible Staphylococcus aureus (MSSA) S S S R S S       .ID  Lab Results   Component Value Date    BLOODCULT No growth at 2 days 02/04/2024    BLOODCULT No growth at 2 days 02/04/2024       Images:  KUB FINDINGS:  Nonobstructive bowel gas pattern. Partially visualized bilateral hip  arthroplasties.      Impression: 1.  Nonobstructive bowel gas pattern.     Meds    Scheduled medications  acetaminophen, 975 mg, oral, q8h  ARIPiprazole, 15 mg, oral, Nightly  [Held by provider] aspirin, 81 mg, oral, BID  ceFAZolin, 2 g, intravenous, q8h  [Held by provider] enoxaparin, 40 mg, subcutaneous, q24h  famotidine, 40 mg, oral, BID  ferrous sulfate (325 mg ferrous sulfate), 1 tablet, oral, Daily  [Held by provider] lisinopril, 10 mg, oral, q AM  loratadine, 10 mg, oral, Daily  magnesium oxide, 400 mg, oral, Daily  [Held by provider] metoprolol succinate XL, 25 mg, oral, Daily  montelukast, 10 mg, oral, q AM  naproxen, 500 mg, oral, BID with meals  nicotine, 1 patch, transdermal, Daily  pantoprazole, 40 mg, intravenous, BID  PARoxetine, 40 mg, oral, Daily  polyethylene glycol, 17 g, oral, Daily  potassium chloride, 40 mEq, oral, BID  traZODone, 100 mg, oral, Nightly      Continuous medications  sodium chloride 0.9%, 100 mL/hr, Last Rate: 100 mL/hr (02/07/24 0950)      PRN medications  PRN medications: acetaminophen **OR** acetaminophen **OR**  acetaminophen, HYDROmorphone, oxyCODONE-acetaminophen     Assessment and Plan    Blanca Hamilton is a 48 y.o. female with a medical history of GERD, HTN, and Depression who presented with a right hip prosthetic joint infection.     Right Prosthetic Hip Joint Infection  -S/p ALEXA on 1/2 with subsequent revision on 1/5 for prosthetic fracture  -Cultures positive for MSSA  -ID is following - on Cefazolin  -Underwent ALEXA revision with Dr. Jalloh on 2/6  -Medicate for pain  -Preliminary BC negative.   -Monitor ESR, CRP  -Advised mobilization, IS use  -Maintain bowel regimen  -Monitor H&H for ABLA. EBL 500ml. Hgb 7.0 this AM. Repeat in PM and transfuse to keep Hgb>7.   -Afebrile. No leukocytosis.     Abdominal Pain with N/V  -No recurrence of N/V   -KUB unremarkable with nonobstructive bowel pattern  -Zofran as needed, PPI, Pepcid  -Will monitor    Hypertension  -Has been hypotensive and agents on hold (Lisinopril, Metoprolol)  -SBP has improved - 112-126 in past 24 hours.   -Will continue to hold and resume postoperatively as BP supports.     Anxiety/Depression  -On home regimen: Paxil, Abilify, Trazadone    Acute on Chronic Anemia  -Likely related to infection, hemodilution, ABLA  -Hgb dropped to 7.0 this AM (see above)  -Transfuse as needed to keep >7  -On Iron supplementation  -CBC in AM    Tobacco Use Disorder  -On Nicotine Patch    DVT Prophylaxis  -Lovenox    Fluids/Electrolytes/Nutrition  -Laboratory data reviewed.   -Electrolytes stable and replaced as needed.   -No current nutritional issues.     Disposition  -Plan of care discussed with attending, Dr. Ramírez.  -Discharge plan pending postop course, PT/OT evals, etc. From SNF. Will require precert.       DEVON Montalvo-CNP

## 2024-02-07 NOTE — PROGRESS NOTES
Blanca Hamilton is a 48 y.o. female on day 5 of admission presenting with Right hip pain.    Subjective   Interval History: no fever, no new complaints        Review of Systems    Objective   Range of Vitals (last 24 hours)  Heart Rate:  []   Temp:  [36.1 °C (97 °F)-36.7 °C (98.1 °F)]   Resp:  [13-18]   BP: (112-126)/(64-82)   SpO2:  [91 %-100 %]   Daily Weight  02/02/24 : 83.3 kg (183 lb 10.3 oz)    Body mass index is 30.56 kg/m².    Physical Exam  Constitutional:       Appearance: Normal appearance.   HENT:      Head: Normocephalic and atraumatic.      Mouth/Throat:      Mouth: Mucous membranes are moist.      Pharynx: Oropharynx is clear.   Eyes:      Pupils: Pupils are equal, round, and reactive to light.   Cardiovascular:      Rate and Rhythm: Normal rate and regular rhythm.      Heart sounds: Normal heart sounds.   Pulmonary:      Effort: Pulmonary effort is normal.      Breath sounds: Normal breath sounds.   Abdominal:      General: Abdomen is flat. Bowel sounds are normal.      Palpations: Abdomen is soft.   Musculoskeletal:      Cervical back: Normal range of motion.      Comments: Rt hip dressing   Neurological:      Mental Status: She is alert.         Antibiotics  ceFAZolin in dextrose (iso-os) (Ancef) IVPB 2 g  vancomycin (Vancocin) in dextrose 5 % water (D5W) 500 mL IV 1,500 mg  cefTRIAXone (Rocephin) IVPB 1 g  sodium chloride 0.9% infusion  sodium chloride 0.9 % bolus 1,000 mL  HYDROmorphone (Dilaudid) injection 0.2 mg  lisonpril (Prinivil, Zestril) tablet  PARoxetine (Paxil-CR) 24 hr tablet  acetaminophen (Tylenol) tablet 650 mg  acetaminophen (Tylenol) oral liquid 650 mg  acetaminophen (Tylenol) suppository 650 mg  polyethylene glycol (Glycolax, Miralax) packet 17 g  enoxaparin (Lovenox) syringe 40 mg  cefTRIAXone (Rocephin) 2 g IV in dextrose 5% 50 mL  HYDROmorphone (Dilaudid) injection 0.2 mg  acetaminophen (Tylenol) tablet 975 mg  magnesium oxide (Mag-Ox) tablet 400 mg  nicotine (Nicoderm CQ)  21 mg/24 hr patch 1 patch  ARIPiprazole (Abilify) tablet 15 mg  aspirin chewable tablet 81 mg  loratadine (Claritin) tablet 10 mg  ferrous sulfate (325 mg ferrous sulfate) tablet 1 tablet  lisinopril tablet 10 mg  metoprolol succinate XL (Toprol-XL) 24 hr tablet 25 mg  montelukast (Singulair) tablet 10 mg  naproxen (Naprosyn) tablet 500 mg  pantoprazole (ProtoNix) EC tablet 40 mg  oxyCODONE-acetaminophen (Percocet) 5-325 mg per tablet 1 tablet  PARoxetine (Paxil) tablet 20 mg  PARoxetine (Paxil) tablet 40 mg  traZODone (Desyrel) tablet 100 mg  PARoxetine (Paxil-CR) 24 hr tablet  vancomycin (Vancocin) 1,000 mg in dextrose 5% water 200 mL  PARoxetine (Paxil) tablet 20 mg  sodium chloride 0.9% infusion  sodium chloride 0.9 % bolus 1,000 mL  magnesium sulfate IV 2 g  potassium chloride (Klor-Con) packet 40 mEq      Relevant Results  Labs  Results from last 72 hours   Lab Units 02/07/24  0650 02/06/24  1829 02/06/24  0719   WBC AUTO x10*3/uL 4.1* 2.6* 3.2*   HEMOGLOBIN g/dL 7.0* 8.4* 8.6*   HEMATOCRIT % 22.9* 27.4* 26.9*   PLATELETS AUTO x10*3/uL 234 224 216       Results from last 72 hours   Lab Units 02/07/24  0650 02/06/24  0719 02/05/24  0517   SODIUM mmol/L 135* 136 132*   POTASSIUM mmol/L 4.4 3.9 3.6   CHLORIDE mmol/L 104 106 103   CO2 mmol/L 28 25 24   BUN mg/dL 7 6 7   CREATININE mg/dL 0.33* 0.24* 0.30*   GLUCOSE mg/dL 97 77 89   CALCIUM mg/dL 7.6* 7.2* 7.3*   ANION GAP mmol/L 7* 9* 9*   EGFR mL/min/1.73m*2 >90 >90 >90       Results from last 72 hours   Lab Units 02/07/24  0650   ALK PHOS U/L 79   BILIRUBIN TOTAL mg/dL 0.2   PROTEIN TOTAL g/dL 4.3*   ALT U/L 5*   AST U/L 7*   ALBUMIN g/dL 2.5*       Estimated Creatinine Clearance: 125 mL/min (A) (by C-G formula based on SCr of 0.33 mg/dL (L)).  C-Reactive Protein   Date Value Ref Range Status   02/02/2024 23.75 (H) <1.00 mg/dL Final   01/12/2024 4.87 (H) <1.00 mg/dL Final     Microbiology  Reviewed  Imaging  reviewed        Assessment/Plan    Right hip surgical  wound infection, for surgery, the culture with MSSA, sp surgery and partial hardware exchange  Emesis, xray no evidence of obstruction     Recommendations :  Continue  Cefazolin  Discussed with the medical team    I spent minutes in the professional and overall care of this patient.      Frank Modi MD

## 2024-02-07 NOTE — CARE PLAN
The patient's goals for the shift include patient will get adequate sleep    The clinical goals for the shift include Patients pain will be ncontrolled throughout the shift    Over the shift, the patient did not make progress toward the following goals. Barriers to progression include . Recommendations to address these barriers include   Problem: Skin  Goal: Prevent/minimize sheer/friction injuries  Outcome: Progressing  Flowsheets (Taken 2/6/2024 1818 by Galilea Rodríguez RN)  Prevent/minimize sheer/friction injuries:   Increase activity/out of bed for meals   Use pull sheet   Complete micro-shifts as needed if patient unable. Adjust patient position to relieve pressure points, not a full turn   HOB 30 degrees or less  Goal: Promote/optimize nutrition  Outcome: Progressing  Flowsheets (Taken 2/6/2024 9171)  Promote/optimize nutrition: Assist with feeding  Goal: Promote skin healing  Outcome: Progressing   .

## 2024-02-07 NOTE — PROGRESS NOTES
Physical Therapy    Physical Therapy Evaluation    Patient Name: Blanca Hamilton  MRN: [id number]  Today's Date: 2/7/2024   Time Calculation  Start Time: 1345  Stop Time: 1401  Time Calculation (min): 16 min    Assessment/Plan   PT Assessment  PT Assessment Results: Decreased strength, Decreased range of motion, Decreased endurance, Impaired balance, Decreased mobility  Rehab Prognosis: Fair  Evaluation/Treatment Tolerance: Patient tolerated treatment well  Medical Staff Made Aware: Yes  Strengths: Ability to acquire knowledge, Housing layout, Support of Caregivers  Barriers to Participation: Comorbidities  End of Session Communication: Bedside nurse  Assessment Comment: Patient agreeable to eval, limited with weight beraing status R LE, posterior hip precautions and wound vac. At baseline patient completes stand pivot transfer only however unable to complete at this time. Rec mod intensity PT intervention.  End of Session Patient Position: Bed, 3 rail up, Alarm on  IP OR SWING BED PT PLAN  Inpatient or Swing Bed: Inpatient  PT Plan  Treatment/Interventions: Bed mobility, Transfer training, Balance training, Strengthening, Endurance training  PT Plan: Skilled PT  PT Frequency: 3 times per week  PT Discharge Recommendations: Moderate intensity level of continued care  Equipment Recommended upon Discharge: Wheeled walker, Wheelchair (owns)  PT Recommended Transfer Status: Assist x2  PT - OK to Discharge: Yes (per PT POC)      Subjective   General Visit Information:  General  Reason for Referral: Impaired functional mobility; R ALEXA revision with hip exchange and abx bead placement  Referred By: Hanny Ramírez  Past Medical History Relevant to Rehab: GERD, HTN, HLD; 1/2 R ALEXA, 1/5 ORIF for periprosthetic femur fx  Family/Caregiver Present: No  Prior to Session Communication: Bedside nurse  Patient Position Received: Bed, 3 rail up, Alarm on  General Comment: Patient cooperative, agreeable to PT eval  Home Living:  Home  "Living  Type of Home: House  Lives With: Alone  Home Adaptive Equipment: Walker rolling or standard, Wheelchair-manual  Home Layout: One level  Home Access: Ramped entrance  Bathroom Shower/Tub: Walk-in shower  Home Living Comments: Aide to assist for ADLs/IADLs 3 hrs/day, 4 days/week  Prior Level of Function:  Prior Function Per Pt/Caregiver Report  Level of [place]: Independent with ADLs and functional transfers, Needs assistance with homemaking  ADL Assistance: Needs assistance  Homemaking Assistance: Needs assistance  Ambulatory Assistance:  (Nonambulatory at baseline, pivot transfers to w/c nd w/c mobile throughout home)  Precautions:  Precautions  LE Weight Bearing Status:  (TTWB R LE for transfers only)  Medical Precautions: Fall precautions (wound vac, IV, purewick)  Post-Surgical Precautions: Right hip precautions (Posterior)    Objective   Pain:  Pain Assessment  Pain Assessment: 0-10  Pain Score: 3  Pain Type: Surgical pain  Pain Location: Hip  Pain Orientation: Right  Pain Interventions: Repositioned  Cognition:  Cognition  Overall Cognitive Status: Within Functional Limits  Orientation Level: Oriented X4    General Assessments:    Activity Tolerance  Endurance: Tolerates less than 10 min exercise, no significant change in vital signs    Sensation  Light Touch: No apparent deficits    Strength  Strength Comments: R hip 3/5, R knee and ankle 3+/5; L LE 4-/5    Coordination  Movements are Fluid and Coordinated: Yes    Postural Control  Postural Control: Within Functional Limits    Static Sitting Balance  Static Sitting-Balance Support: Bilateral upper extremity supported, Feet supported  Static Sitting-Level of Assistance: Close supervision       Functional Assessments:       Bed Mobility  Bed Mobility: Yes  Bed Mobility 1  Bed Mobility 1: Supine to sitting, Sitting to supine  Level of Assistance 1: Maximum assistance    Transfers  Transfer: No (Patient unable to complete at weight bearing status \"I'm " "not going to be able to do that\" Patient encouraged would continue to attempt during future txs)    Outcome Measures:  [facility] Basic Mobility  Turning from your back to your side while in a flat bed without using bedrails: A lot  Moving from lying on your back to sitting on the side of a flat bed without using bedrails: A lot  Moving to and from bed to chair (including a wheelchair): Total  Standing up from a chair using your arms (e.g. wheelchair or bedside chair): Total  To walk in hospital room: Total  Climbing 3-5 steps with railing: Total  Basic Mobility - Total Score: 8    Encounter Problems       Encounter Problems (Active)       Balance       STG - Maintains dynamic sitting balance without upper extremity support x 10'  (Progressing)       Start:  02/07/24    Expected End:  02/21/24               Transfers       STG - Transfer from bed to chair CGA  (Progressing)       Start:  02/07/24    Expected End:  02/21/24            STG - Patient will perform bed mobility CGA  (Progressing)       Start:  02/07/24    Expected End:  02/21/24            STG - Patient will transfer sit to and from stand CGA  (Progressing)       Start:  02/07/24    Expected End:  02/21/24                   Education Documentation  Precautions, taught by Amy Montero PT at 2/7/2024  2:58 PM.  Learner: Patient  Readiness: Acceptance  Method: Explanation  Response: Verbalizes Understanding  Comment: Educated on posterior hip precautions, provided verbal education and demonstration    Body Mechanics, taught by Amy Montero PT at 2/7/2024  2:58 PM.  Learner: Patient  Readiness: Acceptance  Method: Explanation  Response: Verbalizes Understanding  Comment: Educated on posterior hip precautions, provided verbal education and demonstration    Mobility Training, taught by Amy Montero PT at 2/7/2024  2:58 PM.  Learner: Patient  Readiness: Acceptance  Method: Explanation  Response: Verbalizes Understanding  Comment: Educated on posterior hip " precautions, provided verbal education and demonstration    Education Comments  No comments found.

## 2024-02-07 NOTE — NURSING NOTE
Patient seen in bed, sitting, denies pain.  Prevena wound dressing intact, vac at -125 mm Hg.  Rocky hose straightened and made room for toes, Heels boggy, red, barely blanching- order obtained for zinc 40% to promote local blood flow and true lindsay boots placed to off load heels. Buttocks no redness, tender to patient on left side, Mepilex placed and turned to right side to relieve pressure.  Nursing team updated. Patient educated. Message if any questions.

## 2024-02-07 NOTE — NURSING NOTE
0730: assumed pt care    1249: ok to give 40 meq potassium even with potassium level of 4.4 today per ES Munoz, and pt has a hagan but no order, asking ES Munoz    1259: ok to take out hagan per ortho    1642: pt's lovenox is still on hold, no further orders to resume it per Cherry Michaels, NP

## 2024-02-07 NOTE — PROGRESS NOTES
"Blanca Hamilton is a 48 y.o. female on day 5 of admission presenting with Right hip pain.    Subjective   No acute overnight events.  Pain controlled.  Denies numbness and tingling of LLE.  Tolerating PO intake.         Objective     Physical Exam  HENT:      Head: Normocephalic and atraumatic.   Eyes:      Extraocular Movements: Extraocular movements intact.      Conjunctiva/sclera: Conjunctivae normal.      Pupils: Pupils are equal, round, and reactive to light.   Cardiovascular:      Rate and Rhythm: Normal rate and regular rhythm.      Pulses: Normal pulses.   Pulmonary:      Breath sounds: Normal breath sounds.   Abdominal:      General: Bowel sounds are normal.      Palpations: Abdomen is soft.   Musculoskeletal:      Comments: Right hip dressing C/D/I with wound VAC in place -125 suction, leg and foot warm and well perfused, motion and sensations intact distally   Skin:     General: Skin is warm and dry.      Capillary Refill: Capillary refill takes less than 2 seconds.   Neurological:      General: No focal deficit present.      Mental Status: She is alert and oriented to person, place, and time.       Last Recorded Vitals  Blood pressure 112/66, pulse 93, temperature 36.1 °C (97 °F), temperature source Temporal, resp. rate 16, height 1.651 m (5' 5\"), weight 83.3 kg (183 lb 10.3 oz), SpO2 95 %.  Intake/Output last 3 Shifts:  I/O last 3 completed shifts:  In: 7416.7 (89 mL/kg) [P.O.:240; I.V.:6526.7 (78.4 mL/kg); IV Piggyback:650]  Out: 2225 (26.7 mL/kg) [Urine:1725 (0.6 mL/kg/hr); Blood:500]  Weight: 83.3 kg     Relevant Results  ECG 12 lead    Result Date: 2/7/2024  Atrial flutter with 2:1 AV conduction Nonspecific intraventricular block Left ventricular hypertrophy ( R in aVL , Lyndon product ) Inferior infarct , age undetermined Abnormal ECG When compared with ECG of 11-JAN-2024 23:16, Significant changes have occurred    XR abdomen 1 view    Result Date: 2/5/2024  Interpreted By:  Richard Garcia, STUDY: " XR ABDOMEN 1 VIEW;  2/4/2024 6:47 pm   INDICATION: Signs/Symptoms:Lactic acidosis and emesis.   COMPARISON: None.   ACCESSION NUMBER(S): [id number]   ORDERING CLINICIAN: TABITHA MAYNARD   FINDINGS: Nonobstructive bowel gas pattern. Partially visualized bilateral hip arthroplasties.       1.  Nonobstructive bowel gas pattern.   MACRO: None   Signed by: Richard Garcia 2/5/2024 4:12 PM Dictation workstation:   [id number]    ECG 12 lead    Result Date: 1/31/2024  Poor data quality, interpretation may be adversely affected Sinus tachycardia with short WV with occasional Premature ventricular complexes ST & T wave abnormality, consider lateral ischemia Abnormal ECG When compared with ECG of 11-JAN-2024 12:24, Premature ventricular complexes are now Present T wave inversion more evident in Lateral leads Confirmed by Michael Nguyen ([id number]) on 1/31/2024 10:27:35 AM    XR hip right with pelvis when performed 2 or 3 views    Result Date: 1/27/2024  Interpreted By:  Yolie Tate, STUDY: Single view pelvis. Right hip, two views.   INDICATION: Signs/Symptoms:POST OP-RIGHT TOTAL HIP.   COMPARISON: 10/17/2023.   ACCESSION NUMBER(S): [id number]   ORDERING CLINICIAN: ETHEL MORALES   FINDINGS: Status post right total hip arthroplasty with cerclage wire fixation of the proximal femur. Hardware is intact without perihardware fractures or lucencies.   There is fragmentation of the greater trochanter which is likely postsurgical in etiology with attention on follow-up recommended.   Right acetabular protrusio. No malalignment.   Changes of left hip arthroplasty noted with prominent osteolysis of the medial acetabular wall similar to prior.       1. As above.   MACRO: None.   Signed by: Yolie Tate 1/27/2024 10:30 AM Dictation workstation:   [id number]    ECG 12 lead    Result Date: 1/15/2024  Normal sinus rhythm Nonspecific ST and T wave abnormality Abnormal ECG When compared with ECG of 28-DEC-2023 13:55, (unconfirmed) Previous ECG  has undetermined rhythm, needs review T wave inversion now evident in Lateral leads    XR femur right 2+ views    Result Date: 1/13/2024  Interpreted By:  Regino Bell, STUDY: XR FEMUR RIGHT 2+ VIEWS; ;  1/12/2024 8:44 am   INDICATION: Signs/Symptoms:rright hip post op.   COMPARISON: 01/03/2024   ACCESSION NUMBER(S): [id number]   ORDERING CLINICIAN: JAZMIN MOREL   FINDINGS: Right femur, two views   Interval fixation of right proximal femoral periprosthetic fracture with cerclage wires. There is no malalignment. Redemonstration of subluxed extrusion, similar to the prior. Right total hip arthroplasty in place.       Interval fixation of periprosthetic fracture through the proximal femur.     MACRO: None   Signed by: Regino Bell 1/13/2024 8:36 AM Dictation workstation:   [id number]    Transthoracic Echo (TTE) Complete    Result Date: 1/12/2024   [facility], [address] [place] [address]              Tel [phone] and Fax [phone] TRANSTHORACIC ECHOCARDIOGRAM REPORT  Patient Name:      ZULEIKAMAIRA Mcnamara Physician:    [id number] Jim Loredo MD Study Date:        1/12/2024            Ordering Provider:    [id number] RAMBO LEVI MRN/PID:           [id number]             Fellow: Accession#:        [id number]         Nurse: Date of Birth/Age: 1975 / 48      Sonographer:          Katherine Man RDCS                    years Gender:            F                    Additional Staff: Height:            175.26 cm            Admit Date: Weight:            89.81 kg             Admission Status:     Inpatient -                                                               Routine BSA:               2.06 m2              Encounter#:           [id number]                                         Department Location:  [facility]                                                                Floor Blood Pressure: 128 /70 mmHg Study Type:    TRANSTHORACIC ECHO (TTE) COMPLETE Diagnosis/ICD: Abnormal electrocardiogram [ECG] [EKG]-R94.31 Indication:    Impaired functional mobility CPT Code:      Echo Complete w Full Doppler-50931 Patient History: Pertinent History: HTN. Tachycardia. Study Detail: The following Echo studies were performed: 2D, M-Mode, Doppler and               color flow. Technically challenging study due to the patient's               lack of cooperation, poor acoustic windows and body habitus. The               patient was awake.  PHYSICIAN INTERPRETATION: Left Ventricle: The left ventricular systolic function is normal, with an estimated ejection fraction of 60-65%. There are no regional wall motion abnormalities. The left ventricular cavity size is normal. Spectral Doppler shows a normal pattern of left ventricular diastolic filling. Left Atrium: The left atrium is normal in size. Right Ventricle: The right ventricle is normal in size. There is normal right ventricular global systolic function. Right Atrium: The right atrium is normal in size. Aortic Valve: The aortic valve is trileaflet. There is mild aortic valve cusp calcification. There is mild to moderate aortic valve thickening. There is evidence of mildly elevated transaortic gradients consistent with sclerosis of the aortic valve. There is no evidence of aortic valve regurgitation. The peak instantaneous gradient of the aortic valve is 11.2 mmHg. Mitral Valve: The mitral valve is normal in structure. There is no evidence of mitral valve regurgitation. Tricuspid Valve: The tricuspid valve is structurally normal. There is mild tricuspid regurgitation. Pulmonic Valve: The pulmonic valve is not well visualized. There is physiologic pulmonic valve regurgitation. Pericardium: There is no pericardial effusion noted. Aorta: The aortic root was not well visualized.  CONCLUSIONS:  1. Left ventricular  systolic function is normal with a 60-65% estimated ejection fraction.  2. Aortic valve sclerosis. QUANTITATIVE DATA SUMMARY: 2D MEASUREMENTS:                          Normal Ranges: Ao Root d:     2.90 cm   (2.0-3.7cm) LAs:           2.50 cm   (2.7-4.0cm) IVSd:          0.96 cm   (0.6-1.1cm) LVPWd:         1.09 cm   (0.6-1.1cm) LVIDd:         4.15 cm   (3.9-5.9cm) LVIDs:         2.89 cm LV Mass Index: 67.8 g/m2 LV % FS        30.4 % LA VOLUME:                               Normal Ranges: LA Vol A4C:        35.4 ml    (22+/-6mL/m2) LA Vol A2C:        29.9 ml LA Vol BP:         34.5 ml LA Vol Index A4C:  17.2ml/m2 LA Vol Index A2C:  14.5 ml/m2 LA Vol Index BP:   16.8 ml/m2 LA Area A4C:       15.0 cm2 LA Area A2C:       13.0 cm2 LA Major Axis A4C: 5.4 cm LA Major Axis A2C: 4.8 cm LA Volume Index:   15.0 ml/m2 RA VOLUME BY A/L METHOD:                               Normal Ranges: RA Vol A4C:        39.8 ml    (8.3-19.5ml) RA Vol Index A4C:  19.4 ml/m2 RA Area A4C:       15.0 cm2 RA Major Axis A4C: 4.8 cm M-MODE MEASUREMENTS:                  Normal Ranges: Ao Root: 3.00 cm (2.0-3.7cm) LAs:     3.90 cm (2.7-4.0cm) AORTA MEASUREMENTS:                    Normal Ranges: Asc Ao, d: 3.10 cm (2.1-3.4cm) LV SYSTOLIC FUNCTION BY 2D PLANIMETRY (MOD):                     Normal Ranges: EF-A4C View: 68.6 % (>=55%) EF-A2C View: 62.4 % EF-Biplane:  66.3 % LV DIASTOLIC FUNCTION:                               Normal Ranges: MV Peak E:        0.82 m/s    (0.7-1.2 m/s) MV Peak A:        0.65 m/s    (0.42-0.7 m/s) E/A Ratio:        1.26        (1.0-2.2) MV e'             0.10 m/s    (>8.0) MV lateral e'     0.12 m/s MV medial e'      0.08 m/s MV A Dur:         92.00 msec E/e' Ratio:       8.25        (<8.0) PulmV Sys Fam:    47.60 cm/s PulmV Oro Fam:   32.30 cm/s PulmV S/D Fam:    1.50 PulmV A Revs Fam: 32.70 cm/s PulmV A Revs Dur: 100.00 msec MITRAL VALVE:                 Normal Ranges: MV DT: 170 msec (150-240msec) AORTIC VALVE:                           Normal Ranges: AoV Vmax:      1.67 m/s  (<=1.7m/s) AoV Peak P.2 mmHg (<20mmHg) LVOT Max Fam:  1.22 m/s  (<=1.1m/s) LVOT VTI:      27.60 cm LVOT Diameter: 2.00 cm   (1.8-2.4cm) AoV Area,Vmax: 2.30 cm2  (2.5-4.5cm2)  RIGHT VENTRICLE: RV Basal 3.50 cm RV Mid   2.40 cm RV Major 7.0 cm TAPSE:   27.0 mm RV s'    0.17 m/s PULMONIC VALVE:                      Normal Ranges: PV Max Fam: 1.2 m/s  (0.6-0.9m/s) PV Max P.3 mmHg Pulmonary Veins: PulmV A Revs Dur: 100.00 msec PulmV A Revs Fam: 32.70 cm/s PulmV Oro Fam:   32.30 cm/s PulmV S/D Fam:    1.50 PulmV Sys Fam:    47.60 cm/s  [id number] Jim Loredo MD Electronically signed on 2024 at 11:44:14 PM  ** Final **     Lower extremity venous duplex right    Result Date: 2024  Interpreted By:  Marlys Mahajan, STUDY: [facility] LOWER EXTREMITY VENOUS DUPLEX RIGHT  2024 12:10 am   INDICATION: 47 y/o   F with  Signs/Symptoms:RLE pain and edema. Status post right total hip arthroplasty.. LMP:  Unknown.   COMPARISON: None.   ACCESSION NUMBER(S): [id number]   ORDERING CLINICIAN: AMI BARNETT   TECHNIQUE: Routine ultrasound of the  right lower extremity was performed with duplex Doppler (color and spectral) evaluation.   Static images were obtained for remote interpretation.   FINDINGS: THIGH VEINS:  The common femoral, femoral, popliteal, proximal medial saphenous, and deep femoral veins are patent and free of thrombus. The veins are normally compressible.  They demonstrate normal phasic flow and augmentation response.   CALF VEINS:  The paired peroneal and posterior tibial calf veins are patent.       No deep venous thrombosis of the  right lower extremity.   MACRO: None   Signed by: Marlys Mahajan 2024 12:50 AM Dictation workstation:   [id number]    ECG 12 lead    Result Date: 2024  Normal sinus rhythm Nonspecific ST and T wave abnormality Abnormal ECG When compared with ECG of 28-DEC-2023 13:55, (unconfirmed) Previous  ECG has undetermined rhythm, needs review T wave inversion now evident in Lateral leads See ED provider note for full interpretation and clinical correlation Confirmed by Opal Matthews ([id number]) on 1/11/2024 12:33:55 PM      Scheduled medications  acetaminophen, 975 mg, oral, q8h  ARIPiprazole, 15 mg, oral, Nightly  [Held by provider] aspirin, 81 mg, oral, BID  ceFAZolin, 2 g, intravenous, q8h  [Held by provider] enoxaparin, 40 mg, subcutaneous, q24h  famotidine, 40 mg, oral, BID  ferrous sulfate (325 mg ferrous sulfate), 1 tablet, oral, Daily  [Held by provider] lisinopril, 10 mg, oral, q AM  loratadine, 10 mg, oral, Daily  magnesium oxide, 400 mg, oral, Daily  [Held by provider] metoprolol succinate XL, 25 mg, oral, Daily  montelukast, 10 mg, oral, q AM  naproxen, 500 mg, oral, BID with meals  nicotine, 1 patch, transdermal, Daily  pantoprazole, 40 mg, intravenous, BID  PARoxetine, 40 mg, oral, Daily  polyethylene glycol, 17 g, oral, Daily  potassium chloride, 40 mEq, oral, BID  traZODone, 100 mg, oral, Nightly      Continuous medications  sodium chloride 0.9%, 100 mL/hr, Last Rate: 100 mL/hr (02/06/24 4360)      PRN medications  PRN medications: acetaminophen **OR** acetaminophen **OR** acetaminophen, HYDROmorphone, oxyCODONE-acetaminophen  Results for orders placed or performed during the hospital encounter of 02/02/24 (from the past 24 hour(s))   Prepare RBC: 1 Units   Result Value Ref Range    PRODUCT CODE K2896V96     Unit Number [id number]     Unit ABO A     Unit RH POS     XM INTEP COMP     Dispense Status RE     Blood Expiration Date February 08, 2024 23:59 EST     PRODUCT BLOOD TYPE 6200     UNIT VOLUME 350    CBC   Result Value Ref Range    WBC 2.6 (L) 4.4 - 11.3 x10*3/uL    nRBC 0.0 0.0 - 0.0 /100 WBCs    RBC 2.99 (L) 4.00 - 5.20 x10*6/uL    Hemoglobin 8.4 (L) 12.0 - 16.0 g/dL    Hematocrit 27.4 (L) 36.0 - 46.0 %    MCV 92 80 - 100 fL    MCH 28.1 26.0 - 34.0 pg    MCHC 30.7 (L) 32.0 - 36.0 g/dL     RDW 22.1 (H) 11.5 - 14.5 %    Platelets 224 150 - 450 x10*3/uL   CBC   Result Value Ref Range    WBC 4.1 (L) 4.4 - 11.3 x10*3/uL    nRBC 0.0 0.0 - 0.0 /100 WBCs    RBC 2.50 (L) 4.00 - 5.20 x10*6/uL    Hemoglobin 7.0 (L) 12.0 - 16.0 g/dL    Hematocrit 22.9 (L) 36.0 - 46.0 %    MCV 92 80 - 100 fL    MCH 28.0 26.0 - 34.0 pg    MCHC 30.6 (L) 32.0 - 36.0 g/dL    RDW 21.9 (H) 11.5 - 14.5 %    Platelets 234 150 - 450 x10*3/uL               This patient has a urinary catheter   Reason for the urinary catheter remaining today? Urine catheter unnecessary, will be removed today      Assessment/Plan   Principal Problem:    Right hip pain  Active Problems:    Wound infection after surgery    48 year old female, POD 1 from revision of right hip arthroplasty   - AM labs reviewed  - WB for transfers only, PT evaluation  - will require a SNF at [place]  - continue ABX, pending intra-op cultures  - pain control per primary  - CBC at 1400 7.5,no indication for PRBC at time  - if AM Hgb stable ok for DVT prophylaxis   - DC hagan  - regular diet  - ortho will continue to follow    Discussed with Dr. Jalloh         I spent 30 minutes in the professional and overall care of this patient.    Ester Fitch, APRN-CNP

## 2024-02-07 NOTE — ANESTHESIA POSTPROCEDURE EVALUATION
Patient: Blanca Hamilton    Procedure Summary       Date: 02/06/24 Room / Location: [facility] OR 03 / [facility]    Anesthesia Start: 1509 Anesthesia Stop: 1815    Procedure: REVISION ARTHROPLASTY TOTAL HIP (Right: Hip) Diagnosis:       Right hip pain      (Right hip pain [M25.551])    Surgeons: Felix Jalloh MD Responsible Provider: ELVIRA Betancur    Anesthesia Type: general ASA Status: 3            Anesthesia Type: general    Vitals Value Taken Time   /64 02/06/24 1857   Temp 36.5 °C (97.7 °F) 02/06/24 1815   Pulse 96 02/06/24 1905   Resp 13 02/06/24 1815   SpO2 94 % 02/06/24 1905   Vitals shown include unvalidated device data.    Anesthesia Post Evaluation    Patient location during evaluation: PACU  Patient participation: complete - patient participated  Level of consciousness: awake  Pain management: adequate  Multimodal analgesia pain management approach  Airway patency: patent  Two or more strategies used to mitigate risk of obstructive sleep apnea  Cardiovascular status: acceptable  Respiratory status: acceptable  Hydration status: acceptable  Postoperative Nausea and Vomiting: none        No notable events documented.

## 2024-02-08 ENCOUNTER — APPOINTMENT (OUTPATIENT)
Dept: RADIOLOGY | Facility: HOSPITAL | Age: 49
DRG: 466 | End: 2024-02-08
Payer: COMMERCIAL

## 2024-02-08 LAB
ABO GROUP (TYPE) IN BLOOD: NORMAL
ALBUMIN SERPL BCP-MCNC: 2.4 G/DL (ref 3.4–5)
ALP SERPL-CCNC: 90 U/L (ref 33–110)
ALT SERPL W P-5'-P-CCNC: 6 U/L (ref 7–45)
ANION GAP SERPL CALC-SCNC: 9 MMOL/L (ref 10–20)
ANTIBODY SCREEN: NORMAL
AST SERPL W P-5'-P-CCNC: 12 U/L (ref 9–39)
BASOPHILS # BLD AUTO: 0.03 X10*3/UL (ref 0–0.1)
BASOPHILS NFR BLD AUTO: 0.6 %
BILIRUB SERPL-MCNC: 0.2 MG/DL (ref 0–1.2)
BUN SERPL-MCNC: 6 MG/DL (ref 6–23)
CALCIUM SERPL-MCNC: 7.6 MG/DL (ref 8.6–10.3)
CHLORIDE SERPL-SCNC: 103 MMOL/L (ref 98–107)
CO2 SERPL-SCNC: 28 MMOL/L (ref 21–32)
CREAT SERPL-MCNC: 0.39 MG/DL (ref 0.5–1.05)
EGFRCR SERPLBLD CKD-EPI 2021: >90 ML/MIN/1.73M*2
EOSINOPHIL # BLD AUTO: 0.78 X10*3/UL (ref 0–0.7)
EOSINOPHIL NFR BLD AUTO: 14.9 %
ERYTHROCYTE [DISTWIDTH] IN BLOOD BY AUTOMATED COUNT: 21.9 % (ref 11.5–14.5)
ERYTHROCYTE [DISTWIDTH] IN BLOOD BY AUTOMATED COUNT: 22.1 % (ref 11.5–14.5)
GLUCOSE SERPL-MCNC: 85 MG/DL (ref 74–99)
HCT VFR BLD AUTO: 21.8 % (ref 36–46)
HCT VFR BLD AUTO: 23.6 % (ref 36–46)
HGB BLD-MCNC: 6.8 G/DL (ref 12–16)
HGB BLD-MCNC: 7.4 G/DL (ref 12–16)
HYPOCHROMIA BLD QL SMEAR: NORMAL
IMM GRANULOCYTES # BLD AUTO: 0.24 X10*3/UL (ref 0–0.7)
IMM GRANULOCYTES NFR BLD AUTO: 4.6 % (ref 0–0.9)
LYMPHOCYTES # BLD AUTO: 1.28 X10*3/UL (ref 1.2–4.8)
LYMPHOCYTES NFR BLD AUTO: 24.5 %
MCH RBC QN AUTO: 28.3 PG (ref 26–34)
MCH RBC QN AUTO: 28.4 PG (ref 26–34)
MCHC RBC AUTO-ENTMCNC: 31.2 G/DL (ref 32–36)
MCHC RBC AUTO-ENTMCNC: 31.4 G/DL (ref 32–36)
MCV RBC AUTO: 90 FL (ref 80–100)
MCV RBC AUTO: 91 FL (ref 80–100)
MONOCYTES # BLD AUTO: 0.5 X10*3/UL (ref 0.1–1)
MONOCYTES NFR BLD AUTO: 9.6 %
NEUTROPHILS # BLD AUTO: 2.4 X10*3/UL (ref 1.2–7.7)
NEUTROPHILS NFR BLD AUTO: 45.8 %
NRBC BLD-RTO: 0 /100 WBCS (ref 0–0)
NRBC BLD-RTO: 0.6 /100 WBCS (ref 0–0)
PLATELET # BLD AUTO: 295 X10*3/UL (ref 150–450)
PLATELET # BLD AUTO: 302 X10*3/UL (ref 150–450)
POTASSIUM SERPL-SCNC: 4.5 MMOL/L (ref 3.5–5.3)
PROT SERPL-MCNC: 4.5 G/DL (ref 6.4–8.2)
RBC # BLD AUTO: 2.4 X10*6/UL (ref 4–5.2)
RBC # BLD AUTO: 2.61 X10*6/UL (ref 4–5.2)
RBC MORPH BLD: NORMAL
RH FACTOR (ANTIGEN D): NORMAL
SODIUM SERPL-SCNC: 135 MMOL/L (ref 136–145)
WBC # BLD AUTO: 5.2 X10*3/UL (ref 4.4–11.3)
WBC # BLD AUTO: 6.2 X10*3/UL (ref 4.4–11.3)

## 2024-02-08 PROCEDURE — 99232 SBSQ HOSP IP/OBS MODERATE 35: CPT | Performed by: STUDENT IN AN ORGANIZED HEALTH CARE EDUCATION/TRAINING PROGRAM

## 2024-02-08 PROCEDURE — 36430 TRANSFUSION BLD/BLD COMPNT: CPT

## 2024-02-08 PROCEDURE — 86920 COMPATIBILITY TEST SPIN: CPT

## 2024-02-08 PROCEDURE — 2500000004 HC RX 250 GENERAL PHARMACY W/ HCPCS (ALT 636 FOR OP/ED)

## 2024-02-08 PROCEDURE — 2500000001 HC RX 250 WO HCPCS SELF ADMINISTERED DRUGS (ALT 637 FOR MEDICARE OP): Performed by: ORTHOPAEDIC SURGERY

## 2024-02-08 PROCEDURE — 86901 BLOOD TYPING SEROLOGIC RH(D): CPT | Performed by: STUDENT IN AN ORGANIZED HEALTH CARE EDUCATION/TRAINING PROGRAM

## 2024-02-08 PROCEDURE — 85025 COMPLETE CBC W/AUTO DIFF WBC: CPT | Performed by: STUDENT IN AN ORGANIZED HEALTH CARE EDUCATION/TRAINING PROGRAM

## 2024-02-08 PROCEDURE — 87081 CULTURE SCREEN ONLY: CPT | Mod: GEALAB | Performed by: NURSE PRACTITIONER

## 2024-02-08 PROCEDURE — 2500000004 HC RX 250 GENERAL PHARMACY W/ HCPCS (ALT 636 FOR OP/ED): Performed by: ORTHOPAEDIC SURGERY

## 2024-02-08 PROCEDURE — 36415 COLL VENOUS BLD VENIPUNCTURE: CPT | Performed by: STUDENT IN AN ORGANIZED HEALTH CARE EDUCATION/TRAINING PROGRAM

## 2024-02-08 PROCEDURE — 1100000001 HC PRIVATE ROOM DAILY

## 2024-02-08 PROCEDURE — 84075 ASSAY ALKALINE PHOSPHATASE: CPT | Performed by: STUDENT IN AN ORGANIZED HEALTH CARE EDUCATION/TRAINING PROGRAM

## 2024-02-08 PROCEDURE — 97165 OT EVAL LOW COMPLEX 30 MIN: CPT | Mod: GO

## 2024-02-08 PROCEDURE — 05HY33Z INSERTION OF INFUSION DEVICE INTO UPPER VEIN, PERCUTANEOUS APPROACH: ICD-10-PCS | Performed by: INTERNAL MEDICINE

## 2024-02-08 PROCEDURE — 85027 COMPLETE CBC AUTOMATED: CPT | Performed by: NURSE PRACTITIONER

## 2024-02-08 PROCEDURE — S4991 NICOTINE PATCH NONLEGEND: HCPCS | Performed by: ORTHOPAEDIC SURGERY

## 2024-02-08 PROCEDURE — P9016 RBC LEUKOCYTES REDUCED: HCPCS

## 2024-02-08 PROCEDURE — 2500000001 HC RX 250 WO HCPCS SELF ADMINISTERED DRUGS (ALT 637 FOR MEDICARE OP): Performed by: NURSE PRACTITIONER

## 2024-02-08 PROCEDURE — 2500000002 HC RX 250 W HCPCS SELF ADMINISTERED DRUGS (ALT 637 FOR MEDICARE OP, ALT 636 FOR OP/ED): Performed by: ORTHOPAEDIC SURGERY

## 2024-02-08 PROCEDURE — C9113 INJ PANTOPRAZOLE SODIUM, VIA: HCPCS | Performed by: ORTHOPAEDIC SURGERY

## 2024-02-08 PROCEDURE — 99232 SBSQ HOSP IP/OBS MODERATE 35: CPT | Performed by: NURSE PRACTITIONER

## 2024-02-08 PROCEDURE — 2500000004 HC RX 250 GENERAL PHARMACY W/ HCPCS (ALT 636 FOR OP/ED): Performed by: NURSE PRACTITIONER

## 2024-02-08 PROCEDURE — 2500000004 HC RX 250 GENERAL PHARMACY W/ HCPCS (ALT 636 FOR OP/ED): Performed by: STUDENT IN AN ORGANIZED HEALTH CARE EDUCATION/TRAINING PROGRAM

## 2024-02-08 PROCEDURE — 36569 INSJ PICC 5 YR+ W/O IMAGING: CPT

## 2024-02-08 RX ORDER — ONDANSETRON HYDROCHLORIDE 2 MG/ML
4 INJECTION, SOLUTION INTRAVENOUS ONCE
Status: COMPLETED | OUTPATIENT
Start: 2024-02-08 | End: 2024-02-08

## 2024-02-08 RX ORDER — LIDOCAINE HYDROCHLORIDE 10 MG/ML
5 INJECTION, SOLUTION EPIDURAL; INFILTRATION; INTRACAUDAL; PERINEURAL ONCE
Status: DISCONTINUED | OUTPATIENT
Start: 2024-02-08 | End: 2024-02-11 | Stop reason: HOSPADM

## 2024-02-08 RX ORDER — POTASSIUM CHLORIDE 20 MEQ/1
40 TABLET, EXTENDED RELEASE ORAL 2 TIMES DAILY
Status: DISCONTINUED | OUTPATIENT
Start: 2024-02-08 | End: 2024-02-11 | Stop reason: HOSPADM

## 2024-02-08 RX ADMIN — CEFAZOLIN SODIUM 2 G: 2 INJECTION, SOLUTION INTRAVENOUS at 13:56

## 2024-02-08 RX ADMIN — FERROUS SULFATE TAB 325 MG (65 MG ELEMENTAL FE) 1 TABLET: 325 (65 FE) TAB at 05:52

## 2024-02-08 RX ADMIN — CEFAZOLIN SODIUM 2 G: 2 INJECTION, SOLUTION INTRAVENOUS at 20:10

## 2024-02-08 RX ADMIN — OXYCODONE HYDROCHLORIDE AND ACETAMINOPHEN 1 TABLET: 5; 325 TABLET ORAL at 20:30

## 2024-02-08 RX ADMIN — ENOXAPARIN SODIUM 40 MG: 40 INJECTION SUBCUTANEOUS at 16:30

## 2024-02-08 RX ADMIN — ONDANSETRON 4 MG: 2 INJECTION, SOLUTION INTRAMUSCULAR; INTRAVENOUS at 00:30

## 2024-02-08 RX ADMIN — NICOTINE 1 PATCH: 21 PATCH, EXTENDED RELEASE TRANSDERMAL at 10:24

## 2024-02-08 RX ADMIN — Medication 400 MG: at 10:24

## 2024-02-08 RX ADMIN — POLYETHYLENE GLYCOL 3350 17 G: 17 POWDER, FOR SOLUTION ORAL at 10:23

## 2024-02-08 RX ADMIN — SODIUM CHLORIDE 100 ML/HR: 9 INJECTION, SOLUTION INTRAVENOUS at 10:35

## 2024-02-08 RX ADMIN — METOPROLOL SUCCINATE 25 MG: 25 TABLET, EXTENDED RELEASE ORAL at 14:05

## 2024-02-08 RX ADMIN — ACETAMINOPHEN 975 MG: 325 TABLET ORAL at 10:24

## 2024-02-08 RX ADMIN — PAROXETINE HYDROCHLORIDE 40 MG: 20 TABLET, FILM COATED ORAL at 10:24

## 2024-02-08 RX ADMIN — FAMOTIDINE 40 MG: 20 TABLET ORAL at 20:11

## 2024-02-08 RX ADMIN — Medication 1 APPLICATION: at 20:16

## 2024-02-08 RX ADMIN — LORATADINE 10 MG: 10 TABLET ORAL at 10:24

## 2024-02-08 RX ADMIN — TRAZODONE HYDROCHLORIDE 100 MG: 50 TABLET ORAL at 20:11

## 2024-02-08 RX ADMIN — POTASSIUM CHLORIDE 40 MEQ: 1500 TABLET, EXTENDED RELEASE ORAL at 12:34

## 2024-02-08 RX ADMIN — CEFAZOLIN SODIUM 2 G: 2 INJECTION, SOLUTION INTRAVENOUS at 05:51

## 2024-02-08 RX ADMIN — PANTOPRAZOLE SODIUM 40 MG: 40 INJECTION, POWDER, FOR SOLUTION INTRAVENOUS at 10:24

## 2024-02-08 RX ADMIN — FAMOTIDINE 40 MG: 20 TABLET ORAL at 10:24

## 2024-02-08 RX ADMIN — Medication 1 APPLICATION: at 10:34

## 2024-02-08 RX ADMIN — ACETAMINOPHEN 975 MG: 325 TABLET ORAL at 16:31

## 2024-02-08 RX ADMIN — MONTELUKAST 10 MG: 10 TABLET, FILM COATED ORAL at 10:24

## 2024-02-08 RX ADMIN — ARIPIPRAZOLE 15 MG: 5 TABLET ORAL at 20:11

## 2024-02-08 RX ADMIN — PANTOPRAZOLE SODIUM 40 MG: 40 INJECTION, POWDER, FOR SOLUTION INTRAVENOUS at 20:11

## 2024-02-08 ASSESSMENT — COGNITIVE AND FUNCTIONAL STATUS - GENERAL
DRESSING REGULAR LOWER BODY CLOTHING: TOTAL
CLIMB 3 TO 5 STEPS WITH RAILING: TOTAL
HELP NEEDED FOR BATHING: A LOT
DRESSING REGULAR UPPER BODY CLOTHING: A LITTLE
MOBILITY SCORE: 10
STANDING UP FROM CHAIR USING ARMS: A LOT
DAILY ACTIVITIY SCORE: 14
PERSONAL GROOMING: A LITTLE
DRESSING REGULAR LOWER BODY CLOTHING: TOTAL
MOVING TO AND FROM BED TO CHAIR: A LOT
DAILY ACTIVITIY SCORE: 14
HELP NEEDED FOR BATHING: A LOT
MOVING FROM LYING ON BACK TO SITTING ON SIDE OF FLAT BED WITH BEDRAILS: A LOT
TOILETING: TOTAL
WALKING IN HOSPITAL ROOM: TOTAL
TURNING FROM BACK TO SIDE WHILE IN FLAT BAD: A LOT
DRESSING REGULAR UPPER BODY CLOTHING: A LITTLE
PERSONAL GROOMING: A LITTLE
TOILETING: TOTAL

## 2024-02-08 ASSESSMENT — PAIN SCALES - GENERAL
PAINLEVEL_OUTOF10: 0 - NO PAIN
PAINLEVEL_OUTOF10: 3
PAINLEVEL_OUTOF10: 5 - MODERATE PAIN

## 2024-02-08 ASSESSMENT — PAIN - FUNCTIONAL ASSESSMENT
PAIN_FUNCTIONAL_ASSESSMENT: 0-10

## 2024-02-08 ASSESSMENT — ACTIVITIES OF DAILY LIVING (ADL): BATHING_ASSISTANCE: MODERATE

## 2024-02-08 NOTE — PROGRESS NOTES
Occupational Therapy    Evaluation    Patient Name: Blanca Hamilton  MRN: [id number]  Today's Date: 2/8/2024  Time Calculation  Start Time: 1246  Stop Time: 1305  Time Calculation (min): 19 min    Assessment  IP OT Assessment  OT Assessment: Pt presents with decreased endurance, decreased ADL, decreased functional mobility. Continued skilled OT recommended to maximize pt safety and indepedence in order to return to PLOF.  Prognosis: Good  Barriers to Discharge: None  Evaluation/Treatment Tolerance: Patient limited by fatigue  Medical Staff Made Aware: Yes  End of Session Communication: Bedside nurse  End of Session Patient Position: Bed, 3 rail up, Alarm on  Plan:  Treatment Interventions: ADL retraining, Functional transfer training, UE strengthening/ROM, Endurance training, Compensatory technique education  OT Frequency: 3 times per week  OT Discharge Recommendations: Moderate intensity level of continued care  OT Recommended Transfer Status: Assist of 1  OT - OK to Discharge: Yes (per OT POC)    Subjective   Current Problem:  1. Wound infection after surgery        2. Right hip pain  Tissue/Wound Culture/Smear    Tissue/Wound Culture/Smear    Tissue/Wound Culture/Smear    Tissue/Wound Culture/Smear    Tissue/Wound Culture/Smear    Tissue/Wound Culture/Smear    CANCELED: Tissue/Wound Culture/Smear    CANCELED: Tissue/Wound Culture/Smear    CANCELED: Tissue/Wound Culture/Smear    CANCELED: Tissue/Wound Culture/Smear    CANCELED: Tissue/Wound Culture/Smear    CANCELED: Tissue/Wound Culture/Smear        General:  General  Reason for Referral: 47 yo female referred to OT for R ALEXA revision, impaired ADL, impaired mobility  Referred By: Hanny Ramírez  Past Medical History Relevant to Rehab: GERD, HTN, HLD; 1/2 R ALEXA, 1/5 ORIF for periprosthetic femur fx  Prior to Session Communication: Bedside nurse  Patient Position Received: Bed, 3 rail up, Alarm on  General Comment: Pt pleasant, agreeable to OT eval. Purewick, IV, wound vac,  SCDs in place  Precautions:  LE Weight Bearing Status: Right Toe-Touch Weight Bearing (with transfers ONLY)  Medical Precautions: Fall precautions  Post-Surgical Precautions: Right hip precautions (posterior)    Pain:  Pain Assessment  Pain Assessment: 0-10  Pain Score: 0 - No pain (at rest)    Objective   Cognition:  Overall Cognitive Status: Within Functional Limits  Orientation Level: Oriented X4     Home Living:  Type of Home: House  Lives With: Alone  Home Adaptive Equipment: Walker rolling or standard, Wheelchair-manual  Home Layout: One level  Home Access: Ramped entrance  Bathroom Shower/Tub: Walk-in shower  Bathroom Toilet: Adaptive toilet seating  Bathroom Equipment: Raised toilet seat with rails, Grab bars in shower, Shower chair with back   Prior Function:  Level of Barry: Needs assistance with ADLs, Needs assistance with homemaking  Prior Function Comments: pt has aide come by 3 days/wk to assist with LE ADL, household chores    ADL:  Eating Assistance: Independent  Grooming Assistance: Stand by  Bathing Assistance: Moderate  UE Dressing Assistance: Minimal  LE Dressing Assistance: Total  Toileting Assistance with Device: Maximal  Functional Assistance: Maximal  ADL Comments: ADL performance anticipated d/t current clinical presentation  Activity Tolerance:  Endurance: Tolerates less than 10 min exercise, no significant change in vital signs  Bed Mobility/Transfers: Bed Mobility  Bed Mobility: Yes  Bed Mobility 1  Bed Mobility 1: Supine to sitting  Level of Assistance 1: Moderate assistance  Bed Mobility Comments 1: with BLE, scooting towards EOB  Bed Mobility 2  Bed Mobility  2: Sitting to supine  Level of Assistance 2: Maximum assistance    Transfers  Transfer: No (pt declines, reports she will not be able to do that d/t weakness in non-surgical leg. Max ancouragment, education on benefits of participation provided)     Sitting Balance:  Static Sitting Balance  Static Sitting-Balance Support:  Feet supported, No upper extremity supported  Static Sitting-Level of Assistance: Contact guard    Strength:  Strength Comments: JUDY santana 4/5    Hand Function:  Hand Function  Gross Grasp: Functional  Extremities: RUE   RUE : Within Functional Limits and PUNEETE   LUE: Within Functional Limits    Outcome Measures: [facility] Daily Activity  Putting on and taking off regular lower body clothing: Total  Bathing (including washing, rinsing, drying): A lot  Putting on and taking off regular upper body clothing: A little  Toileting, which includes using toilet, bedpan or urinal: Total  Taking care of personal grooming such as brushing teeth: A little  Eating Meals: None  Daily Activity - Total Score: 14      Education Documentation  Body Mechanics, taught by Ross Summers OT at 2/8/2024  2:02 PM.  Learner: Patient  Readiness: Acceptance  Method: Explanation  Response: Verbalizes Understanding    Precautions, taught by Ross Summers OT at 2/8/2024  2:02 PM.  Learner: Patient  Readiness: Acceptance  Method: Explanation  Response: Verbalizes Understanding    Education Comments  No comments found.      Goals:   Encounter Problems       Encounter Problems (Active)       OT Goals       Pt will increase endurance to tolerate 15min of OOB activity with no more than 1 rest break in order to increase ability to engage in ADL completion.  (Progressing)       Start:  02/08/24    Expected End:  02/22/24            Pt will demo ADL routine and meaningful daily activities using modifications as needed  (Progressing)       Start:  02/08/24    Expected End:  02/22/24            Pt will demo increased functional mobility to tolerate tasks necessary to complete ADL routine.  (Progressing)       Start:  02/08/24    Expected End:  02/22/24            Pt will tolerate 10min stand during functional task completion with no more than 1 rest break in order to increase endurance for functional task completion.  (Progressing)       Start:   02/08/24    Expected End:  02/22/24            Pt will demo and/or verbalize 2-3 energy conservation techniques to incorporate into functional mobility or ADL to improve performance and increase independence.  (Progressing)       Start:  02/08/24    Expected End:  02/22/24

## 2024-02-08 NOTE — PROGRESS NOTES
Patient: Blanca Hamilton  Room/bed: 133/133-A  Admitted on: 2/2/2024    Age: 48 y.o.   Gender: female  Code Status:  Full Code   Admitting Dx: Wound infection after surgery [T81.49XA]    MRN: [id number]  PCP: Henry Olmos DO       Subjective   Seen and examined in her room this AM. Awake and alert. Resting in bed. Reports right hip pain is minimal. She denies chest pain, breathing difficulties, abdominal pain, N/V/D/C, fever, or chills.      Objective    Physical Exam   Constitutional: A&O x 3; NAD; calm and cooperative  Eyes: EOM's intact  HEENT: Normocephalic, Atraumatic. Oral mucosa moist.   Neck: Supple. No JVD, lymphadenopathy.   Lungs: CTAB with fair air movement. Respirations even and unlabored on room air.   Heart: RRR  Abdomen: Softly distended; non-tender; +BS  : Slaughter is patent with yellow urine.   MS/Extremities: LUND equally x 4 with RLE weakness related to hip infection. Right hip VAC dressing is C/D/I.  No edema. Peripheral pulses intact bilaterally.   Neuro: A&O x3; no focal deficits; gross motor and sensation intact. Cognitive dysfunction.  Skin: Warm and dry. No rashes or lesions  Psych: Normal affect.      Temp:  [36.1 °C (97 °F)-36.3 °C (97.3 °F)] 36.3 °C (97.3 °F)  Heart Rate:  [] 91  BP: (113-133)/(70-84) 133/84    Vitals:    02/02/24 1210   Weight: 83.3 kg (183 lb 10.3 oz)     I/Os    Intake/Output Summary (Last 24 hours) at 2/8/2024 1322  Last data filed at 2/8/2024 1300  Gross per 24 hour   Intake 2000 ml   Output 800 ml   Net 1200 ml         Labs:   Results from last 72 hours   Lab Units 02/08/24  0616 02/07/24  0650 02/06/24  0719   SODIUM mmol/L 135* 135* 136   POTASSIUM mmol/L 4.5 4.4 3.9   CHLORIDE mmol/L 103 104 106   CO2 mmol/L 28 28 25   BUN mg/dL 6 7 6   CREATININE mg/dL 0.39* 0.33* 0.24*   GLUCOSE mg/dL 85 97 77   CALCIUM mg/dL 7.6* 7.6* 7.2*   ANION GAP mmol/L 9* 7* 9*   EGFR mL/min/1.73m*2 >90 >90 >90        Results from last 72 hours   Lab Units 02/08/24  0616  02/07/24  1404 02/07/24  0650   WBC AUTO x10*3/uL 5.2 4.6 4.1*   HEMOGLOBIN g/dL 7.4* 7.5* 7.0*   HEMATOCRIT % 23.6* 23.9* 22.9*   PLATELETS AUTO x10*3/uL 295 281 234   NEUTROS PCT AUTO % 45.8  --   --    LYMPHS PCT AUTO % 24.5  --   --    MONOS PCT AUTO % 9.6  --   --    EOS PCT AUTO % 14.9  --   --         Lab Results   Component Value Date    CALCIUM 7.6 (L) 02/08/2024    PHOS 2.5 02/02/2024      Lab Results   Component Value Date    CRP 23.75 (H) 02/02/2024        Micro/ID:   Susceptibility data from last 90 days.  Collected Specimen Info Organism Clindamycin Erythromycin Oxacillin Tetracycline Trimethoprim/Sulfamethoxazole Vancomycin   02/06/24 Swab from HIP ARTHROPLASTY RIGHT Staphylococcus aureus         02/06/24 Swab from HIP ARTHROPLASTY RIGHT Staphylococcus aureus         02/06/24 Swab from HIP ARTHROPLASTY RIGHT Staphylococcus aureus         02/02/24 Tissue/Biopsy from Wound/Tissue Methicillin Susceptible Staphylococcus aureus (MSSA) S S S R S S       .ID  Lab Results   Component Value Date    BLOODCULT No growth at 3 days 02/04/2024    BLOODCULT No growth at 3 days 02/04/2024       Images:  KUB FINDINGS:  Nonobstructive bowel gas pattern. Partially visualized bilateral hip  arthroplasties.      Impression: 1.  Nonobstructive bowel gas pattern.     Meds    Scheduled medications  acetaminophen, 975 mg, oral, q8h  ARIPiprazole, 15 mg, oral, Nightly  aspirin, 81 mg, oral, BID  ceFAZolin, 2 g, intravenous, q8h  enoxaparin, 40 mg, subcutaneous, q24h  famotidine, 40 mg, oral, BID  ferrous sulfate (325 mg ferrous sulfate), 1 tablet, oral, Daily  lidocaine, 5 mL, infiltration, Once  lisinopril, 10 mg, oral, q AM  loratadine, 10 mg, oral, Daily  magnesium oxide, 400 mg, oral, Daily  [Held by provider] metoprolol succinate XL, 25 mg, oral, Daily  montelukast, 10 mg, oral, q AM  [Held by provider] naproxen, 500 mg, oral, BID with meals  nicotine, 1 patch, transdermal, Daily  pantoprazole, 40 mg, intravenous,  BID  PARoxetine, 40 mg, oral, Daily  polyethylene glycol, 17 g, oral, Daily  [Held by provider] potassium chloride CR, 40 mEq, oral, BID  traZODone, 100 mg, oral, Nightly  zinc oxide, 1 Application, Topical, BID      Continuous medications  sodium chloride 0.9%, 100 mL/hr, Last Rate: 100 mL/hr (02/08/24 1035)      PRN medications  PRN medications: acetaminophen **OR** acetaminophen **OR** acetaminophen, alteplase, HYDROmorphone, oxyCODONE-acetaminophen     Assessment and Plan    Blanca Hamilton is a 48 y.o. female with a medical history of GERD, HTN, and Depression who presented with a right hip prosthetic joint infection.     Right Prosthetic Hip Joint Infection  -S/p ALEXA on 1/2 with subsequent revision on 1/5 for prosthetic fracture  -Cultures positive for MSSA  -ID is following - on Cefazolin  -Underwent ALEXA revision with Dr. Jalloh on 2/6  -WB with transfers/pivets  -Medicate for pain  -Preliminary BC negative.   -Monitor ESR, CRP  -Advised mobilization, IS use  -Maintain bowel regimen  -Monitor H&H for ABLA. EBL 500ml. Hgb 7.4 this AM.   -Afebrile. No leukocytosis.   -Discussed with ID. Needs PICC line placed for 6 weeks of IV antibiotic therapy. Order placed in EMR.    Abdominal Pain with N/V  -No recurrence of N/V   -KUB unremarkable with nonobstructive bowel pattern  -Zofran as needed, PPI, Pepcid  -Will monitor    Hypertension  -Has been hypotensive and agents on hold (Lisinopril, Metoprolol)  -SBP has improved. Metoprolol resumed.   -Will continue to hold and resume Lisinopril as BP supports.     Anxiety/Depression  -On home regimen: Paxil, Abilify, Trazadone    Acute on Chronic Anemia  -Likely related to infection, hemodilution, ABLA  -Hgb dropped to 7.0 POD #1. Today is 7.4.  -Transfuse as needed to keep >7  -On Iron supplementation  -CBC in AM    Tobacco Use Disorder  -On Nicotine Patch    DVT Prophylaxis  -Lovenox    Fluids/Electrolytes/Nutrition  -Laboratory data reviewed.   -Electrolytes stable and  replaced as needed.   -No current nutritional issues.     Disposition  -Plan of care discussed with attending, DOMENIC Solorzano.   -Discharge plan pending postop course, PT/OT evals, etc. From SNF. Will require precert.   -Tentative plan for discharge tomorrow if precert obtained.       Cherry Michaels, DEVON-CNP

## 2024-02-08 NOTE — PROGRESS NOTES
"Blanca Hamliton is a 48 y.o. female on day 6 of admission presenting with Right hip pain.    Subjective   No acute overnight events.  Pain controlled.  Denies numbness and tingling of LLE.  Tolerating PO intake.      Objective     Physical Exam  HENT:      Head: Normocephalic and atraumatic.   Eyes:      Extraocular Movements: Extraocular movements intact.      Conjunctiva/sclera: Conjunctivae normal.      Pupils: Pupils are equal, round, and reactive to light.   Cardiovascular:      Rate and Rhythm: Normal rate and regular rhythm.      Pulses: Normal pulses.   Pulmonary:      Breath sounds: Normal breath sounds.   Abdominal:      General: Bowel sounds are normal.      Palpations: Abdomen is soft.   Musculoskeletal:      Comments: Right hip dressing C/D/I with wound VAC in place -125 suction, leg and foot warm and well perfused, motion and sensations intact distally   Skin:     General: Skin is warm and dry.      Capillary Refill: Capillary refill takes less than 2 seconds.   Neurological:      General: No focal deficit present.      Mental Status: She is alert and oriented to person, place, and time.       Last Recorded Vitals  Blood pressure 133/84, pulse 91, temperature 36.3 °C (97.3 °F), temperature source Temporal, resp. rate 16, height 1.651 m (5' 5\"), weight 83.3 kg (183 lb 10.3 oz), SpO2 92 %.  Intake/Output last 3 Shifts:  I/O last 3 completed shifts:  In: 3316.7 (39.8 mL/kg) [P.O.:880; I.V.:2136.7 (25.7 mL/kg); IV Piggyback:300]  Out: 1025 (12.3 mL/kg) [Urine:1025 (0.3 mL/kg/hr)]  Weight: 83.3 kg     Relevant Results  ECG 12 lead    Result Date: 2/7/2024  Atrial flutter with 2:1 AV conduction Nonspecific intraventricular block Left ventricular hypertrophy ( R in aVL , Lyndon product ) Inferior infarct , age undetermined Abnormal ECG When compared with ECG of 11-JAN-2024 23:16, Significant changes have occurred    XR abdomen 1 view    Result Date: 2/5/2024  Interpreted By:  Richard Garcia, STUDY: XR ABDOMEN " 1 VIEW;  2/4/2024 6:47 pm   INDICATION: Signs/Symptoms:Lactic acidosis and emesis.   COMPARISON: None.   ACCESSION NUMBER(S): [id number]   ORDERING CLINICIAN: TABITHA MAYNARD   FINDINGS: Nonobstructive bowel gas pattern. Partially visualized bilateral hip arthroplasties.       1.  Nonobstructive bowel gas pattern.   MACRO: None   Signed by: Richard Garcia 2/5/2024 4:12 PM Dictation workstation:   [id number]    ECG 12 lead    Result Date: 1/31/2024  Poor data quality, interpretation may be adversely affected Sinus tachycardia with short MA with occasional Premature ventricular complexes ST & T wave abnormality, consider lateral ischemia Abnormal ECG When compared with ECG of 11-JAN-2024 12:24, Premature ventricular complexes are now Present T wave inversion more evident in Lateral leads Confirmed by Michael Nguyen ([id number]) on 1/31/2024 10:27:35 AM    XR hip right with pelvis when performed 2 or 3 views    Result Date: 1/27/2024  Interpreted By:  Yolie Tate, STUDY: Single view pelvis. Right hip, two views.   INDICATION: Signs/Symptoms:POST OP-RIGHT TOTAL HIP.   COMPARISON: 10/17/2023.   ACCESSION NUMBER(S): [id number]   ORDERING CLINICIAN: ETHEL MORALES   FINDINGS: Status post right total hip arthroplasty with cerclage wire fixation of the proximal femur. Hardware is intact without perihardware fractures or lucencies.   There is fragmentation of the greater trochanter which is likely postsurgical in etiology with attention on follow-up recommended.   Right acetabular protrusio. No malalignment.   Changes of left hip arthroplasty noted with prominent osteolysis of the medial acetabular wall similar to prior.       1. As above.   MACRO: None.   Signed by: Yolie Tate 1/27/2024 10:30 AM Dictation workstation:   [id number]    ECG 12 lead    Result Date: 1/15/2024  Normal sinus rhythm Nonspecific ST and T wave abnormality Abnormal ECG When compared with ECG of 28-DEC-2023 13:55, (unconfirmed) Previous ECG has  undetermined rhythm, needs review T wave inversion now evident in Lateral leads    XR femur right 2+ views    Result Date: 1/13/2024  Interpreted By:  Regino Bell, STUDY: XR FEMUR RIGHT 2+ VIEWS; ;  1/12/2024 8:44 am   INDICATION: Signs/Symptoms:rright hip post op.   COMPARISON: 01/03/2024   ACCESSION NUMBER(S): [id number]   ORDERING CLINICIAN: JAZMIN MOREL   FINDINGS: Right femur, two views   Interval fixation of right proximal femoral periprosthetic fracture with cerclage wires. There is no malalignment. Redemonstration of subluxed extrusion, similar to the prior. Right total hip arthroplasty in place.       Interval fixation of periprosthetic fracture through the proximal femur.     MACRO: None   Signed by: Regino Bell 1/13/2024 8:36 AM Dictation workstation:   [id number]    Transthoracic Echo (TTE) Complete    Result Date: 1/12/2024   [facility], [address] [place] [address]              Tel [phone] and Fax [phone] TRANSTHORACIC ECHOCARDIOGRAM REPORT  Patient Name:      ZULEIKA Mcnamara Physician:    [id number] Jim Loredo MD Study Date:        1/12/2024            Ordering Provider:    [id number] RAMBO LEVI MRN/PID:           [id number]             Fellow: Accession#:        [id number]         Nurse: Date of Birth/Age: 1975 / 48      Sonographer:          Katherine Man RDCS                    years Gender:            F                    Additional Staff: Height:            175.26 cm            Admit Date: Weight:            89.81 kg             Admission Status:     Inpatient -                                                               Routine BSA:               2.06 m2              Encounter#:           [id number]                                         Department Location:  [facility]                                                                Floor Blood Pressure: 128 /70 mmHg Study Type:    TRANSTHORACIC ECHO (TTE) COMPLETE Diagnosis/ICD: Abnormal electrocardiogram [ECG] [EKG]-R94.31 Indication:    Impaired functional mobility CPT Code:      Echo Complete w Full Doppler-14412 Patient History: Pertinent History: HTN. Tachycardia. Study Detail: The following Echo studies were performed: 2D, M-Mode, Doppler and               color flow. Technically challenging study due to the patient's               lack of cooperation, poor acoustic windows and body habitus. The               patient was awake.  PHYSICIAN INTERPRETATION: Left Ventricle: The left ventricular systolic function is normal, with an estimated ejection fraction of 60-65%. There are no regional wall motion abnormalities. The left ventricular cavity size is normal. Spectral Doppler shows a normal pattern of left ventricular diastolic filling. Left Atrium: The left atrium is normal in size. Right Ventricle: The right ventricle is normal in size. There is normal right ventricular global systolic function. Right Atrium: The right atrium is normal in size. Aortic Valve: The aortic valve is trileaflet. There is mild aortic valve cusp calcification. There is mild to moderate aortic valve thickening. There is evidence of mildly elevated transaortic gradients consistent with sclerosis of the aortic valve. There is no evidence of aortic valve regurgitation. The peak instantaneous gradient of the aortic valve is 11.2 mmHg. Mitral Valve: The mitral valve is normal in structure. There is no evidence of mitral valve regurgitation. Tricuspid Valve: The tricuspid valve is structurally normal. There is mild tricuspid regurgitation. Pulmonic Valve: The pulmonic valve is not well visualized. There is physiologic pulmonic valve regurgitation. Pericardium: There is no pericardial effusion noted. Aorta: The aortic root was not well visualized.  CONCLUSIONS:  1. Left ventricular  systolic function is normal with a 60-65% estimated ejection fraction.  2. Aortic valve sclerosis. QUANTITATIVE DATA SUMMARY: 2D MEASUREMENTS:                          Normal Ranges: Ao Root d:     2.90 cm   (2.0-3.7cm) LAs:           2.50 cm   (2.7-4.0cm) IVSd:          0.96 cm   (0.6-1.1cm) LVPWd:         1.09 cm   (0.6-1.1cm) LVIDd:         4.15 cm   (3.9-5.9cm) LVIDs:         2.89 cm LV Mass Index: 67.8 g/m2 LV % FS        30.4 % LA VOLUME:                               Normal Ranges: LA Vol A4C:        35.4 ml    (22+/-6mL/m2) LA Vol A2C:        29.9 ml LA Vol BP:         34.5 ml LA Vol Index A4C:  17.2ml/m2 LA Vol Index A2C:  14.5 ml/m2 LA Vol Index BP:   16.8 ml/m2 LA Area A4C:       15.0 cm2 LA Area A2C:       13.0 cm2 LA Major Axis A4C: 5.4 cm LA Major Axis A2C: 4.8 cm LA Volume Index:   15.0 ml/m2 RA VOLUME BY A/L METHOD:                               Normal Ranges: RA Vol A4C:        39.8 ml    (8.3-19.5ml) RA Vol Index A4C:  19.4 ml/m2 RA Area A4C:       15.0 cm2 RA Major Axis A4C: 4.8 cm M-MODE MEASUREMENTS:                  Normal Ranges: Ao Root: 3.00 cm (2.0-3.7cm) LAs:     3.90 cm (2.7-4.0cm) AORTA MEASUREMENTS:                    Normal Ranges: Asc Ao, d: 3.10 cm (2.1-3.4cm) LV SYSTOLIC FUNCTION BY 2D PLANIMETRY (MOD):                     Normal Ranges: EF-A4C View: 68.6 % (>=55%) EF-A2C View: 62.4 % EF-Biplane:  66.3 % LV DIASTOLIC FUNCTION:                               Normal Ranges: MV Peak E:        0.82 m/s    (0.7-1.2 m/s) MV Peak A:        0.65 m/s    (0.42-0.7 m/s) E/A Ratio:        1.26        (1.0-2.2) MV e'             0.10 m/s    (>8.0) MV lateral e'     0.12 m/s MV medial e'      0.08 m/s MV A Dur:         92.00 msec E/e' Ratio:       8.25        (<8.0) PulmV Sys Fam:    47.60 cm/s PulmV Oro Fam:   32.30 cm/s PulmV S/D Fam:    1.50 PulmV A Revs Fam: 32.70 cm/s PulmV A Revs Dur: 100.00 msec MITRAL VALVE:                 Normal Ranges: MV DT: 170 msec (150-240msec) AORTIC VALVE:                           Normal Ranges: AoV Vmax:      1.67 m/s  (<=1.7m/s) AoV Peak P.2 mmHg (<20mmHg) LVOT Max Fam:  1.22 m/s  (<=1.1m/s) LVOT VTI:      27.60 cm LVOT Diameter: 2.00 cm   (1.8-2.4cm) AoV Area,Vmax: 2.30 cm2  (2.5-4.5cm2)  RIGHT VENTRICLE: RV Basal 3.50 cm RV Mid   2.40 cm RV Major 7.0 cm TAPSE:   27.0 mm RV s'    0.17 m/s PULMONIC VALVE:                      Normal Ranges: PV Max Fam: 1.2 m/s  (0.6-0.9m/s) PV Max P.3 mmHg Pulmonary Veins: PulmV A Revs Dur: 100.00 msec PulmV A Revs Fam: 32.70 cm/s PulmV Oro Fam:   32.30 cm/s PulmV S/D Fam:    1.50 PulmV Sys Fam:    47.60 cm/s  [id number] Jim Loredo MD Electronically signed on 2024 at 11:44:14 PM  ** Final **     Lower extremity venous duplex right    Result Date: 2024  Interpreted By:  Marlys Mahajan, STUDY: [facility] LOWER EXTREMITY VENOUS DUPLEX RIGHT  2024 12:10 am   INDICATION: 49 y/o   F with  Signs/Symptoms:RLE pain and edema. Status post right total hip arthroplasty.. LMP:  Unknown.   COMPARISON: None.   ACCESSION NUMBER(S): [id number]   ORDERING CLINICIAN: AMI BARNETT   TECHNIQUE: Routine ultrasound of the  right lower extremity was performed with duplex Doppler (color and spectral) evaluation.   Static images were obtained for remote interpretation.   FINDINGS: THIGH VEINS:  The common femoral, femoral, popliteal, proximal medial saphenous, and deep femoral veins are patent and free of thrombus. The veins are normally compressible.  They demonstrate normal phasic flow and augmentation response.   CALF VEINS:  The paired peroneal and posterior tibial calf veins are patent.       No deep venous thrombosis of the  right lower extremity.   MACRO: None   Signed by: Marlys Mahajan 2024 12:50 AM Dictation workstation:   [id number]    ECG 12 lead    Result Date: 2024  Normal sinus rhythm Nonspecific ST and T wave abnormality Abnormal ECG When compared with ECG of 28-DEC-2023 13:55, (unconfirmed) Previous  ECG has undetermined rhythm, needs review T wave inversion now evident in Lateral leads See ED provider note for full interpretation and clinical correlation Confirmed by Opal Matthews ([id number]) on 1/11/2024 12:33:55 PM      Scheduled medications  acetaminophen, 975 mg, oral, q8h  ARIPiprazole, 15 mg, oral, Nightly  [Held by provider] aspirin, 81 mg, oral, BID  ceFAZolin, 2 g, intravenous, q8h  [Held by provider] enoxaparin, 40 mg, subcutaneous, q24h  famotidine, 40 mg, oral, BID  ferrous sulfate (325 mg ferrous sulfate), 1 tablet, oral, Daily  [Held by provider] lisinopril, 10 mg, oral, q AM  loratadine, 10 mg, oral, Daily  magnesium oxide, 400 mg, oral, Daily  [Held by provider] metoprolol succinate XL, 25 mg, oral, Daily  montelukast, 10 mg, oral, q AM  naproxen, 500 mg, oral, BID with meals  nicotine, 1 patch, transdermal, Daily  pantoprazole, 40 mg, intravenous, BID  PARoxetine, 40 mg, oral, Daily  polyethylene glycol, 17 g, oral, Daily  potassium chloride, 40 mEq, oral, BID  traZODone, 100 mg, oral, Nightly  zinc oxide, 1 Application, Topical, BID      Continuous medications  sodium chloride 0.9%, 100 mL/hr, Last Rate: 100 mL/hr (02/07/24 2202)      PRN medications  PRN medications: acetaminophen **OR** acetaminophen **OR** acetaminophen, HYDROmorphone, oxyCODONE-acetaminophen  Results for orders placed or performed during the hospital encounter of 02/02/24 (from the past 24 hour(s))   CBC   Result Value Ref Range    WBC 4.6 4.4 - 11.3 x10*3/uL    nRBC 0.4 (H) 0.0 - 0.0 /100 WBCs    RBC 2.67 (L) 4.00 - 5.20 x10*6/uL    Hemoglobin 7.5 (L) 12.0 - 16.0 g/dL    Hematocrit 23.9 (L) 36.0 - 46.0 %    MCV 90 80 - 100 fL    MCH 28.1 26.0 - 34.0 pg    MCHC 31.4 (L) 32.0 - 36.0 g/dL    RDW 21.9 (H) 11.5 - 14.5 %    Platelets 281 150 - 450 x10*3/uL   Comprehensive Metabolic Panel   Result Value Ref Range    Glucose 85 74 - 99 mg/dL    Sodium 135 (L) 136 - 145 mmol/L    Potassium 4.5 3.5 - 5.3 mmol/L    Chloride 103 98  - 107 mmol/L    Bicarbonate 28 21 - 32 mmol/L    Anion Gap 9 (L) 10 - 20 mmol/L    Urea Nitrogen 6 6 - 23 mg/dL    Creatinine 0.39 (L) 0.50 - 1.05 mg/dL    eGFR >90 >60 mL/min/1.73m*2    Calcium 7.6 (L) 8.6 - 10.3 mg/dL    Albumin 2.4 (L) 3.4 - 5.0 g/dL    Alkaline Phosphatase 90 33 - 110 U/L    Total Protein 4.5 (L) 6.4 - 8.2 g/dL    AST 12 9 - 39 U/L    Bilirubin, Total 0.2 0.0 - 1.2 mg/dL    ALT 6 (L) 7 - 45 U/L   CBC and Auto Differential   Result Value Ref Range    WBC 5.2 4.4 - 11.3 x10*3/uL    nRBC 0.0 0.0 - 0.0 /100 WBCs    RBC 2.61 (L) 4.00 - 5.20 x10*6/uL    Hemoglobin 7.4 (L) 12.0 - 16.0 g/dL    Hematocrit 23.6 (L) 36.0 - 46.0 %    MCV 90 80 - 100 fL    MCH 28.4 26.0 - 34.0 pg    MCHC 31.4 (L) 32.0 - 36.0 g/dL    RDW 21.9 (H) 11.5 - 14.5 %    Platelets 295 150 - 450 x10*3/uL    Neutrophils % 45.8 40.0 - 80.0 %    Immature Granulocytes %, Automated 4.6 (H) 0.0 - 0.9 %    Lymphocytes % 24.5 13.0 - 44.0 %    Monocytes % 9.6 2.0 - 10.0 %    Eosinophils % 14.9 0.0 - 6.0 %    Basophils % 0.6 0.0 - 2.0 %    Neutrophils Absolute 2.40 1.20 - 7.70 x10*3/uL    Immature Granulocytes Absolute, Automated 0.24 0.00 - 0.70 x10*3/uL    Lymphocytes Absolute 1.28 1.20 - 4.80 x10*3/uL    Monocytes Absolute 0.50 0.10 - 1.00 x10*3/uL    Eosinophils Absolute 0.78 (H) 0.00 - 0.70 x10*3/uL    Basophils Absolute 0.03 0.00 - 0.10 x10*3/uL   Morphology   Result Value Ref Range    RBC Morphology See Below     Hypochromia Mild        Assessment/Plan   Principal Problem:    Right hip pain  Active Problems:    Wound infection after surgery    48 year old female, POD 1 from revision of right hip arthroplasty   - AM labs reviewed, hemoglobin stable 7.4   - WB for transfers only, PT evaluation  - will require a SNF at ID  - continue ABX, pending intra-op cultures  - pain control per primary  - regular diet  - ortho will continue to follow    Discussed with Dr. Jalloh         I spent 30 minutes in the professional and overall care of this  patient.    Ester Fitch, APRN-CNP

## 2024-02-08 NOTE — CARE PLAN
The patient's goals for the shift include patient will get adequate sleep    The clinical goals for the shift include Patients pain will be ncontrolled throughout the shift      Problem: Skin  Goal: Prevent/minimize sheer/friction injuries  Outcome: Progressing  Flowsheets (Taken 2/8/2024 0159)  Prevent/minimize sheer/friction injuries:   HOB 30 degrees or less   Turn/reposition every 2 hours/use positioning/transfer devices  Goal: Promote/optimize nutrition  Outcome: Progressing  Flowsheets (Taken 2/8/2024 0159)  Promote/optimize nutrition:   Consume > 50% meals/supplements   Offer water/supplements/favorite foods   Monitor/record intake including meals  Goal: Promote skin healing  Outcome: Progressing  Flowsheets (Taken 2/8/2024 0159)  Promote skin healing:   Assess skin/pad under line(s)/device(s)   Turn/reposition every 2 hours/use positioning/transfer devices   Rotate device position/do not position patient on device     Problem: Pain  Goal: My pain/discomfort is manageable  Outcome: Progressing     Problem: Daily Care  Goal: Daily care needs are met  Outcome: Progressing     Problem: Psychosocial Needs  Goal: Demonstrates ability to cope with hospitalization/illness  Outcome: Progressing  Goal: Collaborate with me, my family, and caregiver to identify my specific goals  Outcome: Progressing     Problem: Discharge Barriers  Goal: My discharge needs are met  Outcome: Progressing

## 2024-02-08 NOTE — PROGRESS NOTES
Blanca Hamilton is a 48 y.o. female on day 6 of admission presenting with Right hip pain.    Subjective   Interval History: no fever, no new complaints        Review of Systems    Objective   Range of Vitals (last 24 hours)  Heart Rate:  []   Temp:  [36.1 °C (97 °F)-36.3 °C (97.3 °F)]   BP: (113-133)/(70-84)   SpO2:  [92 %-95 %]   Daily Weight  02/02/24 : 83.3 kg (183 lb 10.3 oz)    Body mass index is 30.56 kg/m².    Physical Exam  Constitutional:       Appearance: Normal appearance.   HENT:      Head: Normocephalic and atraumatic.      Mouth/Throat:      Mouth: Mucous membranes are moist.      Pharynx: Oropharynx is clear.   Eyes:      Pupils: Pupils are equal, round, and reactive to light.   Cardiovascular:      Rate and Rhythm: Normal rate and regular rhythm.      Heart sounds: Normal heart sounds.   Pulmonary:      Effort: Pulmonary effort is normal.      Breath sounds: Normal breath sounds.   Abdominal:      General: Abdomen is flat. Bowel sounds are normal.      Palpations: Abdomen is soft.   Musculoskeletal:      Cervical back: Normal range of motion.      Comments: Rt hip dressing   Neurological:      Mental Status: She is alert.         Antibiotics  ceFAZolin in dextrose (iso-os) (Ancef) IVPB 2 g  vancomycin (Vancocin) in dextrose 5 % water (D5W) 500 mL IV 1,500 mg  cefTRIAXone (Rocephin) IVPB 1 g  sodium chloride 0.9% infusion  sodium chloride 0.9 % bolus 1,000 mL  HYDROmorphone (Dilaudid) injection 0.2 mg  lisonpril (Prinivil, Zestril) tablet  PARoxetine (Paxil-CR) 24 hr tablet  acetaminophen (Tylenol) tablet 650 mg  acetaminophen (Tylenol) oral liquid 650 mg  acetaminophen (Tylenol) suppository 650 mg  polyethylene glycol (Glycolax, Miralax) packet 17 g  enoxaparin (Lovenox) syringe 40 mg  cefTRIAXone (Rocephin) 2 g IV in dextrose 5% 50 mL  HYDROmorphone (Dilaudid) injection 0.2 mg  acetaminophen (Tylenol) tablet 975 mg  magnesium oxide (Mag-Ox) tablet 400 mg  nicotine (Nicoderm CQ) 21 mg/24 hr patch 1  patch  ARIPiprazole (Abilify) tablet 15 mg  aspirin chewable tablet 81 mg  loratadine (Claritin) tablet 10 mg  ferrous sulfate (325 mg ferrous sulfate) tablet 1 tablet  lisinopril tablet 10 mg  metoprolol succinate XL (Toprol-XL) 24 hr tablet 25 mg  montelukast (Singulair) tablet 10 mg  naproxen (Naprosyn) tablet 500 mg  pantoprazole (ProtoNix) EC tablet 40 mg  oxyCODONE-acetaminophen (Percocet) 5-325 mg per tablet 1 tablet  PARoxetine (Paxil) tablet 20 mg  PARoxetine (Paxil) tablet 40 mg  traZODone (Desyrel) tablet 100 mg  PARoxetine (Paxil-CR) 24 hr tablet  vancomycin (Vancocin) 1,000 mg in dextrose 5% water 200 mL  PARoxetine (Paxil) tablet 20 mg  sodium chloride 0.9% infusion  sodium chloride 0.9 % bolus 1,000 mL  magnesium sulfate IV 2 g  potassium chloride (Klor-Con) packet 40 mEq      Relevant Results  Labs  Results from last 72 hours   Lab Units 02/08/24  0616 02/07/24  1404 02/07/24  0650   WBC AUTO x10*3/uL 5.2 4.6 4.1*   HEMOGLOBIN g/dL 7.4* 7.5* 7.0*   HEMATOCRIT % 23.6* 23.9* 22.9*   PLATELETS AUTO x10*3/uL 295 281 234   NEUTROS PCT AUTO % 45.8  --   --    LYMPHS PCT AUTO % 24.5  --   --    MONOS PCT AUTO % 9.6  --   --    EOS PCT AUTO % 14.9  --   --        Results from last 72 hours   Lab Units 02/08/24  0616 02/07/24  0650 02/06/24  0719   SODIUM mmol/L 135* 135* 136   POTASSIUM mmol/L 4.5 4.4 3.9   CHLORIDE mmol/L 103 104 106   CO2 mmol/L 28 28 25   BUN mg/dL 6 7 6   CREATININE mg/dL 0.39* 0.33* 0.24*   GLUCOSE mg/dL 85 97 77   CALCIUM mg/dL 7.6* 7.6* 7.2*   ANION GAP mmol/L 9* 7* 9*   EGFR mL/min/1.73m*2 >90 >90 >90       Results from last 72 hours   Lab Units 02/08/24  0616 02/07/24  0650   ALK PHOS U/L 90 79   BILIRUBIN TOTAL mg/dL 0.2 0.2   PROTEIN TOTAL g/dL 4.5* 4.3*   ALT U/L 6* 5*   AST U/L 12 7*   ALBUMIN g/dL 2.4* 2.5*       Estimated Creatinine Clearance: 125 mL/min (A) (by C-G formula based on SCr of 0.39 mg/dL (L)).  C-Reactive Protein   Date Value Ref Range Status   02/02/2024 23.75  (H) <1.00 mg/dL Final   01/12/2024 4.87 (H) <1.00 mg/dL Final     Microbiology  Reviewed  Imaging  reviewed        Assessment/Plan    Right hip surgical wound infection, for surgery, the culture with MSSA, sp surgery and partial hardware exchange  Emesis, xray no evidence of obstruction     Recommendations :  Continue  Cefazolin  Discussed with the medical team    I spent minutes in the professional and overall care of this patient.      Frank Modi MD

## 2024-02-09 PROBLEM — R39.9 URINARY SYMPTOM OR SIGN: Status: ACTIVE | Noted: 2024-02-09

## 2024-02-09 PROBLEM — R52 INTRACTABLE PAIN: Status: ACTIVE | Noted: 2024-02-09

## 2024-02-09 LAB
ALBUMIN SERPL BCP-MCNC: 2.3 G/DL (ref 3.4–5)
ALP SERPL-CCNC: 93 U/L (ref 33–110)
ALT SERPL W P-5'-P-CCNC: 6 U/L (ref 7–45)
ANION GAP SERPL CALC-SCNC: 8 MMOL/L (ref 10–20)
AST SERPL W P-5'-P-CCNC: 11 U/L (ref 9–39)
BACTERIA BLD CULT: NORMAL
BACTERIA BLD CULT: NORMAL
BACTERIA SPEC CULT: ABNORMAL
BASOPHILS # BLD MANUAL: 0 X10*3/UL (ref 0–0.1)
BASOPHILS NFR BLD MANUAL: 0 %
BILIRUB SERPL-MCNC: 0.3 MG/DL (ref 0–1.2)
BITE CELLS BLD QL SMEAR: PRESENT
BLOOD EXPIRATION DATE: NORMAL
BUN SERPL-MCNC: 5 MG/DL (ref 6–23)
CALCIUM SERPL-MCNC: 7.4 MG/DL (ref 8.6–10.3)
CHLORIDE SERPL-SCNC: 103 MMOL/L (ref 98–107)
CO2 SERPL-SCNC: 28 MMOL/L (ref 21–32)
CREAT SERPL-MCNC: 0.3 MG/DL (ref 0.5–1.05)
DACRYOCYTES BLD QL SMEAR: ABNORMAL
DISPENSE STATUS: NORMAL
EGFRCR SERPLBLD CKD-EPI 2021: >90 ML/MIN/1.73M*2
EOSINOPHIL # BLD MANUAL: 0.62 X10*3/UL (ref 0–0.7)
EOSINOPHIL NFR BLD MANUAL: 10 %
ERYTHROCYTE [DISTWIDTH] IN BLOOD BY AUTOMATED COUNT: 21.4 % (ref 11.5–14.5)
GIANT PLATELETS BLD QL SMEAR: ABNORMAL
GLUCOSE SERPL-MCNC: 79 MG/DL (ref 74–99)
GRAM STN SPEC: ABNORMAL
HCT VFR BLD AUTO: 25 % (ref 36–46)
HGB BLD-MCNC: 8.1 G/DL (ref 12–16)
HYPOCHROMIA BLD QL SMEAR: ABNORMAL
IMM GRANULOCYTES # BLD AUTO: 0.55 X10*3/UL (ref 0–0.7)
IMM GRANULOCYTES NFR BLD AUTO: 8.9 % (ref 0–0.9)
LYMPHOCYTES # BLD MANUAL: 0.68 X10*3/UL (ref 1.2–4.8)
LYMPHOCYTES NFR BLD MANUAL: 11 %
MCH RBC QN AUTO: 28.6 PG (ref 26–34)
MCHC RBC AUTO-ENTMCNC: 32.4 G/DL (ref 32–36)
MCV RBC AUTO: 88 FL (ref 80–100)
METAMYELOCYTES # BLD MANUAL: 0.12 X10*3/UL
METAMYELOCYTES NFR BLD MANUAL: 2 %
MONOCYTES # BLD MANUAL: 0.62 X10*3/UL (ref 0.1–1)
MONOCYTES NFR BLD MANUAL: 10 %
MYELOCYTES # BLD MANUAL: 0.06 X10*3/UL
MYELOCYTES NFR BLD MANUAL: 1 %
NEUTROPHILS # BLD MANUAL: 4.03 X10*3/UL (ref 1.2–7.7)
NEUTS BAND # BLD MANUAL: 0.12 X10*3/UL (ref 0–0.7)
NEUTS BAND NFR BLD MANUAL: 2 %
NEUTS SEG # BLD MANUAL: 3.91 X10*3/UL (ref 1.2–7)
NEUTS SEG NFR BLD MANUAL: 63 %
NRBC BLD-RTO: 0.5 /100 WBCS (ref 0–0)
OVALOCYTES BLD QL SMEAR: ABNORMAL
PLATELET # BLD AUTO: 320 X10*3/UL (ref 150–450)
PLATELET CLUMP BLD QL SMEAR: PRESENT
POLYCHROMASIA BLD QL SMEAR: ABNORMAL
POTASSIUM SERPL-SCNC: 4.2 MMOL/L (ref 3.5–5.3)
PRODUCT BLOOD TYPE: 6200
PRODUCT CODE: NORMAL
PROT SERPL-MCNC: 4.4 G/DL (ref 6.4–8.2)
RBC # BLD AUTO: 2.83 X10*6/UL (ref 4–5.2)
RBC MORPH BLD: ABNORMAL
SODIUM SERPL-SCNC: 135 MMOL/L (ref 136–145)
SPHEROCYTES BLD QL SMEAR: ABNORMAL
TOTAL CELLS COUNTED BLD: 100
UNIT ABO: NORMAL
UNIT NUMBER: NORMAL
UNIT RH: NORMAL
UNIT VOLUME: 350
VARIANT LYMPHS # BLD MANUAL: 0.06 X10*3/UL (ref 0–0.5)
VARIANT LYMPHS NFR BLD: 1 %
WBC # BLD AUTO: 6.2 X10*3/UL (ref 4.4–11.3)
XM INTEP: NORMAL

## 2024-02-09 PROCEDURE — 2500000001 HC RX 250 WO HCPCS SELF ADMINISTERED DRUGS (ALT 637 FOR MEDICARE OP): Performed by: NURSE PRACTITIONER

## 2024-02-09 PROCEDURE — 99232 SBSQ HOSP IP/OBS MODERATE 35: CPT | Performed by: NURSE PRACTITIONER

## 2024-02-09 PROCEDURE — 2500000004 HC RX 250 GENERAL PHARMACY W/ HCPCS (ALT 636 FOR OP/ED): Performed by: ORTHOPAEDIC SURGERY

## 2024-02-09 PROCEDURE — 80053 COMPREHEN METABOLIC PANEL: CPT | Performed by: STUDENT IN AN ORGANIZED HEALTH CARE EDUCATION/TRAINING PROGRAM

## 2024-02-09 PROCEDURE — S4991 NICOTINE PATCH NONLEGEND: HCPCS | Performed by: ORTHOPAEDIC SURGERY

## 2024-02-09 PROCEDURE — C9113 INJ PANTOPRAZOLE SODIUM, VIA: HCPCS | Performed by: ORTHOPAEDIC SURGERY

## 2024-02-09 PROCEDURE — 97530 THERAPEUTIC ACTIVITIES: CPT | Mod: GP,CQ

## 2024-02-09 PROCEDURE — 85007 BL SMEAR W/DIFF WBC COUNT: CPT | Performed by: STUDENT IN AN ORGANIZED HEALTH CARE EDUCATION/TRAINING PROGRAM

## 2024-02-09 PROCEDURE — 2500000002 HC RX 250 W HCPCS SELF ADMINISTERED DRUGS (ALT 637 FOR MEDICARE OP, ALT 636 FOR OP/ED): Performed by: ORTHOPAEDIC SURGERY

## 2024-02-09 PROCEDURE — 2500000001 HC RX 250 WO HCPCS SELF ADMINISTERED DRUGS (ALT 637 FOR MEDICARE OP): Performed by: STUDENT IN AN ORGANIZED HEALTH CARE EDUCATION/TRAINING PROGRAM

## 2024-02-09 PROCEDURE — 99232 SBSQ HOSP IP/OBS MODERATE 35: CPT | Performed by: STUDENT IN AN ORGANIZED HEALTH CARE EDUCATION/TRAINING PROGRAM

## 2024-02-09 PROCEDURE — 1100000001 HC PRIVATE ROOM DAILY

## 2024-02-09 PROCEDURE — 85027 COMPLETE CBC AUTOMATED: CPT | Performed by: STUDENT IN AN ORGANIZED HEALTH CARE EDUCATION/TRAINING PROGRAM

## 2024-02-09 PROCEDURE — 2500000001 HC RX 250 WO HCPCS SELF ADMINISTERED DRUGS (ALT 637 FOR MEDICARE OP): Performed by: ORTHOPAEDIC SURGERY

## 2024-02-09 RX ORDER — ADHESIVE BANDAGE
30 BANDAGE TOPICAL ONCE
Status: COMPLETED | OUTPATIENT
Start: 2024-02-09 | End: 2024-02-09

## 2024-02-09 RX ORDER — SIMVASTATIN 20 MG/1
20 TABLET, FILM COATED ORAL NIGHTLY
Status: DISCONTINUED | OUTPATIENT
Start: 2024-02-09 | End: 2024-02-11 | Stop reason: HOSPADM

## 2024-02-09 RX ORDER — SIMVASTATIN 20 MG/1
20 TABLET, FILM COATED ORAL NIGHTLY
Status: ON HOLD | COMMUNITY
End: 2024-02-14

## 2024-02-09 RX ORDER — LACTULOSE 10 G/15ML
20 SOLUTION ORAL DAILY
Status: DISCONTINUED | OUTPATIENT
Start: 2024-02-09 | End: 2024-02-11 | Stop reason: HOSPADM

## 2024-02-09 RX ADMIN — MAGNESIUM HYDROXIDE 30 ML: 400 SUSPENSION ORAL at 16:03

## 2024-02-09 RX ADMIN — METOPROLOL SUCCINATE 25 MG: 25 TABLET, EXTENDED RELEASE ORAL at 09:54

## 2024-02-09 RX ADMIN — ACETAMINOPHEN 975 MG: 325 TABLET ORAL at 17:00

## 2024-02-09 RX ADMIN — PAROXETINE HYDROCHLORIDE 40 MG: 20 TABLET, FILM COATED ORAL at 09:54

## 2024-02-09 RX ADMIN — LORATADINE 10 MG: 10 TABLET ORAL at 09:54

## 2024-02-09 RX ADMIN — PANTOPRAZOLE SODIUM 40 MG: 40 INJECTION, POWDER, FOR SOLUTION INTRAVENOUS at 22:56

## 2024-02-09 RX ADMIN — NICOTINE 1 PATCH: 21 PATCH, EXTENDED RELEASE TRANSDERMAL at 09:53

## 2024-02-09 RX ADMIN — Medication 1 APPLICATION: at 22:57

## 2024-02-09 RX ADMIN — Medication 1 APPLICATION: at 09:53

## 2024-02-09 RX ADMIN — SODIUM CHLORIDE 100 ML/HR: 9 INJECTION, SOLUTION INTRAVENOUS at 16:03

## 2024-02-09 RX ADMIN — ARIPIPRAZOLE 15 MG: 5 TABLET ORAL at 22:58

## 2024-02-09 RX ADMIN — FAMOTIDINE 40 MG: 20 TABLET ORAL at 09:53

## 2024-02-09 RX ADMIN — LACTULOSE 20 G: 20 SOLUTION ORAL at 16:03

## 2024-02-09 RX ADMIN — FAMOTIDINE 40 MG: 20 TABLET ORAL at 22:56

## 2024-02-09 RX ADMIN — Medication 400 MG: at 09:54

## 2024-02-09 RX ADMIN — CEFAZOLIN SODIUM 2 G: 2 INJECTION, SOLUTION INTRAVENOUS at 04:32

## 2024-02-09 RX ADMIN — PANTOPRAZOLE SODIUM 40 MG: 40 INJECTION, POWDER, FOR SOLUTION INTRAVENOUS at 09:53

## 2024-02-09 RX ADMIN — ACETAMINOPHEN 975 MG: 325 TABLET ORAL at 10:01

## 2024-02-09 RX ADMIN — TRAZODONE HYDROCHLORIDE 100 MG: 50 TABLET ORAL at 22:56

## 2024-02-09 RX ADMIN — OXYCODONE HYDROCHLORIDE AND ACETAMINOPHEN 1 TABLET: 5; 325 TABLET ORAL at 09:55

## 2024-02-09 RX ADMIN — POLYETHYLENE GLYCOL 3350 17 G: 17 POWDER, FOR SOLUTION ORAL at 09:53

## 2024-02-09 RX ADMIN — MONTELUKAST 10 MG: 10 TABLET, FILM COATED ORAL at 09:54

## 2024-02-09 RX ADMIN — CEFAZOLIN SODIUM 2 G: 2 INJECTION, SOLUTION INTRAVENOUS at 22:57

## 2024-02-09 RX ADMIN — CEFAZOLIN SODIUM 2 G: 2 INJECTION, SOLUTION INTRAVENOUS at 13:04

## 2024-02-09 RX ADMIN — FERROUS SULFATE TAB 325 MG (65 MG ELEMENTAL FE) 1 TABLET: 325 (65 FE) TAB at 05:07

## 2024-02-09 ASSESSMENT — COGNITIVE AND FUNCTIONAL STATUS - GENERAL
STANDING UP FROM CHAIR USING ARMS: TOTAL
PERSONAL GROOMING: A LITTLE
MOVING FROM LYING ON BACK TO SITTING ON SIDE OF FLAT BED WITH BEDRAILS: A LITTLE
WALKING IN HOSPITAL ROOM: TOTAL
MOVING TO AND FROM BED TO CHAIR: TOTAL
MOBILITY SCORE: 10
MOVING FROM LYING ON BACK TO SITTING ON SIDE OF FLAT BED WITH BEDRAILS: A LOT
DRESSING REGULAR UPPER BODY CLOTHING: A LITTLE
STANDING UP FROM CHAIR USING ARMS: A LOT
DAILY ACTIVITIY SCORE: 14
HELP NEEDED FOR BATHING: A LOT
TOILETING: TOTAL
TURNING FROM BACK TO SIDE WHILE IN FLAT BAD: A LITTLE
WALKING IN HOSPITAL ROOM: TOTAL
MOBILITY SCORE: 10
TURNING FROM BACK TO SIDE WHILE IN FLAT BAD: A LOT
DRESSING REGULAR LOWER BODY CLOTHING: TOTAL
MOVING TO AND FROM BED TO CHAIR: A LOT
CLIMB 3 TO 5 STEPS WITH RAILING: TOTAL
CLIMB 3 TO 5 STEPS WITH RAILING: TOTAL

## 2024-02-09 ASSESSMENT — ACTIVITIES OF DAILY LIVING (ADL): LACK_OF_TRANSPORTATION: NO

## 2024-02-09 ASSESSMENT — PAIN SCALES - GENERAL
PAINLEVEL_OUTOF10: 0 - NO PAIN
PAINLEVEL_OUTOF10: 5 - MODERATE PAIN

## 2024-02-09 ASSESSMENT — PAIN - FUNCTIONAL ASSESSMENT
PAIN_FUNCTIONAL_ASSESSMENT: 0-10

## 2024-02-09 NOTE — PROGRESS NOTES
Blanca Hamilton is a 48 y.o. female on day 7 of admission presenting with Right hip pain.    Subjective   Interval History: no fever, no new complaints        Review of Systems    Objective   Range of Vitals (last 24 hours)  Heart Rate:  []   Temp:  [35.8 °C (96.4 °F)-36.7 °C (98.1 °F)]   Resp:  [14-19]   BP: ()/(59-87)   SpO2:  [93 %-96 %]   Daily Weight  02/02/24 : 83.3 kg (183 lb 10.3 oz)    Body mass index is 30.56 kg/m².    Physical Exam  Constitutional:       Appearance: Normal appearance.   HENT:      Head: Normocephalic and atraumatic.      Mouth/Throat:      Mouth: Mucous membranes are moist.      Pharynx: Oropharynx is clear.   Eyes:      Pupils: Pupils are equal, round, and reactive to light.   Cardiovascular:      Rate and Rhythm: Normal rate and regular rhythm.      Heart sounds: Normal heart sounds.   Pulmonary:      Effort: Pulmonary effort is normal.      Breath sounds: Normal breath sounds.   Abdominal:      General: Abdomen is flat. Bowel sounds are normal.      Palpations: Abdomen is soft.   Musculoskeletal:      Cervical back: Normal range of motion.      Comments: Rt hip dressing   Neurological:      Mental Status: She is alert.         Antibiotics  ceFAZolin in dextrose (iso-os) (Ancef) IVPB 2 g  vancomycin (Vancocin) in dextrose 5 % water (D5W) 500 mL IV 1,500 mg  cefTRIAXone (Rocephin) IVPB 1 g  sodium chloride 0.9% infusion  sodium chloride 0.9 % bolus 1,000 mL  HYDROmorphone (Dilaudid) injection 0.2 mg  lisonpril (Prinivil, Zestril) tablet  PARoxetine (Paxil-CR) 24 hr tablet  acetaminophen (Tylenol) tablet 650 mg  acetaminophen (Tylenol) oral liquid 650 mg  acetaminophen (Tylenol) suppository 650 mg  polyethylene glycol (Glycolax, Miralax) packet 17 g  enoxaparin (Lovenox) syringe 40 mg  cefTRIAXone (Rocephin) 2 g IV in dextrose 5% 50 mL  HYDROmorphone (Dilaudid) injection 0.2 mg  acetaminophen (Tylenol) tablet 975 mg  magnesium oxide (Mag-Ox) tablet 400 mg  nicotine (Nicoderm CQ)  21 mg/24 hr patch 1 patch  ARIPiprazole (Abilify) tablet 15 mg  aspirin chewable tablet 81 mg  loratadine (Claritin) tablet 10 mg  ferrous sulfate (325 mg ferrous sulfate) tablet 1 tablet  lisinopril tablet 10 mg  metoprolol succinate XL (Toprol-XL) 24 hr tablet 25 mg  montelukast (Singulair) tablet 10 mg  naproxen (Naprosyn) tablet 500 mg  pantoprazole (ProtoNix) EC tablet 40 mg  oxyCODONE-acetaminophen (Percocet) 5-325 mg per tablet 1 tablet  PARoxetine (Paxil) tablet 20 mg  PARoxetine (Paxil) tablet 40 mg  traZODone (Desyrel) tablet 100 mg  PARoxetine (Paxil-CR) 24 hr tablet  vancomycin (Vancocin) 1,000 mg in dextrose 5% water 200 mL  PARoxetine (Paxil) tablet 20 mg  sodium chloride 0.9% infusion  sodium chloride 0.9 % bolus 1,000 mL  magnesium sulfate IV 2 g  potassium chloride (Klor-Con) packet 40 mEq      Relevant Results  Labs  Results from last 72 hours   Lab Units 02/09/24  0622 02/08/24  1659 02/08/24  0616   WBC AUTO x10*3/uL 6.2 6.2 5.2   HEMOGLOBIN g/dL 8.1* 6.8* 7.4*   HEMATOCRIT % 25.0* 21.8* 23.6*   PLATELETS AUTO x10*3/uL 320 302 295   NEUTROS PCT AUTO %  --   --  45.8   LYMPHO PCT MAN % 11.0  --   --    LYMPHS PCT AUTO %  --   --  24.5   MONO PCT MAN % 10.0  --   --    MONOS PCT AUTO %  --   --  9.6   EOSINO PCT MAN % 10.0  --   --    EOS PCT AUTO %  --   --  14.9       Results from last 72 hours   Lab Units 02/09/24  0622 02/08/24  0616 02/07/24  0650   SODIUM mmol/L 135* 135* 135*   POTASSIUM mmol/L 4.2 4.5 4.4   CHLORIDE mmol/L 103 103 104   CO2 mmol/L 28 28 28   BUN mg/dL 5* 6 7   CREATININE mg/dL 0.30* 0.39* 0.33*   GLUCOSE mg/dL 79 85 97   CALCIUM mg/dL 7.4* 7.6* 7.6*   ANION GAP mmol/L 8* 9* 7*   EGFR mL/min/1.73m*2 >90 >90 >90       Results from last 72 hours   Lab Units 02/09/24  0622 02/08/24  0616 02/07/24  0650   ALK PHOS U/L 93 90 79   BILIRUBIN TOTAL mg/dL 0.3 0.2 0.2   PROTEIN TOTAL g/dL 4.4* 4.5* 4.3*   ALT U/L 6* 6* 5*   AST U/L 11 12 7*   ALBUMIN g/dL 2.3* 2.4* 2.5*        Estimated Creatinine Clearance: 125 mL/min (A) (by C-G formula based on SCr of 0.3 mg/dL (L)).  C-Reactive Protein   Date Value Ref Range Status   02/02/2024 23.75 (H) <1.00 mg/dL Final   01/12/2024 4.87 (H) <1.00 mg/dL Final     Microbiology  Reviewed  Imaging  reviewed        Assessment/Plan    Right hip surgical wound infection, for surgery, the culture with MSSA, sp surgery and partial hardware exchange  Emesis, xray no evidence of obstruction     Recommendations :  Continue  Cefazolin, plan on 6 weeks of therapy with weekly labs  Discussed with the medical team    I spent minutes in the professional and overall care of this patient.      Frank Modi MD

## 2024-02-09 NOTE — NURSING NOTE
0750: Pt lying in bed with HOB elevated. Wound vac dsg clean dry intact. Vacuum holding 125 mmHg, plugged in to wall. Level of drainage in collection canister at start of shift = 300mL sanguinous. Dr Jalloh and Dr Modi at bedside during assessment. Pt alert, oriented, pleasant.     ~1200. 150mL additional sanguinous drainage in collection canister of wound vac. Canister changed out by wound nurse Sonia.

## 2024-02-09 NOTE — NURSING NOTE
"1930- wound vac had 200ml from dayshift.  0000- pt refsued turning  0400- pt refused turning this am as well stating \"I'm comfortable this way\"  0700- wound vac had 300ml this am, throughout shift.  "

## 2024-02-09 NOTE — PROGRESS NOTES
"Blanca Hamilton is a 48 y.o. female on day 7 of admission presenting with Right hip pain.    Subjective   Received 1 unit of blood for HgB 6.8  (incremented to 8.1 this morning) due to increased sanguinous output from wound VAC.  Pain controlled, denies numbness and tingling.      Objective     Physical Exam  Vitals reviewed.   HENT:      Head: Normocephalic and atraumatic.   Eyes:      Extraocular Movements: Extraocular movements intact.      Conjunctiva/sclera: Conjunctivae normal.      Pupils: Pupils are equal, round, and reactive to light.   Cardiovascular:      Rate and Rhythm: Normal rate and regular rhythm.      Pulses: Normal pulses.   Pulmonary:      Breath sounds: Normal breath sounds.   Abdominal:      General: Bowel sounds are normal.      Palpations: Abdomen is soft.   Musculoskeletal:      Comments: Right hip dressing C/D/I, wound VAC in place with sanguinous output, now more serosanguinous, surrounding tissues soft and compressible, leg and foot warm and well perfused, motion and sensations intact distally    Skin:     General: Skin is warm and dry.      Capillary Refill: Capillary refill takes less than 2 seconds.   Neurological:      General: No focal deficit present.      Mental Status: She is alert and oriented to person, place, and time.       Last Recorded Vitals  Blood pressure 138/87, pulse 85, temperature 36 °C (96.8 °F), temperature source Temporal, resp. rate 16, height 1.651 m (5' 5\"), weight 83.3 kg (183 lb 10.3 oz), SpO2 95 %.  Intake/Output last 3 Shifts:  I/O last 3 completed shifts:  In: 1332 (16 mL/kg) [P.O.:940; Blood:292; IV Piggyback:100]  Out: 2000 (24 mL/kg) [Urine:2000 (0.7 mL/kg/hr)]  Weight: 83.3 kg     Relevant Results  Bedside PICC Imaging    Result Date: 2/8/2024  These images are not reportable by radiology and will not be interpreted by  Radiologists.    ECG 12 lead    Result Date: 2/7/2024  Atrial flutter with 2:1 AV conduction Nonspecific intraventricular block Left " ventricular hypertrophy ( R in aVL , [place] product ) Inferior infarct , age undetermined Abnormal ECG When compared with ECG of 11-JAN-2024 23:16, Significant changes have occurred See ED provider note for full interpretation and clinical correlation Confirmed by Vega Bruce ([id number]) on 2/7/2024 10:40:33 PM    XR hip right with pelvis when performed 2 or 3 views    Result Date: 2/7/2024  Interpreted By:  Richard Garcia, STUDY: XR HIP RIGHT WITH PELVIS WHEN PERFORMED 2 OR 3 VIEWS; ;  2/7/2024 9:25 am   INDICATION: Signs/Symptoms:s/p revision oanh.   COMPARISON: 01/26/2024   ACCESSION NUMBER(S): [id number]   ORDERING CLINICIAN: JON HERNANDEZ   FINDINGS: Total right hip arthroplasty with interval placement of antibiotic impregnated beads. Partially visualized left hip arthroplasty.       Interval placement of antibiotic impregnated beads in the right hip.     MACRO: None   Signed by: Richard Garcia 2/7/2024 5:00 PM Dictation workstation:   [id number]    XR abdomen 1 view    Result Date: 2/5/2024  Interpreted By:  Richard Garcia, STUDY: XR ABDOMEN 1 VIEW;  2/4/2024 6:47 pm   INDICATION: Signs/Symptoms:Lactic acidosis and emesis.   COMPARISON: None.   ACCESSION NUMBER(S): [id number]   ORDERING CLINICIAN: TABITHA MAYNARD   FINDINGS: Nonobstructive bowel gas pattern. Partially visualized bilateral hip arthroplasties.       1.  Nonobstructive bowel gas pattern.   MACRO: None   Signed by: Richard Garcia 2/5/2024 4:12 PM Dictation workstation:   [id number]    ECG 12 lead    Result Date: 1/31/2024  Poor data quality, interpretation may be adversely affected Sinus tachycardia with short VT with occasional Premature ventricular complexes ST & T wave abnormality, consider lateral ischemia Abnormal ECG When compared with ECG of 11-JAN-2024 12:24, Premature ventricular complexes are now Present T wave inversion more evident in Lateral leads Confirmed by Michael Nguyen ([id number]) on 1/31/2024 10:27:35 AM    XR hip right  with pelvis when performed 2 or 3 views    Result Date: 1/27/2024  Interpreted By:  Yolie Tate, STUDY: Single view pelvis. Right hip, two views.   INDICATION: Signs/Symptoms:POST OP-RIGHT TOTAL HIP.   COMPARISON: 10/17/2023.   ACCESSION NUMBER(S): [id number]   ORDERING CLINICIAN: ETHEL MORALES   FINDINGS: Status post right total hip arthroplasty with cerclage wire fixation of the proximal femur. Hardware is intact without perihardware fractures or lucencies.   There is fragmentation of the greater trochanter which is likely postsurgical in etiology with attention on follow-up recommended.   Right acetabular protrusio. No malalignment.   Changes of left hip arthroplasty noted with prominent osteolysis of the medial acetabular wall similar to prior.       1. As above.   MACRO: None.   Signed by: Yolie Tate 1/27/2024 10:30 AM Dictation workstation:   [id number]    ECG 12 lead    Result Date: 1/15/2024  Normal sinus rhythm Nonspecific ST and T wave abnormality Abnormal ECG When compared with ECG of 28-DEC-2023 13:55, (unconfirmed) Previous ECG has undetermined rhythm, needs review T wave inversion now evident in Lateral leads    XR femur right 2+ views    Result Date: 1/13/2024  Interpreted By:  Regino Bell, STUDY: XR FEMUR RIGHT 2+ VIEWS; ;  1/12/2024 8:44 am   INDICATION: Signs/Symptoms:rright hip post op.   COMPARISON: 01/03/2024   ACCESSION NUMBER(S): [id number]   ORDERING CLINICIAN: JAZMIN MOREL   FINDINGS: Right femur, two views   Interval fixation of right proximal femoral periprosthetic fracture with cerclage wires. There is no malalignment. Redemonstration of subluxed extrusion, similar to the prior. Right total hip arthroplasty in place.       Interval fixation of periprosthetic fracture through the proximal femur.     MACRO: None   Signed by: Regino Bell 1/13/2024 8:36 AM Dictation workstation:   [id number]    Transthoracic Echo (TTE) Complete    Result Date: 1/12/2024   [place]  [facility], [address] [place] [address]              Tel [phone] and Fax [phone] TRANSTHORACIC ECHOCARDIOGRAM REPORT  Patient Name:      ZULEIKAMAIRA Mcnamara Physician:    [id number] Jim Loredo MD Study Date:        1/12/2024            Ordering Provider:    [id number] RAMBO LEVI MRN/PID:           [id number]             Fellow: Accession#:        [id number]         Nurse: Date of Birth/Age: 1975 / 48      Sonographer:          Katherine Man RDCS                    years Gender:            F                    Additional Staff: Height:            175.26 cm            Admit Date: Weight:            89.81 kg             Admission Status:     Inpatient -                                                               Routine BSA:               2.06 m2              Encounter#:           [id number]                                         Department Location:  [facility] Blood Pressure: 128 /70 mmHg Study Type:    TRANSTHORACIC ECHO (TTE) COMPLETE Diagnosis/ICD: Abnormal electrocardiogram [ECG] [EKG]-R94.31 Indication:    Impaired functional mobility CPT Code:      Echo Complete w Full Doppler-48971 Patient History: Pertinent History: HTN. Tachycardia. Study Detail: The following Echo studies were performed: 2D, M-Mode, Doppler and               color flow. Technically challenging study due to the patient's               lack of cooperation, poor acoustic windows and body habitus. The               patient was awake.  PHYSICIAN INTERPRETATION: Left Ventricle: The left ventricular systolic function is normal, with an estimated ejection fraction of 60-65%. There are no regional wall motion abnormalities. The left ventricular cavity size is normal. Spectral Doppler shows a normal pattern of  left ventricular diastolic filling. Left Atrium: The left atrium is normal in size. Right Ventricle: The right ventricle is normal in size. There is normal right ventricular global systolic function. Right Atrium: The right atrium is normal in size. Aortic Valve: The aortic valve is trileaflet. There is mild aortic valve cusp calcification. There is mild to moderate aortic valve thickening. There is evidence of mildly elevated transaortic gradients consistent with sclerosis of the aortic valve. There is no evidence of aortic valve regurgitation. The peak instantaneous gradient of the aortic valve is 11.2 mmHg. Mitral Valve: The mitral valve is normal in structure. There is no evidence of mitral valve regurgitation. Tricuspid Valve: The tricuspid valve is structurally normal. There is mild tricuspid regurgitation. Pulmonic Valve: The pulmonic valve is not well visualized. There is physiologic pulmonic valve regurgitation. Pericardium: There is no pericardial effusion noted. Aorta: The aortic root was not well visualized.  CONCLUSIONS:  1. Left ventricular systolic function is normal with a 60-65% estimated ejection fraction.  2. Aortic valve sclerosis. QUANTITATIVE DATA SUMMARY: 2D MEASUREMENTS:                          Normal Ranges: Ao Root d:     2.90 cm   (2.0-3.7cm) LAs:           2.50 cm   (2.7-4.0cm) IVSd:          0.96 cm   (0.6-1.1cm) LVPWd:         1.09 cm   (0.6-1.1cm) LVIDd:         4.15 cm   (3.9-5.9cm) LVIDs:         2.89 cm LV Mass Index: 67.8 g/m2 LV % FS        30.4 % LA VOLUME:                               Normal Ranges: LA Vol A4C:        35.4 ml    (22+/-6mL/m2) LA Vol A2C:        29.9 ml LA Vol BP:         34.5 ml LA Vol Index A4C:  17.2ml/m2 LA Vol Index A2C:  14.5 ml/m2 LA Vol Index BP:   16.8 ml/m2 LA Area A4C:       15.0 cm2 LA Area A2C:       13.0 cm2 LA Major Axis A4C: 5.4 cm LA Major Axis A2C: 4.8 cm LA Volume Index:   15.0 ml/m2 RA VOLUME BY A/L METHOD:                                Normal Ranges: RA Vol A4C:        39.8 ml    (8.3-19.5ml) RA Vol Index A4C:  19.4 ml/m2 RA Area A4C:       15.0 cm2 RA Major Axis A4C: 4.8 cm M-MODE MEASUREMENTS:                  Normal Ranges: Ao Root: 3.00 cm (2.0-3.7cm) LAs:     3.90 cm (2.7-4.0cm) AORTA MEASUREMENTS:                    Normal Ranges: Asc Ao, d: 3.10 cm (2.1-3.4cm) LV SYSTOLIC FUNCTION BY 2D PLANIMETRY (MOD):                     Normal Ranges: EF-A4C View: 68.6 % (>=55%) EF-A2C View: 62.4 % EF-Biplane:  66.3 % LV DIASTOLIC FUNCTION:                               Normal Ranges: MV Peak E:        0.82 m/s    (0.7-1.2 m/s) MV Peak A:        0.65 m/s    (0.42-0.7 m/s) E/A Ratio:        1.26        (1.0-2.2) MV e'             0.10 m/s    (>8.0) MV lateral e'     0.12 m/s MV medial e'      0.08 m/s MV A Dur:         92.00 msec E/e' Ratio:       8.25        (<8.0) PulmV Sys Fam:    47.60 cm/s PulmV Oro Fam:   32.30 cm/s PulmV S/D Fam:    1.50 PulmV A Revs Fam: 32.70 cm/s PulmV A Revs Dur: 100.00 msec MITRAL VALVE:                 Normal Ranges: MV DT: 170 msec (150-240msec) AORTIC VALVE:                          Normal Ranges: AoV Vmax:      1.67 m/s  (<=1.7m/s) AoV Peak P.2 mmHg (<20mmHg) LVOT Max Fam:  1.22 m/s  (<=1.1m/s) LVOT VTI:      27.60 cm LVOT Diameter: 2.00 cm   (1.8-2.4cm) AoV Area,Vmax: 2.30 cm2  (2.5-4.5cm2)  RIGHT VENTRICLE: RV Basal 3.50 cm RV Mid   2.40 cm RV Major 7.0 cm TAPSE:   27.0 mm RV s'    0.17 m/s PULMONIC VALVE:                      Normal Ranges: PV Max Fam: 1.2 m/s  (0.6-0.9m/s) PV Max P.3 mmHg Pulmonary Veins: PulmV A Revs Dur: 100.00 msec PulmV A Revs Fam: 32.70 cm/s PulmV Oro Fam:   32.30 cm/s PulmV S/D Fam:    1.50 PulmV Sys Fam:    47.60 cm/s  [id number] Jim Loredo MD Electronically signed on 2024 at 11:44:14 PM  ** Final **     Lower extremity venous duplex right    Result Date: 2024  Interpreted By:  Marlys Mahajan, STUDY: [facility] [place]OWER EXTREMITY VENOUS DUPLEX RIGHT  2024 12:10 am    INDICATION: 49 y/o   F with  Signs/Symptoms:RLE pain and edema. Status post right total hip arthroplasty.. LMP:  Unknown.   COMPARISON: None.   ACCESSION NUMBER(S): [id number]   ORDERING CLINICIAN: AMI BARNETT   TECHNIQUE: Routine ultrasound of the  right lower extremity was performed with duplex Doppler (color and spectral) evaluation.   Static images were obtained for remote interpretation.   FINDINGS: THIGH VEINS:  The common femoral, femoral, popliteal, proximal medial saphenous, and deep femoral veins are patent and free of thrombus. The veins are normally compressible.  They demonstrate normal phasic flow and augmentation response.   CALF VEINS:  The paired peroneal and posterior tibial calf veins are patent.       No deep venous thrombosis of the  right lower extremity.   MACRO: None   Signed by: Marlys Mahajan 1/12/2024 12:50 AM Dictation workstation:   [id number]    ECG 12 lead    Result Date: 1/11/2024  Normal sinus rhythm Nonspecific ST and T wave abnormality Abnormal ECG When compared with ECG of 28-DEC-2023 13:55, (unconfirmed) Previous ECG has undetermined rhythm, needs review T wave inversion now evident in Lateral leads See ED provider note for full interpretation and clinical correlation Confirmed by Opal Matthews ([id number]) on 1/11/2024 12:33:55 PM      Scheduled medications  acetaminophen, 975 mg, oral, q8h  ARIPiprazole, 15 mg, oral, Nightly  [Held by provider] aspirin, 81 mg, oral, BID  ceFAZolin, 2 g, intravenous, q8h  [Held by provider] enoxaparin, 40 mg, subcutaneous, q24h  famotidine, 40 mg, oral, BID  ferrous sulfate (325 mg ferrous sulfate), 1 tablet, oral, Daily  lidocaine, 5 mL, infiltration, Once  [Held by provider] lisinopril, 10 mg, oral, q AM  loratadine, 10 mg, oral, Daily  magnesium oxide, 400 mg, oral, Daily  metoprolol succinate XL, 25 mg, oral, Daily  montelukast, 10 mg, oral, q AM  [Held by provider] naproxen, 500 mg, oral, BID with meals  nicotine, 1 patch,  transdermal, Daily  pantoprazole, 40 mg, intravenous, BID  PARoxetine, 40 mg, oral, Daily  polyethylene glycol, 17 g, oral, Daily  [Held by provider] potassium chloride CR, 40 mEq, oral, BID  traZODone, 100 mg, oral, Nightly  zinc oxide, 1 Application, Topical, BID      Continuous medications  sodium chloride 0.9%, 100 mL/hr, Last Rate: 100 mL/hr (02/08/24 1035)      PRN medications  PRN medications: acetaminophen **OR** acetaminophen **OR** acetaminophen, alteplase, HYDROmorphone, oxyCODONE-acetaminophen  Results for orders placed or performed during the hospital encounter of 02/02/24 (from the past 24 hour(s))   CBC   Result Value Ref Range    WBC 6.2 4.4 - 11.3 x10*3/uL    nRBC 0.6 (H) 0.0 - 0.0 /100 WBCs    RBC 2.40 (L) 4.00 - 5.20 x10*6/uL    Hemoglobin 6.8 (L) 12.0 - 16.0 g/dL    Hematocrit 21.8 (L) 36.0 - 46.0 %    MCV 91 80 - 100 fL    MCH 28.3 26.0 - 34.0 pg    MCHC 31.2 (L) 32.0 - 36.0 g/dL    RDW 22.1 (H) 11.5 - 14.5 %    Platelets 302 150 - 450 x10*3/uL   Type and Screen   Result Value Ref Range    ABO TYPE A     Rh TYPE POS     ANTIBODY SCREEN NEG    Prepare RBC: 1 Units   Result Value Ref Range    PRODUCT CODE T0059T10     Unit Number [id number]     Unit ABO A     Unit RH POS     XM INTEP COMP     Dispense Status TR     Blood Expiration Date February 13, 2024 23:59 EST     PRODUCT BLOOD TYPE 6200     UNIT VOLUME 350    Comprehensive Metabolic Panel   Result Value Ref Range    Glucose 79 74 - 99 mg/dL    Sodium 135 (L) 136 - 145 mmol/L    Potassium 4.2 3.5 - 5.3 mmol/L    Chloride 103 98 - 107 mmol/L    Bicarbonate 28 21 - 32 mmol/L    Anion Gap 8 (L) 10 - 20 mmol/L    Urea Nitrogen 5 (L) 6 - 23 mg/dL    Creatinine 0.30 (L) 0.50 - 1.05 mg/dL    eGFR >90 >60 mL/min/1.73m*2    Calcium 7.4 (L) 8.6 - 10.3 mg/dL    Albumin 2.3 (L) 3.4 - 5.0 g/dL    Alkaline Phosphatase 93 33 - 110 U/L    Total Protein 4.4 (L) 6.4 - 8.2 g/dL    AST 11 9 - 39 U/L    Bilirubin, Total 0.3 0.0 - 1.2 mg/dL    ALT 6 (L) 7 - 45  U/L   CBC and Auto Differential   Result Value Ref Range    WBC 6.2 4.4 - 11.3 x10*3/uL    nRBC 0.5 (H) 0.0 - 0.0 /100 WBCs    RBC 2.83 (L) 4.00 - 5.20 x10*6/uL    Hemoglobin 8.1 (L) 12.0 - 16.0 g/dL    Hematocrit 25.0 (L) 36.0 - 46.0 %    MCV 88 80 - 100 fL    MCH 28.6 26.0 - 34.0 pg    MCHC 32.4 32.0 - 36.0 g/dL    RDW 21.4 (H) 11.5 - 14.5 %    Platelets 320 150 - 450 x10*3/uL    Neutrophils %      Immature Granulocytes %, Automated      Lymphocytes %      Monocytes %      Eosinophils %      Basophils %      Neutrophils Absolute      Lymphocytes Absolute      Monocytes Absolute      Eosinophils Absolute      Basophils Absolute               This patient has a central line   Reason for the central line remaining today?  Antibiotics       Assessment/Plan   Principal Problem:    Right hip pain  Active Problems:    Wound infection after surgery    48 year old female, POD 3 from revision of right hip arthroplasty   - AM labs reviewed, SP 1 unit PRBC: 6.8 -> 8.1  - WB for transfers only, PT evaluation  - continue to monitor VAC output  - HOLD DVT prophylaxis in setting of increased sanguinous output, required PRBC  - will require a SNF at [place]  - continue Ancef, + staph  - PICC line placed  - pain control per primary  - regular diet  - ortho will continue to follow         I spent 30 minutes in the professional and overall care of this patient.    Ester Fitch, APRN-CNP

## 2024-02-09 NOTE — NURSING NOTE
Informed by Care Coordination Team patient's discharge has been postponed.  Vac Prevena to op site continues to drain goodly amounts cherry red drainage, her RN reports this to be sporadic and positional.   Therefore, will maintain patient on Vac Ulta until closer to discharge (Vac Prevena canisters hold much less).  The vac Prevena pump was set up and is ready to be attached when she does leave, extra canisters obtained to be sent with her to the SNF.  Plan reviewed with her RN, Najma.

## 2024-02-09 NOTE — PROGRESS NOTES
Patient: Blanca Hamilton  Room/bed: 133/133-A  Admitted on: 2/2/2024    Age: 48 y.o.   Gender: female  Code Status:  Full Code   Admitting Dx: Wound infection after surgery [T81.49XA]    MRN: [id number]  PCP: Henry Olmos DO       Subjective   Seen and examined in her room this AM. Awake and alert. Resting in bed. Reports right hip pain is minimal. She denies chest pain, breathing difficulties, abdominal pain, N/V/D/C, fever, or chills.  No BM postoperatively. Hgb dropped yesterday afternoon and was transfused 1 unit PRBC and was also noted to have increased bloody drainage in VAC canister.     Objective    Physical Exam   Constitutional: A&O x 3; NAD; calm and cooperative  Eyes: EOM's intact  HEENT: Normocephalic, Atraumatic. Oral mucosa moist.   Neck: Supple. No JVD, lymphadenopathy.   Lungs: CTAB with fair air movement. Respirations even and unlabored on room air.   Heart: RRR  Abdomen: Softly distended; non-tender; +BS  : Slaughter is patent with yellow urine.   MS/Extremities: LUND equally x 4 with RLE weakness related to hip infection. Right hip VAC dressing is C/D/I with bloody drainage. No edema. Peripheral pulses intact bilaterally.   Neuro: A&O x3; no focal deficits; gross motor and sensation intact. Cognitive dysfunction.  Skin: Warm and dry. No rashes or lesions  Psych: Normal affect.      Temp:  [35.8 °C (96.4 °F)-36.7 °C (98.1 °F)] 36.2 °C (97.2 °F)  Heart Rate:  [] 96  Resp:  [14-19] 16  BP: ()/(59-87) 106/67    Vitals:    02/02/24 1210   Weight: 83.3 kg (183 lb 10.3 oz)     I/Os    Intake/Output Summary (Last 24 hours) at 2/9/2024 1344  Last data filed at 2/9/2024 1200  Gross per 24 hour   Intake 912 ml   Output 1650 ml   Net -738 ml         Labs:   Results from last 72 hours   Lab Units 02/09/24  0622 02/08/24  0616 02/07/24  0650   SODIUM mmol/L 135* 135* 135*   POTASSIUM mmol/L 4.2 4.5 4.4   CHLORIDE mmol/L 103 103 104   CO2 mmol/L 28 28 28   BUN mg/dL 5* 6 7   CREATININE mg/dL 0.30* 0.39*  0.33*   GLUCOSE mg/dL 79 85 97   CALCIUM mg/dL 7.4* 7.6* 7.6*   ANION GAP mmol/L 8* 9* 7*   EGFR mL/min/1.73m*2 >90 >90 >90        Results from last 72 hours   Lab Units 02/09/24  0622 02/08/24  1659 02/08/24  0616   WBC AUTO x10*3/uL 6.2 6.2 5.2   HEMOGLOBIN g/dL 8.1* 6.8* 7.4*   HEMATOCRIT % 25.0* 21.8* 23.6*   PLATELETS AUTO x10*3/uL 320 302 295   NEUTROS PCT AUTO %  --   --  45.8   LYMPHO PCT MAN % 11.0  --   --    LYMPHS PCT AUTO %  --   --  24.5   MONO PCT MAN % 10.0  --   --    MONOS PCT AUTO %  --   --  9.6   EOSINO PCT MAN % 10.0  --   --    EOS PCT AUTO %  --   --  14.9        Lab Results   Component Value Date    CALCIUM 7.4 (L) 02/09/2024    PHOS 2.5 02/02/2024      Lab Results   Component Value Date    CRP 23.75 (H) 02/02/2024        Micro/ID:   Susceptibility data from last 90 days.  Collected Specimen Info Organism Clindamycin Erythromycin Oxacillin Tetracycline Trimethoprim/Sulfamethoxazole Vancomycin   02/06/24 Swab from HIP ARTHROPLASTY RIGHT Methicillin Susceptible Staphylococcus aureus (MSSA) S S S R S S   02/06/24 Swab from HIP ARTHROPLASTY RIGHT Staphylococcus aureus         02/06/24 Swab from HIP ARTHROPLASTY RIGHT Staphylococcus aureus         02/02/24 Tissue/Biopsy from Wound/Tissue Methicillin Susceptible Staphylococcus aureus (MSSA) S S S R S S       .ID  Lab Results   Component Value Date    BLOODCULT No growth at 4 days -  FINAL REPORT 02/04/2024    BLOODCULT No growth at 4 days -  FINAL REPORT 02/04/2024       Images:  KUB FINDINGS:  Nonobstructive bowel gas pattern. Partially visualized bilateral hip  arthroplasties.      Impression: 1.  Nonobstructive bowel gas pattern.     Meds    Scheduled medications  acetaminophen, 975 mg, oral, q8h  ARIPiprazole, 15 mg, oral, Nightly  [Held by provider] aspirin, 81 mg, oral, BID  ceFAZolin, 2 g, intravenous, q8h  [Held by provider] enoxaparin, 40 mg, subcutaneous, q24h  famotidine, 40 mg, oral, BID  ferrous sulfate (325 mg ferrous sulfate), 1  tablet, oral, Daily  lidocaine, 5 mL, infiltration, Once  [Held by provider] lisinopril, 10 mg, oral, q AM  loratadine, 10 mg, oral, Daily  magnesium oxide, 400 mg, oral, Daily  metoprolol succinate XL, 25 mg, oral, Daily  montelukast, 10 mg, oral, q AM  [Held by provider] naproxen, 500 mg, oral, BID with meals  nicotine, 1 patch, transdermal, Daily  pantoprazole, 40 mg, intravenous, BID  PARoxetine, 40 mg, oral, Daily  polyethylene glycol, 17 g, oral, Daily  [Held by provider] potassium chloride CR, 40 mEq, oral, BID  traZODone, 100 mg, oral, Nightly  zinc oxide, 1 Application, Topical, BID      Continuous medications  sodium chloride 0.9%, 100 mL/hr, Last Rate: 100 mL/hr (02/08/24 1035)      PRN medications  PRN medications: acetaminophen **OR** acetaminophen **OR** acetaminophen, alteplase, HYDROmorphone, oxyCODONE-acetaminophen     Assessment and Plan    Blanca Hamilton is a 48 y.o. female with a medical history of GERD, HTN, and Depression who presented with a right hip prosthetic joint infection.     Right Prosthetic Hip Joint Infection  -S/p ALEXA on 1/2 with subsequent revision on 1/5 for prosthetic fracture  -Cultures positive for MSSA  -ID is following - on Cefazolin  -Underwent ALEXA revision and partial hardware removal with Dr. Jalloh on 2/6  -WB only with transfers/pivets  -Medicate for pain  -Final BC negative.   -Monitor ESR, CRP  -Advised mobilization, IS use  -Maintain bowel regimen  -Monitor H&H for ABLA. EBL 500ml. Hgb 8.1 this AM.   -VAC dressing with bloody drainage. Discussed with orthopedics - feels this was possibly related to a hematoma.   -Afebrile. No leukocytosis.   -Discussed with ID. PICC line placed for 6 weeks of IV antibiotic therapy.   -Monitoring blood counts, drainage closely for the next 24 hours.     Abdominal Pain with N/V  -No recurrence of N/V   -KUB unremarkable with nonobstructive bowel pattern  -Zofran as needed, PPI, Pepcid  -Will monitor    Hypertension  -Has been hypotensive  and agents on hold (Lisinopril, Metoprolol)  -SBP has improved. Metoprolol resumed.   -Will continue to hold and resume Lisinopril as BP supports.     Anxiety/Depression  -On home regimen: Paxil, Abilify, Trazadone    Acute on Chronic Anemia  -Likely related to infection, hemodilution, ABLA  -Hgb dropped to 7.0 POD #1. Today is 8.1.  -Yesterday PM dropped to 6.8 - transfused 1 unit PRBC.  -Transfuse as needed to keep >7  -On Iron supplementation  -CBC in AM  -Hold Lovenox    Tobacco Use Disorder  -On Nicotine Patch    DVT Prophylaxis  -Lovenox    Fluids/Electrolytes/Nutrition  -Laboratory data reviewed.   -Electrolytes stable and replaced as needed.   -No current nutritional issues.     Disposition  -Plan of care discussed with attending, Dr. Ramírez, ID.   -Discharge plan pending postop course, PT/OT evals, etc. From SNF. Will require precert.   -Tentative plan for discharge tomorrow if Hgb stable. .       Cherry Michaels, APRN-CNP

## 2024-02-09 NOTE — PROGRESS NOTES
"Physical Therapy    Physical Therapy Treatment    Patient Name: Blanca Hmailton  MRN: [id number]  Today's Date: 2/9/2024          Assessment/Plan   PT Assessment  PT Assessment Results: Decreased strength, Decreased range of motion, Decreased endurance, Impaired balance, Decreased mobility  End of Session Communication: PCT/NA/CTA, Bedside nurse  End of Session Patient Position:  (Sitting EOB I, alarm on, call light in reach, nursing assistant in checking on patient also)     PT Plan  Treatment/Interventions: Bed mobility, Transfer training, Balance training, Strengthening, Endurance training  PT Plan: Skilled PT  PT Frequency: 3 times per week  PT Discharge Recommendations: Moderate intensity level of continued care  Equipment Recommended upon Discharge: Wheeled walker, Wheelchair  PT Recommended Transfer Status: Assist x2  PT - OK to Discharge: Yes      General Visit Information:   PT  Visit  PT Received On: 02/09/24  General  Reason for Referral: 47 yo female referred to PT for R ALEXA revision, impaired mobility  Past Medical History Relevant to Rehab: GERD, HTN, HLD; 1/2 R ALEXA, 1/5 ORIF for periprosthetic femur fx  Family/Caregiver Present: No  Prior to Session Communication: Bedside nurse  Patient Position Received: Bed, 3 rail up (HOB up and bilat knees flexed, \"they dont go straight and havnt gone straight for a long time\" stated patient.)  General Comment: AXOX3, pleasant and agreeable, wound vac to right hip, heel protectors, purewick external cath,    Subjective   Precautions:     Vital Signs:       Objective   Pain:  Pain Assessment  Pain Assessment: 0-10  Pain Score: 0 - No pain  Cognition:  Cognition  Overall Cognitive Status: Within Functional Limits  Orientation Level: Oriented X4  Postural Control:     Extremity/Trunk Assessments:    Activity Tolerance:     Treatments:  Therapeutic Exercise  Therapeutic Exercise Performed: No (\"I cant do that, which side are we going to\"  encouragement and instruction given " "to try but we would stay within her comfort zone \"no\".stated patient)    Therapeutic Activity  Therapeutic Activity Performed: Yes (static/dynamic sitting EOB x12 min with S/I)         Bed Mobility 1  Bed Mobility 1: Supine to sitting  Level of Assistance 1: Moderate assistance, Moderate verbal cues (HOB up per patient request)                             Outcome Measures:  [facility] Basic Mobility  Turning from your back to your side while in a flat bed without using bedrails: A little  Moving from lying on your back to sitting on the side of a flat bed without using bedrails: A little  Moving to and from bed to chair (including a wheelchair): Total  Standing up from a chair using your arms (e.g. wheelchair or bedside chair): Total  To walk in hospital room: Total  Climbing 3-5 steps with railing: Total  Basic Mobility - Total Score: 10    Education Documentation  Body Mechanics, taught by Adelina Duncan PTA at 2/9/2024  3:53 PM.  Learner: Patient  Readiness: Acceptance  Method: Explanation  Response: Needs Reinforcement    Precautions, taught by Adelina Duncan PTA at 2/9/2024  3:53 PM.  Learner: Patient  Readiness: Acceptance  Method: Explanation  Response: Needs Reinforcement    Precautions, taught by Adelina Duncan PTA at 2/9/2024  3:53 PM.  Learner: Patient  Readiness: Acceptance  Method: Explanation  Response: Needs Reinforcement    Body Mechanics, taught by Adelina Duncan PTA at 2/9/2024  3:53 PM.  Learner: Patient  Readiness: Acceptance  Method: Explanation  Response: Needs Reinforcement    Mobility Training, taught by Adelina Duncan PTA at 2/9/2024  3:53 PM.  Learner: Patient  Readiness: Acceptance  Method: Explanation  Response: Needs Reinforcement    Education Comments  No comments found.        OP EDUCATION:       Encounter Problems       Encounter Problems (Active)       Balance       STG - Maintains dynamic sitting balance without upper extremity support x 10'  (Progressing)       Start:  02/07/24    " Expected End:  02/21/24               Transfers       STG - Transfer from bed to chair CGA  (Progressing)       Start:  02/07/24    Expected End:  02/21/24            STG - Patient will perform bed mobility CGA  (Progressing)       Start:  02/07/24    Expected End:  02/21/24            STG - Patient will transfer sit to and from stand CGA  (Progressing)       Start:  02/07/24    Expected End:  02/21/24

## 2024-02-10 LAB
ALBUMIN SERPL BCP-MCNC: 2.3 G/DL (ref 3.4–5)
ALP SERPL-CCNC: 97 U/L (ref 33–110)
ALT SERPL W P-5'-P-CCNC: 5 U/L (ref 7–45)
ANION GAP SERPL CALC-SCNC: 7 MMOL/L (ref 10–20)
AST SERPL W P-5'-P-CCNC: 10 U/L (ref 9–39)
BASOPHILS # BLD AUTO: 0.04 X10*3/UL (ref 0–0.1)
BASOPHILS NFR BLD AUTO: 0.5 %
BILIRUB SERPL-MCNC: 0.2 MG/DL (ref 0–1.2)
BUN SERPL-MCNC: 4 MG/DL (ref 6–23)
CALCIUM SERPL-MCNC: 7.4 MG/DL (ref 8.6–10.3)
CHLORIDE SERPL-SCNC: 104 MMOL/L (ref 98–107)
CO2 SERPL-SCNC: 27 MMOL/L (ref 21–32)
CREAT SERPL-MCNC: 0.29 MG/DL (ref 0.5–1.05)
EGFRCR SERPLBLD CKD-EPI 2021: >90 ML/MIN/1.73M*2
EOSINOPHIL # BLD AUTO: 0.79 X10*3/UL (ref 0–0.7)
EOSINOPHIL NFR BLD AUTO: 10 %
ERYTHROCYTE [DISTWIDTH] IN BLOOD BY AUTOMATED COUNT: 21.8 % (ref 11.5–14.5)
GLUCOSE SERPL-MCNC: 82 MG/DL (ref 74–99)
HCT VFR BLD AUTO: 25.3 % (ref 36–46)
HGB BLD-MCNC: 8.1 G/DL (ref 12–16)
IMM GRANULOCYTES # BLD AUTO: 0.56 X10*3/UL (ref 0–0.7)
IMM GRANULOCYTES NFR BLD AUTO: 7.1 % (ref 0–0.9)
LYMPHOCYTES # BLD AUTO: 1.29 X10*3/UL (ref 1.2–4.8)
LYMPHOCYTES NFR BLD AUTO: 16.3 %
MCH RBC QN AUTO: 28.3 PG (ref 26–34)
MCHC RBC AUTO-ENTMCNC: 32 G/DL (ref 32–36)
MCV RBC AUTO: 89 FL (ref 80–100)
MONOCYTES # BLD AUTO: 0.68 X10*3/UL (ref 0.1–1)
MONOCYTES NFR BLD AUTO: 8.6 %
NEUTROPHILS # BLD AUTO: 4.57 X10*3/UL (ref 1.2–7.7)
NEUTROPHILS NFR BLD AUTO: 57.5 %
NRBC BLD-RTO: 0 /100 WBCS (ref 0–0)
PLATELET # BLD AUTO: 336 X10*3/UL (ref 150–450)
POTASSIUM SERPL-SCNC: 4 MMOL/L (ref 3.5–5.3)
PROT SERPL-MCNC: 4.4 G/DL (ref 6.4–8.2)
RBC # BLD AUTO: 2.86 X10*6/UL (ref 4–5.2)
SODIUM SERPL-SCNC: 134 MMOL/L (ref 136–145)
STAPHYLOCOCCUS SPEC CULT: NORMAL
WBC # BLD AUTO: 7.9 X10*3/UL (ref 4.4–11.3)

## 2024-02-10 PROCEDURE — 2500000004 HC RX 250 GENERAL PHARMACY W/ HCPCS (ALT 636 FOR OP/ED): Performed by: ORTHOPAEDIC SURGERY

## 2024-02-10 PROCEDURE — S4991 NICOTINE PATCH NONLEGEND: HCPCS | Performed by: ORTHOPAEDIC SURGERY

## 2024-02-10 PROCEDURE — 2500000002 HC RX 250 W HCPCS SELF ADMINISTERED DRUGS (ALT 637 FOR MEDICARE OP, ALT 636 FOR OP/ED): Performed by: ORTHOPAEDIC SURGERY

## 2024-02-10 PROCEDURE — 2500000001 HC RX 250 WO HCPCS SELF ADMINISTERED DRUGS (ALT 637 FOR MEDICARE OP): Performed by: NURSE PRACTITIONER

## 2024-02-10 PROCEDURE — 2500000001 HC RX 250 WO HCPCS SELF ADMINISTERED DRUGS (ALT 637 FOR MEDICARE OP): Performed by: ORTHOPAEDIC SURGERY

## 2024-02-10 PROCEDURE — C9113 INJ PANTOPRAZOLE SODIUM, VIA: HCPCS | Performed by: ORTHOPAEDIC SURGERY

## 2024-02-10 PROCEDURE — 2500000002 HC RX 250 W HCPCS SELF ADMINISTERED DRUGS (ALT 637 FOR MEDICARE OP, ALT 636 FOR OP/ED): Performed by: NURSE PRACTITIONER

## 2024-02-10 PROCEDURE — 85025 COMPLETE CBC W/AUTO DIFF WBC: CPT | Performed by: STUDENT IN AN ORGANIZED HEALTH CARE EDUCATION/TRAINING PROGRAM

## 2024-02-10 PROCEDURE — 1100000001 HC PRIVATE ROOM DAILY

## 2024-02-10 PROCEDURE — 80053 COMPREHEN METABOLIC PANEL: CPT | Performed by: STUDENT IN AN ORGANIZED HEALTH CARE EDUCATION/TRAINING PROGRAM

## 2024-02-10 PROCEDURE — 99232 SBSQ HOSP IP/OBS MODERATE 35: CPT | Performed by: STUDENT IN AN ORGANIZED HEALTH CARE EDUCATION/TRAINING PROGRAM

## 2024-02-10 RX ORDER — EAR PLUGS
1 EACH OTIC (EAR) 2 TIMES DAILY
Qty: 3 G | Refills: 0 | Status: SHIPPED | OUTPATIENT
Start: 2024-02-10 | End: 2024-03-05 | Stop reason: HOSPADM

## 2024-02-10 RX ORDER — POLYETHYLENE GLYCOL 3350 17 G/17G
17 POWDER, FOR SOLUTION ORAL DAILY
Start: 2024-02-10

## 2024-02-10 RX ORDER — NAPROXEN SODIUM 220 MG/1
81 TABLET, FILM COATED ORAL 2 TIMES DAILY
Start: 2024-02-10 | End: 2024-02-10 | Stop reason: SDUPTHER

## 2024-02-10 RX ORDER — ONDANSETRON HYDROCHLORIDE 2 MG/ML
4 INJECTION, SOLUTION INTRAVENOUS ONCE
Status: DISCONTINUED | OUTPATIENT
Start: 2024-02-10 | End: 2024-02-11 | Stop reason: HOSPADM

## 2024-02-10 RX ORDER — LANOLIN ALCOHOL/MO/W.PET/CERES
400 CREAM (GRAM) TOPICAL DAILY
Start: 2024-02-10

## 2024-02-10 RX ORDER — ONDANSETRON HYDROCHLORIDE 2 MG/ML
4 INJECTION, SOLUTION INTRAVENOUS EVERY 6 HOURS PRN
Status: DISCONTINUED | OUTPATIENT
Start: 2024-02-10 | End: 2024-02-11 | Stop reason: HOSPADM

## 2024-02-10 RX ORDER — IBUPROFEN 200 MG
1 TABLET ORAL DAILY
Start: 2024-02-10

## 2024-02-10 RX ORDER — NAPROXEN SODIUM 220 MG/1
81 TABLET, FILM COATED ORAL 2 TIMES DAILY
Start: 2024-02-10 | End: 2024-03-05 | Stop reason: HOSPADM

## 2024-02-10 RX ORDER — CEFAZOLIN SODIUM/D5W 2 G/100 ML
2 PLASTIC BAG, INJECTION (ML) INTRAVENOUS EVERY 8 HOURS
Start: 2024-02-10 | End: 2024-03-05 | Stop reason: HOSPADM

## 2024-02-10 RX ORDER — OXYCODONE AND ACETAMINOPHEN 5; 325 MG/1; MG/1
1 TABLET ORAL EVERY 6 HOURS PRN
Qty: 12 TABLET | Refills: 0 | Status: SHIPPED | OUTPATIENT
Start: 2024-02-10 | End: 2024-03-05 | Stop reason: HOSPADM

## 2024-02-10 RX ADMIN — FERROUS SULFATE TAB 325 MG (65 MG ELEMENTAL FE) 1 TABLET: 325 (65 FE) TAB at 05:37

## 2024-02-10 RX ADMIN — Medication 400 MG: at 10:26

## 2024-02-10 RX ADMIN — FAMOTIDINE 40 MG: 20 TABLET ORAL at 10:26

## 2024-02-10 RX ADMIN — LORATADINE 10 MG: 10 TABLET ORAL at 10:26

## 2024-02-10 RX ADMIN — SODIUM CHLORIDE 100 ML/HR: 9 INJECTION, SOLUTION INTRAVENOUS at 05:36

## 2024-02-10 RX ADMIN — METOPROLOL SUCCINATE 25 MG: 25 TABLET, EXTENDED RELEASE ORAL at 10:26

## 2024-02-10 RX ADMIN — CEFAZOLIN SODIUM 2 G: 2 INJECTION, SOLUTION INTRAVENOUS at 10:30

## 2024-02-10 RX ADMIN — PANTOPRAZOLE SODIUM 40 MG: 40 INJECTION, POWDER, FOR SOLUTION INTRAVENOUS at 21:02

## 2024-02-10 RX ADMIN — MONTELUKAST 10 MG: 10 TABLET, FILM COATED ORAL at 10:26

## 2024-02-10 RX ADMIN — FAMOTIDINE 40 MG: 20 TABLET ORAL at 20:46

## 2024-02-10 RX ADMIN — ACETAMINOPHEN 975 MG: 325 TABLET ORAL at 15:47

## 2024-02-10 RX ADMIN — ARIPIPRAZOLE 15 MG: 5 TABLET ORAL at 20:46

## 2024-02-10 RX ADMIN — OXYCODONE HYDROCHLORIDE AND ACETAMINOPHEN 1 TABLET: 5; 325 TABLET ORAL at 10:26

## 2024-02-10 RX ADMIN — Medication 1 APPLICATION: at 20:49

## 2024-02-10 RX ADMIN — PAROXETINE HYDROCHLORIDE 40 MG: 20 TABLET, FILM COATED ORAL at 10:27

## 2024-02-10 RX ADMIN — ACETAMINOPHEN 975 MG: 325 TABLET ORAL at 00:34

## 2024-02-10 RX ADMIN — Medication 1 APPLICATION: at 10:29

## 2024-02-10 RX ADMIN — SIMVASTATIN 20 MG: 20 TABLET, FILM COATED ORAL at 20:46

## 2024-02-10 RX ADMIN — OXYCODONE HYDROCHLORIDE AND ACETAMINOPHEN 1 TABLET: 5; 325 TABLET ORAL at 18:19

## 2024-02-10 RX ADMIN — POLYETHYLENE GLYCOL 3350 17 G: 17 POWDER, FOR SOLUTION ORAL at 10:30

## 2024-02-10 RX ADMIN — SIMVASTATIN 20 MG: 20 TABLET, FILM COATED ORAL at 00:35

## 2024-02-10 RX ADMIN — TRAZODONE HYDROCHLORIDE 100 MG: 50 TABLET ORAL at 20:46

## 2024-02-10 RX ADMIN — NICOTINE 1 PATCH: 21 PATCH, EXTENDED RELEASE TRANSDERMAL at 10:34

## 2024-02-10 RX ADMIN — CEFAZOLIN SODIUM 2 G: 2 INJECTION, SOLUTION INTRAVENOUS at 15:47

## 2024-02-10 ASSESSMENT — COGNITIVE AND FUNCTIONAL STATUS - GENERAL
MOVING TO AND FROM BED TO CHAIR: TOTAL
TURNING FROM BACK TO SIDE WHILE IN FLAT BAD: A LITTLE
MOVING TO AND FROM BED TO CHAIR: TOTAL
DAILY ACTIVITIY SCORE: 16
DRESSING REGULAR LOWER BODY CLOTHING: A LOT
PERSONAL GROOMING: A LITTLE
PERSONAL GROOMING: A LITTLE
CLIMB 3 TO 5 STEPS WITH RAILING: TOTAL
DRESSING REGULAR UPPER BODY CLOTHING: A LITTLE
TOILETING: A LOT
WALKING IN HOSPITAL ROOM: TOTAL
HELP NEEDED FOR BATHING: A LOT
WALKING IN HOSPITAL ROOM: TOTAL
DAILY ACTIVITIY SCORE: 16
STANDING UP FROM CHAIR USING ARMS: TOTAL
CLIMB 3 TO 5 STEPS WITH RAILING: TOTAL
DRESSING REGULAR UPPER BODY CLOTHING: A LITTLE
HELP NEEDED FOR BATHING: A LOT
TOILETING: A LOT
MOBILITY SCORE: 11
MOBILITY SCORE: 11
TURNING FROM BACK TO SIDE WHILE IN FLAT BAD: A LITTLE
STANDING UP FROM CHAIR USING ARMS: TOTAL
DRESSING REGULAR LOWER BODY CLOTHING: A LOT

## 2024-02-10 ASSESSMENT — PAIN DESCRIPTION - LOCATION
LOCATION: HIP
LOCATION: HIP

## 2024-02-10 ASSESSMENT — PAIN DESCRIPTION - ORIENTATION: ORIENTATION: RIGHT

## 2024-02-10 ASSESSMENT — PAIN - FUNCTIONAL ASSESSMENT
PAIN_FUNCTIONAL_ASSESSMENT: 0-10

## 2024-02-10 ASSESSMENT — PAIN SCALES - GENERAL
PAINLEVEL_OUTOF10: 2
PAINLEVEL_OUTOF10: 0 - NO PAIN
PAINLEVEL_OUTOF10: 6
PAINLEVEL_OUTOF10: 3
PAINLEVEL_OUTOF10: 5 - MODERATE PAIN

## 2024-02-10 NOTE — CARE PLAN
The patient's goals for the shift include pain will be controlled    The clinical goals for the shift include wound vac will stay intact    Over the shift, the patient did not make progress toward the following goals. Barriers to progression include large wound vac drainge. Recommendations to address these barriers include support and monitor  Problem: Pain  Goal: My pain/discomfort is manageable  Outcome: Progressing     Problem: Daily Care  Goal: Daily care needs are met  Outcome: Progressing     Problem: Safety - Adult  Goal: Free from fall injury  Outcome: Progressing   .

## 2024-02-10 NOTE — PROGRESS NOTES
Blanca Hamilton is a 48 y.o. female on day 8 of admission presenting with No Principal Problem: There is no principal problem currently on the Problem List. Please update the Problem List and refresh..    Subjective   Interval History: no fever, no new complaints        Review of Systems    Objective   Range of Vitals (last 24 hours)  Heart Rate:  [84-96]   Temp:  [35.8 °C (96.4 °F)-36.5 °C (97.7 °F)]   Resp:  [16-18]   BP: (106-131)/(67-79)   SpO2:  [93 %-96 %]   Daily Weight  02/02/24 : 83.3 kg (183 lb 10.3 oz)    Body mass index is 30.56 kg/m².    Physical Exam  Constitutional:       Appearance: Normal appearance.   HENT:      Head: Normocephalic and atraumatic.      Mouth/Throat:      Mouth: Mucous membranes are moist.      Pharynx: Oropharynx is clear.   Eyes:      Pupils: Pupils are equal, round, and reactive to light.   Cardiovascular:      Rate and Rhythm: Normal rate and regular rhythm.      Heart sounds: Normal heart sounds.   Pulmonary:      Effort: Pulmonary effort is normal.      Breath sounds: Normal breath sounds.   Abdominal:      General: Abdomen is flat. Bowel sounds are normal.      Palpations: Abdomen is soft.   Musculoskeletal:      Cervical back: Normal range of motion.      Comments: Rt hip dressing   Neurological:      Mental Status: She is alert.         Antibiotics  ceFAZolin in dextrose (iso-os) (Ancef) IVPB 2 g  vancomycin (Vancocin) in dextrose 5 % water (D5W) 500 mL IV 1,500 mg  cefTRIAXone (Rocephin) IVPB 1 g  sodium chloride 0.9% infusion  sodium chloride 0.9 % bolus 1,000 mL  HYDROmorphone (Dilaudid) injection 0.2 mg  lisonpril (Prinivil, Zestril) tablet  PARoxetine (Paxil-CR) 24 hr tablet  acetaminophen (Tylenol) tablet 650 mg  acetaminophen (Tylenol) oral liquid 650 mg  acetaminophen (Tylenol) suppository 650 mg  polyethylene glycol (Glycolax, Miralax) packet 17 g  enoxaparin (Lovenox) syringe 40 mg  cefTRIAXone (Rocephin) 2 g IV in dextrose 5% 50 mL  HYDROmorphone (Dilaudid) injection  0.2 mg  acetaminophen (Tylenol) tablet 975 mg  magnesium oxide (Mag-Ox) tablet 400 mg  nicotine (Nicoderm CQ) 21 mg/24 hr patch 1 patch  ARIPiprazole (Abilify) tablet 15 mg  aspirin chewable tablet 81 mg  loratadine (Claritin) tablet 10 mg  ferrous sulfate (325 mg ferrous sulfate) tablet 1 tablet  lisinopril tablet 10 mg  metoprolol succinate XL (Toprol-XL) 24 hr tablet 25 mg  montelukast (Singulair) tablet 10 mg  naproxen (Naprosyn) tablet 500 mg  pantoprazole (ProtoNix) EC tablet 40 mg  oxyCODONE-acetaminophen (Percocet) 5-325 mg per tablet 1 tablet  PARoxetine (Paxil) tablet 20 mg  PARoxetine (Paxil) tablet 40 mg  traZODone (Desyrel) tablet 100 mg  PARoxetine (Paxil-CR) 24 hr tablet  vancomycin (Vancocin) 1,000 mg in dextrose 5% water 200 mL  PARoxetine (Paxil) tablet 20 mg  sodium chloride 0.9% infusion  sodium chloride 0.9 % bolus 1,000 mL  magnesium sulfate IV 2 g  potassium chloride (Klor-Con) packet 40 mEq      Relevant Results  Labs  Results from last 72 hours   Lab Units 02/10/24  0544 02/09/24  0622 02/08/24  1659 02/08/24  0616   WBC AUTO x10*3/uL 7.9 6.2 6.2 5.2   HEMOGLOBIN g/dL 8.1* 8.1* 6.8* 7.4*   HEMATOCRIT % 25.3* 25.0* 21.8* 23.6*   PLATELETS AUTO x10*3/uL 336 320 302 295   NEUTROS PCT AUTO % 57.5  --   --  45.8   LYMPHO PCT MAN %  --  11.0  --   --    LYMPHS PCT AUTO % 16.3  --   --  24.5   MONO PCT MAN %  --  10.0  --   --    MONOS PCT AUTO % 8.6  --   --  9.6   EOSINO PCT MAN %  --  10.0  --   --    EOS PCT AUTO % 10.0  --   --  14.9       Results from last 72 hours   Lab Units 02/10/24  0544 02/09/24  0622 02/08/24  0616   SODIUM mmol/L 134* 135* 135*   POTASSIUM mmol/L 4.0 4.2 4.5   CHLORIDE mmol/L 104 103 103   CO2 mmol/L 27 28 28   BUN mg/dL 4* 5* 6   CREATININE mg/dL 0.29* 0.30* 0.39*   GLUCOSE mg/dL 82 79 85   CALCIUM mg/dL 7.4* 7.4* 7.6*   ANION GAP mmol/L 7* 8* 9*   EGFR mL/min/1.73m*2 >90 >90 >90       Results from last 72 hours   Lab Units 02/10/24  0544 02/09/24  0622 02/08/24  0616    ALK PHOS U/L 97 93 90   BILIRUBIN TOTAL mg/dL 0.2 0.3 0.2   PROTEIN TOTAL g/dL 4.4* 4.4* 4.5*   ALT U/L 5* 6* 6*   AST U/L 10 11 12   ALBUMIN g/dL 2.3* 2.3* 2.4*       Estimated Creatinine Clearance: 125 mL/min (A) (by C-G formula based on SCr of 0.29 mg/dL (L)).  C-Reactive Protein   Date Value Ref Range Status   02/02/2024 23.75 (H) <1.00 mg/dL Final   01/12/2024 4.87 (H) <1.00 mg/dL Final     Microbiology  Reviewed  Imaging  reviewed        Assessment/Plan    Right hip surgical wound infection, for surgery, the culture with MSSA, sp surgery and partial hardware exchange  Emesis, xray no evidence of obstruction     Recommendations :  Continue  Cefazolin, plan on 6 weeks of therapy with weekly labs  Discussed with the medical team    I spent minutes in the professional and overall care of this patient.      Frank Modi MD

## 2024-02-10 NOTE — PROGRESS NOTES
Patient: Blanca Hamilton  Room/bed: 133/133-A  Admitted on: 2/2/2024    Age: 48 y.o.   Gender: female  Code Status:  Full Code   Admitting Dx: Wound infection after surgery [T81.49XA]    MRN: [id number]  PCP: Henry Olmos DO       Subjective   Seen and examined in her room this AM. Awake and alert. Resting in bed. Reports right hip pain is minimal. She denies chest pain, breathing difficulties, abdominal pain, N/V/D/C, fever, or chills.  BM this AM.     Objective    Physical Exam   Constitutional: A&O x 3; NAD; calm and cooperative  Eyes: EOM's intact  HEENT: Normocephalic, Atraumatic. Oral mucosa moist.   Neck: Supple. No JVD, lymphadenopathy.   Lungs: CTAB with fair air movement. Respirations even and unlabored on room air.   Heart: RRR  Abdomen: Soft, non-distended; non-tender; +BS  : Slaughter is patent with yellow urine.   MS/Extremities: LUND equally x 4 with RLE weakness related to hip infection. Right hip VAC dressing is C/D/I with serosanguinous drainage. No edema. Peripheral pulses intact bilaterally.   Neuro: A&O x3; no focal deficits; gross motor and sensation intact.   Skin: Warm and dry. No rashes or lesions  Psych: Normal affect.      Temp:  [35.8 °C (96.4 °F)-36.5 °C (97.7 °F)] 35.8 °C (96.4 °F)  Heart Rate:  [84-87] 87  Resp:  [16-18] 18  BP: (124-131)/(78-79) 131/78    Vitals:    02/02/24 1210   Weight: 83.3 kg (183 lb 10.3 oz)     I/Os    Intake/Output Summary (Last 24 hours) at 2/10/2024 1421  Last data filed at 2/10/2024 1108  Gross per 24 hour   Intake 2878.33 ml   Output 3725 ml   Net -846.67 ml         Labs:   Results from last 72 hours   Lab Units 02/10/24  0544 02/09/24  0622 02/08/24  0616   SODIUM mmol/L 134* 135* 135*   POTASSIUM mmol/L 4.0 4.2 4.5   CHLORIDE mmol/L 104 103 103   CO2 mmol/L 27 28 28   BUN mg/dL 4* 5* 6   CREATININE mg/dL 0.29* 0.30* 0.39*   GLUCOSE mg/dL 82 79 85   CALCIUM mg/dL 7.4* 7.4* 7.6*   ANION GAP mmol/L 7* 8* 9*   EGFR mL/min/1.73m*2 >90 >90 >90        Results from  last 72 hours   Lab Units 02/10/24  0544 02/09/24  0622 02/08/24  1659 02/08/24  0616   WBC AUTO x10*3/uL 7.9 6.2 6.2 5.2   HEMOGLOBIN g/dL 8.1* 8.1* 6.8* 7.4*   HEMATOCRIT % 25.3* 25.0* 21.8* 23.6*   PLATELETS AUTO x10*3/uL 336 320 302 295   NEUTROS PCT AUTO % 57.5  --   --  45.8   LYMPHO PCT MAN %  --  11.0  --   --    LYMPHS PCT AUTO % 16.3  --   --  24.5   MONO PCT MAN %  --  10.0  --   --    MONOS PCT AUTO % 8.6  --   --  9.6   EOSINO PCT MAN %  --  10.0  --   --    EOS PCT AUTO % 10.0  --   --  14.9        Lab Results   Component Value Date    CALCIUM 7.4 (L) 02/10/2024    PHOS 2.5 02/02/2024      Lab Results   Component Value Date    CRP 23.75 (H) 02/02/2024        Micro/ID:   Susceptibility data from last 90 days.  Collected Specimen Info Organism Clindamycin Erythromycin Oxacillin Tetracycline Trimethoprim/Sulfamethoxazole Vancomycin   02/06/24 Swab from HIP ARTHROPLASTY RIGHT Methicillin Susceptible Staphylococcus aureus (MSSA) S S S R S S   02/06/24 Swab from HIP ARTHROPLASTY RIGHT Staphylococcus aureus         02/06/24 Swab from HIP ARTHROPLASTY RIGHT Staphylococcus aureus         02/02/24 Tissue/Biopsy from Wound/Tissue Methicillin Susceptible Staphylococcus aureus (MSSA) S S S R S S       .ID  Lab Results   Component Value Date    BLOODCULT No growth at 4 days -  FINAL REPORT 02/04/2024    BLOODCULT No growth at 4 days -  FINAL REPORT 02/04/2024       Images:  KUB FINDINGS:  Nonobstructive bowel gas pattern. Partially visualized bilateral hip  arthroplasties.      Impression: 1.  Nonobstructive bowel gas pattern.     Meds    Scheduled medications  acetaminophen, 975 mg, oral, q8h  ARIPiprazole, 15 mg, oral, Nightly  [Held by provider] aspirin, 81 mg, oral, BID  ceFAZolin, 2 g, intravenous, q8h  [Held by provider] enoxaparin, 40 mg, subcutaneous, q24h  famotidine, 40 mg, oral, BID  ferrous sulfate (325 mg ferrous sulfate), 1 tablet, oral, Daily  lactulose, 20 g, oral, Daily  lidocaine, 5 mL,  infiltration, Once  [Held by provider] lisinopril, 10 mg, oral, q AM  loratadine, 10 mg, oral, Daily  magnesium oxide, 400 mg, oral, Daily  metoprolol succinate XL, 25 mg, oral, Daily  montelukast, 10 mg, oral, q AM  [Held by provider] naproxen, 500 mg, oral, BID with meals  nicotine, 1 patch, transdermal, Daily  ondansetron, 4 mg, intravenous, Once  pantoprazole, 40 mg, intravenous, BID  PARoxetine, 40 mg, oral, Daily  polyethylene glycol, 17 g, oral, Daily  [Held by provider] potassium chloride CR, 40 mEq, oral, BID  simvastatin, 20 mg, oral, Nightly  traZODone, 100 mg, oral, Nightly  zinc oxide, 1 Application, Topical, BID      Continuous medications       PRN medications  PRN medications: acetaminophen **OR** acetaminophen **OR** acetaminophen, alteplase, HYDROmorphone, ondansetron, oxyCODONE-acetaminophen     Assessment and Plan    Blanca Hamilton is a 48 y.o. female with a medical history of GERD, HTN, and Depression who presented with a right hip prosthetic joint infection.     Right Prosthetic Hip Joint Infection  -S/p ALEXA on 1/2 with subsequent revision on 1/5 for prosthetic fracture  -Cultures positive for MSSA  -ID is following - on Cefazolin  -Underwent ALEXA revision and partial hardware removal with Dr. Jalloh on 2/6  -WB only with transfers  -Medicate for pain  -Final BC negative.   -Monitor ESR, CRP  -Advised IS use  -Maintain bowel regimen  -Monitor H&H for ABLA. EBL 500ml. Hgb 8.1 this AM.   -VAC dressing with bloody drainage. 125ml output in first 4 hours of day shift. Discussed with orthopedics - recommends regular wound vac dressing vs Prevena to manage output.    -Afebrile. No leukocytosis.   -PICC line placed for 6 weeks of IV antibiotic therapy.     Hypertension  -Had been hypotensive and agents on hold (Lisinopril, Metoprolol)  -SBP has improved. Metoprolol resumed.   -Will continue to hold Lisinopril and resume with discharge.      Anxiety/Depression  -On home regimen: Paxil, Abilify,  Trazadone    Acute on Chronic Anemia  -Likely related to infection, hemodilution, ABLA  -Hgb dropped to 7.0 POD #1. Today is 8.1; unchanged from yesterday AM.  -Transfused 1 unit PRBC on 2/8 when Hgb dropped to 6.8..  -Transfuse as needed to keep >7  -On Iron supplementation  -CBC in AM  -Hold Lovenox and ASA for now.     Tobacco Use Disorder  -On Nicotine Patch    DVT Prophylaxis  -Lovenox    Fluids/Electrolytes/Nutrition  -Laboratory data reviewed.   -Electrolytes stable and replaced as needed.   -No current nutritional issues.     Disposition  -Plan of care discussed with attending, Dr. Ramírez, ID.   -Discharge to nursing facility was delayed till tomorrow as will require KCL wound vac due to increased output and facility unable to obtain till tomorrow.   -Plan was discussed via epic chat with Dr. Jalloh.       Cherry Michaels, APRN-CNP

## 2024-02-10 NOTE — NURSING NOTE
2-9-24/1930: assumed pt care    2-9-24/2200: changed pt's IV tubing and PICC drsg, old PICC line drsg had dried blood and was peeling off.

## 2024-02-10 NOTE — DISCHARGE SUMMARY
Discharge Diagnosis  MSSA Prosthetic Hip Joint Infection    Issues Requiring Follow-Up  Medical management with IV antibiotic therapy  F/U with orthopedic surgeon    Discharge Meds     Your medication list        START taking these medications        Instructions Last Dose Given Next Dose Due   ceFAZolin in dextrose 5 % 2 gram/100 mL solution  Commonly known as: Ancef      Infuse 100 mL (2 g) at 200 mL/hr over 30 minutes into a venous catheter every 8 hours. For 6 weeks of therapy with weekly labs: CBC, CMP, ESR, CRP       magnesium oxide 400 mg (241.3 mg magnesium) tablet  Commonly known as: Mag-Ox      Take 1 tablet (400 mg) by mouth once daily.       nicotine 21 mg/24 hr patch  Commonly known as: Nicoderm CQ      Place 1 patch over 24 hours on the skin once daily.       polyethylene glycol 17 gram packet  Commonly known as: Glycolax, Miralax      Take 17 g by mouth once daily.       zinc oxide 40 % ointment ointment      Apply 1 Application topically 2 times a day for 23 doses.              CHANGE how you take these medications        Instructions Last Dose Given Next Dose Due   oxyCODONE-acetaminophen 5-325 mg tablet  Commonly known as: Percocet  What changed: when to take this      Take 1 tablet by mouth every 6 hours if needed for severe pain (7 - 10) for up to 3 days.       PARoxetine CR 37.5 mg 24 hr tablet  Commonly known as: Paxil-CR  What changed: Another medication with the same name was removed. Continue taking this medication, and follow the directions you see here.                  CONTINUE taking these medications        Instructions Last Dose Given Next Dose Due   Abilify 15 mg tablet  Generic drug: ARIPiprazole           aspirin 81 mg chewable tablet      Chew 1 tablet (81 mg) 2 times a day.       ferrous sulfate (325 mg ferrous sulfate) tablet           lisinopril 10 mg tablet           metoprolol succinate XL 25 mg 24 hr tablet  Commonly known as: Toprol-XL      Take 1 tablet (25 mg) by mouth  once daily. Do not crush or chew. Do not start before January 14, 2024.       montelukast 10 mg tablet  Commonly known as: Singulair           omeprazole 40 mg DR capsule  Commonly known as: PriLOSEC           simvastatin 20 mg tablet  Commonly known as: Zocor           traZODone 50 mg tablet  Commonly known as: Desyrel           ZyrTEC 10 mg tablet  Generic drug: cetirizine                  STOP taking these medications      doxycycline 100 mg capsule  Commonly known as: Vibramycin        naproxen 500 mg tablet  Commonly known as: Naprosyn                  Where to Get Your Medications        These medications were sent to [facility] [place] [id number]      Phone: [phone]   zinc oxide 40 % ointment ointment       You can get these medications from any pharmacy    Bring a paper prescription for each of these medications  oxyCODONE-acetaminophen 5-325 mg tablet       Information about where to get these medications is not yet available    Ask your nurse or doctor about these medications  aspirin 81 mg chewable tablet  ceFAZolin in dextrose 5 % 2 gram/100 mL solution  magnesium oxide 400 mg (241.3 mg magnesium) tablet  nicotine 21 mg/24 hr patch  polyethylene glycol 17 gram packet         Test Results Pending At Discharge  Pending Labs       Order Current Status    Staphylococcus Aureus/MRSA Colonization, Culture - PICC Only In process            Hospital Course  Blanca Hamilton is a 48 y.o. female with a medical history of GERD, HTN, and Depression who presented with a right hip prosthetic joint infection. Underwent ALEXA on 1/2 with subsequent revision on 1/5 for prosthetic fracture. Cultures positive for MSSA. ID is following - on Cefazolin. Underwent ALEXA revision and partial hardware removal with Dr. Jalloh on 2/6/24. WB only with transfers. Medicated for pain. Final BC negative. Monitoring ESR, CRP. Advised IS use. Maintained bowel regimen. Monitored  H&H for ABLA. EBL 500ml. Hgb 8.1 this AM. VAC dressing with serosanguinous drainage. Discussed with orthopedics - feels this was possibly related to a hematoma. Cleared for discharge with orthopedic surgeon. Recommends ASA for DVT prophylaxis when wound drainage stops and to maintain regular vac with canister vs. Portable prevena. Afebrile. No leukocytosis. PICC line placed for 6 weeks of IV antibiotic therapy with weekly labs for monitoring.     Medical Management:     Hypertension  -Has been hypotensive and agents on hold (Lisinopril, Metoprolol)  -SBP has improved. Metoprolol resumed.   -Resume Lisinopril as BP supports.      Anxiety/Depression  -On home regimen: Paxil, Abilify, Trazadone     Acute on Chronic Anemia  -Likely related to infection, hemodilution, ABLA  -Hgb dropped to 7.0 POD #1. Today is 8.1.  -On 2/8 Hgb dropped to 6.8 - transfused 1 unit PRBC.  -On Iron supplementation  -No Lovenox due to bleeding  -Surgeon recommends to hold ASA until wound drainage stops     Tobacco Use Disorder  -On Nicotine Patch     DVT Prophylaxis  -Lovenox     Fluids/Electrolytes/Nutrition  -Laboratory data reviewed.   -Electrolytes stable and replaced as needed.   -No current nutritional issues.      Disposition  Plan of care discussed with attending, DOMENIC Solorzano, and orthopedics surgeon. Seen and examined in her room this AM. Doing well. No new complaints. She denies chest pain, breathing difficulties, abdominal pain, N/V/D/C, fever, or chills.  Large BM this AM. Medications reviewed and reconciled. Scripts prepared as needed. Greater than 35 minutes spent in coordination of care, including physical examination, chart review, medication reconciliation, collaboration with providers, staff, and family.     Pertinent Physical Exam At Time of Discharge  Physical Exam  Constitutional: A&O x 3; NAD; calm and cooperative  Eyes: EOM's intact  HEENT: Normocephalic, Atraumatic. Oral mucosa moist.   Neck: Supple. No JVD,  lymphadenopathy.   Lungs: CTAB with fair air movement. Respirations even and unlabored on room air.   Heart: RRR  Abdomen: Soft, non-distended; non-tender; +BS  : Slaughter is patent with yellow urine.   MS/Extremities: LUND equally x 4 with RLE weakness related to hip infection. Right hip VAC dressing is C/D/I with serosanguinous drainage. No edema. Peripheral pulses intact bilaterally.   Neuro: A&O x3; no focal deficits; gross motor and sensation intact. Cognitive dysfunction.  Skin: Warm and dry. No rashes or lesions  Psych: Normal affect.      Outpatient Follow-Up  No future appointments.      Cherry Michaels, APRN-CNP

## 2024-02-10 NOTE — CARE PLAN
Provider now requesting traditional wound vac, instead of proveena wound vac due to amount of drainage. Spoke with Shauna from [facility] as well as messaged facility in [facility]. They do not have a wound vac on site, will order a wound vac and will be delivered on Monday. Spoke with Sofie from [facility] and there are no options to send with [facility] vac from hospital. Provider, patient and facility aware patient will need to stay here until Monday once the Vac is delivered to SNF. [facility]ound [facility] orders [facility] per facility request. Will follow. Discussed in interdisciplinary rounds.     2/10/24 @ 4358: Per [facility] they can have a vac and supplies tomorrow and patient could admit there tomorrow.

## 2024-02-10 NOTE — NURSING NOTE
1930: assumed pt care    2240: ok to remove the thigh high ADIS hose at night per Blanca Bond NP, removed ADIS hose to legs    2303: pt states she takes simvastatin 20mg PO at night, updated med list and asking Blanca Bond NP if pt can have it ordered

## 2024-02-11 VITALS
BODY MASS INDEX: 30.6 KG/M2 | TEMPERATURE: 97.9 F | HEIGHT: 65 IN | OXYGEN SATURATION: 94 % | WEIGHT: 183.64 LBS | HEART RATE: 78 BPM | SYSTOLIC BLOOD PRESSURE: 132 MMHG | DIASTOLIC BLOOD PRESSURE: 83 MMHG | RESPIRATION RATE: 20 BRPM

## 2024-02-11 LAB
ALBUMIN SERPL BCP-MCNC: 2.4 G/DL (ref 3.4–5)
ALP SERPL-CCNC: 105 U/L (ref 33–110)
ALT SERPL W P-5'-P-CCNC: 4 U/L (ref 7–45)
ANION GAP SERPL CALC-SCNC: 9 MMOL/L (ref 10–20)
AST SERPL W P-5'-P-CCNC: 9 U/L (ref 9–39)
BASOPHILS # BLD AUTO: 0.03 X10*3/UL (ref 0–0.1)
BASOPHILS NFR BLD AUTO: 0.4 %
BILIRUB SERPL-MCNC: 0.3 MG/DL (ref 0–1.2)
BUN SERPL-MCNC: 5 MG/DL (ref 6–23)
CALCIUM SERPL-MCNC: 7.5 MG/DL (ref 8.6–10.3)
CHLORIDE SERPL-SCNC: 103 MMOL/L (ref 98–107)
CO2 SERPL-SCNC: 27 MMOL/L (ref 21–32)
CREAT SERPL-MCNC: 0.29 MG/DL (ref 0.5–1.05)
EGFRCR SERPLBLD CKD-EPI 2021: >90 ML/MIN/1.73M*2
EOSINOPHIL # BLD AUTO: 0.67 X10*3/UL (ref 0–0.7)
EOSINOPHIL NFR BLD AUTO: 8.4 %
ERYTHROCYTE [DISTWIDTH] IN BLOOD BY AUTOMATED COUNT: 21.7 % (ref 11.5–14.5)
GLUCOSE SERPL-MCNC: 77 MG/DL (ref 74–99)
HCT VFR BLD AUTO: 26.1 % (ref 36–46)
HGB BLD-MCNC: 8.1 G/DL (ref 12–16)
IMM GRANULOCYTES # BLD AUTO: 0.48 X10*3/UL (ref 0–0.7)
IMM GRANULOCYTES NFR BLD AUTO: 6 % (ref 0–0.9)
LYMPHOCYTES # BLD AUTO: 1.3 X10*3/UL (ref 1.2–4.8)
LYMPHOCYTES NFR BLD AUTO: 16.2 %
MCH RBC QN AUTO: 28.1 PG (ref 26–34)
MCHC RBC AUTO-ENTMCNC: 31 G/DL (ref 32–36)
MCV RBC AUTO: 91 FL (ref 80–100)
MONOCYTES # BLD AUTO: 0.66 X10*3/UL (ref 0.1–1)
MONOCYTES NFR BLD AUTO: 8.2 %
NEUTROPHILS # BLD AUTO: 4.88 X10*3/UL (ref 1.2–7.7)
NEUTROPHILS NFR BLD AUTO: 60.8 %
NRBC BLD-RTO: 0 /100 WBCS (ref 0–0)
PLATELET # BLD AUTO: 341 X10*3/UL (ref 150–450)
POTASSIUM SERPL-SCNC: 4.2 MMOL/L (ref 3.5–5.3)
PROT SERPL-MCNC: 4.6 G/DL (ref 6.4–8.2)
RBC # BLD AUTO: 2.88 X10*6/UL (ref 4–5.2)
SODIUM SERPL-SCNC: 135 MMOL/L (ref 136–145)
WBC # BLD AUTO: 8 X10*3/UL (ref 4.4–11.3)

## 2024-02-11 PROCEDURE — 2500000001 HC RX 250 WO HCPCS SELF ADMINISTERED DRUGS (ALT 637 FOR MEDICARE OP): Performed by: NURSE PRACTITIONER

## 2024-02-11 PROCEDURE — 84075 ASSAY ALKALINE PHOSPHATASE: CPT | Performed by: STUDENT IN AN ORGANIZED HEALTH CARE EDUCATION/TRAINING PROGRAM

## 2024-02-11 PROCEDURE — 2500000002 HC RX 250 W HCPCS SELF ADMINISTERED DRUGS (ALT 637 FOR MEDICARE OP, ALT 636 FOR OP/ED): Performed by: ORTHOPAEDIC SURGERY

## 2024-02-11 PROCEDURE — 2500000004 HC RX 250 GENERAL PHARMACY W/ HCPCS (ALT 636 FOR OP/ED): Performed by: ORTHOPAEDIC SURGERY

## 2024-02-11 PROCEDURE — 2500000001 HC RX 250 WO HCPCS SELF ADMINISTERED DRUGS (ALT 637 FOR MEDICARE OP): Performed by: ORTHOPAEDIC SURGERY

## 2024-02-11 PROCEDURE — 85025 COMPLETE CBC W/AUTO DIFF WBC: CPT | Performed by: STUDENT IN AN ORGANIZED HEALTH CARE EDUCATION/TRAINING PROGRAM

## 2024-02-11 PROCEDURE — S4991 NICOTINE PATCH NONLEGEND: HCPCS | Performed by: ORTHOPAEDIC SURGERY

## 2024-02-11 PROCEDURE — 99239 HOSP IP/OBS DSCHRG MGMT >30: CPT | Performed by: STUDENT IN AN ORGANIZED HEALTH CARE EDUCATION/TRAINING PROGRAM

## 2024-02-11 RX ADMIN — LORATADINE 10 MG: 10 TABLET ORAL at 08:01

## 2024-02-11 RX ADMIN — CEFAZOLIN SODIUM 2 G: 2 INJECTION, SOLUTION INTRAVENOUS at 00:02

## 2024-02-11 RX ADMIN — PAROXETINE HYDROCHLORIDE 40 MG: 20 TABLET, FILM COATED ORAL at 08:01

## 2024-02-11 RX ADMIN — CEFAZOLIN SODIUM 2 G: 2 INJECTION, SOLUTION INTRAVENOUS at 07:58

## 2024-02-11 RX ADMIN — NICOTINE 1 PATCH: 21 PATCH, EXTENDED RELEASE TRANSDERMAL at 08:01

## 2024-02-11 RX ADMIN — ACETAMINOPHEN 975 MG: 325 TABLET ORAL at 00:03

## 2024-02-11 RX ADMIN — METOPROLOL SUCCINATE 25 MG: 25 TABLET, EXTENDED RELEASE ORAL at 08:01

## 2024-02-11 RX ADMIN — MONTELUKAST 10 MG: 10 TABLET, FILM COATED ORAL at 08:01

## 2024-02-11 RX ADMIN — FAMOTIDINE 40 MG: 20 TABLET ORAL at 08:01

## 2024-02-11 RX ADMIN — POLYETHYLENE GLYCOL 3350 17 G: 17 POWDER, FOR SOLUTION ORAL at 08:01

## 2024-02-11 RX ADMIN — OXYCODONE HYDROCHLORIDE AND ACETAMINOPHEN 1 TABLET: 5; 325 TABLET ORAL at 07:58

## 2024-02-11 RX ADMIN — Medication 400 MG: at 08:01

## 2024-02-11 RX ADMIN — FERROUS SULFATE TAB 325 MG (65 MG ELEMENTAL FE) 1 TABLET: 325 (65 FE) TAB at 05:00

## 2024-02-11 ASSESSMENT — PAIN - FUNCTIONAL ASSESSMENT
PAIN_FUNCTIONAL_ASSESSMENT: 0-10
PAIN_FUNCTIONAL_ASSESSMENT: 0-10

## 2024-02-11 ASSESSMENT — COGNITIVE AND FUNCTIONAL STATUS - GENERAL
MOBILITY SCORE: 11
PERSONAL GROOMING: A LOT
STANDING UP FROM CHAIR USING ARMS: TOTAL
MOVING TO AND FROM BED TO CHAIR: TOTAL
DAILY ACTIVITIY SCORE: 15
TURNING FROM BACK TO SIDE WHILE IN FLAT BAD: A LITTLE
CLIMB 3 TO 5 STEPS WITH RAILING: TOTAL
DRESSING REGULAR UPPER BODY CLOTHING: A LITTLE
HELP NEEDED FOR BATHING: A LOT
TOILETING: A LOT
WALKING IN HOSPITAL ROOM: TOTAL
DRESSING REGULAR LOWER BODY CLOTHING: A LOT

## 2024-02-11 ASSESSMENT — PAIN SCALES - GENERAL
PAINLEVEL_OUTOF10: 5 - MODERATE PAIN
PAINLEVEL_OUTOF10: 3

## 2024-02-11 NOTE — NURSING NOTE
Pt discharged to [facility].  Wound vac removed.  Sponge left in place per Shauna at [facility]'s request.  Covered with ABD pads for transfer.  Report called to Shauna at [facility]. PICC line to left upper arm intact.  Flushed with saline and blood return verified.

## 2024-02-11 NOTE — CARE PLAN
The patient's goals for the shift include patient will get adequate sleep    The clinical goals for the shift include monitor out put of wound vac

## 2024-02-11 NOTE — CARE PLAN
The patient's goals for the shift include pain control and discharge    The clinical goals for the shift include discharge    Over the shift, the patient did not make progress toward the following goals. Barriers to progression include complicated surgery recovery. Recommendations to address these barriers include support.

## 2024-02-11 NOTE — PROGRESS NOTES
Blanca Hamilton is a 48 y.o. female on day 9 of admission presenting with No Principal Problem: There is no principal problem currently on the Problem List. Please update the Problem List and refresh..    Subjective   Interval History: no fever, no new complaints        Review of Systems    Objective   Range of Vitals (last 24 hours)  Heart Rate:  [78-88]   Temp:  [36.1 °C (97 °F)-36.6 °C (97.9 °F)]   Resp:  [14-20]   BP: (107-132)/(65-83)   SpO2:  [94 %-96 %]   Daily Weight  02/02/24 : 83.3 kg (183 lb 10.3 oz)    Body mass index is 30.56 kg/m².    Physical Exam  Constitutional:       Appearance: Normal appearance.   HENT:      Head: Normocephalic and atraumatic.      Mouth/Throat:      Mouth: Mucous membranes are moist.      Pharynx: Oropharynx is clear.   Eyes:      Pupils: Pupils are equal, round, and reactive to light.   Cardiovascular:      Rate and Rhythm: Normal rate and regular rhythm.      Heart sounds: Normal heart sounds.   Pulmonary:      Effort: Pulmonary effort is normal.      Breath sounds: Normal breath sounds.   Abdominal:      General: Abdomen is flat. Bowel sounds are normal.      Palpations: Abdomen is soft.   Musculoskeletal:      Cervical back: Normal range of motion.      Comments: Rt hip dressing   Neurological:      Mental Status: She is alert.         Antibiotics  ceFAZolin in dextrose (iso-os) (Ancef) IVPB 2 g  vancomycin (Vancocin) in dextrose 5 % water (D5W) 500 mL IV 1,500 mg  cefTRIAXone (Rocephin) IVPB 1 g  sodium chloride 0.9% infusion  sodium chloride 0.9 % bolus 1,000 mL  HYDROmorphone (Dilaudid) injection 0.2 mg  lisonpril (Prinivil, Zestril) tablet  PARoxetine (Paxil-CR) 24 hr tablet  acetaminophen (Tylenol) tablet 650 mg  acetaminophen (Tylenol) oral liquid 650 mg  acetaminophen (Tylenol) suppository 650 mg  polyethylene glycol (Glycolax, Miralax) packet 17 g  enoxaparin (Lovenox) syringe 40 mg  cefTRIAXone (Rocephin) 2 g IV in dextrose 5% 50 mL  HYDROmorphone (Dilaudid) injection  0.2 mg  acetaminophen (Tylenol) tablet 975 mg  magnesium oxide (Mag-Ox) tablet 400 mg  nicotine (Nicoderm CQ) 21 mg/24 hr patch 1 patch  ARIPiprazole (Abilify) tablet 15 mg  aspirin chewable tablet 81 mg  loratadine (Claritin) tablet 10 mg  ferrous sulfate (325 mg ferrous sulfate) tablet 1 tablet  lisinopril tablet 10 mg  metoprolol succinate XL (Toprol-XL) 24 hr tablet 25 mg  montelukast (Singulair) tablet 10 mg  naproxen (Naprosyn) tablet 500 mg  pantoprazole (ProtoNix) EC tablet 40 mg  oxyCODONE-acetaminophen (Percocet) 5-325 mg per tablet 1 tablet  PARoxetine (Paxil) tablet 20 mg  PARoxetine (Paxil) tablet 40 mg  traZODone (Desyrel) tablet 100 mg  PARoxetine (Paxil-CR) 24 hr tablet  vancomycin (Vancocin) 1,000 mg in dextrose 5% water 200 mL  PARoxetine (Paxil) tablet 20 mg  sodium chloride 0.9% infusion  sodium chloride 0.9 % bolus 1,000 mL  magnesium sulfate IV 2 g  potassium chloride (Klor-Con) packet 40 mEq      Relevant Results  Labs  Results from last 72 hours   Lab Units 02/11/24  0440 02/10/24  0544 02/09/24  0622   WBC AUTO x10*3/uL 8.0 7.9 6.2   HEMOGLOBIN g/dL 8.1* 8.1* 8.1*   HEMATOCRIT % 26.1* 25.3* 25.0*   PLATELETS AUTO x10*3/uL 341 336 320   NEUTROS PCT AUTO % 60.8 57.5  --    LYMPHO PCT MAN %  --   --  11.0   LYMPHS PCT AUTO % 16.2 16.3  --    MONO PCT MAN %  --   --  10.0   MONOS PCT AUTO % 8.2 8.6  --    EOSINO PCT MAN %  --   --  10.0   EOS PCT AUTO % 8.4 10.0  --        Results from last 72 hours   Lab Units 02/11/24  0440 02/10/24  0544 02/09/24  0622   SODIUM mmol/L 135* 134* 135*   POTASSIUM mmol/L 4.2 4.0 4.2   CHLORIDE mmol/L 103 104 103   CO2 mmol/L 27 27 28   BUN mg/dL 5* 4* 5*   CREATININE mg/dL 0.29* 0.29* 0.30*   GLUCOSE mg/dL 77 82 79   CALCIUM mg/dL 7.5* 7.4* 7.4*   ANION GAP mmol/L 9* 7* 8*   EGFR mL/min/1.73m*2 >90 >90 >90       Results from last 72 hours   Lab Units 02/11/24  0440 02/10/24  0544 02/09/24  0622   ALK PHOS U/L 105 97 93   BILIRUBIN TOTAL mg/dL 0.3 0.2 0.3    PROTEIN TOTAL g/dL 4.6* 4.4* 4.4*   ALT U/L 4* 5* 6*   AST U/L 9 10 11   ALBUMIN g/dL 2.4* 2.3* 2.3*       Estimated Creatinine Clearance: 125 mL/min (A) (by C-G formula based on SCr of 0.29 mg/dL (L)).  C-Reactive Protein   Date Value Ref Range Status   02/02/2024 23.75 (H) <1.00 mg/dL Final   01/12/2024 4.87 (H) <1.00 mg/dL Final     Microbiology  Reviewed  Imaging  reviewed        Assessment/Plan    Right hip surgical wound infection, for surgery, the culture with MSSA, sp surgery and partial hardware exchange  Emesis, xray no evidence of obstruction     Recommendations :  Continue  Cefazolin, plan on 6 weeks of therapy with weekly labs  Discussed with the medical team    I spent minutes in the professional and overall care of this patient.      Frank Modi MD

## 2024-02-11 NOTE — CARE PLAN
Medically ready for discharge today to [facility] in [place]. Auth obtained. Orders complete. Patient now going to have traditional wound vac. Facility has vac onsite today and able to accept patient. Transport confirmed for 1030 pickup. Patient, RN, facility notified of discharge time. Number for nurse to nurse report provided to SAMANTHA Mason.

## 2024-02-11 NOTE — DISCHARGE SUMMARY
Discharge Diagnosis  MSSA Prosthetic Hip Joint Infection     Issues Requiring Follow-Up  Medical management with IV antibiotic therapy  F/U with orthopedic surgeon     Discharge Meds      Your medication list          START taking these medications         Instructions Last Dose Given Next Dose Due   ceFAZolin in dextrose 5 % 2 gram/100 mL solution  Commonly known as: Ancef       Infuse 100 mL (2 g) at 200 mL/hr over 30 minutes into a venous catheter every 8 hours. For 6 weeks of therapy with weekly labs: CBC, CMP, ESR, CRP          magnesium oxide 400 mg (241.3 mg magnesium) tablet  Commonly known as: Mag-Ox       Take 1 tablet (400 mg) by mouth once daily.          nicotine 21 mg/24 hr patch  Commonly known as: Nicoderm CQ       Place 1 patch over 24 hours on the skin once daily.          polyethylene glycol 17 gram packet  Commonly known as: Glycolax, Miralax       Take 17 g by mouth once daily.          zinc oxide 40 % ointment ointment       Apply 1 Application topically 2 times a day for 23 doses.                    CHANGE how you take these medications         Instructions Last Dose Given Next Dose Due   oxyCODONE-acetaminophen 5-325 mg tablet  Commonly known as: Percocet  What changed: when to take this       Take 1 tablet by mouth every 6 hours if needed for severe pain (7 - 10) for up to 3 days.          PARoxetine CR 37.5 mg 24 hr tablet  Commonly known as: Paxil-CR  What changed: Another medication with the same name was removed. Continue taking this medication, and follow the directions you see here.                         CONTINUE taking these medications         Instructions Last Dose Given Next Dose Due   Abilify 15 mg tablet  Generic drug: ARIPiprazole               aspirin 81 mg chewable tablet       Chew 1 tablet (81 mg) 2 times a day.          ferrous sulfate (325 mg ferrous sulfate) tablet               lisinopril 10 mg tablet               metoprolol succinate XL 25 mg 24 hr tablet  Commonly  known as: Toprol-XL       Take 1 tablet (25 mg) by mouth once daily. Do not crush or chew. Do not start before January 14, 2024.          montelukast 10 mg tablet  Commonly known as: Singulair               omeprazole 40 mg DR capsule  Commonly known as: PriLOSEC               simvastatin 20 mg tablet  Commonly known as: Zocor               traZODone 50 mg tablet  Commonly known as: Desyrel               ZyrTEC 10 mg tablet  Generic drug: cetirizine                         STOP taking these medications       doxycycline 100 mg capsule  Commonly known as: Vibramycin         naproxen 500 mg tablet  Commonly known as: Naprosyn                       Where to Get Your Medications          These medications were sent to [email or web address] [place] [id number]        Phone: [phone]   zinc oxide 40 % ointment ointment         You can get these medications from any pharmacy    Bring a paper prescription for each of these medications  oxyCODONE-acetaminophen 5-325 mg tablet         Information about where to get these medications is not yet available    Ask your nurse or doctor about these medications  aspirin 81 mg chewable tablet  ceFAZolin in dextrose 5 % 2 gram/100 mL solution  magnesium oxide 400 mg (241.3 mg magnesium) tablet  nicotine 21 mg/24 hr patch  polyethylene glycol 17 gram packet            Test Results Pending At Discharge  Pending Labs         Order Current Status     Staphylococcus Aureus/MRSA Colonization, Culture - PICC Only In process                Hospital Course  Blanca Hamilton is a 48 y.o. female with a medical history of GERD, HTN, and Depression who presented with a right hip prosthetic joint infection. Underwent ALEXA on 1/2 with subsequent revision on 1/5 for prosthetic fracture. Cultures positive for MSSA. ID is following - on Cefazolin. Underwent ALEXA revision and partial hardware removal with Dr. Jalloh on 2/6/24. WB only with transfers.  Medicated for pain. Final BC negative. Monitoring ESR, CRP. Advised IS use. Maintained bowel regimen. Monitored H&H for ABLA. EBL 500ml. Hgb 8.1 this AM. VAC dressing with serosanguinous drainage. Discussed with orthopedics - feels this was possibly related to a hematoma. Cleared for discharge with orthopedic surgeon. Recommends ASA for DVT prophylaxis when wound drainage stops and to maintain regular vac with canister vs. Portable prevena. Facility able to order and have available today. Remained overnight to await availability. Afebrile. No leukocytosis. PICC line placed for 6 weeks of IV antibiotic therapy with weekly labs for monitoring.      Medical Management:      Hypertension  -Had been hypotensive and agents on hold (Lisinopril, Metoprolol)  -SBP has improved. Metoprolol resumed.   -Resumed Lisinopril as BP supports.      Anxiety/Depression  -On home regimen: Paxil, Abilify, Trazadone     Acute on Chronic Anemia  -Likely related to infection, hemodilution, ABLA  -Hgb dropped to 7.0 POD #1. Today is 8.1 - unchanged x 3 days.  -On 2/8 Hgb dropped to 6.8 - transfused 1 unit PRBC.  -On Iron supplementation  -No Lovenox due to bleeding  -Surgeon recommends to hold ASA until wound drainage stops     Tobacco Use Disorder  -On Nicotine Patch     DVT Prophylaxis  -Lovenox     Fluids/Electrolytes/Nutrition  -Laboratory data reviewed.   -Electrolytes stable and replaced as needed.   -No current nutritional issues.      Disposition  Plan of care discussed with attending, DOMENIC Solorzano, and orthopedics surgeon. Seen and examined in her room this AM. Doing well. No new complaints. She denies chest pain, breathing difficulties, abdominal pain, N/V/D/C, fever, or chills.  Large BM yesterday AM. Medications reviewed and reconciled. Scripts prepared as needed. Greater than 35 minutes spent in coordination of care, including physical examination, chart review, medication reconciliation, collaboration with providers, staff, and  family.      Pertinent Physical Exam At Time of Discharge  Physical Exam  Constitutional: A&O x 3; NAD; calm and cooperative  Eyes: EOM's intact  HEENT: Normocephalic, Atraumatic. Oral mucosa moist.   Neck: Supple. No JVD, lymphadenopathy.   Lungs: CTAB with fair air movement. Respirations even and unlabored on room air.   Heart: RRR  Abdomen: Soft, non-distended; non-tender; +BS  : Slaughter is patent with yellow urine.   MS/Extremities: LUND equally x 4 with RLE weakness related to hip infection. Right hip VAC dressing is C/D/I with serosanguinous drainage. No edema. Peripheral pulses intact bilaterally.   Neuro: A&O x3; no focal deficits; gross motor and sensation intact. Cognitive dysfunction.  Skin: Warm and dry. No rashes or lesions  Psych: Normal affect.       Outpatient Follow-Up  No future appointments.        Cherry Michaels, APRN-CNP

## 2024-02-13 ENCOUNTER — TELEPHONE (OUTPATIENT)
Dept: ORTHOPEDIC SURGERY | Facility: CLINIC | Age: 49
End: 2024-02-13
Payer: COMMERCIAL

## 2024-02-13 PROCEDURE — 99291 CRITICAL CARE FIRST HOUR: CPT | Performed by: STUDENT IN AN ORGANIZED HEALTH CARE EDUCATION/TRAINING PROGRAM

## 2024-02-13 NOTE — TELEPHONE ENCOUNTER
2/6/24 rt oanh  Patient is in a rehab facility and her wound vac is completely full. She is bleeding so much, it is mainly full of blood. Wound Nurse doesn't come until tomorrow and they want to make sure this is normal? They're constantly changing her sheets because it is so bad.    [place], Nurse   Phone: [phone]

## 2024-02-14 ENCOUNTER — APPOINTMENT (OUTPATIENT)
Dept: VASCULAR MEDICINE | Facility: HOSPITAL | Age: 49
DRG: 464 | End: 2024-02-14
Payer: COMMERCIAL

## 2024-02-14 ENCOUNTER — HOSPITAL ENCOUNTER (INPATIENT)
Facility: HOSPITAL | Age: 49
LOS: 20 days | Discharge: NURSING, REHAB OR LONG-TERM CARE | DRG: 464 | End: 2024-03-05
Attending: STUDENT IN AN ORGANIZED HEALTH CARE EDUCATION/TRAINING PROGRAM | Admitting: INTERNAL MEDICINE
Payer: COMMERCIAL

## 2024-02-14 DIAGNOSIS — A41.9 SEPSIS, DUE TO UNSPECIFIED ORGANISM, UNSPECIFIED WHETHER ACUTE ORGAN DYSFUNCTION PRESENT (MULTI): ICD-10-CM

## 2024-02-14 DIAGNOSIS — L03.115 CELLULITIS OF RIGHT LOWER LIMB: ICD-10-CM

## 2024-02-14 DIAGNOSIS — Z74.09 IMPAIRED FUNCTIONAL MOBILITY AND ACTIVITY TOLERANCE: ICD-10-CM

## 2024-02-14 DIAGNOSIS — E87.8 ELECTROLYTE ABNORMALITY: ICD-10-CM

## 2024-02-14 DIAGNOSIS — R60.0 LOCALIZED EDEMA: ICD-10-CM

## 2024-02-14 DIAGNOSIS — T81.30XA WOUND DEHISCENCE: Primary | ICD-10-CM

## 2024-02-14 LAB
ACANTHOCYTES BLD QL SMEAR: NORMAL
ALBUMIN SERPL BCP-MCNC: 2.7 G/DL (ref 3.4–5)
ALP SERPL-CCNC: 108 U/L (ref 33–110)
ALT SERPL W P-5'-P-CCNC: 4 U/L (ref 7–45)
ANION GAP SERPL CALC-SCNC: 8 MMOL/L (ref 10–20)
ANION GAP SERPL CALC-SCNC: 9 MMOL/L (ref 10–20)
AST SERPL W P-5'-P-CCNC: 7 U/L (ref 9–39)
BASOPHILS # BLD AUTO: 0.06 X10*3/UL (ref 0–0.1)
BASOPHILS NFR BLD AUTO: 0.5 %
BILIRUB SERPL-MCNC: 0.4 MG/DL (ref 0–1.2)
BUN SERPL-MCNC: 4 MG/DL (ref 6–23)
BUN SERPL-MCNC: 7 MG/DL (ref 6–23)
CALCIUM SERPL-MCNC: 7.1 MG/DL (ref 8.6–10.3)
CALCIUM SERPL-MCNC: 7.9 MG/DL (ref 8.6–10.3)
CHLORIDE SERPL-SCNC: 105 MMOL/L (ref 98–107)
CHLORIDE SERPL-SCNC: 97 MMOL/L (ref 98–107)
CHLORIDE UR-SCNC: 55 MMOL/L
CHLORIDE/CREATININE (MMOL/G) IN URINE: 238 MMOL/G CREAT (ref 38–318)
CO2 SERPL-SCNC: 24 MMOL/L (ref 21–32)
CO2 SERPL-SCNC: 27 MMOL/L (ref 21–32)
CREAT SERPL-MCNC: 0.24 MG/DL (ref 0.5–1.05)
CREAT SERPL-MCNC: 0.29 MG/DL (ref 0.5–1.05)
CREAT UR-MCNC: 23.1 MG/DL (ref 20–320)
CRP SERPL-MCNC: 7.15 MG/DL
CRP SERPL-MCNC: 8.96 MG/DL
DACRYOCYTES BLD QL SMEAR: NORMAL
EGFRCR SERPLBLD CKD-EPI 2021: >90 ML/MIN/1.73M*2
EGFRCR SERPLBLD CKD-EPI 2021: >90 ML/MIN/1.73M*2
EOSINOPHIL # BLD AUTO: 0.67 X10*3/UL (ref 0–0.7)
EOSINOPHIL NFR BLD AUTO: 5.5 %
ERYTHROCYTE [DISTWIDTH] IN BLOOD BY AUTOMATED COUNT: 20.4 % (ref 11.5–14.5)
ERYTHROCYTE [DISTWIDTH] IN BLOOD BY AUTOMATED COUNT: 20.6 % (ref 11.5–14.5)
ERYTHROCYTE [SEDIMENTATION RATE] IN BLOOD BY WESTERGREN METHOD: 16 MM/H (ref 0–20)
GLUCOSE SERPL-MCNC: 112 MG/DL (ref 74–99)
GLUCOSE SERPL-MCNC: 93 MG/DL (ref 74–99)
HCT VFR BLD AUTO: 24.5 % (ref 36–46)
HCT VFR BLD AUTO: 27.8 % (ref 36–46)
HGB BLD-MCNC: 7.3 G/DL (ref 12–16)
HGB BLD-MCNC: 8.6 G/DL (ref 12–16)
IMM GRANULOCYTES # BLD AUTO: 0.34 X10*3/UL (ref 0–0.7)
IMM GRANULOCYTES NFR BLD AUTO: 2.8 % (ref 0–0.9)
LACTATE SERPL-SCNC: 0.7 MMOL/L (ref 0.4–2)
LYMPHOCYTES # BLD AUTO: 1.31 X10*3/UL (ref 1.2–4.8)
LYMPHOCYTES NFR BLD AUTO: 10.8 %
MCH RBC QN AUTO: 27.9 PG (ref 26–34)
MCH RBC QN AUTO: 28.2 PG (ref 26–34)
MCHC RBC AUTO-ENTMCNC: 29.8 G/DL (ref 32–36)
MCHC RBC AUTO-ENTMCNC: 30.9 G/DL (ref 32–36)
MCV RBC AUTO: 90 FL (ref 80–100)
MCV RBC AUTO: 95 FL (ref 80–100)
MONOCYTES # BLD AUTO: 1.07 X10*3/UL (ref 0.1–1)
MONOCYTES NFR BLD AUTO: 8.9 %
NEUTROPHILS # BLD AUTO: 8.64 X10*3/UL (ref 1.2–7.7)
NEUTROPHILS NFR BLD AUTO: 71.5 %
NRBC BLD-RTO: 0 /100 WBCS (ref 0–0)
NRBC BLD-RTO: 0 /100 WBCS (ref 0–0)
OSMOLALITY SERPL: 266 MOSM/KG (ref 280–300)
OSMOLALITY UR: 229 MOSM/KG (ref 200–1200)
PLATELET # BLD AUTO: 303 X10*3/UL (ref 150–450)
PLATELET # BLD AUTO: 316 X10*3/UL (ref 150–450)
POLYCHROMASIA BLD QL SMEAR: NORMAL
POTASSIUM SERPL-SCNC: 4.1 MMOL/L (ref 3.5–5.3)
POTASSIUM SERPL-SCNC: 4.3 MMOL/L (ref 3.5–5.3)
POTASSIUM UR-SCNC: 23 MMOL/L
POTASSIUM/CREAT UR-RTO: 100 MMOL/G CREAT
PROT SERPL-MCNC: 5.2 G/DL (ref 6.4–8.2)
RBC # BLD AUTO: 2.59 X10*6/UL (ref 4–5.2)
RBC # BLD AUTO: 3.08 X10*6/UL (ref 4–5.2)
RBC MORPH BLD: NORMAL
SODIUM SERPL-SCNC: 129 MMOL/L (ref 136–145)
SODIUM SERPL-SCNC: 133 MMOL/L (ref 136–145)
SODIUM UR-SCNC: 59 MMOL/L
SODIUM/CREAT UR-RTO: 255 MMOL/G CREAT
SPHEROCYTES BLD QL SMEAR: NORMAL
WBC # BLD AUTO: 12.1 X10*3/UL (ref 4.4–11.3)
WBC # BLD AUTO: 8.5 X10*3/UL (ref 4.4–11.3)

## 2024-02-14 PROCEDURE — 83930 ASSAY OF BLOOD OSMOLALITY: CPT | Mod: GEALAB

## 2024-02-14 PROCEDURE — 85025 COMPLETE CBC W/AUTO DIFF WBC: CPT | Performed by: STUDENT IN AN ORGANIZED HEALTH CARE EDUCATION/TRAINING PROGRAM

## 2024-02-14 PROCEDURE — 86140 C-REACTIVE PROTEIN: CPT

## 2024-02-14 PROCEDURE — 80048 BASIC METABOLIC PNL TOTAL CA: CPT | Mod: CCI

## 2024-02-14 PROCEDURE — 36415 COLL VENOUS BLD VENIPUNCTURE: CPT | Performed by: STUDENT IN AN ORGANIZED HEALTH CARE EDUCATION/TRAINING PROGRAM

## 2024-02-14 PROCEDURE — S4991 NICOTINE PATCH NONLEGEND: HCPCS

## 2024-02-14 PROCEDURE — 85027 COMPLETE CBC AUTOMATED: CPT

## 2024-02-14 PROCEDURE — 97162 PT EVAL MOD COMPLEX 30 MIN: CPT | Mod: GP

## 2024-02-14 PROCEDURE — 82436 ASSAY OF URINE CHLORIDE: CPT | Mod: GEALAB

## 2024-02-14 PROCEDURE — 93970 EXTREMITY STUDY: CPT | Performed by: SURGERY

## 2024-02-14 PROCEDURE — 2500000001 HC RX 250 WO HCPCS SELF ADMINISTERED DRUGS (ALT 637 FOR MEDICARE OP)

## 2024-02-14 PROCEDURE — 99222 1ST HOSP IP/OBS MODERATE 55: CPT | Performed by: INTERNAL MEDICINE

## 2024-02-14 PROCEDURE — 85652 RBC SED RATE AUTOMATED: CPT

## 2024-02-14 PROCEDURE — 1210000001 HC SEMI-PRIVATE ROOM DAILY

## 2024-02-14 PROCEDURE — A4217 STERILE WATER/SALINE, 500 ML: HCPCS

## 2024-02-14 PROCEDURE — 80053 COMPREHEN METABOLIC PANEL: CPT | Performed by: STUDENT IN AN ORGANIZED HEALTH CARE EDUCATION/TRAINING PROGRAM

## 2024-02-14 PROCEDURE — 2500000004 HC RX 250 GENERAL PHARMACY W/ HCPCS (ALT 636 FOR OP/ED)

## 2024-02-14 PROCEDURE — 93970 EXTREMITY STUDY: CPT

## 2024-02-14 PROCEDURE — 87040 BLOOD CULTURE FOR BACTERIA: CPT | Mod: GEALAB | Performed by: STUDENT IN AN ORGANIZED HEALTH CARE EDUCATION/TRAINING PROGRAM

## 2024-02-14 PROCEDURE — 2500000004 HC RX 250 GENERAL PHARMACY W/ HCPCS (ALT 636 FOR OP/ED): Performed by: STUDENT IN AN ORGANIZED HEALTH CARE EDUCATION/TRAINING PROGRAM

## 2024-02-14 PROCEDURE — 83935 ASSAY OF URINE OSMOLALITY: CPT | Mod: GEALAB

## 2024-02-14 PROCEDURE — 83605 ASSAY OF LACTIC ACID: CPT | Performed by: STUDENT IN AN ORGANIZED HEALTH CARE EDUCATION/TRAINING PROGRAM

## 2024-02-14 PROCEDURE — 2500000002 HC RX 250 W HCPCS SELF ADMINISTERED DRUGS (ALT 637 FOR MEDICARE OP, ALT 636 FOR OP/ED)

## 2024-02-14 PROCEDURE — 86140 C-REACTIVE PROTEIN: CPT | Performed by: INTERNAL MEDICINE

## 2024-02-14 RX ORDER — OXYCODONE HYDROCHLORIDE 5 MG/1
5 TABLET ORAL EVERY 6 HOURS PRN
Status: DISCONTINUED | OUTPATIENT
Start: 2024-02-14 | End: 2024-03-05 | Stop reason: HOSPADM

## 2024-02-14 RX ORDER — ACETAMINOPHEN 325 MG/1
975 TABLET ORAL EVERY 8 HOURS
Status: DISCONTINUED | OUTPATIENT
Start: 2024-02-14 | End: 2024-03-05 | Stop reason: HOSPADM

## 2024-02-14 RX ORDER — SODIUM CHLORIDE 9 MG/ML
100 INJECTION, SOLUTION INTRAVENOUS CONTINUOUS
Status: DISCONTINUED | OUTPATIENT
Start: 2024-02-14 | End: 2024-02-21

## 2024-02-14 RX ORDER — METOPROLOL SUCCINATE 25 MG/1
25 TABLET, EXTENDED RELEASE ORAL DAILY
Status: DISCONTINUED | OUTPATIENT
Start: 2024-02-14 | End: 2024-02-28

## 2024-02-14 RX ORDER — ATORVASTATIN CALCIUM 10 MG/1
10 TABLET, FILM COATED ORAL NIGHTLY
COMMUNITY

## 2024-02-14 RX ORDER — CEFAZOLIN SODIUM 2 G/100ML
2 INJECTION, SOLUTION INTRAVENOUS EVERY 8 HOURS
Status: CANCELLED | OUTPATIENT
Start: 2024-02-14

## 2024-02-14 RX ORDER — TRAZODONE HYDROCHLORIDE 50 MG/1
100 TABLET ORAL NIGHTLY
Status: DISCONTINUED | OUTPATIENT
Start: 2024-02-14 | End: 2024-03-05 | Stop reason: HOSPADM

## 2024-02-14 RX ORDER — LANOLIN ALCOHOL/MO/W.PET/CERES
400 CREAM (GRAM) TOPICAL DAILY
Status: DISCONTINUED | OUTPATIENT
Start: 2024-02-14 | End: 2024-02-23

## 2024-02-14 RX ORDER — FERROUS SULFATE 325(65) MG
1 TABLET ORAL DAILY
Status: DISCONTINUED | OUTPATIENT
Start: 2024-02-14 | End: 2024-03-05 | Stop reason: HOSPADM

## 2024-02-14 RX ORDER — OXYCODONE HYDROCHLORIDE 5 MG/1
10 TABLET ORAL EVERY 6 HOURS PRN
Status: DISCONTINUED | OUTPATIENT
Start: 2024-02-14 | End: 2024-03-05 | Stop reason: HOSPADM

## 2024-02-14 RX ORDER — PAROXETINE HYDROCHLORIDE 20 MG/1
40 TABLET, FILM COATED ORAL DAILY
Status: DISCONTINUED | OUTPATIENT
Start: 2024-02-14 | End: 2024-03-05 | Stop reason: HOSPADM

## 2024-02-14 RX ORDER — IBUPROFEN 200 MG
1 TABLET ORAL DAILY
Status: DISCONTINUED | OUTPATIENT
Start: 2024-02-14 | End: 2024-03-05 | Stop reason: HOSPADM

## 2024-02-14 RX ORDER — LORATADINE 10 MG/1
10 TABLET ORAL DAILY
Status: DISCONTINUED | OUTPATIENT
Start: 2024-02-14 | End: 2024-03-05 | Stop reason: HOSPADM

## 2024-02-14 RX ORDER — HEPARIN SODIUM,PORCINE/PF 10 UNIT/ML
5 SYRINGE (ML) INTRAVENOUS DAILY
COMMUNITY

## 2024-02-14 RX ORDER — MONTELUKAST SODIUM 10 MG/1
10 TABLET ORAL EVERY MORNING
Status: DISCONTINUED | OUTPATIENT
Start: 2024-02-14 | End: 2024-03-05 | Stop reason: HOSPADM

## 2024-02-14 RX ORDER — NAPROXEN SODIUM 220 MG/1
81 TABLET, FILM COATED ORAL 2 TIMES DAILY
Status: DISCONTINUED | OUTPATIENT
Start: 2024-02-14 | End: 2024-03-05 | Stop reason: HOSPADM

## 2024-02-14 RX ORDER — POLYETHYLENE GLYCOL 3350 17 G/17G
17 POWDER, FOR SOLUTION ORAL DAILY
Status: DISCONTINUED | OUTPATIENT
Start: 2024-02-14 | End: 2024-02-23

## 2024-02-14 RX ORDER — LISINOPRIL 5 MG/1
10 TABLET ORAL EVERY MORNING
Status: DISCONTINUED | OUTPATIENT
Start: 2024-02-14 | End: 2024-03-05 | Stop reason: HOSPADM

## 2024-02-14 RX ORDER — EAR PLUGS
1 EACH OTIC (EAR) 2 TIMES DAILY
Status: DISPENSED | OUTPATIENT
Start: 2024-02-14 | End: 2024-03-04

## 2024-02-14 RX ORDER — LACTULOSE 10 G/15ML
20 SOLUTION ORAL DAILY
Status: DISCONTINUED | OUTPATIENT
Start: 2024-02-14 | End: 2024-02-14

## 2024-02-14 RX ORDER — FAMOTIDINE 20 MG/1
40 TABLET, FILM COATED ORAL 2 TIMES DAILY
Status: DISCONTINUED | OUTPATIENT
Start: 2024-02-14 | End: 2024-03-05 | Stop reason: HOSPADM

## 2024-02-14 RX ORDER — SIMVASTATIN 20 MG/1
20 TABLET, FILM COATED ORAL NIGHTLY
Status: DISCONTINUED | OUTPATIENT
Start: 2024-02-14 | End: 2024-03-05 | Stop reason: HOSPADM

## 2024-02-14 RX ADMIN — FAMOTIDINE 40 MG: 20 TABLET ORAL at 09:09

## 2024-02-14 RX ADMIN — PAROXETINE 40 MG: 20 TABLET, FILM COATED ORAL at 09:09

## 2024-02-14 RX ADMIN — SODIUM CHLORIDE 3120 ML: 9 INJECTION, SOLUTION INTRAVENOUS at 00:47

## 2024-02-14 RX ADMIN — PIPERACILLIN SODIUM AND TAZOBACTAM SODIUM 3.38 G: 3; .375 INJECTION, SOLUTION INTRAVENOUS at 04:56

## 2024-02-14 RX ADMIN — LORATADINE 10 MG: 10 TABLET ORAL at 09:09

## 2024-02-14 RX ADMIN — PIPERACILLIN SODIUM AND TAZOBACTAM SODIUM 3.38 G: 3; .375 INJECTION, SOLUTION INTRAVENOUS at 17:55

## 2024-02-14 RX ADMIN — FERROUS SULFATE TAB 325 MG (65 MG ELEMENTAL FE) 1 TABLET: 325 (65 FE) TAB at 09:09

## 2024-02-14 RX ADMIN — METOPROLOL SUCCINATE 25 MG: 25 TABLET, EXTENDED RELEASE ORAL at 10:36

## 2024-02-14 RX ADMIN — ARIPIPRAZOLE 15 MG: 5 TABLET ORAL at 20:31

## 2024-02-14 RX ADMIN — ACETAMINOPHEN 975 MG: 325 TABLET ORAL at 11:56

## 2024-02-14 RX ADMIN — Medication 1 APPLICATION: at 20:31

## 2024-02-14 RX ADMIN — NICOTINE 1 PATCH: 21 PATCH, EXTENDED RELEASE TRANSDERMAL at 09:09

## 2024-02-14 RX ADMIN — SIMVASTATIN 20 MG: 20 TABLET, FILM COATED ORAL at 20:31

## 2024-02-14 RX ADMIN — ACETAMINOPHEN 975 MG: 325 TABLET ORAL at 02:58

## 2024-02-14 RX ADMIN — TRAZODONE HYDROCHLORIDE 100 MG: 50 TABLET ORAL at 20:31

## 2024-02-14 RX ADMIN — SODIUM CHLORIDE 75 ML/HR: 9 INJECTION, SOLUTION INTRAVENOUS at 04:57

## 2024-02-14 RX ADMIN — Medication 400 MG: at 09:09

## 2024-02-14 RX ADMIN — FAMOTIDINE 40 MG: 20 TABLET ORAL at 20:31

## 2024-02-14 RX ADMIN — Medication 1 APPLICATION: at 09:10

## 2024-02-14 RX ADMIN — PIPERACILLIN SODIUM AND TAZOBACTAM SODIUM 3.38 G: 3; .375 INJECTION, SOLUTION INTRAVENOUS at 23:30

## 2024-02-14 RX ADMIN — POLYETHYLENE GLYCOL 3350 17 G: 17 POWDER, FOR SOLUTION ORAL at 10:36

## 2024-02-14 RX ADMIN — ACETAMINOPHEN 975 MG: 325 TABLET ORAL at 18:55

## 2024-02-14 RX ADMIN — MONTELUKAST 10 MG: 10 TABLET, FILM COATED ORAL at 09:09

## 2024-02-14 RX ADMIN — PIPERACILLIN SODIUM AND TAZOBACTAM SODIUM 3.38 G: 3; .375 INJECTION, SOLUTION INTRAVENOUS at 11:56

## 2024-02-14 RX ADMIN — VANCOMYCIN HYDROCHLORIDE 1500 MG: 10 INJECTION, POWDER, LYOPHILIZED, FOR SOLUTION INTRAVENOUS at 15:26

## 2024-02-14 RX ADMIN — VANCOMYCIN HYDROCHLORIDE 1500 MG: 10 INJECTION, POWDER, LYOPHILIZED, FOR SOLUTION INTRAVENOUS at 05:36

## 2024-02-14 SDOH — SOCIAL STABILITY: SOCIAL INSECURITY: ARE YOU OR HAVE YOU BEEN THREATENED OR ABUSED PHYSICALLY, EMOTIONALLY, OR SEXUALLY BY ANYONE?: NO

## 2024-02-14 SDOH — SOCIAL STABILITY: SOCIAL INSECURITY: DOES ANYONE TRY TO KEEP YOU FROM HAVING/CONTACTING OTHER FRIENDS OR DOING THINGS OUTSIDE YOUR HOME?: NO

## 2024-02-14 SDOH — SOCIAL STABILITY: SOCIAL INSECURITY: ABUSE: ADULT

## 2024-02-14 SDOH — SOCIAL STABILITY: SOCIAL INSECURITY: DO YOU FEEL ANYONE HAS EXPLOITED OR TAKEN ADVANTAGE OF YOU FINANCIALLY OR OF YOUR PERSONAL PROPERTY?: NO

## 2024-02-14 SDOH — SOCIAL STABILITY: SOCIAL INSECURITY: ARE THERE ANY APPARENT SIGNS OF INJURIES/BEHAVIORS THAT COULD BE RELATED TO ABUSE/NEGLECT?: NO

## 2024-02-14 SDOH — SOCIAL STABILITY: SOCIAL INSECURITY: HAVE YOU HAD THOUGHTS OF HARMING ANYONE ELSE?: NO

## 2024-02-14 SDOH — SOCIAL STABILITY: SOCIAL INSECURITY: DO YOU FEEL UNSAFE GOING BACK TO THE PLACE WHERE YOU ARE LIVING?: NO

## 2024-02-14 SDOH — SOCIAL STABILITY: SOCIAL INSECURITY: HAS ANYONE EVER THREATENED TO HURT YOUR FAMILY OR YOUR PETS?: NO

## 2024-02-14 SDOH — SOCIAL STABILITY: SOCIAL INSECURITY: WERE YOU ABLE TO COMPLETE ALL THE BEHAVIORAL HEALTH SCREENINGS?: YES

## 2024-02-14 ASSESSMENT — PAIN - FUNCTIONAL ASSESSMENT
PAIN_FUNCTIONAL_ASSESSMENT: 0-10

## 2024-02-14 ASSESSMENT — ENCOUNTER SYMPTOMS
HEMATOLOGIC/LYMPHATIC NEGATIVE: 1
MUSCULOSKELETAL NEGATIVE: 1
WOUND: 1
ALLERGIC/IMMUNOLOGIC NEGATIVE: 1
RESPIRATORY NEGATIVE: 1
CONSTITUTIONAL NEGATIVE: 1
ENDOCRINE NEGATIVE: 1
CARDIOVASCULAR NEGATIVE: 1
GASTROINTESTINAL NEGATIVE: 1
EYES NEGATIVE: 1
NEUROLOGICAL NEGATIVE: 1
PSYCHIATRIC NEGATIVE: 1

## 2024-02-14 ASSESSMENT — COGNITIVE AND FUNCTIONAL STATUS - GENERAL
MOVING FROM LYING ON BACK TO SITTING ON SIDE OF FLAT BED WITH BEDRAILS: A LITTLE
HELP NEEDED FOR BATHING: A LITTLE
CLIMB 3 TO 5 STEPS WITH RAILING: TOTAL
TURNING FROM BACK TO SIDE WHILE IN FLAT BAD: A LOT
DRESSING REGULAR LOWER BODY CLOTHING: A LOT
MOVING TO AND FROM BED TO CHAIR: A LOT
WALKING IN HOSPITAL ROOM: TOTAL
MOVING TO AND FROM BED TO CHAIR: A LOT
DRESSING REGULAR LOWER BODY CLOTHING: A LOT
DRESSING REGULAR UPPER BODY CLOTHING: A LITTLE
DRESSING REGULAR UPPER BODY CLOTHING: A LITTLE
MOVING FROM LYING ON BACK TO SITTING ON SIDE OF FLAT BED WITH BEDRAILS: A LOT
MOVING TO AND FROM BED TO CHAIR: A LOT
MOVING FROM LYING ON BACK TO SITTING ON SIDE OF FLAT BED WITH BEDRAILS: A LOT
PERSONAL GROOMING: A LITTLE
MOBILITY SCORE: 10
TOILETING: A LITTLE
CLIMB 3 TO 5 STEPS WITH RAILING: TOTAL
WALKING IN HOSPITAL ROOM: A LOT
MOBILITY SCORE: 13
STANDING UP FROM CHAIR USING ARMS: A LOT
STANDING UP FROM CHAIR USING ARMS: A LOT
WALKING IN HOSPITAL ROOM: TOTAL
DAILY ACTIVITIY SCORE: 18
PERSONAL GROOMING: A LITTLE
MOBILITY SCORE: 10
STANDING UP FROM CHAIR USING ARMS: A LOT
TURNING FROM BACK TO SIDE WHILE IN FLAT BAD: A LOT
TURNING FROM BACK TO SIDE WHILE IN FLAT BAD: A LITTLE
DAILY ACTIVITIY SCORE: 18
CLIMB 3 TO 5 STEPS WITH RAILING: TOTAL
TOILETING: A LITTLE
HELP NEEDED FOR BATHING: A LITTLE
PATIENT BASELINE BEDBOUND: NO

## 2024-02-14 ASSESSMENT — LIFESTYLE VARIABLES
SUBSTANCE_ABUSE_PAST_12_MONTHS: NO
AUDIT-C TOTAL SCORE: 0
HOW OFTEN DO YOU HAVE 6 OR MORE DRINKS ON ONE OCCASION: NEVER
SKIP TO QUESTIONS 9-10: 1
HOW MANY STANDARD DRINKS CONTAINING ALCOHOL DO YOU HAVE ON A TYPICAL DAY: PATIENT DOES NOT DRINK
PRESCIPTION_ABUSE_PAST_12_MONTHS: NO
SKIP TO QUESTIONS 9-10: 1
AUDIT-C TOTAL SCORE: 0
HOW OFTEN DO YOU HAVE A DRINK CONTAINING ALCOHOL: NEVER
HOW OFTEN DO YOU HAVE A DRINK CONTAINING ALCOHOL: NEVER
AUDIT-C TOTAL SCORE: 0
AUDIT-C TOTAL SCORE: 0
HOW MANY STANDARD DRINKS CONTAINING ALCOHOL DO YOU HAVE ON A TYPICAL DAY: PATIENT DOES NOT DRINK
HOW OFTEN DO YOU HAVE 6 OR MORE DRINKS ON ONE OCCASION: NEVER

## 2024-02-14 ASSESSMENT — ACTIVITIES OF DAILY LIVING (ADL)
FEEDING YOURSELF: INDEPENDENT
GROOMING: NEEDS ASSISTANCE
HEARING - RIGHT EAR: FUNCTIONAL
TOILETING: NEEDS ASSISTANCE
ASSISTIVE_DEVICE: CANE;WALKER
BATHING: NEEDS ASSISTANCE
ADEQUATE_TO_COMPLETE_ADL: YES
LACK_OF_TRANSPORTATION: NO
DRESSING YOURSELF: NEEDS ASSISTANCE
HEARING - LEFT EAR: FUNCTIONAL
WALKS IN HOME: NEEDS ASSISTANCE
PATIENT'S MEMORY ADEQUATE TO SAFELY COMPLETE DAILY ACTIVITIES?: YES
JUDGMENT_ADEQUATE_SAFELY_COMPLETE_DAILY_ACTIVITIES: YES
ADL_ASSISTANCE: NEEDS ASSISTANCE

## 2024-02-14 ASSESSMENT — COLUMBIA-SUICIDE SEVERITY RATING SCALE - C-SSRS
2. HAVE YOU ACTUALLY HAD ANY THOUGHTS OF KILLING YOURSELF?: NO
6. HAVE YOU EVER DONE ANYTHING, STARTED TO DO ANYTHING, OR PREPARED TO DO ANYTHING TO END YOUR LIFE?: NO
2. HAVE YOU ACTUALLY HAD ANY THOUGHTS OF KILLING YOURSELF?: NO
1. IN THE PAST MONTH, HAVE YOU WISHED YOU WERE DEAD OR WISHED YOU COULD GO TO SLEEP AND NOT WAKE UP?: NO
6. HAVE YOU EVER DONE ANYTHING, STARTED TO DO ANYTHING, OR PREPARED TO DO ANYTHING TO END YOUR LIFE?: NO
1. IN THE PAST MONTH, HAVE YOU WISHED YOU WERE DEAD OR WISHED YOU COULD GO TO SLEEP AND NOT WAKE UP?: NO

## 2024-02-14 ASSESSMENT — PAIN SCALES - GENERAL
PAINLEVEL_OUTOF10: 5 - MODERATE PAIN
PAINLEVEL_OUTOF10: 5 - MODERATE PAIN
PAINLEVEL_OUTOF10: 0 - NO PAIN

## 2024-02-14 ASSESSMENT — PAIN DESCRIPTION - ORIENTATION: ORIENTATION: RIGHT

## 2024-02-14 ASSESSMENT — PATIENT HEALTH QUESTIONNAIRE - PHQ9
2. FEELING DOWN, DEPRESSED OR HOPELESS: NOT AT ALL
1. LITTLE INTEREST OR PLEASURE IN DOING THINGS: NOT AT ALL
2. FEELING DOWN, DEPRESSED OR HOPELESS: NOT AT ALL
SUM OF ALL RESPONSES TO PHQ9 QUESTIONS 1 & 2: 0
1. LITTLE INTEREST OR PLEASURE IN DOING THINGS: NOT AT ALL
SUM OF ALL RESPONSES TO PHQ9 QUESTIONS 1 & 2: 0

## 2024-02-14 ASSESSMENT — PAIN DESCRIPTION - LOCATION: LOCATION: HIP

## 2024-02-14 ASSESSMENT — PAIN DESCRIPTION - PAIN TYPE: TYPE: ACUTE PAIN

## 2024-02-14 NOTE — PROGRESS NOTES
Physical Therapy    Physical Therapy Evaluation    Patient Name: Blanca Hamilton  MRN: [id number]  Today's Date: 2/14/2024   Time Calculation  Start Time: 1521  Stop Time: 1533  Time Calculation (min): 12 min    Assessment/Plan   PT Assessment  PT Assessment Results: Decreased strength, Decreased endurance, Impaired balance, Decreased mobility  Rehab Prognosis: Good  Evaluation/Treatment Tolerance: Patient tolerated treatment well  Medical Staff Made Aware: Yes  Strengths: Ability to acquire knowledge, Housing layout, Rehab experience, Support of Caregivers  Barriers to Participation: Comorbidities  End of Session Communication: Bedside nurse  Assessment Comment: Patient pleasant, cooperative, understands weight bearing limitations well however states she has not been able to maintain at rehab and has been completing slide board transfers only. Rec mod intensity PT intervention.  End of Session Patient Position: Bed, 3 rail up, Alarm on  IP OR SWING BED PT PLAN  Inpatient or Swing Bed: Inpatient  PT Plan  Treatment/Interventions: Bed mobility, Transfer training, Balance training, Strengthening, Endurance training  PT Plan: Skilled PT  PT Frequency: 4 times per week  PT Discharge Recommendations: Moderate intensity level of continued care  Equipment Recommended upon Discharge: Wheeled walker, Wheelchair (owns)  PT Recommended Transfer Status: Assist x2  PT - OK to Discharge: Yes (per PT POC)      Subjective   General Visit Information:  General  Reason for Referral: Impaired functional mobility; R hip wound dehisced  Referred By: Brando Mascorro  Past Medical History Relevant to Rehab: Anxiety, Arthritis, Class 1 obesity with body mass index (BMI) of 30.0 to 30.9 in adult (02/02/2024), Depression, GERD (gastroesophageal reflux disease), HTN (hypertension), Hyperlipidemia; R total hip arthroplasty on 1/2/4, but had to get subsequent revision on 1/5 for prosthetic fracture. 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 Cultures were positive for MSSA and patient was started on cefazolin. Then underwent ALEXA revision and partial hardware removal with Dr. Jalloh on 2/6/24. Patient was discharged to SNF on 2/11 with wound vac, a PICC line for 6 weeks of cefazolin, and ASA for DVT ppx.  Family/Caregiver Present: No  Prior to Session Communication: Bedside nurse  Patient Position Received: Bed, 3 rail up, Alarm on  Home Living:  Home Living  Type of Home: House  Lives With: Alone  Home Adaptive Equipment: Walker rolling or standard, Wheelchair-manual  Home Layout: One level  Bathroom Shower/Tub: Walk-in shower  Home Living Comments: Aide 3 hrs/day, 4 day/week  Prior Level of Function:  Prior Function Per Pt/Caregiver Report  Level of [place]: Needs assistance with ADLs, Needs assistance with homemaking  Receives Help From: Other (Comment) (home health aide)  ADL Assistance: Needs assistance  Homemaking Assistance: Needs assistance  Precautions:  Precautions  LE Weight Bearing Status: Right Toe-Touch Weight Bearing (transfers only)  Medical Precautions: Fall precautions (wound vac R hip, posterior hip precautions, purewick, IV, tele)  Vital Signs:       Objective   Pain:  Pain Assessment  Pain Assessment: 0-10  Pain Score: 5 - Moderate pain  Pain Type: Acute pain  Pain Location: Hip  Pain Orientation: Left (no pain R hip)  Pain Interventions: Repositioned  Cognition:  Cognition  Overall Cognitive Status: Within Functional Limits  Orientation Level: Oriented X4    General Assessments:    Activity Tolerance  Endurance: Tolerates less than 10 min exercise, no significant change in vital signs    Sensation  Light Touch: No apparent deficits    Strength  Strength Comments: L hip 3/5, L knee and ankle 3+/5; R hip NT, R knee and ankle 3+/5    Coordination  Movements are Fluid and Coordinated: Yes    Postural Control  Postural Control: Within Functional Limits    Static Sitting Balance  Static Sitting-Balance  Support: No upper extremity supported, Feet supported  Static Sitting-Level of Assistance: Independent       Functional Assessments:       Bed Mobility  Bed Mobility: Yes  Bed Mobility 1  Bed Mobility 1: Supine to sitting, Sitting to supine  Level of Assistance 1: Maximum assistance    Transfers  Transfer: No (Patient reports she has been unable to complete transfers d/t inability to maintain weight bearing status; at rehab has been completing slide board transfers)    Ambulation/Gait Training  Ambulation/Gait Training Performed: No    Stairs  Stairs:  (nonambulatory at baseline)    Outcome Measures:  [facility] Basic Mobility  Turning from your back to your side while in a flat bed without using bedrails: A lot  Moving from lying on your back to sitting on the side of a flat bed without using bedrails: A lot  Moving to and from bed to chair (including a wheelchair): A lot  Standing up from a chair using your arms (e.g. wheelchair or bedside chair): A lot  To walk in hospital room: Total  Climbing 3-5 steps with railing: Total  Basic Mobility - Total Score: 10    Encounter Problems       Encounter Problems (Active)       Balance       STG - Maintains static standing balance with upper extremity support x 1' maintaining weight bearing status  (Progressing)       Start:  02/14/24    Expected End:  02/28/24            STG - Maintains dynamic sitting balance with upper extremity support x 10'  (Progressing)       Start:  02/14/24    Expected End:  02/28/24               Transfers       STG - Patient will perform bed mobility independently  (Progressing)       Start:  02/14/24    Expected End:  02/28/24            STG - Patient will transfer sit to and from stand min A, maintaining weight bearing status  (Progressing)       Start:  02/14/24    Expected End:  02/28/24                   Education Documentation  Precautions, taught by Amy Montero PT at 2/14/2024  3:47 PM.  Learner: Patient  Readiness: Acceptance  Method:  Explanation  Response: Verbalizes Understanding, Needs Reinforcement    Body Mechanics, taught by Amy Montero, PT at 2/14/2024  3:47 PM.  Learner: Patient  Readiness: Acceptance  Method: Explanation  Response: Verbalizes Understanding, Needs Reinforcement    Mobility Training, taught by Amy Montero, PT at 2/14/2024  3:47 PM.  Learner: Patient  Readiness: Acceptance  Method: Explanation  Response: Verbalizes Understanding, Needs Reinforcement    Education Comments  No comments found.

## 2024-02-14 NOTE — CONSULTS
Wound Care Consult     Visit Date: 2/14/2024      Patient Name: Blanca Hamilton         MRN: [id number]           YOB: 1975     Reason for Consult:  Orthopedics requested wound vac to right hip wound     Wound History:   History of right hip surgery with postop infection s/p revision, now admitted from SNF with wound dehiscence     Pertinent Labs:   Albumin   Date Value Ref Range Status   02/14/2024 2.7 (L) 3.4 - 5.0 g/dL Final       Wound Assessment:  Wound 02/06/24 Incision Leg Proximal;Right;Upper (Active)   Wound Image   02/14/24 0953   Site Assessment Edema;Maceration;Pink;Red;Swelling;Dehisced 02/14/24 0015   Irish-Wound Assessment Moist ;Edematous;Red;Pink 02/14/24 0015   State of Healing Early/partial granulation 02/14/24 0015   Drainage Description Sanguineous 02/14/24 0250   Drainage Amount Large 02/14/24 0250   Dressing ABD 02/14/24 0250   Dressing Changed New 02/14/24 0250       Wound Team Summary Assessment:   Very cooperative patient freely able to turn to left side for wound vac application. The long incision with staples and sutures is gaping at superior 5 cm and cloudy pink/tan drainage is pouring out in spurts. The entire incision has dull redness along the length of it, no other areas of it are draining.  Ortho plans to revise hip in OR again next week, requests a vac mainly as a means to manage / contain the exudate.  The vac was applied at this time in the usual fashion of an incisional vac. Irish-wound skin prepped with Cavilon, vac drape to protect all intact skin.  The vac was set at -125mmHg continuous.  The plan is to leave vac on until Friday 2-17-24 and then to reassess plan.      Wound Team Plan:   Wound vac at -125mmHg continuous, reassess POC 2-17-24 with Dr. Jalloh.     Sonia Zamora RN  2/14/2024  2:27 PM

## 2024-02-14 NOTE — PROGRESS NOTES
02/14/24 1134   Discharge Planning   Living Arrangements Other (Comment)  (from [facility])   Support Systems Friends/neighbors;/   Assistance Needed max assist with walker, wound vac   Type of Residence Skilled nursing facility   Home or Post Acute Services Post acute facilities (Rehab/SNF/etc)   Type of Post Acute Facility Services Skilled nursing   Patient expects to be discharged to: return to [facility] skilled   Does the patient need discharge transport arranged? Yes   RoundTrip coordination needed? Yes   Has discharge transport been arranged? No   Patient Choice   Patient / Family choosing to utilize agency / facility established prior to hospitalization Yes     02/14/2024 1135: [facility] sent referral to [facility] to confirm patient is skilled. Precert needed to return.

## 2024-02-14 NOTE — ED TRIAGE NOTES
Pt BIB EMS f/ [facility] f/ right THR wound dehiscence. Superior 3-4 inches of wound are approximated w/ staples only, but the skin is grossly dehisced and leaking serosanguinous drainage in copious amounts. Remainder of wound is well approximated w/ sutures and staples - both in tact. Pt denies fevers or chills. Pt does not recall when the wound started to leak. Reportedly pt is using a wound vac at facility but staff at facility did not have it hooked up upon EMS arrival stating they did not have the supplies. Pt is AxOx4, NAD, VSS.

## 2024-02-14 NOTE — CARE PLAN
Discussed with nursing home and ED staff. Persistent drainage and wound dehiscence after revision ALEXA for infection. Likely persistent infection. Extensive fat necrosis found during previous case may have progressed, could be related to increased offset and therefore soft tissue tension from correction of protrusio deformity. Planning for return to OR later this week or early next week. Wound care to place vac for now to handle drainage. Note to follow

## 2024-02-14 NOTE — H&P
History Of Present Illness  Blanca Hamilton is a 48 y.o. 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    Pt initially underwent R total hip arthroplasty on 1/2/4, but had to get subsequent revision on 1/5 for prosthetic fracture. 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 Cultures were positive for MSSA and patient was started on cefazolin. Then underwent ALEXA revision and partial hardware removal with Dr. Jalloh on 2/6/24. Patient was discharged to SNF on 2/11 with wound vac, a PICC line for 6 weeks of cefazolin, and ASA for DVT ppx. Since discharge, patient reports that the wound has been draining more serosanguinous fluid than before. Says that it looks different than previous but is not able to describe why. Wound vac was taken off yesterday because her facility could not find any other canisters. Pt endorses mild R hip pain but denies worsening over the past few days. Noticed pink rash on feet but does not know when it appeared. Denies fevers, chills, CP, SOB, N/V/D, weakness, neuropathy.    ED Course:  - Vitals: T36.6, P102, RR20, BP99/63, SpO2 100% on RA  - CBC: 12.1 > 8.6 / 27.8 < 316  ANC 8.64 (2/11 labs: 8>8.1/26.1<34)   - RFP: 129 / 4.3 / 97 / 27 / 7 / 0.29 (at baseline) < 112  - Lactate 0.7  Interventions: blood cx x2 collected, patient administered 3120mL of NS      Past Medical History  She has a past medical history of Anxiety, Arthritis, Class 1 obesity with body mass index (BMI) of 30.0 to 30.9 in adult (02/02/2024), Depression, GERD (gastroesophageal reflux disease), HTN (hypertension), Hyperlipidemia, Right hip pain, Sinusitis, and Transfusion history.    Surgical History  She has a past surgical history that includes Total hip arthroplasty (Left); Knee arthroscopy w/ debridement (Left); Tubal ligation; and Bladder suspension.     Social History  She reports that she has  been smoking cigarettes. She has been smoking an average of 1 pack per day. She has never used smokeless tobacco. She reports that she does not currently use alcohol. She reports that she does not currently use drugs.    Family History  No family history on file.     Allergies  Patient has no known allergies.    Review of Systems   Constitutional: Negative.    HENT: Negative.     Eyes: Negative.    Respiratory: Negative.     Cardiovascular: Negative.    Gastrointestinal: Negative.    Endocrine: Negative.    Genitourinary: Negative.    Musculoskeletal: Negative.    Skin:  Positive for wound.   Allergic/Immunologic: Negative.    Neurological: Negative.    Hematological: Negative.    Psychiatric/Behavioral: Negative.     All other systems reviewed and are negative.       Physical Exam  Constitutional:       General: She is not in acute distress.  HENT:      Head: Normocephalic and atraumatic.   Eyes:      Extraocular Movements: Extraocular movements intact.      Conjunctiva/sclera: Conjunctivae normal.      Pupils: Pupils are equal, round, and reactive to light.   Cardiovascular:      Rate and Rhythm: Regular rhythm. Tachycardia present.      Pulses: Normal pulses.   Pulmonary:      Effort: Pulmonary effort is normal. No respiratory distress.      Breath sounds: Normal breath sounds. No wheezing, rhonchi or rales.   Abdominal:      General: Bowel sounds are normal. There is no distension.      Palpations: Abdomen is soft.      Tenderness: There is no abdominal tenderness. There is no guarding.   Musculoskeletal:      Right lower leg: Edema (2+ pitting) present.      Left lower leg: Edema (2+ pitting) present.   Skin:     General: Skin is warm and dry.      Comments: Extending down the R hip is separation of skin edges, held with staples. Wound appears recently cleaned, with some sanguinous drainage.    On the b/l lower extremities extending from feet to ankles are pink macules coalescing into patches.   Neurological:  "     General: No focal deficit present.      Mental Status: She is alert and oriented to person, place, and time. Mental status is at baseline.   Psychiatric:         Mood and Affect: Mood normal.          Last Recorded Vitals  Blood pressure 95/61, pulse 100, temperature 36.6 °C (97.9 °F), temperature source Temporal, resp. rate 18, height 1.727 m (5' 8\"), weight 104 kg (230 lb), SpO2 99 %.    Relevant Results  Lab Results   Component Value Date    WBC 12.1 (H) 02/14/2024    HGB 8.6 (L) 02/14/2024    HCT 27.8 (L) 02/14/2024    MCV 90 02/14/2024     02/14/2024     Lab Results   Component Value Date    GLUCOSE 112 (H) 02/14/2024    CALCIUM 7.9 (L) 02/14/2024     (L) 02/14/2024    K 4.3 02/14/2024    CO2 27 02/14/2024    CL 97 (L) 02/14/2024    BUN 7 02/14/2024    CREATININE 0.29 (L) 02/14/2024           Assessment/Plan   Principal Problem:    Wound dehiscence    Blanca Hamilton is a 48 y.o. 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 2/4 SIRS criteria in admission, s/p 3.12L NS. Cefazolin through PICC line discontinued (on previous discharge, planned for 6 weeks of cefazolin outpatient.) Started on vanc/zosyn. Admitted for further management with ortho, ID, and wound care consult.    Acute Medical Issues:  #Sepsis  #Wound dehiscence  #Hx of R ALEXA infection s/p revision and hardware removal on 2/6  Underwent revision with Dr. Jalloh on 2/6. Discharged on 2/11 with wound vac + PICC line for 6-week course of cefazolin.  2/4 SIRS criteria on admission. S/p sepsis fluids in ED. Lactate 0.7. Hgb stable.  - d/c cefazolin  - start vanc/zosyn  - fu blood cultures, ESR, CRP  - pain control: scheduled tylenol, oxy 5 mod, oxy 10 severe, dilaudid 0.4 PRN breakthrough  - ortho consult to Dr. Jalloh  - ID consult  - wound care consult    #Hyponatremia likely 2/2 poor PO intake  - Na 129 on admission (baseline ~135)  - fu urine " studies  - CTM    #B/l LE edema  Associated with pink macular rash.  Ddx stasis dermatitis, tinea, less likely DVT  - fu b/l duplex US to r/o DVT  - leg elevation    Chronic Medical Issues:  #GERD: pepcid  #HTN: hold home lisinopril and metoprolol in the setting of hypotension  #HLD: simvastatin  #Depression: abilify, paroxetine, trazodone  #Chronic anemia: iron supplement  #Tobacco use: nicotine patch  #Other: cont claritin, singulair, mag ox    F: s/p 3.12L NS, start NS @75 ml/hr  E: PRN  N: regular diet  G: PPI  Bowel regimen: PEG  Lines: PICC line  DVT: holding in setting of copious sanguinous output (on discharge last admission, was told to restart ASA for DVT ppx once drainage decreased)    CODE: FULL    Dispo: 47 yo F with recent admission for R hip prosthetic joint infection presents with wound dehiscence. Ortho, ID, and wound care consulted. eLOS >48 hours.      Elly Rosado MD   PGY-1

## 2024-02-14 NOTE — CONSULTS
Orthopaedic Surgery Consult H&P    HPI:   Orthopaedic Problems/Injuries: Right hip wound complication  Other Injuries: denies    48 y.o. female PMHx HEN, GERD, Depression, and R ALEXA 1/2/2024 c/b periprosthetic fracture s/p ORIF 1/5/2024 with subsequent PJI s/p I+D with liner exchange presents with drainage from her right hip incision. She arrived from her nursing home to ED on 2/13/2024. The nursing home states there was a large amount of drainage from the wound. She was given a wound vac post-operatively after her most recent procedure however the cannister in the wound vac was reportedly full and there were no replacement cannisters. Denies numbness, tingling, and open wounds on the affected limb. Denies falls or interval trauma. Denies systemic symptoms including f/c, n/v, cp/sob. Able to stand for transfers    PMH: per above/EMR  PSH: per above/EMR  SocHx:      -  Denies tobacco use      -  Denies EtOH use      -  Denies other drug use  FamHx:  Non-contributory to this patient's acute orthopaedic problem.   Allergies: Reviewed in EMR  Meds: Reviewed in EMR    ROS      - 14 point ROS negative except as above    Physical Exam:  Gen: AOx3, NAD  HEENT: normocephalic atraumatic  Psych: appropriate mood and affect  Resp: nonlabored breathing  Cardiac: Extremities WWP, RRR to peripheral palpation  Neuro: CN 2-12 grossly intact  Skin: no rashes    R lower extremity:  - Right hip wound with staples and nylon sutures in place. Drainage from proximal aspect of wound  - No significant TTP  - Fires EHL/DF/PF.  - Sensation intact to light touch in sural, saphenous, superficial/deep peroneal, tibial nerve distributions.  - 2+ DP pulse, < 2 seconds capillary refill.    A full secondary exam was performed and all relevant findings discussed and noted above.    Imaging:  No additional imaging obtained/indicated at this time    Assessment:  48F PMHx HEN, GERD, Depression, and R ALEXA 1/2/2024 c/b periprosthetic fracture s/p ORIF  1/5/2024 with subsequent PJI s/p I+D with liner exchange presents with drainage from her right hip incision with concern for persistent PJI    Plan:  - No acute orthopaedic intervention. Possible plan for OR 2/20 pending wound care and repeat evaluation  - Wound care consult for placement of wound vac  - WB: WB RLE for tfx only  - Abx: per ID based on recent intra-op cultures. Appreciate recs  - Diet: per primary.   - DVT: Per primary. Rec SCDs, early mobilization, and chemoppx while in house.   - Multimodal pain control  -Remainder of care per primary team    - Dispo: possible OR 2/20 pending wound care/repeat evaluation    Александр Cash MD  PGY-5 Orthopaedic Surgery

## 2024-02-14 NOTE — ED PROVIDER NOTES
History/Exam limitations: none  HPI was provided by patient    HPI:    Chief Complaint   Patient presents with    Post-op Problem    Wound Dehiscence        Blanca Hamilton is a 48 y.o. female presents with chief complaint of wound dehiscence.  She arrives from nursing home and is status post revision of the right hip currently on Ancef.  There is report that there was some dehiscence of the sutures/staples on the proximal aspect of the incision area.  Nursing home reports copious amounts of drainage and blood reported serosanguineous.  Possibly supposed to be on a wound VAC but facility reports they do not have the correct hookups and was not on it when EMS brought her here for evaluation.      ROS:(Bolded text if patient is positive for) All other review of systems are negative except as noted above and HPI or ROS.   CONSTITUTIONAL:       fever, chills  EENT:       sore throat, congestion, rhinorrhea, blurry vision, change in vision  CARDIOVASCULAR:       chest pain, palpitations, swelling  RESPIRATORY:       cough, wheeze, shortness of breath  GI:       nausea, vomiting, abdominal pain  MUSCULOSKELETAL:       deformity, neck pain  SKIN:       rash, lesion  NEUROLOGIC:       headache, numbness, focal weakness  NOTES: All systems reviewed, negative except as described above       Physical Exam:  GENERAL: Alert, oriented , cooperative,  in no acute distress.  HEAD: normocephalic, atraumatic  SKIN: Some erythema around feet.  Slight wound dehiscence to right status post arthroplasty with staples and sutures in place more dehisced proximally.  Some serosanguineous drainage with palpation, no crepitus.  See photo below for more detail  EYES: PERRL, EOMs intact,  Conjunctiva pink with no erythema or exudates. No scleral icterus.   PULMONARY: Clear bilaterally. No crackles, rhonchi, wheezing.  No respiratory distress.  No work of breathing.  CARDIAC: Regular rate and regular rhythm.  Pulses 2+ in radials and dorsal pedal  pulses bilaterally.  No murmur, rub, gallop.  No edema.  MUSCULOSKELETAL: Limited range of motion in right Lower extremity secondary to recent hip replacement and wound on the right hip.    NEUROLOGICAL: no focal neuro deficits.  Neurovascularly intact throughout            MDM/ED COURSE:  Patient presents with chief complaint of wound dehiscence.  Differential includes but not limited to sepsis, cellulitis, dehiscence.  Labs and imaging were performed along with blood cultures.  Consult was made out to Dr. Jalloh.    Patient requires continued workup and management of their symptoms and will be admitted to the hospital for further evaluation and treatment.        I discussed the differential, results and plan with the patient and/or family/friend/caregiver if present.        Note: This note was dictated by speech recognition. Minor errors in transcription may be present.    ED Course as of 02/14/24 0200 Wed Feb 14, 2024 0035 Consult made to orthopedic surgery who advised admission to medicine and they will see the patient in the morning. [WL]   0035 Looking at chart review it looks like she is supposed to be on a wound VAC but none present. [WL]   0035 Also looks to already be on Ancef. [WL]   0045 Consult was made to hospitalist, Dr Duran who states to wait on the lactate if elevated will add vancomycin to antibiotic regiment otherwise do not need any antibiotic treatment. [WL]   0046 Dr Duran would like assessment under Dr. Killian. [WL]   0102 Lactate found to be normal. [WL]   0102 Was found her to be hyponatremic hypochloremic.  She was given IV fluids. [WL]   0200 Reperfusion exam performed and cap refill less than 2 seconds and blood pressure improved. [WL]      ED Course User Index  [WL] Brennan Carreno DO         Diagnoses as of 02/14/24 0200   Wound dehiscence   Electrolyte abnormality   Sepsis, due to unspecified organism, unspecified whether acute organ dysfunction present (CMS/[facility])         Past  Medical History:   Diagnosis Date    Anxiety     Arthritis     Class 1 obesity with body mass index (BMI) of 30.0 to 30.9 in adult 02/02/2024    30.56 kg/m²    Depression     GERD (gastroesophageal reflux disease)     HTN (hypertension)     Hyperlipidemia     Right hip pain     Sinusitis     Transfusion history     s/p L ALEXA      Social History     Socioeconomic History    Marital status: Single     Spouse name: Not on file    Number of children: Not on file    Years of education: Not on file    Highest education level: Not on file   Occupational History    Not on file   Tobacco Use    Smoking status: Every Day     Packs/day: 1     Types: Cigarettes    Smokeless tobacco: Never   Substance and Sexual Activity    Alcohol use: Not Currently    Drug use: Not Currently    Sexual activity: Not on file   Other Topics Concern    Not on file   Social History Narrative    Not on file     Social Determinants of Health     Financial Resource Strain: Low Risk  (2/9/2024)    Overall Financial Resource Strain (CARDIA)     Difficulty of Paying Living Expenses: Not hard at all   Food Insecurity: Not on file   Transportation Needs: No Transportation Needs (2/9/2024)    PRAPARE - Transportation     Lack of Transportation (Medical): No     Lack of Transportation (Non-Medical): No   Recent Concern: Transportation Needs - Unmet Transportation Needs (1/11/2024)    PRAPARE - Transportation     Lack of Transportation (Medical): Yes     Lack of Transportation (Non-Medical): Yes   Physical Activity: Not on file   Stress: Not on file   Social Connections: Feeling Socially Integrated (1/9/2024)    OASIS : Social Isolation     Frequency of experiencing loneliness or isolation: Never   Intimate Partner Violence: Not on file   Housing Stability: Low Risk  (2/9/2024)    Housing Stability Vital Sign     Unable to Pay for Housing in the Last Year: No     Number of Places Lived in the Last Year: 1     Unstable Housing in the Last Year: No      Current Outpatient Medications   Medication Instructions    ARIPiprazole (ABILIFY) 15 mg, oral, Nightly    aspirin 81 mg, oral, 2 times daily, Do not start until wound drainage stops or no longer bloody.    ceFAZolin in dextrose 5 % (Ancef) 2 gram/100 mL solution 2 g, intravenous, Every 8 hours, For 6 weeks of therapy with weekly labs: CBC, CMP, ESR, CRP    cetirizine (ZYRTEC) 10 mg, oral, Every morning    ferrous sulfate (325 mg ferrous sulfate) 325 mg, oral, Daily    lisinopril 10 mg, oral, Every morning    magnesium oxide (MAG-OX) 400 mg, oral, Daily    metoprolol succinate XL (TOPROL-XL) 25 mg, oral, Daily, Do not crush or chew.    montelukast (SINGULAIR) 10 mg, oral, Every morning    nicotine (Nicoderm CQ) 21 mg/24 hr patch 1 patch, transdermal, Daily    omeprazole (PRILOSEC) 40 mg, oral, Daily, Do not crush or chew.    PARoxetine CR (Paxil-CR) 37.5 mg 24 hr tablet 1 tablet, oral, Every morning    polyethylene glycol (GLYCOLAX, MIRALAX) 17 g, oral, Daily    simvastatin (ZOCOR) 20 mg, oral, Nightly    traZODone (DESYREL) 100 mg, oral, Nightly    zinc oxide 40 % ointment ointment 1 Application, Topical, 2 times daily     No Known Allergies          ED Triage Vitals [02/14/24 0010]   Temperature Heart Rate Respirations BP   36.6 °C (97.9 °F) (!) 102 20 99/63      Pulse Ox Temp Source Heart Rate Source Patient Position   100 % Temporal Monitor Lying      BP Location FiO2 (%)     Right arm --               Labs and Imaging  No orders to display     Labs Reviewed   CBC WITH AUTO DIFFERENTIAL - Abnormal       Result Value    WBC 12.1 (*)     nRBC 0.0      RBC 3.08 (*)     Hemoglobin 8.6 (*)     Hematocrit 27.8 (*)     MCV 90      MCH 27.9      MCHC 30.9 (*)     RDW 20.6 (*)     Platelets 316      Neutrophils % 71.5      Immature Granulocytes %, Automated 2.8 (*)     Lymphocytes % 10.8      Monocytes % 8.9      Eosinophils % 5.5      Basophils % 0.5      Neutrophils Absolute 8.64 (*)     Immature Granulocytes  Absolute, Automated 0.34      Lymphocytes Absolute 1.31      Monocytes Absolute 1.07 (*)     Eosinophils Absolute 0.67      Basophils Absolute 0.06     COMPREHENSIVE METABOLIC PANEL - Abnormal    Glucose 112 (*)     Sodium 129 (*)     Potassium 4.3      Chloride 97 (*)     Bicarbonate 27      Anion Gap 9 (*)     Urea Nitrogen 7      Creatinine 0.29 (*)     eGFR >90      Calcium 7.9 (*)     Albumin 2.7 (*)     Alkaline Phosphatase 108      Total Protein 5.2 (*)     AST 7 (*)     Bilirubin, Total 0.4      ALT 4 (*)    LACTATE - Normal    Lactate 0.7      Narrative:     Venipuncture immediately after or during the administration of Metamizole may lead to falsely low results. Testing should be performed immediately  prior to Metamizole dosing.   BLOOD CULTURE   BLOOD CULTURE   MORPHOLOGY    RBC Morphology See Below      Polychromasia Mild      Spherocytes Few      Teardrop Cells Few      Acanthocytes Few             Procedure  Critical Care    Performed by: Brennan Carreno DO  Authorized by: Brennan Carreno DO    Critical care provider statement:     Critical care time (minutes):  31    Critical care time was exclusive of:  Separately billable procedures and treating other patients    Critical care was necessary to treat or prevent imminent or life-threatening deterioration of the following conditions:  Sepsis    Critical care was time spent personally by me on the following activities:  Ordering and performing treatments and interventions, ordering and review of laboratory studies, ordering and review of radiographic studies, pulse oximetry, re-evaluation of patient's condition, review of old charts, examination of patient and evaluation of patient's response to treatment    Care discussed with: admitting provider                      Brennan Carreno DO  02/14/24 9970
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
## 2024-02-14 NOTE — PROGRESS NOTES
Blanca Hamilton is a 48 y.o. female on day 0 of admission presenting with Wound dehiscence.    Subjective   No acute events overnight.  Patient resting comfortably in bed this morning.  Admits to consistent and constant drainage from surgical site.  Denies noticing malodorous discharge.  Denies pain at surgical site, dizziness, vision changes, chest pain, shortness of breath, new cough, new onset weakness, or abdominal pain.    12 ROS negative unless otherwise stated above.    Objective     Physical Exam  Constitutional:       General: She is not in acute distress.     Comments: Conversant and pleasant.  Nontoxic appearing   HENT:      Head: Normocephalic and atraumatic.      Nose: Nose normal.   Cardiovascular:      Rate and Rhythm: Normal rate and regular rhythm.      Heart sounds: Normal heart sounds.   Pulmonary:      Effort: Pulmonary effort is normal. No respiratory distress.      Breath sounds: Normal breath sounds. No stridor. No wheezing.   Abdominal:      General: Bowel sounds are normal. There is no distension.      Palpations: Abdomen is soft. There is no mass.      Tenderness: There is no abdominal tenderness.   Musculoskeletal:         General: Deformity and signs of injury present. Normal range of motion.      Cervical back: Normal range of motion. No rigidity.      Right lower leg: No edema.      Left lower leg: No edema.   Skin:     General: Skin is warm.      Findings: Lesion present.      Comments: Right lateral hip arthroplasty surgical site with long incision and staples.  Incision with proximal dehiscence of 5 cm.  There is noticeable cloudy, malodorous drainage near the proximal area of dehiscence.  The incision has dull redness along its entirety.  No other areas of the surgical site have obvious drainage or dehiscence.     Neurological:      General: No focal deficit present.      Mental Status: She is alert and oriented to person, place, and time. Mental status is at baseline.   Psychiatric:     "     Mood and Affect: Mood normal.         Behavior: Behavior normal.         Thought Content: Thought content normal.         Judgment: Judgment normal.         Last Recorded Vitals  Blood pressure 94/59, pulse 108, temperature 36.8 °C (98.3 °F), temperature source Temporal, resp. rate 16, height 1.753 m (5' 9\"), weight 89.4 kg (197 lb 1.6 oz), SpO2 96 %.  Intake/Output last 3 Shifts:  I/O last 3 completed shifts:  In: - (0 mL/kg)   Out: 300 (3.4 mL/kg) [Urine:300 (0.1 mL/kg/hr)]  Weight: 89.4 kg     Relevant Results    Current Facility-Administered Medications:     acetaminophen (Tylenol) tablet 975 mg, 975 mg, oral, q8h, Elly Rosado MD, 975 mg at 02/14/24 1156    ARIPiprazole (Abilify) tablet 15 mg, 15 mg, oral, Nightly, Elly Rosado MD    [Held by provider] aspirin chewable tablet 81 mg, 81 mg, oral, BID, Elly Rosado MD    famotidine (Pepcid) tablet 40 mg, 40 mg, oral, BID, Elly Rosado MD, 40 mg at 02/14/24 0909    ferrous sulfate (325 mg ferrous sulfate) tablet 1 tablet, 1 tablet, oral, Daily, Elly Rosado MD, 1 tablet at 02/14/24 0909    HYDROmorphone (Dilaudid) injection 0.4 mg, 0.4 mg, intravenous, q3h PRN, Elly Rosado MD    [Held by provider] lisinopril tablet 10 mg, 10 mg, oral, q AM, Elly Rosado MD    loratadine (Claritin) tablet 10 mg, 10 mg, oral, Daily, Elly Rosado MD, 10 mg at 02/14/24 0909    magnesium oxide (Mag-Ox) tablet 400 mg, 400 mg, oral, Daily, Elly Rosado MD, 400 mg at 02/14/24 0909    metoprolol succinate XL (Toprol-XL) 24 hr tablet 25 mg, 25 mg, oral, Daily, Elly Rosado MD, 25 mg at 02/14/24 1036    montelukast (Singulair) tablet 10 mg, 10 mg, oral, q AM, Elly Rosado MD, 10 mg at 02/14/24 0909    nicotine (Nicoderm CQ) 21 mg/24 hr patch 1 patch, 1 patch, transdermal, Daily, Elly Rosado MD, 1 patch at 02/14/24 0909    oxyCODONE (Roxicodone) immediate release tablet 10 mg, 10 mg, oral, q6h PRN, Elly Rosado MD    oxyCODONE (Roxicodone) immediate release tablet 5 mg, 5 mg, oral, q6h PRN, Elly Rosado MD    PARoxetine " (Paxil) tablet 40 mg, 40 mg, oral, Daily, Elly Rosado MD, 40 mg at 02/14/24 0909    piperacillin-tazobactam-dextrose (Zosyn) IV 3.375 g, 3.375 g, intravenous, q6h, Elly Rosado MD, Stopped at 02/14/24 1226    polyethylene glycol (Glycolax, Miralax) packet 17 g, 17 g, oral, Daily, Elly Rosado MD, 17 g at 02/14/24 1036    simvastatin (Zocor) tablet 20 mg, 20 mg, oral, Nightly, Elly Rosado MD    sodium chloride 0.9% infusion, 75 mL/hr, intravenous, Continuous, Elly Rosado MD, Last Rate: 75 mL/hr at 02/14/24 0457, 75 mL/hr at 02/14/24 0457    traZODone (Desyrel) tablet 100 mg, 100 mg, oral, Nightly, Elly Rosado MD    vancomycin (Vancocin) in dextrose 5 % water (D5W) 500 mL IV 1,500 mg, 1,500 mg, intravenous, q12h, Elly Rosado MD, Last Rate: 333.3 mL/hr at 02/14/24 1526, 1,500 mg at 02/14/24 1526    zinc oxide 40 % ointment 1 Application, 1 Application, Topical, BID, Elly Rosado MD, 1 Application at 02/14/24 0910    Lower extremity venous duplex bilateral  Result Date: 2/14/2024  PRELIMINARY CONCLUSIONS: Right Lower Venous: No evidence of acute deep vein thrombus visualized in the right lower extremity. Left Lower Venous: No evidence of acute deep vein thrombus visualized in the left lower extremity.  Additional Findings: Technically difficult study due to patient movement and inability to position.     ECG 12 lead  Result Date: 2/7/2024  Atrial flutter with 2:1 AV conduction Nonspecific intraventricular block Left ventricular hypertrophy ( R in aVL , Lyndon product ) Inferior infarct , age undetermined Abnormal ECG When compared with ECG of 11-JAN-2024 23:16, Significant changes have occurred See ED provider note for full interpretation and clinical correlation Confirmed by Vega Bruce ([id number]) on 2/7/2024 10:40:33 PM    XR hip right with pelvis when performed 2 or 3 views  Result Date: 2/7/2024  FINDINGS: Total right hip arthroplasty with interval placement of antibiotic impregnated beads. Partially visualized left hip  arthroplasty.     Interval placement of antibiotic impregnated beads in the right hip.     MACRO: None   Signed by: Richard Garcia 2/7/2024 5:00 PM Dictation workstation:   [id number]    XR hip right with pelvis when performed 2 or 3 views  Result Date: 1/27/2024    FINDINGS: Status post right total hip arthroplasty with cerclage wire fixation of the proximal femur. Hardware is intact without perihardware fractures or lucencies.   There is fragmentation of the greater trochanter which is likely postsurgical in etiology with attention on follow-up recommended.   Right acetabular protrusio. No malalignment.   Changes of left hip arthroplasty noted with prominent osteolysis of the medial acetabular wall similar to prior.           Results for orders placed or performed during the hospital encounter of 02/14/24 (from the past 24 hour(s))   CBC and Auto Differential   Result Value Ref Range    WBC 12.1 (H) 4.4 - 11.3 x10*3/uL    nRBC 0.0 0.0 - 0.0 /100 WBCs    RBC 3.08 (L) 4.00 - 5.20 x10*6/uL    Hemoglobin 8.6 (L) 12.0 - 16.0 g/dL    Hematocrit 27.8 (L) 36.0 - 46.0 %    MCV 90 80 - 100 fL    MCH 27.9 26.0 - 34.0 pg    MCHC 30.9 (L) 32.0 - 36.0 g/dL    RDW 20.6 (H) 11.5 - 14.5 %    Platelets 316 150 - 450 x10*3/uL    Neutrophils % 71.5 40.0 - 80.0 %    Immature Granulocytes %, Automated 2.8 (H) 0.0 - 0.9 %    Lymphocytes % 10.8 13.0 - 44.0 %    Monocytes % 8.9 2.0 - 10.0 %    Eosinophils % 5.5 0.0 - 6.0 %    Basophils % 0.5 0.0 - 2.0 %    Neutrophils Absolute 8.64 (H) 1.20 - 7.70 x10*3/uL    Immature Granulocytes Absolute, Automated 0.34 0.00 - 0.70 x10*3/uL    Lymphocytes Absolute 1.31 1.20 - 4.80 x10*3/uL    Monocytes Absolute 1.07 (H) 0.10 - 1.00 x10*3/uL    Eosinophils Absolute 0.67 0.00 - 0.70 x10*3/uL    Basophils Absolute 0.06 0.00 - 0.10 x10*3/uL   Comprehensive Metabolic Panel   Result Value Ref Range    Glucose 112 (H) 74 - 99 mg/dL    Sodium 129 (L) 136 - 145 mmol/L    Potassium 4.3 3.5 - 5.3 mmol/L     Chloride 97 (L) 98 - 107 mmol/L    Bicarbonate 27 21 - 32 mmol/L    Anion Gap 9 (L) 10 - 20 mmol/L    Urea Nitrogen 7 6 - 23 mg/dL    Creatinine 0.29 (L) 0.50 - 1.05 mg/dL    eGFR >90 >60 mL/min/1.73m*2    Calcium 7.9 (L) 8.6 - 10.3 mg/dL    Albumin 2.7 (L) 3.4 - 5.0 g/dL    Alkaline Phosphatase 108 33 - 110 U/L    Total Protein 5.2 (L) 6.4 - 8.2 g/dL    AST 7 (L) 9 - 39 U/L    Bilirubin, Total 0.4 0.0 - 1.2 mg/dL    ALT 4 (L) 7 - 45 U/L   Lactate   Result Value Ref Range    Lactate 0.7 0.4 - 2.0 mmol/L   Blood Culture    Specimen: Peripheral Venipuncture; Blood culture   Result Value Ref Range    Blood Culture Loaded on Instrument - Culture in progress    Morphology   Result Value Ref Range    RBC Morphology See Below     Polychromasia Mild     Spherocytes Few     Teardrop Cells Few     Acanthocytes Few    Sedimentation rate, automated   Result Value Ref Range    Sedimentation Rate 16 0 - 20 mm/h   C-reactive protein   Result Value Ref Range    C-Reactive Protein 8.96 (H) <1.00 mg/dL   Osmolality   Result Value Ref Range    Osmolality, Serum 266 (L) 280 - 300 mOsm/kg   Blood Culture    Specimen: Peripheral Venipuncture; Blood culture   Result Value Ref Range    Blood Culture Loaded on Instrument - Culture in progress    CBC   Result Value Ref Range    WBC 8.5 4.4 - 11.3 x10*3/uL    nRBC 0.0 0.0 - 0.0 /100 WBCs    RBC 2.59 (L) 4.00 - 5.20 x10*6/uL    Hemoglobin 7.3 (L) 12.0 - 16.0 g/dL    Hematocrit 24.5 (L) 36.0 - 46.0 %    MCV 95 80 - 100 fL    MCH 28.2 26.0 - 34.0 pg    MCHC 29.8 (L) 32.0 - 36.0 g/dL    RDW 20.4 (H) 11.5 - 14.5 %    Platelets 303 150 - 450 x10*3/uL   Basic metabolic panel   Result Value Ref Range    Glucose 93 74 - 99 mg/dL    Sodium 133 (L) 136 - 145 mmol/L    Potassium 4.1 3.5 - 5.3 mmol/L    Chloride 105 98 - 107 mmol/L    Bicarbonate 24 21 - 32 mmol/L    Anion Gap 8 (L) 10 - 20 mmol/L    Urea Nitrogen 4 (L) 6 - 23 mg/dL    Creatinine 0.24 (L) 0.50 - 1.05 mg/dL    eGFR >90 >60 mL/min/1.73m*2     Calcium 7.1 (L) 8.6 - 10.3 mg/dL   C-reactive protein   Result Value Ref Range    C-Reactive Protein 7.15 (H) <1.00 mg/dL   Lower extremity venous duplex bilateral   Result Value Ref Range    BSA 2.09 m2           Assessment/Plan   Blanca Hamilton is a 48 y.o. 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 2/4 SIRS criteria in admission, s/p 3.12L NS. Cefazolin through PICC line discontinued (on previous discharge, planned for 6 weeks of cefazolin outpatient.) Started on vanc/zosyn. Admitted for further management with ortho, ID, and wound care consult.     Acute Medical Issues:  #Sepsis secondary to wound dehiscence complicated by recent right total hip arthroplasty infection  #status post revision and hardware removal on 2/6  #Extensive fat necrosis  #History of MSSA wound culture  -Underwent revision with Dr. Jalloh on 2/6 for SSI, cultures positive for MSSA  -Discharged on 2/11 with wound vac + PICC line for 6-week course of cefazolin  -2/13/24: 2/4 SIRS criteria on admission. S/p sepsis fluids in ED. Lactate 0.7. Hgb stable.  -pain control: scheduled tylenol, oxy 5 mod, oxy 10 severe, dilaudid 0.4 PRN breakthrough  -Weightbearing right lower extremity for tfx only   -Blood Cx in progress  -Wound Cx pending  -CRP: 8.96 --> 7.15   -ESR: 24 -->16  -Continue to trend CRP and Pro-Leonardo  -No DVT prophylaxis at this time due to wound dehiscence and drainage, after last discharge was instructed to hold off on aspirin 81 mg until drainage improved  -Wound care consulted, successful placement of wound VAC for management of exudate/drainage.  Set to continuous, and planning to leave on until Friday, 2/17/2024  -Ortho consulted, no acute orthopedic intervention and planning for possible return trip to the OR on 2/20 pending wound care and repeat evaluation   -Initially started on Vanco and Zosyn on admission  -ID consulted,  recommend transition Abx to IV Ancef 2 g 3 times daily.  Will continue with vancomycin and Zosyn at this time and may revisit de-escalating antibiotic regimen tomorrow    #B/l LE edema  -Associated with pink macular rash  -Likely stasis dermatitis  -Bilateral duplex negative for acute DVT bilaterally  -Encourage bilateral leg elevation as tolerated    #Hyponatremia -improving  - Na 129 on admission  -Likely related to poor p.o. intake  - CTM    Chronic Medical Issues:  #GERD: pepcid  #HTN: home metoprolol restarted, hold home lisinopril in setting of hypotension  #HLD: simvastatin  #Depression: abilify, paroxetine, trazodone  #Chronic anemia: iron supplement  #Tobacco use: nicotine patch  #Other: cont claritin, singulair, mag ox     F: NS @75 ml/hr  E: PRN  N: regular diet  G: PPI  Bowel regimen: PEG  Lines: PICC line  DVT: holding in setting of copious sanguinous output (on discharge last admission, was told to restart ASA for DVT ppx once drainage decreased)     CODE: FULL    Dispo: 49 yo F presents with wound dehiscence of right hip arthroplasty, following several prior surgical complications including infection.  Orthopedic surgery consulted, planning for ORIF on 2/20/2024.  Infectious disease consulted for antibiotic management, recommend transitioning from Vanco and Zosyn to Ancef.  Wound care consulted, with successful placement of wound VAC today.    [facility] sent referral to [facility] to confirm patient is skilled. Precert needed to return.      Patient seen and discussed with attending Dr. Ludwin Bledsoe, DO  Internal Medicine  PGY-1

## 2024-02-14 NOTE — PROGRESS NOTES
"Vancomycin Dosing by Pharmacy- INITIAL    Blanca Hamilton is a 48 y.o. year old female who Pharmacy has been consulted for vancomycin dosing for AUC of 400-600. Based on the patient's indication and renal status this patient will be dosed based on a goal AUC of 400-600.     Renal function is currently stable.    Visit Vitals  /64   Pulse (!) 111   Temp 36.1 °C (97 °F)   Resp 18        Lab Results   Component Value Date    CREATININE 0.29 (L) 02/14/2024    CREATININE 0.29 (L) 02/11/2024    CREATININE 0.29 (L) 02/10/2024    CREATININE 0.30 (L) 02/09/2024        Patient weight is No results found for: \"PTWEIGHT\"    No results found for: \"CULTURE\"     No intake/output data recorded.  [unfilled]    No results found for: \"PATIENTTEMP\"       Assessment/Plan     Patient will not be given a loading dose.  Will initiate vancomycin maintenance,  1500 mg every 12 hours.    This dosing regimen is predicted by [employer] to result in the following pharmacokinetic parameters:  Loading dose: N/A  Regimen: 1500 mg IV every 12 hours.  Start time: 04:00 on 02/14/2024  Exposure target: AUC24 (range)400-600 mg/L.hr   AUC24,ss: 549 mg/L.hr  Probability of AUC24 > 400: 81 %  Ctrough,ss: 16.3 mg/L  Probability of Ctrough,ss > 20: 34 %  Probability of nephrotoxicity (Lodise BRENDA 2009): 12 %      Follow-up level will be ordered on 2/15 at 5am unless clinically indicated sooner.  Will continue to monitor renal function daily while on vancomycin and order serum creatinine at least every 48 hours if not already ordered.  Follow for continued vancomycin needs, clinical response, and signs/symptoms of toxicity.       Blanca Neal, PharmD       "

## 2024-02-15 LAB
ALBUMIN SERPL BCP-MCNC: 2.2 G/DL (ref 3.4–5)
ALP SERPL-CCNC: 87 U/L (ref 33–110)
ALT SERPL W P-5'-P-CCNC: <3 U/L (ref 7–45)
ANION GAP SERPL CALC-SCNC: 8 MMOL/L (ref 10–20)
AST SERPL W P-5'-P-CCNC: 6 U/L (ref 9–39)
BILIRUB SERPL-MCNC: 0.2 MG/DL (ref 0–1.2)
BUN SERPL-MCNC: 3 MG/DL (ref 6–23)
CALCIUM SERPL-MCNC: 7.1 MG/DL (ref 8.6–10.3)
CHLORIDE SERPL-SCNC: 105 MMOL/L (ref 98–107)
CO2 SERPL-SCNC: 28 MMOL/L (ref 21–32)
CREAT SERPL-MCNC: 0.24 MG/DL (ref 0.5–1.05)
CRP SERPL-MCNC: 6.3 MG/DL
EGFRCR SERPLBLD CKD-EPI 2021: >90 ML/MIN/1.73M*2
ERYTHROCYTE [DISTWIDTH] IN BLOOD BY AUTOMATED COUNT: 20 % (ref 11.5–14.5)
GLUCOSE SERPL-MCNC: 76 MG/DL (ref 74–99)
HCT VFR BLD AUTO: 23.7 % (ref 36–46)
HGB BLD-MCNC: 7.4 G/DL (ref 12–16)
MCH RBC QN AUTO: 28.9 PG (ref 26–34)
MCHC RBC AUTO-ENTMCNC: 31.2 G/DL (ref 32–36)
MCV RBC AUTO: 93 FL (ref 80–100)
NRBC BLD-RTO: 0 /100 WBCS (ref 0–0)
PHOSPHATE SERPL-MCNC: 3 MG/DL (ref 2.5–4.9)
PLATELET # BLD AUTO: 300 X10*3/UL (ref 150–450)
POTASSIUM SERPL-SCNC: 3.9 MMOL/L (ref 3.5–5.3)
PROCALCITONIN SERPL-MCNC: 0.05 NG/ML
PROT SERPL-MCNC: 4.5 G/DL (ref 6.4–8.2)
RBC # BLD AUTO: 2.56 X10*6/UL (ref 4–5.2)
SODIUM SERPL-SCNC: 137 MMOL/L (ref 136–145)
VANCOMYCIN TROUGH SERPL-MCNC: 19.7 UG/ML (ref 5–20)
WBC # BLD AUTO: 5.5 X10*3/UL (ref 4.4–11.3)

## 2024-02-15 PROCEDURE — 2500000004 HC RX 250 GENERAL PHARMACY W/ HCPCS (ALT 636 FOR OP/ED)

## 2024-02-15 PROCEDURE — 2500000001 HC RX 250 WO HCPCS SELF ADMINISTERED DRUGS (ALT 637 FOR MEDICARE OP)

## 2024-02-15 PROCEDURE — 80202 ASSAY OF VANCOMYCIN: CPT

## 2024-02-15 PROCEDURE — 85027 COMPLETE CBC AUTOMATED: CPT

## 2024-02-15 PROCEDURE — 1200000002 HC GENERAL ROOM WITH TELEMETRY DAILY

## 2024-02-15 PROCEDURE — 97530 THERAPEUTIC ACTIVITIES: CPT | Mod: GP,CQ

## 2024-02-15 PROCEDURE — 80053 COMPREHEN METABOLIC PANEL: CPT

## 2024-02-15 PROCEDURE — 99232 SBSQ HOSP IP/OBS MODERATE 35: CPT | Performed by: NURSE PRACTITIONER

## 2024-02-15 PROCEDURE — A4217 STERILE WATER/SALINE, 500 ML: HCPCS

## 2024-02-15 PROCEDURE — 84145 PROCALCITONIN (PCT): CPT | Mod: GEALAB

## 2024-02-15 PROCEDURE — 84100 ASSAY OF PHOSPHORUS: CPT

## 2024-02-15 PROCEDURE — 97165 OT EVAL LOW COMPLEX 30 MIN: CPT | Mod: GO

## 2024-02-15 PROCEDURE — 99233 SBSQ HOSP IP/OBS HIGH 50: CPT

## 2024-02-15 PROCEDURE — 2500000002 HC RX 250 W HCPCS SELF ADMINISTERED DRUGS (ALT 637 FOR MEDICARE OP, ALT 636 FOR OP/ED)

## 2024-02-15 PROCEDURE — 86140 C-REACTIVE PROTEIN: CPT

## 2024-02-15 PROCEDURE — S4991 NICOTINE PATCH NONLEGEND: HCPCS

## 2024-02-15 RX ORDER — ENOXAPARIN SODIUM 100 MG/ML
40 INJECTION SUBCUTANEOUS EVERY 24 HOURS
Status: DISCONTINUED | OUTPATIENT
Start: 2024-02-15 | End: 2024-03-05 | Stop reason: HOSPADM

## 2024-02-15 RX ORDER — CEFAZOLIN SODIUM 1 G/50ML
1 SOLUTION INTRAVENOUS EVERY 8 HOURS
Status: DISCONTINUED | OUTPATIENT
Start: 2024-02-15 | End: 2024-02-15

## 2024-02-15 RX ORDER — ONDANSETRON HYDROCHLORIDE 2 MG/ML
4 INJECTION, SOLUTION INTRAVENOUS EVERY 6 HOURS PRN
Status: DISCONTINUED | OUTPATIENT
Start: 2024-02-15 | End: 2024-03-05 | Stop reason: HOSPADM

## 2024-02-15 RX ORDER — CEFAZOLIN SODIUM 2 G/100ML
2 INJECTION, SOLUTION INTRAVENOUS EVERY 8 HOURS
Status: DISCONTINUED | OUTPATIENT
Start: 2024-02-15 | End: 2024-03-05 | Stop reason: HOSPADM

## 2024-02-15 RX ADMIN — NICOTINE 1 PATCH: 21 PATCH, EXTENDED RELEASE TRANSDERMAL at 08:48

## 2024-02-15 RX ADMIN — ACETAMINOPHEN 975 MG: 325 TABLET ORAL at 16:06

## 2024-02-15 RX ADMIN — Medication 400 MG: at 08:48

## 2024-02-15 RX ADMIN — CEFAZOLIN SODIUM 2 G: 2 INJECTION, SOLUTION INTRAVENOUS at 21:27

## 2024-02-15 RX ADMIN — PAROXETINE 40 MG: 20 TABLET, FILM COATED ORAL at 08:48

## 2024-02-15 RX ADMIN — PIPERACILLIN SODIUM AND TAZOBACTAM SODIUM 3.38 G: 3; .375 INJECTION, SOLUTION INTRAVENOUS at 10:56

## 2024-02-15 RX ADMIN — ARIPIPRAZOLE 15 MG: 5 TABLET ORAL at 21:27

## 2024-02-15 RX ADMIN — FAMOTIDINE 40 MG: 20 TABLET ORAL at 21:27

## 2024-02-15 RX ADMIN — ONDANSETRON 4 MG: 2 INJECTION INTRAMUSCULAR; INTRAVENOUS at 20:13

## 2024-02-15 RX ADMIN — TRAZODONE HYDROCHLORIDE 100 MG: 50 TABLET ORAL at 21:27

## 2024-02-15 RX ADMIN — FERROUS SULFATE TAB 325 MG (65 MG ELEMENTAL FE) 1 TABLET: 325 (65 FE) TAB at 05:37

## 2024-02-15 RX ADMIN — ENOXAPARIN SODIUM 40 MG: 40 INJECTION SUBCUTANEOUS at 21:27

## 2024-02-15 RX ADMIN — SODIUM CHLORIDE 75 ML/HR: 9 INJECTION, SOLUTION INTRAVENOUS at 05:37

## 2024-02-15 RX ADMIN — SODIUM CHLORIDE 75 ML/HR: 9 INJECTION, SOLUTION INTRAVENOUS at 17:54

## 2024-02-15 RX ADMIN — ACETAMINOPHEN 975 MG: 325 TABLET ORAL at 05:37

## 2024-02-15 RX ADMIN — ACETAMINOPHEN 975 MG: 325 TABLET ORAL at 21:27

## 2024-02-15 RX ADMIN — MONTELUKAST 10 MG: 10 TABLET, FILM COATED ORAL at 08:48

## 2024-02-15 RX ADMIN — Medication 1 APPLICATION: at 21:00

## 2024-02-15 RX ADMIN — FAMOTIDINE 40 MG: 20 TABLET ORAL at 08:48

## 2024-02-15 RX ADMIN — METOPROLOL SUCCINATE 25 MG: 25 TABLET, EXTENDED RELEASE ORAL at 08:48

## 2024-02-15 RX ADMIN — PIPERACILLIN SODIUM AND TAZOBACTAM SODIUM 3.38 G: 3; .375 INJECTION, SOLUTION INTRAVENOUS at 05:37

## 2024-02-15 RX ADMIN — Medication 1 APPLICATION: at 09:00

## 2024-02-15 RX ADMIN — LORATADINE 10 MG: 10 TABLET ORAL at 08:48

## 2024-02-15 RX ADMIN — OXYCODONE HYDROCHLORIDE 5 MG: 5 TABLET ORAL at 09:15

## 2024-02-15 RX ADMIN — SIMVASTATIN 20 MG: 20 TABLET, FILM COATED ORAL at 21:27

## 2024-02-15 RX ADMIN — VANCOMYCIN HYDROCHLORIDE 1500 MG: 10 INJECTION, POWDER, LYOPHILIZED, FOR SOLUTION INTRAVENOUS at 03:04

## 2024-02-15 RX ADMIN — VANCOMYCIN HYDROCHLORIDE 1500 MG: 10 INJECTION, POWDER, LYOPHILIZED, FOR SOLUTION INTRAVENOUS at 16:07

## 2024-02-15 ASSESSMENT — COGNITIVE AND FUNCTIONAL STATUS - GENERAL
MOVING TO AND FROM BED TO CHAIR: A LOT
DRESSING REGULAR LOWER BODY CLOTHING: TOTAL
PERSONAL GROOMING: A LITTLE
WALKING IN HOSPITAL ROOM: A LOT
CLIMB 3 TO 5 STEPS WITH RAILING: A LOT
MOVING TO AND FROM BED TO CHAIR: A LOT
PERSONAL GROOMING: A LOT
HELP NEEDED FOR BATHING: A LOT
HELP NEEDED FOR BATHING: A LOT
DRESSING REGULAR LOWER BODY CLOTHING: A LOT
TOILETING: A LOT
TURNING FROM BACK TO SIDE WHILE IN FLAT BAD: A LOT
DRESSING REGULAR UPPER BODY CLOTHING: A LITTLE
MOVING FROM LYING ON BACK TO SITTING ON SIDE OF FLAT BED WITH BEDRAILS: A LOT
STANDING UP FROM CHAIR USING ARMS: A LOT
TURNING FROM BACK TO SIDE WHILE IN FLAT BAD: A LOT
STANDING UP FROM CHAIR USING ARMS: A LOT
WALKING IN HOSPITAL ROOM: A LOT
DRESSING REGULAR UPPER BODY CLOTHING: A LITTLE
CLIMB 3 TO 5 STEPS WITH RAILING: A LOT
EATING MEALS: A LITTLE
STANDING UP FROM CHAIR USING ARMS: A LOT
TURNING FROM BACK TO SIDE WHILE IN FLAT BAD: A LOT
MOBILITY SCORE: 12
CLIMB 3 TO 5 STEPS WITH RAILING: A LOT
MOVING TO AND FROM BED TO CHAIR: A LOT
MOBILITY SCORE: 12
DAILY ACTIVITIY SCORE: 15
WALKING IN HOSPITAL ROOM: A LOT
DAILY ACTIVITIY SCORE: 13
TOILETING: A LOT
TOILETING: A LOT
MOVING FROM LYING ON BACK TO SITTING ON SIDE OF FLAT BED WITH BEDRAILS: A LOT
DRESSING REGULAR UPPER BODY CLOTHING: A LOT
DAILY ACTIVITIY SCORE: 15
DRESSING REGULAR LOWER BODY CLOTHING: TOTAL
PERSONAL GROOMING: A LITTLE
HELP NEEDED FOR BATHING: A LOT
MOVING FROM LYING ON BACK TO SITTING ON SIDE OF FLAT BED WITH BEDRAILS: A LOT
MOBILITY SCORE: 12

## 2024-02-15 ASSESSMENT — PAIN SCALES - GENERAL
PAINLEVEL_OUTOF10: 5 - MODERATE PAIN
PAINLEVEL_OUTOF10: 0 - NO PAIN
PAINLEVEL_OUTOF10: 0 - NO PAIN

## 2024-02-15 ASSESSMENT — ACTIVITIES OF DAILY LIVING (ADL)
BATHING_ASSISTANCE: MODERATE
ADL_ASSISTANCE: NEEDS ASSISTANCE

## 2024-02-15 ASSESSMENT — PAIN - FUNCTIONAL ASSESSMENT
PAIN_FUNCTIONAL_ASSESSMENT: 0-10

## 2024-02-15 ASSESSMENT — PAIN DESCRIPTION - ORIENTATION: ORIENTATION: RIGHT

## 2024-02-15 ASSESSMENT — PAIN DESCRIPTION - LOCATION: LOCATION: HIP

## 2024-02-15 NOTE — PROGRESS NOTES
"  Subjective    Overnight Events:     NAEO  Resting comfortably in jeniffer  Endorses mild serosanguinous drainage, but denies malodorous discharge   Pt working with OT this morning    ROS: 12 points review of system is negative except as stated in the HPI above.     Objective    Vitals  Visit Vitals  /76   Pulse 94   Temp 37 °C (98.6 °F) (Temporal)   Resp 20   Ht 1.753 m (5' 9\")   Wt 89.4 kg (197 lb 1.6 oz)   SpO2 97%   BMI 29.11 kg/m²   Smoking Status Every Day   BSA 2.09 m²       Physical Exam   Constitutional:       General: She is not in acute distress.     Comments: Conversant and pleasant. Nontoxic appearing   HENT:      Head: Normocephalic and atraumatic.      Nose: Nose normal.   Cardiovascular:      Rate and Rhythm: Normal rate and regular rhythm.      Heart sounds: Normal heart sounds.   Pulmonary:      Effort: Pulmonary effort is normal. No respiratory distress.      Breath sounds: Normal breath sounds. No stridor. No wheezing.   Abdominal:      General: Bowel sounds are normal. There is no distension.      Palpations: Abdomen is soft. There is no mass.      Tenderness: There is no abdominal tenderness.   Musculoskeletal:         General: Deformity and signs of injury present. Normal range of motion.      Cervical back: Normal range of motion. No rigidity.      Right lower leg: No edema.      Left lower leg: No edema.   Skin:     General: Skin is warm.      Findings: Lesion present.      Comments: Right lateral hip arthroplasty surgical site with long incision and staples.  Incision with proximal dehiscence of 5 cm.  There is noticeable cloudy, malodorous drainage near the proximal area of dehiscence.  The incision has dull redness along its entirety.  No other areas of the surgical site have obvious drainage or dehiscence.     Neurological:      General: No focal deficit present.      Mental Status: She is alert and oriented to person, place, and time. Mental status is at baseline.   Psychiatric:       "   Mood and Affect: Mood normal.         Behavior: Behavior normal.         Thought Content: Thought content normal.         Judgment: Judgment normal.      IOs    Intake/Output Summary (Last 24 hours) at 2/15/2024 1159  Last data filed at 2/15/2024 0652  Gross per 24 hour   Intake 3410 ml   Output 2300 ml   Net 1110 ml       Labs:   Results from last 72 hours   Lab Units 02/15/24  0550 02/14/24  0438 02/14/24  0025   SODIUM mmol/L 137 133* 129*   POTASSIUM mmol/L 3.9 4.1 4.3   CHLORIDE mmol/L 105 105 97*   CO2 mmol/L 28 24 27   BUN mg/dL 3* 4* 7   CREATININE mg/dL 0.24* 0.24* 0.29*   GLUCOSE mg/dL 76 93 112*   CALCIUM mg/dL 7.1* 7.1* 7.9*   ANION GAP mmol/L 8* 8* 9*   EGFR mL/min/1.73m*2 >90 >90 >90   PHOSPHORUS mg/dL 3.0  --   --       Results from last 72 hours   Lab Units 02/15/24  0550 02/14/24  0438 02/14/24  0025   WBC AUTO x10*3/uL 5.5 8.5 12.1*   HEMOGLOBIN g/dL 7.4* 7.3* 8.6*   HEMATOCRIT % 23.7* 24.5* 27.8*   PLATELETS AUTO x10*3/uL 300 303 316   NEUTROS PCT AUTO %  --   --  71.5   LYMPHS PCT AUTO %  --   --  10.8   MONOS PCT AUTO %  --   --  8.9   EOS PCT AUTO %  --   --  5.5      Lab Results   Component Value Date    CALCIUM 7.1 (L) 02/15/2024    PHOS 3.0 02/15/2024      Lab Results   Component Value Date    CRP 6.30 (H) 02/15/2024        Micro/ID:   Susceptibility data from last 90 days.  Collected Specimen Info Organism Clindamycin Erythromycin Oxacillin Tetracycline Trimethoprim/Sulfamethoxazole Vancomycin   02/06/24 Swab from HIP ARTHROPLASTY RIGHT Methicillin Susceptible Staphylococcus aureus (MSSA) S S S R S S   02/06/24 Swab from HIP ARTHROPLASTY RIGHT Staphylococcus aureus         02/06/24 Swab from HIP ARTHROPLASTY RIGHT Staphylococcus aureus         02/02/24 Tissue/Biopsy from Wound/Tissue Methicillin Susceptible Staphylococcus aureus (MSSA) S S S R S S       Lab Results   Component Value Date    BLOODCULT No growth at 1 day 02/14/2024       Images  ECG 12 lead  Normal sinus rhythm  Nonspecific  ST and T wave abnormality  Abnormal ECG  When compared with ECG of 28-DEC-2023 13:55, (unconfirmed)  Previous ECG has undetermined rhythm, needs review  T wave inversion now evident in Lateral leads  See ED provider note for full interpretation and clinical correlation  Confirmed by Shauna Arciniega ([id number]) on 1/15/2024 8:42:19 PM  Lower extremity venous duplex bilateral  Preliminary Cardiology Report            [place] [address]   Tel [phone] and Fax [phone]               Preliminary Vascular Lab Report     [facility] US LOWER EXTREMITY VENOUS DUPLEX BILATERAL       Patient Name:      ZULEIKA PAUL KAMARA  Reading Physician:  [id number] Prieto Loza MD  Study Date:        2/14/2024    Ordering Physician: [id number] GEORGE AMADOR  MRN/PID:           [id number]     Technologist:       Rosa Pierre S  Accession#:        [id number] Technologist 2:     Hattie Raymond  Date of Birth/Age: 1975   Encounter#:         [id number]  Gender:            F  Admission Status:  Inpatient    Location Performed: [facility]       Diagnosis/ICD: Localized (leg) edema-R60.0  Procedure/CPT: 56429 Peripheral venous duplex scan for DVT complete       PRELIMINARY CONCLUSIONS:  Right Lower Venous: No evidence of acute deep vein thrombus visualized in the right lower extremity.  Left Lower Venous: No evidence of acute deep vein thrombus visualized in the left lower extremity.     Additional Findings:  Technically difficult study due to patient movement and inability to position.       Imaging & Doppler Findings:     Right                 Compressible Thrombus        Flow  Distal External Iliac     Yes        None   Spontaneous/Phasic  CFV                       Yes        None   Spontaneous/Phasic  PFV                       Yes        None  FV Proximal               Yes        None   Spontaneous/Phasic  FV Mid                    Yes        None  FV Distal                 Yes         None  Popliteal                 Yes        None   Spontaneous/Phasic  Peroneal                  Yes        None  PTV                       Yes        None       Left                  Compress Thrombus        Flow  Distal External Iliac   Yes      None   Spontaneous/Phasic  CFV                     Yes      None   Spontaneous/Phasic  PFV                     Yes      None  FV Proximal             Yes      None   Spontaneous/Phasic  FV Mid                  Yes      None  FV Distal               Yes      None  Popliteal               Yes      None   Spontaneous/Phasic  Peroneal                Yes      None  PTV                     Yes      None    VASCULAR PRELIMINARY REPORT  completed by Sandra Lane RVCHRIS on 2/14/2024 at 8:49:37 AM       ** Final **      Meds  Scheduled medications  acetaminophen, 975 mg, oral, q8h  ARIPiprazole, 15 mg, oral, Nightly  [Held by provider] aspirin, 81 mg, oral, BID  famotidine, 40 mg, oral, BID  ferrous sulfate (325 mg ferrous sulfate), 1 tablet, oral, Daily  [Held by provider] lisinopril, 10 mg, oral, q AM  loratadine, 10 mg, oral, Daily  magnesium oxide, 400 mg, oral, Daily  metoprolol succinate XL, 25 mg, oral, Daily  montelukast, 10 mg, oral, q AM  nicotine, 1 patch, transdermal, Daily  PARoxetine, 40 mg, oral, Daily  piperacillin-tazobactam, 3.375 g, intravenous, q6h  polyethylene glycol, 17 g, oral, Daily  simvastatin, 20 mg, oral, Nightly  traZODone, 100 mg, oral, Nightly  vancomycin, 1,500 mg, intravenous, q12h  zinc oxide, 1 Application, Topical, BID      Continuous medications  sodium chloride 0.9%, 75 mL/hr, Last Rate: 75 mL/hr (02/15/24 0537)      PRN medications  PRN medications: HYDROmorphone, oxyCODONE, oxyCODONE     Problem List    Problem list:   Patient Active Problem List   Diagnosis    Primary hypertension    Other hyperlipidemia    Impaired functional mobility and activity tolerance    Status post hip surgery    Psychophysiological insomnia    Recurrent major depressive  disorder (CMS/HCC)    Gastroesophageal reflux disease without esophagitis    Allergies    Iron deficiency anemia secondary to inadequate dietary iron intake    Other constipation    Tachycardia    Mild intermittent asthma without complication    Right hip pain    Wound infection after surgery    Urinary symptom or sign    Intractable pain    Wound dehiscence        Assessment and Plan        Sepsis 2/2 Wound Dehiscence c/b R ALEXA infection s/p revision & hardware removal on 2/6  2.   Extensive fat necrosis / Hx of MSSA wound culture      Recommendations:     - Discontinue Vanc/Zosyn    - De-escalate to IV Ancef 2g TID (to complete total 6 week course)   - Wound Cx (2/6): MSSA +  - Trend CRP & Procal levels  - Ortho following -> plan to return OR Tues 2/20/23  - Wound care to place wound vac      Thank you for the consult, will continue to follow. Please reach out with any questions/concerns.      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 The times in the chart may not be reflective of actual patient care times, interventions, or procedures. Documentation occurs after the physical care of the patient. Dictation technology was used during documentation.

## 2024-02-15 NOTE — PROGRESS NOTES
"Physical Therapy    Physical Therapy Treatment    Patient Name: Blanca Hamilton  MRN: [id number]  Today's Date: 2/15/2024  Time Calculation  Start Time: 1335  Stop Time: 1354  Time Calculation (min): 19 min       Assessment/Plan   PT Assessment  End of Session Communication: Bedside nurse  Assessment Comment: pt not willing to att standing, stating she \"know herself\".  Pt is able to sit EOB w/good tolerance, no LOB, and appropriate levels of fatigue afterwards  End of Session Patient Position: Bed, 3 rail up, Alarm off, caregiver present     PT Plan  Treatment/Interventions: Bed mobility, Transfer training, Balance training, Strengthening, Endurance training  PT Plan: Skilled PT  PT Frequency: 4 times per week  PT Discharge Recommendations: Moderate intensity level of continued care  Equipment Recommended upon Discharge: Wheeled walker, Wheelchair (owns)  PT Recommended Transfer Status: Assist x2  PT - OK to Discharge: Yes      General Visit Information:   PT  Visit  PT Received On: 02/15/24  General  Prior to Session Communication: Bedside nurse  Patient Position Received: Bed, 3 rail up, Alarm on  General Comment: Pt agreeable to participating in tx, but does not want to attempt standing.  Pt does not feel she is ready to stand, but is willing to att sitting EOB    Subjective   Precautions:  Precautions  LE Weight Bearing Status: Right Toe-Touch Weight Bearing  Medical Precautions: Fall precautions  Post-Surgical Precautions: Right hip precautions  Vital Signs:       Objective   Pain:     Cognition:     Postural Control:  Static Sitting Balance  Static Sitting-Balance Support: No upper extremity supported, Feet supported  Static Sitting-Level of Assistance: Independent  Static Sitting-Comment/Number of Minutes: 5+ min  Extremity/Trunk Assessments:    Activity Tolerance:     Treatments:  Therapeutic Exercise  Therapeutic Exercise Performed: Yes  Therapeutic Exercise Activity 1: ankle pumps 10x, quad sets 10x, LAQ 10x, " toe/heel raises 10x         Bed Mobility  Bed Mobility: Yes  Bed Mobility 1  Bed Mobility 1: Supine to sitting  Level of Assistance 1: Moderate assistance  Bed Mobility Comments 1: assist given to B LEs  Bed Mobility 2  Bed Mobility  2: Sitting to supine  Level of Assistance 2: Moderate assistance       Transfers  Transfer: No    Outcome Measures:  [facility] Basic Mobility  Turning from your back to your side while in a flat bed without using bedrails: A lot  Moving from lying on your back to sitting on the side of a flat bed without using bedrails: A lot  Moving to and from bed to chair (including a wheelchair): A lot  Standing up from a chair using your arms (e.g. wheelchair or bedside chair): A lot  To walk in hospital room: A lot  Climbing 3-5 steps with railing: A lot  Basic Mobility - Total Score: 12    Education Documentation  Precautions, taught by Rocio Olsen PTA at 2/15/2024  2:58 PM.  Learner: Patient  Readiness: Acceptance  Method: Explanation  Response: Verbalizes Understanding    Body Mechanics, taught by Rocio Olsen PTA at 2/15/2024  2:58 PM.  Learner: Patient  Readiness: Acceptance  Method: Explanation  Response: Verbalizes Understanding    Mobility Training, taught by Rocio Olsen PTA at 2/15/2024  2:58 PM.  Learner: Patient  Readiness: Acceptance  Method: Explanation  Response: Verbalizes Understanding    Education Comments  No comments found.        OP EDUCATION:       Encounter Problems       Encounter Problems (Active)       Balance       STG - Maintains static standing balance with upper extremity support x 1' maintaining weight bearing status  (Progressing)       Start:  02/14/24    Expected End:  02/28/24            STG - Maintains dynamic sitting balance with upper extremity support x 10'  (Progressing)       Start:  02/14/24    Expected End:  02/28/24               Transfers       STG - Patient will perform bed mobility independently  (Progressing)       Start:  02/14/24    Expected End:   02/28/24            STG - Patient will transfer sit to and from stand min A, maintaining weight bearing status  (Progressing)       Start:  02/14/24    Expected End:  02/28/24

## 2024-02-15 NOTE — PROGRESS NOTES
Occupational Therapy    Evaluation    Patient Name: Blanca Hamilton  MRN: [id number]  Today's Date: 2/15/2024  Time Calculation  Start Time: 0934  Stop Time: 0950  Time Calculation (min): 16 min    Assessment  IP OT Assessment  OT Assessment: Pt presents with decreased endurance, decreased ADL, decreased functional mobility. Continued skilled OT recommended to maximize pt safety and indepedence in order to return to PLOF.  Prognosis: Good  Barriers to Discharge: None  Evaluation/Treatment Tolerance: Patient limited by fatigue  Medical Staff Made Aware: Yes  End of Session Communication: Bedside nurse  End of Session Patient Position: Bed, 3 rail up, Alarm off, caregiver present (RN, ortho in with pt)  Plan:  Treatment Interventions: ADL retraining, Functional transfer training, UE strengthening/ROM, Endurance training  OT Frequency: 3 times per week  OT Discharge Recommendations: Moderate intensity level of continued care  OT Recommended Transfer Status: Assist of 1  OT - OK to Discharge: Yes (per OT POC)    Subjective   Current Problem:  1. Wound dehiscence  Case Request Operating Room: Revision Arthroplasty Total Hip    Case Request Operating Room: Revision Arthroplasty Total Hip      2. Electrolyte abnormality        3. Sepsis, due to unspecified organism, unspecified whether acute organ dysfunction present (CMS/HCC)        4. Impaired functional mobility and activity tolerance  Lower extremity venous duplex bilateral    Lower extremity venous duplex bilateral      5. Cellulitis of right lower limb  Lower extremity venous duplex bilateral    Lower extremity venous duplex bilateral      6. Localized edema  Lower extremity venous duplex bilateral        General:  General  Reason for Referral: 49 yo female referred to OT for R hip wound dehiscence, impaired mobility, impaired ADL  Referred By: Brando Mascorro  Past Medical History Relevant to Rehab: Anxiety, Arthritis, Class 1 obesity with body mass index (BMI) of 30.0 to  30.9 in adult (02/02/2024), Depression, GERD (gastroesophageal reflux disease), HTN (hypertension), Hyperlipidemia; R total hip arthroplasty on 1/2/4, but had to get subsequent revision on 1/5 for prosthetic fracture. 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 Cultures were positive for MSSA and patient was started on cefazolin. Then underwent ALEXA revision and partial hardware removal with Dr. Jalloh on 2/6/24. Patient was discharged to SNF on 2/11 with wound vac, a PICC line for 6 weeks of cefazolin, and ASA for DVT ppx.  Prior to Session Communication: Bedside nurse  Patient Position Received: Bed, 3 rail up, Alarm on  General Comment: Pt pleasant, cooperative with OT eval. Incision site/wound vac leakage, RN notified  Precautions:  LE Weight Bearing Status: Right Toe-Touch Weight Bearing (for transfers only)  Medical Precautions: Fall precautions (Wound vac R hip, IV)  Post-Surgical Precautions: Right hip precautions (posterior)     Pain:  Pain Assessment  Pain Assessment: 0-10  Pain Score: 0 - No pain    Objective   Cognition:  Overall Cognitive Status: Within Functional Limits  Orientation Level: Oriented X4    Home Living:  Type of Home: House  Lives With: Alone  Home Adaptive Equipment: Walker rolling or standard, Wheelchair-manual  Home Layout: One level  Bathroom Shower/Tub: Walk-in shower  Home Living Comments: Aide 3 hrs/day, 4 day/week   Prior Function:  Level of [place]: Needs assistance with ADLs, Needs assistance with homemaking  Receives Help From: Home health  ADL Assistance: Needs assistance  Homemaking Assistance: Needs assistance  Prior Function Comments: Nonambulatory at baseline, pivot transfers to w/c nd w/c mobile throughout home    ADL:  Eating Assistance: Independent  Grooming Assistance: Minimal  Bathing Assistance: Moderate  UE Dressing Assistance: Minimal  LE Dressing Assistance: Maximal  Toileting Assistance with Device: Maximal  Functional  Assistance: Maximal  ADL Comments: ADL performance anticipated d/t current clinical presentation  Activity Tolerance:  Endurance: Tolerates less than 10 min exercise, no significant change in vital signs  Bed Mobility/Transfers: Bed Mobility  Bed Mobility: Yes  Bed Mobility 1  Bed Mobility 1: Supine to sitting  Level of Assistance 1: Moderate assistance  Bed Mobility Comments 1: assist at BLE and trunk  Bed Mobility 2  Bed Mobility  2: Sitting to supine, Scooting  Level of Assistance 2: Maximum assistance    Transfers  Transfer: No    Sitting Balance:  Static Sitting Balance  Static Sitting-Balance Support: Feet supported, Bilateral upper extremity supported  Static Sitting-Level of Assistance: Close supervision     Modalities:  Modalities Used:  (Patient reports she has been unable to complete transfers d/t inability to maintain weight bearing status; at rehab has been completing slide board transfers)    Strength:  Strength Comments: BUE grossly 4-/5    Hand Function:  Hand Function  Gross Grasp: Functional  Extremities: RUE   RUE : Within Functional Limits and LUE   LUE: Within Functional Limits    Outcome Measures: [facility] Daily Activity  Putting on and taking off regular lower body clothing: Total  Bathing (including washing, rinsing, drying): A lot  Putting on and taking off regular upper body clothing: A little  Toileting, which includes using toilet, bedpan or urinal: A lot  Taking care of personal grooming such as brushing teeth: A little  Eating Meals: None  Daily Activity - Total Score: 15      Education Documentation  Body Mechanics, taught by Ross Summers OT at 2/15/2024 10:53 AM.  Learner: Patient  Readiness: Acceptance  Method: Explanation  Response: Verbalizes Understanding    Precautions, taught by Ross Summers OT at 2/15/2024 10:53 AM.  Learner: Patient  Readiness: Acceptance  Method: Explanation  Response: Verbalizes Understanding    ADL Training, taught by Ross Summers OT at  2/15/2024 10:53 AM.  Learner: Patient  Readiness: Acceptance  Method: Explanation  Response: Verbalizes Understanding    Education Comments  No comments found.      Goals:   Encounter Problems       Encounter Problems (Active)       OT Goals       Pt will increase endurance to tolerate 15min of OOB activity with no more than 1 rest break in order to increase ability to engage in ADL completion.  (Progressing)       Start:  02/15/24    Expected End:  02/29/24            Pt will demo and/or verbalize 2-3 energy conservation techniques to incorporate into functional mobility or ADL to improve performance and increase independence.  (Progressing)       Start:  02/15/24    Expected End:  02/29/24            Pt will demo increased functional mobility/pivot transfer independence to tolerate tasks necessary to complete ADL routine.  (Progressing)       Start:  02/15/24    Expected End:  02/29/24            Pt will demo ADL routine and meaningful daily activities using modifications as needed  (Progressing)       Start:  02/15/24    Expected End:  02/29/24

## 2024-02-15 NOTE — CARE PLAN
2/15/24  @1504: Plan remains to return to [facility] when medically ready. Will require precert. PT/ OT pending. Will need to know wound care needs  prior to discharge. Was on wound vac at SNF.  Will follow. Discussed in interdisciplinary rounds.

## 2024-02-15 NOTE — PROGRESS NOTES
"Blanca Hamilton is a 48 y.o. female on day 1 of admission presenting with Wound dehiscence.    Subjective   Wound VAC not maintaining seal, patient with large around of serosanguinous output under wound VAC dressing.  Patient reporting adequate pain control.      Objective     Physical Exam  HENT:      Head: Normocephalic and atraumatic.   Eyes:      Extraocular Movements: Extraocular movements intact.      Conjunctiva/sclera: Conjunctivae normal.      Pupils: Pupils are equal, round, and reactive to light.   Cardiovascular:      Rate and Rhythm: Normal rate and regular rhythm.      Pulses: Normal pulses.   Pulmonary:      Breath sounds: Normal breath sounds.   Abdominal:      General: Bowel sounds are normal.      Palpations: Abdomen is soft.   Musculoskeletal:      Comments: Right hip wound VAC in place, not maintaining seal, large puddle of SS output, leg and foot warm and well perfused, motion and sensations intact    Skin:     General: Skin is warm and dry.      Capillary Refill: Capillary refill takes less than 2 seconds.   Neurological:      General: No focal deficit present.      Mental Status: She is alert and oriented to person, place, and time.         Last Recorded Vitals  Blood pressure 125/82, pulse 95, temperature 36.3 °C (97.3 °F), temperature source Temporal, resp. rate 16, height 1.753 m (5' 9\"), weight 89.4 kg (197 lb 1.6 oz), SpO2 98 %.  Intake/Output last 3 Shifts:  I/O last 3 completed shifts:  In: 3650 (40.8 mL/kg) [P.O.:600; I.V.:1850 (20.7 mL/kg); IV Piggyback:1200]  Out: 2600 (29.1 mL/kg) [Urine:2550 (0.8 mL/kg/hr); Drains:50]  Weight: 89.4 kg     Relevant Results  Lower extremity venous duplex bilateral    Result Date: 2/14/2024  Preliminary Cardiology Report         [place] [address]  Tel [phone] and Fax [phone]          Preliminary Vascular Lab Report  [facility] LOWER EXTREMITY VENOUS DUPLEX BILATERAL  Patient Name:      BLANCA HAMILTON  " Reading Physician:  [id number] Prieto Loza MD Study Date:        2/14/2024    Ordering Physician: [id number] GEORGE AMADOR MRN/PID:           [id number]     Technologist:       Rosa Pierre RVS Accession#:        [id number] Technologist 2:     Hattie Raymond Date of Birth/Age: 1975   Encounter#:         [id number] Gender:            F Admission Status:  Inpatient    Location Performed: [facility]  Diagnosis/ICD: Localized (leg) edema-R60.0 Procedure/CPT: 94321 Peripheral venous duplex scan for DVT complete  PRELIMINARY CONCLUSIONS: Right Lower Venous: No evidence of acute deep vein thrombus visualized in the right lower extremity. Left Lower Venous: No evidence of acute deep vein thrombus visualized in the left lower extremity.  Additional Findings: Technically difficult study due to patient movement and inability to position.  Imaging & Doppler Findings:  Right                 Compressible Thrombus        Flow Distal External Iliac     Yes        None   Spontaneous/Phasic CFV                       Yes        None   Spontaneous/Phasic PFV                       Yes        None FV Proximal               Yes        None   Spontaneous/Phasic FV Mid                    Yes        None FV Distal                 Yes        None Popliteal                 Yes        None   Spontaneous/Phasic Peroneal                  Yes        None PTV                       Yes        None  Left                  Compress Thrombus        Flow Distal External Iliac   Yes      None   Spontaneous/Phasic CFV                     Yes      None   Spontaneous/Phasic PFV                     Yes      None FV Proximal             Yes      None   Spontaneous/Phasic FV Mid                  Yes      None FV Distal               Yes      None Popliteal               Yes      None   Spontaneous/Phasic Peroneal                Yes      None PTV                     Yes      None VASCULAR PRELIMINARY REPORT completed by Sandra Lane RVT on 2/14/2024 at  8:49:37 AM  ** Final **     Bedside PICC Imaging    Result Date: 2/8/2024  These images are not reportable by radiology and will not be interpreted by  Radiologists.    ECG 12 lead    Result Date: 2/7/2024  Atrial flutter with 2:1 AV conduction Nonspecific intraventricular block Left ventricular hypertrophy ( R in aVL , Lyndon product ) Inferior infarct , age undetermined Abnormal ECG When compared with ECG of 11-JAN-2024 23:16, Significant changes have occurred See ED provider note for full interpretation and clinical correlation Confirmed by Vega Bruce ([id number]) on 2/7/2024 10:40:33 PM    XR hip right with pelvis when performed 2 or 3 views    Result Date: 2/7/2024  Interpreted By:  Richard Garcia, STUDY: XR HIP RIGHT WITH PELVIS WHEN PERFORMED 2 OR 3 VIEWS; ;  2/7/2024 9:25 am   INDICATION: Signs/Symptoms:s/p revision oanh.   COMPARISON: 01/26/2024   ACCESSION NUMBER(S): [id number]   ORDERING CLINICIAN: JON HERNANDEZ   FINDINGS: Total right hip arthroplasty with interval placement of antibiotic impregnated beads. Partially visualized left hip arthroplasty.       Interval placement of antibiotic impregnated beads in the right hip.     MACRO: None   Signed by: Richard Garcia 2/7/2024 5:00 PM Dictation workstation:   [id number]    XR abdomen 1 view    Result Date: 2/5/2024  Interpreted By:  Richard Garcia, STUDY: XR ABDOMEN 1 VIEW;  2/4/2024 6:47 pm   INDICATION: Signs/Symptoms:Lactic acidosis and emesis.   COMPARISON: None.   ACCESSION NUMBER(S): [id number]   ORDERING CLINICIAN: TABITHA MAYNARD   FINDINGS: Nonobstructive bowel gas pattern. Partially visualized bilateral hip arthroplasties.       1.  Nonobstructive bowel gas pattern.   MACRO: None   Signed by: Richard Garcia 2/5/2024 4:12 PM Dictation workstation:   [id number]    ECG 12 lead    Result Date: 1/31/2024  Poor data quality, interpretation may be adversely affected Sinus tachycardia with short NJ with occasional Premature ventricular  complexes ST & T wave abnormality, consider lateral ischemia Abnormal ECG When compared with ECG of 11-JAN-2024 12:24, Premature ventricular complexes are now Present T wave inversion more evident in Lateral leads Confirmed by Michael Nguyen ([id number]) on 1/31/2024 10:27:35 AM    XR hip right with pelvis when performed 2 or 3 views    Result Date: 1/27/2024  Interpreted By:  Yolie Tate, STUDY: Single view pelvis. Right hip, two views.   INDICATION: Signs/Symptoms:POST OP-RIGHT TOTAL HIP.   COMPARISON: 10/17/2023.   ACCESSION NUMBER(S): [id number]   ORDERING CLINICIAN: ETHEL MORALES   FINDINGS: Status post right total hip arthroplasty with cerclage wire fixation of the proximal femur. Hardware is intact without perihardware fractures or lucencies.   There is fragmentation of the greater trochanter which is likely postsurgical in etiology with attention on follow-up recommended.   Right acetabular protrusio. No malalignment.   Changes of left hip arthroplasty noted with prominent osteolysis of the medial acetabular wall similar to prior.       1. As above.   MACRO: None.   Signed by: Yolie Tate 1/27/2024 10:30 AM Dictation workstation:   [id number]      Scheduled medications  acetaminophen, 975 mg, oral, q8h  ARIPiprazole, 15 mg, oral, Nightly  [Held by provider] aspirin, 81 mg, oral, BID  famotidine, 40 mg, oral, BID  ferrous sulfate (325 mg ferrous sulfate), 1 tablet, oral, Daily  [Held by provider] lisinopril, 10 mg, oral, q AM  loratadine, 10 mg, oral, Daily  magnesium oxide, 400 mg, oral, Daily  metoprolol succinate XL, 25 mg, oral, Daily  montelukast, 10 mg, oral, q AM  nicotine, 1 patch, transdermal, Daily  PARoxetine, 40 mg, oral, Daily  piperacillin-tazobactam, 3.375 g, intravenous, q6h  polyethylene glycol, 17 g, oral, Daily  simvastatin, 20 mg, oral, Nightly  traZODone, 100 mg, oral, Nightly  vancomycin, 1,500 mg, intravenous, q12h  zinc oxide, 1 Application, Topical, BID      Continuous  medications  sodium chloride 0.9%, 75 mL/hr, Last Rate: 75 mL/hr (02/15/24 0537)      PRN medications  PRN medications: HYDROmorphone, oxyCODONE, oxyCODONE  Results for orders placed or performed during the hospital encounter of 02/14/24 (from the past 24 hour(s))   Lower extremity venous duplex bilateral   Result Value Ref Range    BSA 2.09 m2   Vancomycin, Trough   Result Value Ref Range    Vancomycin, Trough 19.7 5.0 - 20.0 ug/mL   C-reactive protein   Result Value Ref Range    C-Reactive Protein 6.30 (H) <1.00 mg/dL   CBC   Result Value Ref Range    WBC 5.5 4.4 - 11.3 x10*3/uL    nRBC 0.0 0.0 - 0.0 /100 WBCs    RBC 2.56 (L) 4.00 - 5.20 x10*6/uL    Hemoglobin 7.4 (L) 12.0 - 16.0 g/dL    Hematocrit 23.7 (L) 36.0 - 46.0 %    MCV 93 80 - 100 fL    MCH 28.9 26.0 - 34.0 pg    MCHC 31.2 (L) 32.0 - 36.0 g/dL    RDW 20.0 (H) 11.5 - 14.5 %    Platelets 300 150 - 450 x10*3/uL   Comprehensive Metabolic Panel   Result Value Ref Range    Glucose 76 74 - 99 mg/dL    Sodium 137 136 - 145 mmol/L    Potassium 3.9 3.5 - 5.3 mmol/L    Chloride 105 98 - 107 mmol/L    Bicarbonate 28 21 - 32 mmol/L    Anion Gap 8 (L) 10 - 20 mmol/L    Urea Nitrogen 3 (L) 6 - 23 mg/dL    Creatinine 0.24 (L) 0.50 - 1.05 mg/dL    eGFR >90 >60 mL/min/1.73m*2    Calcium 7.1 (L) 8.6 - 10.3 mg/dL    Albumin 2.2 (L) 3.4 - 5.0 g/dL    Alkaline Phosphatase 87 33 - 110 U/L    Total Protein 4.5 (L) 6.4 - 8.2 g/dL    AST 6 (L) 9 - 39 U/L    Bilirubin, Total 0.2 0.0 - 1.2 mg/dL    ALT <3 (L) 7 - 45 U/L   Phosphorus   Result Value Ref Range    Phosphorus 3.0 2.5 - 4.9 mg/dL       Assessment/Plan   Principal Problem:    Wound dehiscence    48 year old female SP R ALEXA 1/2/2024 c/b periprosthetic fracture s/p ORIF 1/5/2024 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 Appreciate recs           I spent 30 minutes in the professional and overall care of this patient.    Ester Fitch, APRN-CNP

## 2024-02-15 NOTE — PROGRESS NOTES
Blanca Hamilton is a 48 y.o. female on day 1 of admission presenting with Wound dehiscence.    Subjective   No acute events overnight.  Patient resting comfortably in bed this morning.  Admits to mild drainage from surgical site, although vastly improved with placement of wound vac. Denies pain at surgical site, dizziness, vision changes, chest pain, shortness of breath, new cough, new onset weakness, or abdominal pain.    12 ROS negative unless otherwise stated above.    Objective     Physical Exam  Constitutional:       General: She is not in acute distress.     Comments: Conversant and pleasant.  Nontoxic appearing   HENT:      Head: Normocephalic and atraumatic.      Nose: Nose normal.   Cardiovascular:      Rate and Rhythm: Normal rate and regular rhythm.      Heart sounds: Normal heart sounds.   Pulmonary:      Effort: Pulmonary effort is normal. No respiratory distress.      Breath sounds: Normal breath sounds. No stridor. No wheezing.   Abdominal:      General: Bowel sounds are normal. There is no distension.      Palpations: Abdomen is soft. There is no mass.      Tenderness: There is no abdominal tenderness.   Musculoskeletal:         General: Deformity and signs of injury present. Normal range of motion.      Cervical back: Normal range of motion. No rigidity.      Right lower leg: No edema.      Left lower leg: No edema.   Skin:     General: Skin is warm.      Findings: Lesion present.      Comments: Right lateral hip arthroplasty surgical site with long incision and staples.  Incision with proximal dehiscence of 5 cm.  There is noticeable cloudy, malodorous drainage near the proximal area of dehiscence.  The incision has dull redness along its entirety.  No other areas of the surgical site have obvious drainage or dehiscence.     Neurological:      General: No focal deficit present.      Mental Status: She is alert and oriented to person, place, and time. Mental status is at baseline.   Psychiatric:          "Mood and Affect: Mood normal.         Behavior: Behavior normal.         Thought Content: Thought content normal.         Judgment: Judgment normal.         Last Recorded Vitals  Blood pressure 120/76, pulse 94, temperature 37 °C (98.6 °F), temperature source Temporal, resp. rate 20, height 1.753 m (5' 9\"), weight 89.4 kg (197 lb 1.6 oz), SpO2 97 %.  Intake/Output last 3 Shifts:  I/O last 3 completed shifts:  In: 3650 (40.8 mL/kg) [P.O.:600; I.V.:1850 (20.7 mL/kg); IV Piggyback:1200]  Out: 2600 (29.1 mL/kg) [Urine:2550 (0.8 mL/kg/hr); Drains:50]  Weight: 89.4 kg     Relevant Results    Current Facility-Administered Medications:     acetaminophen (Tylenol) tablet 975 mg, 975 mg, oral, q8h, Elly Rosado MD, 975 mg at 02/15/24 0537    ARIPiprazole (Abilify) tablet 15 mg, 15 mg, oral, Nightly, Elly Rosado MD, 15 mg at 02/14/24 2031    [Held by provider] aspirin chewable tablet 81 mg, 81 mg, oral, BID, Elly Rosado MD    ceFAZolin in dextrose (iso-os) (Ancef) IVPB 1 g, 1 g, intravenous, q8h, Dylan Casas DO    enoxaparin (Lovenox) syringe 40 mg, 40 mg, subcutaneous, q24h, Dylan Casas DO    famotidine (Pepcid) tablet 40 mg, 40 mg, oral, BID, Elly Rosado MD, 40 mg at 02/15/24 0848    ferrous sulfate (325 mg ferrous sulfate) tablet 1 tablet, 1 tablet, oral, Daily, Elly Rosado MD, 1 tablet at 02/15/24 0537    HYDROmorphone (Dilaudid) injection 0.4 mg, 0.4 mg, intravenous, q3h PRN, Elly Rosado MD    [Held by provider] lisinopril tablet 10 mg, 10 mg, oral, q AM, Elly Rosado MD    loratadine (Claritin) tablet 10 mg, 10 mg, oral, Daily, Elly Rosado MD, 10 mg at 02/15/24 0848    magnesium oxide (Mag-Ox) tablet 400 mg, 400 mg, oral, Daily, Elly Rosado MD, 400 mg at 02/15/24 0848    metoprolol succinate XL (Toprol-XL) 24 hr tablet 25 mg, 25 mg, oral, Daily, Elly Rosado MD, 25 mg at 02/15/24 0848    montelukast (Singulair) tablet 10 mg, 10 mg, oral, q AM, Elly Rosado MD, 10 mg at 02/15/24 0848    nicotine (Nicoderm CQ) 21 mg/24 hr patch 1 patch, 1 " patch, transdermal, Daily, Elly Rosado MD, 1 patch at 02/15/24 0848    oxyCODONE (Roxicodone) immediate release tablet 10 mg, 10 mg, oral, q6h PRN, Elly Rosado MD    oxyCODONE (Roxicodone) immediate release tablet 5 mg, 5 mg, oral, q6h PRN, Elly Rosado MD, 5 mg at 02/15/24 0915    PARoxetine (Paxil) tablet 40 mg, 40 mg, oral, Daily, Elly Rosado MD, 40 mg at 02/15/24 0848    polyethylene glycol (Glycolax, Miralax) packet 17 g, 17 g, oral, Daily, Elly Rosado MD, 17 g at 02/14/24 1036    simvastatin (Zocor) tablet 20 mg, 20 mg, oral, Nightly, Elly Rosado MD, 20 mg at 02/14/24 2031    sodium chloride 0.9% infusion, 75 mL/hr, intravenous, Continuous, Elly Rosado MD, Last Rate: 75 mL/hr at 02/15/24 0537, 75 mL/hr at 02/15/24 0537    traZODone (Desyrel) tablet 100 mg, 100 mg, oral, Nightly, Elly Rosado MD, 100 mg at 02/14/24 2031    vancomycin (Vancocin) in dextrose 5 % water (D5W) 500 mL IV 1,500 mg, 1,500 mg, intravenous, q12h, Elly Rosado MD, Stopped at 02/15/24 0434    zinc oxide 40 % ointment 1 Application, 1 Application, Topical, BID, Elly Rosado MD, 1 Application at 02/15/24 0900    Lower extremity venous duplex bilateral  Result Date: 2/14/2024  PRELIMINARY CONCLUSIONS: Right Lower Venous: No evidence of acute deep vein thrombus visualized in the right lower extremity. Left Lower Venous: No evidence of acute deep vein thrombus visualized in the left lower extremity.  Additional Findings: Technically difficult study due to patient movement and inability to position.     ECG 12 lead  Result Date: 2/7/2024  Atrial flutter with 2:1 AV conduction Nonspecific intraventricular block Left ventricular hypertrophy ( R in aVL , Lyndon product ) Inferior infarct , age undetermined Abnormal ECG When compared with ECG of 11-JAN-2024 23:16, Significant changes have occurred See ED provider note for full interpretation and clinical correlation Confirmed by Vega Bruce ([id number]) on 2/7/2024 10:40:33 PM    XR hip right with pelvis when performed 2  or 3 views  Result Date: 2/7/2024  FINDINGS: Total right hip arthroplasty with interval placement of antibiotic impregnated beads. Partially visualized left hip arthroplasty.     Interval placement of antibiotic impregnated beads in the right hip.     MACRO: None   Signed by: Richard Garcia 2/7/2024 5:00 PM Dictation workstation:   [id number]    XR hip right with pelvis when performed 2 or 3 views  Result Date: 1/27/2024    FINDINGS: Status post right total hip arthroplasty with cerclage wire fixation of the proximal femur. Hardware is intact without perihardware fractures or lucencies.   There is fragmentation of the greater trochanter which is likely postsurgical in etiology with attention on follow-up recommended.   Right acetabular protrusio. No malalignment.   Changes of left hip arthroplasty noted with prominent osteolysis of the medial acetabular wall similar to prior.           Results for orders placed or performed during the hospital encounter of 02/14/24 (from the past 24 hour(s))   Vancomycin, Trough   Result Value Ref Range    Vancomycin, Trough 19.7 5.0 - 20.0 ug/mL   C-reactive protein   Result Value Ref Range    C-Reactive Protein 6.30 (H) <1.00 mg/dL   CBC   Result Value Ref Range    WBC 5.5 4.4 - 11.3 x10*3/uL    nRBC 0.0 0.0 - 0.0 /100 WBCs    RBC 2.56 (L) 4.00 - 5.20 x10*6/uL    Hemoglobin 7.4 (L) 12.0 - 16.0 g/dL    Hematocrit 23.7 (L) 36.0 - 46.0 %    MCV 93 80 - 100 fL    MCH 28.9 26.0 - 34.0 pg    MCHC 31.2 (L) 32.0 - 36.0 g/dL    RDW 20.0 (H) 11.5 - 14.5 %    Platelets 300 150 - 450 x10*3/uL   Comprehensive Metabolic Panel   Result Value Ref Range    Glucose 76 74 - 99 mg/dL    Sodium 137 136 - 145 mmol/L    Potassium 3.9 3.5 - 5.3 mmol/L    Chloride 105 98 - 107 mmol/L    Bicarbonate 28 21 - 32 mmol/L    Anion Gap 8 (L) 10 - 20 mmol/L    Urea Nitrogen 3 (L) 6 - 23 mg/dL    Creatinine 0.24 (L) 0.50 - 1.05 mg/dL    eGFR >90 >60 mL/min/1.73m*2    Calcium 7.1 (L) 8.6 - 10.3 mg/dL    Albumin  2.2 (L) 3.4 - 5.0 g/dL    Alkaline Phosphatase 87 33 - 110 U/L    Total Protein 4.5 (L) 6.4 - 8.2 g/dL    AST 6 (L) 9 - 39 U/L    Bilirubin, Total 0.2 0.0 - 1.2 mg/dL    ALT <3 (L) 7 - 45 U/L   Phosphorus   Result Value Ref Range    Phosphorus 3.0 2.5 - 4.9 mg/dL           Assessment/Plan   Blanca Hamilton is a 48 y.o. 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 2/4 SIRS criteria in admission, s/p 3.12L NS. Cefazolin through PICC line discontinued (on previous discharge, planned for 6 weeks of cefazolin outpatient.) Started on vanc/zosyn, transitioned to Ancef. Admitted for further management with ortho, ID, and wound care consult.     Acute Medical Issues:  #Sepsis secondary to wound dehiscence complicated by recent right total hip arthroplasty infection  #status post revision and hardware removal on 2/6  #Extensive fat necrosis  #History of MSSA wound culture  -Underwent revision with Dr. Jalloh on 2/6 for SSI, cultures positive for MSSA  -Discharged on 2/11 with wound vac + PICC line for 6-week course of cefazolin  -2/13/24: 2/4 SIRS criteria on admission. S/p sepsis fluids in ED. Lactate 0.7. Hgb stable.  -pain control: scheduled tylenol, oxy 5 mod, oxy 10 severe, dilaudid 0.4 PRN breakthrough  -Weightbearing right lower extremity for tfx only   -Blood Cx no growth after 1 day  -Wound Cx pending  -ESR and CRP downtrending  -Continue to trend CRP and Pro-Leonardo  -Wound care consulted, successful placement of wound VAC for management of exudate/drainage.  Set to continuous, and planning to leave on until Friday, 2/17/2024  -Ortho consulted, no acute orthopedic intervention and planning for possible return trip to the OR on 2/20 pending wound care and repeat evaluation   -Continue IV Ancef 2 g 3 times daily   -Continue Lovenox 40 daily    #B/l LE edema  -Associated with pink macular rash  -Likely stasis dermatitis  -Bilateral  duplex negative for acute DVT bilaterally  -Encourage bilateral leg elevation as tolerated    #Hyponatremia -improving  - Na 129 on admission  -Likely related to poor p.o. intake  - CTM  - Nutrition consult, appreciate recs    Chronic Medical Issues:  #GERD: pepcid  #HTN: home metoprolol restarted, hold home lisinopril in setting of hypotension  #HLD: simvastatin  #Depression: abilify, paroxetine, trazodone  #Chronic anemia: iron supplement  #Tobacco use: nicotine patch  #Other: cont claritin, singulair, mag ox     F: NS @75 ml/hr  E: PRN  N: regular diet  G: PPI  Bowel regimen: PEG  Lines: PICC line  DVT: Continue Lovenox 40     CODE: FULL    Dispo: 49 yo F presents with wound dehiscence of right hip arthroplasty, following several prior surgical complications including infection.  Orthopedic surgery consulted, planning for potential OR on 2/20/2024.  Wound VAC placed, continue with Ancef.       Barry Bledsoe, DO  Internal Medicine  PGY-1

## 2024-02-15 NOTE — CONSULTS
Vancomycin Dosing by Pharmacy- Cessation of Therapy  Consult to pharmacy for vancomycin dosing has been discontinued by the prescriber, pharmacy will sign off at this time.    Please call pharmacy if there are further questions or re-enter a consult if vancomycin is resumed.     Rina Spears, PharmD  PGY-1 Pharmacy Resident

## 2024-02-16 LAB
ALBUMIN SERPL BCP-MCNC: 2.4 G/DL (ref 3.4–5)
ALP SERPL-CCNC: 89 U/L (ref 33–110)
ALT SERPL W P-5'-P-CCNC: 3 U/L (ref 7–45)
ANION GAP SERPL CALC-SCNC: 8 MMOL/L (ref 10–20)
AST SERPL W P-5'-P-CCNC: 7 U/L (ref 9–39)
BILIRUB SERPL-MCNC: 0.2 MG/DL (ref 0–1.2)
BUN SERPL-MCNC: 3 MG/DL (ref 6–23)
CALCIUM SERPL-MCNC: 7.5 MG/DL (ref 8.6–10.3)
CHLORIDE SERPL-SCNC: 103 MMOL/L (ref 98–107)
CO2 SERPL-SCNC: 28 MMOL/L (ref 21–32)
CREAT SERPL-MCNC: 0.3 MG/DL (ref 0.5–1.05)
CRP SERPL-MCNC: 2.39 MG/DL
EGFRCR SERPLBLD CKD-EPI 2021: >90 ML/MIN/1.73M*2
ERYTHROCYTE [DISTWIDTH] IN BLOOD BY AUTOMATED COUNT: 19.4 % (ref 11.5–14.5)
GLUCOSE SERPL-MCNC: 88 MG/DL (ref 74–99)
HCT VFR BLD AUTO: 25.2 % (ref 36–46)
HGB BLD-MCNC: 7.6 G/DL (ref 12–16)
MAGNESIUM SERPL-MCNC: 1.8 MG/DL (ref 1.6–2.4)
MCH RBC QN AUTO: 27.9 PG (ref 26–34)
MCHC RBC AUTO-ENTMCNC: 30.2 G/DL (ref 32–36)
MCV RBC AUTO: 93 FL (ref 80–100)
NRBC BLD-RTO: 0 /100 WBCS (ref 0–0)
PHOSPHATE SERPL-MCNC: 2.9 MG/DL (ref 2.5–4.9)
PLATELET # BLD AUTO: 347 X10*3/UL (ref 150–450)
POTASSIUM SERPL-SCNC: 4 MMOL/L (ref 3.5–5.3)
PROCALCITONIN SERPL-MCNC: 0.11 NG/ML
PROT SERPL-MCNC: 4.9 G/DL (ref 6.4–8.2)
RBC # BLD AUTO: 2.72 X10*6/UL (ref 4–5.2)
SODIUM SERPL-SCNC: 135 MMOL/L (ref 136–145)
WBC # BLD AUTO: 6.4 X10*3/UL (ref 4.4–11.3)

## 2024-02-16 PROCEDURE — 84145 PROCALCITONIN (PCT): CPT | Mod: GEALAB

## 2024-02-16 PROCEDURE — 2500000004 HC RX 250 GENERAL PHARMACY W/ HCPCS (ALT 636 FOR OP/ED)

## 2024-02-16 PROCEDURE — 97110 THERAPEUTIC EXERCISES: CPT | Mod: GP,CQ

## 2024-02-16 PROCEDURE — 85027 COMPLETE CBC AUTOMATED: CPT

## 2024-02-16 PROCEDURE — 1200000002 HC GENERAL ROOM WITH TELEMETRY DAILY

## 2024-02-16 PROCEDURE — 97530 THERAPEUTIC ACTIVITIES: CPT | Mod: GP,CQ

## 2024-02-16 PROCEDURE — 2500000002 HC RX 250 W HCPCS SELF ADMINISTERED DRUGS (ALT 637 FOR MEDICARE OP, ALT 636 FOR OP/ED)

## 2024-02-16 PROCEDURE — 2500000001 HC RX 250 WO HCPCS SELF ADMINISTERED DRUGS (ALT 637 FOR MEDICARE OP)

## 2024-02-16 PROCEDURE — 86140 C-REACTIVE PROTEIN: CPT

## 2024-02-16 PROCEDURE — S4991 NICOTINE PATCH NONLEGEND: HCPCS

## 2024-02-16 PROCEDURE — 83735 ASSAY OF MAGNESIUM: CPT

## 2024-02-16 PROCEDURE — 80053 COMPREHEN METABOLIC PANEL: CPT

## 2024-02-16 PROCEDURE — 99232 SBSQ HOSP IP/OBS MODERATE 35: CPT

## 2024-02-16 PROCEDURE — 84100 ASSAY OF PHOSPHORUS: CPT

## 2024-02-16 RX ADMIN — ENOXAPARIN SODIUM 40 MG: 40 INJECTION SUBCUTANEOUS at 18:07

## 2024-02-16 RX ADMIN — PAROXETINE 40 MG: 20 TABLET, FILM COATED ORAL at 08:47

## 2024-02-16 RX ADMIN — FAMOTIDINE 40 MG: 20 TABLET ORAL at 21:24

## 2024-02-16 RX ADMIN — SODIUM CHLORIDE 75 ML/HR: 9 INJECTION, SOLUTION INTRAVENOUS at 09:07

## 2024-02-16 RX ADMIN — FAMOTIDINE 40 MG: 20 TABLET ORAL at 08:46

## 2024-02-16 RX ADMIN — TRAZODONE HYDROCHLORIDE 100 MG: 50 TABLET ORAL at 21:24

## 2024-02-16 RX ADMIN — NICOTINE 1 PATCH: 21 PATCH, EXTENDED RELEASE TRANSDERMAL at 08:45

## 2024-02-16 RX ADMIN — ACETAMINOPHEN 975 MG: 325 TABLET ORAL at 04:59

## 2024-02-16 RX ADMIN — CEFAZOLIN SODIUM 2 G: 2 INJECTION, SOLUTION INTRAVENOUS at 21:24

## 2024-02-16 RX ADMIN — ARIPIPRAZOLE 15 MG: 5 TABLET ORAL at 21:23

## 2024-02-16 RX ADMIN — CEFAZOLIN SODIUM 2 G: 2 INJECTION, SOLUTION INTRAVENOUS at 13:36

## 2024-02-16 RX ADMIN — Medication 1 APPLICATION: at 09:00

## 2024-02-16 RX ADMIN — Medication 400 MG: at 08:46

## 2024-02-16 RX ADMIN — ACETAMINOPHEN 975 MG: 325 TABLET ORAL at 21:23

## 2024-02-16 RX ADMIN — MONTELUKAST 10 MG: 10 TABLET, FILM COATED ORAL at 08:46

## 2024-02-16 RX ADMIN — Medication 1 APPLICATION: at 21:00

## 2024-02-16 RX ADMIN — CEFAZOLIN SODIUM 2 G: 2 INJECTION, SOLUTION INTRAVENOUS at 04:59

## 2024-02-16 RX ADMIN — FERROUS SULFATE TAB 325 MG (65 MG ELEMENTAL FE) 1 TABLET: 325 (65 FE) TAB at 05:00

## 2024-02-16 RX ADMIN — ACETAMINOPHEN 975 MG: 325 TABLET ORAL at 13:37

## 2024-02-16 RX ADMIN — SIMVASTATIN 20 MG: 20 TABLET, FILM COATED ORAL at 21:24

## 2024-02-16 RX ADMIN — LORATADINE 10 MG: 10 TABLET ORAL at 08:46

## 2024-02-16 RX ADMIN — METOPROLOL SUCCINATE 25 MG: 25 TABLET, EXTENDED RELEASE ORAL at 08:46

## 2024-02-16 ASSESSMENT — COGNITIVE AND FUNCTIONAL STATUS - GENERAL
MOBILITY SCORE: 8
CLIMB 3 TO 5 STEPS WITH RAILING: TOTAL
DRESSING REGULAR UPPER BODY CLOTHING: A LITTLE
MOVING FROM LYING ON BACK TO SITTING ON SIDE OF FLAT BED WITH BEDRAILS: A LOT
MOBILITY SCORE: 8
WALKING IN HOSPITAL ROOM: A LOT
WALKING IN HOSPITAL ROOM: TOTAL
PERSONAL GROOMING: A LITTLE
STANDING UP FROM CHAIR USING ARMS: A LOT
TURNING FROM BACK TO SIDE WHILE IN FLAT BAD: A LOT
MOVING FROM LYING ON BACK TO SITTING ON SIDE OF FLAT BED WITH BEDRAILS: A LOT
HELP NEEDED FOR BATHING: A LOT
DAILY ACTIVITIY SCORE: 15
TOILETING: A LOT
DRESSING REGULAR LOWER BODY CLOTHING: TOTAL
CLIMB 3 TO 5 STEPS WITH RAILING: A LOT
TOILETING: A LOT
DAILY ACTIVITIY SCORE: 15
STANDING UP FROM CHAIR USING ARMS: TOTAL
MOVING TO AND FROM BED TO CHAIR: TOTAL
TURNING FROM BACK TO SIDE WHILE IN FLAT BAD: A LOT
DRESSING REGULAR UPPER BODY CLOTHING: A LITTLE
HELP NEEDED FOR BATHING: A LOT
PERSONAL GROOMING: A LITTLE
STANDING UP FROM CHAIR USING ARMS: TOTAL
MOBILITY SCORE: 12
WALKING IN HOSPITAL ROOM: TOTAL
DRESSING REGULAR LOWER BODY CLOTHING: TOTAL
MOVING FROM LYING ON BACK TO SITTING ON SIDE OF FLAT BED WITH BEDRAILS: A LOT
MOVING TO AND FROM BED TO CHAIR: TOTAL
MOVING TO AND FROM BED TO CHAIR: A LOT
CLIMB 3 TO 5 STEPS WITH RAILING: TOTAL
TURNING FROM BACK TO SIDE WHILE IN FLAT BAD: A LOT

## 2024-02-16 ASSESSMENT — PAIN - FUNCTIONAL ASSESSMENT
PAIN_FUNCTIONAL_ASSESSMENT: 0-10

## 2024-02-16 ASSESSMENT — PAIN SCALES - GENERAL
PAINLEVEL_OUTOF10: 0 - NO PAIN
PAINLEVEL_OUTOF10: 0 - NO PAIN

## 2024-02-16 NOTE — PROGRESS NOTES
Blanca Hamilton is a 48 y.o. female on day 2 of admission presenting with Wound dehiscence.    Subjective   No acute events overnight.  Patient resting comfortably in bed this morning.  Denies pain at surgical site, dizziness, vision changes, chest pain, shortness of breath, new cough, new onset weakness, or abdominal pain.    12 ROS negative unless otherwise stated above.    Objective     Physical Exam  Constitutional:       General: She is not in acute distress.     Comments: Conversant and pleasant.  Nontoxic appearing   HENT:      Head: Normocephalic and atraumatic.      Nose: Nose normal.   Cardiovascular:      Rate and Rhythm: Normal rate and regular rhythm.      Heart sounds: Normal heart sounds.   Pulmonary:      Effort: Pulmonary effort is normal. No respiratory distress.      Breath sounds: Normal breath sounds. No stridor. No wheezing.   Abdominal:      General: Bowel sounds are normal. There is no distension.      Palpations: Abdomen is soft. There is no mass.      Tenderness: There is no abdominal tenderness.   Musculoskeletal:         General: Deformity and signs of injury present. Normal range of motion.      Cervical back: Normal range of motion. No rigidity.      Right lower leg: No edema.      Left lower leg: No edema.   Skin:     General: Skin is warm.      Findings: Lesion present.      Comments: Right lateral hip surgical site with packing and intact wound vac. Good seal and without drainage or seeping. Notable long surgical incision. No other areas of the surgical site have obvious drainage or dehiscence. There is serosang output into collecting system, 350cc at time of check.   Neurological:      General: No focal deficit present.      Mental Status: She is alert and oriented to person, place, and time. Mental status is at baseline.   Psychiatric:         Mood and Affect: Mood normal.         Behavior: Behavior normal.         Thought Content: Thought content normal.         Judgment: Judgment  "normal.         Last Recorded Vitals  Blood pressure 109/71, pulse 88, temperature 37.2 °C (99 °F), temperature source Temporal, resp. rate 18, height 1.753 m (5' 9\"), weight 89.4 kg (197 lb 1.6 oz), SpO2 96 %.  Intake/Output last 3 Shifts:  I/O last 3 completed shifts:  In: 2851.3 (31.9 mL/kg) [I.V.:2051.3 (22.9 mL/kg); IV Piggyback:800]  Out: 5130 (57.4 mL/kg) [Urine:4700 (1.5 mL/kg/hr); Drains:430]  Weight: 89.4 kg     Relevant Results    Current Facility-Administered Medications:     acetaminophen (Tylenol) tablet 975 mg, 975 mg, oral, q8h, Elly Rosado MD, 975 mg at 02/16/24 1337    ARIPiprazole (Abilify) tablet 15 mg, 15 mg, oral, Nightly, Elly Rosado MD, 15 mg at 02/15/24 2127    [Held by provider] aspirin chewable tablet 81 mg, 81 mg, oral, BID, Elly Rosado MD    ceFAZolin in dextrose (iso-os) (Ancef) IVPB 2 g, 2 g, intravenous, q8h, Barry Bledsoe DO, Stopped at 02/16/24 1406    enoxaparin (Lovenox) syringe 40 mg, 40 mg, subcutaneous, q24h, Dylan Casas DO, 40 mg at 02/15/24 2127    famotidine (Pepcid) tablet 40 mg, 40 mg, oral, BID, Elly Rosado MD, 40 mg at 02/16/24 0846    ferrous sulfate (325 mg ferrous sulfate) tablet 1 tablet, 1 tablet, oral, Daily, Elly Rosado MD, 1 tablet at 02/16/24 0500    HYDROmorphone (Dilaudid) injection 0.4 mg, 0.4 mg, intravenous, q3h PRN, Elly Rosado MD    [Held by provider] lisinopril tablet 10 mg, 10 mg, oral, q AM, Elly Rosado MD    loratadine (Claritin) tablet 10 mg, 10 mg, oral, Daily, Elly Rosado MD, 10 mg at 02/16/24 0846    magnesium oxide (Mag-Ox) tablet 400 mg, 400 mg, oral, Daily, Elly Rosado MD, 400 mg at 02/16/24 0846    metoprolol succinate XL (Toprol-XL) 24 hr tablet 25 mg, 25 mg, oral, Daily, Elly Rosado MD, 25 mg at 02/16/24 0846    montelukast (Singulair) tablet 10 mg, 10 mg, oral, q AM, Elly Rosado MD, 10 mg at 02/16/24 0846    nicotine (Nicoderm CQ) 21 mg/24 hr patch 1 patch, 1 patch, transdermal, Daily, Elly Rosado MD, 1 patch at 02/16/24 0845    ondansetron (Zofran) " injection 4 mg, 4 mg, intravenous, q6h PRN, Elly Rosado MD, 4 mg at 02/15/24 2013    oxyCODONE (Roxicodone) immediate release tablet 10 mg, 10 mg, oral, q6h PRN, Elly Rosado MD    oxyCODONE (Roxicodone) immediate release tablet 5 mg, 5 mg, oral, q6h PRN, Elly Rosado MD, 5 mg at 02/15/24 0915    PARoxetine (Paxil) tablet 40 mg, 40 mg, oral, Daily, Elly Rosado MD, 40 mg at 02/16/24 0847    polyethylene glycol (Glycolax, Miralax) packet 17 g, 17 g, oral, Daily, Elly Rosado MD, 17 g at 02/14/24 1036    simvastatin (Zocor) tablet 20 mg, 20 mg, oral, Nightly, Elly Rosado MD, 20 mg at 02/15/24 2127    sodium chloride 0.9% infusion, 75 mL/hr, intravenous, Continuous, Elly Rosado MD, Last Rate: 75 mL/hr at 02/16/24 0907, 75 mL/hr at 02/16/24 0907    traZODone (Desyrel) tablet 100 mg, 100 mg, oral, Nightly, Elly Rosado MD, 100 mg at 02/15/24 2127    zinc oxide 40 % ointment 1 Application, 1 Application, Topical, BID, Elly Rosado MD, 1 Application at 02/16/24 0900    Lower extremity venous duplex bilateral  Result Date: 2/14/2024  PRELIMINARY CONCLUSIONS: Right Lower Venous: No evidence of acute deep vein thrombus visualized in the right lower extremity. Left Lower Venous: No evidence of acute deep vein thrombus visualized in the left lower extremity.  Additional Findings: Technically difficult study due to patient movement and inability to position.     ECG 12 lead  Result Date: 2/7/2024  Atrial flutter with 2:1 AV conduction Nonspecific intraventricular block Left ventricular hypertrophy ( R in aVL , [place] product ) Inferior infarct , age undetermined Abnormal ECG When compared with ECG of 11-JAN-2024 23:16, Significant changes have occurred See ED provider note for full interpretation and clinical correlation Confirmed by Vega Bruce ([id number]) on 2/7/2024 10:40:33 PM    XR hip right with pelvis when performed 2 or 3 views  Result Date: 2/7/2024  FINDINGS: Total right hip arthroplasty with interval placement of antibiotic impregnated  beads. Partially visualized left hip arthroplasty.     Interval placement of antibiotic impregnated beads in the right hip.     MACRO: None   Signed by: Richard Garcia 2/7/2024 5:00 PM Dictation workstation:   [id number]    XR hip right with pelvis when performed 2 or 3 views  Result Date: 1/27/2024    FINDINGS: Status post right total hip arthroplasty with cerclage wire fixation of the proximal femur. Hardware is intact without perihardware fractures or lucencies.   There is fragmentation of the greater trochanter which is likely postsurgical in etiology with attention on follow-up recommended.   Right acetabular protrusio. No malalignment.   Changes of left hip arthroplasty noted with prominent osteolysis of the medial acetabular wall similar to prior.           Results for orders placed or performed during the hospital encounter of 02/14/24 (from the past 24 hour(s))   C-reactive protein   Result Value Ref Range    C-Reactive Protein 2.39 (H) <1.00 mg/dL   CBC   Result Value Ref Range    WBC 6.4 4.4 - 11.3 x10*3/uL    nRBC 0.0 0.0 - 0.0 /100 WBCs    RBC 2.72 (L) 4.00 - 5.20 x10*6/uL    Hemoglobin 7.6 (L) 12.0 - 16.0 g/dL    Hematocrit 25.2 (L) 36.0 - 46.0 %    MCV 93 80 - 100 fL    MCH 27.9 26.0 - 34.0 pg    MCHC 30.2 (L) 32.0 - 36.0 g/dL    RDW 19.4 (H) 11.5 - 14.5 %    Platelets 347 150 - 450 x10*3/uL   Comprehensive Metabolic Panel   Result Value Ref Range    Glucose 88 74 - 99 mg/dL    Sodium 135 (L) 136 - 145 mmol/L    Potassium 4.0 3.5 - 5.3 mmol/L    Chloride 103 98 - 107 mmol/L    Bicarbonate 28 21 - 32 mmol/L    Anion Gap 8 (L) 10 - 20 mmol/L    Urea Nitrogen 3 (L) 6 - 23 mg/dL    Creatinine 0.30 (L) 0.50 - 1.05 mg/dL    eGFR >90 >60 mL/min/1.73m*2    Calcium 7.5 (L) 8.6 - 10.3 mg/dL    Albumin 2.4 (L) 3.4 - 5.0 g/dL    Alkaline Phosphatase 89 33 - 110 U/L    Total Protein 4.9 (L) 6.4 - 8.2 g/dL    AST 7 (L) 9 - 39 U/L    Bilirubin, Total 0.2 0.0 - 1.2 mg/dL    ALT 3 (L) 7 - 45 U/L   Magnesium    Result Value Ref Range    Magnesium 1.80 1.60 - 2.40 mg/dL   Phosphorus   Result Value Ref Range    Phosphorus 2.9 2.5 - 4.9 mg/dL           Assessment/Plan   Blanca Hamilton is a 48 y.o. 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 2/4 SIRS criteria in admission, s/p 3.12L NS. Cefazolin through PICC line discontinued (on previous discharge, planned for 6 weeks of cefazolin outpatient.) Started on vanc/zosyn, transitioned to Ancef. Admitted for further management with ortho, ID, and wound care consult.     Acute Medical Issues:  #Sepsis secondary to reccurent post op wound infection (MSSA) of right hip arthroplasty   #Hx right total hip arthroplasty w/ revision (1/5/24) for prosthetic fx  #Hx hardware removal for prosthetic joint infection (2/6/24)  -Discharged on 2/11 with wound vac + PICC line for 6-week course of cefazolin  -2/13/24: 2/4 SIRS criteria on admission. S/p sepsis fluids in ED. Lactate 0.7. Hgb stable.  -pain control: scheduled tylenol, oxy 5 mod, oxy 10 severe, dilaudid 0.4 PRN breakthrough  -Weightbearing right lower extremity for tfx only   -Blood Cx no growth after 2 days  -ESR and CRP downtrending  -Continue to trend CRP and Pro-Leonardo  -Continue IV Ancef 2 g 3 times daily   -Continue Lovenox 40 daily  -Wound care consulted: Wound vac in place and set to continuous, and planning to leave on until Friday, 2/17/2024  -Ortho consulted: No acute orthopedic intervention. Planning for possible return trip to the OR on 2/20 pending wound care and repeat evaluation. Per ortho, this time is needed for OR surgical planning in anticipation of complex case     #B/l LE edema - chronic  -Associated with pink macular rash  -Likely stasis dermatitis  -Bilateral duplex negative for acute DVT bilaterally  -Encourage bilateral leg elevation as tolerated    #Hyponatremia - RESOLVED  - Na 129 on admission  -Likely related to  poor p.o. intake  - CTM  - Nutrition consult, appreciate recs    Chronic Medical Issues:  #GERD: pepcid  #HTN: home metoprolol restarted, hold home lisinopril in setting of hypotension  #HLD: simvastatin  #Depression: abilify, paroxetine, trazodone  #Chronic anemia: iron supplement  #Tobacco use: nicotine patch  #Other: cont claritin, singulair, mag ox     F: NS @75 ml/hr  E: PRN  N: regular diet  G: PPI  Bowel regimen: PEG  Lines: PICC line  DVT: Continue Lovenox 40     CODE: FULL    Dispo: 49 yo F presents with SSI and sepsis with wound dehiscence of right hip arthroplasty. Orthopedic surgery, infectious disease and wound care consulted. Ortho plans on needing time for OR scheduling and anticipation of complex case, tentatively scheduled for 2/20/2024.  Wound VAC in place, continue Ancef for total of 6 weeks.      Barry Bledsoe, DO  Internal Medicine  PGY-1

## 2024-02-16 NOTE — PROGRESS NOTES
02/16/24 1548   Discharge Planning   Living Arrangements Other (Comment)  ([facility])   Support Systems Friends/neighbors;/   Assistance Needed From [facility] for SNF, Patient A&0x3, max assist with walker for ambulation, room air, wound vac at facility   Type of Residence Skilled nursing facility   Do you have animals or pets at home? Yes   Type of Animals or Pets cat   Who is requesting discharge planning? Provider   Home or Post Acute Services Post acute facilities (Rehab/SNF/etc)   Type of Post Acute Facility Services Skilled nursing   Patient expects to be discharged to: Return to [facility] SNF, will need precert   Does the patient need discharge transport arranged? Yes   RoundTrip coordination needed? Yes   Has discharge transport been arranged? No   Patient Choice   Provider Choice list and CMS website (https://medicare.gov/care-compare#search) for post-acute Quality and Resource Measure Data were provided and reviewed with: Patient   Patient / Family choosing to utilize agency / facility established prior to hospitalization Yes     2/16/24 @ 1551:  Patient will need to complete course of Ancef 2gm IV TID for total of 6 weeks. Has PICC that was placed previous admission.Tentatively to OR next week on 2/20 per ortho note. PT/ OT following recommending SNF.Patient preference remains to return to [facility] for rehab when medically ready. Will need precert. Updates sent to facility. Will need to follow for possible wound vac needs. Did have wound vac at facility. Will follow. Discussed in interdisciplinary rounds.       2/19/24 @ 1544: Patient will need to complete course of Ancef 2gm IV TID for total of 6 weeks. Has PICC that was placed previous admission. Plan for OR tomorrow. Preference remains to return to [facility] when medically ready. Will need precert. Unsure of wound vac needs upon  discharge. Will follow.  Discussed in interdisciplinary rounds.

## 2024-02-16 NOTE — PROGRESS NOTES
"  Subjective    Overnight Events:     NAEO  Resting comfortably in jeniffer  Endorses mild serosanguinous drainage, but no malodorous discharge   Although vastly improved with wound vac placement over the past 48 hrs    ROS: 12 points review of system is negative except as stated in the HPI above.     Objective    Vitals  Visit Vitals  /71 (BP Location: Right arm, Patient Position: Sitting)   Pulse 88   Temp 37.2 °C (99 °F) (Temporal)   Resp 18   Ht 1.753 m (5' 9\")   Wt 89.4 kg (197 lb 1.6 oz)   SpO2 96%   BMI 29.11 kg/m²   Smoking Status Every Day   BSA 2.09 m²       Physical Exam   Constitutional:       General: She is not in acute distress.     Comments: Conversant and pleasant. Nontoxic appearing   HENT:      Head: Normocephalic and atraumatic.      Nose: Nose normal.   Cardiovascular:      Rate and Rhythm: Normal rate and regular rhythm.      Heart sounds: Normal heart sounds.   Pulmonary:      Effort: Pulmonary effort is normal. No respiratory distress.      Breath sounds: Normal breath sounds. No stridor. No wheezing.   Abdominal:      General: Bowel sounds are normal. There is no distension.      Palpations: Abdomen is soft. There is no mass.      Tenderness: There is no abdominal tenderness.   Musculoskeletal:         General: Deformity and signs of injury present. Normal range of motion.      Cervical back: Normal range of motion. No rigidity.      Right lower leg: No edema.      Left lower leg: No edema.   Skin:     General: Skin is warm.      Findings: Lesion present.      Comments: Right lateral hip arthroplasty surgical site with long incision and staples.  Incision with proximal dehiscence of 5 cm.  There is noticeable cloudy, malodorous drainage near the proximal area of dehiscence.  The incision has dull redness along its entirety.  No other areas of the surgical site have obvious drainage or dehiscence.     Neurological:      General: No focal deficit present.      Mental Status: She is alert and " oriented to person, place, and time. Mental status is at baseline.   Psychiatric:         Mood and Affect: Mood normal.         Behavior: Behavior normal.         Thought Content: Thought content normal.         Judgment: Judgment normal.      IOs    Intake/Output Summary (Last 24 hours) at 2/16/2024 1140  Last data filed at 2/16/2024 0958  Gross per 24 hour   Intake 1590 ml   Output 3630 ml   Net -2040 ml       Labs:   Results from last 72 hours   Lab Units 02/16/24  0508 02/15/24  0550 02/14/24  0438   SODIUM mmol/L 135* 137 133*   POTASSIUM mmol/L 4.0 3.9 4.1   CHLORIDE mmol/L 103 105 105   CO2 mmol/L 28 28 24   BUN mg/dL 3* 3* 4*   CREATININE mg/dL 0.30* 0.24* 0.24*   GLUCOSE mg/dL 88 76 93   CALCIUM mg/dL 7.5* 7.1* 7.1*   ANION GAP mmol/L 8* 8* 8*   EGFR mL/min/1.73m*2 >90 >90 >90   PHOSPHORUS mg/dL 2.9 3.0  --       Results from last 72 hours   Lab Units 02/16/24  0508 02/15/24  0550 02/14/24  0438 02/14/24  0025   WBC AUTO x10*3/uL 6.4 5.5 8.5 12.1*   HEMOGLOBIN g/dL 7.6* 7.4* 7.3* 8.6*   HEMATOCRIT % 25.2* 23.7* 24.5* 27.8*   PLATELETS AUTO x10*3/uL 347 300 303 316   NEUTROS PCT AUTO %  --   --   --  71.5   LYMPHS PCT AUTO %  --   --   --  10.8   MONOS PCT AUTO %  --   --   --  8.9   EOS PCT AUTO %  --   --   --  5.5      Lab Results   Component Value Date    CALCIUM 7.5 (L) 02/16/2024    PHOS 2.9 02/16/2024      Lab Results   Component Value Date    CRP 2.39 (H) 02/16/2024        Micro/ID:   Susceptibility data from last 90 days.  Collected Specimen Info Organism Clindamycin Erythromycin Oxacillin Tetracycline Trimethoprim/Sulfamethoxazole Vancomycin   02/06/24 Swab from HIP ARTHROPLASTY RIGHT Methicillin Susceptible Staphylococcus aureus (MSSA) S S S R S S   02/06/24 Swab from HIP ARTHROPLASTY RIGHT Staphylococcus aureus         02/06/24 Swab from HIP ARTHROPLASTY RIGHT Staphylococcus aureus         02/02/24 Tissue/Biopsy from Wound/Tissue Methicillin Susceptible Staphylococcus aureus (MSSA) S S S R S S        Lab Results   Component Value Date    BLOODCULT No growth at 2 days 02/14/2024       Images  Lower extremity venous duplex bilateral            [place] [id number]   Tel [phone] and Fax [phone]       [place]  [facility] [place]ITY [place]EN[place]UP[place] BILATERAL       Patient Name:      ZULEIKA KAMARA          Reading Physician:  [id number] Prieto Loza MD  Study Date:        2/14/2024            Ordering Physician: [id number] GEORGE AMADOR  MRN/PID:           [id number]             Technologist:       Rosa Pierre [facility]  Accession#:        [id number]         Technologist 2:     Hattie Raymond  Date of Birth/Age: 1975 / 48      Encounter#:         [id number]                     years  Gender:            F  Admission Status:  Inpatient            Location Performed: [facility]       Diagnosis/ICD: Localized (leg) edema-R60.0  CPT Codes:     17356 Peripheral venous duplex scan for DVT complete       CONCLUSIONS:  Right Lower Venous: No evidence of acute deep vein thrombus visualized in the right lower extremity.  Left Lower Venous: No evidence of acute deep vein thrombus visualized in the left lower extremity.     Additional Findings:  Technically difficult study due to patient movement and inability to position.       Imaging & Doppler Findings:     Right                 Compressible Thrombus        Flow  Distal External Iliac     Yes        None   Spontaneous/Phasic  CFV                       Yes        None   Spontaneous/Phasic  PFV                       Yes        None  FV Proximal               Yes        None   Spontaneous/Phasic  FV Mid                    Yes        None  FV Distal                 Yes        None  Popliteal                 Yes        None   Spontaneous/Phasic  Peroneal                  Yes         None  PTV                       Yes        None       Left                  Compress Thrombus        Flow  Distal External Iliac   Yes      None   Spontaneous/Phasic  CFV                     Yes      None   Spontaneous/Phasic  PFV                     Yes      None  FV Proximal             Yes      None   Spontaneous/Phasic  FV Mid                  Yes      None  FV Distal               Yes      None  Popliteal               Yes      None   Spontaneous/Phasic  Peroneal                Yes      None  PTV                     Yes      None       [id number] Prieto Loza MD  Electronically signed by [id number] Prieto Loza MD on 2/15/2024 at 5:44:38 PM       ** Final **      Meds  Scheduled medications  acetaminophen, 975 mg, oral, q8h  ARIPiprazole, 15 mg, oral, Nightly  [Held by provider] aspirin, 81 mg, oral, BID  ceFAZolin, 2 g, intravenous, q8h  enoxaparin, 40 mg, subcutaneous, q24h  famotidine, 40 mg, oral, BID  ferrous sulfate (325 mg ferrous sulfate), 1 tablet, oral, Daily  [Held by provider] lisinopril, 10 mg, oral, q AM  loratadine, 10 mg, oral, Daily  magnesium oxide, 400 mg, oral, Daily  metoprolol succinate XL, 25 mg, oral, Daily  montelukast, 10 mg, oral, q AM  nicotine, 1 patch, transdermal, Daily  PARoxetine, 40 mg, oral, Daily  polyethylene glycol, 17 g, oral, Daily  simvastatin, 20 mg, oral, Nightly  traZODone, 100 mg, oral, Nightly  zinc oxide, 1 Application, Topical, BID      Continuous medications  sodium chloride 0.9%, 75 mL/hr, Last Rate: 75 mL/hr (02/16/24 0907)      PRN medications  PRN medications: HYDROmorphone, ondansetron, oxyCODONE, oxyCODONE     Problem List    Problem list:   Patient Active Problem List   Diagnosis    Primary hypertension    Other hyperlipidemia    Impaired functional mobility and activity tolerance    Status post hip surgery    Psychophysiological insomnia    Recurrent major depressive disorder (CMS/HCC)    Gastroesophageal reflux disease without esophagitis    Allergies    Iron  deficiency anemia secondary to inadequate dietary iron intake    Other constipation    Tachycardia    Mild intermittent asthma without complication    Right hip pain    Wound infection after surgery    Urinary symptom or sign    Intractable pain    Wound dehiscence        Assessment and Plan        Sepsis 2/2 Wound Dehiscence c/b R ALEXA infection s/p revision & hardware removal on 2/6  2.   Extensive fat necrosis / Hx of MSSA wound culture      Recommendations:     - Resume IV Ancef 2g TID (to complete total 6 week course)   - Wound Cx (2/6): MSSA +  - Trend CRP & Procal levels  - Ortho following -> plan for OR Tues 2/20/23  - Wound care to place wound vac   - B/L Lower Doppler: Negative     Thank you for the consult, will continue to follow. Please reach out with any questions/concerns.      Gamal Quiroz MD  Internal Medicine, PGY-3             Attending note : the patient was evaluated, the note was reviewed and up dated  Right hip prosthesis infection sp I&D with partial hardware exchange, MSSA on the cultures  Recommendations :  Continue Cefazolin        Frank Modi M.D

## 2024-02-16 NOTE — PROGRESS NOTES
"Physical Therapy    Physical Therapy Treatment    Patient Name: Blanca Hamilton  MRN: [id number]  Today's Date: 2/16/2024  Time Calculation  Start Time: 1000  Stop Time: 1023  Time Calculation (min): 23 min       Assessment/Plan   PT Assessment  PT Assessment Results: Decreased strength, Decreased endurance, Impaired balance, Decreased mobility  End of Session Communication: Bedside nurse  Assessment Comment: pt not willing to att standing, stating she \"know herself\".  Pt is able to sit EOB w/good tolerance, no LOB, and appropriate levels of fatigue afterwards  End of Session Patient Position: Alarm on, Bed, 3 rail up (Sitting I with and without back support , no UE support EOB per patient request. Nursing and aides aware, aide in with patient.)     PT Plan  Treatment/Interventions: Bed mobility, Transfer training, Balance training, Strengthening, Endurance training  PT Plan: Skilled PT  PT Frequency: 4 times per week  PT Discharge Recommendations: Moderate intensity level of continued care  Equipment Recommended upon Discharge: Wheeled walker, Wheelchair  PT Recommended Transfer Status: Assist x2  PT - OK to Discharge: Yes      General Visit Information:   PT  Visit  PT Received On: 02/16/24  General  Reason for Referral: 49 yo female referred to PT for R hip wound dehiscence, impaired mobility,  Patient Position Received: Bed, 3 rail up, Alarm on  Preferred Learning Style: verbal, kinesthetic  General Comment: AXOX3, bilat wound vac to right hip, TTWB/WBAT RLE for transfers only with post hip precautions, patietn able to verb 1/3 independently, bilat hip/knee flexion contractures noted about 30degrees each, self limiting. \"no standing,\" stated patietn.    Correction to above note:  WBAT is to be deleted, TTWB for transfers only is correct.     Subjective   Precautions:     Vital Signs:       Objective   Pain:  Pain Assessment  Pain Assessment: 0-10  Cognition:  Cognition  Overall Cognitive Status: Within Functional " Limits  Orientation Level: Oriented X4  Memory: Exceptions to WFL  Postural Control:     Extremity/Trunk Assessments:    Activity Tolerance:  Activity Tolerance  Endurance: Tolerates 30 min exercise with multiple rests  Treatments:  Therapeutic Exercise  Therapeutic Exercise Performed: Yes  Therapeutic Exercise Activity 1: long sitting ankle pumps, quad sets, heel slides  x10 reps , unable to get hp or knees to neutral,    Therapeutic Activity  Therapeutic Activity Performed: Yes  Therapeutic Activity 1: reviewed and demo'd post hip precautions.    Balance/Neuromuscular Re-Education  Balance/Neuromuscular Re-Education Activity Performed: Yes  Balance/Neuromuscular Re-Education Activity 1: statiac/dynamic sitting EOB wihtout back support and no UE supp x15 minutes with S/I assist.              Bed Mobility  Bed Mobility: Yes  Bed Mobility 1  Bed Mobility 1: Supine to sitting  Level of Assistance 1: Moderate assistance    Ambulation/Gait Training  Ambulation/Gait Training Performed: No (declines)  Transfers  Transfer: No (declines)                     Outcome Measures:  [facility] Basic Mobility  Turning from your back to your side while in a flat bed without using bedrails: A lot  Moving from lying on your back to sitting on the side of a flat bed without using bedrails: A lot  Moving to and from bed to chair (including a wheelchair): Total  Standing up from a chair using your arms (e.g. wheelchair or bedside chair): Total  To walk in hospital room: Total  Climbing 3-5 steps with railing: Total  Basic Mobility - Total Score: 8    Education Documentation  Body Mechanics, taught by Adelina Duncan PTA at 2/16/2024  2:16 PM.  Learner: Patient  Readiness: Acceptance  Method: Explanation  Response: Verbalizes Understanding    Precautions, taught by Adelina Duncan PTA at 2/16/2024  2:16 PM.  Learner: Patient  Readiness: Acceptance  Method: Explanation  Response: Verbalizes Understanding    ADL Training, taught by Adelina Duncan  PTA at 2/16/2024  2:16 PM.  Learner: Patient  Readiness: Acceptance  Method: Explanation  Response: Verbalizes Understanding    Precautions, taught by Adelina Duncan PTA at 2/16/2024  2:16 PM.  Learner: Patient  Readiness: Acceptance  Method: Explanation  Response: Verbalizes Understanding    Body Mechanics, taught by Adelina Duncan PTA at 2/16/2024  2:16 PM.  Learner: Patient  Readiness: Acceptance  Method: Explanation  Response: Verbalizes Understanding    Mobility Training, taught by Adelina Duncan PTA at 2/16/2024  2:16 PM.  Learner: Patient  Readiness: Acceptance  Method: Explanation  Response: Verbalizes Understanding    Education Comments  No comments found.        OP EDUCATION:       Encounter Problems       Encounter Problems (Active)       Balance       STG - Maintains static standing balance with upper extremity support x 1' maintaining weight bearing status  (Progressing)       Start:  02/14/24    Expected End:  02/28/24            STG - Maintains dynamic sitting balance with upper extremity support x 10'  (Progressing)       Start:  02/14/24    Expected End:  02/28/24               Transfers       STG - Patient will perform bed mobility independently  (Progressing)       Start:  02/14/24    Expected End:  02/28/24            STG - Patient will transfer sit to and from stand min A, maintaining weight bearing status  (Progressing)       Start:  02/14/24    Expected End:  02/28/24
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
## 2024-02-16 NOTE — NURSING NOTE
Rounded to see patient, wound vac intact, no drainage, no seepage, both vac canister to suction as stated by ES Groves.

## 2024-02-17 LAB
ALBUMIN SERPL BCP-MCNC: 2.4 G/DL (ref 3.4–5)
ALP SERPL-CCNC: 93 U/L (ref 33–110)
ALT SERPL W P-5'-P-CCNC: 3 U/L (ref 7–45)
ANION GAP SERPL CALC-SCNC: 7 MMOL/L (ref 10–20)
AST SERPL W P-5'-P-CCNC: 7 U/L (ref 9–39)
BILIRUB SERPL-MCNC: 0.2 MG/DL (ref 0–1.2)
BUN SERPL-MCNC: 4 MG/DL (ref 6–23)
CALCIUM SERPL-MCNC: 7.6 MG/DL (ref 8.6–10.3)
CHLORIDE SERPL-SCNC: 104 MMOL/L (ref 98–107)
CO2 SERPL-SCNC: 27 MMOL/L (ref 21–32)
CREAT SERPL-MCNC: 0.29 MG/DL (ref 0.5–1.05)
CRP SERPL-MCNC: 1.38 MG/DL
EGFRCR SERPLBLD CKD-EPI 2021: >90 ML/MIN/1.73M*2
ERYTHROCYTE [DISTWIDTH] IN BLOOD BY AUTOMATED COUNT: 19.3 % (ref 11.5–14.5)
GLUCOSE SERPL-MCNC: 77 MG/DL (ref 74–99)
HCT VFR BLD AUTO: 25.2 % (ref 36–46)
HGB BLD-MCNC: 7.8 G/DL (ref 12–16)
MAGNESIUM SERPL-MCNC: 1.8 MG/DL (ref 1.6–2.4)
MCH RBC QN AUTO: 28.5 PG (ref 26–34)
MCHC RBC AUTO-ENTMCNC: 31 G/DL (ref 32–36)
MCV RBC AUTO: 92 FL (ref 80–100)
NRBC BLD-RTO: 0 /100 WBCS (ref 0–0)
PHOSPHATE SERPL-MCNC: 3.3 MG/DL (ref 2.5–4.9)
PLATELET # BLD AUTO: 344 X10*3/UL (ref 150–450)
POTASSIUM SERPL-SCNC: 4.2 MMOL/L (ref 3.5–5.3)
PROCALCITONIN SERPL-MCNC: 0.06 NG/ML
PROT SERPL-MCNC: 4.9 G/DL (ref 6.4–8.2)
RBC # BLD AUTO: 2.74 X10*6/UL (ref 4–5.2)
SODIUM SERPL-SCNC: 134 MMOL/L (ref 136–145)
WBC # BLD AUTO: 5.9 X10*3/UL (ref 4.4–11.3)

## 2024-02-17 PROCEDURE — 99232 SBSQ HOSP IP/OBS MODERATE 35: CPT

## 2024-02-17 PROCEDURE — 84100 ASSAY OF PHOSPHORUS: CPT

## 2024-02-17 PROCEDURE — 80053 COMPREHEN METABOLIC PANEL: CPT

## 2024-02-17 PROCEDURE — 2500000004 HC RX 250 GENERAL PHARMACY W/ HCPCS (ALT 636 FOR OP/ED)

## 2024-02-17 PROCEDURE — 2500000002 HC RX 250 W HCPCS SELF ADMINISTERED DRUGS (ALT 637 FOR MEDICARE OP, ALT 636 FOR OP/ED)

## 2024-02-17 PROCEDURE — 84145 PROCALCITONIN (PCT): CPT | Mod: GEALAB

## 2024-02-17 PROCEDURE — 83735 ASSAY OF MAGNESIUM: CPT

## 2024-02-17 PROCEDURE — S4991 NICOTINE PATCH NONLEGEND: HCPCS

## 2024-02-17 PROCEDURE — 2500000001 HC RX 250 WO HCPCS SELF ADMINISTERED DRUGS (ALT 637 FOR MEDICARE OP)

## 2024-02-17 PROCEDURE — 86140 C-REACTIVE PROTEIN: CPT

## 2024-02-17 PROCEDURE — 1200000002 HC GENERAL ROOM WITH TELEMETRY DAILY

## 2024-02-17 PROCEDURE — 85027 COMPLETE CBC AUTOMATED: CPT

## 2024-02-17 RX ADMIN — TRAZODONE HYDROCHLORIDE 100 MG: 50 TABLET ORAL at 20:40

## 2024-02-17 RX ADMIN — NICOTINE 1 PATCH: 21 PATCH, EXTENDED RELEASE TRANSDERMAL at 09:14

## 2024-02-17 RX ADMIN — Medication 400 MG: at 09:14

## 2024-02-17 RX ADMIN — Medication 1 APPLICATION: at 09:15

## 2024-02-17 RX ADMIN — CEFAZOLIN SODIUM 2 G: 2 INJECTION, SOLUTION INTRAVENOUS at 12:53

## 2024-02-17 RX ADMIN — ARIPIPRAZOLE 15 MG: 5 TABLET ORAL at 20:40

## 2024-02-17 RX ADMIN — SIMVASTATIN 20 MG: 20 TABLET, FILM COATED ORAL at 20:40

## 2024-02-17 RX ADMIN — PAROXETINE 40 MG: 20 TABLET, FILM COATED ORAL at 09:14

## 2024-02-17 RX ADMIN — MONTELUKAST 10 MG: 10 TABLET, FILM COATED ORAL at 09:14

## 2024-02-17 RX ADMIN — FAMOTIDINE 40 MG: 20 TABLET ORAL at 09:14

## 2024-02-17 RX ADMIN — CEFAZOLIN SODIUM 2 G: 2 INJECTION, SOLUTION INTRAVENOUS at 20:40

## 2024-02-17 RX ADMIN — ACETAMINOPHEN 975 MG: 325 TABLET ORAL at 20:40

## 2024-02-17 RX ADMIN — ONDANSETRON 4 MG: 2 INJECTION INTRAMUSCULAR; INTRAVENOUS at 19:54

## 2024-02-17 RX ADMIN — LORATADINE 10 MG: 10 TABLET ORAL at 09:14

## 2024-02-17 RX ADMIN — POLYETHYLENE GLYCOL 3350 17 G: 17 POWDER, FOR SOLUTION ORAL at 09:15

## 2024-02-17 RX ADMIN — SODIUM CHLORIDE 75 ML/HR: 9 INJECTION, SOLUTION INTRAVENOUS at 14:47

## 2024-02-17 RX ADMIN — Medication 1 APPLICATION: at 21:00

## 2024-02-17 RX ADMIN — ACETAMINOPHEN 975 MG: 325 TABLET ORAL at 12:53

## 2024-02-17 RX ADMIN — CEFAZOLIN SODIUM 2 G: 2 INJECTION, SOLUTION INTRAVENOUS at 05:16

## 2024-02-17 RX ADMIN — FAMOTIDINE 40 MG: 20 TABLET ORAL at 20:40

## 2024-02-17 RX ADMIN — METOPROLOL SUCCINATE 25 MG: 25 TABLET, EXTENDED RELEASE ORAL at 09:14

## 2024-02-17 RX ADMIN — FERROUS SULFATE TAB 325 MG (65 MG ELEMENTAL FE) 1 TABLET: 325 (65 FE) TAB at 05:16

## 2024-02-17 RX ADMIN — ENOXAPARIN SODIUM 40 MG: 40 INJECTION SUBCUTANEOUS at 14:42

## 2024-02-17 RX ADMIN — ACETAMINOPHEN 975 MG: 325 TABLET ORAL at 05:16

## 2024-02-17 ASSESSMENT — COGNITIVE AND FUNCTIONAL STATUS - GENERAL
DRESSING REGULAR LOWER BODY CLOTHING: TOTAL
DAILY ACTIVITIY SCORE: 15
WALKING IN HOSPITAL ROOM: TOTAL
TOILETING: A LOT
WALKING IN HOSPITAL ROOM: TOTAL
PERSONAL GROOMING: A LITTLE
TURNING FROM BACK TO SIDE WHILE IN FLAT BAD: A LOT
CLIMB 3 TO 5 STEPS WITH RAILING: TOTAL
DAILY ACTIVITIY SCORE: 15
PERSONAL GROOMING: A LITTLE
MOBILITY SCORE: 8
HELP NEEDED FOR BATHING: A LOT
MOVING FROM LYING ON BACK TO SITTING ON SIDE OF FLAT BED WITH BEDRAILS: A LOT
DRESSING REGULAR UPPER BODY CLOTHING: A LITTLE
MOBILITY SCORE: 8
MOVING FROM LYING ON BACK TO SITTING ON SIDE OF FLAT BED WITH BEDRAILS: A LOT
STANDING UP FROM CHAIR USING ARMS: TOTAL
MOVING TO AND FROM BED TO CHAIR: TOTAL
STANDING UP FROM CHAIR USING ARMS: TOTAL
MOVING TO AND FROM BED TO CHAIR: TOTAL
CLIMB 3 TO 5 STEPS WITH RAILING: TOTAL
TOILETING: A LOT
TURNING FROM BACK TO SIDE WHILE IN FLAT BAD: A LOT
DRESSING REGULAR UPPER BODY CLOTHING: A LITTLE
DRESSING REGULAR LOWER BODY CLOTHING: TOTAL
HELP NEEDED FOR BATHING: A LOT

## 2024-02-17 ASSESSMENT — PAIN - FUNCTIONAL ASSESSMENT
PAIN_FUNCTIONAL_ASSESSMENT: 0-10
PAIN_FUNCTIONAL_ASSESSMENT: 0-10

## 2024-02-17 ASSESSMENT — PAIN SCALES - GENERAL
PAINLEVEL_OUTOF10: 5 - MODERATE PAIN
PAINLEVEL_OUTOF10: 0 - NO PAIN
PAINLEVEL_OUTOF10: 0 - NO PAIN

## 2024-02-17 NOTE — PROGRESS NOTES
Blanca Hamilton is a 48 y.o. female on day 3 of admission presenting with Wound dehiscence.    Subjective   Interval History: no fever, no new complaints        Review of Systems    Objective   Range of Vitals (last 24 hours)  Heart Rate:  [83-90]   Temp:  [36.7 °C (98 °F)-37.2 °C (99 °F)]   Resp:  [16-19]   BP: (109-138)/(71-86)   SpO2:  [95 %-96 %]   Daily Weight  02/14/24 : 89.4 kg (197 lb 1.6 oz)    Body mass index is 29.11 kg/m².    Physical Exam  Constitutional:       Appearance: Normal appearance.   HENT:      Head: Normocephalic and atraumatic.      Mouth/Throat:      Mouth: Mucous membranes are moist.      Pharynx: Oropharynx is clear.   Eyes:      Pupils: Pupils are equal, round, and reactive to light.   Cardiovascular:      Rate and Rhythm: Normal rate and regular rhythm.      Heart sounds: Normal heart sounds.   Pulmonary:      Effort: Pulmonary effort is normal.      Breath sounds: Normal breath sounds.   Abdominal:      General: Abdomen is flat. Bowel sounds are normal.      Palpations: Abdomen is soft.   Musculoskeletal:      Cervical back: Normal range of motion.   Neurological:      Mental Status: She is alert.         Antibiotics  sodium chloride 0.9 % bolus 3,120 mL  acetaminophen (Tylenol) tablet 975 mg  ARIPiprazole (Abilify) tablet 15 mg  aspirin chewable tablet 81 mg  ceFAZolin in dextrose (iso-os) (Ancef) IVPB 2 g  famotidine (Pepcid) tablet 40 mg  ferrous sulfate (325 mg ferrous sulfate) tablet 1 tablet  lactulose 20 gram/30 mL oral solution 20 g  lisinopril tablet 10 mg  loratadine (Claritin) tablet 10 mg  magnesium oxide (Mag-Ox) tablet 400 mg  metoprolol succinate XL (Toprol-XL) 24 hr tablet 25 mg  montelukast (Singulair) tablet 10 mg  nicotine (Nicoderm CQ) 21 mg/24 hr patch 1 patch  PARoxetine (Paxil) tablet 40 mg  polyethylene glycol (Glycolax, Miralax) packet 17 g  simvastatin (Zocor) tablet 20 mg  traZODone (Desyrel) tablet 100 mg  zinc oxide 40 % ointment 1  Application  piperacillin-tazobactam-dextrose (Zosyn) IV 3.375 g  sodium chloride 0.9% infusion  oxyCODONE (Roxicodone) immediate release tablet 5 mg  oxyCODONE (Roxicodone) immediate release tablet 10 mg  HYDROmorphone (Dilaudid) injection 0.4 mg  vancomycin (Vancocin) in dextrose 5 % water (D5W) 500 mL IV 1,500 mg  atorvastatin (Lipitor) tablet    ceFAZolin in dextrose (iso-os) (Ancef) IVPB 1 g  enoxaparin (Lovenox) syringe 40 mg  ceFAZolin in dextrose (iso-os) (Ancef) IVPB 2 g  ondansetron (Zofran) injection 4 mg      Relevant Results  Labs  Results from last 72 hours   Lab Units 02/17/24  0518 02/16/24  0508 02/15/24  0550   WBC AUTO x10*3/uL 5.9 6.4 5.5   HEMOGLOBIN g/dL 7.8* 7.6* 7.4*   HEMATOCRIT % 25.2* 25.2* 23.7*   PLATELETS AUTO x10*3/uL 344 347 300     Results from last 72 hours   Lab Units 02/17/24  0518 02/16/24  0508 02/15/24  0550   SODIUM mmol/L 134* 135* 137   POTASSIUM mmol/L 4.2 4.0 3.9   CHLORIDE mmol/L 104 103 105   CO2 mmol/L 27 28 28   BUN mg/dL 4* 3* 3*   CREATININE mg/dL 0.29* 0.30* 0.24*   GLUCOSE mg/dL 77 88 76   CALCIUM mg/dL 7.6* 7.5* 7.1*   ANION GAP mmol/L 7* 8* 8*   EGFR mL/min/1.73m*2 >90 >90 >90   PHOSPHORUS mg/dL 3.3 2.9 3.0     Results from last 72 hours   Lab Units 02/17/24  0518 02/16/24  0508 02/15/24  0550   ALK PHOS U/L 93 89 87   BILIRUBIN TOTAL mg/dL 0.2 0.2 0.2   PROTEIN TOTAL g/dL 4.9* 4.9* 4.5*   ALT U/L 3* 3* <3*   AST U/L 7* 7* 6*   ALBUMIN g/dL 2.4* 2.4* 2.2*     Estimated Creatinine Clearance: 125 mL/min (A) (by C-G formula based on SCr of 0.29 mg/dL (L)).  C-Reactive Protein   Date Value Ref Range Status   02/17/2024 1.38 (H) <1.00 mg/dL Final   02/16/2024 2.39 (H) <1.00 mg/dL Final   02/15/2024 6.30 (H) <1.00 mg/dL Final     Microbiology  Susceptibility data from last 14 days.  Collected Specimen Info Organism Clindamycin Erythromycin Oxacillin Tetracycline Trimethoprim/Sulfamethoxazole Vancomycin   02/06/24 Swab from HIP ARTHROPLASTY RIGHT Methicillin Susceptible  Staphylococcus aureus (MSSA) S S S R S S   02/06/24 Swab from HIP ARTHROPLASTY RIGHT Staphylococcus aureus         02/06/24 Swab from HIP ARTHROPLASTY RIGHT Staphylococcus aureus           Imaging  reviwed        Assessment/Plan      Right hip prosthesis infection sp I&D with partial hardware exchange, MSSA on the cultures    Recommendations :  Continue Cefazolin    I spent minutes in the professional and overall care of this patient.      Frank Modi MD

## 2024-02-17 NOTE — CARE PLAN
The patient's goals for the shift include  remain comfortable throughout shift.    The clinical goals for the shift include Pt will remain HDS throughout shift.

## 2024-02-17 NOTE — PROGRESS NOTES
Patient: Blanca Hamilton Age: 48 y.o.   Gender: female Room/bed: 252/252-B     Attending: Brando Mascorro DO  Code Status:  Full Code    Overnight Events     None    Subjective   Seen and examined at the bedside and appeared calm. She states that she is doing well. She is not having any significant pain.     Denies chest pain/pressure, abdominal pain, nausea, vomiting, fever, chills, headaches.     Objective    Physical Exam  HENT:      Head: Normocephalic and atraumatic.   Cardiovascular:      Rate and Rhythm: Normal rate and regular rhythm.      Pulses: Normal pulses.      Heart sounds: Normal heart sounds.   Pulmonary:      Effort: Pulmonary effort is normal.   Abdominal:      General: Abdomen is flat. Bowel sounds are normal.      Palpations: Abdomen is soft.   Musculoskeletal:         General: Deformity present.      Comments: Wound vac attached to right hip, draining serosanguinous.    Neurological:      General: No focal deficit present.      Mental Status: She is alert. Mental status is at baseline.   Psychiatric:         Mood and Affect: Mood normal.         Behavior: Behavior normal.        Temp:  [36.7 °C (98 °F)-37.1 °C (98.7 °F)] 37 °C (98.6 °F)  Heart Rate:  [83-98] 98  Resp:  [16-17] 16  BP: (114-138)/(75-86) 114/75    Intake/Output Summary (Last 24 hours) at 2/17/2024 1534  Last data filed at 2/17/2024 1323  Gross per 24 hour   Intake 880 ml   Output 2100 ml   Net -1220 ml     Vitals:    02/14/24 0227   Weight: 89.4 kg (197 lb 1.6 oz)       I/Os    Intake/Output Summary (Last 24 hours) at 2/17/2024 1534  Last data filed at 2/17/2024 1323  Gross per 24 hour   Intake 880 ml   Output 2100 ml   Net -1220 ml     Labs:   Results from last 72 hours   Lab Units 02/17/24  0518 02/16/24  0508 02/15/24  0550   SODIUM mmol/L 134* 135* 137   POTASSIUM mmol/L 4.2 4.0 3.9   CHLORIDE mmol/L 104 103 105   CO2 mmol/L 27 28 28   BUN mg/dL 4* 3* 3*   CREATININE mg/dL 0.29* 0.30* 0.24*   GLUCOSE mg/dL 77 88 76   CALCIUM mg/dL  7.6* 7.5* 7.1*   ANION GAP mmol/L 7* 8* 8*   EGFR mL/min/1.73m*2 >90 >90 >90   PHOSPHORUS mg/dL 3.3 2.9 3.0      Results from last 72 hours   Lab Units 02/17/24  0518 02/16/24  0508 02/15/24  0550   WBC AUTO x10*3/uL 5.9 6.4 5.5   HEMOGLOBIN g/dL 7.8* 7.6* 7.4*   HEMATOCRIT % 25.2* 25.2* 23.7*   PLATELETS AUTO x10*3/uL 344 347 300      Lab Results   Component Value Date    CALCIUM 7.6 (L) 02/17/2024    PHOS 3.3 02/17/2024      Lab Results   Component Value Date    CRP 1.38 (H) 02/17/2024      [unfilled]     Micro/ID:   Susceptibility data from last 90 days.  Collected Specimen Info Organism Clindamycin Erythromycin Oxacillin Tetracycline Trimethoprim/Sulfamethoxazole Vancomycin   02/06/24 Swab from HIP ARTHROPLASTY RIGHT Methicillin Susceptible Staphylococcus aureus (MSSA) S S S R S S   02/06/24 Swab from HIP ARTHROPLASTY RIGHT Staphylococcus aureus         02/06/24 Swab from HIP ARTHROPLASTY RIGHT Staphylococcus aureus         02/02/24 Tissue/Biopsy from Wound/Tissue Methicillin Susceptible Staphylococcus aureus (MSSA) S S S R S S     Lab Results   Component Value Date    BLOODCULT No growth at 3 days 02/14/2024     Images:  Lower extremity venous duplex bilateral            [place] [id number]   Tel [phone] and Fax [phone]       Vascular Lab Report  [facility] LOWER EXTREMITY VENOUS DUPLEX BILATERAL       Patient Name:      ZULEIKA Mcnamara Physician:  [id number] Prieto Loza MD  Study Date:        2/14/2024            Ordering Physician: [id number] AMADOR  MRN/PID:           [id number]             Technologist:       Rosa CAMARGO  Accession#:        [id number]         Technologist 2:     Hattie Raymond  Date of Birth/Age: 1975 / 48      Encounter#:         [id number]                     years  Gender:            F  Admission Status:  Inpatient            Location Performed:  [facility]       Diagnosis/ICD: Localized (leg) edema-R60.0  CPT Codes:     35619 Peripheral venous duplex scan for DVT complete       CONCLUSIONS:  Right Lower Venous: No evidence of acute deep vein thrombus visualized in the right lower extremity.  Left Lower Venous: No evidence of acute deep vein thrombus visualized in the left lower extremity.     Additional Findings:  Technically difficult study due to patient movement and inability to position.       Imaging & Doppler Findings:     Right                 Compressible Thrombus        Flow  Distal External Iliac     Yes        None   Spontaneous/Phasic  CFV                       Yes        None   Spontaneous/Phasic  PFV                       Yes        None  FV Proximal               Yes        None   Spontaneous/Phasic  FV Mid                    Yes        None  FV Distal                 Yes        None  Popliteal                 Yes        None   Spontaneous/Phasic  Peroneal                  Yes        None  PTV                       Yes        None       Left                  Compress Thrombus        Flow  Distal External Iliac   Yes      None   Spontaneous/Phasic  CFV                     Yes      None   Spontaneous/Phasic  PFV                     Yes      None  FV Proximal             Yes      None   Spontaneous/Phasic  FV Mid                  Yes      None  FV Distal               Yes      None  Popliteal               Yes      None   Spontaneous/Phasic  Peroneal                Yes      None  PTV                     Yes      None       [id number] Prieto Loza MD  Electronically signed by [id number] Prieto Loza MD on 2/15/2024 at 5:44:38 PM       ** Final **     Meds    Scheduled medications  acetaminophen, 975 mg, oral, q8h  ARIPiprazole, 15 mg, oral, Nightly  [Held by provider] aspirin, 81 mg, oral, BID  ceFAZolin, 2 g, intravenous, q8h  enoxaparin, 40 mg, subcutaneous, q24h  famotidine, 40 mg,  oral, BID  ferrous sulfate (325 mg ferrous sulfate), 1 tablet, oral, Daily  [Held by provider] lisinopril, 10 mg, oral, q AM  loratadine, 10 mg, oral, Daily  magnesium oxide, 400 mg, oral, Daily  metoprolol succinate XL, 25 mg, oral, Daily  montelukast, 10 mg, oral, q AM  nicotine, 1 patch, transdermal, Daily  PARoxetine, 40 mg, oral, Daily  polyethylene glycol, 17 g, oral, Daily  simvastatin, 20 mg, oral, Nightly  traZODone, 100 mg, oral, Nightly  zinc oxide, 1 Application, Topical, BID      Continuous medications  sodium chloride 0.9%, 75 mL/hr, Last Rate: 75 mL/hr (02/17/24 1447)      PRN medications  PRN medications: HYDROmorphone, ondansetron, oxyCODONE, oxyCODONE     Assessment and Plan    Blanca Hamilton is a 48 y.o. 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 Initially met Sepsis criteria. Admitted for further management of dehiscence.     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   - Discharged on 2/11/2024 with wound vac + 6-week course of Cefazolin   - Microdata:  -- Blood Culture x2 (2/14): NTD   - Previous Microdata  -- Tissue/Wound Culture (2/6): MSSA, only resistant to Tetracycline   [ ] Current Antibiotics: Cefazolin 2g TID (2/15 - )  [ ] Wound Care consulted  [ ] Orthopedics consulted, planning for OR on 2/20/2024   [ ] ID Consulted     Chronic Medical Issues:    # GERD: c/w Pepcid   # HTN: c/w Metoprolol restarted, hold home lisinopril in setting of hypotension  # HLD: c/w Simvastatin  # Depression: c/w Abilify + Paroxetine +Trazodone  # Chronic anemia: iron supplement  # Tobacco use: nicotine patch    Fluids: None  Electrolytes: replete as needed  Nutrition: Regular   GI PPX: None   DVT PPX: Lovenox     Access: PICC Line, PIV's  Antibiotics: Cefazolin 2g TID (2/15 -  )  Oxygenation: Room Air    Dispo: Admitted onto the floor for Post-Op wound dehiscence. Plan is for OR on 2/20 with Ortho.     Dylan Casas DO

## 2024-02-17 NOTE — HOSPITAL COURSE
Blanca Hamilton is a 48 y.o. female with PMHx of recent R ALEXA revision and hardware removal for PJI on 2/6, GERD, HTN, and depression, who presented to [facility] ED with wound dehiscence. Pt initially underwent R total hip arthroplasty on 1/2/4, but had subsequent revision on 1/5 for prosthetic fracture. 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 Cultures were positive for MSSA and patient was started on cefazolin.  At this time the patient underwent ALEXA revision and partial hardware removal with Dr. Jalloh on 2/6/24. Patient was discharged to SNF on 2/11 with wound vac, a PICC line for 6 weeks of cefazolin, and ASA for DVT ppx.    Upon most recent presentation to ED on 2/14/2024, patient found to have surgical site infection with wound dehiscence of the right hip.  Admitted to the medical floor for further management.  Orthopedic surgery was consulted.  Wound care was consulted.  Prior to surgery, wound care placed wound VAC with appropriate suction.  The wound drained consistent serosanguineous fluid.  Without improvement in conservative management, the patient was taken to the OR on 2/20 for revision of the right hip.      Postoperatively, the patient became anemic due to acute blood loss.  The patient was consented and transfused 1 unit of blood with an appropriate incremental increase in her hemoglobin.  The patient was fluid bolused as needed for hypotension.  No further complications ensued, and the patient's pain was controlled with a scheduled and as needed pain regimen. The patient was continued on Ancef in the postoperative period, which she is supposed to complete a 6-week course.  The clock starts from the day of most recent surgery, on 2/20/2024. So she will continue with Ancef 2g TID through 4/2/24.  Per ID, it is also important she obtain weekly labs. She was also treated with Tamiflu for 10 days due to Flu B exposure, although the patient remained asymptomatic throughout  her stay.    In the postoperative period, the patient continued to have increased drain output. ASA was held, and LVX was continued with close monitoring.  Superficial and deep drains were removed on 3/1.  Prevena wound VAC was changed on 3/5.  For anticoagulation, the patient will be continued on Lovenox for a total of 30 days.  She will remain on Lovenox until 3/19.    At the time of discharge, patient's pain was controlled with oral analgesia, patient was urinating, having BMs, sleeping, and eating well. She was restarted on all home medications. Based on PT/OT's recommendation, patient was discharged with scripts and follow up appointments.  The patient will need close follow-up with orthopedic surgery in 1 week.  The patient will be discharged with a prescription for Lovenox for DVT prophylaxis.  She will also continue to receive antibiotics for 6 weeks as stated above.  Discharge plan was discussed with the patient and all of the patient's questions were answered.  Patient agreeable and understanding with plan.  [facility] will [facility], [facility].

## 2024-02-18 LAB
ALBUMIN SERPL BCP-MCNC: 2.7 G/DL (ref 3.4–5)
ALP SERPL-CCNC: 108 U/L (ref 33–110)
ALT SERPL W P-5'-P-CCNC: 3 U/L (ref 7–45)
ANION GAP SERPL CALC-SCNC: 7 MMOL/L (ref 10–20)
AST SERPL W P-5'-P-CCNC: 7 U/L (ref 9–39)
BACTERIA BLD CULT: NORMAL
BACTERIA BLD CULT: NORMAL
BILIRUB SERPL-MCNC: 0.2 MG/DL (ref 0–1.2)
BUN SERPL-MCNC: 4 MG/DL (ref 6–23)
CALCIUM SERPL-MCNC: 7.9 MG/DL (ref 8.6–10.3)
CHLORIDE SERPL-SCNC: 103 MMOL/L (ref 98–107)
CO2 SERPL-SCNC: 26 MMOL/L (ref 21–32)
CREAT SERPL-MCNC: 0.26 MG/DL (ref 0.5–1.05)
CRP SERPL-MCNC: 1.13 MG/DL
EGFRCR SERPLBLD CKD-EPI 2021: >90 ML/MIN/1.73M*2
ERYTHROCYTE [DISTWIDTH] IN BLOOD BY AUTOMATED COUNT: 19 % (ref 11.5–14.5)
GLUCOSE SERPL-MCNC: 89 MG/DL (ref 74–99)
HCT VFR BLD AUTO: 27.4 % (ref 36–46)
HGB BLD-MCNC: 8.6 G/DL (ref 12–16)
MCH RBC QN AUTO: 28.8 PG (ref 26–34)
MCHC RBC AUTO-ENTMCNC: 31.4 G/DL (ref 32–36)
MCV RBC AUTO: 92 FL (ref 80–100)
NRBC BLD-RTO: 0 /100 WBCS (ref 0–0)
PLATELET # BLD AUTO: 361 X10*3/UL (ref 150–450)
POTASSIUM SERPL-SCNC: 4.2 MMOL/L (ref 3.5–5.3)
PROT SERPL-MCNC: 5.3 G/DL (ref 6.4–8.2)
RBC # BLD AUTO: 2.99 X10*6/UL (ref 4–5.2)
SODIUM SERPL-SCNC: 132 MMOL/L (ref 136–145)
WBC # BLD AUTO: 5.5 X10*3/UL (ref 4.4–11.3)

## 2024-02-18 PROCEDURE — 86140 C-REACTIVE PROTEIN: CPT

## 2024-02-18 PROCEDURE — 2500000004 HC RX 250 GENERAL PHARMACY W/ HCPCS (ALT 636 FOR OP/ED)

## 2024-02-18 PROCEDURE — 97535 SELF CARE MNGMENT TRAINING: CPT | Mod: CO,GO | Performed by: OCCUPATIONAL THERAPY ASSISTANT

## 2024-02-18 PROCEDURE — 80053 COMPREHEN METABOLIC PANEL: CPT

## 2024-02-18 PROCEDURE — 84145 PROCALCITONIN (PCT): CPT | Mod: GEALAB

## 2024-02-18 PROCEDURE — 1200000002 HC GENERAL ROOM WITH TELEMETRY DAILY

## 2024-02-18 PROCEDURE — 2500000001 HC RX 250 WO HCPCS SELF ADMINISTERED DRUGS (ALT 637 FOR MEDICARE OP)

## 2024-02-18 PROCEDURE — 85027 COMPLETE CBC AUTOMATED: CPT

## 2024-02-18 PROCEDURE — 2500000002 HC RX 250 W HCPCS SELF ADMINISTERED DRUGS (ALT 637 FOR MEDICARE OP, ALT 636 FOR OP/ED)

## 2024-02-18 PROCEDURE — S4991 NICOTINE PATCH NONLEGEND: HCPCS

## 2024-02-18 PROCEDURE — 99232 SBSQ HOSP IP/OBS MODERATE 35: CPT | Performed by: INTERNAL MEDICINE

## 2024-02-18 RX ADMIN — SODIUM CHLORIDE 75 ML/HR: 9 INJECTION, SOLUTION INTRAVENOUS at 05:27

## 2024-02-18 RX ADMIN — Medication 1 APPLICATION: at 20:27

## 2024-02-18 RX ADMIN — MONTELUKAST 10 MG: 10 TABLET, FILM COATED ORAL at 08:47

## 2024-02-18 RX ADMIN — CEFAZOLIN SODIUM 2 G: 2 INJECTION, SOLUTION INTRAVENOUS at 20:30

## 2024-02-18 RX ADMIN — ACETAMINOPHEN 975 MG: 325 TABLET ORAL at 13:25

## 2024-02-18 RX ADMIN — FAMOTIDINE 40 MG: 20 TABLET ORAL at 20:21

## 2024-02-18 RX ADMIN — ENOXAPARIN SODIUM 40 MG: 40 INJECTION SUBCUTANEOUS at 16:54

## 2024-02-18 RX ADMIN — CEFAZOLIN SODIUM 2 G: 2 INJECTION, SOLUTION INTRAVENOUS at 13:25

## 2024-02-18 RX ADMIN — Medication 1 APPLICATION: at 08:48

## 2024-02-18 RX ADMIN — FAMOTIDINE 40 MG: 20 TABLET ORAL at 08:47

## 2024-02-18 RX ADMIN — SODIUM CHLORIDE 75 ML/HR: 9 INJECTION, SOLUTION INTRAVENOUS at 20:38

## 2024-02-18 RX ADMIN — METOPROLOL SUCCINATE 25 MG: 25 TABLET, EXTENDED RELEASE ORAL at 08:47

## 2024-02-18 RX ADMIN — PAROXETINE 40 MG: 20 TABLET, FILM COATED ORAL at 08:47

## 2024-02-18 RX ADMIN — LORATADINE 10 MG: 10 TABLET ORAL at 08:47

## 2024-02-18 RX ADMIN — CEFAZOLIN SODIUM 2 G: 2 INJECTION, SOLUTION INTRAVENOUS at 05:27

## 2024-02-18 RX ADMIN — NICOTINE 1 PATCH: 21 PATCH, EXTENDED RELEASE TRANSDERMAL at 08:47

## 2024-02-18 RX ADMIN — ARIPIPRAZOLE 15 MG: 5 TABLET ORAL at 20:21

## 2024-02-18 RX ADMIN — ONDANSETRON 4 MG: 2 INJECTION INTRAMUSCULAR; INTRAVENOUS at 21:53

## 2024-02-18 RX ADMIN — Medication 400 MG: at 08:47

## 2024-02-18 RX ADMIN — ACETAMINOPHEN 975 MG: 325 TABLET ORAL at 20:30

## 2024-02-18 RX ADMIN — ACETAMINOPHEN 975 MG: 325 TABLET ORAL at 05:27

## 2024-02-18 RX ADMIN — TRAZODONE HYDROCHLORIDE 100 MG: 50 TABLET ORAL at 20:21

## 2024-02-18 RX ADMIN — FERROUS SULFATE TAB 325 MG (65 MG ELEMENTAL FE) 1 TABLET: 325 (65 FE) TAB at 05:27

## 2024-02-18 RX ADMIN — SIMVASTATIN 20 MG: 20 TABLET, FILM COATED ORAL at 20:21

## 2024-02-18 ASSESSMENT — COGNITIVE AND FUNCTIONAL STATUS - GENERAL
MOVING TO AND FROM BED TO CHAIR: A LOT
DRESSING REGULAR UPPER BODY CLOTHING: A LITTLE
DRESSING REGULAR UPPER BODY CLOTHING: A LITTLE
DAILY ACTIVITIY SCORE: 15
EATING MEALS: A LITTLE
DRESSING REGULAR UPPER BODY CLOTHING: A LITTLE
MOVING FROM LYING ON BACK TO SITTING ON SIDE OF FLAT BED WITH BEDRAILS: A LITTLE
PERSONAL GROOMING: A LITTLE
DAILY ACTIVITIY SCORE: 16
DRESSING REGULAR LOWER BODY CLOTHING: A LOT
TOILETING: A LOT
TOILETING: A LOT
CLIMB 3 TO 5 STEPS WITH RAILING: TOTAL
TURNING FROM BACK TO SIDE WHILE IN FLAT BAD: A LOT
HELP NEEDED FOR BATHING: A LOT
DRESSING REGULAR LOWER BODY CLOTHING: A LOT
STANDING UP FROM CHAIR USING ARMS: TOTAL
EATING MEALS: A LITTLE
DAILY ACTIVITIY SCORE: 15
STANDING UP FROM CHAIR USING ARMS: TOTAL
CLIMB 3 TO 5 STEPS WITH RAILING: TOTAL
TURNING FROM BACK TO SIDE WHILE IN FLAT BAD: A LOT
MOBILITY SCORE: 10
MOBILITY SCORE: 10
MOVING FROM LYING ON BACK TO SITTING ON SIDE OF FLAT BED WITH BEDRAILS: A LITTLE
WALKING IN HOSPITAL ROOM: TOTAL
PERSONAL GROOMING: A LITTLE
WALKING IN HOSPITAL ROOM: TOTAL
TOILETING: A LOT
MOVING TO AND FROM BED TO CHAIR: A LOT
PERSONAL GROOMING: A LITTLE
HELP NEEDED FOR BATHING: A LOT
HELP NEEDED FOR BATHING: A LOT
DRESSING REGULAR LOWER BODY CLOTHING: A LOT

## 2024-02-18 ASSESSMENT — PAIN SCALES - GENERAL
PAINLEVEL_OUTOF10: 0 - NO PAIN
PAINLEVEL_OUTOF10: 2
PAINLEVEL_OUTOF10: 0 - NO PAIN

## 2024-02-18 ASSESSMENT — PAIN - FUNCTIONAL ASSESSMENT
PAIN_FUNCTIONAL_ASSESSMENT: 0-10
PAIN_FUNCTIONAL_ASSESSMENT: 0-10

## 2024-02-18 NOTE — PROGRESS NOTES
Occupational Therapy    OT Treatment    Patient Name: Blanca Hamilton  MRN: [id number]  Today's Date: 2/18/2024  Time Calculation  Start Time: 1600  Stop Time: 1617  Time Calculation (min): 17 min         Assessment:IP OT Assessment  OT Assessment: Pt presents with decreased endurance, decreased ADL, decreased functional mobility. Continued skilled OT recommended to maximize pt safety and indepedence in order to return to PLOF.  Prognosis: Good  Barriers to Discharge: None        Plan:  Treatment Interventions: ADL retraining, Functional transfer training, UE strengthening/ROM, Endurance training  OT Frequency: 3 times per week  OT Discharge Recommendations: Moderate intensity level of continued care  OT Recommended Transfer Status: Assist of 1  OT - OK to Discharge: Yes (per OT POC)  Treatment Interventions: ADL retraining, Functional transfer training, UE strengthening/ROM, Endurance training    Subjective   Previous Visit Info:  OT Last Visit  OT Received On: 02/18/24  General:  General  General Comment: Chart review completed prior to session. Pt supine with hob elevated with call button and all needs within reach at exit. Pt ed/agreeable to call for assistance for all oob tasks  Precautions:  Precautions Comment: Per OT Eval: Posterior Hip, TTWB (Pt demo'ing G recall for precautions)  Pain:Pt reporting pain well managed       Objective       Activities of Daily Living: LE Dressing  LE Dressing: Yes  LE Dressing Adaptive Equipment: Reacher  Pants Level of Assistance: Moderate assistance  LE Dressing Where Assessed: Edge of bed  LE Dressing Comments: Pt up eob demo'ing G unsupported stand bal  Functional Standing Tolerance:     Bed Mobility/Transfers: Bed Mobility  Bed Mobility: Yes  Bed Mobility 1  Bed Mobility 1: Supine to sitting, Sitting to supine  Level of Assistance 1: Moderate tactile cues (Pt transferring supine to eob with mod a and eob to supine with sba)  Outcome Measures:[facility] Daily Activity  Putting on and  taking off regular lower body clothing: A lot  Bathing (including washing, rinsing, drying): A lot  Putting on and taking off regular upper body clothing: A little  Toileting, which includes using toilet, bedpan or urinal: A lot  Taking care of personal grooming such as brushing teeth: A little  Eating Meals: A little  Daily Activity - Total Score: 15            OP EDUCATION:       Goals:  Encounter Problems       Encounter Problems (Active)       OT Goals       Pt will increase endurance to tolerate 15min of OOB activity with no more than 1 rest break in order to increase ability to engage in ADL completion.  (Progressing)       Start:  02/15/24    Expected End:  02/29/24            Pt will demo and/or verbalize 2-3 energy conservation techniques to incorporate into functional mobility or ADL to improve performance and increase independence.  (Progressing)       Start:  02/15/24    Expected End:  02/29/24            Pt will demo increased functional mobility/pivot transfer independence to tolerate tasks necessary to complete ADL routine.  (Progressing)       Start:  02/15/24    Expected End:  02/29/24            Pt will demo ADL routine and meaningful daily activities using modifications as needed  (Progressing)       Start:  02/15/24    Expected End:  02/29/24

## 2024-02-18 NOTE — PROGRESS NOTES
Blanca Hamilton is a 48 y.o. female on day 4 of admission presenting with Wound dehiscence.    Subjective   Interval History: no fever, no new complaints        Review of Systems    Objective   Range of Vitals (last 24 hours)  Heart Rate:  [83-98]   Temp:  [36.2 °C (97.2 °F)-37.1 °C (98.8 °F)]   Resp:  [16-20]   BP: (114-131)/(75-85)   SpO2:  [97 %-99 %]   Daily Weight  02/14/24 : 89.4 kg (197 lb 1.6 oz)    Body mass index is 29.11 kg/m².    Physical Exam  Constitutional:       Appearance: Normal appearance.   HENT:      Head: Normocephalic and atraumatic.      Mouth/Throat:      Mouth: Mucous membranes are moist.      Pharynx: Oropharynx is clear.   Eyes:      Pupils: Pupils are equal, round, and reactive to light.   Cardiovascular:      Rate and Rhythm: Normal rate and regular rhythm.      Heart sounds: Normal heart sounds.   Pulmonary:      Effort: Pulmonary effort is normal.      Breath sounds: Normal breath sounds.   Abdominal:      General: Abdomen is flat. Bowel sounds are normal.      Palpations: Abdomen is soft.   Musculoskeletal:      Cervical back: Normal range of motion.   Neurological:      Mental Status: She is alert.         Antibiotics  sodium chloride 0.9 % bolus 3,120 mL  acetaminophen (Tylenol) tablet 975 mg  ARIPiprazole (Abilify) tablet 15 mg  aspirin chewable tablet 81 mg  ceFAZolin in dextrose (iso-os) (Ancef) IVPB 2 g  famotidine (Pepcid) tablet 40 mg  ferrous sulfate (325 mg ferrous sulfate) tablet 1 tablet  lactulose 20 gram/30 mL oral solution 20 g  lisinopril tablet 10 mg  loratadine (Claritin) tablet 10 mg  magnesium oxide (Mag-Ox) tablet 400 mg  metoprolol succinate XL (Toprol-XL) 24 hr tablet 25 mg  montelukast (Singulair) tablet 10 mg  nicotine (Nicoderm CQ) 21 mg/24 hr patch 1 patch  PARoxetine (Paxil) tablet 40 mg  polyethylene glycol (Glycolax, Miralax) packet 17 g  simvastatin (Zocor) tablet 20 mg  traZODone (Desyrel) tablet 100 mg  zinc oxide 40 % ointment 1  Application  piperacillin-tazobactam-dextrose (Zosyn) IV 3.375 g  sodium chloride 0.9% infusion  oxyCODONE (Roxicodone) immediate release tablet 5 mg  oxyCODONE (Roxicodone) immediate release tablet 10 mg  HYDROmorphone (Dilaudid) injection 0.4 mg  vancomycin (Vancocin) in dextrose 5 % water (D5W) 500 mL IV 1,500 mg  atorvastatin (Lipitor) tablet    ceFAZolin in dextrose (iso-os) (Ancef) IVPB 1 g  enoxaparin (Lovenox) syringe 40 mg  ceFAZolin in dextrose (iso-os) (Ancef) IVPB 2 g  ondansetron (Zofran) injection 4 mg      Relevant Results  Labs  Results from last 72 hours   Lab Units 02/18/24  0534 02/17/24  0518 02/16/24  0508   WBC AUTO x10*3/uL 5.5 5.9 6.4   HEMOGLOBIN g/dL 8.6* 7.8* 7.6*   HEMATOCRIT % 27.4* 25.2* 25.2*   PLATELETS AUTO x10*3/uL 361 344 347       Results from last 72 hours   Lab Units 02/18/24  0534 02/17/24  0518 02/16/24  0508   SODIUM mmol/L 132* 134* 135*   POTASSIUM mmol/L 4.2 4.2 4.0   CHLORIDE mmol/L 103 104 103   CO2 mmol/L 26 27 28   BUN mg/dL 4* 4* 3*   CREATININE mg/dL 0.26* 0.29* 0.30*   GLUCOSE mg/dL 89 77 88   CALCIUM mg/dL 7.9* 7.6* 7.5*   ANION GAP mmol/L 7* 7* 8*   EGFR mL/min/1.73m*2 >90 >90 >90   PHOSPHORUS mg/dL  --  3.3 2.9       Results from last 72 hours   Lab Units 02/18/24  0534 02/17/24  0518 02/16/24  0508   ALK PHOS U/L 108 93 89   BILIRUBIN TOTAL mg/dL 0.2 0.2 0.2   PROTEIN TOTAL g/dL 5.3* 4.9* 4.9*   ALT U/L 3* 3* 3*   AST U/L 7* 7* 7*   ALBUMIN g/dL 2.7* 2.4* 2.4*       Estimated Creatinine Clearance: 125 mL/min (A) (by C-G formula based on SCr of 0.26 mg/dL (L)).  C-Reactive Protein   Date Value Ref Range Status   02/18/2024 1.13 (H) <1.00 mg/dL Final   02/17/2024 1.38 (H) <1.00 mg/dL Final   02/16/2024 2.39 (H) <1.00 mg/dL Final     Microbiology  Susceptibility data from last 14 days.  Collected Specimen Info Organism Clindamycin Erythromycin Oxacillin Tetracycline Trimethoprim/Sulfamethoxazole Vancomycin   02/06/24 Swab from HIP ARTHROPLASTY RIGHT Methicillin  Susceptible Staphylococcus aureus (MSSA) S S S R S S   02/06/24 Swab from HIP ARTHROPLASTY RIGHT Staphylococcus aureus         02/06/24 Swab from HIP ARTHROPLASTY RIGHT Staphylococcus aureus             Imaging  reviwed        Assessment/Plan      Right hip prosthesis infection sp I&D with partial hardware exchange, MSSA on the cultures    Recommendations :  Continue Cefazolin    I spent minutes in the professional and overall care of this patient.      Frank Modi MD

## 2024-02-18 NOTE — PROGRESS NOTES
Patient: Blanca Hamilton Age: 48 y.o.   Gender: female Room/bed: 252/252-B     Attending: Brando Mascorro DO  Code Status:  Full Code    Overnight Events     None    Subjective   Seen and examined at the bedside resting comfortably.  She continues to deny any pain.  Mentation at baseline.  Eating well and having bowel movements.    Denies chest pain/pressure, abdominal pain, nausea, vomiting, fever, chills, headaches.     Objective    Physical Exam  Constitutional:       General: She is not in acute distress.     Appearance: She is not ill-appearing.   HENT:      Head: Normocephalic and atraumatic.   Cardiovascular:      Rate and Rhythm: Normal rate and regular rhythm.      Pulses: Normal pulses.      Heart sounds: Normal heart sounds.   Pulmonary:      Effort: Pulmonary effort is normal. No respiratory distress.      Breath sounds: No stridor.   Abdominal:      General: Abdomen is flat. Bowel sounds are normal. There is no distension.      Palpations: Abdomen is soft. There is no mass.      Tenderness: There is no abdominal tenderness.   Musculoskeletal:         General: Tenderness and deformity present.      Comments: Right lateral hip surgical site with wound vac attached.  Draining serosanguineous output with 400 cc and collecting system at time of check.  Good seal and without drainage or seeping.   Skin:     Findings: Bruising present.   Neurological:      General: No focal deficit present.      Mental Status: She is alert and oriented to person, place, and time. Mental status is at baseline.   Psychiatric:         Mood and Affect: Mood normal.         Behavior: Behavior normal.        Temp:  [36.2 °C (97.2 °F)-37.1 °C (98.8 °F)] 36.5 °C (97.7 °F)  Heart Rate:  [83-88] 83  Resp:  [18-20] 19  BP: (122-131)/(75-85) 122/76    Intake/Output Summary (Last 24 hours) at 2/18/2024 1616  Last data filed at 2/18/2024 1355  Gross per 24 hour   Intake 4544.75 ml   Output 1525 ml   Net 3019.75 ml       Vitals:    02/14/24 0227    Weight: 89.4 kg (197 lb 1.6 oz)       I/Os    Intake/Output Summary (Last 24 hours) at 2/18/2024 1616  Last data filed at 2/18/2024 1355  Gross per 24 hour   Intake 4544.75 ml   Output 1525 ml   Net 3019.75 ml       Labs:   Results from last 72 hours   Lab Units 02/18/24  0534 02/17/24  0518 02/16/24  0508   SODIUM mmol/L 132* 134* 135*   POTASSIUM mmol/L 4.2 4.2 4.0   CHLORIDE mmol/L 103 104 103   CO2 mmol/L 26 27 28   BUN mg/dL 4* 4* 3*   CREATININE mg/dL 0.26* 0.29* 0.30*   GLUCOSE mg/dL 89 77 88   CALCIUM mg/dL 7.9* 7.6* 7.5*   ANION GAP mmol/L 7* 7* 8*   EGFR mL/min/1.73m*2 >90 >90 >90   PHOSPHORUS mg/dL  --  3.3 2.9        Results from last 72 hours   Lab Units 02/18/24  0534 02/17/24  0518 02/16/24  0508   WBC AUTO x10*3/uL 5.5 5.9 6.4   HEMOGLOBIN g/dL 8.6* 7.8* 7.6*   HEMATOCRIT % 27.4* 25.2* 25.2*   PLATELETS AUTO x10*3/uL 361 344 347        Lab Results   Component Value Date    CALCIUM 7.9 (L) 02/18/2024    PHOS 3.3 02/17/2024      Lab Results   Component Value Date    CRP 1.13 (H) 02/18/2024      [unfilled]     Micro/ID:   Susceptibility data from last 90 days.  Collected Specimen Info Organism Clindamycin Erythromycin Oxacillin Tetracycline Trimethoprim/Sulfamethoxazole Vancomycin   02/06/24 Swab from HIP ARTHROPLASTY RIGHT Methicillin Susceptible Staphylococcus aureus (MSSA) S S S R S S   02/06/24 Swab from HIP ARTHROPLASTY RIGHT Staphylococcus aureus         02/06/24 Swab from HIP ARTHROPLASTY RIGHT Staphylococcus aureus         02/02/24 Tissue/Biopsy from Wound/Tissue Methicillin Susceptible Staphylococcus aureus (MSSA) S S S R S S       Lab Results   Component Value Date    BLOODCULT No growth at 4 days -  FINAL REPORT 02/14/2024     Images:  Lower extremity venous duplex bilateral            [facility] [place] [address]   Tel [phone] and Fax [phone]       Vascular Lab Report  VASC US LOWER EXTREMITY VENOUS DUPLEX BILATERAL       Patient Name:       ZULEIKA KAMARA          Reading Physician:  [id number] Prieto Loza MD  Study Date:        2/14/2024            Ordering Physician: [id number] GEORGE AMADOR  MRN/PID:           [id number]             Technologist:       Rosa Pierre [facility]  Accession#:        [id number]         Technologist 2:     Hattie Raymond  Date of Birth/Age: 1975 / 48      Encounter#:         [id number]                     years  Gender:            F  Admission Status:  Inpatient            Location Performed: [facility]       Diagnosis/ICD: Localized (leg) edema-R60.0  CPT Codes:     49946 Peripheral venous duplex scan for DVT complete       CONCLUSIONS:  Right Lower Venous: No evidence of acute deep vein thrombus visualized in the right lower extremity.  Left Lower Venous: No evidence of acute deep vein thrombus visualized in the left lower extremity.     Additional Findings:  Technically difficult study due to patient movement and inability to position.       Imaging & Doppler Findings:     Right                 Compressible Thrombus        Flow  Distal External Iliac     Yes        None   Spontaneous/Phasic  CFV                       Yes        None   Spontaneous/Phasic  PFV                       Yes        None  FV Proximal               Yes        None   Spontaneous/Phasic  FV Mid                    Yes        None  FV Distal                 Yes        None  Popliteal                 Yes        None   Spontaneous/Phasic  Peroneal                  Yes        None  PTV                       Yes        None       Left                  Compress Thrombus        Flow  Distal External Iliac   Yes      None   Spontaneous/Phasic  CFV                     Yes      None   Spontaneous/Phasic  PFV                     Yes      None  FV Proximal             Yes      None   Spontaneous/Phasic  FV Mid                  Yes       None  FV Distal               Yes      None  Popliteal               Yes      None   Spontaneous/Phasic  Peroneal                Yes      None  PTV                     Yes      None       [id number] Prieto Loza MD  Electronically signed by [id number] Prieto Loza MD on 2/15/2024 at 5:44:38 PM       ** Final **     Meds    Scheduled medications  acetaminophen, 975 mg, oral, q8h  ARIPiprazole, 15 mg, oral, Nightly  [Held by provider] aspirin, 81 mg, oral, BID  ceFAZolin, 2 g, intravenous, q8h  enoxaparin, 40 mg, subcutaneous, q24h  famotidine, 40 mg, oral, BID  ferrous sulfate (325 mg ferrous sulfate), 1 tablet, oral, Daily  [Held by provider] lisinopril, 10 mg, oral, q AM  loratadine, 10 mg, oral, Daily  magnesium oxide, 400 mg, oral, Daily  metoprolol succinate XL, 25 mg, oral, Daily  montelukast, 10 mg, oral, q AM  nicotine, 1 patch, transdermal, Daily  PARoxetine, 40 mg, oral, Daily  polyethylene glycol, 17 g, oral, Daily  simvastatin, 20 mg, oral, Nightly  traZODone, 100 mg, oral, Nightly  zinc oxide, 1 Application, Topical, BID      Continuous medications  sodium chloride 0.9%, 75 mL/hr, Last Rate: 75 mL/hr (02/18/24 1230)      PRN medications  PRN medications: HYDROmorphone, ondansetron, oxyCODONE, oxyCODONE     Assessment and Plan    Blanca Hamilton is a 48 y.o. 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 Initially met Sepsis criteria. Admitted for further management of dehiscence.     Acute Medical Issues:  #Sepsis secondary to reccurent post op wound infection (MSSA) of right hip arthroplasty   #Hx right total hip arthroplasty w/ revision (1/5/24) for prosthetic fx  #Hx hardware removal for prosthetic joint infection (2/6/24)  -Discharged on 2/11 with wound vac + PICC line for 6-week course of cefazolin   -Previous tissue/Wound Culture (2/6): MSSA, only resistant to Tetracycline   -2/13/24: 2/4 SIRS criteria  on admission. S/p sepsis fluids in ED. Lactate 0.7. Hgb stable.  -pain control: scheduled tylenol, oxy 5 mod, oxy 10 severe, dilaudid 0.4 PRN breakthrough  -Weightbearing right lower extremity for tfx only   -Wound care consulted: Wound vac in place and set to continuous  -Ortho consulted: Planning for possible return trip to the OR on 2/20 pending wound care and repeat evaluation  -ID consulted, rec'd continue Lovenox 40 daily  -Blood Cx no growth, final  -ESR and CRP downtrending  -Continue to trend CRP and Pro-Leonardo  -Continue IV Ancef 2 g 3 times daily (2/15- )    #Hyponatremia  - Na 129 on admission  -Likely related to poor p.o. intake  - Nutrition consult, recommend encouraging p.o. intake and ensure supplements    #B/l LE edema - chronic  -Bilateral duplex negative for acute DVT bilaterally  -Encourage bilateral leg elevation as tolerated    Chronic Medical Issues:  # GERD: c/w Pepcid   # HTN: c/w Metoprolol restarted, hold home lisinopril in setting of hypotension  # HLD: c/w Simvastatin  # Depression: c/w Abilify + Paroxetine +Trazodone  # Chronic anemia: iron supplement  # Tobacco use: nicotine patch    Fluids: None  Electrolytes: replete as needed  Nutrition: Regular   GI PPX: None   DVT PPX: Lovenox     Access: PICC Line, PIV's  Antibiotics: Cefazolin 2g TID (2/15 - )  Oxygenation: Room Air    Dispo: Admitted onto the floor for postoperative wound dehiscence and surgical site infection.  Plan for OR on 2/20 with Ortho, followed by placement.     Barry Bledsoe, DO   PGY 1

## 2024-02-19 ENCOUNTER — ANESTHESIA EVENT (OUTPATIENT)
Dept: OPERATING ROOM | Facility: HOSPITAL | Age: 49
DRG: 464 | End: 2024-02-19
Payer: COMMERCIAL

## 2024-02-19 LAB
ABO GROUP (TYPE) IN BLOOD: NORMAL
ALBUMIN SERPL BCP-MCNC: 2.6 G/DL (ref 3.4–5)
ALP SERPL-CCNC: 108 U/L (ref 33–110)
ALT SERPL W P-5'-P-CCNC: 3 U/L (ref 7–45)
ANION GAP SERPL CALC-SCNC: 9 MMOL/L (ref 10–20)
ANTIBODY SCREEN: NORMAL
AST SERPL W P-5'-P-CCNC: 7 U/L (ref 9–39)
BILIRUB SERPL-MCNC: 0.2 MG/DL (ref 0–1.2)
BUN SERPL-MCNC: 4 MG/DL (ref 6–23)
CALCIUM SERPL-MCNC: 7.8 MG/DL (ref 8.6–10.3)
CHLORIDE SERPL-SCNC: 102 MMOL/L (ref 98–107)
CO2 SERPL-SCNC: 26 MMOL/L (ref 21–32)
CREAT SERPL-MCNC: 0.23 MG/DL (ref 0.5–1.05)
CRP SERPL-MCNC: 0.68 MG/DL
EGFRCR SERPLBLD CKD-EPI 2021: >90 ML/MIN/1.73M*2
ERYTHROCYTE [DISTWIDTH] IN BLOOD BY AUTOMATED COUNT: 18.7 % (ref 11.5–14.5)
GLUCOSE SERPL-MCNC: 82 MG/DL (ref 74–99)
HCT VFR BLD AUTO: 27.9 % (ref 36–46)
HGB BLD-MCNC: 8.7 G/DL (ref 12–16)
MCH RBC QN AUTO: 28.4 PG (ref 26–34)
MCHC RBC AUTO-ENTMCNC: 31.2 G/DL (ref 32–36)
MCV RBC AUTO: 91 FL (ref 80–100)
NRBC BLD-RTO: 0 /100 WBCS (ref 0–0)
PHOSPHATE SERPL-MCNC: 2.9 MG/DL (ref 2.5–4.9)
PLATELET # BLD AUTO: 355 X10*3/UL (ref 150–450)
POTASSIUM SERPL-SCNC: 4.3 MMOL/L (ref 3.5–5.3)
PROCALCITONIN SERPL-MCNC: 0.03 NG/ML
PROCALCITONIN SERPL-MCNC: 0.04 NG/ML
PROT SERPL-MCNC: 5.2 G/DL (ref 6.4–8.2)
RBC # BLD AUTO: 3.06 X10*6/UL (ref 4–5.2)
RH FACTOR (ANTIGEN D): NORMAL
SODIUM SERPL-SCNC: 133 MMOL/L (ref 136–145)
WBC # BLD AUTO: 6.8 X10*3/UL (ref 4.4–11.3)

## 2024-02-19 PROCEDURE — 2500000004 HC RX 250 GENERAL PHARMACY W/ HCPCS (ALT 636 FOR OP/ED)

## 2024-02-19 PROCEDURE — 2500000001 HC RX 250 WO HCPCS SELF ADMINISTERED DRUGS (ALT 637 FOR MEDICARE OP)

## 2024-02-19 PROCEDURE — 2500000002 HC RX 250 W HCPCS SELF ADMINISTERED DRUGS (ALT 637 FOR MEDICARE OP, ALT 636 FOR OP/ED)

## 2024-02-19 PROCEDURE — 84100 ASSAY OF PHOSPHORUS: CPT

## 2024-02-19 PROCEDURE — 86140 C-REACTIVE PROTEIN: CPT

## 2024-02-19 PROCEDURE — 99232 SBSQ HOSP IP/OBS MODERATE 35: CPT

## 2024-02-19 PROCEDURE — S4991 NICOTINE PATCH NONLEGEND: HCPCS

## 2024-02-19 PROCEDURE — 85027 COMPLETE CBC AUTOMATED: CPT

## 2024-02-19 PROCEDURE — 1200000002 HC GENERAL ROOM WITH TELEMETRY DAILY

## 2024-02-19 PROCEDURE — 86920 COMPATIBILITY TEST SPIN: CPT

## 2024-02-19 PROCEDURE — 86901 BLOOD TYPING SEROLOGIC RH(D): CPT | Performed by: NURSE PRACTITIONER

## 2024-02-19 PROCEDURE — 84145 PROCALCITONIN (PCT): CPT | Mod: GEALAB

## 2024-02-19 PROCEDURE — 80053 COMPREHEN METABOLIC PANEL: CPT

## 2024-02-19 RX ADMIN — NICOTINE 1 PATCH: 21 PATCH, EXTENDED RELEASE TRANSDERMAL at 08:30

## 2024-02-19 RX ADMIN — PAROXETINE 40 MG: 20 TABLET, FILM COATED ORAL at 08:23

## 2024-02-19 RX ADMIN — TRAZODONE HYDROCHLORIDE 100 MG: 50 TABLET ORAL at 20:31

## 2024-02-19 RX ADMIN — ACETAMINOPHEN 975 MG: 325 TABLET ORAL at 20:30

## 2024-02-19 RX ADMIN — ACETAMINOPHEN 975 MG: 325 TABLET ORAL at 12:45

## 2024-02-19 RX ADMIN — FERROUS SULFATE TAB 325 MG (65 MG ELEMENTAL FE) 1 TABLET: 325 (65 FE) TAB at 05:19

## 2024-02-19 RX ADMIN — LORATADINE 10 MG: 10 TABLET ORAL at 08:25

## 2024-02-19 RX ADMIN — CEFAZOLIN SODIUM 2 G: 2 INJECTION, SOLUTION INTRAVENOUS at 20:31

## 2024-02-19 RX ADMIN — CEFAZOLIN SODIUM 2 G: 2 INJECTION, SOLUTION INTRAVENOUS at 05:19

## 2024-02-19 RX ADMIN — ENOXAPARIN SODIUM 40 MG: 40 INJECTION SUBCUTANEOUS at 15:07

## 2024-02-19 RX ADMIN — SODIUM CHLORIDE 75 ML/HR: 9 INJECTION, SOLUTION INTRAVENOUS at 08:39

## 2024-02-19 RX ADMIN — MONTELUKAST 10 MG: 10 TABLET, FILM COATED ORAL at 08:24

## 2024-02-19 RX ADMIN — CEFAZOLIN SODIUM 2 G: 2 INJECTION, SOLUTION INTRAVENOUS at 15:07

## 2024-02-19 RX ADMIN — ACETAMINOPHEN 975 MG: 325 TABLET ORAL at 05:19

## 2024-02-19 RX ADMIN — FAMOTIDINE 40 MG: 20 TABLET ORAL at 08:24

## 2024-02-19 RX ADMIN — Medication 400 MG: at 08:24

## 2024-02-19 RX ADMIN — ARIPIPRAZOLE 15 MG: 5 TABLET ORAL at 20:31

## 2024-02-19 RX ADMIN — Medication 1 APPLICATION: at 21:00

## 2024-02-19 RX ADMIN — METOPROLOL SUCCINATE 25 MG: 25 TABLET, EXTENDED RELEASE ORAL at 08:24

## 2024-02-19 RX ADMIN — FAMOTIDINE 40 MG: 20 TABLET ORAL at 20:31

## 2024-02-19 RX ADMIN — SIMVASTATIN 20 MG: 20 TABLET, FILM COATED ORAL at 20:31

## 2024-02-19 ASSESSMENT — COGNITIVE AND FUNCTIONAL STATUS - GENERAL
DAILY ACTIVITIY SCORE: 15
DRESSING REGULAR UPPER BODY CLOTHING: A LITTLE
DRESSING REGULAR LOWER BODY CLOTHING: A LOT
MOVING TO AND FROM BED TO CHAIR: A LOT
MOBILITY SCORE: 10
DAILY ACTIVITIY SCORE: 15
WALKING IN HOSPITAL ROOM: TOTAL
PERSONAL GROOMING: A LITTLE
DRESSING REGULAR UPPER BODY CLOTHING: A LITTLE
TURNING FROM BACK TO SIDE WHILE IN FLAT BAD: A LOT
MOBILITY SCORE: 10
MOVING TO AND FROM BED TO CHAIR: A LOT
STANDING UP FROM CHAIR USING ARMS: TOTAL
WALKING IN HOSPITAL ROOM: TOTAL
DRESSING REGULAR LOWER BODY CLOTHING: A LOT
EATING MEALS: A LITTLE
TOILETING: A LOT
TOILETING: A LOT
PERSONAL GROOMING: A LITTLE
STANDING UP FROM CHAIR USING ARMS: TOTAL
HELP NEEDED FOR BATHING: A LOT
MOVING FROM LYING ON BACK TO SITTING ON SIDE OF FLAT BED WITH BEDRAILS: A LITTLE
CLIMB 3 TO 5 STEPS WITH RAILING: TOTAL
HELP NEEDED FOR BATHING: A LOT
TURNING FROM BACK TO SIDE WHILE IN FLAT BAD: A LOT
MOVING FROM LYING ON BACK TO SITTING ON SIDE OF FLAT BED WITH BEDRAILS: A LITTLE
CLIMB 3 TO 5 STEPS WITH RAILING: TOTAL
EATING MEALS: A LITTLE

## 2024-02-19 ASSESSMENT — PAIN DESCRIPTION - DESCRIPTORS: DESCRIPTORS: ACHING

## 2024-02-19 ASSESSMENT — PAIN SCALES - GENERAL
PAINLEVEL_OUTOF10: 0 - NO PAIN
PAINLEVEL_OUTOF10: 0 - NO PAIN

## 2024-02-19 ASSESSMENT — PAIN - FUNCTIONAL ASSESSMENT
PAIN_FUNCTIONAL_ASSESSMENT: 0-10
PAIN_FUNCTIONAL_ASSESSMENT: 0-10

## 2024-02-19 NOTE — PROGRESS NOTES
Physical Therapy                 Therapy Communication Note    Patient Name: Blanca Hamilton  MRN: [id number]  Today's Date: 2/19/2024     Discipline: Physical Therapy    Missed Visit Reason: Missed Visit Reason: Patient refused (Patient states she has been vomitting since lunch, emesis bag at bedside. Patient requesting not to participate in therapy at this time, RN made aware.)    Missed Time: Attempt

## 2024-02-19 NOTE — PROGRESS NOTES
Blanca Hamilton is a 48 y.o. female on day 5 of admission presenting with Wound dehiscence.    Subjective     Seen and examined today resting in bed comfortably.  Denies acute pain today, mentation at baseline.  Tolerating PO intake & regular bowel movements over the weekend  Objective   Range of Vitals (last 24 hours)  Heart Rate:  [83-89]   Temp:  [36.2 °C (97.2 °F)-36.9 °C (98.5 °F)]   Resp:  [16-19]   BP: (122-150)/(76-96)   SpO2:  [95 %-98 %]   Daily Weight  02/14/24 : 89.4 kg (197 lb 1.6 oz)    Body mass index is 29.11 kg/m².    Physical Exam  Constitutional:       Appearance: Normal appearance.   HENT:      Head: Normocephalic and atraumatic.      Mouth/Throat:      Mouth: Mucous membranes are moist.      Pharynx: Oropharynx is clear.   Eyes:      Pupils: Pupils are equal, round, and reactive to light.   Cardiovascular:      Rate and Rhythm: Normal rate and regular rhythm.      Heart sounds: Normal heart sounds.   Pulmonary:      Effort: Pulmonary effort is normal.      Breath sounds: Normal breath sounds.   Abdominal:      General: Abdomen is flat. Bowel sounds are normal.      Palpations: Abdomen is soft.   Musculoskeletal:      Cervical back: Normal range of motion.   Neurological:      Mental Status: She is alert.         Antibiotics  sodium chloride 0.9 % bolus 3,120 mL  acetaminophen (Tylenol) tablet 975 mg  ARIPiprazole (Abilify) tablet 15 mg  aspirin chewable tablet 81 mg  ceFAZolin in dextrose (iso-os) (Ancef) IVPB 2 g  famotidine (Pepcid) tablet 40 mg  ferrous sulfate (325 mg ferrous sulfate) tablet 1 tablet  lactulose 20 gram/30 mL oral solution 20 g  lisinopril tablet 10 mg  loratadine (Claritin) tablet 10 mg  magnesium oxide (Mag-Ox) tablet 400 mg  metoprolol succinate XL (Toprol-XL) 24 hr tablet 25 mg  montelukast (Singulair) tablet 10 mg  nicotine (Nicoderm CQ) 21 mg/24 hr patch 1 patch  PARoxetine (Paxil) tablet 40 mg  polyethylene glycol (Glycolax, Miralax) packet 17 g  simvastatin (Zocor) tablet  20 mg  traZODone (Desyrel) tablet 100 mg  zinc oxide 40 % ointment 1 Application  piperacillin-tazobactam-dextrose (Zosyn) IV 3.375 g  sodium chloride 0.9% infusion  oxyCODONE (Roxicodone) immediate release tablet 5 mg  oxyCODONE (Roxicodone) immediate release tablet 10 mg  HYDROmorphone (Dilaudid) injection 0.4 mg  vancomycin (Vancocin) in dextrose 5 % water (D5W) 500 mL IV 1,500 mg  atorvastatin (Lipitor) tablet    ceFAZolin in dextrose (iso-os) (Ancef) IVPB 1 g  enoxaparin (Lovenox) syringe 40 mg  ceFAZolin in dextrose (iso-os) (Ancef) IVPB 2 g  ondansetron (Zofran) injection 4 mg      Relevant Results  Labs  Results from last 72 hours   Lab Units 02/19/24 0527 02/18/24 0534 02/17/24  0518   WBC AUTO x10*3/uL 6.8 5.5 5.9   HEMOGLOBIN g/dL 8.7* 8.6* 7.8*   HEMATOCRIT % 27.9* 27.4* 25.2*   PLATELETS AUTO x10*3/uL 355 361 344       Results from last 72 hours   Lab Units 02/19/24 0527 02/18/24 0534 02/17/24  0518   SODIUM mmol/L 133* 132* 134*   POTASSIUM mmol/L 4.3 4.2 4.2   CHLORIDE mmol/L 102 103 104   CO2 mmol/L 26 26 27   BUN mg/dL 4* 4* 4*   CREATININE mg/dL 0.23* 0.26* 0.29*   GLUCOSE mg/dL 82 89 77   CALCIUM mg/dL 7.8* 7.9* 7.6*   ANION GAP mmol/L 9* 7* 7*   EGFR mL/min/1.73m*2 >90 >90 >90   PHOSPHORUS mg/dL 2.9  --  3.3       Results from last 72 hours   Lab Units 02/19/24 0527 02/18/24 0534 02/17/24  0518   ALK PHOS U/L 108 108 93   BILIRUBIN TOTAL mg/dL 0.2 0.2 0.2   PROTEIN TOTAL g/dL 5.2* 5.3* 4.9*   ALT U/L 3* 3* 3*   AST U/L 7* 7* 7*   ALBUMIN g/dL 2.6* 2.7* 2.4*       Estimated Creatinine Clearance: 125 mL/min (A) (by C-G formula based on SCr of 0.23 mg/dL (L)).  C-Reactive Protein   Date Value Ref Range Status   02/19/2024 0.68 <1.00 mg/dL Final   02/18/2024 1.13 (H) <1.00 mg/dL Final   02/17/2024 1.38 (H) <1.00 mg/dL Final     Microbiology  Susceptibility data from last 14 days.  Collected Specimen Info Organism Clindamycin Erythromycin Oxacillin Tetracycline Trimethoprim/Sulfamethoxazole  Vancomycin   02/06/24 Swab from HIP ARTHROPLASTY RIGHT Methicillin Susceptible Staphylococcus aureus (MSSA) S S S R S S   02/06/24 Swab from HIP ARTHROPLASTY RIGHT Staphylococcus aureus         02/06/24 Swab from HIP ARTHROPLASTY RIGHT Staphylococcus aureus             Imaging  reviwed        Assessment/Plan     Sepsis 2/2 Wound Dehiscence c/b R ALEXA infection s/p revision & hardware removal on 2/6  2.   Extensive fat necrosis / Hx of MSSA wound culture      Recommendations:     - Continue IV Ancef 2g TID (to complete total 6 week course)   - Wound Cx (2/6): MSSA +  - Blood Cx (2/14): Negative x2  - CRP levels down trending (7.15 -> 2.39 -> 1.13)  - Ortho following -> plan for OR Tues 2/20/23  - Wound care following     Thank you for the consult, will continue to follow. Please reach out with any questions/concerns.      Gamal Quiroz MD  Internal Medicine, PGY-3         Attending note : the patient was evaluated, the note was reviewed and up dated  Right hip prosthesis infection sp I&D with partial hardware exchange, MSSA on the cultures  Recommendations :  Continue Cefazolin        Frank Modi M.D

## 2024-02-19 NOTE — CARE PLAN
The patient's goals for the shift include      The clinical goals for the shift include have no pain this shift    Over the shift, the patient did not make progress toward the following goals. Barriers to progression include . Recommendations to address these barriers include use pain scale and medicate appropriatly.

## 2024-02-19 NOTE — PROGRESS NOTES
Patient: Blanca Hamilton Age: 48 y.o.   Gender: female Room/bed: 241/241-A     Attending: Brando Mascorro DO  Code Status:  Full Code    Overnight Events     None    Subjective   Seen and examined at the bedside resting comfortably.  Admits to pain at baseline, manageable with current regimen.  Mentation at baseline.  Eating well and having bowel movements.    Denies chest pain/pressure, abdominal pain, nausea, vomiting, fever, chills, headaches.     Revised Cardiac Risk Index  (from UpToDate with rates of adverse outcomes as seen below)     High-risk type of surgery: None  (examples include vascular surgery and any open intraperitoneal or intrathoracic procedures)     History of ischemic heart disease: None  (history of myocardial infarction or a positive exercise test, current complaint of chest pain considered to be secondary to myocardial ischemia, use of nitrate therapy, or ECG with pathological Q waves; do not count prior coronary revascularization procedure unless one of the other criteria for ischemic heart disease is present)     History of heart failure: None  History of cerebrovascular disease: None  Diabetes mellitus requiring treatment with insulin: None  Preoperative serum creatinine >2.0 mg/dL (177 micromol/L): None     Total Risk Factors: Cardiac risk factors, none     Rate of cardiac death, nonfatal myocardial infarction, and nonfatal cardiac arrest according to the number of predictors  No risk factors - 0.4% (95% CI: 0.1-0.8)  One risk factor - 1.0% (95% CI: 0.5-1.4)  Two risk factors - 2.4% (95% CI: 1.3-3.5)  Three or more risk factors - 5.4% (95% CI: 2.8-7.9)     Rate of myocardial infarction, pulmonary edema, ventricular fibrillation, primary cardiac arrest, and complete heart block  No risk factors - 0.5% (95% CI: 0.2-1.1)  One risk factor - 1.3% (95% CI: 0.7-2.1)  Two risk factors - 3.6% (95% CI: 2.1-5.6)  Three or more risk factors - 9.1% (95% CI: 5.5-13.8)     Pre-op Risk Assessment - based on the  RCRI as performed in the [facility], patient is minimal cardiac risk. Presents potential complex surgical case with recent history of revisions and surgical site infection. Patient without any recent cardiopulmonary symptoms and is medically optimized for surgery.      Objective    Physical Exam  Constitutional:       General: She is not in acute distress.     Appearance: She is not ill-appearing.   HENT:      Head: Normocephalic and atraumatic.   Cardiovascular:      Rate and Rhythm: Normal rate and regular rhythm.      Pulses: Normal pulses.      Heart sounds: Normal heart sounds.   Pulmonary:      Effort: Pulmonary effort is normal. No respiratory distress.      Breath sounds: No stridor.   Abdominal:      General: Abdomen is flat. Bowel sounds are normal. There is no distension.      Palpations: Abdomen is soft. There is no mass.      Tenderness: There is no abdominal tenderness.   Musculoskeletal:         General: Tenderness and deformity present.      Comments: Right lateral hip surgical site with wound vac attached.  Draining serosanguineous output with ~700 cc and collecting system at time of check.  Good seal and without drainage or seeping.   Skin:     Findings: Bruising present.   Neurological:      General: No focal deficit present.      Mental Status: She is alert and oriented to person, place, and time. Mental status is at baseline.   Psychiatric:         Mood and Affect: Mood normal.         Behavior: Behavior normal.        Temp:  [36.2 °C (97.2 °F)-37.2 °C (99 °F)] 37.2 °C (99 °F)  Heart Rate:  [83-89] 83  Resp:  [16-18] 18  BP: (127-150)/(79-96) 129/80    Intake/Output Summary (Last 24 hours) at 2/19/2024 1410  Last data filed at 2/19/2024 0911  Gross per 24 hour   Intake 1263.5 ml   Output 1000 ml   Net 263.5 ml       Vitals:    02/14/24 0227   Weight: 89.4 kg (197 lb 1.6 oz)       I/Os    Intake/Output Summary (Last 24 hours) at 2/19/2024 1410  Last data filed at 2/19/2024 0911  Gross per 24 hour    Intake 1263.5 ml   Output 1000 ml   Net 263.5 ml       Labs:   Results from last 72 hours   Lab Units 02/19/24  0527 02/18/24  0534 02/17/24  0518   SODIUM mmol/L 133* 132* 134*   POTASSIUM mmol/L 4.3 4.2 4.2   CHLORIDE mmol/L 102 103 104   CO2 mmol/L 26 26 27   BUN mg/dL 4* 4* 4*   CREATININE mg/dL 0.23* 0.26* 0.29*   GLUCOSE mg/dL 82 89 77   CALCIUM mg/dL 7.8* 7.9* 7.6*   ANION GAP mmol/L 9* 7* 7*   EGFR mL/min/1.73m*2 >90 >90 >90   PHOSPHORUS mg/dL 2.9  --  3.3        Results from last 72 hours   Lab Units 02/19/24 0527 02/18/24  0534 02/17/24 0518   WBC AUTO x10*3/uL 6.8 5.5 5.9   HEMOGLOBIN g/dL 8.7* 8.6* 7.8*   HEMATOCRIT % 27.9* 27.4* 25.2*   PLATELETS AUTO x10*3/uL 355 361 344        Lab Results   Component Value Date    CALCIUM 7.8 (L) 02/19/2024    PHOS 2.9 02/19/2024      Lab Results   Component Value Date    CRP 0.68 02/19/2024      [unfilled]     Micro/ID:   Susceptibility data from last 90 days.  Collected Specimen Info Organism Clindamycin Erythromycin Oxacillin Tetracycline Trimethoprim/Sulfamethoxazole Vancomycin   02/06/24 Swab from HIP ARTHROPLASTY RIGHT Methicillin Susceptible Staphylococcus aureus (MSSA) S S S R S S   02/06/24 Swab from HIP ARTHROPLASTY RIGHT Staphylococcus aureus         02/06/24 Swab from HIP ARTHROPLASTY RIGHT Staphylococcus aureus         02/02/24 Tissue/Biopsy from Wound/Tissue Methicillin Susceptible Staphylococcus aureus (MSSA) S S S R S S       Lab Results   Component Value Date    BLOODCULT No growth at 4 days -  FINAL REPORT 02/14/2024     Images:  Lower extremity venous duplex bilateral            [place] [address]   Tel [phone] and Fax [phone]       Vascular Lab Report  [facility] US LOWER EXTREMITY VENOUS DUPLEX BILATERAL       Patient Name:      ZULEIKA Mcnamara Physician:  [id number] Prieto Loza MD  Study Date:        2/14/2024             Ordering Physician: [id number] GEORGE AMADOR  MRN/PID:           [id number]             Technologist:       [facility]  Accession#:        [id number]         Technologist 2:     Hattie Raymond  Date of Birth/Age: 1975 / 48      Encounter#:         [id number]                     years  Gender:            F  Admission Status:  Inpatient            Location Performed: [facility]       Diagnosis/ICD: Localized (leg) edema-R60.0  CPT Codes:     77148 Peripheral venous duplex scan for DVT complete       CONCLUSIONS:  Right Lower Venous: No evidence of acute deep vein thrombus visualized in the right lower extremity.  Left Lower Venous: No evidence of acute deep vein thrombus visualized in the left lower extremity.     Additional Findings:  Technically difficult study due to patient movement and inability to position.       Imaging & Doppler Findings:     Right                 Compressible Thrombus        Flow  Distal External Iliac     Yes        None   Spontaneous/Phasic  CFV                       Yes        None   Spontaneous/Phasic  PFV                       Yes        None  FV Proximal               Yes        None   Spontaneous/Phasic  FV Mid                    Yes        None  FV Distal                 Yes        None  Popliteal                 Yes        None   Spontaneous/Phasic  Peroneal                  Yes        None  PTV                       Yes        None       Left                  Compress Thrombus        Flow  Distal External Iliac   Yes      None   Spontaneous/Phasic  CFV                     Yes      None   Spontaneous/Phasic  PFV                     Yes      None  FV Proximal             Yes      None   Spontaneous/Phasic  FV Mid                  Yes      None  FV Distal               Yes      None  Popliteal               Yes      None   Spontaneous/Phasic  Peroneal                Yes      None  PTV                      Yes      None       [id number] Prieto Loza MD  Electronically signed by [id number] Prieto Loza MD on 2/15/2024 at 5:44:38 PM       ** Final **     Meds    Scheduled medications  acetaminophen, 975 mg, oral, q8h  ARIPiprazole, 15 mg, oral, Nightly  [Held by provider] aspirin, 81 mg, oral, BID  ceFAZolin, 2 g, intravenous, q8h  enoxaparin, 40 mg, subcutaneous, q24h  famotidine, 40 mg, oral, BID  ferrous sulfate (325 mg ferrous sulfate), 1 tablet, oral, Daily  [Held by provider] lisinopril, 10 mg, oral, q AM  loratadine, 10 mg, oral, Daily  magnesium oxide, 400 mg, oral, Daily  metoprolol succinate XL, 25 mg, oral, Daily  montelukast, 10 mg, oral, q AM  nicotine, 1 patch, transdermal, Daily  PARoxetine, 40 mg, oral, Daily  polyethylene glycol, 17 g, oral, Daily  simvastatin, 20 mg, oral, Nightly  traZODone, 100 mg, oral, Nightly  zinc oxide, 1 Application, Topical, BID      Continuous medications  sodium chloride 0.9%, 75 mL/hr, Last Rate: 75 mL/hr (02/19/24 0839)      PRN medications  PRN medications: HYDROmorphone, ondansetron, oxyCODONE, oxyCODONE     Assessment and Plan    Blanca Hamilton is a 48 y.o. 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 Initially met Sepsis criteria. Admitted for further management of dehiscence.     Acute Medical Issues:  #Sepsis secondary to reccurent post op wound infection (MSSA) of right hip arthroplasty   #Hx right total hip arthroplasty w/ revision (1/5/24) for prosthetic fx  #Hx hardware removal for prosthetic joint infection (2/6/24)  -Discharged on 2/11 with wound vac + PICC line for 6-week course of cefazolin   -Previous tissue/Wound Culture (2/6): MSSA, only resistant to Tetracycline   -2/13/24: 2/4 SIRS criteria on admission. S/p sepsis fluids in ED. Lactate 0.7. Hgb stable.  -pain control: scheduled tylenol, oxy 5 mod, oxy 10 severe, dilaudid 0.4 PRN  breakthrough  -Weightbearing right lower extremity for tfx only   -Wound care consulted: Wound vac in place and set to continuous  -ID consulted, rec'd continue Lovenox 40 daily  -Blood Cx no growth, final  -Ortho consulted: Scheduled for return trip to OR on 2/20/2024 with Dr. Jalloh  -N.p.o. at midnight  -Continue IV Ancef 2 g 3 times daily (2/15- )  -Patient is medically optimized for surgery.  Surgical clearance as stated above    #Hyponatremia  - Na 129 on admission  -Likely related to poor p.o. intake  - Nutrition consult, recommend encouraging p.o. intake and ensure supplements    #B/l LE edema - chronic  -Bilateral duplex negative for acute DVT bilaterally  -Encourage bilateral leg elevation as tolerated    Chronic Medical Issues:  # GERD: c/w Pepcid   # HTN: c/w Metoprolol restarted, hold home lisinopril in setting of hypotension  # HLD: c/w Simvastatin  # Depression: c/w Abilify + Paroxetine +Trazodone  # Chronic anemia: iron supplement  # Tobacco use: nicotine patch    Fluids: None  Electrolytes: replete as needed  Nutrition: Regular, n.p.o. midnight  GI PPX: None   DVT PPX: Lovenox     Access: PICC Line, PIV's  Antibiotics: Cefazolin 2g TID (2/15 - )  Oxygenation: Room Air    Dispo: Admitted onto the floor for postoperative wound dehiscence and surgical site infection.  Orthopedic surgery consulted, Dr. Jalloh notified. Scheduled for 2/19/2024.  She will need pre-CERT in the postoperative period.    Barry Bledsoe, DO   PGY 1

## 2024-02-20 ENCOUNTER — ANESTHESIA (OUTPATIENT)
Dept: OPERATING ROOM | Facility: HOSPITAL | Age: 49
DRG: 464 | End: 2024-02-20
Payer: COMMERCIAL

## 2024-02-20 ENCOUNTER — APPOINTMENT (OUTPATIENT)
Dept: CARDIOLOGY | Facility: HOSPITAL | Age: 49
DRG: 464 | End: 2024-02-20
Payer: COMMERCIAL

## 2024-02-20 PROBLEM — E66.811 CLASS 1 OBESITY WITH BODY MASS INDEX (BMI) OF 30.0 TO 30.9 IN ADULT: Status: ACTIVE | Noted: 2024-02-02

## 2024-02-20 PROBLEM — E87.1 HYPONATREMIA: Status: ACTIVE | Noted: 2024-02-20

## 2024-02-20 PROBLEM — E66.9 CLASS 1 OBESITY WITH BODY MASS INDEX (BMI) OF 30.0 TO 30.9 IN ADULT: Status: ACTIVE | Noted: 2024-02-02

## 2024-02-20 PROBLEM — Z92.89 TRANSFUSION HISTORY: Status: ACTIVE | Noted: 2024-02-20

## 2024-02-20 PROBLEM — J45.20 MILD INTERMITTENT ASTHMA WITHOUT COMPLICATION (HHS-HCC): Status: RESOLVED | Noted: 2024-01-12 | Resolved: 2024-02-20

## 2024-02-20 PROBLEM — J32.9 SINUSITIS: Status: ACTIVE | Noted: 2024-02-20

## 2024-02-20 PROBLEM — M19.90 ARTHRITIS: Status: ACTIVE | Noted: 2024-02-20

## 2024-02-20 LAB
ALBUMIN SERPL BCP-MCNC: 2.5 G/DL (ref 3.4–5)
ALP SERPL-CCNC: 106 U/L (ref 33–110)
ALT SERPL W P-5'-P-CCNC: <3 U/L (ref 7–45)
ANION GAP SERPL CALC-SCNC: 8 MMOL/L (ref 10–20)
APTT PPP: 28 SECONDS (ref 27–38)
AST SERPL W P-5'-P-CCNC: 6 U/L (ref 9–39)
BILIRUB SERPL-MCNC: 0.2 MG/DL (ref 0–1.2)
BLOOD EXPIRATION DATE: NORMAL
BUN SERPL-MCNC: 5 MG/DL (ref 6–23)
CALCIUM SERPL-MCNC: 7.6 MG/DL (ref 8.6–10.3)
CHLORIDE SERPL-SCNC: 103 MMOL/L (ref 98–107)
CO2 SERPL-SCNC: 27 MMOL/L (ref 21–32)
CREAT SERPL-MCNC: 0.3 MG/DL (ref 0.5–1.05)
CRP SERPL-MCNC: 0.42 MG/DL
DISPENSE STATUS: NORMAL
EGFRCR SERPLBLD CKD-EPI 2021: >90 ML/MIN/1.73M*2
ERYTHROCYTE [DISTWIDTH] IN BLOOD BY AUTOMATED COUNT: 18.6 % (ref 11.5–14.5)
GLUCOSE SERPL-MCNC: 72 MG/DL (ref 74–99)
HCT VFR BLD AUTO: 26.4 % (ref 36–46)
HGB BLD-MCNC: 8.2 G/DL (ref 12–16)
INR PPP: 1.1 (ref 0.9–1.1)
MCH RBC QN AUTO: 28.5 PG (ref 26–34)
MCHC RBC AUTO-ENTMCNC: 31.1 G/DL (ref 32–36)
MCV RBC AUTO: 92 FL (ref 80–100)
NRBC BLD-RTO: 0 /100 WBCS (ref 0–0)
PHOSPHATE SERPL-MCNC: 3.2 MG/DL (ref 2.5–4.9)
PLATELET # BLD AUTO: 321 X10*3/UL (ref 150–450)
POTASSIUM SERPL-SCNC: 3.9 MMOL/L (ref 3.5–5.3)
PRODUCT BLOOD TYPE: NORMAL
PRODUCT CODE: NORMAL
PROT SERPL-MCNC: 4.9 G/DL (ref 6.4–8.2)
PROTHROMBIN TIME: 12.4 SECONDS (ref 9.8–12.8)
RBC # BLD AUTO: 2.88 X10*6/UL (ref 4–5.2)
SODIUM SERPL-SCNC: 134 MMOL/L (ref 136–145)
UNIT ABO: NORMAL
UNIT NUMBER: NORMAL
UNIT RH: NORMAL
UNIT VOLUME: 350
WBC # BLD AUTO: 5.3 X10*3/UL (ref 4.4–11.3)
XM INTEP: NORMAL

## 2024-02-20 PROCEDURE — 2500000002 HC RX 250 W HCPCS SELF ADMINISTERED DRUGS (ALT 637 FOR MEDICARE OP, ALT 636 FOR OP/ED)

## 2024-02-20 PROCEDURE — A27134 PR REVISE TOTAL HIP REPLACEMENT

## 2024-02-20 PROCEDURE — 3600000018 HC OR TIME - INITIAL BASE CHARGE - PROCEDURE LEVEL SIX: Performed by: ORTHOPAEDIC SURGERY

## 2024-02-20 PROCEDURE — C1763 CONN TISS, NON-HUMAN: HCPCS | Performed by: ORTHOPAEDIC SURGERY

## 2024-02-20 PROCEDURE — 0SPR0JZ REMOVAL OF SYNTHETIC SUBSTITUTE FROM RIGHT HIP JOINT, FEMORAL SURFACE, OPEN APPROACH: ICD-10-PCS | Performed by: ORTHOPAEDIC SURGERY

## 2024-02-20 PROCEDURE — 84100 ASSAY OF PHOSPHORUS: CPT

## 2024-02-20 PROCEDURE — 0HRHXK3 REPLACEMENT OF RIGHT UPPER LEG SKIN WITH NONAUTOLOGOUS TISSUE SUBSTITUTE, FULL THICKNESS, EXTERNAL APPROACH: ICD-10-PCS | Performed by: ORTHOPAEDIC SURGERY

## 2024-02-20 PROCEDURE — 2500000005 HC RX 250 GENERAL PHARMACY W/O HCPCS

## 2024-02-20 PROCEDURE — C1776 JOINT DEVICE (IMPLANTABLE): HCPCS | Performed by: ORTHOPAEDIC SURGERY

## 2024-02-20 PROCEDURE — 2500000004 HC RX 250 GENERAL PHARMACY W/ HCPCS (ALT 636 FOR OP/ED)

## 2024-02-20 PROCEDURE — 84075 ASSAY ALKALINE PHOSPHATASE: CPT

## 2024-02-20 PROCEDURE — 97605 NEG PRS WND THER DME<=50SQCM: CPT | Performed by: ORTHOPAEDIC SURGERY

## 2024-02-20 PROCEDURE — 2500000001 HC RX 250 WO HCPCS SELF ADMINISTERED DRUGS (ALT 637 FOR MEDICARE OP)

## 2024-02-20 PROCEDURE — 0SRR01Z REPLACEMENT OF RIGHT HIP JOINT, FEMORAL SURFACE WITH METAL SYNTHETIC SUBSTITUTE, OPEN APPROACH: ICD-10-PCS | Performed by: ORTHOPAEDIC SURGERY

## 2024-02-20 PROCEDURE — A4217 STERILE WATER/SALINE, 500 ML: HCPCS | Performed by: ORTHOPAEDIC SURGERY

## 2024-02-20 PROCEDURE — 0SUA09Z SUPPLEMENT RIGHT HIP JOINT, ACETABULAR SURFACE WITH LINER, OPEN APPROACH: ICD-10-PCS | Performed by: ORTHOPAEDIC SURGERY

## 2024-02-20 PROCEDURE — 85027 COMPLETE CBC AUTOMATED: CPT

## 2024-02-20 PROCEDURE — 1200000002 HC GENERAL ROOM WITH TELEMETRY DAILY

## 2024-02-20 PROCEDURE — P9016 RBC LEUKOCYTES REDUCED: HCPCS

## 2024-02-20 PROCEDURE — 7100000001 HC RECOVERY ROOM TIME - INITIAL BASE CHARGE: Performed by: ORTHOPAEDIC SURGERY

## 2024-02-20 PROCEDURE — 3700000001 HC GENERAL ANESTHESIA TIME - INITIAL BASE CHARGE: Performed by: ORTHOPAEDIC SURGERY

## 2024-02-20 PROCEDURE — 99233 SBSQ HOSP IP/OBS HIGH 50: CPT

## 2024-02-20 PROCEDURE — 0QP604Z REMOVAL OF INTERNAL FIXATION DEVICE FROM RIGHT UPPER FEMUR, OPEN APPROACH: ICD-10-PCS | Performed by: ORTHOPAEDIC SURGERY

## 2024-02-20 PROCEDURE — 93005 ELECTROCARDIOGRAM TRACING: CPT

## 2024-02-20 PROCEDURE — 7100000002 HC RECOVERY ROOM TIME - EACH INCREMENTAL 1 MINUTE: Performed by: ORTHOPAEDIC SURGERY

## 2024-02-20 PROCEDURE — 99223 1ST HOSP IP/OBS HIGH 75: CPT | Performed by: ORTHOPAEDIC SURGERY

## 2024-02-20 PROCEDURE — 2780000003 HC OR 278 NO HCPCS: Performed by: ORTHOPAEDIC SURGERY

## 2024-02-20 PROCEDURE — 85610 PROTHROMBIN TIME: CPT

## 2024-02-20 PROCEDURE — 2720000007 HC OR 272 NO HCPCS: Performed by: ORTHOPAEDIC SURGERY

## 2024-02-20 PROCEDURE — 2500000004 HC RX 250 GENERAL PHARMACY W/ HCPCS (ALT 636 FOR OP/ED): Performed by: ORTHOPAEDIC SURGERY

## 2024-02-20 PROCEDURE — 0SP909Z REMOVAL OF LINER FROM RIGHT HIP JOINT, OPEN APPROACH: ICD-10-PCS | Performed by: ORTHOPAEDIC SURGERY

## 2024-02-20 PROCEDURE — S4991 NICOTINE PATCH NONLEGEND: HCPCS

## 2024-02-20 PROCEDURE — 3700000002 HC GENERAL ANESTHESIA TIME - EACH INCREMENTAL 1 MINUTE: Performed by: ORTHOPAEDIC SURGERY

## 2024-02-20 PROCEDURE — A9999 DME SUPPLY OR ACCESSORY, NOS: HCPCS | Performed by: ORTHOPAEDIC SURGERY

## 2024-02-20 PROCEDURE — 2500000005 HC RX 250 GENERAL PHARMACY W/O HCPCS: Performed by: ANESTHESIOLOGY

## 2024-02-20 PROCEDURE — 3600000017 HC OR TIME - EACH INCREMENTAL 1 MINUTE - PROCEDURE LEVEL SIX: Performed by: ORTHOPAEDIC SURGERY

## 2024-02-20 PROCEDURE — 86140 C-REACTIVE PROTEIN: CPT

## 2024-02-20 PROCEDURE — 36430 TRANSFUSION BLD/BLD COMPNT: CPT

## 2024-02-20 PROCEDURE — 2500000004 HC RX 250 GENERAL PHARMACY W/ HCPCS (ALT 636 FOR OP/ED): Performed by: ANESTHESIOLOGY

## 2024-02-20 PROCEDURE — 27134 REVISE HIP JOINT REPLACEMENT: CPT | Performed by: ORTHOPAEDIC SURGERY

## 2024-02-20 DEVICE — USE ONLY WITH V40 TAPER STEMS
Type: IMPLANTABLE DEVICE | Site: HIP | Status: FUNCTIONAL
Brand: SLEEVE

## 2024-02-20 DEVICE — IMPLANTABLE DEVICE: Type: IMPLANTABLE DEVICE | Site: HIP | Status: FUNCTIONAL

## 2024-02-20 DEVICE — RESTORATION ADM/MDM X3 INSERT
Type: IMPLANTABLE DEVICE | Site: HIP | Status: FUNCTIONAL
Brand: RESTORATION ADM/MDM X3 INSERT

## 2024-02-20 DEVICE — LINER, COCR, MDM, 42MM E: Type: IMPLANTABLE DEVICE | Site: HIP | Status: FUNCTIONAL

## 2024-02-20 DEVICE — STIMULAN KIT, RAPID CURE, 10CC: Type: IMPLANTABLE DEVICE | Site: HIP | Status: NON-FUNCTIONAL

## 2024-02-20 DEVICE — CERAMIC C-TAPER FEMORAL HEAD
Type: IMPLANTABLE DEVICE | Site: HIP | Status: FUNCTIONAL
Brand: BIOLOX

## 2024-02-20 RX ORDER — ONDANSETRON HYDROCHLORIDE 2 MG/ML
4 INJECTION, SOLUTION INTRAVENOUS ONCE AS NEEDED
Status: DISCONTINUED | OUTPATIENT
Start: 2024-02-20 | End: 2024-02-20

## 2024-02-20 RX ORDER — VASOPRESSIN 20 U/ML
INJECTION PARENTERAL AS NEEDED
Status: DISCONTINUED | OUTPATIENT
Start: 2024-02-20 | End: 2024-02-20

## 2024-02-20 RX ORDER — SODIUM CHLORIDE, SODIUM LACTATE, POTASSIUM CHLORIDE, AND CALCIUM CHLORIDE .6; .31; .03; .02 G/100ML; G/100ML; G/100ML; G/100ML
IRRIGANT IRRIGATION AS NEEDED
Status: DISCONTINUED | OUTPATIENT
Start: 2024-02-20 | End: 2024-02-20 | Stop reason: HOSPADM

## 2024-02-20 RX ORDER — SODIUM CHLORIDE, SODIUM LACTATE, POTASSIUM CHLORIDE, CALCIUM CHLORIDE 600; 310; 30; 20 MG/100ML; MG/100ML; MG/100ML; MG/100ML
100 INJECTION, SOLUTION INTRAVENOUS CONTINUOUS
Status: DISCONTINUED | OUTPATIENT
Start: 2024-02-20 | End: 2024-02-20 | Stop reason: HOSPADM

## 2024-02-20 RX ORDER — GENTAMICIN 40 MG/ML
INJECTION, SOLUTION INTRAMUSCULAR; INTRAVENOUS AS NEEDED
Status: DISCONTINUED | OUTPATIENT
Start: 2024-02-20 | End: 2024-02-20 | Stop reason: HOSPADM

## 2024-02-20 RX ORDER — NORETHINDRONE AND ETHINYL ESTRADIOL 0.5-0.035
KIT ORAL AS NEEDED
Status: DISCONTINUED | OUTPATIENT
Start: 2024-02-20 | End: 2024-02-20

## 2024-02-20 RX ORDER — CEFAZOLIN 1 G/1
INJECTION, POWDER, FOR SOLUTION INTRAVENOUS AS NEEDED
Status: DISCONTINUED | OUTPATIENT
Start: 2024-02-20 | End: 2024-02-20

## 2024-02-20 RX ORDER — LIDOCAINE HCL/PF 100 MG/5ML
SYRINGE (ML) INTRAVENOUS AS NEEDED
Status: DISCONTINUED | OUTPATIENT
Start: 2024-02-20 | End: 2024-02-20

## 2024-02-20 RX ORDER — PHENYLEPHRINE 10 MG/250 ML(40 MCG/ML)IN 0.9 % SOD.CHLORIDE INTRAVENOUS
CONTINUOUS PRN
Status: DISCONTINUED | OUTPATIENT
Start: 2024-02-20 | End: 2024-02-20

## 2024-02-20 RX ORDER — TRANEXAMIC ACID 100 MG/ML
INJECTION, SOLUTION INTRAVENOUS AS NEEDED
Status: DISCONTINUED | OUTPATIENT
Start: 2024-02-20 | End: 2024-02-20

## 2024-02-20 RX ORDER — SODIUM CHLORIDE, SODIUM LACTATE, POTASSIUM CHLORIDE, CALCIUM CHLORIDE 600; 310; 30; 20 MG/100ML; MG/100ML; MG/100ML; MG/100ML
100 INJECTION, SOLUTION INTRAVENOUS CONTINUOUS
Status: DISCONTINUED | OUTPATIENT
Start: 2024-02-20 | End: 2024-02-20

## 2024-02-20 RX ORDER — DEXAMETHASONE SODIUM PHOSPHATE 4 MG/ML
INJECTION, SOLUTION INTRA-ARTICULAR; INTRALESIONAL; INTRAMUSCULAR; INTRAVENOUS; SOFT TISSUE AS NEEDED
Status: DISCONTINUED | OUTPATIENT
Start: 2024-02-20 | End: 2024-02-20

## 2024-02-20 RX ORDER — VANCOMYCIN HYDROCHLORIDE 1 G/20ML
INJECTION, POWDER, LYOPHILIZED, FOR SOLUTION INTRAVENOUS AS NEEDED
Status: DISCONTINUED | OUTPATIENT
Start: 2024-02-20 | End: 2024-02-20 | Stop reason: HOSPADM

## 2024-02-20 RX ORDER — HYDROMORPHONE HYDROCHLORIDE 2 MG/ML
INJECTION, SOLUTION INTRAMUSCULAR; INTRAVENOUS; SUBCUTANEOUS AS NEEDED
Status: DISCONTINUED | OUTPATIENT
Start: 2024-02-20 | End: 2024-02-20

## 2024-02-20 RX ORDER — DROPERIDOL 2.5 MG/ML
0.62 INJECTION, SOLUTION INTRAMUSCULAR; INTRAVENOUS ONCE AS NEEDED
Status: DISCONTINUED | OUTPATIENT
Start: 2024-02-20 | End: 2024-02-20

## 2024-02-20 RX ORDER — SODIUM CHLORIDE, SODIUM LACTATE, POTASSIUM CHLORIDE, CALCIUM CHLORIDE 600; 310; 30; 20 MG/100ML; MG/100ML; MG/100ML; MG/100ML
75 INJECTION, SOLUTION INTRAVENOUS ONCE
Status: COMPLETED | OUTPATIENT
Start: 2024-02-20 | End: 2024-02-20

## 2024-02-20 RX ORDER — OXYCODONE HYDROCHLORIDE 5 MG/1
5 TABLET ORAL EVERY 4 HOURS PRN
Status: DISCONTINUED | OUTPATIENT
Start: 2024-02-20 | End: 2024-02-20

## 2024-02-20 RX ORDER — FENTANYL CITRATE 50 UG/ML
INJECTION, SOLUTION INTRAMUSCULAR; INTRAVENOUS AS NEEDED
Status: DISCONTINUED | OUTPATIENT
Start: 2024-02-20 | End: 2024-02-20

## 2024-02-20 RX ORDER — ONDANSETRON HYDROCHLORIDE 2 MG/ML
INJECTION, SOLUTION INTRAVENOUS AS NEEDED
Status: DISCONTINUED | OUTPATIENT
Start: 2024-02-20 | End: 2024-02-20

## 2024-02-20 RX ORDER — PROPOFOL 10 MG/ML
INJECTION, EMULSION INTRAVENOUS AS NEEDED
Status: DISCONTINUED | OUTPATIENT
Start: 2024-02-20 | End: 2024-02-20

## 2024-02-20 RX ORDER — ROCURONIUM BROMIDE 10 MG/ML
INJECTION, SOLUTION INTRAVENOUS AS NEEDED
Status: DISCONTINUED | OUTPATIENT
Start: 2024-02-20 | End: 2024-02-20

## 2024-02-20 RX ORDER — SODIUM CHLORIDE 0.9 G/100ML
IRRIGANT IRRIGATION AS NEEDED
Status: DISCONTINUED | OUTPATIENT
Start: 2024-02-20 | End: 2024-02-20 | Stop reason: HOSPADM

## 2024-02-20 RX ADMIN — MONTELUKAST 10 MG: 10 TABLET, FILM COATED ORAL at 09:09

## 2024-02-20 RX ADMIN — Medication 0.4 MCG/KG/MIN: at 12:48

## 2024-02-20 RX ADMIN — ARIPIPRAZOLE 15 MG: 5 TABLET ORAL at 21:00

## 2024-02-20 RX ADMIN — Medication 1 APPLICATION: at 21:00

## 2024-02-20 RX ADMIN — TRANEXAMIC ACID 1000 MG: 1 INJECTION, SOLUTION INTRAVENOUS at 14:42

## 2024-02-20 RX ADMIN — SUGAMMADEX 200 MG: 100 INJECTION, SOLUTION INTRAVENOUS at 14:49

## 2024-02-20 RX ADMIN — ONDANSETRON 4 MG: 2 INJECTION INTRAMUSCULAR; INTRAVENOUS at 14:40

## 2024-02-20 RX ADMIN — ACETAMINOPHEN 975 MG: 325 TABLET ORAL at 20:30

## 2024-02-20 RX ADMIN — OXYCODONE HYDROCHLORIDE 5 MG: 5 TABLET ORAL at 17:07

## 2024-02-20 RX ADMIN — FAMOTIDINE 40 MG: 20 TABLET ORAL at 20:25

## 2024-02-20 RX ADMIN — SODIUM CHLORIDE, POTASSIUM CHLORIDE, SODIUM LACTATE AND CALCIUM CHLORIDE 75 ML/HR: 600; 310; 30; 20 INJECTION, SOLUTION INTRAVENOUS at 12:15

## 2024-02-20 RX ADMIN — LIDOCAINE HYDROCHLORIDE 100 MG: 20 INJECTION INTRAVENOUS at 12:31

## 2024-02-20 RX ADMIN — DEXAMETHASONE SODIUM PHOSPHATE 8 MG: 4 INJECTION INTRA-ARTICULAR; INTRALESIONAL; INTRAMUSCULAR; INTRAVENOUS; SOFT TISSUE at 13:19

## 2024-02-20 RX ADMIN — EPHEDRINE SULFATE 50 MG: 50 INJECTION, SOLUTION INTRAVENOUS at 13:12

## 2024-02-20 RX ADMIN — ROCURONIUM BROMIDE 30 MG: 10 INJECTION, SOLUTION INTRAVENOUS at 13:54

## 2024-02-20 RX ADMIN — CEFAZOLIN SODIUM 2 G: 2 INJECTION, SOLUTION INTRAVENOUS at 05:12

## 2024-02-20 RX ADMIN — VASOPRESSIN 3 UNITS: 20 INJECTION INTRAVENOUS at 13:16

## 2024-02-20 RX ADMIN — PROPOFOL 50 MG: 10 INJECTION, EMULSION INTRAVENOUS at 13:08

## 2024-02-20 RX ADMIN — Medication 0.4 MCG/KG/MIN: at 13:12

## 2024-02-20 RX ADMIN — FAMOTIDINE 40 MG: 20 TABLET ORAL at 09:09

## 2024-02-20 RX ADMIN — HYDROMORPHONE HYDROCHLORIDE 1 MG: 2 INJECTION, SOLUTION INTRAMUSCULAR; INTRAVENOUS; SUBCUTANEOUS at 14:02

## 2024-02-20 RX ADMIN — Medication: at 15:00

## 2024-02-20 RX ADMIN — ACETAMINOPHEN 975 MG: 325 TABLET ORAL at 05:12

## 2024-02-20 RX ADMIN — CEFAZOLIN 2 G: 1 INJECTION, POWDER, FOR SOLUTION INTRAMUSCULAR; INTRAVENOUS at 12:50

## 2024-02-20 RX ADMIN — LORATADINE 10 MG: 10 TABLET ORAL at 09:09

## 2024-02-20 RX ADMIN — PAROXETINE 40 MG: 20 TABLET, FILM COATED ORAL at 09:08

## 2024-02-20 RX ADMIN — PROPOFOL 200 MG: 10 INJECTION, EMULSION INTRAVENOUS at 12:31

## 2024-02-20 RX ADMIN — Medication 400 MG: at 09:09

## 2024-02-20 RX ADMIN — SODIUM CHLORIDE, POTASSIUM CHLORIDE, SODIUM LACTATE AND CALCIUM CHLORIDE 500 ML: 600; 310; 30; 20 INJECTION, SOLUTION INTRAVENOUS at 20:26

## 2024-02-20 RX ADMIN — VASOPRESSIN 2 UNITS: 20 INJECTION INTRAVENOUS at 15:10

## 2024-02-20 RX ADMIN — METOPROLOL SUCCINATE 25 MG: 25 TABLET, EXTENDED RELEASE ORAL at 09:09

## 2024-02-20 RX ADMIN — POLYETHYLENE GLYCOL 3350 17 G: 17 POWDER, FOR SOLUTION ORAL at 09:08

## 2024-02-20 RX ADMIN — Medication 1 APPLICATION: at 09:08

## 2024-02-20 RX ADMIN — VASOPRESSIN 2 UNITS: 20 INJECTION INTRAVENOUS at 12:51

## 2024-02-20 RX ADMIN — ROCURONIUM BROMIDE 20 MG: 10 INJECTION, SOLUTION INTRAVENOUS at 13:08

## 2024-02-20 RX ADMIN — FENTANYL CITRATE 100 MCG: 50 INJECTION, SOLUTION INTRAMUSCULAR; INTRAVENOUS at 12:31

## 2024-02-20 RX ADMIN — NICOTINE 1 PATCH: 21 PATCH, EXTENDED RELEASE TRANSDERMAL at 09:08

## 2024-02-20 RX ADMIN — FERROUS SULFATE TAB 325 MG (65 MG ELEMENTAL FE) 1 TABLET: 325 (65 FE) TAB at 05:12

## 2024-02-20 RX ADMIN — TRAZODONE HYDROCHLORIDE 100 MG: 50 TABLET ORAL at 20:25

## 2024-02-20 RX ADMIN — SIMVASTATIN 20 MG: 20 TABLET, FILM COATED ORAL at 20:25

## 2024-02-20 RX ADMIN — TRANEXAMIC ACID 1000 MG: 1 INJECTION, SOLUTION INTRAVENOUS at 12:39

## 2024-02-20 RX ADMIN — SODIUM CHLORIDE, POTASSIUM CHLORIDE, SODIUM LACTATE AND CALCIUM CHLORIDE: 600; 310; 30; 20 INJECTION, SOLUTION INTRAVENOUS at 14:58

## 2024-02-20 RX ADMIN — CEFAZOLIN SODIUM 2 G: 2 INJECTION, SOLUTION INTRAVENOUS at 20:30

## 2024-02-20 RX ADMIN — SODIUM CHLORIDE, POTASSIUM CHLORIDE, SODIUM LACTATE AND CALCIUM CHLORIDE: 600; 310; 30; 20 INJECTION, SOLUTION INTRAVENOUS at 12:19

## 2024-02-20 RX ADMIN — ROCURONIUM BROMIDE 30 MG: 10 INJECTION, SOLUTION INTRAVENOUS at 12:31

## 2024-02-20 SDOH — HEALTH STABILITY: MENTAL HEALTH: CURRENT SMOKER: 1

## 2024-02-20 ASSESSMENT — COGNITIVE AND FUNCTIONAL STATUS - GENERAL
EATING MEALS: A LITTLE
MOBILITY SCORE: 10
CLIMB 3 TO 5 STEPS WITH RAILING: TOTAL
MOVING FROM LYING ON BACK TO SITTING ON SIDE OF FLAT BED WITH BEDRAILS: A LITTLE
TURNING FROM BACK TO SIDE WHILE IN FLAT BAD: A LOT
STANDING UP FROM CHAIR USING ARMS: TOTAL
DAILY ACTIVITIY SCORE: 15
DRESSING REGULAR UPPER BODY CLOTHING: A LITTLE
HELP NEEDED FOR BATHING: A LOT
MOVING TO AND FROM BED TO CHAIR: A LOT
EATING MEALS: A LITTLE
TOILETING: A LOT
MOVING TO AND FROM BED TO CHAIR: A LOT
DRESSING REGULAR UPPER BODY CLOTHING: A LITTLE
CLIMB 3 TO 5 STEPS WITH RAILING: TOTAL
MOBILITY SCORE: 10
WALKING IN HOSPITAL ROOM: TOTAL
TOILETING: A LOT
HELP NEEDED FOR BATHING: A LOT
PERSONAL GROOMING: A LITTLE
STANDING UP FROM CHAIR USING ARMS: TOTAL
DRESSING REGULAR LOWER BODY CLOTHING: A LOT
PERSONAL GROOMING: A LITTLE
TURNING FROM BACK TO SIDE WHILE IN FLAT BAD: A LOT
DRESSING REGULAR LOWER BODY CLOTHING: A LOT
WALKING IN HOSPITAL ROOM: TOTAL
MOVING FROM LYING ON BACK TO SITTING ON SIDE OF FLAT BED WITH BEDRAILS: A LITTLE
DAILY ACTIVITIY SCORE: 15

## 2024-02-20 ASSESSMENT — PAIN SCALES - GENERAL
PAINLEVEL_OUTOF10: 3
PAINLEVEL_OUTOF10: 0 - NO PAIN
PAINLEVEL_OUTOF10: 0 - NO PAIN
PAINLEVEL_OUTOF10: 5 - MODERATE PAIN
PAINLEVEL_OUTOF10: 0 - NO PAIN
PAINLEVEL_OUTOF10: 3
PAINLEVEL_OUTOF10: 5 - MODERATE PAIN
PAINLEVEL_OUTOF10: 3

## 2024-02-20 ASSESSMENT — PAIN - FUNCTIONAL ASSESSMENT
PAIN_FUNCTIONAL_ASSESSMENT: 0-10

## 2024-02-20 NOTE — CARE PLAN
The patient's goals for the shift include      The clinical goals for the shift include patient will remain HDS throughout the shift

## 2024-02-20 NOTE — CARE PLAN
The patient's goals for the shift include  maintain tolerable pain level    The clinical goals for the shift include pt will remain HDS throughout shift.

## 2024-02-20 NOTE — CONSULTS
Reason For Consult  Right hip persistent drainage    History Of Present Illness  Blanca Hamilton is a 48 y.o. female presenting with complications after her right total hip.  Patient had her index total hip about 6 weeks ago, this was a complex primary total hip the patient is severe contracture deformity and protrusio.  Unfortunately this initial procedure was complicated by periprosthetic femur fracture.  She was taken back 3 days later for fixation of the fracture and revision of the total hip.  Initially she did well after that surgery but refused discharge to facility.  She ended up going home and per reports from her home care providers she was in unhygienic conditions and was unable to care for herself.  Eventually she was admitted to a nursing home.  When she presented to the clinic for follow-up initially she was doing well but she then began having wound drainage.  Couple of weeks ago she was taken back for surgical debridement for an acute wound dehiscence and drainage.  At that time there is extensive fat necrosis and complete breakdown of her fascial layer.  After her discharge to a facility after that procedure she began having massive output from her wound VAC.  The facility was unable to catch up with that and it caused failure of the wound VAC.  She was readmitted here with large amounts of drainage and wound dehiscence in the proximal part of the wound.  She denies any significant pain.  No fevers or chills.  She has been on antibiotics.     Past Medical History  She has a past medical history of Anxiety, Arthritis, Class 1 obesity with body mass index (BMI) of 30.0 to 30.9 in adult (02/02/2024), Depression, GERD (gastroesophageal reflux disease), HTN (hypertension), Hyperlipidemia, Right hip pain, Sinusitis, and Transfusion history.    Surgical History  She has a past surgical history that includes Total hip arthroplasty (Left); Knee arthroscopy w/ debridement (Left); Tubal ligation; and Bladder  "suspension.     Social History  She reports that she has been smoking cigarettes. She has been smoking an average of 1 pack per day. She has never used smokeless tobacco. She reports that she does not currently use alcohol. She reports that she does not currently use drugs.    Family History  No family history on file.     Allergies  Patient has no known allergies.    Review of Systems  Negative except as above     Physical Exam  VAC in place, I have examined the wound previously and there is dehiscence proximally with serosanguineous drainage.  Neurovascular intact distally    X-rays of the right hip reviewed dependently interpreted by me today, the construct is stable.     Last Recorded Vitals  Blood pressure (!) 129/96, pulse 84, temperature 37 °C (98.6 °F), temperature source Temporal, resp. rate 16, height 1.753 m (5' 9\"), weight 89.4 kg (197 lb 1.6 oz), SpO2 97 %.    Relevant Results      Scheduled medications  acetaminophen, 975 mg, oral, q8h  ARIPiprazole, 15 mg, oral, Nightly  [Held by provider] aspirin, 81 mg, oral, BID  ceFAZolin, 2 g, intravenous, q8h  enoxaparin, 40 mg, subcutaneous, q24h  famotidine, 40 mg, oral, BID  ferrous sulfate (325 mg ferrous sulfate), 1 tablet, oral, Daily  [Held by provider] lisinopril, 10 mg, oral, q AM  loratadine, 10 mg, oral, Daily  magnesium oxide, 400 mg, oral, Daily  metoprolol succinate XL, 25 mg, oral, Daily  montelukast, 10 mg, oral, q AM  nicotine, 1 patch, transdermal, Daily  PARoxetine, 40 mg, oral, Daily  polyethylene glycol, 17 g, oral, Daily  simvastatin, 20 mg, oral, Nightly  traZODone, 100 mg, oral, Nightly  zinc oxide, 1 Application, Topical, BID      Continuous medications  sodium chloride 0.9%, 75 mL/hr, Last Rate: 75 mL/hr (02/19/24 0839)      PRN medications  PRN medications: HYDROmorphone, ondansetron, oxyCODONE, oxyCODONE  Results for orders placed or performed during the hospital encounter of 02/14/24 (from the past 24 hour(s))   Type and screen "   Result Value Ref Range    ABO TYPE A     Rh TYPE POS     ANTIBODY SCREEN NEG    Coagulation Screen   Result Value Ref Range    Protime 12.4 9.8 - 12.8 seconds    INR 1.1 0.9 - 1.1    aPTT 28 27 - 38 seconds   CBC   Result Value Ref Range    WBC 5.3 4.4 - 11.3 x10*3/uL    nRBC 0.0 0.0 - 0.0 /100 WBCs    RBC 2.88 (L) 4.00 - 5.20 x10*6/uL    Hemoglobin 8.2 (L) 12.0 - 16.0 g/dL    Hematocrit 26.4 (L) 36.0 - 46.0 %    MCV 92 80 - 100 fL    MCH 28.5 26.0 - 34.0 pg    MCHC 31.1 (L) 32.0 - 36.0 g/dL    RDW 18.6 (H) 11.5 - 14.5 %    Platelets 321 150 - 450 x10*3/uL   Comprehensive Metabolic Panel   Result Value Ref Range    Glucose 72 (L) 74 - 99 mg/dL    Sodium 134 (L) 136 - 145 mmol/L    Potassium 3.9 3.5 - 5.3 mmol/L    Chloride 103 98 - 107 mmol/L    Bicarbonate 27 21 - 32 mmol/L    Anion Gap 8 (L) 10 - 20 mmol/L    Urea Nitrogen 5 (L) 6 - 23 mg/dL    Creatinine 0.30 (L) 0.50 - 1.05 mg/dL    eGFR >90 >60 mL/min/1.73m*2    Calcium 7.6 (L) 8.6 - 10.3 mg/dL    Albumin 2.5 (L) 3.4 - 5.0 g/dL    Alkaline Phosphatase 106 33 - 110 U/L    Total Protein 4.9 (L) 6.4 - 8.2 g/dL    AST 6 (L) 9 - 39 U/L    Bilirubin, Total 0.2 0.0 - 1.2 mg/dL    ALT <3 (L) 7 - 45 U/L   C-reactive protein   Result Value Ref Range    C-Reactive Protein 0.42 <1.00 mg/dL   Phosphorus   Result Value Ref Range    Phosphorus 3.2 2.5 - 4.9 mg/dL        Assessment/Plan     Impression plan: 48-year-old female status post complex total hip complicated by periprosthetic fracture and acute wound drainage and infection, now with persistent wound drainage and wound dehiscence.  I discussed the situation in depth with her including treatment options and the relative risks and benefits.  At this point the nonsurgical option would be continued wound care and observation.  Unfortunately we have attempted to manage this drainage and wound in various ways including backs for a while and there is no downturn in the drainage.  I would not recommend this course of action,  although it would avoid the immediate risks of surgery and is unlikely to be successful and it is more likely to progress toward worsening infection.  I would recommend repeat return to the OR for surgical debridement.  I discussed with her that there are a variety of things we could potentially find and this would inform what we would need to do.  If the problem is solely superficial, we could potentially debride the area place drains attempt reclosure.  If there is dehiscence of the fascial incision as it was in the previous case, more extensive work would need to be done.  I advised her that I feel it is likely that combination of poor biology and pressure from the increased offset from correcting her protrusio is likely making it very difficult for the interfascial layer to heal.  If it has once again dehisced the only way to remove that pressure would be to explant her hip.  This would also assist with clearing of any infection, infection is likely to have persisted if there is a continuous opening down to the joint.  Explant would be complex, additionally she has a fracture which has not had had a time to heal.  Would likely place him on articulating spacer resulting in resection arthroplasty of the hip.  Given the difficulties that we had with both her soft tissue and bone it is unclear whether she be a candidate in the future for reimplantation.  We discussed the functional consequences of this in depth, she would be able to bear weight but would likely have a shortened limb initially and decreased function relative to normal negative or replaced hip.  The risks are extensive, fullest of risks as listed below however the risks of the surgery are significantly increased for revision, these include persistent infection recurrent infection wound breakdown or soft tissue damage, neurovascular damage, instability.  Patient is aware of all this she would like to go ahead and signed informed consent.    We  discussed the risks of complications as well as the risks of morbidity and mortality related to surgical treatment with total joint replacement. We reviewed the fact that total joint replacement is major urgent surgery with significant associated surgical and procedural risk factors as detailed below.   Risks include: Pain, blood loss, damage to nearby anatomical structures including but not limited to nerves or blood vessels muscles tendons and bone, failure surgery to ameliorate symptoms, persistence of pain surrounding the affected joint, mechanical failure of the prosthesis including but not limited to loosening of the prosthesis from bone ,breakage of the prosthesis and dislocation of the prosthesis, change in length or appearance of a limb, infection possibly necessitating further surgery, removal of the prosthesis, or limb amputation, the need for additional surgery for other reasons, blood clots, amputation, and death. No guarantees were implied or given.  All questions were answered and the patient voiced their understanding.     Felix Jalloh MD

## 2024-02-20 NOTE — PROGRESS NOTES
Occupational Therapy                 Therapy Communication Note    Patient Name: Blanca Hamilton  MRN: [id number]  Today's Date: 2/20/2024     Discipline: Occupational Therapy    Missed Visit Reason: Patient in a medical procedure (Pt off floor in OR.)    Missed Time: Attempt

## 2024-02-20 NOTE — ANESTHESIA PROCEDURE NOTES
Airway  Date/Time: 2/20/2024 12:35 PM  Urgency: elective    Airway not difficult    Staffing  Performed: CRNA   Authorized by: ELVIRA Tovar    Performed by: ELVIRA Tovar  Patient location during procedure: OR    Indications and Patient Condition  Indications for airway management: anesthesia  Spontaneous Ventilation: absent  Sedation level: deep    Planned trial extubation    Final Airway Details  Final airway type: endotracheal airway      Successful airway: ETT  Cuffed: yes   Successful intubation technique: video laryngoscopy ([facility])  Facilitating devices/methods: intubating stylet  Endotracheal tube insertion site: oral  Blade size: #3  ETT size (mm): 7.0  Cormack-Lehane Classification: grade I - full view of glottis  Placement verified by: chest auscultation and capnometry   Cuff volume (mL): 8  Measured from: lips  ETT to lips (cm): 22  Number of attempts at approach: 1  Ventilation between attempts: none  Number of other approaches attempted: 0

## 2024-02-20 NOTE — PROGRESS NOTES
Blanca Hamilton is a 48 y.o. female on day 6 of admission presenting with Wound dehiscence.    Subjective     Seen and examined today resting in bed comfortably.  Pain level tolerated with pain regimen  Tolerating PO intake & regular bowel movements     Objective   Range of Vitals (last 24 hours)  Heart Rate:  [83-90]   Temp:  [36.4 °C (97.6 °F)-37.2 °C (99 °F)]   Resp:  [14-18]   BP: (120-129)/(80-96)   SpO2:  [96 %-97 %]   Daily Weight  02/14/24 : 89.4 kg (197 lb 1.6 oz)    Body mass index is 29.11 kg/m².    Physical Exam  Constitutional:       Appearance: Normal appearance.   HENT:      Head: Normocephalic and atraumatic.      Mouth/Throat:      Mouth: Mucous membranes are moist.      Pharynx: Oropharynx is clear.   Eyes:      Pupils: Pupils are equal, round, and reactive to light.   Cardiovascular:      Rate and Rhythm: Normal rate and regular rhythm.      Heart sounds: Normal heart sounds.   Pulmonary:      Effort: Pulmonary effort is normal.      Breath sounds: Normal breath sounds.   Abdominal:      General: Abdomen is flat. Bowel sounds are normal.      Palpations: Abdomen is soft.   Musculoskeletal:      Cervical back: Normal range of motion.   Neurological:      Mental Status: She is alert.         Antibiotics  sodium chloride 0.9 % bolus 3,120 mL  acetaminophen (Tylenol) tablet 975 mg  ARIPiprazole (Abilify) tablet 15 mg  aspirin chewable tablet 81 mg  ceFAZolin in dextrose (iso-os) (Ancef) IVPB 2 g  famotidine (Pepcid) tablet 40 mg  ferrous sulfate (325 mg ferrous sulfate) tablet 1 tablet  lactulose 20 gram/30 mL oral solution 20 g  lisinopril tablet 10 mg  loratadine (Claritin) tablet 10 mg  magnesium oxide (Mag-Ox) tablet 400 mg  metoprolol succinate XL (Toprol-XL) 24 hr tablet 25 mg  montelukast (Singulair) tablet 10 mg  nicotine (Nicoderm CQ) 21 mg/24 hr patch 1 patch  PARoxetine (Paxil) tablet 40 mg  polyethylene glycol (Glycolax, Miralax) packet 17 g  simvastatin (Zocor) tablet 20 mg  traZODone  (Desyrel) tablet 100 mg  zinc oxide 40 % ointment 1 Application  piperacillin-tazobactam-dextrose (Zosyn) IV 3.375 g  sodium chloride 0.9% infusion  oxyCODONE (Roxicodone) immediate release tablet 5 mg  oxyCODONE (Roxicodone) immediate release tablet 10 mg  HYDROmorphone (Dilaudid) injection 0.4 mg  vancomycin (Vancocin) in dextrose 5 % water (D5W) 500 mL IV 1,500 mg  atorvastatin (Lipitor) tablet    ceFAZolin in dextrose (iso-os) (Ancef) IVPB 1 g  enoxaparin (Lovenox) syringe 40 mg  ceFAZolin in dextrose (iso-os) (Ancef) IVPB 2 g  ondansetron (Zofran) injection 4 mg      Relevant Results  Labs  Results from last 72 hours   Lab Units 02/20/24 0512 02/19/24 0527 02/18/24  0534   WBC AUTO x10*3/uL 5.3 6.8 5.5   HEMOGLOBIN g/dL 8.2* 8.7* 8.6*   HEMATOCRIT % 26.4* 27.9* 27.4*   PLATELETS AUTO x10*3/uL 321 355 361       Results from last 72 hours   Lab Units 02/20/24 0512 02/19/24 0527 02/18/24  0534   SODIUM mmol/L 134* 133* 132*   POTASSIUM mmol/L 3.9 4.3 4.2   CHLORIDE mmol/L 103 102 103   CO2 mmol/L 27 26 26   BUN mg/dL 5* 4* 4*   CREATININE mg/dL 0.30* 0.23* 0.26*   GLUCOSE mg/dL 72* 82 89   CALCIUM mg/dL 7.6* 7.8* 7.9*   ANION GAP mmol/L 8* 9* 7*   EGFR mL/min/1.73m*2 >90 >90 >90   PHOSPHORUS mg/dL 3.2 2.9  --        Results from last 72 hours   Lab Units 02/20/24 0512 02/19/24  0527 02/18/24  0534   ALK PHOS U/L 106 108 108   BILIRUBIN TOTAL mg/dL 0.2 0.2 0.2   PROTEIN TOTAL g/dL 4.9* 5.2* 5.3*   ALT U/L <3* 3* 3*   AST U/L 6* 7* 7*   ALBUMIN g/dL 2.5* 2.6* 2.7*       Estimated Creatinine Clearance: 125 mL/min (A) (by C-G formula based on SCr of 0.3 mg/dL (L)).  C-Reactive Protein   Date Value Ref Range Status   02/20/2024 0.42 <1.00 mg/dL Final   02/19/2024 0.68 <1.00 mg/dL Final   02/18/2024 1.13 (H) <1.00 mg/dL Final     Microbiology  Susceptibility data from last 14 days.  Collected Specimen Info Organism Clindamycin Erythromycin Oxacillin Tetracycline Trimethoprim/Sulfamethoxazole Vancomycin   02/06/24  Swab from HIP ARTHROPLASTY RIGHT Methicillin Susceptible Staphylococcus aureus (MSSA) S S S R S S   02/06/24 Swab from HIP ARTHROPLASTY RIGHT Staphylococcus aureus         02/06/24 Swab from HIP ARTHROPLASTY RIGHT Staphylococcus aureus             Imaging  reviwed        Assessment/Plan     Sepsis 2/2 Wound Dehiscence c/b R ALEXA infection s/p revision & hardware removal on 2/6  2.   Extensive fat necrosis / Hx of MSSA wound culture      Recommendations:     - Continue IV Ancef 2g TID (to complete total 6 week course)   - Wound Cx (2/6): MSSA +  - Blood Cx (2/14): Negative x2  - CRP levels down trending (7.15 -> 2.39 -> 1.13)  - Ortho following -> plan for surgical debridement Tuevaristo 2/20/23  - Wound care following     Thank you for the consult, will continue to follow. Please reach out with any questions/concerns.      Gamal Quiroz MD  Internal Medicine, PGY-3        Attending note : the patient was evaluated, the note was reviewed and up dated  Right hip prosthesis infection sp I&D with partial hardware exchange, MSSA on the cultures, for repeat I&D  Recommendations :  Continue Cefazolin        Frank Modi M.D
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
## 2024-02-20 NOTE — PROGRESS NOTES
Patient: Blanca Hamilton Age: 48 y.o.   Gender: female Room/bed: [facility] OR     Attending: Ashkan Crawford MD  Code Status:  Full Code    Overnight Events     None    Subjective   Seen and examined at the bedside resting comfortably.  Admits to pain at baseline, manageable with current regimen.  Mentation at baseline.  Eating well and having bowel movements.    Denies chest pain/pressure, abdominal pain, nausea, vomiting, fever, chills, headaches.     Objective    Physical Exam  Constitutional:       General: She is not in acute distress.     Appearance: She is not ill-appearing.   HENT:      Head: Normocephalic and atraumatic.   Cardiovascular:      Rate and Rhythm: Normal rate and regular rhythm.      Pulses: Normal pulses.      Heart sounds: Normal heart sounds.   Pulmonary:      Effort: Pulmonary effort is normal. No respiratory distress.      Breath sounds: No stridor.   Abdominal:      General: Abdomen is flat. Bowel sounds are normal. There is no distension.      Palpations: Abdomen is soft. There is no mass.      Tenderness: There is no abdominal tenderness.   Musculoskeletal:         General: Tenderness and deformity present.      Comments: Right lateral hip surgical site with wound vac attached.  Draining serosanguineous output with ~700 cc and collecting system at time of check.  Good seal and without drainage or seeping.   Skin:     Findings: Bruising present.   Neurological:      General: No focal deficit present.      Mental Status: She is alert and oriented to person, place, and time. Mental status is at baseline.   Psychiatric:         Mood and Affect: Mood normal.         Behavior: Behavior normal.        Temp:  [36.4 °C (97.5 °F)-37 °C (98.6 °F)] 36.5 °C (97.7 °F)  Heart Rate:  [] 98  Resp:  [14-16] 14  BP: ()/(51-96) 91/59    Intake/Output Summary (Last 24 hours) at 2/20/2024 1627  Last data filed at 2/20/2024 1453  Gross per 24 hour   Intake 1470 ml   Output 1400 ml   Net 70 ml        Vitals:    02/14/24 0227   Weight: 89.4 kg (197 lb 1.6 oz)       I/Os    Intake/Output Summary (Last 24 hours) at 2/20/2024 1627  Last data filed at 2/20/2024 1453  Gross per 24 hour   Intake 1470 ml   Output 1400 ml   Net 70 ml       Labs:   Results from last 72 hours   Lab Units 02/20/24  0512 02/19/24  0527 02/18/24  0534   SODIUM mmol/L 134* 133* 132*   POTASSIUM mmol/L 3.9 4.3 4.2   CHLORIDE mmol/L 103 102 103   CO2 mmol/L 27 26 26   BUN mg/dL 5* 4* 4*   CREATININE mg/dL 0.30* 0.23* 0.26*   GLUCOSE mg/dL 72* 82 89   CALCIUM mg/dL 7.6* 7.8* 7.9*   ANION GAP mmol/L 8* 9* 7*   EGFR mL/min/1.73m*2 >90 >90 >90   PHOSPHORUS mg/dL 3.2 2.9  --         Results from last 72 hours   Lab Units 02/20/24  0512 02/19/24  0527 02/18/24  0534   WBC AUTO x10*3/uL 5.3 6.8 5.5   HEMOGLOBIN g/dL 8.2* 8.7* 8.6*   HEMATOCRIT % 26.4* 27.9* 27.4*   PLATELETS AUTO x10*3/uL 321 355 361        Lab Results   Component Value Date    CALCIUM 7.6 (L) 02/20/2024    PHOS 3.2 02/20/2024      Lab Results   Component Value Date    CRP 0.42 02/20/2024      [unfilled]     Micro/ID:   Susceptibility data from last 90 days.  Collected Specimen Info Organism Clindamycin Erythromycin Oxacillin Tetracycline Trimethoprim/Sulfamethoxazole Vancomycin   02/06/24 Swab from HIP ARTHROPLASTY RIGHT Methicillin Susceptible Staphylococcus aureus (MSSA) S S S R S S   02/06/24 Swab from HIP ARTHROPLASTY RIGHT Staphylococcus aureus         02/06/24 Swab from HIP ARTHROPLASTY RIGHT Staphylococcus aureus         02/02/24 Tissue/Biopsy from Wound/Tissue Methicillin Susceptible Staphylococcus aureus (MSSA) S S S R S S       Lab Results   Component Value Date    BLOODCULT No growth at 4 days -  FINAL REPORT 02/14/2024     Images:  Lower extremity venous duplex bilateral            [facility]  [id number] [place] [address]   Tel [phone] and Fax [phone]       Vascular Lab Report  VASC US LOWER EXTREMITY VENOUS DUPLEX  BILATERAL       Patient Name:      ZULEIKA KAMARA          Reading Physician:  [id number] Prieto Loza MD  Study Date:        2/14/2024            Ordering Physician: [id number] GEORGE AMADOR  MRN/PID:           [id number]             Technologist:       Rosa Pierre [facility]  Accession#:        [id number]         Technologist 2:     Hattie Raymond  Date of Birth/Age: 1975 / 48      Encounter#:         [id number]                     years  Gender:            F  Admission Status:  Inpatient            Location Performed: [facility]       Diagnosis/ICD: Localized (leg) edema-R60.0  CPT Codes:     86097 Peripheral venous duplex scan for DVT complete       CONCLUSIONS:  Right Lower Venous: No evidence of acute deep vein thrombus visualized in the right lower extremity.  Left Lower Venous: No evidence of acute deep vein thrombus visualized in the left lower extremity.     Additional Findings:  Technically difficult study due to patient movement and inability to position.       Imaging & Doppler Findings:     Right                 Compressible Thrombus        Flow  Distal External Iliac     Yes        None   Spontaneous/Phasic  CFV                       Yes        None   Spontaneous/Phasic  PFV                       Yes        None  FV Proximal               Yes        None   Spontaneous/Phasic  FV Mid                    Yes        None  FV Distal                 Yes        None  Popliteal                 Yes        None   Spontaneous/Phasic  Peroneal                  Yes        None  PTV                       Yes        None       Left                  Compress Thrombus        Flow  Distal External Iliac   Yes      None   Spontaneous/Phasic  CFV                     Yes      None   Spontaneous/Phasic  PFV                     Yes      None  FV Proximal             Yes      None   Spontaneous/Phasic  FV  Mid                  Yes      None  FV Distal               Yes      None  Popliteal               Yes      None   Spontaneous/Phasic  Peroneal                Yes      None  PTV                     Yes      None       [id number] Prieto Loza MD  Electronically signed by [id number] Prieto Loza MD on 2/15/2024 at 5:44:38 PM       ** Final **     Meds    Scheduled medications  [MAR Hold] acetaminophen, 975 mg, oral, q8h  [MAR Hold] ARIPiprazole, 15 mg, oral, Nightly  [Held by provider] aspirin, 81 mg, oral, BID  [MAR Hold] ceFAZolin, 2 g, intravenous, q8h  [MAR Hold] enoxaparin, 40 mg, subcutaneous, q24h  [MAR Hold] famotidine, 40 mg, oral, BID  [MAR Hold] ferrous sulfate (325 mg ferrous sulfate), 1 tablet, oral, Daily  [Held by provider] lisinopril, 10 mg, oral, q AM  [MAR Hold] loratadine, 10 mg, oral, Daily  [MAR Hold] magnesium oxide, 400 mg, oral, Daily  [MAR Hold] metoprolol succinate XL, 25 mg, oral, Daily  [MAR Hold] montelukast, 10 mg, oral, q AM  [MAR Hold] nicotine, 1 patch, transdermal, Daily  [MAR Hold] PARoxetine, 40 mg, oral, Daily  [MAR Hold] polyethylene glycol, 17 g, oral, Daily  [MAR Hold] simvastatin, 20 mg, oral, Nightly  [MAR Hold] traZODone, 100 mg, oral, Nightly  [MAR Hold] zinc oxide, 1 Application, Topical, BID      Continuous medications  lactated Ringer's, 100 mL/hr, Last Rate: 75 mL/hr (02/20/24 1458)  lactated Ringer's, 100 mL/hr, Last Rate: 100 mL/hr (02/20/24 1630)  sodium chloride 0.9%, 75 mL/hr, Last Rate: 75 mL/hr (02/19/24 0839)      PRN medications  PRN medications: droperidol, HYDROmorphone, [MAR Hold] HYDROmorphone, HYDROmorphone, [MAR Hold] ondansetron, ondansetron, [MAR Hold] oxyCODONE, [MAR Hold] oxyCODONE, oxyCODONE, oxygen     Assessment and Plan    Blanca Hamilton is a 48 y.o. 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 Initially met Sepsis criteria. Admitted for further  management of dehiscence.     Acute Medical Issues:  #Sepsis secondary to reccurent post op wound infection (MSSA) of right hip arthroplasty   #Hx right total hip arthroplasty w/ revision (1/5/24) for prosthetic fx  #Hx hardware removal for prosthetic joint infection (2/6/24)  -Discharged on 2/11 with wound vac + PICC line for 6-week course of cefazolin   -Previous tissue/Wound Culture (2/6): MSSA, only resistant to Tetracycline   -2/13/24: 2/4 SIRS criteria on admission. S/p sepsis fluids in ED. Lactate 0.7. Hgb stable.  -pain control: scheduled tylenol, oxy 5 mod, oxy 10 severe, dilaudid 0.4 PRN breakthrough  -Weightbearing right lower extremity for tfx only   -Wound care consulted: Wound vac in place and set to continuous  -continue Lovenox 40 daily  -Blood Cx no growth, final  -Continue IV Ancef 2 g 3 times daily (2/15- )  -ID for definitive Abx regimen, 6w from recent surgery vs 6w from initiation  -OR 2/20/24 for debridement/explantation/revision/wound vac  -PT/OT eval, needs pre-cert    #Hyponatremia - improving  - Na 129 on admission  - Likely related to poor p.o. intake  - Nutrition consult, recommend encouraging p.o. intake and ensure supplements  - CTM    #B/l LE edema - chronic  -Bilateral duplex negative for acute DVT bilaterally  -Encourage bilateral leg elevation as tolerated    Chronic Medical Issues:  # GERD: c/w Pepcid   # HTN: c/w Metoprolol restarted, hold home lisinopril in setting of hypotension  # HLD: c/w Simvastatin  # Depression: c/w Abilify + Paroxetine +Trazodone  # Chronic anemia: iron supplement  # Tobacco use: nicotine patch    Fluids: None  Electrolytes: replete as needed  Nutrition: Regular  GI PPX: None   DVT PPX: Lovenox     Access: PICC Line, PIV's  Antibiotics: Cefazolin 2g TID (2/15 - )  Oxygenation: Room Air    Dispo: Admitted onto the floor for postoperative wound dehiscence and surgical site infection. 2/20/24 OR for potential I&D vs dehiscence vs explantation. she will need  pre-CERT in the postoperative period.    Barry Bledsoe, DO   PGY 1

## 2024-02-20 NOTE — PROGRESS NOTES
Physical Therapy                 Therapy Communication Note    Patient Name: Blanca Hamilton  MRN: [id number]  Today's Date: 2/20/2024     Discipline: Physical Therapy    Missed Visit Reason: Missed Visit Reason: Patient in a medical procedure    Missed Time: Attempt    Comment: att tx @1107.  Spoke to bed side nurse prior to tx.  Nurse relayed that pt is heading to the OR for hip procedure, PT not appropriate at this time.

## 2024-02-20 NOTE — OP NOTE
REVISION ARTHROPLASTY TOTAL HIP (R) Operative Note     Date: 2024 - 2024  OR Location: [facility] OR    Name: Blanca Hamilton, : 1975, Age: 48 y.o., MRN: [id number], Sex: female    Diagnosis  Pre-op Diagnosis     * Wound dehiscence [T81.30XA] Post-op Diagnosis     * Wound dehiscence [T81.30XA]     Procedures  REVISION ARTHROPLASTY TOTAL HIP  17446 - DE REVJ TOT HIP ARTHRP [facility] W/WO AGRFT/ALGRFT      Surgeons      * Felix Jalloh - Primary    Resident/Fellow/Other Assistant:  Surgeon(s) and Role:    Procedure Summary  Anesthesia: Consult  ASA: III  Anesthesia Staff: CRNA: DEVON Tovar-CRNA  Estimated Blood Loss: 300 mL  Intra-op Medications:   Administrations occurring from 1200 to 1500 on 24:   Medication Name Total Dose   vancomycin (Vancocin) vial for injection 1 g   acetaminophen (Tylenol) tablet 975 mg Cannot be calculated   ceFAZolin in dextrose (iso-os) (Ancef) IVPB 2 g Cannot be calculated   lactated Ringer's infusion 201.25 mL   lactated Ringer's infusion Cannot be calculated              Anesthesia Record               Intraprocedure I/O Totals          Intake    PRBC 350.00 mL    Tranexamic Acid 0.00 mL    The total shown is the total volume documented since Anesthesia Start was filed.    Phenylephrine Drip 0.00 mL    The total shown is the total volume documented since Anesthesia Start was filed.    Total Intake 350 mL       Output    Urine 450 mL    Est. Blood Loss 300 mL    Total Output 750 mL       Net    Net Volume -400 mL          Specimen: No specimens collected     Staff:   Circulator: Lore Hernandez RN; Chary Garcia RN  Scrub Person: Lina Napoles  RNFA: Laith Hardy RN; Smooth Cedeno RN         Drains and/or Catheters:   Closed/Suction Drain Proximal;Right;Ventral Leg Accordion 19 Fr. (Active)       Closed/Suction Drain Proximal;Right;Ventral Leg Accordion 19 Fr. (Active)       Urethral Catheter Non-latex 16 Fr. (Active)       External Urinary Catheter Female  (Active)   Output (mL) 450 mL 02/20/24 0527       Tourniquet Times:         Implants:  Implants       Type Name Action Serial No.      Graft STIMULAN KIT, RAPID CURE, 10CC - [id number] Used, Not Implanted      Joint Hip LINER, COCR, MDM, 42MM E - [id number] Implanted      Joint SLEEVE FOR BIOLOX HEAD, TI - [id number] Implanted      Joint INSERT, MDM X3 RESTORATION, 42OD 28ID X 6.9MM - [id number] Implanted       BIOLOX DELTA CERAMIC C-TAPER FEMORAL HEAD Implanted      Graft GRAFT, STRATTICE, ABD, THICK, 10CM X 10CM - [id number] Implanted               Findings: Slight dehiscence of wound closure approximately, dehiscence of deep fascial layer.    Indications: Blanca Hamilton is an 48 y.o. female who is having surgery for Wound dehiscence [T81.30XA].     The patient was seen in the preoperative area. The risks, benefits, complications, treatment options, non-operative alternatives, expected recovery and outcomes were discussed with the patient. The possibilities of reaction to medication, pulmonary aspiration, injury to surrounding structures, bleeding, recurrent infection, the need for additional procedures, failure to diagnose a condition, and creating a complication requiring transfusion or operation were discussed with the patient. The patient concurred with the proposed plan, giving informed consent.  The site of surgery was properly noted/marked if necessary per policy. The patient has been actively warmed in preoperative area. Preoperative antibiotics have been ordered and given within 1 hours of incision. Venous thrombosis prophylaxis have been ordered including bilateral sequential compression devices and chemical prophylaxis    Procedure Details: This is a 48-year-old female who is unfortunately a very complex course after her initial index total hip about 6 weeks ago.  She had a very complex primary total hip with significant right lower extremity contractures and protrusio.  This was initially complicated by  periprosthetic femur fracture and she went back for revision and fixation about 3 days postop.  She presented a couple weeks ago with wound drainage and was found during that surgery to have dehiscence of her fascial closure.  She underwent I&D and poly exchange along with reclosure.  She began having significant wound drainage and VAC output several days postop and was therefore indicated for revision for this wound dehiscence.  The risk benefits and alternatives were explained to the patient she signed informed consent    Description of procedure: Patient was brought to the operating room placed the operating table in supine position all bony prominences well-padded preoperative antibiotics were given anesthesia was induced by the anesthesia team patient was prepped and draped in usual sterile fashion in the lateral decubitus position with the right hip up.  Timeout was performed patient was identified by name and medical record number and laterality and site of surgery confirmed by all present.  We noted that there was a small area of the incision only about 3 cm approximately that was dehisced.  We used the patient's previous incision but excised the previous scar.  We excised the scar and the draining areas completely back to healthy tissue using a #10 blade.  We noted at that point that there was some serous fluid deep to the skin layer and there was once again dehiscence of the fascial layer.  It is not as extensive dehiscence as in the previous case, proximal distal extents of the fascial incision remained intact and are healing.  There is no purulence or evidence of infection.  At this point the decision was made given the appearance of the tissues and the lack of any significant sign of infection in the area to make an additional attempt at reclosure of the fascia and retention of the implants.  I felt that the risk at this point was that she could have further dehiscence and require further surgery, the  benefit of this would be retention of a functional hip.  He again noted that there was significant offset of the hip which would be expected in a normal hip but because this patient's soft tissues had been contracted into an area that accommodated her native protrusio hip it made sense that this would affect healing of the fascial layer.  We noted that the cables that have been used in an attempt to fix the small pieces of fractured greater trochanter had failed and were loose.  We remove those cables.  We did not make a further attempt to fix that piece of the greater trochanter as those pieces had a very small amount of bone and would not accept any reasonable fixation.  We dislocated the hip, we remove the head liner and then performed an extensive irrigation.  We also performed an extensive debridement during this time with curettes and rongeur.  We debrided all of the tissues especially the edges of the fascial layer to get back to excellent healthy bleeding tissue.  We used Irrisept back to sure dilute Betadine and greater than 9 L of normal saline via pulse lavage.  Once we completed our irrigation and debridement we placed a new acetabular liner impacted into position in the standard fashion.  We cleaned and dried the trunnion and impacted a dual mobility head into place the same construct as has been used previously.  We reduced the hip and took it through range of motion and found that we had excellent stability of the hip anteriorly and posteriorly.  Again we noted significant contractures.  We then set about our complex fascial closure.  We carefully  the fascial layer from the deep and superficial tissues in order to mobilize it sufficiently.  We ensured that there was removal of any nonviable parts of the tissue.  We were able to get a closure and it did not feel as if it was under extreme tension however we did note that the lateral aspect of the bone at the greater trochanter was very close  underneath the fascial incision.  We placed stimulant beads into the joint prior to closing, these were made with vancomycin and gentamicin.  We also placed a deep drain.  We then closed the fascial layer with interrupted #5 Ethibond in figure-of-eight fashion.  We were able to get an excellent stout robust closure.  We then oversewed this with a reinforcing layer of strattice porcine dermal tissue matrix.  This was intended to help reinforce the fascial layer as well as create excellent tissue regeneration and scar in that area.  We then placed a superficial drain, we then closed the deep dermal layers and subcu cutaneous layers with 2-0 PDS.  We closed the skin with 2-0 nylon and staples.  A Prevena negative pressure wound therapy device was placed.  Patient was woke from anesthesia by the anesthesia team brought to the PACU in stable condition    Postoperative plan patient will continue her antibiotics, DVT prophylaxis and other standard supportive care.  Nonweightbearing with toe-touch for transfers only.      Complications:  None; patient tolerated the procedure well.    Disposition: PACU - hemodynamically stable.  Condition: stable         Additional Details: none    Attending Attestation: I was present and scrubbed for the key portions of the procedure.    Felix Jalloh  Phone Number: [phone]

## 2024-02-21 LAB
ALBUMIN SERPL BCP-MCNC: 2.4 G/DL (ref 3.4–5)
ALP SERPL-CCNC: 101 U/L (ref 33–110)
ALT SERPL W P-5'-P-CCNC: <3 U/L (ref 7–45)
ANION GAP SERPL CALC-SCNC: 9 MMOL/L (ref 10–20)
AST SERPL W P-5'-P-CCNC: 7 U/L (ref 9–39)
BILIRUB SERPL-MCNC: 0.3 MG/DL (ref 0–1.2)
BUN SERPL-MCNC: 7 MG/DL (ref 6–23)
CALCIUM SERPL-MCNC: 7.7 MG/DL (ref 8.6–10.3)
CHLORIDE SERPL-SCNC: 102 MMOL/L (ref 98–107)
CO2 SERPL-SCNC: 25 MMOL/L (ref 21–32)
CREAT SERPL-MCNC: 0.3 MG/DL (ref 0.5–1.05)
EGFRCR SERPLBLD CKD-EPI 2021: >90 ML/MIN/1.73M*2
ERYTHROCYTE [DISTWIDTH] IN BLOOD BY AUTOMATED COUNT: 18.7 % (ref 11.5–14.5)
GLUCOSE SERPL-MCNC: 79 MG/DL (ref 74–99)
HCT VFR BLD AUTO: 26.6 % (ref 36–46)
HGB BLD-MCNC: 8.2 G/DL (ref 12–16)
HOLD SPECIMEN: NORMAL
HOLD SPECIMEN: NORMAL
MAGNESIUM SERPL-MCNC: 1.59 MG/DL (ref 1.6–2.4)
MCH RBC QN AUTO: 28.7 PG (ref 26–34)
MCHC RBC AUTO-ENTMCNC: 30.8 G/DL (ref 32–36)
MCV RBC AUTO: 93 FL (ref 80–100)
NRBC BLD-RTO: 0 /100 WBCS (ref 0–0)
PHOSPHATE SERPL-MCNC: 2.8 MG/DL (ref 2.5–4.9)
PLATELET # BLD AUTO: 282 X10*3/UL (ref 150–450)
POTASSIUM SERPL-SCNC: 4.2 MMOL/L (ref 3.5–5.3)
PROT SERPL-MCNC: 4.7 G/DL (ref 6.4–8.2)
RBC # BLD AUTO: 2.86 X10*6/UL (ref 4–5.2)
SODIUM SERPL-SCNC: 132 MMOL/L (ref 136–145)
WBC # BLD AUTO: 9.4 X10*3/UL (ref 4.4–11.3)

## 2024-02-21 PROCEDURE — S4991 NICOTINE PATCH NONLEGEND: HCPCS

## 2024-02-21 PROCEDURE — 83735 ASSAY OF MAGNESIUM: CPT

## 2024-02-21 PROCEDURE — G0008 ADMIN INFLUENZA VIRUS VAC: HCPCS | Performed by: INTERNAL MEDICINE

## 2024-02-21 PROCEDURE — 90662 IIV NO PRSV INCREASED AG IM: CPT | Performed by: INTERNAL MEDICINE

## 2024-02-21 PROCEDURE — 84100 ASSAY OF PHOSPHORUS: CPT

## 2024-02-21 PROCEDURE — 97535 SELF CARE MNGMENT TRAINING: CPT | Mod: GO,CO

## 2024-02-21 PROCEDURE — 2500000001 HC RX 250 WO HCPCS SELF ADMINISTERED DRUGS (ALT 637 FOR MEDICARE OP)

## 2024-02-21 PROCEDURE — 2500000004 HC RX 250 GENERAL PHARMACY W/ HCPCS (ALT 636 FOR OP/ED)

## 2024-02-21 PROCEDURE — 99233 SBSQ HOSP IP/OBS HIGH 50: CPT

## 2024-02-21 PROCEDURE — 80053 COMPREHEN METABOLIC PANEL: CPT

## 2024-02-21 PROCEDURE — 85027 COMPLETE CBC AUTOMATED: CPT

## 2024-02-21 PROCEDURE — 36416 COLLJ CAPILLARY BLOOD SPEC: CPT

## 2024-02-21 PROCEDURE — 1200000002 HC GENERAL ROOM WITH TELEMETRY DAILY

## 2024-02-21 PROCEDURE — 2500000002 HC RX 250 W HCPCS SELF ADMINISTERED DRUGS (ALT 637 FOR MEDICARE OP, ALT 636 FOR OP/ED): Performed by: INTERNAL MEDICINE

## 2024-02-21 PROCEDURE — 97530 THERAPEUTIC ACTIVITIES: CPT | Mod: GP,CQ

## 2024-02-21 PROCEDURE — 2500000004 HC RX 250 GENERAL PHARMACY W/ HCPCS (ALT 636 FOR OP/ED): Performed by: INTERNAL MEDICINE

## 2024-02-21 PROCEDURE — 2500000002 HC RX 250 W HCPCS SELF ADMINISTERED DRUGS (ALT 637 FOR MEDICARE OP, ALT 636 FOR OP/ED)

## 2024-02-21 PROCEDURE — 97110 THERAPEUTIC EXERCISES: CPT | Mod: GP,CQ

## 2024-02-21 PROCEDURE — 99232 SBSQ HOSP IP/OBS MODERATE 35: CPT | Performed by: NURSE PRACTITIONER

## 2024-02-21 RX ORDER — SODIUM CHLORIDE, SODIUM LACTATE, POTASSIUM CHLORIDE, CALCIUM CHLORIDE 600; 310; 30; 20 MG/100ML; MG/100ML; MG/100ML; MG/100ML
100 INJECTION, SOLUTION INTRAVENOUS CONTINUOUS
Status: DISCONTINUED | OUTPATIENT
Start: 2024-02-21 | End: 2024-03-05 | Stop reason: HOSPADM

## 2024-02-21 RX ORDER — MIDODRINE HYDROCHLORIDE 5 MG/1
5 TABLET ORAL 2 TIMES DAILY PRN
Status: DISCONTINUED | OUTPATIENT
Start: 2024-02-21 | End: 2024-03-05 | Stop reason: HOSPADM

## 2024-02-21 RX ORDER — OSELTAMIVIR PHOSPHATE 75 MG/1
75 CAPSULE ORAL DAILY
Status: COMPLETED | OUTPATIENT
Start: 2024-02-21 | End: 2024-03-01

## 2024-02-21 RX ADMIN — NICOTINE 1 PATCH: 21 PATCH, EXTENDED RELEASE TRANSDERMAL at 08:40

## 2024-02-21 RX ADMIN — MONTELUKAST 10 MG: 10 TABLET, FILM COATED ORAL at 08:40

## 2024-02-21 RX ADMIN — SODIUM CHLORIDE, SODIUM LACTATE, POTASSIUM CHLORIDE, AND CALCIUM CHLORIDE 1000 ML: 600; 310; 30; 20 INJECTION, SOLUTION INTRAVENOUS at 15:30

## 2024-02-21 RX ADMIN — FAMOTIDINE 40 MG: 20 TABLET ORAL at 08:40

## 2024-02-21 RX ADMIN — ENOXAPARIN SODIUM 40 MG: 40 INJECTION SUBCUTANEOUS at 15:16

## 2024-02-21 RX ADMIN — ACETAMINOPHEN 975 MG: 325 TABLET ORAL at 20:56

## 2024-02-21 RX ADMIN — ACETAMINOPHEN 975 MG: 325 TABLET ORAL at 05:42

## 2024-02-21 RX ADMIN — Medication 1 APPLICATION: at 20:57

## 2024-02-21 RX ADMIN — OXYCODONE HYDROCHLORIDE 10 MG: 5 TABLET ORAL at 20:56

## 2024-02-21 RX ADMIN — PAROXETINE 40 MG: 20 TABLET, FILM COATED ORAL at 08:40

## 2024-02-21 RX ADMIN — OXYCODONE HYDROCHLORIDE 10 MG: 5 TABLET ORAL at 08:52

## 2024-02-21 RX ADMIN — CEFAZOLIN SODIUM 2 G: 2 INJECTION, SOLUTION INTRAVENOUS at 20:57

## 2024-02-21 RX ADMIN — OSELTAMAVIR PHOSPHATE 75 MG: 75 CAPSULE ORAL at 15:14

## 2024-02-21 RX ADMIN — SODIUM CHLORIDE, SODIUM LACTATE, POTASSIUM CHLORIDE, AND CALCIUM CHLORIDE 500 ML: 600; 310; 30; 20 INJECTION, SOLUTION INTRAVENOUS at 01:45

## 2024-02-21 RX ADMIN — ACETAMINOPHEN 975 MG: 325 TABLET ORAL at 15:16

## 2024-02-21 RX ADMIN — SIMVASTATIN 20 MG: 20 TABLET, FILM COATED ORAL at 20:56

## 2024-02-21 RX ADMIN — SODIUM CHLORIDE, POTASSIUM CHLORIDE, SODIUM LACTATE AND CALCIUM CHLORIDE 100 ML/HR: 600; 310; 30; 20 INJECTION, SOLUTION INTRAVENOUS at 10:20

## 2024-02-21 RX ADMIN — Medication 1 APPLICATION: at 08:42

## 2024-02-21 RX ADMIN — Medication 400 MG: at 08:40

## 2024-02-21 RX ADMIN — FERROUS SULFATE TAB 325 MG (65 MG ELEMENTAL FE) 1 TABLET: 325 (65 FE) TAB at 05:42

## 2024-02-21 RX ADMIN — INFLUENZA A VIRUS A/VICTORIA/4897/2022 IVR-238 (H1N1) ANTIGEN (FORMALDEHYDE INACTIVATED), INFLUENZA A VIRUS A/DARWIN/9/2021 SAN-010 (H3N2) ANTIGEN (FORMALDEHYDE INACTIVATED), INFLUENZA B VIRUS B/PHUKET/3073/2013 ANTIGEN (FORMALDEHYDE INACTIVATED), AND INFLUENZA B VIRUS B/MICHIGAN/01/2021 ANTIGEN (FORMALDEHYDE INACTIVATED) 0.5 ML: 60; 60; 60; 60 INJECTION, SUSPENSION INTRAMUSCULAR at 15:15

## 2024-02-21 RX ADMIN — ARIPIPRAZOLE 15 MG: 5 TABLET ORAL at 20:56

## 2024-02-21 RX ADMIN — LORATADINE 10 MG: 10 TABLET ORAL at 08:40

## 2024-02-21 RX ADMIN — FAMOTIDINE 40 MG: 20 TABLET ORAL at 20:56

## 2024-02-21 RX ADMIN — CEFAZOLIN SODIUM 2 G: 2 INJECTION, SOLUTION INTRAVENOUS at 15:14

## 2024-02-21 RX ADMIN — TRAZODONE HYDROCHLORIDE 100 MG: 50 TABLET ORAL at 20:56

## 2024-02-21 ASSESSMENT — COGNITIVE AND FUNCTIONAL STATUS - GENERAL
DRESSING REGULAR LOWER BODY CLOTHING: A LOT
DRESSING REGULAR UPPER BODY CLOTHING: A LOT
MOVING FROM LYING ON BACK TO SITTING ON SIDE OF FLAT BED WITH BEDRAILS: A LOT
CLIMB 3 TO 5 STEPS WITH RAILING: TOTAL
WALKING IN HOSPITAL ROOM: TOTAL
MOBILITY SCORE: 8
DAILY ACTIVITIY SCORE: 14
PERSONAL GROOMING: A LOT
DRESSING REGULAR UPPER BODY CLOTHING: A LOT
DAILY ACTIVITIY SCORE: 14
CLIMB 3 TO 5 STEPS WITH RAILING: TOTAL
TOILETING: A LOT
MOVING TO AND FROM BED TO CHAIR: A LOT
DRESSING REGULAR LOWER BODY CLOTHING: A LOT
TURNING FROM BACK TO SIDE WHILE IN FLAT BAD: A LOT
TURNING FROM BACK TO SIDE WHILE IN FLAT BAD: A LOT
MOBILITY SCORE: 12
TURNING FROM BACK TO SIDE WHILE IN FLAT BAD: A LOT
DAILY ACTIVITIY SCORE: 14
MOVING FROM LYING ON BACK TO SITTING ON SIDE OF FLAT BED WITH BEDRAILS: A LOT
DRESSING REGULAR LOWER BODY CLOTHING: A LOT
WALKING IN HOSPITAL ROOM: TOTAL
MOVING FROM LYING ON BACK TO SITTING ON SIDE OF FLAT BED WITH BEDRAILS: A LOT
TOILETING: A LOT
HELP NEEDED FOR BATHING: A LOT
PERSONAL GROOMING: A LOT
CLIMB 3 TO 5 STEPS WITH RAILING: A LOT
HELP NEEDED FOR BATHING: A LOT
PERSONAL GROOMING: A LOT
MOBILITY SCORE: 8
MOVING TO AND FROM BED TO CHAIR: TOTAL
STANDING UP FROM CHAIR USING ARMS: TOTAL
MOVING TO AND FROM BED TO CHAIR: TOTAL
HELP NEEDED FOR BATHING: A LOT
STANDING UP FROM CHAIR USING ARMS: TOTAL
STANDING UP FROM CHAIR USING ARMS: A LOT
DRESSING REGULAR UPPER BODY CLOTHING: A LOT
TOILETING: A LOT
WALKING IN HOSPITAL ROOM: A LOT

## 2024-02-21 ASSESSMENT — PAIN SCALES - GENERAL
PAINLEVEL_OUTOF10: 8
PAINLEVEL_OUTOF10: 5 - MODERATE PAIN
PAINLEVEL_OUTOF10: 5 - MODERATE PAIN
PAIN_LEVEL: 2
PAINLEVEL_OUTOF10: 7

## 2024-02-21 ASSESSMENT — PAIN DESCRIPTION - ORIENTATION: ORIENTATION: RIGHT

## 2024-02-21 ASSESSMENT — PAIN DESCRIPTION - LOCATION: LOCATION: HIP

## 2024-02-21 ASSESSMENT — PAIN - FUNCTIONAL ASSESSMENT
PAIN_FUNCTIONAL_ASSESSMENT: 0-10

## 2024-02-21 ASSESSMENT — ACTIVITIES OF DAILY LIVING (ADL): HOME_MANAGEMENT_TIME_ENTRY: 17

## 2024-02-21 ASSESSMENT — PAIN DESCRIPTION - DESCRIPTORS: DESCRIPTORS: ACHING;SORE

## 2024-02-21 NOTE — PROGRESS NOTES
"Blanca Hamilton is a 48 y.o. female on day 7 of admission presenting with Wound dehiscence.    Subjective   Episodes of hypotension overnight, reporting mild right hip pain.     Objective     Physical Exam  HENT:      Head: Normocephalic and atraumatic.   Eyes:      Extraocular Movements: Extraocular movements intact.      Conjunctiva/sclera: Conjunctivae normal.      Pupils: Pupils are equal, round, and reactive to light.   Cardiovascular:      Rate and Rhythm: Normal rate and regular rhythm.      Pulses: Normal pulses.   Pulmonary:      Breath sounds: Normal breath sounds.   Abdominal:      General: Bowel sounds are normal.      Palpations: Abdomen is soft.   Musculoskeletal:      Comments: Hemovac drains in place, compressed, sanguineous outpatient, Prevena in place no output.  Leg and foot warm and well perfused, surrounding tissues soft and compressible, motion and sensations intact distally      Skin:     General: Skin is warm and dry.      Capillary Refill: Capillary refill takes less than 2 seconds.   Neurological:      General: No focal deficit present.      Mental Status: She is alert and oriented to person, place, and time.       Last Recorded Vitals  Blood pressure 89/56, pulse 95, temperature 37 °C (98.6 °F), temperature source Temporal, resp. rate 15, height 1.753 m (5' 9\"), weight 89.4 kg (197 lb 1.6 oz), SpO2 97 %.  Intake/Output last 3 Shifts:  I/O last 3 completed shifts:  In: 4550 (50.9 mL/kg) [I.V.:3700 (41.4 mL/kg); Blood:350; IV Piggyback:500]  Out: 1935 (21.6 mL/kg) [Urine:1575 (0.5 mL/kg/hr); Drains:60; Blood:300]  Weight: 89.4 kg     Relevant Results  Lower extremity venous duplex bilateral    Result Date: 2/15/2024          [place] [address]  Tel [phone] and Fax [phone]  Vascular Lab Report [facility] LOWER EXTREMITY VENOUS DUPLEX BILATERAL  Patient Name:      BLANCA HAMILTON          Reading Physician:  [id number] Loza                         "                                     MD Study Date:        2/14/2024            Ordering Physician: [id number] GEORGE AMADOR MRN/PID:           [id number]             Technologist:       Rosa Pierre [facility] Accession#:        [id number]         Technologist 2:     Hattie Raymond Date of Birth/Age: 1975 / 48      Encounter#:         [id number]                    years Gender:            F Admission Status:  Inpatient            Location Performed: [facility]  Diagnosis/ICD: Localized (leg) edema-R60.0 CPT Codes:     21923 Peripheral venous duplex scan for DVT complete  CONCLUSIONS: Right Lower Venous: No evidence of acute deep vein thrombus visualized in the right lower extremity. Left Lower Venous: No evidence of acute deep vein thrombus visualized in the left lower extremity.  Additional Findings: Technically difficult study due to patient movement and inability to position.  Imaging & Doppler Findings:  Right                 Compressible Thrombus        Flow Distal External Iliac     Yes        None   Spontaneous/Phasic CFV                       Yes        None   Spontaneous/Phasic PFV                       Yes        None FV Proximal               Yes        None   Spontaneous/Phasic FV Mid                    Yes        None FV Distal                 Yes        None Popliteal                 Yes        None   Spontaneous/Phasic Peroneal                  Yes        None PTV                       Yes        None  Left                  Compress Thrombus        Flow Distal External Iliac   Yes      None   Spontaneous/Phasic CFV                     Yes      None   Spontaneous/Phasic PFV                     Yes      None FV Proximal             Yes      None   Spontaneous/Phasic FV Mid                  Yes      None FV Distal               Yes      None Popliteal               Yes      None   Spontaneous/Phasic Peroneal                Yes      None  PTV                     Yes      None  [id number] Prieto Loza MD Electronically signed by [id number] Prieto Loza MD on 2/15/2024 at 5:44:38 PM  ** Final **     Bedside PICC Imaging    Result Date: 2/8/2024  These images are not reportable by radiology and will not be interpreted by  Radiologists.    ECG 12 lead    Result Date: 2/7/2024  Atrial flutter with 2:1 AV conduction Nonspecific intraventricular block Left ventricular hypertrophy ( R in aVL , [place] product ) Inferior infarct , age undetermined Abnormal ECG When compared with ECG of 11-JAN-2024 23:16, Significant changes have occurred See ED provider note for full interpretation and clinical correlation Confirmed by Vega Bruce ([id number]) on 2/7/2024 10:40:33 PM    XR hip right with pelvis when performed 2 or 3 views    Result Date: 2/7/2024  Interpreted By:  Richard Garcia, STUDY: XR HIP RIGHT WITH PELVIS WHEN PERFORMED 2 OR 3 VIEWS; ;  2/7/2024 9:25 am   INDICATION: Signs/Symptoms:s/p revision oanh.   COMPARISON: 01/26/2024   ACCESSION NUMBER(S): [id number]   ORDERING CLINICIAN: JON HERNANDEZ   FINDINGS: Total right hip arthroplasty with interval placement of antibiotic impregnated beads. Partially visualized left hip arthroplasty.       Interval placement of antibiotic impregnated beads in the right hip.     MACRO: None   Signed by: Richard Garcia 2/7/2024 5:00 PM Dictation workstation:   [id number]    XR abdomen 1 view    Result Date: 2/5/2024  Interpreted By:  Richard Garcia, STUDY: XR ABDOMEN 1 VIEW;  2/4/2024 6:47 pm   INDICATION: Signs/Symptoms:Lactic acidosis and emesis.   COMPARISON: None.   ACCESSION NUMBER(S): [id number]   ORDERING CLINICIAN: TABITHA MAYNARD   FINDINGS: Nonobstructive bowel gas pattern. Partially visualized bilateral hip arthroplasties.       1.  Nonobstructive bowel gas pattern.   MACRO: None   Signed by: Richard Garcia 2/5/2024 4:12 PM Dictation workstation:   [id number]    XR hip right with pelvis when performed 2 or 3  views    Result Date: 1/27/2024  Interpreted By:  Yolie Tate, STUDY: Single view pelvis. Right hip, two views.   INDICATION: Signs/Symptoms:POST OP-RIGHT TOTAL HIP.   COMPARISON: 10/17/2023.   ACCESSION NUMBER(S): [id number]   ORDERING CLINICIAN: ETHEL MORALES   FINDINGS: Status post right total hip arthroplasty with cerclage wire fixation of the proximal femur. Hardware is intact without perihardware fractures or lucencies.   There is fragmentation of the greater trochanter which is likely postsurgical in etiology with attention on follow-up recommended.   Right acetabular protrusio. No malalignment.   Changes of left hip arthroplasty noted with prominent osteolysis of the medial acetabular wall similar to prior.       1. As above.   MACRO: None.   Signed by: Yolie Tate 1/27/2024 10:30 AM Dictation workstation:   [id number]      Scheduled medications  acetaminophen, 975 mg, oral, q8h  ARIPiprazole, 15 mg, oral, Nightly  [Held by provider] aspirin, 81 mg, oral, BID  ceFAZolin, 2 g, intravenous, q8h  enoxaparin, 40 mg, subcutaneous, q24h  famotidine, 40 mg, oral, BID  ferrous sulfate (325 mg ferrous sulfate), 1 tablet, oral, Daily  [Held by provider] lisinopril, 10 mg, oral, q AM  loratadine, 10 mg, oral, Daily  magnesium oxide, 400 mg, oral, Daily  [Held by provider] metoprolol succinate XL, 25 mg, oral, Daily  montelukast, 10 mg, oral, q AM  nicotine, 1 patch, transdermal, Daily  PARoxetine, 40 mg, oral, Daily  polyethylene glycol, 17 g, oral, Daily  simvastatin, 20 mg, oral, Nightly  traZODone, 100 mg, oral, Nightly  zinc oxide, 1 Application, Topical, BID      Continuous medications  sodium chloride 0.9%, 75 mL/hr, Last Rate: 75 mL/hr (02/20/24 2335)      PRN medications  PRN medications: HYDROmorphone, ondansetron, oxyCODONE, oxyCODONE    Results for orders placed or performed during the hospital encounter of 02/14/24 (from the past 24 hour(s))   Prepare RBC: 1 Units   Result Value Ref Range     PRODUCT CODE Z6244K29     Unit Number [id number]     Unit ABO A     Unit RH POS     XM INTEP COMP     Dispense Status TR     Blood Expiration Date March 04, 2024 23:59 EST     PRODUCT BLOOD TYPE      UNIT VOLUME 350    CBC   Result Value Ref Range    WBC 9.4 4.4 - 11.3 x10*3/uL    nRBC 0.0 0.0 - 0.0 /100 WBCs    RBC 2.86 (L) 4.00 - 5.20 x10*6/uL    Hemoglobin 8.2 (L) 12.0 - 16.0 g/dL    Hematocrit 26.6 (L) 36.0 - 46.0 %    MCV 93 80 - 100 fL    MCH 28.7 26.0 - 34.0 pg    MCHC 30.8 (L) 32.0 - 36.0 g/dL    RDW 18.7 (H) 11.5 - 14.5 %    Platelets 282 150 - 450 x10*3/uL       This patient has a urinary catheter   Reason for the urinary catheter remaining today? Urine catheter unnecessary, will be removed today    Assessment/Plan   Principal Problem:    Wound dehiscence  Active Problems:    Class 1 obesity with body mass index (BMI) of 30.0 to 30.9 in adult    Sinusitis    Transfusion history    Arthritis    Hyponatremia    48 year female POD 1 from right hip revision.    - AM labs reviewed  - toe touch weight bearing for transfers other, otherwise NWB RLE  - PT evaluation  - monitor and document hemovac drain output every shift   - maintain two pillows in between legs when in bed  - pain control per primary  - DVT prophylaxis per primary  - ortho will continue to follow          I spent 30 minutes in the professional and overall care of this patient.    Ester Fitch, APRN-CNP

## 2024-02-21 NOTE — PROGRESS NOTES
02/21/24 1500   Discharge Planning   Living Arrangements Other (Comment)   Support Systems Friends/neighbors;/   Assistance Needed From [facility] for SNF, Patient A&0x3, max assist with walker for ambulation, room air, wound vac at facility   Type of Residence Skilled nursing facility   Do you have animals or pets at home? Yes   Type of Animals or Pets cat   Who is requesting discharge planning? Provider   Home or Post Acute Services Post acute facilities (Rehab/SNF/etc)   Type of Post Acute Facility Services Skilled nursing   Patient expects to be discharged to: Return to [facility] SNF, patient will need Ancef 2gm IV TID for 6 weeeks from 2/20. Patient has PICC in place. Awaiting wound vac /keren reece.   Patient Choice   Provider Choice list and CMS website (https://medicare.gov/care-compare#search) for post-acute Quality and Resource Measure Data were provided and reviewed with: Patient   Patient / Family choosing to utilize agency / facility established prior to hospitalization Yes

## 2024-02-21 NOTE — CARE PLAN
The patient's goals for the shift include  remain free from pain.    The clinical goals for the shift include patient will remain HDS throughout the shift

## 2024-02-21 NOTE — PROGRESS NOTES
Blanca Hamilton is a 48 y.o. female on day 7 of admission presenting with Wound dehiscence.    Subjective     Episode of hypotension overnight s/p  ml bolus x2  Started on  ml/hr this morning  Seen and examined today resting in bed comfortably.  Pain level tolerated with pain regimen    Objective   Range of Vitals (last 24 hours)  Heart Rate:  []   Temp:  [36.2 °C (97.2 °F)-37 °C (98.6 °F)]   Resp:  [8-17]   BP: ()/(46-73)   SpO2:  [94 %-100 %]   Daily Weight  02/14/24 : 89.4 kg (197 lb 1.6 oz)    Body mass index is 29.11 kg/m².    Physical Exam  Constitutional:       Appearance: Normal appearance.   HENT:      Head: Normocephalic and atraumatic.      Mouth/Throat:      Mouth: Mucous membranes are moist.      Pharynx: Oropharynx is clear.   Eyes:      Pupils: Pupils are equal, round, and reactive to light.   Cardiovascular:      Rate and Rhythm: Normal rate and regular rhythm.      Heart sounds: Normal heart sounds.   Pulmonary:      Effort: Pulmonary effort is normal.      Breath sounds: Normal breath sounds.   Abdominal:      General: Abdomen is flat. Bowel sounds are normal.      Palpations: Abdomen is soft.   Musculoskeletal:      Cervical back: Normal range of motion.   Neurological:      Mental Status: She is alert.         Antibiotics  sodium chloride 0.9 % bolus 3,120 mL  acetaminophen (Tylenol) tablet 975 mg  ARIPiprazole (Abilify) tablet 15 mg  aspirin chewable tablet 81 mg  ceFAZolin in dextrose (iso-os) (Ancef) IVPB 2 g  famotidine (Pepcid) tablet 40 mg  ferrous sulfate (325 mg ferrous sulfate) tablet 1 tablet  lactulose 20 gram/30 mL oral solution 20 g  lisinopril tablet 10 mg  loratadine (Claritin) tablet 10 mg  magnesium oxide (Mag-Ox) tablet 400 mg  metoprolol succinate XL (Toprol-XL) 24 hr tablet 25 mg  montelukast (Singulair) tablet 10 mg  nicotine (Nicoderm CQ) 21 mg/24 hr patch 1 patch  PARoxetine (Paxil) tablet 40 mg  polyethylene glycol (Glycolax, Miralax) packet 17  g  simvastatin (Zocor) tablet 20 mg  traZODone (Desyrel) tablet 100 mg  zinc oxide 40 % ointment 1 Application  piperacillin-tazobactam-dextrose (Zosyn) IV 3.375 g  sodium chloride 0.9% infusion  oxyCODONE (Roxicodone) immediate release tablet 5 mg  oxyCODONE (Roxicodone) immediate release tablet 10 mg  HYDROmorphone (Dilaudid) injection 0.4 mg  vancomycin (Vancocin) in dextrose 5 % water (D5W) 500 mL IV 1,500 mg  atorvastatin (Lipitor) tablet    ceFAZolin in dextrose (iso-os) (Ancef) IVPB 1 g  enoxaparin (Lovenox) syringe 40 mg  ceFAZolin in dextrose (iso-os) (Ancef) IVPB 2 g  ondansetron (Zofran) injection 4 mg      Relevant Results  Labs  Results from last 72 hours   Lab Units 02/21/24 0621 02/20/24 0512 02/19/24  0527   WBC AUTO x10*3/uL 9.4 5.3 6.8   HEMOGLOBIN g/dL 8.2* 8.2* 8.7*   HEMATOCRIT % 26.6* 26.4* 27.9*   PLATELETS AUTO x10*3/uL 282 321 355       Results from last 72 hours   Lab Units 02/21/24 0622 02/20/24 0512 02/19/24  0527   SODIUM mmol/L 132* 134* 133*   POTASSIUM mmol/L 4.2 3.9 4.3   CHLORIDE mmol/L 102 103 102   CO2 mmol/L 25 27 26   BUN mg/dL 7 5* 4*   CREATININE mg/dL 0.30* 0.30* 0.23*   GLUCOSE mg/dL 79 72* 82   CALCIUM mg/dL 7.7* 7.6* 7.8*   ANION GAP mmol/L 9* 8* 9*   EGFR mL/min/1.73m*2 >90 >90 >90   PHOSPHORUS mg/dL 2.8 3.2 2.9       Results from last 72 hours   Lab Units 02/21/24 0622 02/20/24 0512 02/19/24  0527   ALK PHOS U/L 101 106 108   BILIRUBIN TOTAL mg/dL 0.3 0.2 0.2   PROTEIN TOTAL g/dL 4.7* 4.9* 5.2*   ALT U/L <3* <3* 3*   AST U/L 7* 6* 7*   ALBUMIN g/dL 2.4* 2.5* 2.6*       Estimated Creatinine Clearance: 125 mL/min (A) (by C-G formula based on SCr of 0.3 mg/dL (L)).  C-Reactive Protein   Date Value Ref Range Status   02/20/2024 0.42 <1.00 mg/dL Final   02/19/2024 0.68 <1.00 mg/dL Final   02/18/2024 1.13 (H) <1.00 mg/dL Final     Microbiology  No results found for the last 14 days.    Imaging  reviwed        Assessment/Plan     Sepsis 2/2 Wound Dehiscence c/b R ALEXA  infection s/p revision & hardware removal on 2/6  2.   Extensive fat necrosis / Hx of MSSA wound culture      Recommendations:     - Continue IV Ancef 2g TID (Discharge with 6 week course - timed from the most recent intervention 2/20/24)  - Wound Cx (2/6): MSSA +  - Blood Cx (2/14): Negative x2  - CRP levels down trending (7.15 -> 2.39 -> 1.13)  - Ortho following -> s/p R total hip revision 2/20  - Wound care following     Thank you for the consult, will continue to follow. Please reach out with any questions/concerns.      Gamal Quiroz MD  Internal Medicine, PGY-3          Attending note : the patient was evaluated, the note was reviewed and up dated  Right hip prosthesis infection sp I&D with partial hardware exchange, MSSA on the cultures, sp repeat I&D  Recommendations :  Continue Cefazolin  Influenza vaccine / Tamiflu prophylaxis  Discussed with the medical team        Frank Modi M.D

## 2024-02-21 NOTE — PROGRESS NOTES
"Physical Therapy    Physical Therapy Treatment    Patient Name: Blanca Hamilton  MRN: [id number]  Today's Date: 2/21/2024  Time Calculation  Start Time: 1030  Stop Time: 1053  Time Calculation (min): 23 min       Assessment/Plan   PT Assessment  PT Assessment Results: Decreased strength, Decreased endurance, Impaired balance, Decreased mobility  End of Session Communication: Bedside nurse  Assessment Comment: pt not willing to att standing, stating she \"know herself\".  Pt is able to sit EOB w/good tolerance, no LOB, and appropriate levels of fatigue afterwards  End of Session Patient Position: Alarm on, Bed, 3 rail up (patient sitting EOB I with pillow supports, breakfast set up for patient , call light in reach, SAMANTHA Villatoro aware)     PT Plan  Treatment/Interventions: Bed mobility, Transfer training, Balance training, Strengthening, Endurance training  PT Plan: Skilled PT  PT Frequency: 4 times per week  PT Discharge Recommendations: Moderate intensity level of continued care  Equipment Recommended upon Discharge: Wheeled walker, Wheelchair  PT Recommended Transfer Status: Assist x2  PT - OK to Discharge: Yes      General Visit Information:   PT  Visit  PT Received On: 02/21/24  General  Reason for Referral: 49 yo female referred to PT for R hip wound dehiscence, impaired mobility,  Referred By: Brando Mascorro  Past Medical History Relevant to Rehab: Anxiety, Arthritis, Class 1 obesity with body mass index (BMI) of 30.0 to 30.9 in adult (02/02/2024), Depression, GERD (gastroesophageal reflux disease), HTN (hypertension), Hyperlipidemia; R total hip arthroplasty on 1/2/4, but had to get subsequent revision on 1/5 for prosthetic fracture. 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 Cultures were positive for MSSA and patient was started on cefazolin. Then underwent ALEXA revision and partial hardware removal with Dr. Jalloh on 2/6/24. Patient was discharged to SNF on 2/11 with wound vac, a PICC " "line for 6 weeks of cefazolin, and ASA for DVT ppx.  Prior to Session Communication: Bedside nurse (Per SAMANTHA Villatoro \"patient had procedure yesterday to remove 1 wound vac and added 2 accordion drains, still has wound vac left hip and is NWB\")  General Comment: AXOX3 self limiting \"I cant put weight in this foot, so I cant get up\" stated patient. Wound vac and hemovac x2 to right hip wound, IV, bilat hip and knee flexion contractures    Subjective   Precautions:     Vital Signs:       Objective   Pain:  Pain Assessment  Pain Assessment: 0-10  Pain Score: 5 - Moderate pain (5/10 right hip pain. SAMANTHA Villatoro present and states \"she has had 10 of Oxy this morning\")  Cognition:  Cognition  Overall Cognitive Status: Within Functional Limits  Orientation Level: Oriented X4  Attention: Within Functional Limits  Postural Control:     Extremity/Trunk Assessments:    Activity Tolerance:  Activity Tolerance  Endurance: Tolerates 30+ min exercise without fatigue  Treatments:  Therapeutic Exercise  Therapeutic Exercise Performed: Yes  Therapeutic Exercise Activity 1: long sitting ankle pumps, quad sets, heel slides  x10 reps , unable to get hp or knees to neutral, (limited bilat hip/knee mobility bilat LE's)                        Bed Mobility  Bed Mobility: Yes  Bed Mobility 1  Bed Mobility 1: Supine to sitting, Sitting to supine  Level of Assistance 1: Moderate assistance, Moderate verbal cues, Moderate tactile cues  Bed Mobility 2  Bed Mobility  2: Scooting  Level of Assistance 2: Moderate assistance    Ambulation/Gait Training  Ambulation/Gait Training Performed: No  Transfers  Transfer: No    Stairs  Stairs: No                Outcome Measures:  [facility] Basic Mobility  Turning from your back to your side while in a flat bed without using bedrails: A lot  Moving from lying on your back to sitting on the side of a flat bed without using bedrails: A lot  Moving to and from bed to chair (including a wheelchair): Total  Standing up from " a chair using your arms (e.g. wheelchair or bedside chair): Total  To walk in hospital room: Total  Climbing 3-5 steps with railing: Total  Basic Mobility - Total Score: 8    Education Documentation  Body Mechanics, taught by Adelina Duncan PTA at 2/21/2024 11:27 AM.  Learner: Patient  Readiness: Acceptance  Method: Explanation  Response: Verbalizes Understanding    Precautions, taught by Adelina Duncan PTA at 2/21/2024 11:27 AM.  Learner: Patient  Readiness: Acceptance  Method: Explanation  Response: Verbalizes Understanding    ADL Training, taught by Adelina Duncan PTA at 2/21/2024 11:27 AM.  Learner: Patient  Readiness: Acceptance  Method: Explanation  Response: Verbalizes Understanding    Precautions, taught by Adelina Duncan PTA at 2/21/2024 11:27 AM.  Learner: Patient  Readiness: Acceptance  Method: Explanation  Response: Verbalizes Understanding    Body Mechanics, taught by Adelina Duncan PTA at 2/21/2024 11:27 AM.  Learner: Patient  Readiness: Acceptance  Method: Explanation  Response: Verbalizes Understanding    Mobility Training, taught by Adelina Duncan PTA at 2/21/2024 11:27 AM.  Learner: Patient  Readiness: Acceptance  Method: Explanation  Response: Verbalizes Understanding    Education Comments  No comments found.        OP EDUCATION:       Encounter Problems       Encounter Problems (Active)       Balance       STG - Maintains static standing balance with upper extremity support x 1' maintaining weight bearing status  (Progressing)       Start:  02/14/24    Expected End:  02/22/24            STG - Maintains dynamic sitting balance with upper extremity support x 10'  (Progressing)       Start:  02/14/24    Expected End:  02/22/24               Pain - Adult          Transfers       STG - Patient will perform bed mobility independently  (Progressing)       Start:  02/14/24    Expected End:  02/22/24            STG - Patient will transfer sit to and from stand min A, maintaining weight bearing status   (Progressing)       Start:  02/14/24    Expected End:  02/22/24

## 2024-02-21 NOTE — PROGRESS NOTES
Patient: Blanca Hamilton Age: 48 y.o.   Gender: female Room/bed: 241/241-A     Attending: Ashkan Crawford MD  Code Status:  Full Code    Overnight Events     None    Subjective   Seen and examined sitting upright in bed, with flexion at the hips bilaterally.  Admits to pain at baseline, localized to the right lateral hip and lateral thigh.  Current regimen is appropriately covering pain.  Sleeping and eating 3 meals per day.    Denies chest pain/pressure, abdominal pain, nausea, vomiting, fever, chills, headaches.     Objective    Physical Exam  Constitutional:       General: She is not in acute distress.     Appearance: She is not ill-appearing.   HENT:      Head: Normocephalic and atraumatic.   Cardiovascular:      Rate and Rhythm: Normal rate and regular rhythm.      Pulses: Normal pulses.      Heart sounds: Normal heart sounds.   Pulmonary:      Effort: Pulmonary effort is normal. No respiratory distress.      Breath sounds: No stridor.   Abdominal:      General: Abdomen is flat. Bowel sounds are normal. There is no distension.      Palpations: Abdomen is soft. There is no mass.      Tenderness: There is no abdominal tenderness.   Musculoskeletal:         General: Tenderness and deformity present.      Comments: Right hip near greater trochanter to distal lateral thigh tender to palpation.  Surgical site clean and dry, with surgical packing in place. Deep and surgical drains placed and appropriately draining.  Tightly wrapped, without extravasation, hematoma formation or seepage. Serosanguineous drainage noted in both tubes.  Minimal output in collecting system.    Skin:     Findings: Bruising present.   Neurological:      General: No focal deficit present.      Mental Status: She is alert and oriented to person, place, and time. Mental status is at baseline.   Psychiatric:         Mood and Affect: Mood normal.         Behavior: Behavior normal.        Temp:  [35.6 °C (96 °F)-37 °C (98.6 °F)] 35.6 °C (96 °F)  Heart  Rate:  [] 108  Resp:  [8-17] 10  BP: ()/(46-73) 94/55    Intake/Output Summary (Last 24 hours) at 2/21/2024 1214  Last data filed at 2/21/2024 1132  Gross per 24 hour   Intake 4910 ml   Output 1695 ml   Net 3215 ml       Vitals:    02/14/24 0227   Weight: 89.4 kg (197 lb 1.6 oz)       I/Os    Intake/Output Summary (Last 24 hours) at 2/21/2024 1214  Last data filed at 2/21/2024 1132  Gross per 24 hour   Intake 4910 ml   Output 1695 ml   Net 3215 ml       Labs:   Results from last 72 hours   Lab Units 02/21/24  0622 02/20/24  0512 02/19/24  0527   SODIUM mmol/L 132* 134* 133*   POTASSIUM mmol/L 4.2 3.9 4.3   CHLORIDE mmol/L 102 103 102   CO2 mmol/L 25 27 26   BUN mg/dL 7 5* 4*   CREATININE mg/dL 0.30* 0.30* 0.23*   GLUCOSE mg/dL 79 72* 82   CALCIUM mg/dL 7.7* 7.6* 7.8*   ANION GAP mmol/L 9* 8* 9*   EGFR mL/min/1.73m*2 >90 >90 >90   PHOSPHORUS mg/dL 2.8 3.2 2.9        Results from last 72 hours   Lab Units 02/21/24  0621 02/20/24  0512 02/19/24  0527   WBC AUTO x10*3/uL 9.4 5.3 6.8   HEMOGLOBIN g/dL 8.2* 8.2* 8.7*   HEMATOCRIT % 26.6* 26.4* 27.9*   PLATELETS AUTO x10*3/uL 282 321 355        Lab Results   Component Value Date    CALCIUM 7.7 (L) 02/21/2024    PHOS 2.8 02/21/2024      Lab Results   Component Value Date    CRP 0.42 02/20/2024      [unfilled]     Micro/ID:   Susceptibility data from last 90 days.  Collected Specimen Info Organism Clindamycin Erythromycin Oxacillin Tetracycline Trimethoprim/Sulfamethoxazole Vancomycin   02/06/24 Swab from HIP ARTHROPLASTY RIGHT Methicillin Susceptible Staphylococcus aureus (MSSA) S S S R S S   02/06/24 Swab from HIP ARTHROPLASTY RIGHT Staphylococcus aureus         02/06/24 Swab from HIP ARTHROPLASTY RIGHT Staphylococcus aureus         02/02/24 Tissue/Biopsy from Wound/Tissue Methicillin Susceptible Staphylococcus aureus (MSSA) S S S R S S       Lab Results   Component Value Date    BLOODCULT No growth at 4 days -  FINAL REPORT 02/14/2024     Images:  Lower  extremity venous duplex bilateral            [facility]  [address] [address]   Tel [phone] and Fax [phone]       Vascular Lab Report  [facility] LOWER EXTREMITY VENOUS DUPLEX BILATERAL       Patient Name:      ZULEIKA PAUL Mcnamara Physician:  [id number] Prieto Loza MD  Study Date:        2/14/2024            Ordering Physician: [id number] GEORGE AMADOR  MRN/PID:           [id number]             Technologist:       Rosa Pierre [facility]  Accession#:        [id number]         Technologist 2:     Hattie Raymond  Date of Birth/Age: 1975 / 48      Encounter#:         [id number]                     years  Gender:            F  Admission Status:  Inpatient            Location Performed: [facility]       Diagnosis/ICD: Localized (leg) edema-R60.0  CPT Codes:     76729 Peripheral venous duplex scan for DVT complete       CONCLUSIONS:  Right Lower Venous: No evidence of acute deep vein thrombus visualized in the right lower extremity.  Left Lower Venous: No evidence of acute deep vein thrombus visualized in the left lower extremity.     Additional Findings:  Technically difficult study due to patient movement and inability to position.       Imaging & Doppler Findings:     Right                 Compressible Thrombus        Flow  Distal External Iliac     Yes        None   Spontaneous/Phasic  CFV                       Yes        None   Spontaneous/Phasic  PFV                       Yes        None  FV Proximal               Yes        None   Spontaneous/Phasic  FV Mid                    Yes        None  FV Distal                 Yes        None  Popliteal                 Yes        None   Spontaneous/Phasic  Peroneal                  Yes        None  PTV                       Yes        None       Left                  Compress Thrombus         Flow  Distal External Iliac   Yes      None   Spontaneous/Phasic  CFV                     Yes      None   Spontaneous/Phasic  PFV                     Yes      None  FV Proximal             Yes      None   Spontaneous/Phasic  FV Mid                  Yes      None  FV Distal               Yes      None  Popliteal               Yes      None   Spontaneous/Phasic  Peroneal                Yes      None  PTV                     Yes      None       [id number] Prieto Loza MD  Electronically signed by [id number] Prieto Loza MD on 2/15/2024 at 5:44:38 PM       ** Final **     Meds    Scheduled medications  acetaminophen, 975 mg, oral, q8h  ARIPiprazole, 15 mg, oral, Nightly  [Held by provider] aspirin, 81 mg, oral, BID  ceFAZolin, 2 g, intravenous, q8h  enoxaparin, 40 mg, subcutaneous, q24h  famotidine, 40 mg, oral, BID  ferrous sulfate (325 mg ferrous sulfate), 1 tablet, oral, Daily  [Held by provider] lisinopril, 10 mg, oral, q AM  loratadine, 10 mg, oral, Daily  magnesium oxide, 400 mg, oral, Daily  [Held by provider] metoprolol succinate XL, 25 mg, oral, Daily  montelukast, 10 mg, oral, q AM  nicotine, 1 patch, transdermal, Daily  PARoxetine, 40 mg, oral, Daily  polyethylene glycol, 17 g, oral, Daily  simvastatin, 20 mg, oral, Nightly  traZODone, 100 mg, oral, Nightly  zinc oxide, 1 Application, Topical, BID      Continuous medications  lactated Ringer's, 100 mL/hr, Last Rate: 100 mL/hr (02/21/24 1132)      PRN medications  PRN medications: HYDROmorphone, ondansetron, oxyCODONE, oxyCODONE     Assessment and Plan    Blanca Hamilton is a 48 y.o. 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 Initially met Sepsis criteria. Admitted for further management of dehiscence and SSI.     Acute Medical Issues:  #Sepsis secondary to reccurent post op wound infection (MSSA) of right hip arthroplasty   #Hx right total hip arthroplasty w/  revision (1/5/24) for prosthetic fx  #Hx hardware removal for prosthetic joint infection (2/6/24)  -Discharged on 2/11 with wound vac + PICC line for 6-week course of cefazolin   -Previous tissue/Wound Culture (2/6): MSSA, only resistant to Tetracycline   -2/13/24: 2/4 SIRS criteria on admission. S/p sepsis fluids in ED. Lactate 0.7. Hgb stable.  -pain control: scheduled tylenol, oxy 5 mod, oxy 10 severe, dilaudid 0.4 PRN breakthrough  -toe touch weight bearing for transfers other, otherwise NWB RLE  -monitor and document hemovac drain output every shift; determine length of time drains need to remain per ortho  -DO NOT LAY on right hip, maintain two pillows in between legs when in bed  -Continue IV Ancef 2g TID (Discharge with 6 week course - timed from the most recent intervention 2/20/24)  -Blood Cx (2/14): Negative x2  -CRP levels down trending (7.15 -> 2.39 -> 1.13)  -OR 2/20/24 right hip revision  -PT/OT eval, needs pre-cert    #Hypovolemic hyponatremia   - Na 129 on admission  - Likely related to poor p.o. intake and in postop setting  - Nutrition consult, recommend encouraging p.o. intake and ensure supplements  - Bolus and continuous fluids today for hypotension  - If remains hypotensive, add midodrine  - If midodrine does not improve hypotension, consider transfer to ICU for pressors    #B/l LE edema - chronic  -Bilateral duplex negative for acute DVT bilaterally  -Encourage bilateral leg elevation as tolerated    Chronic Medical Issues:  # GERD: c/w Pepcid   # HTN: c/w Metoprolol restarted, hold home lisinopril in setting of hypotension  # HLD: c/w Simvastatin  # Depression: c/w Abilify + Paroxetine +Trazodone  # Chronic anemia: iron supplement  # Tobacco use: nicotine patch    Fluids: None  Electrolytes: replete as needed  Nutrition: Regular  GI PPX: None   DVT PPX: Lovenox     Access: PICC Line, PIV's  Antibiotics: Cefazolin 2g TID (2/15 - )  Oxygenation: Room Air    Dispo: Admitted onto the floor for  postoperative wound dehiscence and surgical site infection. OR on 2/20/24 for right hip revision. 6w Abx from most recent intervention. Will need pre-CERT prior to discharge.    Barry Bledsoe, DO   PGY 1

## 2024-02-21 NOTE — PROGRESS NOTES
Occupational Therapy    Occupational Therapy Treatment    Name: Blanca Hamilton  MRN: [id number]  : 1975  Date: 24  Time Calculation  Start Time: 1101  Stop Time: 1118  Time Calculation (min): 17 min    Assessment:  OT Assessment: Pt continues to present with decreased endurance, decreased ADL, decreased functional mobility. Continued skilled OT recommended to maximize pt safety and indepedence in order to return to PLOF.  Prognosis: Good  Barriers to Discharge: None  Evaluation/Treatment Tolerance: Patient limited by fatigue (Pt educated on energy conservation and work simplification principles in course of session. Requires both encouragement and limiting to sustain activities.)  Medical Staff Made Aware: Yes  End of Session Communication: Bedside nurse, PCT/NA/CTA  End of Session Patient Position: Alarm on, Bed, 3 rail up  Plan:  Treatment Interventions: ADL retraining, Functional transfer training, Patient/family training, Equipment evaluation/education, Compensatory technique education  OT Frequency: 3 times per week  OT Discharge Recommendations: Moderate intensity level of continued care  Equipment Recommended upon Discharge: Wheeled walker, Wheelchair  OT Recommended Transfer Status: Assist of 1  OT - OK to Discharge: Yes (In accord with OT POC)    Subjective   Previous Visit Info:  OT Last Visit  OT Received On: 24  General:  General  Reason for Referral: 47 yo female referred to PT for R hip wound dehiscence, impaired mobility,  Referred By: Brando Mascorro  Past Medical History Relevant to Rehab: Anxiety, Arthritis, Class 1 obesity with body mass index (BMI) of 30.0 to 30.9 in adult (2024), Depression, GERD (gastroesophageal reflux disease), HTN (hypertension), Hyperlipidemia; R total hip arthroplasty on , but had to get subsequent revision on  for prosthetic fracture. 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 Cultures were positive for MSSA  and patient was started on cefazolin. Then underwent ALEXA revision and partial hardware removal with Dr. Jalloh on 2/6/24. Patient was discharged to SNF on 2/11 with wound vac, a PICC line for 6 weeks of cefazolin, and ASA for DVT ppx.  Missed Visit: Yes  Missed Visit Reason: Patient in a medical procedure (Pt off floor in OR.)  Family/Caregiver Present: No  Prior to Session Communication: Bedside nurse  General Comment: Pt self-limiting, requiring both encouragement and limiting to complete LB dressing task.  Precautions:  LE Weight Bearing Status: Right Toe-Touch Weight Bearing  Medical Precautions: Fall precautions, Infection precautions (Droplet isolation precautions.)  Post-Surgical Precautions: Right hip precautions  Vitals:     Pain Assessment:  Pain Assessment  Pain Assessment: 0-10  Pain Score: 5 - Moderate pain     Objective   Activities of Daily Living: LE Dressing  LE Dressing: Yes  LE Dressing Adaptive Equipment: Reacher, Sock aide, Dressing stick  Sock Level of Assistance: Moderate assistance  LE Dressing Where Assessed: Edge of bed  LE Dressing Comments: Significantly increased time on task required in course of session and task.    Outcome Measures:  [facility] Daily Activity  Putting on and taking off regular lower body clothing: A lot  Bathing (including washing, rinsing, drying): A lot  Putting on and taking off regular upper body clothing: A lot  Toileting, which includes using toilet, bedpan or urinal: A lot  Taking care of personal grooming such as brushing teeth: A lot  Eating Meals: None  Daily Activity - Total Score: 14    Education Documentation  Body Mechanics, taught by SARITA Landaverde at 2/21/2024 12:04 PM.  Learner: Patient  Readiness: Acceptance  Method: Explanation, Demonstration  Response: Verbalizes Understanding, Demonstrated Understanding, Needs Reinforcement  Comment: Pt required single step cues for tool use and to problem solve task.    Precautions, taught by SARITA Landaverde at  2/21/2024 12:04 PM.  Learner: Patient  Readiness: Acceptance  Method: Explanation, Demonstration  Response: Verbalizes Understanding, Demonstrated Understanding, Needs Reinforcement  Comment: Pt required single step cues for tool use and to problem solve task.    ADL Training, taught by SARITA Landaverde at 2/21/2024 12:04 PM.  Learner: Patient  Readiness: Acceptance  Method: Explanation, Demonstration  Response: Verbalizes Understanding, Demonstrated Understanding, Needs Reinforcement  Comment: Pt required single step cues for tool use and to problem solve task.    Education Comments  Pt compliant with instruction with increased time on task.    Goals:  Encounter Problems       Encounter Problems (Active)       OT Goals       Pt will increase endurance to tolerate 15min of OOB activity with no more than 1 rest break in order to increase ability to engage in ADL completion.  (Progressing)       Start:  02/15/24    Expected End:  02/22/24            Pt will demo and/or verbalize 2-3 energy conservation techniques to incorporate into functional mobility or ADL to improve performance and increase independence.  (Progressing)       Start:  02/15/24    Expected End:  02/22/24            Pt will demo increased functional mobility/pivot transfer independence to tolerate tasks necessary to complete ADL routine.  (Progressing)       Start:  02/15/24    Expected End:  02/22/24            Pt will demo ADL routine and meaningful daily activities using modifications as needed  (Progressing)       Start:  02/15/24    Expected End:  02/22/24

## 2024-02-21 NOTE — ANESTHESIA POSTPROCEDURE EVALUATION
Patient: Blanca Hamilton    Procedure Summary       Date: 02/20/24 Room / Location: [facility] OR 03 / [facility] OR    Anesthesia Start: 1219 Anesthesia Stop: 1550    Procedure: REVISION ARTHROPLASTY TOTAL HIP (Right: Hip) Diagnosis:       Wound dehiscence      (Wound dehiscence [T81.30XA])    Surgeons: Felix Jalloh MD Responsible Provider: ELVIRA Tovar    Anesthesia Type: general ASA Status: 3            Anesthesia Type: general    Vitals Value Taken Time   BP 92/56 02/20/24 1630   Temp 36.5 °C (97.7 °F) 02/20/24 1545   Pulse 93 02/20/24 1630   Resp  02/21/24 1300   SpO2 100 % 02/20/24 1630       Anesthesia Post Evaluation    Patient location during evaluation: PACU  Patient participation: complete - patient participated  Level of consciousness: awake  Pain score: 2  Pain management: adequate  Multimodal analgesia pain management approach  Airway patency: patent  Two or more strategies used to mitigate risk of obstructive sleep apnea  Cardiovascular status: acceptable  Respiratory status: acceptable  Hydration status: acceptable  Postoperative Nausea and Vomiting: none    No notable events documented.

## 2024-02-22 LAB
ALBUMIN SERPL BCP-MCNC: 2.2 G/DL (ref 3.4–5)
ALP SERPL-CCNC: 92 U/L (ref 33–110)
ALT SERPL W P-5'-P-CCNC: <3 U/L (ref 7–45)
ANION GAP SERPL CALC-SCNC: 9 MMOL/L (ref 10–20)
AST SERPL W P-5'-P-CCNC: 9 U/L (ref 9–39)
BILIRUB SERPL-MCNC: 0.2 MG/DL (ref 0–1.2)
BUN SERPL-MCNC: 6 MG/DL (ref 6–23)
CALCIUM SERPL-MCNC: 7.3 MG/DL (ref 8.6–10.3)
CHLORIDE SERPL-SCNC: 102 MMOL/L (ref 98–107)
CO2 SERPL-SCNC: 27 MMOL/L (ref 21–32)
CREAT SERPL-MCNC: 0.21 MG/DL (ref 0.5–1.05)
EGFRCR SERPLBLD CKD-EPI 2021: >90 ML/MIN/1.73M*2
ERYTHROCYTE [DISTWIDTH] IN BLOOD BY AUTOMATED COUNT: 18.7 % (ref 11.5–14.5)
GLUCOSE SERPL-MCNC: 73 MG/DL (ref 74–99)
HCT VFR BLD AUTO: 23.1 % (ref 36–46)
HGB BLD-MCNC: 7.3 G/DL (ref 12–16)
MCH RBC QN AUTO: 29.3 PG (ref 26–34)
MCHC RBC AUTO-ENTMCNC: 31.6 G/DL (ref 32–36)
MCV RBC AUTO: 93 FL (ref 80–100)
NRBC BLD-RTO: 0 /100 WBCS (ref 0–0)
PLATELET # BLD AUTO: 230 X10*3/UL (ref 150–450)
POTASSIUM SERPL-SCNC: 4 MMOL/L (ref 3.5–5.3)
PROT SERPL-MCNC: 4.3 G/DL (ref 6.4–8.2)
RBC # BLD AUTO: 2.49 X10*6/UL (ref 4–5.2)
SODIUM SERPL-SCNC: 134 MMOL/L (ref 136–145)
WBC # BLD AUTO: 6.6 X10*3/UL (ref 4.4–11.3)

## 2024-02-22 PROCEDURE — 1200000002 HC GENERAL ROOM WITH TELEMETRY DAILY

## 2024-02-22 PROCEDURE — 2500000004 HC RX 250 GENERAL PHARMACY W/ HCPCS (ALT 636 FOR OP/ED)

## 2024-02-22 PROCEDURE — 2500000002 HC RX 250 W HCPCS SELF ADMINISTERED DRUGS (ALT 637 FOR MEDICARE OP, ALT 636 FOR OP/ED): Performed by: INTERNAL MEDICINE

## 2024-02-22 PROCEDURE — 99232 SBSQ HOSP IP/OBS MODERATE 35: CPT | Performed by: NURSE PRACTITIONER

## 2024-02-22 PROCEDURE — 2500000001 HC RX 250 WO HCPCS SELF ADMINISTERED DRUGS (ALT 637 FOR MEDICARE OP)

## 2024-02-22 PROCEDURE — 85027 COMPLETE CBC AUTOMATED: CPT

## 2024-02-22 PROCEDURE — S4991 NICOTINE PATCH NONLEGEND: HCPCS

## 2024-02-22 PROCEDURE — 2500000002 HC RX 250 W HCPCS SELF ADMINISTERED DRUGS (ALT 637 FOR MEDICARE OP, ALT 636 FOR OP/ED)

## 2024-02-22 PROCEDURE — 97530 THERAPEUTIC ACTIVITIES: CPT | Mod: GO,CO

## 2024-02-22 PROCEDURE — 99232 SBSQ HOSP IP/OBS MODERATE 35: CPT

## 2024-02-22 PROCEDURE — 80053 COMPREHEN METABOLIC PANEL: CPT

## 2024-02-22 PROCEDURE — 97535 SELF CARE MNGMENT TRAINING: CPT | Mod: GO,CO

## 2024-02-22 RX ADMIN — OSELTAMAVIR PHOSPHATE 75 MG: 75 CAPSULE ORAL at 08:29

## 2024-02-22 RX ADMIN — TRAZODONE HYDROCHLORIDE 100 MG: 50 TABLET ORAL at 20:34

## 2024-02-22 RX ADMIN — MONTELUKAST 10 MG: 10 TABLET, FILM COATED ORAL at 08:29

## 2024-02-22 RX ADMIN — SODIUM CHLORIDE, POTASSIUM CHLORIDE, SODIUM LACTATE AND CALCIUM CHLORIDE 100 ML/HR: 600; 310; 30; 20 INJECTION, SOLUTION INTRAVENOUS at 22:27

## 2024-02-22 RX ADMIN — SODIUM CHLORIDE, POTASSIUM CHLORIDE, SODIUM LACTATE AND CALCIUM CHLORIDE 100 ML/HR: 600; 310; 30; 20 INJECTION, SOLUTION INTRAVENOUS at 11:21

## 2024-02-22 RX ADMIN — SODIUM CHLORIDE, POTASSIUM CHLORIDE, SODIUM LACTATE AND CALCIUM CHLORIDE 500 ML: 600; 310; 30; 20 INJECTION, SOLUTION INTRAVENOUS at 09:52

## 2024-02-22 RX ADMIN — FAMOTIDINE 40 MG: 20 TABLET ORAL at 20:34

## 2024-02-22 RX ADMIN — Medication 400 MG: at 08:28

## 2024-02-22 RX ADMIN — OXYCODONE HYDROCHLORIDE 10 MG: 5 TABLET ORAL at 05:16

## 2024-02-22 RX ADMIN — OXYCODONE HYDROCHLORIDE 5 MG: 5 TABLET ORAL at 20:34

## 2024-02-22 RX ADMIN — ARIPIPRAZOLE 15 MG: 5 TABLET ORAL at 20:34

## 2024-02-22 RX ADMIN — PAROXETINE 40 MG: 20 TABLET, FILM COATED ORAL at 08:29

## 2024-02-22 RX ADMIN — CEFAZOLIN SODIUM 2 G: 2 INJECTION, SOLUTION INTRAVENOUS at 13:15

## 2024-02-22 RX ADMIN — FAMOTIDINE 40 MG: 20 TABLET ORAL at 08:29

## 2024-02-22 RX ADMIN — CEFAZOLIN SODIUM 2 G: 2 INJECTION, SOLUTION INTRAVENOUS at 20:34

## 2024-02-22 RX ADMIN — LORATADINE 10 MG: 10 TABLET ORAL at 08:28

## 2024-02-22 RX ADMIN — CEFAZOLIN SODIUM 2 G: 2 INJECTION, SOLUTION INTRAVENOUS at 05:16

## 2024-02-22 RX ADMIN — OXYCODONE HYDROCHLORIDE 10 MG: 5 TABLET ORAL at 13:15

## 2024-02-22 RX ADMIN — Medication 1 APPLICATION: at 08:30

## 2024-02-22 RX ADMIN — FERROUS SULFATE TAB 325 MG (65 MG ELEMENTAL FE) 1 TABLET: 325 (65 FE) TAB at 08:33

## 2024-02-22 RX ADMIN — NICOTINE 1 PATCH: 21 PATCH, EXTENDED RELEASE TRANSDERMAL at 08:28

## 2024-02-22 RX ADMIN — ACETAMINOPHEN 975 MG: 325 TABLET ORAL at 05:16

## 2024-02-22 RX ADMIN — ACETAMINOPHEN 975 MG: 325 TABLET ORAL at 20:34

## 2024-02-22 RX ADMIN — Medication 1 APPLICATION: at 21:00

## 2024-02-22 RX ADMIN — SIMVASTATIN 20 MG: 20 TABLET, FILM COATED ORAL at 20:34

## 2024-02-22 ASSESSMENT — COGNITIVE AND FUNCTIONAL STATUS - GENERAL
HELP NEEDED FOR BATHING: A LOT
DAILY ACTIVITIY SCORE: 14
TOILETING: A LOT
HELP NEEDED FOR BATHING: A LOT
MOBILITY SCORE: 12
HELP NEEDED FOR BATHING: A LOT
DRESSING REGULAR LOWER BODY CLOTHING: A LOT
PERSONAL GROOMING: A LOT
WALKING IN HOSPITAL ROOM: A LOT
TURNING FROM BACK TO SIDE WHILE IN FLAT BAD: A LOT
DRESSING REGULAR UPPER BODY CLOTHING: A LOT
PERSONAL GROOMING: A LOT
MOBILITY SCORE: 12
STANDING UP FROM CHAIR USING ARMS: A LOT
CLIMB 3 TO 5 STEPS WITH RAILING: A LOT
DRESSING REGULAR UPPER BODY CLOTHING: A LOT
DRESSING REGULAR LOWER BODY CLOTHING: A LOT
TURNING FROM BACK TO SIDE WHILE IN FLAT BAD: A LOT
TOILETING: A LOT
DAILY ACTIVITIY SCORE: 14
DRESSING REGULAR LOWER BODY CLOTHING: A LOT
MOVING TO AND FROM BED TO CHAIR: A LOT
WALKING IN HOSPITAL ROOM: A LOT
CLIMB 3 TO 5 STEPS WITH RAILING: A LOT
DAILY ACTIVITIY SCORE: 14
MOVING TO AND FROM BED TO CHAIR: A LOT
STANDING UP FROM CHAIR USING ARMS: A LOT
MOVING FROM LYING ON BACK TO SITTING ON SIDE OF FLAT BED WITH BEDRAILS: A LOT
PERSONAL GROOMING: A LOT
DRESSING REGULAR UPPER BODY CLOTHING: A LOT
TOILETING: A LOT
MOVING FROM LYING ON BACK TO SITTING ON SIDE OF FLAT BED WITH BEDRAILS: A LOT

## 2024-02-22 ASSESSMENT — PAIN - FUNCTIONAL ASSESSMENT
PAIN_FUNCTIONAL_ASSESSMENT: 0-10

## 2024-02-22 ASSESSMENT — PAIN SCALES - GENERAL
PAINLEVEL_OUTOF10: 4
PAINLEVEL_OUTOF10: 5 - MODERATE PAIN
PAINLEVEL_OUTOF10: 4

## 2024-02-22 ASSESSMENT — ACTIVITIES OF DAILY LIVING (ADL): HOME_MANAGEMENT_TIME_ENTRY: 14

## 2024-02-22 NOTE — PROGRESS NOTES
Blanca Hamilton is a 48 y.o. female on day 8 of admission presenting with Wound dehiscence.    Subjective     Hypotension improving, remains on  ml/hr  Seen and examined today resting in bed comfortably.  Pain level tolerated with pain regimen    Objective   Range of Vitals (last 24 hours)  Heart Rate:  []   Temp:  [35.6 °C (96 °F)-36.8 °C (98.2 °F)]   Resp:  [10-14]   BP: ()/(49-79)   SpO2:  [96 %-100 %]   Daily Weight  02/14/24 : 89.4 kg (197 lb 1.6 oz)    Body mass index is 29.11 kg/m².    Physical Exam  Constitutional:       Appearance: Normal appearance.   HENT:      Head: Normocephalic and atraumatic.      Mouth/Throat:      Mouth: Mucous membranes are moist.      Pharynx: Oropharynx is clear.   Eyes:      Pupils: Pupils are equal, round, and reactive to light.   Cardiovascular:      Rate and Rhythm: Normal rate and regular rhythm.      Heart sounds: Normal heart sounds.   Pulmonary:      Effort: Pulmonary effort is normal.      Breath sounds: Normal breath sounds.   Abdominal:      General: Abdomen is flat. Bowel sounds are normal.      Palpations: Abdomen is soft.   Musculoskeletal:      Cervical back: Normal range of motion.   Neurological:      Mental Status: She is alert.         Antibiotics  sodium chloride 0.9 % bolus 3,120 mL  acetaminophen (Tylenol) tablet 975 mg  ARIPiprazole (Abilify) tablet 15 mg  aspirin chewable tablet 81 mg  ceFAZolin in dextrose (iso-os) (Ancef) IVPB 2 g  famotidine (Pepcid) tablet 40 mg  ferrous sulfate (325 mg ferrous sulfate) tablet 1 tablet  lactulose 20 gram/30 mL oral solution 20 g  lisinopril tablet 10 mg  loratadine (Claritin) tablet 10 mg  magnesium oxide (Mag-Ox) tablet 400 mg  metoprolol succinate XL (Toprol-XL) 24 hr tablet 25 mg  montelukast (Singulair) tablet 10 mg  nicotine (Nicoderm CQ) 21 mg/24 hr patch 1 patch  PARoxetine (Paxil) tablet 40 mg  polyethylene glycol (Glycolax, Miralax) packet 17 g  simvastatin (Zocor) tablet 20 mg  traZODone  (Desyrel) tablet 100 mg  zinc oxide 40 % ointment 1 Application  piperacillin-tazobactam-dextrose (Zosyn) IV 3.375 g  sodium chloride 0.9% infusion  oxyCODONE (Roxicodone) immediate release tablet 5 mg  oxyCODONE (Roxicodone) immediate release tablet 10 mg  HYDROmorphone (Dilaudid) injection 0.4 mg  vancomycin (Vancocin) in dextrose 5 % water (D5W) 500 mL IV 1,500 mg  atorvastatin (Lipitor) tablet    ceFAZolin in dextrose (iso-os) (Ancef) IVPB 1 g  enoxaparin (Lovenox) syringe 40 mg  ceFAZolin in dextrose (iso-os) (Ancef) IVPB 2 g  ondansetron (Zofran) injection 4 mg      Relevant Results  Labs  Results from last 72 hours   Lab Units 02/22/24 0430 02/21/24 0621 02/20/24  0512   WBC AUTO x10*3/uL 6.6 9.4 5.3   HEMOGLOBIN g/dL 7.3* 8.2* 8.2*   HEMATOCRIT % 23.1* 26.6* 26.4*   PLATELETS AUTO x10*3/uL 230 282 321       Results from last 72 hours   Lab Units 02/22/24  0430 02/21/24 0622 02/20/24  0512   SODIUM mmol/L 134* 132* 134*   POTASSIUM mmol/L 4.0 4.2 3.9   CHLORIDE mmol/L 102 102 103   CO2 mmol/L 27 25 27   BUN mg/dL 6 7 5*   CREATININE mg/dL 0.21* 0.30* 0.30*   GLUCOSE mg/dL 73* 79 72*   CALCIUM mg/dL 7.3* 7.7* 7.6*   ANION GAP mmol/L 9* 9* 8*   EGFR mL/min/1.73m*2 >90 >90 >90   PHOSPHORUS mg/dL  --  2.8 3.2       Results from last 72 hours   Lab Units 02/22/24  0430 02/21/24 0622 02/20/24  0512   ALK PHOS U/L 92 101 106   BILIRUBIN TOTAL mg/dL 0.2 0.3 0.2   PROTEIN TOTAL g/dL 4.3* 4.7* 4.9*   ALT U/L <3* <3* <3*   AST U/L 9 7* 6*   ALBUMIN g/dL 2.2* 2.4* 2.5*       Estimated Creatinine Clearance: 125 mL/min (A) (by C-G formula based on SCr of 0.21 mg/dL (L)).  C-Reactive Protein   Date Value Ref Range Status   02/20/2024 0.42 <1.00 mg/dL Final   02/19/2024 0.68 <1.00 mg/dL Final   02/18/2024 1.13 (H) <1.00 mg/dL Final     Microbiology  No results found for the last 14 days.    Imaging  reviwed        Assessment/Plan     Sepsis 2/2 Wound Dehiscence c/b R ALEXA infection s/p revision & hardware removal on  2/6  2.   Extensive fat necrosis / Hx of MSSA wound culture      Recommendations:     - Continue IV Ancef 2g TID (Discharge with 6 week course - timed from the most recent intervention 2/20/24)  - Wound Cx (2/6): MSSA +  - Blood Cx (2/14): Negative x2  - CRP levels down trending (7.15 -> 2.39 -> 1.13)  - Ortho following -> s/p R total hip revision 2/20  - Wound care following     Thank you for the consult, will continue to follow. Please reach out with any questions/concerns.      Gamal Quiroz MD  Internal Medicine, PGY-3          Attending note : the patient was evaluated, the note was reviewed and up dated  Right hip prosthesis infection sp I&D with partial hardware exchange, MSSA on the cultures, sp repeat I&D  Recommendations :  Continue Cefazolin, plan on 6 weeks from the date of the last surgery, weekly labs  Discussed with the medical team        Frank Modi M.D
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
## 2024-02-22 NOTE — PROGRESS NOTES
"Physical Therapy                 Therapy Communication Note    Patient Name: Blanca Hamilton  MRN: [id number]  Today's Date: 2/22/2024     Discipline: Physical Therapy    Missed Visit Reason: Missed Visit Reason: Patient refused (patient refused stating \"I just worked with the other therapy, Ru, not now its too soon, \" no tx. RN area)    Missed Time: Attempt  1225    Comment:  "

## 2024-02-22 NOTE — PROGRESS NOTES
"Blanca Hamilton is a 48 y.o. female on day 8 of admission presenting with Wound dehiscence.    Subjective   No acute overnight events.  Pain controlled.  Denies numbness and tingling.      Objective     Physical Exam  HENT:      Head: Normocephalic and atraumatic.   Eyes:      Extraocular Movements: Extraocular movements intact.      Conjunctiva/sclera: Conjunctivae normal.      Pupils: Pupils are equal, round, and reactive to light.   Cardiovascular:      Rate and Rhythm: Normal rate and regular rhythm.      Pulses: Normal pulses.   Pulmonary:      Breath sounds: Normal breath sounds.   Abdominal:      General: Bowel sounds are normal.      Palpations: Abdomen is soft.   Musculoskeletal:      Comments: Right hip dressing C/D/I, superficial and deep hemovac drains to compressed suction with sanguinous output, Prevena VAC in place to continuous suction, no outout, leg and foot warm and well perfused, motion and sensations intact    Skin:     General: Skin is warm and dry.      Capillary Refill: Capillary refill takes less than 2 seconds.   Neurological:      General: No focal deficit present.      Mental Status: She is alert and oriented to person, place, and time.     Last Recorded Vitals  Blood pressure 103/72, pulse 89, temperature 36.8 °C (98.2 °F), temperature source Temporal, resp. rate 14, height 1.753 m (5' 9\"), weight 89.4 kg (197 lb 1.6 oz), SpO2 100 %.  Intake/Output last 3 Shifts:  I/O last 3 completed shifts:  In: 5769 (64.5 mL/kg) [P.O.:480; I.V.:4589 (51.3 mL/kg); IV Piggyback:700]  Out: 1455 (16.3 mL/kg) [Urine:950 (0.3 mL/kg/hr); Drains:505]  Weight: 89.4 kg     Scheduled medications  acetaminophen, 975 mg, oral, q8h  ARIPiprazole, 15 mg, oral, Nightly  [Held by provider] aspirin, 81 mg, oral, BID  ceFAZolin, 2 g, intravenous, q8h  enoxaparin, 40 mg, subcutaneous, q24h  famotidine, 40 mg, oral, BID  ferrous sulfate (325 mg ferrous sulfate), 1 tablet, oral, Daily  [Held by provider] lisinopril, 10 mg, " oral, q AM  loratadine, 10 mg, oral, Daily  magnesium oxide, 400 mg, oral, Daily  [Held by provider] metoprolol succinate XL, 25 mg, oral, Daily  montelukast, 10 mg, oral, q AM  nicotine, 1 patch, transdermal, Daily  oseltamivir, 75 mg, oral, Daily  PARoxetine, 40 mg, oral, Daily  polyethylene glycol, 17 g, oral, Daily  simvastatin, 20 mg, oral, Nightly  traZODone, 100 mg, oral, Nightly  zinc oxide, 1 Application, Topical, BID      Continuous medications  lactated Ringer's, 100 mL/hr, Last Rate: 100 mL/hr (02/21/24 1132)      PRN medications  PRN medications: HYDROmorphone, midodrine, ondansetron, oxyCODONE, oxyCODONE    Results for orders placed or performed during the hospital encounter of 02/14/24 (from the past 24 hour(s))   CBC   Result Value Ref Range    WBC 6.6 4.4 - 11.3 x10*3/uL    nRBC 0.0 0.0 - 0.0 /100 WBCs    RBC 2.49 (L) 4.00 - 5.20 x10*6/uL    Hemoglobin 7.3 (L) 12.0 - 16.0 g/dL    Hematocrit 23.1 (L) 36.0 - 46.0 %    MCV 93 80 - 100 fL    MCH 29.3 26.0 - 34.0 pg    MCHC 31.6 (L) 32.0 - 36.0 g/dL    RDW 18.7 (H) 11.5 - 14.5 %    Platelets 230 150 - 450 x10*3/uL   Comprehensive Metabolic Panel   Result Value Ref Range    Glucose 73 (L) 74 - 99 mg/dL    Sodium 134 (L) 136 - 145 mmol/L    Potassium 4.0 3.5 - 5.3 mmol/L    Chloride 102 98 - 107 mmol/L    Bicarbonate 27 21 - 32 mmol/L    Anion Gap 9 (L) 10 - 20 mmol/L    Urea Nitrogen 6 6 - 23 mg/dL    Creatinine 0.21 (L) 0.50 - 1.05 mg/dL    eGFR >90 >60 mL/min/1.73m*2    Calcium 7.3 (L) 8.6 - 10.3 mg/dL    Albumin 2.2 (L) 3.4 - 5.0 g/dL    Alkaline Phosphatase 92 33 - 110 U/L    Total Protein 4.3 (L) 6.4 - 8.2 g/dL    AST 9 9 - 39 U/L    Bilirubin, Total 0.2 0.0 - 1.2 mg/dL    ALT <3 (L) 7 - 45 U/L       Assessment/Plan   Principal Problem:    Wound dehiscence  Active Problems:    Class 1 obesity with body mass index (BMI) of 30.0 to 30.9 in adult    Sinusitis    Transfusion history    Arthritis    Hyponatremia    48 year female POD 2 from right hip  revision.     - AM labs reviewed - hemoglobin 7.3  - toe touch weight bearing for transfers other, otherwise NWB RLE  - HOLD DVT prophylaxis until hemoglobin stable   - PT evaluation  - monitor and document hemovac drain output every shift   - maintain two pillows in between legs when in bed  - pain control per primary  - DVT prophylaxis per primary  - ortho will continue to follow          I spent 30 minutes in the professional and overall care of this patient.    Ester Fitch, APRN-CNP

## 2024-02-22 NOTE — CARE PLAN
The patient's goals for the shift include      The clinical goals for the shift include Pt will remain hds    Over the shift, the patient did not make progress toward the following goals. Barriers to progression include pt post surgery. Recommendations to address these barriers include monitoring labs and vital signs.

## 2024-02-22 NOTE — PROGRESS NOTES
Patient: Blanca Hamilton Age: 48 y.o.   Gender: female Room/bed: 241/241-A     Attending: Ashkan Crawford MD  Code Status:  Full Code    Overnight Events     None    Subjective   Seen and examined sitting upright in bed, with flexion at the hips bilaterally.  Improved pain at baseline, localized to the right lateral hip and lateral thigh.  Current regimen is appropriately covering pain.  Sleeping and eating 3 meals per day.    Denies chest pain/pressure, abdominal pain, nausea, vomiting, fever, chills, headaches.     Objective    Physical Exam  Constitutional:       General: She is not in acute distress.     Appearance: She is not ill-appearing.   HENT:      Head: Normocephalic and atraumatic.   Cardiovascular:      Rate and Rhythm: Normal rate and regular rhythm.      Pulses: Normal pulses.      Heart sounds: Normal heart sounds.   Pulmonary:      Effort: Pulmonary effort is normal. No respiratory distress.      Breath sounds: No stridor.   Abdominal:      General: Abdomen is flat. Bowel sounds are normal. There is no distension.      Palpations: Abdomen is soft. There is no mass.      Tenderness: There is no abdominal tenderness.   Musculoskeletal:         General: Tenderness and deformity present.      Comments: Right hip near greater trochanter to distal lateral thigh tender to palpation.  Surgical site clean and dry, with surgical packing in place. Deep and surgical drains placed and appropriately draining.  Tightly wrapped, without extravasation, hematoma formation or seepage. Serosanguineous drainage noted in both tubes.  Minimal output in collecting system.    Skin:     Findings: Bruising present.   Neurological:      General: No focal deficit present.      Mental Status: She is alert and oriented to person, place, and time. Mental status is at baseline.   Psychiatric:         Mood and Affect: Mood normal.         Behavior: Behavior normal.        Temp:  [36.5 °C (97.7 °F)-37.1 °C (98.8 °F)] 37.1 °C (98.8  °F)  Heart Rate:  [] 106  Resp:  [14] 14  BP: ()/(50-79) 99/69    Intake/Output Summary (Last 24 hours) at 2/22/2024 1411  Last data filed at 2/22/2024 1357  Gross per 24 hour   Intake 3150.67 ml   Output 1293 ml   Net 1857.67 ml       Vitals:    02/14/24 0227   Weight: 89.4 kg (197 lb 1.6 oz)       I/Os    Intake/Output Summary (Last 24 hours) at 2/22/2024 1411  Last data filed at 2/22/2024 1357  Gross per 24 hour   Intake 3150.67 ml   Output 1293 ml   Net 1857.67 ml       Labs:   Results from last 72 hours   Lab Units 02/22/24  0430 02/21/24  0622 02/20/24  0512   SODIUM mmol/L 134* 132* 134*   POTASSIUM mmol/L 4.0 4.2 3.9   CHLORIDE mmol/L 102 102 103   CO2 mmol/L 27 25 27   BUN mg/dL 6 7 5*   CREATININE mg/dL 0.21* 0.30* 0.30*   GLUCOSE mg/dL 73* 79 72*   CALCIUM mg/dL 7.3* 7.7* 7.6*   ANION GAP mmol/L 9* 9* 8*   EGFR mL/min/1.73m*2 >90 >90 >90   PHOSPHORUS mg/dL  --  2.8 3.2        Results from last 72 hours   Lab Units 02/22/24  0430 02/21/24  0621 02/20/24  0512   WBC AUTO x10*3/uL 6.6 9.4 5.3   HEMOGLOBIN g/dL 7.3* 8.2* 8.2*   HEMATOCRIT % 23.1* 26.6* 26.4*   PLATELETS AUTO x10*3/uL 230 282 321        Lab Results   Component Value Date    CALCIUM 7.3 (L) 02/22/2024    PHOS 2.8 02/21/2024      Lab Results   Component Value Date    CRP 0.42 02/20/2024      [unfilled]     Micro/ID:   Susceptibility data from last 90 days.  Collected Specimen Info Organism Clindamycin Erythromycin Oxacillin Tetracycline Trimethoprim/Sulfamethoxazole Vancomycin   02/06/24 Swab from HIP ARTHROPLASTY RIGHT Methicillin Susceptible Staphylococcus aureus (MSSA) S S S R S S   02/06/24 Swab from HIP ARTHROPLASTY RIGHT Staphylococcus aureus         02/06/24 Swab from HIP ARTHROPLASTY RIGHT Staphylococcus aureus         02/02/24 Tissue/Biopsy from Wound/Tissue Methicillin Susceptible Staphylococcus aureus (MSSA) S S S R S S       Lab Results   Component Value Date    BLOODCULT No growth at 4 days -  FINAL REPORT  02/14/2024     Images:  Lower extremity venous duplex bilateral            [facility]  [address] [address]   Tel [phone] and Fax [phone]       Vascular Lab Report  [facility] LOWER EXTREMITY VENOUS DUPLEX BILATERAL       Patient Name:      ZULEIKA KAMARA          Reading Physician:  [id number] Prieto Loza MD  Study Date:        2/14/2024            Ordering Physician: [id number] GEORGE AMADOR  MRN/PID:           [id number]             Technologist:       Rosa Pierre [facility]  Accession#:        [id number]         Technologist 2:     Hattie Raymond  Date of Birth/Age: 1975 / 48      Encounter#:         [id number]                     years  Gender:            F  Admission Status:  Inpatient            Location Performed: [facility]       Diagnosis/ICD: Localized (leg) edema-R60.0  CPT Codes:     58329 Peripheral venous duplex scan for DVT complete       CONCLUSIONS:  Right Lower Venous: No evidence of acute deep vein thrombus visualized in the right lower extremity.  Left Lower Venous: No evidence of acute deep vein thrombus visualized in the left lower extremity.     Additional Findings:  Technically difficult study due to patient movement and inability to position.       Imaging & Doppler Findings:     Right                 Compressible Thrombus        Flow  Distal External Iliac     Yes        None   Spontaneous/Phasic  CFV                       Yes        None   Spontaneous/Phasic  PFV                       Yes        None  FV Proximal               Yes        None   Spontaneous/Phasic  FV Mid                    Yes        None  FV Distal                 Yes        None  Popliteal                 Yes        None   Spontaneous/Phasic  Peroneal                  Yes        None  PTV                       Yes        None       Left                   Compress Thrombus        Flow  Distal External Iliac   Yes      None   Spontaneous/Phasic  CFV                     Yes      None   Spontaneous/Phasic  PFV                     Yes      None  FV Proximal             Yes      None   Spontaneous/Phasic  FV Mid                  Yes      None  FV Distal               Yes      None  Popliteal               Yes      None   Spontaneous/Phasic  Peroneal                Yes      None  PTV                     Yes      None       [id number] Prieto Loza MD  Electronically signed by [id number] Prieto Loza MD on 2/15/2024 at 5:44:38 PM       ** Final **     Meds    Scheduled medications  acetaminophen, 975 mg, oral, q8h  ARIPiprazole, 15 mg, oral, Nightly  [Held by provider] aspirin, 81 mg, oral, BID  ceFAZolin, 2 g, intravenous, q8h  [Held by provider] enoxaparin, 40 mg, subcutaneous, q24h  famotidine, 40 mg, oral, BID  ferrous sulfate (325 mg ferrous sulfate), 1 tablet, oral, Daily  [Held by provider] lisinopril, 10 mg, oral, q AM  loratadine, 10 mg, oral, Daily  magnesium oxide, 400 mg, oral, Daily  [Held by provider] metoprolol succinate XL, 25 mg, oral, Daily  montelukast, 10 mg, oral, q AM  nicotine, 1 patch, transdermal, Daily  oseltamivir, 75 mg, oral, Daily  PARoxetine, 40 mg, oral, Daily  polyethylene glycol, 17 g, oral, Daily  simvastatin, 20 mg, oral, Nightly  traZODone, 100 mg, oral, Nightly  zinc oxide, 1 Application, Topical, BID      Continuous medications  lactated Ringer's, 100 mL/hr, Last Rate: 100 mL/hr (02/22/24 1204)      PRN medications  PRN medications: HYDROmorphone, midodrine, ondansetron, oxyCODONE, oxyCODONE     Assessment and Plan    Blanca Hamilton is a 48 y.o. 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 Initially met Sepsis criteria. Admitted for further management of dehiscence and SSI.     Acute Medical Issues:  #Sepsis secondary to reccurent post op  wound infection (MSSA) of right hip arthroplasty   #Hx right total hip arthroplasty w/ revision (1/5/24) for prosthetic fx  #Hx hardware removal for prosthetic joint infection (2/6/24)  -OR 2/20/24 right hip revision  -Discharged on 2/11 with wound vac + PICC line for 6-week course of cefazolin   -Previous tissue/Wound Culture (2/6): MSSA, only resistant to Tetracycline   -2/13/24: 2/4 SIRS criteria on admission. S/p sepsis fluids in ED. Lactate 0.7. Hgb stable.  -pain control: scheduled tylenol, oxy 5 mod, oxy 10 severe, dilaudid 0.4 PRN breakthrough  -toe touch weight bearing for transfers other, otherwise NWB RLE  -monitor and document hemovac drain output every shift; determine length of time drains need to remain per ortho  -DO NOT LAY on right hip, maintain two pillows in between legs when in bed  -Continue IV Ancef 2g TID (Discharge with 6 week course - timed from the most recent intervention 2/20/24)  -Blood Cx (2/14): Negative x2  -CRP levels down trending (7.15 -> 2.39 -> 1.13)  -PT/OT eval, rec'd moderate therapy 3x week. Will need wheeled walker and wheelchair on discharge  -Holding Lovenox and ASA BID in setting of anemia, resume ASA81 BID on discharge for total of 35 days    #Hypotension 2/2 hypovolemic hyponatremia   - Na 129 on admission  - Likely related to poor p.o. intake and in postop setting  - Nutrition consult, recommend encouraging p.o. intake and ensure supplements  - Bolus and continuous fluids today for hypotension  - If remains hypotensive, add midodrine    #B/l LE edema - chronic  -Bilateral duplex negative for acute DVT bilaterally  -Encourage bilateral leg elevation as tolerated    Chronic Medical Issues:  # GERD: c/w Pepcid   # HTN: Holding Metoprolol and lisinopril in setting of hypotension  # HLD: c/w Simvastatin  # Depression: c/w Abilify + Paroxetine +Trazodone  # Chronic anemia: iron supplement  # Tobacco use: nicotine patch    Fluids: None  Electrolytes: replete as  needed  Nutrition: Regular, supplements  GI PPX: None   DVT PPX: Holding Lovenox     Access: PICC Line, PIV's  Antibiotics: Cefazolin 2g TID (2/15 - )  Oxygenation: Room Air    Dispo: Admitted onto the floor for postoperative wound dehiscence and surgical site infection. OR on 2/20/24 for right hip revision. 6w Abx from most recent intervention. Will need pre-CERT prior to discharge.    Barry Bledsoe, DO   PGY 1

## 2024-02-22 NOTE — PROGRESS NOTES
Occupational Therapy    Occupational Therapy Treatment    Name: Blanca Hamilton  MRN: [id number]  : 1975  Date: 24  Time Calculation  Start Time: 1019  Stop Time: 1043  Time Calculation (min): 24 min    Assessment:  OT Assessment: Pt continues to present with decreased endurance, decreased ADL, decreased functional mobility. Continued skilled OT recommended to maximize pt safety and indepedence in order to return to PLOF.  Prognosis: Good  Barriers to Discharge: None  Evaluation/Treatment Tolerance: Patient limited by fatigue (Continues to require encouragement and cues to sustain activities.)  Medical Staff Made Aware: Yes  End of Session Communication: Bedside nurse, PCT/NA/CTA  End of Session Patient Position: Alarm on, Bed, 3 rail up  Plan:  Treatment Interventions: ADL retraining, Functional transfer training, Endurance training, Patient/family training, Equipment evaluation/education, Compensatory technique education  OT Frequency: 3 times per week  OT Discharge Recommendations: Moderate intensity level of continued care  Equipment Recommended upon Discharge: Wheeled walker, Wheelchair  OT Recommended Transfer Status: Assist of 1  OT - OK to Discharge: Yes    Subjective   Previous Visit Info:  OT Last Visit  OT Received On: 24  General:  General  Reason for Referral: 47 yo female referred to PT for R hip wound dehiscence, impaired mobility,  Referred By: Brando Mascorro  Past Medical History Relevant to Rehab: Anxiety, Arthritis, Class 1 obesity with body mass index (BMI) of 30.0 to 30.9 in adult (2024), Depression, GERD (gastroesophageal reflux disease), HTN (hypertension), Hyperlipidemia; R total hip arthroplasty on , but had to get subsequent revision on  for prosthetic fracture. 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 Cultures were positive for MSSA and patient was started on cefazolin. Then underwent ALXEA revision and partial hardware removal  with Dr. Jalloh on 2/6/24. Patient was discharged to SNF on 2/11 with wound vac, a PICC line for 6 weeks of cefazolin, and ASA for DVT ppx.  Family/Caregiver Present: No  Prior to Session Communication: Bedside nurse, PCT/NA/CTA  Patient Position Received: Bed, 3 rail up, Alarm on  Preferred Learning Style: verbal, kinesthetic  General Comment: Pt self-limiting, requiring  encouragement to complete bed mobility and LB dressing tasks.  Precautions:  LE Weight Bearing Status: Right Toe-Touch Weight Bearing  Medical Precautions: Fall precautions, Infection precautions  Post-Surgical Precautions: Right hip precautions  Vitals:     Pain Assessment:  Pain Assessment  Pain Assessment: 0-10  Pain Score: 4     Objective   Activities of Daily Living: UE Dressing  UE Dressing Level of Assistance: Setup, Minimum assistance (Gown changed d/t urinary incont.)  UE Dressing Where Assessed: Edge of bed    LE Dressing  LE Dressing: Yes  LE Dressing Adaptive Equipment: Reacher, Sock aide, Dressing stick  Pants Level of Assistance: Moderate assistance  Sock Level of Assistance: Moderate assistance  LE Dressing Where Assessed: Edge of bed     Bed Mobility/Transfers: Bed Mobility  Bed Mobility: Yes  Bed Mobility 1  Bed Mobility 1: Supine to sitting, Sitting to supine  Level of Assistance 1: Moderate assistance, Moderate verbal cues, Moderate tactile cues  Bed Mobility 2  Bed Mobility  2: Rolling right, Rolling left  Level of Assistance 2: Moderate assistance  Bed Mobility Comments 2: x4 for bed change with pillow between knees to avoid adduction    Outcome Measures:  [facility] Daily Activity  Putting on and taking off regular lower body clothing: A lot  Bathing (including washing, rinsing, drying): A lot  Putting on and taking off regular upper body clothing: A lot  Toileting, which includes using toilet, bedpan or urinal: A lot  Taking care of personal grooming such as brushing teeth: A lot  Eating Meals: None  Daily Activity - Total Score:  14    Education Documentation  Body Mechanics, taught by SARITA Landaverde at 2/22/2024 12:26 PM.  Learner: Patient  Readiness: Acceptance  Method: Explanation, Demonstration  Response: Verbalizes Understanding, Demonstrated Understanding, Needs Reinforcement    Precautions, taught by SARITA Landaverde at 2/22/2024 12:26 PM.  Learner: Patient  Readiness: Acceptance  Method: Explanation, Demonstration  Response: Verbalizes Understanding, Demonstrated Understanding, Needs Reinforcement    ADL Training, taught by SARITA Landaverde at 2/22/2024 12:26 PM.  Learner: Patient  Readiness: Acceptance  Method: Explanation, Demonstration  Response: Verbalizes Understanding, Demonstrated Understanding, Needs Reinforcement    Education Comments  Pt required limiting and encouragement to participate in tasks and activities..      Goals:  Encounter Problems       Encounter Problems (Active)       OT Goals       Pt will increase endurance to tolerate 15min of OOB activity with no more than 1 rest break in order to increase ability to engage in ADL completion.  (Progressing)       Start:  02/15/24    Expected End:  02/22/24            Pt will demo and/or verbalize 2-3 energy conservation techniques to incorporate into functional mobility or ADL to improve performance and increase independence.  (Progressing)       Start:  02/15/24    Expected End:  02/22/24            Pt will demo increased functional mobility/pivot transfer independence to tolerate tasks necessary to complete ADL routine.  (Progressing)       Start:  02/15/24    Expected End:  02/22/24            Pt will demo ADL routine and meaningful daily activities using modifications as needed  (Progressing)       Start:  02/15/24    Expected End:  02/22/24

## 2024-02-22 NOTE — CARE PLAN
Problem: Safety  Goal: I will remain free of falls  Outcome: Progressing     Problem: Fall/Injury  Goal: Not fall by end of shift  Outcome: Progressing     Problem: Pain  Goal: Takes deep breaths with improved pain control throughout the shift  Outcome: Progressing      The clinical goals for the shift include patient will state a decrease in pain level this shift    Patient alert and oriented. Continues to monitor drainage from accordion drains and wound vac. Patient complaining of right hip pain. PRN oxy administered

## 2024-02-23 LAB
ABO GROUP (TYPE) IN BLOOD: NORMAL
ALBUMIN SERPL BCP-MCNC: 2.1 G/DL (ref 3.4–5)
ALP SERPL-CCNC: 93 U/L (ref 33–110)
ALT SERPL W P-5'-P-CCNC: <3 U/L (ref 7–45)
ANION GAP SERPL CALC-SCNC: 8 MMOL/L (ref 10–20)
ANTIBODY SCREEN: NORMAL
APTT PPP: 26 SECONDS (ref 27–38)
AST SERPL W P-5'-P-CCNC: 7 U/L (ref 9–39)
BILIRUB SERPL-MCNC: 0.2 MG/DL (ref 0–1.2)
BLOOD EXPIRATION DATE: NORMAL
BUN SERPL-MCNC: 5 MG/DL (ref 6–23)
CALCIUM SERPL-MCNC: 7.3 MG/DL (ref 8.6–10.3)
CHLORIDE SERPL-SCNC: 103 MMOL/L (ref 98–107)
CO2 SERPL-SCNC: 29 MMOL/L (ref 21–32)
CREAT SERPL-MCNC: 0.24 MG/DL (ref 0.5–1.05)
DISPENSE STATUS: NORMAL
EGFRCR SERPLBLD CKD-EPI 2021: >90 ML/MIN/1.73M*2
ERYTHROCYTE [DISTWIDTH] IN BLOOD BY AUTOMATED COUNT: 17.7 % (ref 11.5–14.5)
ERYTHROCYTE [DISTWIDTH] IN BLOOD BY AUTOMATED COUNT: 18.7 % (ref 11.5–14.5)
FLUAV RNA RESP QL NAA+PROBE: NOT DETECTED
FLUBV RNA RESP QL NAA+PROBE: NOT DETECTED
GLUCOSE SERPL-MCNC: 80 MG/DL (ref 74–99)
HCT VFR BLD AUTO: 21.4 % (ref 36–46)
HCT VFR BLD AUTO: 25.8 % (ref 36–46)
HGB BLD-MCNC: 6.6 G/DL (ref 12–16)
HGB BLD-MCNC: 8.2 G/DL (ref 12–16)
INR PPP: 1.1 (ref 0.9–1.1)
MAGNESIUM SERPL-MCNC: 1.53 MG/DL (ref 1.6–2.4)
MCH RBC QN AUTO: 28.7 PG (ref 26–34)
MCH RBC QN AUTO: 29 PG (ref 26–34)
MCHC RBC AUTO-ENTMCNC: 30.8 G/DL (ref 32–36)
MCHC RBC AUTO-ENTMCNC: 31.8 G/DL (ref 32–36)
MCV RBC AUTO: 91 FL (ref 80–100)
MCV RBC AUTO: 93 FL (ref 80–100)
NRBC BLD-RTO: 0 /100 WBCS (ref 0–0)
NRBC BLD-RTO: 0 /100 WBCS (ref 0–0)
PHOSPHATE SERPL-MCNC: 3.3 MG/DL (ref 2.5–4.9)
PLATELET # BLD AUTO: 215 X10*3/UL (ref 150–450)
PLATELET # BLD AUTO: 240 X10*3/UL (ref 150–450)
POTASSIUM SERPL-SCNC: 3.9 MMOL/L (ref 3.5–5.3)
PRODUCT BLOOD TYPE: 6200
PRODUCT CODE: NORMAL
PROT SERPL-MCNC: 4.2 G/DL (ref 6.4–8.2)
PROTHROMBIN TIME: 12.4 SECONDS (ref 9.8–12.8)
RBC # BLD AUTO: 2.3 X10*6/UL (ref 4–5.2)
RBC # BLD AUTO: 2.83 X10*6/UL (ref 4–5.2)
RH FACTOR (ANTIGEN D): NORMAL
SARS-COV-2 RNA RESP QL NAA+PROBE: NOT DETECTED
SODIUM SERPL-SCNC: 136 MMOL/L (ref 136–145)
UNIT ABO: NORMAL
UNIT NUMBER: NORMAL
UNIT RH: NORMAL
UNIT VOLUME: 350
WBC # BLD AUTO: 5.6 X10*3/UL (ref 4.4–11.3)
WBC # BLD AUTO: 7 X10*3/UL (ref 4.4–11.3)
XM INTEP: NORMAL

## 2024-02-23 PROCEDURE — 36430 TRANSFUSION BLD/BLD COMPNT: CPT

## 2024-02-23 PROCEDURE — 2500000002 HC RX 250 W HCPCS SELF ADMINISTERED DRUGS (ALT 637 FOR MEDICARE OP, ALT 636 FOR OP/ED): Performed by: INTERNAL MEDICINE

## 2024-02-23 PROCEDURE — 1200000002 HC GENERAL ROOM WITH TELEMETRY DAILY

## 2024-02-23 PROCEDURE — 99232 SBSQ HOSP IP/OBS MODERATE 35: CPT

## 2024-02-23 PROCEDURE — 2500000004 HC RX 250 GENERAL PHARMACY W/ HCPCS (ALT 636 FOR OP/ED)

## 2024-02-23 PROCEDURE — 85610 PROTHROMBIN TIME: CPT

## 2024-02-23 PROCEDURE — P9016 RBC LEUKOCYTES REDUCED: HCPCS

## 2024-02-23 PROCEDURE — 86920 COMPATIBILITY TEST SPIN: CPT

## 2024-02-23 PROCEDURE — 87636 SARSCOV2 & INF A&B AMP PRB: CPT

## 2024-02-23 PROCEDURE — 2500000002 HC RX 250 W HCPCS SELF ADMINISTERED DRUGS (ALT 637 FOR MEDICARE OP, ALT 636 FOR OP/ED)

## 2024-02-23 PROCEDURE — 83735 ASSAY OF MAGNESIUM: CPT

## 2024-02-23 PROCEDURE — 97110 THERAPEUTIC EXERCISES: CPT | Mod: GP,CQ

## 2024-02-23 PROCEDURE — 86900 BLOOD TYPING SEROLOGIC ABO: CPT

## 2024-02-23 PROCEDURE — 85027 COMPLETE CBC AUTOMATED: CPT

## 2024-02-23 PROCEDURE — 80053 COMPREHEN METABOLIC PANEL: CPT

## 2024-02-23 PROCEDURE — 2500000001 HC RX 250 WO HCPCS SELF ADMINISTERED DRUGS (ALT 637 FOR MEDICARE OP)

## 2024-02-23 PROCEDURE — 85730 THROMBOPLASTIN TIME PARTIAL: CPT

## 2024-02-23 PROCEDURE — S4991 NICOTINE PATCH NONLEGEND: HCPCS

## 2024-02-23 PROCEDURE — 97530 THERAPEUTIC ACTIVITIES: CPT | Mod: GO,CO

## 2024-02-23 PROCEDURE — 84100 ASSAY OF PHOSPHORUS: CPT

## 2024-02-23 PROCEDURE — 97535 SELF CARE MNGMENT TRAINING: CPT | Mod: GO,CO

## 2024-02-23 RX ORDER — POLYETHYLENE GLYCOL 3350 17 G/17G
17 POWDER, FOR SOLUTION ORAL 2 TIMES DAILY
Status: DISCONTINUED | OUTPATIENT
Start: 2024-02-23 | End: 2024-03-05 | Stop reason: HOSPADM

## 2024-02-23 RX ORDER — LANOLIN ALCOHOL/MO/W.PET/CERES
800 CREAM (GRAM) TOPICAL DAILY
Status: DISCONTINUED | OUTPATIENT
Start: 2024-02-23 | End: 2024-03-05 | Stop reason: HOSPADM

## 2024-02-23 RX ORDER — FLUTICASONE PROPIONATE 50 MCG
2 SPRAY, SUSPENSION (ML) NASAL DAILY
Status: DISCONTINUED | OUTPATIENT
Start: 2024-02-23 | End: 2024-03-05 | Stop reason: HOSPADM

## 2024-02-23 RX ORDER — BISACODYL 10 MG/1
10 SUPPOSITORY RECTAL DAILY
Status: DISCONTINUED | OUTPATIENT
Start: 2024-02-23 | End: 2024-02-23

## 2024-02-23 RX ADMIN — Medication 1 APPLICATION: at 21:00

## 2024-02-23 RX ADMIN — ACETAMINOPHEN 975 MG: 325 TABLET ORAL at 04:51

## 2024-02-23 RX ADMIN — ARIPIPRAZOLE 15 MG: 5 TABLET ORAL at 20:16

## 2024-02-23 RX ADMIN — PAROXETINE 40 MG: 20 TABLET, FILM COATED ORAL at 08:13

## 2024-02-23 RX ADMIN — Medication 1 APPLICATION: at 08:14

## 2024-02-23 RX ADMIN — MONTELUKAST 10 MG: 10 TABLET, FILM COATED ORAL at 08:13

## 2024-02-23 RX ADMIN — CEFAZOLIN SODIUM 2 G: 2 INJECTION, SOLUTION INTRAVENOUS at 20:17

## 2024-02-23 RX ADMIN — CEFAZOLIN SODIUM 2 G: 2 INJECTION, SOLUTION INTRAVENOUS at 12:28

## 2024-02-23 RX ADMIN — OSELTAMAVIR PHOSPHATE 75 MG: 75 CAPSULE ORAL at 08:13

## 2024-02-23 RX ADMIN — FLUTICASONE PROPIONATE 2 SPRAY: 50 SPRAY, METERED NASAL at 15:56

## 2024-02-23 RX ADMIN — FERROUS SULFATE TAB 325 MG (65 MG ELEMENTAL FE) 1 TABLET: 325 (65 FE) TAB at 04:52

## 2024-02-23 RX ADMIN — POLYETHYLENE GLYCOL 3350 17 G: 17 POWDER, FOR SOLUTION ORAL at 08:13

## 2024-02-23 RX ADMIN — CEFAZOLIN SODIUM 2 G: 2 INJECTION, SOLUTION INTRAVENOUS at 04:52

## 2024-02-23 RX ADMIN — TRAZODONE HYDROCHLORIDE 100 MG: 50 TABLET ORAL at 20:16

## 2024-02-23 RX ADMIN — Medication 800 MG: at 08:13

## 2024-02-23 RX ADMIN — NICOTINE 1 PATCH: 21 PATCH, EXTENDED RELEASE TRANSDERMAL at 08:13

## 2024-02-23 RX ADMIN — FAMOTIDINE 40 MG: 20 TABLET ORAL at 20:16

## 2024-02-23 RX ADMIN — POLYETHYLENE GLYCOL 3350 17 G: 17 POWDER, FOR SOLUTION ORAL at 20:16

## 2024-02-23 RX ADMIN — FAMOTIDINE 40 MG: 20 TABLET ORAL at 08:13

## 2024-02-23 RX ADMIN — ACETAMINOPHEN 975 MG: 325 TABLET ORAL at 12:29

## 2024-02-23 RX ADMIN — SODIUM CHLORIDE, POTASSIUM CHLORIDE, SODIUM LACTATE AND CALCIUM CHLORIDE 100 ML/HR: 600; 310; 30; 20 INJECTION, SOLUTION INTRAVENOUS at 12:30

## 2024-02-23 RX ADMIN — ACETAMINOPHEN 975 MG: 325 TABLET ORAL at 20:17

## 2024-02-23 RX ADMIN — SIMVASTATIN 20 MG: 20 TABLET, FILM COATED ORAL at 20:16

## 2024-02-23 RX ADMIN — LORATADINE 10 MG: 10 TABLET ORAL at 08:13

## 2024-02-23 RX ADMIN — OXYCODONE HYDROCHLORIDE 10 MG: 5 TABLET ORAL at 17:49

## 2024-02-23 ASSESSMENT — COGNITIVE AND FUNCTIONAL STATUS - GENERAL
DRESSING REGULAR LOWER BODY CLOTHING: A LOT
DAILY ACTIVITIY SCORE: 14
DRESSING REGULAR UPPER BODY CLOTHING: A LOT
HELP NEEDED FOR BATHING: A LOT
TURNING FROM BACK TO SIDE WHILE IN FLAT BAD: A LOT
MOVING TO AND FROM BED TO CHAIR: A LOT
TURNING FROM BACK TO SIDE WHILE IN FLAT BAD: A LOT
WALKING IN HOSPITAL ROOM: TOTAL
TOILETING: A LOT
DRESSING REGULAR UPPER BODY CLOTHING: A LOT
HELP NEEDED FOR BATHING: A LOT
DAILY ACTIVITIY SCORE: 14
MOVING FROM LYING ON BACK TO SITTING ON SIDE OF FLAT BED WITH BEDRAILS: A LOT
DAILY ACTIVITIY SCORE: 14
STANDING UP FROM CHAIR USING ARMS: A LOT
WALKING IN HOSPITAL ROOM: TOTAL
CLIMB 3 TO 5 STEPS WITH RAILING: TOTAL
DRESSING REGULAR LOWER BODY CLOTHING: A LOT
PERSONAL GROOMING: A LOT
HELP NEEDED FOR BATHING: A LOT
MOBILITY SCORE: 10
TOILETING: A LOT
DRESSING REGULAR UPPER BODY CLOTHING: A LOT
PERSONAL GROOMING: A LOT
MOVING FROM LYING ON BACK TO SITTING ON SIDE OF FLAT BED WITH BEDRAILS: A LOT
MOBILITY SCORE: 10
STANDING UP FROM CHAIR USING ARMS: A LOT
TOILETING: A LOT
CLIMB 3 TO 5 STEPS WITH RAILING: TOTAL
DRESSING REGULAR LOWER BODY CLOTHING: A LOT
PERSONAL GROOMING: A LOT
MOVING TO AND FROM BED TO CHAIR: A LOT

## 2024-02-23 ASSESSMENT — PAIN - FUNCTIONAL ASSESSMENT
PAIN_FUNCTIONAL_ASSESSMENT: 0-10

## 2024-02-23 ASSESSMENT — PAIN SCALES - GENERAL
PAINLEVEL_OUTOF10: 0 - NO PAIN
PAINLEVEL_OUTOF10: 5 - MODERATE PAIN
PAINLEVEL_OUTOF10: 3
PAINLEVEL_OUTOF10: 3

## 2024-02-23 ASSESSMENT — ACTIVITIES OF DAILY LIVING (ADL)
HOME_MANAGEMENT_TIME_ENTRY: 15
HOME_MANAGEMENT_TIME_ENTRY: 18

## 2024-02-23 NOTE — PROGRESS NOTES
Occupational Therapy    OT Treatment    Patient Name: Blanca Hamilton  MRN: [id number]  Today's Date: 2/23/2024  Time Calculation  Start Time: 1243  Stop Time: 1307  Time Calculation (min): 24 min         Assessment:  OT Assessment: Pt continues to present with decreased endurance, decreased ADL, decreased functional mobility. Continued skilled OT recommended to maximize pt safety and indepedence in order to return to PLOF.  Prognosis: Good  Barriers to Discharge: None  Evaluation/Treatment Tolerance: Patient limited by fatigue  Medical Staff Made Aware: Yes  End of Session Communication: Bedside nurse  End of Session Patient Position: Bed, 3 rail up, Alarm off, not on at start of session  Prognosis: Good  Barriers to Discharge: None  Evaluation/Treatment Tolerance: Patient limited by fatigue  Medical Staff Made Aware: Yes  Plan:  Treatment Interventions: ADL retraining, Endurance training  OT Frequency: 3 times per week  OT Discharge Recommendations: Moderate intensity level of continued care  Equipment Recommended upon Discharge: Wheeled walker, Wheelchair  OT Recommended Transfer Status: Assist of 1  OT - OK to Discharge: Yes  Treatment Interventions: ADL retraining, Endurance training    Subjective   Previous Visit Info:  OT Last Visit  OT Received On: 02/23/24  General:  General  Reason for Referral: 47 yo female referred to OT for R hip wound dehiscence, impaired mobility,  Referred By: Brando Mascorro  Past Medical History Relevant to Rehab: Anxiety, Arthritis, Class 1 obesity with body mass index (BMI) of 30.0 to 30.9 in adult (02/02/2024), Depression, GERD (gastroesophageal reflux disease), HTN (hypertension), Hyperlipidemia; R total hip arthroplasty on 1/2/4, but had to get subsequent revision on 1/5 for prosthetic fracture. 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 Cultures were positive for MSSA and patient was started on cefazolin. Then underwent ALEXA revision and partial  hardware removal with Dr. Jalloh on 2/6/24. Patient was discharged to SNF on 2/11 with wound vac, a PICC line for 6 weeks of cefazolin, and ASA for DVT ppx.  Family/Caregiver Present: No  Prior to Session Communication: Bedside nurse  Patient Position Received: Bed, 3 rail up, Alarm off, not on at start of session  Preferred Learning Style: verbal, kinesthetic  General Comment: Pt pleasant and cooperative throughout treatment session. At time patient was slightly self-limiting however easily redirected to task.  Precautions:  LE Weight Bearing Status: Right Toe-Touch Weight Bearing  Medical Precautions: Fall precautions, Infection precautions  Post-Surgical Precautions: Right hip precautions  Vital Signs:     Pain:  Pain Assessment  Pain Assessment: 0-10  Pain Score: 5 - Moderate pain  Pain Type: Acute pain  Pain Location: Hip  Pain Orientation: Right    Objective    Cognition:  Cognition  Overall Cognitive Status: Within Functional Limits  Orientation Level: Oriented X4  Attention: Within Functional Limits  Coordination:     Activities of Daily Living: LE Dressing  LE Dressing Adaptive Equipment: Reacher, Sock aide  Sock Level of Assistance: Moderate assistance  LE Dressing Where Assessed: Edge of bed  LE Dressing Comments:  While seated at the EOB with mod A pt used sock aid to don bilateral socks and reacher to doff bilateral socks. Verbal cues provided for proper use of adaptive equipment.     Bed Mobility/Transfers: Sitting Balance:  Static Sitting-Balance Support: Bilateral upper extremity supported  Static Sitting-Level of Assistance: Close supervision  Dynamic Sitting Balance  Dynamic Sitting-Comments:  During dynamic sitting pt required verbal cues to keep feet on floor and CGA to keep pt forward.      Therapy/Activity: Therapeutic Activity  Therapeutic Activity Performed: Yes  Therapeutic Activity 1: Pt sat at EOB with supervision to perform ADL tasks. (Pt sat at EOB for ~ 8 mins with supervision to  perform ADL tasks.)    Outcome Measures:[facility] Daily Activity  Putting on and taking off regular lower body clothing: A lot  Bathing (including washing, rinsing, drying): A lot  Putting on and taking off regular upper body clothing: A lot  Toileting, which includes using toilet, bedpan or urinal: A lot  Taking care of personal grooming such as brushing teeth: A lot  Eating Meals: None  Daily Activity - Total Score: 14        Education Documentation  Body Mechanics, taught by SARITA Conti at 2/23/2024  1:52 PM.  Learner: Patient  Readiness: Acceptance  Method: Explanation  Response: Verbalizes Understanding    Precautions, taught by SARITA Conti at 2/23/2024  1:52 PM.  Learner: Patient  Readiness: Acceptance  Method: Explanation  Response: Verbalizes Understanding    ADL Training, taught by SARITA Conti at 2/23/2024  1:52 PM.  Learner: Patient  Readiness: Acceptance  Method: Explanation  Response: Verbalizes Understanding    Education Comments  Individual(s) Educated: Patient  Education Provided: Joint protection and energy conservation, Fall precautons, Risk and benefits of OT discussed with patient or other    Goals:  Encounter Problems          OT Goals       Pt will increase endurance to tolerate 15min of OOB activity with no more than 1 rest break in order to increase ability to engage in ADL completion.  (Progressing)       Start:  02/15/24    Expected End:  02/22/24            Pt will demo and/or verbalize 2-3 energy conservation techniques to incorporate into functional mobility or ADL to improve performance and increase independence.  (Progressing)       Start:  02/15/24    Expected End:  02/22/24            Pt will demo increased functional mobility/pivot transfer independence to tolerate tasks necessary to complete ADL routine.  (Progressing)       Start:  02/15/24    Expected End:  02/22/24            Pt will demo ADL routine and meaningful daily activities using modifications as  needed  (Progressing)       Start:  02/15/24    Expected End:  02/22/24               Transfers       STG - Patient will perform bed mobility independently  (Progressing)       Start:  02/14/24    Expected End:  02/22/24            STG - Patient will transfer sit to and from stand min A, maintaining weight bearing status  (Progressing)       Start:  02/14/24    Expected End:  02/22/24

## 2024-02-23 NOTE — PROGRESS NOTES
Blanca Hamilton is a 48 y.o. female on day 9 of admission presenting with Wound dehiscence.    Subjective   Interval History: no fever, no new complaints        Review of Systems    Objective   Range of Vitals (last 24 hours)  Heart Rate:  []   Temp:  [36.7 °C (98 °F)-37.8 °C (100 °F)]   Resp:  [12-22]   BP: ()/(60-71)   SpO2:  [95 %-100 %]   Daily Weight  02/14/24 : 89.4 kg (197 lb 1.6 oz)    Body mass index is 29.11 kg/m².    Physical Exam  Constitutional:       Appearance: Normal appearance.   HENT:      Head: Normocephalic and atraumatic.      Mouth/Throat:      Mouth: Mucous membranes are moist.      Pharynx: Oropharynx is clear.   Eyes:      Pupils: Pupils are equal, round, and reactive to light.   Cardiovascular:      Rate and Rhythm: Normal rate and regular rhythm.      Heart sounds: Normal heart sounds.   Pulmonary:      Effort: Pulmonary effort is normal.      Breath sounds: Normal breath sounds.   Abdominal:      General: Abdomen is flat. Bowel sounds are normal.      Palpations: Abdomen is soft.   Musculoskeletal:      Cervical back: Normal range of motion.      Comments: Rt hip dressing, no cellulitis   Neurological:      Mental Status: She is alert.         Antibiotics  sodium chloride 0.9 % bolus 3,120 mL  acetaminophen (Tylenol) tablet 975 mg  ARIPiprazole (Abilify) tablet 15 mg  aspirin chewable tablet 81 mg  ceFAZolin in dextrose (iso-os) (Ancef) IVPB 2 g  famotidine (Pepcid) tablet 40 mg  ferrous sulfate (325 mg ferrous sulfate) tablet 1 tablet  lactulose 20 gram/30 mL oral solution 20 g  lisinopril tablet 10 mg  loratadine (Claritin) tablet 10 mg  magnesium oxide (Mag-Ox) tablet 400 mg  metoprolol succinate XL (Toprol-XL) 24 hr tablet 25 mg  montelukast (Singulair) tablet 10 mg  nicotine (Nicoderm CQ) 21 mg/24 hr patch 1 patch  PARoxetine (Paxil) tablet 40 mg  polyethylene glycol (Glycolax, Miralax) packet 17 g  simvastatin (Zocor) tablet 20 mg  traZODone (Desyrel) tablet 100 mg  zinc  oxide 40 % ointment 1 Application  piperacillin-tazobactam-dextrose (Zosyn) IV 3.375 g  sodium chloride 0.9% infusion  oxyCODONE (Roxicodone) immediate release tablet 5 mg  oxyCODONE (Roxicodone) immediate release tablet 10 mg  HYDROmorphone (Dilaudid) injection 0.4 mg  vancomycin (Vancocin) in dextrose 5 % water (D5W) 500 mL IV 1,500 mg  atorvastatin (Lipitor) tablet    ceFAZolin in dextrose (iso-os) (Ancef) IVPB 1 g  enoxaparin (Lovenox) syringe 40 mg  ceFAZolin in dextrose (iso-os) (Ancef) IVPB 2 g  ondansetron (Zofran) injection 4 mg  lactated Ringer's infusion  oxygen (O2) therapy  lactated Ringer's infusion  oxyCODONE (Roxicodone) immediate release tablet 5 mg  HYDROmorphone (Dilaudid) injection 0.25 mg  HYDROmorphone (Dilaudid) injection 0.5 mg  ondansetron (Zofran) injection 4 mg  droperidol (Inapsine) injection 0.625 mg  rocuronium (ZeMuron) injection  - Omnicell Override Pull  ondansetron (Zofran) injection  - Omnicell Override Pull  dexAMETHasone (Decadron) injection  - Omnicell Override Pull  lidocaine (cardiac) (Xylocaine) injection  - Omnicell Override Pull  propofol (Diprivan) injection  - Omnicell Override Pull  fentaNYL PF (Sublimaze) injection  - Omnicell Override Pull  lactated Ringer's infusion  ceFAZolin (Ancef) injection  - Omnicell Override Pull  ceFAZolin (Ancef) injection  - Omnicell Override Pull  tranexamic acid (Cyklokapron) injection  - Omnicell Override Pull  tranexamic acid (Cyklokapron) injection  - Omnicell Override Pull  povidone-iodine (Betadine) soap  - Omnicell Override Pull  povidone-iodine (Betadine) topical solution  - Omnicell Override Pull  ePHEDrine injection  - Omnicell Override Pull  vasopressin (Vasostrict) injection  - Omnicell Override Pull  propofol (Diprivan) injection  - Omnicell Override Pull  dexAMETHasone (Decadron) injection  - Omnicell Override Pull  gentamicin (Garamycin) injection  - Omnicell Override Pull  vancomycin (Vancocin) vial for injection  - Omnicell  Override Pull  HYDROmorphone (Dilaudid) injection  - Omnicell Override Pull  vancomycin (Vancocin) vial for injection  sugammadex (Bridion) injection  - Omnicell Override Pull  gentamicin (Garamycin) injection  sodium chloride 0.9 % irrigation solution  lactated Ringer's irrigation solution  lactated Ringer's bolus 500 mL  lactated Ringer's bolus 500 mL  lactated Ringer's infusion  oseltamivir (Tamiflu) capsule 75 mg  flu vaccine, quadrivalent, high-dose, preservative free, age 65y+ (FLUZONE)  lactated Ringer's bolus 1,000 mL  midodrine (Proamatine) tablet 5 mg  lactated Ringer's bolus 500 mL  magnesium oxide (Mag-Ox) tablet 800 mg  polyethylene glycol (Glycolax, Miralax) packet 17 g  bisacodyl (Dulcolax) suppository 10 mg      Relevant Results  Labs  Results from last 72 hours   Lab Units 02/23/24  0501 02/22/24  0430 02/21/24  0621   WBC AUTO x10*3/uL 5.6 6.6 9.4   HEMOGLOBIN g/dL 6.6* 7.3* 8.2*   HEMATOCRIT % 21.4* 23.1* 26.6*   PLATELETS AUTO x10*3/uL 215 230 282     Results from last 72 hours   Lab Units 02/23/24  0501 02/22/24  0430 02/21/24  0622   SODIUM mmol/L 136 134* 132*   POTASSIUM mmol/L 3.9 4.0 4.2   CHLORIDE mmol/L 103 102 102   CO2 mmol/L 29 27 25   BUN mg/dL 5* 6 7   CREATININE mg/dL 0.24* 0.21* 0.30*   GLUCOSE mg/dL 80 73* 79   CALCIUM mg/dL 7.3* 7.3* 7.7*   ANION GAP mmol/L 8* 9* 9*   EGFR mL/min/1.73m*2 >90 >90 >90   PHOSPHORUS mg/dL 3.3  --  2.8     Results from last 72 hours   Lab Units 02/23/24  0501 02/22/24  0430 02/21/24  0622   ALK PHOS U/L 93 92 101   BILIRUBIN TOTAL mg/dL 0.2 0.2 0.3   PROTEIN TOTAL g/dL 4.2* 4.3* 4.7*   ALT U/L <3* <3* <3*   AST U/L 7* 9 7*   ALBUMIN g/dL 2.1* 2.2* 2.4*     Estimated Creatinine Clearance: 125 mL/min (A) (by C-G formula based on SCr of 0.24 mg/dL (L)).  C-Reactive Protein   Date Value Ref Range Status   02/20/2024 0.42 <1.00 mg/dL Final   02/19/2024 0.68 <1.00 mg/dL Final   02/18/2024 1.13 (H) <1.00 mg/dL Final      Microbiology  Reviewed  Imaging  reviewed        Assessment/Plan     Right hip prosthesis infection sp I&D with partial hardware exchange, MSSA on the cultures, sp repeat I&D    Recommendations :  Continue Cefazolin, plan on 6 weeks from the date of the last surgery, weekly labs  Discussed with the medical team     I spent minutes in the professional and overall care of this patient.      Frank Modi MD

## 2024-02-23 NOTE — PROGRESS NOTES
02/23/24 1524   Discharge Planning   Living Arrangements Other (Comment)  ([facility]F)   Support Systems Friends/neighbors;/   Assistance Needed From [facility] for SNF, Patient A&0x3, max assist with walker for ambulation, room air, wound vac at facility   Type of Residence Skilled nursing facility   Do you have animals or pets at home? Yes   Type of Animals or Pets cat   Home or Post Acute Services Post acute facilities (Rehab/SNF/etc)   Type of Post Acute Facility Services Skilled nursing   Patient expects to be discharged to: Return to [facility] SNF, patient will need Ancef 2gm IV TID for 6 weeks from 2/20. PICC in place, anticipate [place] at dc.   Does the patient need discharge transport arranged? Yes   RoundTrip coordination needed? Yes   Has discharge transport been arranged? No   Patient Choice   Provider Choice list and CMS website (https://medicare.gov/care-compare#search) for post-acute Quality and Resource Measure Data were provided and reviewed with: Patient   Patient / Family choosing to utilize agency / facility established prior to hospitalization Yes     2/23/24: Plan to start precert tomorrow 2/24.

## 2024-02-23 NOTE — PROGRESS NOTES
"Blanca Hamilton is a 48 y.o. female on day 9 of admission presenting with Wound dehiscence.    Subjective   Mildly febrile overnight t-max 100.  HgB dropped to 6.6 this AM.  Pain controlled.  Denies chest pain shortness of breath.  Denies numbness/tingling RLE.      Objective     Physical Exam  HENT:      Head: Normocephalic and atraumatic.   Eyes:      Extraocular Movements: Extraocular movements intact.      Conjunctiva/sclera: Conjunctivae normal.      Pupils: Pupils are equal, round, and reactive to light.   Cardiovascular:      Rate and Rhythm: Normal rate and regular rhythm.      Pulses: Normal pulses.   Pulmonary:      Breath sounds: Normal breath sounds.   Abdominal:      General: Bowel sounds are normal.      Palpations: Abdomen is soft.   Musculoskeletal:      Comments: Right hip dressing C/D/I, superficial and deep hemovac drains to compressed suction with sanguinous output, Prevena VAC in place to continuous suction, no output, leg and foot warm and well perfused, motion and sensations intact     Skin:     General: Skin is warm and dry.      Capillary Refill: Capillary refill takes less than 2 seconds.   Neurological:      General: No focal deficit present.      Mental Status: She is alert and oriented to person, place, and time.       Last Recorded Vitals  Blood pressure 107/71, pulse 87, temperature 37.8 °C (100 °F), temperature source Temporal, resp. rate 18, height 1.753 m (5' 9\"), weight 89.4 kg (197 lb 1.6 oz), SpO2 96 %.  Intake/Output last 3 Shifts:  I/O last 3 completed shifts:  In: 3130.7 (35 mL/kg) [P.O.:660; I.V.:1770.7 (19.8 mL/kg); IV Piggyback:700]  Out: 1333 (14.9 mL/kg) [Urine:1050 (0.3 mL/kg/hr); Drains:283]  Weight: 89.4 kg     Relevant Results  Lower extremity venous duplex bilateral    Result Date: 2/15/2024          [facility] [id number] [place] [address]  Tel [phone] and Fax [phone]  Vascular Lab Report VASC US LOWER EXTREMITY VENOUS DUPLEX BILATERAL  " Patient Name:      ZULEIKA KAMARA          Reading Physician:  [id number] Prieto Loza MD Study Date:        2/14/2024            Ordering Physician: [id number] GEORGE AMADOR MRN/PID:           [id number]             Technologist:       Rosa Pierre [facility] Accession#:        [id number]         Technologist 2:     Hattie Raymond Date of Birth/Age: 1975 / 48      Encounter#:         [id number]                    years Gender:            F Admission Status:  Inpatient            Location Performed: [facility]  Diagnosis/ICD: Localized (leg) edema-R60.0 CPT Codes:     08238 Peripheral venous duplex scan for DVT complete  CONCLUSIONS: Right Lower Venous: No evidence of acute deep vein thrombus visualized in the right lower extremity. Left Lower Venous: No evidence of acute deep vein thrombus visualized in the left lower extremity.  Additional Findings: Technically difficult study due to patient movement and inability to position.  Imaging & Doppler Findings:  Right                 Compressible Thrombus        Flow Distal External Iliac     Yes        None   Spontaneous/Phasic CFV                       Yes        None   Spontaneous/Phasic PFV                       Yes        None FV Proximal               Yes        None   Spontaneous/Phasic FV Mid                    Yes        None FV Distal                 Yes        None Popliteal                 Yes        None   Spontaneous/Phasic Peroneal                  Yes        None PTV                       Yes        None  Left                  Compress Thrombus        Flow Distal External Iliac   Yes      None   Spontaneous/Phasic CFV                     Yes      None   Spontaneous/Phasic PFV                     Yes      None FV Proximal             Yes      None   Spontaneous/Phasic FV Mid                  Yes      None FV Distal               Yes       None Popliteal               Yes      None   Spontaneous/Phasic Peroneal                Yes      None PTV                     Yes      None  [id number] Prieto Loza MD Electronically signed by [id number] Prieto Loza MD on 2/15/2024 at 5:44:38 PM  ** Final **     Bedside PICC Imaging    Result Date: 2/8/2024  These images are not reportable by radiology and will not be interpreted by  Radiologists.    ECG 12 lead    Result Date: 2/7/2024  Atrial flutter with 2:1 AV conduction Nonspecific intraventricular block Left ventricular hypertrophy ( R in aVL , [place] product ) Inferior infarct , age undetermined Abnormal ECG When compared with ECG of 11-JAN-2024 23:16, Significant changes have occurred See ED provider note for full interpretation and clinical correlation Confirmed by Vega Bruce ([id number]) on 2/7/2024 10:40:33 PM    XR hip right with pelvis when performed 2 or 3 views    Result Date: 2/7/2024  Interpreted By:  Richard Garcia, STUDY: XR HIP RIGHT WITH PELVIS WHEN PERFORMED 2 OR 3 VIEWS; ;  2/7/2024 9:25 am   INDICATION: Signs/Symptoms:s/p revision oanh.   COMPARISON: 01/26/2024   ACCESSION NUMBER(S): [id number]   ORDERING CLINICIAN: JON HERNANDEZ   FINDINGS: Total right hip arthroplasty with interval placement of antibiotic impregnated beads. Partially visualized left hip arthroplasty.       Interval placement of antibiotic impregnated beads in the right hip.     MACRO: None   Signed by: Richard Garcia 2/7/2024 5:00 PM Dictation workstation:   [id number]    XR abdomen 1 view    Result Date: 2/5/2024  Interpreted By:  Richard Garcia, STUDY: XR ABDOMEN 1 VIEW;  2/4/2024 6:47 pm   INDICATION: Signs/Symptoms:Lactic acidosis and emesis.   COMPARISON: None.   ACCESSION NUMBER(S): [id number]   ORDERING CLINICIAN: TABITHA MAYNARD   FINDINGS: Nonobstructive bowel gas pattern. Partially visualized bilateral hip arthroplasties.       1.  Nonobstructive bowel gas pattern.   MACRO: None   Signed by: Richard Garcia  2/5/2024 4:12 PM Dictation workstation:   [id number]    XR hip right with pelvis when performed 2 or 3 views    Result Date: 1/27/2024  Interpreted By:  Yolie Tate, STUDY: Single view pelvis. Right hip, two views.   INDICATION: Signs/Symptoms:POST OP-RIGHT TOTAL HIP.   COMPARISON: 10/17/2023.   ACCESSION NUMBER(S): [id number]   ORDERING CLINICIAN: ETHEL MORALES   FINDINGS: Status post right total hip arthroplasty with cerclage wire fixation of the proximal femur. Hardware is intact without perihardware fractures or lucencies.   There is fragmentation of the greater trochanter which is likely postsurgical in etiology with attention on follow-up recommended.   Right acetabular protrusio. No malalignment.   Changes of left hip arthroplasty noted with prominent osteolysis of the medial acetabular wall similar to prior.       1. As above.   MACRO: None.   Signed by: Yolie Tate 1/27/2024 10:30 AM Dictation workstation:   [id number]     Scheduled medications  acetaminophen, 975 mg, oral, q8h  ARIPiprazole, 15 mg, oral, Nightly  [Held by provider] aspirin, 81 mg, oral, BID  ceFAZolin, 2 g, intravenous, q8h  [Held by provider] enoxaparin, 40 mg, subcutaneous, q24h  famotidine, 40 mg, oral, BID  ferrous sulfate (325 mg ferrous sulfate), 1 tablet, oral, Daily  [Held by provider] lisinopril, 10 mg, oral, q AM  loratadine, 10 mg, oral, Daily  magnesium oxide, 400 mg, oral, Daily  [Held by provider] metoprolol succinate XL, 25 mg, oral, Daily  montelukast, 10 mg, oral, q AM  nicotine, 1 patch, transdermal, Daily  oseltamivir, 75 mg, oral, Daily  PARoxetine, 40 mg, oral, Daily  polyethylene glycol, 17 g, oral, Daily  simvastatin, 20 mg, oral, Nightly  traZODone, 100 mg, oral, Nightly  zinc oxide, 1 Application, Topical, BID      Continuous medications  lactated Ringer's, 100 mL/hr, Last Rate: 100 mL/hr (02/22/24 2227)      PRN medications  PRN medications: HYDROmorphone, midodrine, ondansetron, oxyCODONE,  oxyCODONE    Results for orders placed or performed during the hospital encounter of 02/14/24 (from the past 24 hour(s))   CBC   Result Value Ref Range    WBC 5.6 4.4 - 11.3 x10*3/uL    nRBC 0.0 0.0 - 0.0 /100 WBCs    RBC 2.30 (L) 4.00 - 5.20 x10*6/uL    Hemoglobin 6.6 (L) 12.0 - 16.0 g/dL    Hematocrit 21.4 (L) 36.0 - 46.0 %    MCV 93 80 - 100 fL    MCH 28.7 26.0 - 34.0 pg    MCHC 30.8 (L) 32.0 - 36.0 g/dL    RDW 18.7 (H) 11.5 - 14.5 %    Platelets 215 150 - 450 x10*3/uL   Comprehensive Metabolic Panel   Result Value Ref Range    Glucose 80 74 - 99 mg/dL    Sodium 136 136 - 145 mmol/L    Potassium 3.9 3.5 - 5.3 mmol/L    Chloride 103 98 - 107 mmol/L    Bicarbonate 29 21 - 32 mmol/L    Anion Gap 8 (L) 10 - 20 mmol/L    Urea Nitrogen 5 (L) 6 - 23 mg/dL    Creatinine 0.24 (L) 0.50 - 1.05 mg/dL    eGFR >90 >60 mL/min/1.73m*2    Calcium 7.3 (L) 8.6 - 10.3 mg/dL    Albumin 2.1 (L) 3.4 - 5.0 g/dL    Alkaline Phosphatase 93 33 - 110 U/L    Total Protein 4.2 (L) 6.4 - 8.2 g/dL    AST 7 (L) 9 - 39 U/L    Bilirubin, Total 0.2 0.0 - 1.2 mg/dL    ALT <3 (L) 7 - 45 U/L   Magnesium   Result Value Ref Range    Magnesium 1.53 (L) 1.60 - 2.40 mg/dL   Phosphorus   Result Value Ref Range    Phosphorus 3.3 2.5 - 4.9 mg/dL       Assessment/Plan   Principal Problem:    Wound dehiscence  Active Problems:    Class 1 obesity with body mass index (BMI) of 30.0 to 30.9 in adult    Sinusitis    Transfusion history    Arthritis    Hyponatremia    48 year female POD 3 from right hip revision.     - AM labs reviewed - hemoglobin 6.6 -> transfuse 1 unit PRBC  - toe touch weight bearing for transfers other, otherwise NWB RLE  - HOLD DVT prophylaxis until hemoglobin stable   - PT evaluation  - monitor and document hemovac drain output every shift   - maintain two pillows in between legs when in bed  - ortho will continue to follow          I spent 30 minutes in the professional and overall care of this patient.      Ester Fitch, APRN-CNP

## 2024-02-23 NOTE — PROGRESS NOTES
"Physical Therapy    Physical Therapy Treatment    Patient Name: Blanca Hamilton  MRN: [id number]  Today's Date: 2/23/2024  Time Calculation  Start Time: 1318  Stop Time: 1334  Time Calculation (min): 16 min       Assessment/Plan         PT Plan  Treatment/Interventions: Bed mobility, Transfer training, Balance training, Strengthening, Endurance training  PT Plan: Skilled PT  PT Frequency: 4 times per week  PT Discharge Recommendations: Moderate intensity level of continued care  Equipment Recommended upon Discharge: Wheeled walker, Wheelchair  PT Recommended Transfer Status: Assist x2  PT - OK to Discharge: Yes      General Visit Information:   PT  Visit  PT Received On: 02/23/24  General  Prior to Session Communication: Bedside nurse  Patient Position Received: Bed, 3 rail up, Alarm off, not on at start of session  General Comment: Pt agreeable to \"leg exercises\" in bed.  Pt reports that she is \"queasy\" after sitting EOB this morning, and does not want to sit again    Subjective   Precautions:  Precautions  Medical Precautions: Fall precautions, Infection precautions  Vital Signs:       Objective   Pain:     Cognition:     Postural Control:     Extremity/Trunk Assessments:    Activity Tolerance:     Treatments:  Therapeutic Exercise  Therapeutic Exercise Performed: Yes  Therapeutic Exercise Activity 1: ankle pumps 20x, ankle windshield wipers 20x, quad sets 20x, glute sets 20x, L LE heel slides 20x, ABD heel slides 20x, pillow ABD squeezes 20x, AAROM SLR 20x    Outcome Measures:  [facility] Basic Mobility  Turning from your back to your side while in a flat bed without using bedrails: A lot  Moving from lying on your back to sitting on the side of a flat bed without using bedrails: A lot  Moving to and from bed to chair (including a wheelchair): A lot  Standing up from a chair using your arms (e.g. wheelchair or bedside chair): A lot  To walk in hospital room: Total  Climbing 3-5 steps with railing: Total  Basic Mobility - " Total Score: 10    Education Documentation  Precautions, taught by Rocio Olsen PTA at 2/23/2024  1:47 PM.  Learner: Patient  Readiness: Acceptance  Method: Explanation  Response: Verbalizes Understanding    Body Mechanics, taught by Rocio Olsen PTA at 2/23/2024  1:47 PM.  Learner: Patient  Readiness: Acceptance  Method: Explanation  Response: Verbalizes Understanding    Mobility Training, taught by Rocio Olsen PTA at 2/23/2024  1:47 PM.  Learner: Patient  Readiness: Acceptance  Method: Explanation  Response: Verbalizes Understanding    Education Comments  No comments found.        OP EDUCATION:       Encounter Problems       Encounter Problems (Active)       Balance       STG - Maintains static standing balance with upper extremity support x 1' maintaining weight bearing status  (Progressing)       Start:  02/14/24    Expected End:  02/22/24            STG - Maintains dynamic sitting balance with upper extremity support x 10'  (Progressing)       Start:  02/14/24    Expected End:  02/22/24               Pain - Adult          Transfers       STG - Patient will perform bed mobility independently  (Progressing)       Start:  02/14/24    Expected End:  02/22/24            STG - Patient will transfer sit to and from stand min A, maintaining weight bearing status  (Progressing)       Start:  02/14/24    Expected End:  02/22/24

## 2024-02-23 NOTE — CARE PLAN
The patient's goals for the shift include      The clinical goals for the shift include patient will have a successful blood transfusion    Over the shift, the patient did not make progress toward the following goals. Barriers to progression include patient participation.  Recommendations to address these barriers include education.

## 2024-02-23 NOTE — CARE PLAN
The patient's goals for the shift include      The clinical goals for the shift include remain hds    Over the shift, the patient did not make progress toward the following goals. Barriers to progression include patient has remained hds throughout the night. Recommendations to address these barriers include continue with plan of care.

## 2024-02-23 NOTE — NURSING NOTE
Patient for transfer to SNF in next day or two -- she have Vac Prevena to right lateral thigh along with 2 hemovac drains.  So she is ready for transfer the vac Prevena was prepared so that it simply needs to be connected to the prevena dressing and turned on by nursing team at time of discharge, nursing team aware.

## 2024-02-23 NOTE — PROGRESS NOTES
Patient: Blanca Hamilton Age: 48 y.o.   Gender: female Room/bed: 241/241-A     Attending: Ashkan Crawford MD  Code Status:  Full Code    Overnight Events     None    Subjective   She states that overall she is doing well with no new issues. Her pain is well controlled currently.     Denies chest pain/pressure, abdominal pain, nausea, vomiting, fever, chills, headaches.     Objective    Physical Exam   Constitutional:       General: She is not in acute distress.     Appearance: She is not ill-appearing.   HENT:      Head: Normocephalic and atraumatic.   Cardiovascular:      Rate and Rhythm: Normal rate and regular rhythm.      Pulses: Normal pulses.      Heart sounds: Normal heart sounds.   Pulmonary:      Effort: Pulmonary effort is normal. No respiratory distress.      Breath sounds: No stridor.   Abdominal:      General: Abdomen is flat. Bowel sounds are normal. There is no distension.      Palpations: Abdomen is soft. There is no mass.      Tenderness: There is no abdominal tenderness.   Musculoskeletal:         General: Tenderness and deformity present.   - Surgical site clean and dry, with surgical packing in place. Deep and surgical drains placed and appropriately draining.  Tightly wrapped without overt signs of hematoma formation or seepage. Serosanguineous drainage noted in both tubes.  Minimal output in collecting system.    Skin:     Findings: Bruising present.   Neurological:      General: No focal deficit present.      Mental Status: She is alert and oriented to person, place, and time. Mental status is at baseline.   Psychiatric:         Mood and Affect: Mood normal.         Behavior: Behavior normal.     Temp:  [36.7 °C (98 °F)-37.8 °C (100 °F)] 37.2 °C (99 °F)  Heart Rate:  [] 92  Resp:  [12-22] 16  BP: ()/(60-89) 110/68    Intake/Output Summary (Last 24 hours) at 2/23/2024 1343  Last data filed at 2/23/2024 1227  Gross per 24 hour   Intake 1953.5 ml   Output 1395 ml   Net 558.5 ml      Vitals:    02/14/24 0227   Weight: 89.4 kg (197 lb 1.6 oz)       I/Os    Intake/Output Summary (Last 24 hours) at 2/23/2024 1343  Last data filed at 2/23/2024 1227  Gross per 24 hour   Intake 1953.5 ml   Output 1395 ml   Net 558.5 ml     Labs:   Results from last 72 hours   Lab Units 02/23/24  0501 02/22/24  0430 02/21/24  0622   SODIUM mmol/L 136 134* 132*   POTASSIUM mmol/L 3.9 4.0 4.2   CHLORIDE mmol/L 103 102 102   CO2 mmol/L 29 27 25   BUN mg/dL 5* 6 7   CREATININE mg/dL 0.24* 0.21* 0.30*   GLUCOSE mg/dL 80 73* 79   CALCIUM mg/dL 7.3* 7.3* 7.7*   ANION GAP mmol/L 8* 9* 9*   EGFR mL/min/1.73m*2 >90 >90 >90   PHOSPHORUS mg/dL 3.3  --  2.8      Results from last 72 hours   Lab Units 02/23/24  0501 02/22/24  0430 02/21/24  0621   WBC AUTO x10*3/uL 5.6 6.6 9.4   HEMOGLOBIN g/dL 6.6* 7.3* 8.2*   HEMATOCRIT % 21.4* 23.1* 26.6*   PLATELETS AUTO x10*3/uL 215 230 282      Lab Results   Component Value Date    CALCIUM 7.3 (L) 02/23/2024    PHOS 3.3 02/23/2024      Lab Results   Component Value Date    CRP 0.42 02/20/2024      Micro/ID:   Susceptibility data from last 90 days.  Collected Specimen Info Organism Clindamycin Erythromycin Oxacillin Tetracycline Trimethoprim/Sulfamethoxazole Vancomycin   02/06/24 Swab from HIP ARTHROPLASTY RIGHT Methicillin Susceptible Staphylococcus aureus (MSSA) S S S R S S   02/06/24 Swab from HIP ARTHROPLASTY RIGHT Staphylococcus aureus         02/06/24 Swab from HIP ARTHROPLASTY RIGHT Staphylococcus aureus         02/02/24 Tissue/Biopsy from Wound/Tissue Methicillin Susceptible Staphylococcus aureus (MSSA) S S S R S S     Lab Results   Component Value Date    BLOODCULT No growth at 4 days -  FINAL REPORT 02/14/2024     Images:  Lower extremity venous duplex bilateral            [place] [id number]   Tel [phone] and Fax [phone]       Vascular Lab Report  VASC US LOWER EXTREMITY VENOUS DUPLEX BILATERAL       Patient Name:      ZULEIKA  PAUL Mcnamara Physician:  [id number] Prieto Loza MD  Study Date:        2/14/2024            Ordering Physician: [id number] GEORGE AMADOR  MRN/PID:           [id number]             Technologist:       Rosa Pierre [facility]  Accession#:        [id number]         Technologist 2:     Hattie Raymond  Date of Birth/Age: 1975 / 48      Encounter#:         [id number]                     years  Gender:            F  Admission Status:  Inpatient            Location Performed: [facility]       Diagnosis/ICD: Localized (leg) edema-R60.0  CPT Codes:     90107 Peripheral venous duplex scan for DVT complete       CONCLUSIONS:  Right Lower Venous: No evidence of acute deep vein thrombus visualized in the right lower extremity.  Left Lower Venous: No evidence of acute deep vein thrombus visualized in the left lower extremity.     Additional Findings:  Technically difficult study due to patient movement and inability to position.       Imaging & Doppler Findings:     Right                 Compressible Thrombus        Flow  Distal External Iliac     Yes        None   Spontaneous/Phasic  CFV                       Yes        None   Spontaneous/Phasic  PFV                       Yes        None  FV Proximal               Yes        None   Spontaneous/Phasic  FV Mid                    Yes        None  FV Distal                 Yes        None  Popliteal                 Yes        None   Spontaneous/Phasic  Peroneal                  Yes        None  PTV                       Yes        None       Left                  Compress Thrombus        Flow  Distal External Iliac   Yes      None   Spontaneous/Phasic  CFV                     Yes      None   Spontaneous/Phasic  PFV                     Yes      None  FV Proximal             Yes      None   Spontaneous/Phasic  FV Mid                  Yes      None  FV  Distal               Yes      None  Popliteal               Yes      None   Spontaneous/Phasic  Peroneal                Yes      None  PTV                     Yes      None       [id number] Prieto Loza MD  Electronically signed by [id number] Prieto Loza MD on 2/15/2024 at 5:44:38 PM       ** Final **       Meds    Scheduled medications  acetaminophen, 975 mg, oral, q8h  ARIPiprazole, 15 mg, oral, Nightly  [Held by provider] aspirin, 81 mg, oral, BID  bisacodyl, 10 mg, rectal, Daily  ceFAZolin, 2 g, intravenous, q8h  [Held by provider] enoxaparin, 40 mg, subcutaneous, q24h  famotidine, 40 mg, oral, BID  ferrous sulfate (325 mg ferrous sulfate), 1 tablet, oral, Daily  [Held by provider] lisinopril, 10 mg, oral, q AM  loratadine, 10 mg, oral, Daily  magnesium oxide, 800 mg, oral, Daily  [Held by provider] metoprolol succinate XL, 25 mg, oral, Daily  montelukast, 10 mg, oral, q AM  nicotine, 1 patch, transdermal, Daily  oseltamivir, 75 mg, oral, Daily  PARoxetine, 40 mg, oral, Daily  polyethylene glycol, 17 g, oral, BID  simvastatin, 20 mg, oral, Nightly  traZODone, 100 mg, oral, Nightly  zinc oxide, 1 Application, Topical, BID      Continuous medications  lactated Ringer's, 100 mL/hr, Last Rate: 100 mL/hr (02/23/24 1230)      PRN medications  PRN medications: HYDROmorphone, midodrine, ondansetron, oxyCODONE, oxyCODONE     Assessment and Plan    Blanca Hamilton is a 48 y.o. 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 Initially met Sepsis criteria. Admitted for further management of dehiscence and SSI.      Acute Medical Issues:    # Sepsis secondary to reccurent post op wound infection (MSSA) of right hip arthroplasty   # Hx right total hip arthroplasty w/ revision (1/5/24) for prosthetic fx  # Hx hardware removal for prosthetic joint infection (2/6/24)  -OR 2/20/24 right hip revision  -Discharged on 2/11 with wound vac  + PICC line for 6-week course of cefazolin              -Previous tissue/Wound Culture (2/6): MSSA, only resistant to Tetracycline   -2/13/24: 2/4 SIRS criteria on admission. S/p sepsis fluids in ED. Lactate 0.7. Hgb stable.  -pain control: scheduled tylenol, oxy 5 mod, oxy 10 severe, dilaudid 0.4 PRN breakthrough  -toe touch weight bearing for transfers other, otherwise NWB RLE  -monitor and document hemovac drain output every shift; determine length of time drains need to remain per ortho  -DO NOT LAY on right hip, maintain two pillows in between legs when in bed  -Blood Cx (2/14): Negative x2  -CRP levels down trending (7.15 -> 2.39 -> 1.13)  -PT/OT: moderate intensity   -Holding Lovenox and ASA BID in setting of anemia,   [ ] Once Hgb stabilizes, plan on 30 days of Aspirin 81 mg BID   [ ] Ancef 2g TID (2/20/2024 - 4/2/2024)    # Hypotension   2/2 Hypovolemic Hyponatremia vs Acute Blood Loss Anemia from Wound Dehiscence vs Intraoperative Blood loss  - Na 129 on admission  - Likely related to poor p.o. intake and in postop setting  - Nutrition consult, recommend encouraging p.o. intake and ensure supplements  - Bolus and continuous fluids today for hypotension  - If remains hypotensive, add midodrine     # B/l LE edema - chronic  -Bilateral duplex negative for acute DVT bilaterally  -Encourage bilateral leg elevation as tolerated     Chronic Medical Issues:  # GERD: c/w Pepcid   # HTN: Holding Metoprolol and lisinopril in setting of hypotension  # HLD: c/w Simvastatin  # Depression: c/w Abilify + Paroxetine +Trazodone  # Chronic anemia: iron supplement  # Tobacco use: nicotine patch     Fluids: None  Electrolytes: replete as needed  Nutrition: Regular, supplements  GI PPX: None   DVT PPX: Holding Lovenox      Access: PICC Line, PIV's  Antibiotics: Cefazolin 2g TID (2/15 - )  Oxygenation: Room Air     Dispo: Admitted onto the floor for postoperative wound dehiscence and surgical site infection. OR on 2/20/24 for  right hip revision. Once Hemoglobin stabilizes, patient can be discharged to [place] (will need pre-cert started when medically cleared).    Dylan Casas, DO

## 2024-02-24 LAB
ALBUMIN SERPL BCP-MCNC: 2.2 G/DL (ref 3.4–5)
ALP SERPL-CCNC: 101 U/L (ref 33–110)
ALT SERPL W P-5'-P-CCNC: <3 U/L (ref 7–45)
ANION GAP SERPL CALC-SCNC: 9 MMOL/L (ref 10–20)
AST SERPL W P-5'-P-CCNC: 6 U/L (ref 9–39)
BILIRUB SERPL-MCNC: 0.3 MG/DL (ref 0–1.2)
BUN SERPL-MCNC: 4 MG/DL (ref 6–23)
CALCIUM SERPL-MCNC: 7.4 MG/DL (ref 8.6–10.3)
CHLORIDE SERPL-SCNC: 102 MMOL/L (ref 98–107)
CO2 SERPL-SCNC: 26 MMOL/L (ref 21–32)
CREAT SERPL-MCNC: 0.24 MG/DL (ref 0.5–1.05)
EGFRCR SERPLBLD CKD-EPI 2021: >90 ML/MIN/1.73M*2
ERYTHROCYTE [DISTWIDTH] IN BLOOD BY AUTOMATED COUNT: 17.9 % (ref 11.5–14.5)
GLUCOSE SERPL-MCNC: 77 MG/DL (ref 74–99)
HCT VFR BLD AUTO: 26.3 % (ref 36–46)
HGB BLD-MCNC: 8.1 G/DL (ref 12–16)
MAGNESIUM SERPL-MCNC: 1.59 MG/DL (ref 1.6–2.4)
MCH RBC QN AUTO: 28.8 PG (ref 26–34)
MCHC RBC AUTO-ENTMCNC: 30.8 G/DL (ref 32–36)
MCV RBC AUTO: 94 FL (ref 80–100)
NRBC BLD-RTO: 0 /100 WBCS (ref 0–0)
PHOSPHATE SERPL-MCNC: 3 MG/DL (ref 2.5–4.9)
PLATELET # BLD AUTO: 239 X10*3/UL (ref 150–450)
POTASSIUM SERPL-SCNC: 4.1 MMOL/L (ref 3.5–5.3)
PROT SERPL-MCNC: 4.4 G/DL (ref 6.4–8.2)
RBC # BLD AUTO: 2.81 X10*6/UL (ref 4–5.2)
SODIUM SERPL-SCNC: 133 MMOL/L (ref 136–145)
WBC # BLD AUTO: 6.2 X10*3/UL (ref 4.4–11.3)

## 2024-02-24 PROCEDURE — 1200000002 HC GENERAL ROOM WITH TELEMETRY DAILY

## 2024-02-24 PROCEDURE — 80053 COMPREHEN METABOLIC PANEL: CPT

## 2024-02-24 PROCEDURE — 2500000004 HC RX 250 GENERAL PHARMACY W/ HCPCS (ALT 636 FOR OP/ED)

## 2024-02-24 PROCEDURE — S4991 NICOTINE PATCH NONLEGEND: HCPCS

## 2024-02-24 PROCEDURE — 2500000001 HC RX 250 WO HCPCS SELF ADMINISTERED DRUGS (ALT 637 FOR MEDICARE OP)

## 2024-02-24 PROCEDURE — 2500000002 HC RX 250 W HCPCS SELF ADMINISTERED DRUGS (ALT 637 FOR MEDICARE OP, ALT 636 FOR OP/ED): Performed by: INTERNAL MEDICINE

## 2024-02-24 PROCEDURE — 84100 ASSAY OF PHOSPHORUS: CPT

## 2024-02-24 PROCEDURE — 85027 COMPLETE CBC AUTOMATED: CPT

## 2024-02-24 PROCEDURE — 83735 ASSAY OF MAGNESIUM: CPT

## 2024-02-24 PROCEDURE — 99233 SBSQ HOSP IP/OBS HIGH 50: CPT

## 2024-02-24 PROCEDURE — 2500000002 HC RX 250 W HCPCS SELF ADMINISTERED DRUGS (ALT 637 FOR MEDICARE OP, ALT 636 FOR OP/ED)

## 2024-02-24 RX ADMIN — CEFAZOLIN SODIUM 2 G: 2 INJECTION, SOLUTION INTRAVENOUS at 04:59

## 2024-02-24 RX ADMIN — FAMOTIDINE 40 MG: 20 TABLET ORAL at 20:26

## 2024-02-24 RX ADMIN — Medication 1 APPLICATION: at 08:19

## 2024-02-24 RX ADMIN — TRAZODONE HYDROCHLORIDE 100 MG: 50 TABLET ORAL at 20:25

## 2024-02-24 RX ADMIN — PAROXETINE 40 MG: 20 TABLET, FILM COATED ORAL at 08:15

## 2024-02-24 RX ADMIN — SODIUM CHLORIDE, POTASSIUM CHLORIDE, SODIUM LACTATE AND CALCIUM CHLORIDE 100 ML/HR: 600; 310; 30; 20 INJECTION, SOLUTION INTRAVENOUS at 11:34

## 2024-02-24 RX ADMIN — Medication 800 MG: at 08:15

## 2024-02-24 RX ADMIN — CEFAZOLIN SODIUM 2 G: 2 INJECTION, SOLUTION INTRAVENOUS at 20:27

## 2024-02-24 RX ADMIN — SIMVASTATIN 20 MG: 20 TABLET, FILM COATED ORAL at 20:26

## 2024-02-24 RX ADMIN — ARIPIPRAZOLE 15 MG: 5 TABLET ORAL at 20:26

## 2024-02-24 RX ADMIN — ACETAMINOPHEN 975 MG: 325 TABLET ORAL at 20:27

## 2024-02-24 RX ADMIN — POLYETHYLENE GLYCOL 3350 17 G: 17 POWDER, FOR SOLUTION ORAL at 08:15

## 2024-02-24 RX ADMIN — ACETAMINOPHEN 975 MG: 325 TABLET ORAL at 04:59

## 2024-02-24 RX ADMIN — OSELTAMAVIR PHOSPHATE 75 MG: 75 CAPSULE ORAL at 08:15

## 2024-02-24 RX ADMIN — ACETAMINOPHEN 975 MG: 325 TABLET ORAL at 13:20

## 2024-02-24 RX ADMIN — LORATADINE 10 MG: 10 TABLET ORAL at 08:15

## 2024-02-24 RX ADMIN — CEFAZOLIN SODIUM 2 G: 2 INJECTION, SOLUTION INTRAVENOUS at 13:20

## 2024-02-24 RX ADMIN — FERROUS SULFATE TAB 325 MG (65 MG ELEMENTAL FE) 1 TABLET: 325 (65 FE) TAB at 08:15

## 2024-02-24 RX ADMIN — ONDANSETRON 4 MG: 2 INJECTION INTRAMUSCULAR; INTRAVENOUS at 18:29

## 2024-02-24 RX ADMIN — SODIUM CHLORIDE, POTASSIUM CHLORIDE, SODIUM LACTATE AND CALCIUM CHLORIDE 100 ML/HR: 600; 310; 30; 20 INJECTION, SOLUTION INTRAVENOUS at 20:35

## 2024-02-24 RX ADMIN — Medication 1 APPLICATION: at 20:40

## 2024-02-24 RX ADMIN — FLUTICASONE PROPIONATE 2 SPRAY: 50 SPRAY, METERED NASAL at 09:00

## 2024-02-24 RX ADMIN — MONTELUKAST 10 MG: 10 TABLET, FILM COATED ORAL at 08:15

## 2024-02-24 RX ADMIN — NICOTINE 1 PATCH: 21 PATCH, EXTENDED RELEASE TRANSDERMAL at 08:15

## 2024-02-24 RX ADMIN — FAMOTIDINE 40 MG: 20 TABLET ORAL at 08:15

## 2024-02-24 RX ADMIN — OXYCODONE HYDROCHLORIDE 10 MG: 5 TABLET ORAL at 05:00

## 2024-02-24 ASSESSMENT — COGNITIVE AND FUNCTIONAL STATUS - GENERAL
MOBILITY SCORE: 12
DRESSING REGULAR UPPER BODY CLOTHING: A LOT
MOBILITY SCORE: 12
STANDING UP FROM CHAIR USING ARMS: A LOT
DRESSING REGULAR LOWER BODY CLOTHING: A LOT
CLIMB 3 TO 5 STEPS WITH RAILING: A LOT
TOILETING: A LOT
CLIMB 3 TO 5 STEPS WITH RAILING: A LOT
MOVING FROM LYING ON BACK TO SITTING ON SIDE OF FLAT BED WITH BEDRAILS: A LOT
TURNING FROM BACK TO SIDE WHILE IN FLAT BAD: A LOT
DRESSING REGULAR UPPER BODY CLOTHING: A LOT
MOVING TO AND FROM BED TO CHAIR: A LOT
MOVING FROM LYING ON BACK TO SITTING ON SIDE OF FLAT BED WITH BEDRAILS: A LOT
DAILY ACTIVITIY SCORE: 13
MOVING TO AND FROM BED TO CHAIR: A LOT
HELP NEEDED FOR BATHING: A LOT
WALKING IN HOSPITAL ROOM: A LOT
DAILY ACTIVITIY SCORE: 13
EATING MEALS: A LITTLE
DRESSING REGULAR LOWER BODY CLOTHING: A LOT
TOILETING: A LOT
WALKING IN HOSPITAL ROOM: A LOT
STANDING UP FROM CHAIR USING ARMS: A LOT
EATING MEALS: A LITTLE
PERSONAL GROOMING: A LOT
HELP NEEDED FOR BATHING: A LOT
PERSONAL GROOMING: A LOT
TURNING FROM BACK TO SIDE WHILE IN FLAT BAD: A LOT

## 2024-02-24 ASSESSMENT — PAIN - FUNCTIONAL ASSESSMENT: PAIN_FUNCTIONAL_ASSESSMENT: 0-10

## 2024-02-24 ASSESSMENT — PAIN SCALES - GENERAL
PAINLEVEL_OUTOF10: 0 - NO PAIN
PAINLEVEL_OUTOF10: 3

## 2024-02-24 NOTE — PROGRESS NOTES
Blanca Hamilton is a 48 y.o. female on day 10 of admission presenting with Wound dehiscence.    Subjective   Interval History: no fever, no new complaints        Review of Systems    Objective   Range of Vitals (last 24 hours)  Heart Rate:  []   Temp:  [36 °C (96.8 °F)-37.2 °C (99 °F)]   Resp:  [12-17]   BP: ()/(53-89)   SpO2:  [95 %-99 %]   Daily Weight  02/14/24 : 89.4 kg (197 lb 1.6 oz)    Body mass index is 29.11 kg/m².    Physical Exam  Constitutional:       Appearance: Normal appearance.   HENT:      Head: Normocephalic and atraumatic.      Mouth/Throat:      Mouth: Mucous membranes are moist.      Pharynx: Oropharynx is clear.   Eyes:      Pupils: Pupils are equal, round, and reactive to light.   Cardiovascular:      Rate and Rhythm: Normal rate and regular rhythm.      Heart sounds: Normal heart sounds.   Pulmonary:      Effort: Pulmonary effort is normal.      Breath sounds: Normal breath sounds.   Abdominal:      General: Abdomen is flat. Bowel sounds are normal.      Palpations: Abdomen is soft.   Musculoskeletal:      Cervical back: Normal range of motion.      Comments: Rt hip dressing, no cellulitis   Neurological:      Mental Status: She is alert.         Antibiotics  sodium chloride 0.9 % bolus 3,120 mL  acetaminophen (Tylenol) tablet 975 mg  ARIPiprazole (Abilify) tablet 15 mg  aspirin chewable tablet 81 mg  ceFAZolin in dextrose (iso-os) (Ancef) IVPB 2 g  famotidine (Pepcid) tablet 40 mg  ferrous sulfate (325 mg ferrous sulfate) tablet 1 tablet  lactulose 20 gram/30 mL oral solution 20 g  lisinopril tablet 10 mg  loratadine (Claritin) tablet 10 mg  magnesium oxide (Mag-Ox) tablet 400 mg  metoprolol succinate XL (Toprol-XL) 24 hr tablet 25 mg  montelukast (Singulair) tablet 10 mg  nicotine (Nicoderm CQ) 21 mg/24 hr patch 1 patch  PARoxetine (Paxil) tablet 40 mg  polyethylene glycol (Glycolax, Miralax) packet 17 g  simvastatin (Zocor) tablet 20 mg  traZODone (Desyrel) tablet 100 mg  zinc  oxide 40 % ointment 1 Application  piperacillin-tazobactam-dextrose (Zosyn) IV 3.375 g  sodium chloride 0.9% infusion  oxyCODONE (Roxicodone) immediate release tablet 5 mg  oxyCODONE (Roxicodone) immediate release tablet 10 mg  HYDROmorphone (Dilaudid) injection 0.4 mg  vancomycin (Vancocin) in dextrose 5 % water (D5W) 500 mL IV 1,500 mg  atorvastatin (Lipitor) tablet    ceFAZolin in dextrose (iso-os) (Ancef) IVPB 1 g  enoxaparin (Lovenox) syringe 40 mg  ceFAZolin in dextrose (iso-os) (Ancef) IVPB 2 g  ondansetron (Zofran) injection 4 mg  lactated Ringer's infusion  oxygen (O2) therapy  lactated Ringer's infusion  oxyCODONE (Roxicodone) immediate release tablet 5 mg  HYDROmorphone (Dilaudid) injection 0.25 mg  HYDROmorphone (Dilaudid) injection 0.5 mg  ondansetron (Zofran) injection 4 mg  droperidol (Inapsine) injection 0.625 mg  rocuronium (ZeMuron) injection  - Omnicell Override Pull  ondansetron (Zofran) injection  - Omnicell Override Pull  dexAMETHasone (Decadron) injection  - Omnicell Override Pull  lidocaine (cardiac) (Xylocaine) injection  - Omnicell Override Pull  propofol (Diprivan) injection  - Omnicell Override Pull  fentaNYL PF (Sublimaze) injection  - Omnicell Override Pull  lactated Ringer's infusion  ceFAZolin (Ancef) injection  - Omnicell Override Pull  ceFAZolin (Ancef) injection  - Omnicell Override Pull  tranexamic acid (Cyklokapron) injection  - Omnicell Override Pull  tranexamic acid (Cyklokapron) injection  - Omnicell Override Pull  povidone-iodine (Betadine) soap  - Omnicell Override Pull  povidone-iodine (Betadine) topical solution  - Omnicell Override Pull  ePHEDrine injection  - Omnicell Override Pull  vasopressin (Vasostrict) injection  - Omnicell Override Pull  propofol (Diprivan) injection  - Omnicell Override Pull  dexAMETHasone (Decadron) injection  - Omnicell Override Pull  gentamicin (Garamycin) injection  - Omnicell Override Pull  vancomycin (Vancocin) vial for injection  - Omnicell  Override Pull  HYDROmorphone (Dilaudid) injection  - Omnicell Override Pull  vancomycin (Vancocin) vial for injection  sugammadex (Bridion) injection  - Omnicell Override Pull  gentamicin (Garamycin) injection  sodium chloride 0.9 % irrigation solution  lactated Ringer's irrigation solution  lactated Ringer's bolus 500 mL  lactated Ringer's bolus 500 mL  lactated Ringer's infusion  oseltamivir (Tamiflu) capsule 75 mg  flu vaccine, quadrivalent, high-dose, preservative free, age 65y+ (FLUZONE)  lactated Ringer's bolus 1,000 mL  midodrine (Proamatine) tablet 5 mg  lactated Ringer's bolus 500 mL  magnesium oxide (Mag-Ox) tablet 800 mg  polyethylene glycol (Glycolax, Miralax) packet 17 g  bisacodyl (Dulcolax) suppository 10 mg      Relevant Results  Labs  Results from last 72 hours   Lab Units 02/24/24 0458 02/23/24  1604 02/23/24  0501   WBC AUTO x10*3/uL 6.2 7.0 5.6   HEMOGLOBIN g/dL 8.1* 8.2* 6.6*   HEMATOCRIT % 26.3* 25.8* 21.4*   PLATELETS AUTO x10*3/uL 239 240 215       Results from last 72 hours   Lab Units 02/24/24  0458 02/23/24  0501 02/22/24  0430   SODIUM mmol/L 133* 136 134*   POTASSIUM mmol/L 4.1 3.9 4.0   CHLORIDE mmol/L 102 103 102   CO2 mmol/L 26 29 27   BUN mg/dL 4* 5* 6   CREATININE mg/dL 0.24* 0.24* 0.21*   GLUCOSE mg/dL 77 80 73*   CALCIUM mg/dL 7.4* 7.3* 7.3*   ANION GAP mmol/L 9* 8* 9*   EGFR mL/min/1.73m*2 >90 >90 >90   PHOSPHORUS mg/dL 3.0 3.3  --        Results from last 72 hours   Lab Units 02/24/24  0458 02/23/24  0501 02/22/24  0430   ALK PHOS U/L 101 93 92   BILIRUBIN TOTAL mg/dL 0.3 0.2 0.2   PROTEIN TOTAL g/dL 4.4* 4.2* 4.3*   ALT U/L <3* <3* <3*   AST U/L 6* 7* 9   ALBUMIN g/dL 2.2* 2.1* 2.2*       Estimated Creatinine Clearance: 125 mL/min (A) (by C-G formula based on SCr of 0.24 mg/dL (L)).  C-Reactive Protein   Date Value Ref Range Status   02/20/2024 0.42 <1.00 mg/dL Final   02/19/2024 0.68 <1.00 mg/dL Final   02/18/2024 1.13 (H) <1.00 mg/dL Final      Microbiology  Reviewed  Imaging  reviewed        Assessment/Plan     Right hip prosthesis infection sp I&D with partial hardware exchange, MSSA on the cultures, sp repeat I&D    Recommendations :  Continue Cefazolin, plan on 6 weeks from the date of the last surgery, weekly labs  Discussed with the medical team     I spent minutes in the professional and overall care of this patient.      Frank Modi MD

## 2024-02-24 NOTE — PROGRESS NOTES
Patient: Blanca Hamilton Age: 48 y.o.   Gender: female Room/bed: 241/241-A     Attending: Ashkan Crawford MD  Code Status:  Full Code    Overnight Events     None    Subjective   Pt seen and examined seated upright eating breakfast. She states that overall she is doing well with no new issues. Her pain is well controlled currently.     Denies chest pain/pressure, abdominal pain, nausea, vomiting, fever, chills, headaches.     Objective    Physical Exam   Constitutional:       General: She is not in acute distress.     Appearance: She is not ill-appearing.   HENT:      Head: Normocephalic and atraumatic.   Cardiovascular:      Rate and Rhythm: Normal rate and regular rhythm.      Pulses: Normal pulses.      Heart sounds: Normal heart sounds.   Pulmonary:      Effort: Pulmonary effort is normal. No respiratory distress.      Breath sounds: No stridor.   Abdominal:      General: Abdomen is flat. Bowel sounds are normal. There is no distension.      Palpations: Abdomen is soft. There is no mass.      Tenderness: There is no abdominal tenderness.   Musculoskeletal:         General: Tenderness and deformity present.   - Surgical site clean and dry, with surgical packing in place. Deep and surgical drains placed and appropriately draining.  Tightly wrapped without overt signs of hematoma formation or seepage. Serosanguineous drainage noted in both tubes.  Minimal output in collecting system.    Skin:     Findings: Bruising present.   Neurological:      General: No focal deficit present.      Mental Status: She is alert and oriented to person, place, and time. Mental status is at baseline.   Psychiatric:         Mood and Affect: Mood normal.         Behavior: Behavior normal.     Temp:  [36 °C (96.8 °F)-36.6 °C (97.9 °F)] 36.6 °C (97.9 °F)  Heart Rate:  [] 73  Resp:  [12-17] 16  BP: ()/(53-76) 104/72    Intake/Output Summary (Last 24 hours) at 2/24/2024 1355  Last data filed at 2/24/2024 1238  Gross per 24 hour    Intake 2221.66 ml   Output 1565 ml   Net 656.66 ml       Vitals:    02/14/24 0227   Weight: 89.4 kg (197 lb 1.6 oz)       I/Os    Intake/Output Summary (Last 24 hours) at 2/24/2024 1355  Last data filed at 2/24/2024 1238  Gross per 24 hour   Intake 2221.66 ml   Output 1565 ml   Net 656.66 ml       Labs:   Results from last 72 hours   Lab Units 02/24/24  0458 02/23/24  0501 02/22/24  0430   SODIUM mmol/L 133* 136 134*   POTASSIUM mmol/L 4.1 3.9 4.0   CHLORIDE mmol/L 102 103 102   CO2 mmol/L 26 29 27   BUN mg/dL 4* 5* 6   CREATININE mg/dL 0.24* 0.24* 0.21*   GLUCOSE mg/dL 77 80 73*   CALCIUM mg/dL 7.4* 7.3* 7.3*   ANION GAP mmol/L 9* 8* 9*   EGFR mL/min/1.73m*2 >90 >90 >90   PHOSPHORUS mg/dL 3.0 3.3  --         Results from last 72 hours   Lab Units 02/24/24  0458 02/23/24  1604 02/23/24  0501   WBC AUTO x10*3/uL 6.2 7.0 5.6   HEMOGLOBIN g/dL 8.1* 8.2* 6.6*   HEMATOCRIT % 26.3* 25.8* 21.4*   PLATELETS AUTO x10*3/uL 239 240 215        Lab Results   Component Value Date    CALCIUM 7.4 (L) 02/24/2024    PHOS 3.0 02/24/2024      Lab Results   Component Value Date    CRP 0.42 02/20/2024      Micro/ID:   Susceptibility data from last 90 days.  Collected Specimen Info Organism Clindamycin Erythromycin Oxacillin Tetracycline Trimethoprim/Sulfamethoxazole Vancomycin   02/06/24 Swab from HIP ARTHROPLASTY RIGHT Methicillin Susceptible Staphylococcus aureus (MSSA) S S S R S S   02/06/24 Swab from HIP ARTHROPLASTY RIGHT Staphylococcus aureus         02/06/24 Swab from HIP ARTHROPLASTY RIGHT Staphylococcus aureus         02/02/24 Tissue/Biopsy from Wound/Tissue Methicillin Susceptible Staphylococcus aureus (MSSA) S S S R S S       Lab Results   Component Value Date    BLOODCULT No growth at 4 days -  FINAL REPORT 02/14/2024     Images:  Lower extremity venous duplex bilateral            [facility]  [address] [address]   Tel [phone] and Fax [phone]       Vascular Lab Report  [facility] [place]  LOWER EXTREMITY VENOUS DUPLEX BILATERAL       Patient Name:      ZULEIKA MILLER ANH          Reading Physician:  [id number] Prieto Loza MD  Study Date:        2/14/2024            Ordering Physician: [id number] GEORGE AMADOR  MRN/PID:           [id number]             Technologist:       Rosa Pierre S  Accession#:        [id number]         Technologist 2:     Hattie Raymond  Date of Birth/Age: 1975 / 48      Encounter#:         [id number]                     years  Gender:            F  Admission Status:  Inpatient            Location Performed: [facility]       Diagnosis/ICD: Localized (leg) edema-R60.0  CPT Codes:     08766 Peripheral venous duplex scan for DVT complete       CONCLUSIONS:  Right Lower Venous: No evidence of acute deep vein thrombus visualized in the right lower extremity.  Left Lower Venous: No evidence of acute deep vein thrombus visualized in the left lower extremity.     Additional Findings:  Technically difficult study due to patient movement and inability to position.       Imaging & Doppler Findings:     Right                 Compressible Thrombus        Flow  Distal External Iliac     Yes        None   Spontaneous/Phasic  CFV                       Yes        None   Spontaneous/Phasic  PFV                       Yes        None  FV Proximal               Yes        None   Spontaneous/Phasic  FV Mid                    Yes        None  FV Distal                 Yes        None  Popliteal                 Yes        None   Spontaneous/Phasic  Peroneal                  Yes        None  PTV                       Yes        None       Left                  Compress Thrombus        Flow  Distal External Iliac   Yes      None   Spontaneous/Phasic  CFV                     Yes      None   Spontaneous/Phasic  PFV                     Yes      None  FV Proximal             Yes       None   Spontaneous/Phasic  FV Mid                  Yes      None  FV Distal               Yes      None  Popliteal               Yes      None   Spontaneous/Phasic  Peroneal                Yes      None  PTV                     Yes      None       [id number] Prieto Loza MD  Electronically signed by [id number] Prieto Loza MD on 2/15/2024 at 5:44:38 PM       ** Final **       Meds    Scheduled medications  acetaminophen, 975 mg, oral, q8h  ARIPiprazole, 15 mg, oral, Nightly  [Held by provider] aspirin, 81 mg, oral, BID  ceFAZolin, 2 g, intravenous, q8h  [Held by provider] enoxaparin, 40 mg, subcutaneous, q24h  famotidine, 40 mg, oral, BID  ferrous sulfate (325 mg ferrous sulfate), 1 tablet, oral, Daily  fluticasone, 2 spray, Each Nostril, Daily  [Held by provider] lisinopril, 10 mg, oral, q AM  loratadine, 10 mg, oral, Daily  magnesium oxide, 800 mg, oral, Daily  [Held by provider] metoprolol succinate XL, 25 mg, oral, Daily  montelukast, 10 mg, oral, q AM  nicotine, 1 patch, transdermal, Daily  oseltamivir, 75 mg, oral, Daily  PARoxetine, 40 mg, oral, Daily  polyethylene glycol, 17 g, oral, BID  simvastatin, 20 mg, oral, Nightly  traZODone, 100 mg, oral, Nightly  zinc oxide, 1 Application, Topical, BID      Continuous medications  lactated Ringer's, 100 mL/hr, Last Rate: 100 mL/hr (02/24/24 1238)      PRN medications  PRN medications: HYDROmorphone, midodrine, ondansetron, oxyCODONE, oxyCODONE     Assessment and Plan    Blanca Hamilton is a 48 y.o. 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 Initially met Sepsis criteria. Admitted for further management of dehiscence and SSI.      Acute Medical Issues:  # Sepsis secondary to reccurent post op wound infection (MSSA) of right hip arthroplasty   # Hx right total hip arthroplasty w/ revision (1/5/24) for prosthetic fx  # Hx hardware removal for prosthetic joint infection  (2/6/24)  -OR 2/20/24 right hip revision  -Discharged on 2/11 with wound vac + PICC line for 6-week course of cefazolin              -Previous tissue/Wound Culture (2/6): MSSA, only resistant to Tetracycline   -2/13/24: 2/4 SIRS criteria on admission. S/p sepsis fluids in ED. Lactate 0.7. Hgb stable.  -pain control: scheduled tylenol, oxy 5 mod, oxy 10 severe, dilaudid 0.4 PRN breakthrough  -toe touch weight bearing for transfers other, otherwise NWB RLE  -monitor and document hemovac drain output every shift; determine length of time drains need to remain per ortho  -DO NOT LAY on right hip, maintain two pillows in between legs when in bed  -Blood Cx (2/14): Negative x2  -CRP levels down trending (7.15 -> 2.39 -> 1.13)  -PT/OT: moderate intensity   -2/24/24: With stable Hb, restarted Aspirin 81 mg BID   [ ] Ancef 2g TID (2/20/2024 - 4/2/2024)    # Hypotension   2/2 Hypovolemic Hyponatremia vs Acute Blood Loss Anemia from Wound Dehiscence vs Intraoperative Blood loss  - Na 129 on admission  - Likely related to poor p.o. intake and in postop setting  - Nutrition consult, recommend encouraging p.o. intake and ensure supplements  - Bolus and continuous fluids today for hypotension  - If remains hypotensive, add midodrine     # B/l LE edema - chronic  -Bilateral duplex negative for acute DVT bilaterally  -Encourage bilateral leg elevation as tolerated     Chronic Medical Issues:  # GERD: c/w Pepcid   # HTN: Holding Metoprolol and lisinopril in setting of hypotension  # HLD: c/w Simvastatin  # Depression: c/w Abilify + Paroxetine +Trazodone  # Chronic anemia: iron supplement  # Tobacco use: nicotine patch     Fluids: None  Electrolytes: replete as needed  Nutrition: Regular, supplements  GI PPX: None   DVT PPX: Holding Lovenox      Access: PICC Line, PIV's  Antibiotics: Cefazolin 2g TID (2/15 - )  Oxygenation: Room Air     Dispo: Admitted onto the floor for postoperative wound dehiscence and surgical site infection. OR  on 2/20/24 for right hip revision. ASA 81 BID restarted 2/24/24. Once drains removed by ortho, pt will need pre-cert for [place].      Barry Bledsoe, DO   PGY-1

## 2024-02-24 NOTE — CARE PLAN
The patient's goals for the shift include      The clinical goals for the shift include Pt will have pain controlled this shift    Over the shift, the patient did not make progress toward the following goals. Barriers to progression include the patient. Recommendations to address these barriers include educating patient on pain regime .

## 2024-02-24 NOTE — CARE PLAN
Problem: Safety  Goal: I will remain free of falls  Outcome: Progressing     Problem: Fall/Injury  Goal: Not fall by end of shift  Outcome: Progressing      The clinical goals for the shift include patient will have a decrease in pain level this shift    Over the shift, the patient did not make progress toward the following goals. Barriers to progression include patient is developmentally delayed. Recommendations to address these barriers include reiteration of care and family involvement .

## 2024-02-25 LAB
ALBUMIN SERPL BCP-MCNC: 2.3 G/DL (ref 3.4–5)
ALP SERPL-CCNC: 111 U/L (ref 33–110)
ALT SERPL W P-5'-P-CCNC: <3 U/L (ref 7–45)
ANION GAP SERPL CALC-SCNC: 7 MMOL/L (ref 10–20)
AST SERPL W P-5'-P-CCNC: 6 U/L (ref 9–39)
BILIRUB SERPL-MCNC: 0.3 MG/DL (ref 0–1.2)
BUN SERPL-MCNC: 3 MG/DL (ref 6–23)
CALCIUM SERPL-MCNC: 7.7 MG/DL (ref 8.6–10.3)
CHLORIDE SERPL-SCNC: 102 MMOL/L (ref 98–107)
CO2 SERPL-SCNC: 28 MMOL/L (ref 21–32)
CREAT SERPL-MCNC: 0.27 MG/DL (ref 0.5–1.05)
EGFRCR SERPLBLD CKD-EPI 2021: >90 ML/MIN/1.73M*2
ERYTHROCYTE [DISTWIDTH] IN BLOOD BY AUTOMATED COUNT: 16.9 % (ref 11.5–14.5)
GLUCOSE SERPL-MCNC: 77 MG/DL (ref 74–99)
HCT VFR BLD AUTO: 27.4 % (ref 36–46)
HGB BLD-MCNC: 8.6 G/DL (ref 12–16)
MCH RBC QN AUTO: 29.2 PG (ref 26–34)
MCHC RBC AUTO-ENTMCNC: 31.4 G/DL (ref 32–36)
MCV RBC AUTO: 93 FL (ref 80–100)
NRBC BLD-RTO: 0 /100 WBCS (ref 0–0)
PLATELET # BLD AUTO: 270 X10*3/UL (ref 150–450)
POTASSIUM SERPL-SCNC: 4.3 MMOL/L (ref 3.5–5.3)
PROT SERPL-MCNC: 4.8 G/DL (ref 6.4–8.2)
RBC # BLD AUTO: 2.95 X10*6/UL (ref 4–5.2)
SODIUM SERPL-SCNC: 133 MMOL/L (ref 136–145)
WBC # BLD AUTO: 5.9 X10*3/UL (ref 4.4–11.3)

## 2024-02-25 PROCEDURE — 2500000004 HC RX 250 GENERAL PHARMACY W/ HCPCS (ALT 636 FOR OP/ED)

## 2024-02-25 PROCEDURE — S4991 NICOTINE PATCH NONLEGEND: HCPCS

## 2024-02-25 PROCEDURE — 2500000002 HC RX 250 W HCPCS SELF ADMINISTERED DRUGS (ALT 637 FOR MEDICARE OP, ALT 636 FOR OP/ED)

## 2024-02-25 PROCEDURE — 97110 THERAPEUTIC EXERCISES: CPT | Mod: GP

## 2024-02-25 PROCEDURE — 2500000001 HC RX 250 WO HCPCS SELF ADMINISTERED DRUGS (ALT 637 FOR MEDICARE OP)

## 2024-02-25 PROCEDURE — 2500000002 HC RX 250 W HCPCS SELF ADMINISTERED DRUGS (ALT 637 FOR MEDICARE OP, ALT 636 FOR OP/ED): Performed by: INTERNAL MEDICINE

## 2024-02-25 PROCEDURE — 85027 COMPLETE CBC AUTOMATED: CPT

## 2024-02-25 PROCEDURE — 97530 THERAPEUTIC ACTIVITIES: CPT | Mod: GP

## 2024-02-25 PROCEDURE — 80053 COMPREHEN METABOLIC PANEL: CPT

## 2024-02-25 PROCEDURE — 99232 SBSQ HOSP IP/OBS MODERATE 35: CPT

## 2024-02-25 PROCEDURE — 1200000002 HC GENERAL ROOM WITH TELEMETRY DAILY

## 2024-02-25 RX ADMIN — ONDANSETRON 4 MG: 2 INJECTION INTRAMUSCULAR; INTRAVENOUS at 18:40

## 2024-02-25 RX ADMIN — ACETAMINOPHEN 975 MG: 325 TABLET ORAL at 20:38

## 2024-02-25 RX ADMIN — Medication 1 APPLICATION: at 21:00

## 2024-02-25 RX ADMIN — PAROXETINE 40 MG: 20 TABLET, FILM COATED ORAL at 08:06

## 2024-02-25 RX ADMIN — SODIUM CHLORIDE, POTASSIUM CHLORIDE, SODIUM LACTATE AND CALCIUM CHLORIDE 100 ML/HR: 600; 310; 30; 20 INJECTION, SOLUTION INTRAVENOUS at 08:09

## 2024-02-25 RX ADMIN — CEFAZOLIN SODIUM 2 G: 2 INJECTION, SOLUTION INTRAVENOUS at 05:11

## 2024-02-25 RX ADMIN — ENOXAPARIN SODIUM 40 MG: 40 INJECTION SUBCUTANEOUS at 20:38

## 2024-02-25 RX ADMIN — NICOTINE 1 PATCH: 21 PATCH, EXTENDED RELEASE TRANSDERMAL at 08:06

## 2024-02-25 RX ADMIN — ACETAMINOPHEN 975 MG: 325 TABLET ORAL at 12:43

## 2024-02-25 RX ADMIN — Medication 800 MG: at 08:06

## 2024-02-25 RX ADMIN — FAMOTIDINE 40 MG: 20 TABLET ORAL at 08:06

## 2024-02-25 RX ADMIN — SODIUM CHLORIDE, POTASSIUM CHLORIDE, SODIUM LACTATE AND CALCIUM CHLORIDE 100 ML/HR: 600; 310; 30; 20 INJECTION, SOLUTION INTRAVENOUS at 19:50

## 2024-02-25 RX ADMIN — MONTELUKAST 10 MG: 10 TABLET, FILM COATED ORAL at 08:06

## 2024-02-25 RX ADMIN — CEFAZOLIN SODIUM 2 G: 2 INJECTION, SOLUTION INTRAVENOUS at 20:39

## 2024-02-25 RX ADMIN — ACETAMINOPHEN 975 MG: 325 TABLET ORAL at 05:11

## 2024-02-25 RX ADMIN — LORATADINE 10 MG: 10 TABLET ORAL at 08:06

## 2024-02-25 RX ADMIN — FAMOTIDINE 40 MG: 20 TABLET ORAL at 20:38

## 2024-02-25 RX ADMIN — FERROUS SULFATE TAB 325 MG (65 MG ELEMENTAL FE) 1 TABLET: 325 (65 FE) TAB at 05:11

## 2024-02-25 RX ADMIN — Medication 1 APPLICATION: at 09:00

## 2024-02-25 RX ADMIN — CEFAZOLIN SODIUM 2 G: 2 INJECTION, SOLUTION INTRAVENOUS at 12:44

## 2024-02-25 RX ADMIN — TRAZODONE HYDROCHLORIDE 100 MG: 50 TABLET ORAL at 20:38

## 2024-02-25 RX ADMIN — OSELTAMAVIR PHOSPHATE 75 MG: 75 CAPSULE ORAL at 08:06

## 2024-02-25 RX ADMIN — SIMVASTATIN 20 MG: 20 TABLET, FILM COATED ORAL at 20:38

## 2024-02-25 RX ADMIN — FLUTICASONE PROPIONATE 2 SPRAY: 50 SPRAY, METERED NASAL at 09:00

## 2024-02-25 RX ADMIN — ARIPIPRAZOLE 15 MG: 5 TABLET ORAL at 20:38

## 2024-02-25 ASSESSMENT — COGNITIVE AND FUNCTIONAL STATUS - GENERAL
STANDING UP FROM CHAIR USING ARMS: A LOT
DRESSING REGULAR LOWER BODY CLOTHING: A LOT
HELP NEEDED FOR BATHING: A LOT
MOBILITY SCORE: 10
EATING MEALS: A LITTLE
STANDING UP FROM CHAIR USING ARMS: A LOT
WALKING IN HOSPITAL ROOM: A LOT
PERSONAL GROOMING: A LOT
DRESSING REGULAR LOWER BODY CLOTHING: A LOT
TOILETING: A LOT
TURNING FROM BACK TO SIDE WHILE IN FLAT BAD: A LOT
WALKING IN HOSPITAL ROOM: TOTAL
MOVING FROM LYING ON BACK TO SITTING ON SIDE OF FLAT BED WITH BEDRAILS: A LOT
MOVING FROM LYING ON BACK TO SITTING ON SIDE OF FLAT BED WITH BEDRAILS: A LOT
PERSONAL GROOMING: A LOT
HELP NEEDED FOR BATHING: A LOT
MOBILITY SCORE: 10
MOVING TO AND FROM BED TO CHAIR: A LOT
MOVING FROM LYING ON BACK TO SITTING ON SIDE OF FLAT BED WITH BEDRAILS: A LOT
DRESSING REGULAR UPPER BODY CLOTHING: A LOT
TURNING FROM BACK TO SIDE WHILE IN FLAT BAD: A LOT
MOVING TO AND FROM BED TO CHAIR: A LOT
CLIMB 3 TO 5 STEPS WITH RAILING: A LOT
EATING MEALS: A LITTLE
MOBILITY SCORE: 12
STANDING UP FROM CHAIR USING ARMS: A LOT
TURNING FROM BACK TO SIDE WHILE IN FLAT BAD: A LOT
WALKING IN HOSPITAL ROOM: TOTAL
TOILETING: A LOT
DAILY ACTIVITIY SCORE: 13
CLIMB 3 TO 5 STEPS WITH RAILING: TOTAL
MOVING TO AND FROM BED TO CHAIR: A LOT
CLIMB 3 TO 5 STEPS WITH RAILING: TOTAL
DAILY ACTIVITIY SCORE: 13
DRESSING REGULAR UPPER BODY CLOTHING: A LOT

## 2024-02-25 ASSESSMENT — PAIN - FUNCTIONAL ASSESSMENT
PAIN_FUNCTIONAL_ASSESSMENT: 0-10
PAIN_FUNCTIONAL_ASSESSMENT: 0-10

## 2024-02-25 ASSESSMENT — PAIN SCALES - GENERAL
PAINLEVEL_OUTOF10: 0 - NO PAIN
PAINLEVEL_OUTOF10: 0 - NO PAIN

## 2024-02-25 NOTE — PROGRESS NOTES
"Blanca Hamilton is a 48 y.o. female on day 11 of admission presenting with Wound dehiscence.    Subjective   Patient was seen and examined, no acute event. Patient had no complain today. She denies fever, chest pain, nausea, vomiting, abdominal pain, leg pain or worsening leg swelling       Objective     Physical Exam  HENT:      Head: Normocephalic.      Nose: Nose normal.   Cardiovascular:      Pulses: Normal pulses.      Heart sounds: Normal heart sounds.   Pulmonary:      Comments: Decrease breath sound in both lungs  Abdominal:      General: Abdomen is flat. Bowel sounds are normal.      Palpations: Abdomen is soft.   Musculoskeletal:      Right lower leg: Edema present.      Left lower leg: Edema present.   Neurological:      General: No focal deficit present.      Mental Status: She is alert.         Last Recorded Vitals  Blood pressure 109/79, pulse 87, temperature 35.8 °C (96.4 °F), temperature source Temporal, resp. rate 12, height 1.753 m (5' 9\"), weight 89.4 kg (197 lb 1.6 oz), SpO2 96 %.  Intake/Output last 3 Shifts:  I/O last 3 completed shifts:  In: 2581.7 (28.9 mL/kg) [P.O.:480; I.V.:1901.7 (21.3 mL/kg); IV Piggyback:200]  Out: 4365 (48.8 mL/kg) [Urine:4150 (1.3 mL/kg/hr); Drains:215]  Weight: 89.4 kg     Relevant Results           Results for orders placed or performed during the hospital encounter of 02/14/24 (from the past 24 hour(s))   CBC   Result Value Ref Range    WBC 5.9 4.4 - 11.3 x10*3/uL    nRBC 0.0 0.0 - 0.0 /100 WBCs    RBC 2.95 (L) 4.00 - 5.20 x10*6/uL    Hemoglobin 8.6 (L) 12.0 - 16.0 g/dL    Hematocrit 27.4 (L) 36.0 - 46.0 %    MCV 93 80 - 100 fL    MCH 29.2 26.0 - 34.0 pg    MCHC 31.4 (L) 32.0 - 36.0 g/dL    RDW 16.9 (H) 11.5 - 14.5 %    Platelets 270 150 - 450 x10*3/uL   Comprehensive Metabolic Panel   Result Value Ref Range    Glucose 77 74 - 99 mg/dL    Sodium 133 (L) 136 - 145 mmol/L    Potassium 4.3 3.5 - 5.3 mmol/L    Chloride 102 98 - 107 mmol/L    Bicarbonate 28 21 - 32 mmol/L    " Anion Gap 7 (L) 10 - 20 mmol/L    Urea Nitrogen 3 (L) 6 - 23 mg/dL    Creatinine 0.27 (L) 0.50 - 1.05 mg/dL    eGFR >90 >60 mL/min/1.73m*2    Calcium 7.7 (L) 8.6 - 10.3 mg/dL    Albumin 2.3 (L) 3.4 - 5.0 g/dL    Alkaline Phosphatase 111 (H) 33 - 110 U/L    Total Protein 4.8 (L) 6.4 - 8.2 g/dL    AST 6 (L) 9 - 39 U/L    Bilirubin, Total 0.3 0.0 - 1.2 mg/dL    ALT <3 (L) 7 - 45 U/L                         Assessment/Plan   Blanca Hamilton is a 48 y.o. 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 Initially met Sepsis criteria. Admitted for further management of dehiscence and SSI.      Acute Medical Issues:  # Sepsis secondary to reccurent post op wound infection (MSSA) of right hip arthroplasty   # Hx right total hip arthroplasty w/ revision (1/5/24) for prosthetic fx  # Hx hardware removal for prosthetic joint infection (2/6/24)  -OR 2/20/24 right hip revision  -Discharged on 2/11 with wound vac + PICC line for 6-week course of cefazolin              -Previous tissue/Wound Culture (2/6): MSSA, only resistant to Tetracycline   -2/13/24: 2/4 SIRS criteria on admission. S/p sepsis fluids in ED. Lactate 0.7. Hgb stable.  -pain control: scheduled tylenol, oxy 5 mod, oxy 10 severe, dilaudid 0.4 PRN breakthrough  -toe touch weight bearing for transfers other, otherwise NWB RLE  -monitor and document hemovac drain output every shift; determine length of time drains need to remain per ortho  -DO NOT LAY on right hip, maintain two pillows in between legs when in bed  -Blood Cx (2/14): Negative x2  -CRP levels down trending (7.15 -> 2.39 -> 1.13)  -PT/OT: moderate intensity   -2/24/24: With stable Hb, restarted Aspirin 81 mg BID   [ ] Ancef 2g TID (2/20/2024 - 4/2/2024)       Resolved Issues  #Hypotension-- continue to hold metoprolol and lisinopril for now          Chronic Medical Issues:  # GERD: c/w Pepcid   # HTN:  Holding Metoprolol and lisinopril in setting of hypotension  # HLD: c/w Simvastatin  # Depression: c/w Abilify + Paroxetine +Trazodone  # Chronic anemia: iron supplement  # Tobacco use: nicotine patch     Fluids: None  Electrolytes: replete as needed  Nutrition: Regular, supplements  GI PPX: None   DVT PPX: Holding Lovenox      Access: PICC Line, PIV's  Antibiotics: Cefazolin 2g TID (2/15 - )  Oxygenation: Room Air     Dispo: Admitted onto the floor for postoperative wound dehiscence and surgical site infection. OR on 2/20/24 for right hip revisionOnce drains removed by ortho, pt will need pre-cert for [place].           Ross Tinoco MD

## 2024-02-25 NOTE — PROGRESS NOTES
02/25/24 1100   Discharge Planning   Patient expects to be discharged to: PRE-CERT STARTED 2/25 1130 AM. [facility]. Patient will need Ancef 2gm IV TID for 6 weeks from 2/20. PICC line is in place, anticipate wound vac at DC.   Does the patient need discharge transport arranged? Yes   RoundTrip coordination needed? Yes   Has discharge transport been arranged? No

## 2024-02-25 NOTE — PROGRESS NOTES
02/25/24 1300   Discharge Planning   Patient expects to be discharged to: Insurance company notified discharge  who submitted direct pre-cert thart update therapy notes are needed. PT assigned to patient notified. TCC who will be following patient tomorrow also sent secure chat to update.   Does the patient need discharge transport arranged? Yes   RoundTrip coordination needed? Yes   Has discharge transport been arranged? No

## 2024-02-25 NOTE — CARE PLAN
The patient's goals for the shift include      The clinical goals for the shift include Pt will have pain controlled this shift    Over the shift, the patient did not make progress toward the following goals. Barriers to progression include the patient. Recommendations to address these barriers include educate patient on pain regime.

## 2024-02-25 NOTE — PROGRESS NOTES
Physical Therapy    Physical Therapy Treatment    Patient Name: Blanca Hamilton  MRN: [id number]  Today's Date: 2/25/2024  Time Calculation  Start Time: 1340  Stop Time: 1410  Time Calculation (min): 30 min       Assessment/Plan   PT Assessment  PT Assessment Results: Decreased mobility, Orthopedic restrictions (deconditioning)  Rehab Prognosis: Good  Evaluation/Treatment Tolerance: Patient tolerated treatment well, Patient limited by fatigue  Medical Staff Made Aware: Yes  Strengths: Ability to acquire knowledge  End of Session Communication: Bedside nurse  End of Session Patient Position: Bed, 3 rail up, Alarm on (Pt's RN was made awware oanh PT activated Pt's bed alarm)  PT Plan  Inpatient/Swing Bed or Outpatient: Inpatient  PT Plan  Treatment/Interventions: Bed mobility, Transfer training, Gait training, Strengthening, Therapeutic exercise  PT Plan: Skilled PT  PT Frequency: 3 times per week  PT Discharge Recommendations: Moderate intensity level of continued care  Equipment Recommended upon Discharge: Wheeled walker, Wheelchair  PT Recommended Transfer Status: Assist x2  PT - OK to Discharge: Yes (Per PT POC)      General Visit Information:   PT  Visit  PT Received On: 02/25/24  Response to Previous Treatment: Patient with no complaints from previous session.  General  Reason for Referral:  (49 yo female admitted 2' to R hip wound dehisced)  Referred By:  (Dr. CORONA Mascorro)  Prior to Session Communication: Bedside nurse  Patient Position Received: Bed, 3 rail up, Alarm off, not on at start of session  General Comment:  (Pt cleared for PT. PT pleasant and agreeable to work wit PT. Pt did not want to attempt standing, stating that she is not ready and she is very afraid of falling. PT couldn't persuade her to. Pt sat at EOB x 15 mniutes for trunk controll, COG and ex's)    Subjective   Precautions:  Precautions  LE Weight Bearing Status: Right Toe-Touch Weight Bearing  Medical Precautions: Fall precautions  Vital Signs:        Objective   Pain:  Pain Assessment  Pain Assessment: 0-10  Pain Score: 0 - No pain  Cognition:  Cognition  Overall Cognitive Status: Within Functional Limits  Postural Control:  Static Sitting Balance  Static Sitting-Balance Support: Bilateral upper extremity supported  Static Sitting-Level of Assistance: Close supervision  Extremity/Trunk Assessments:  RUE   RUE : Within Functional Limits  LUE   LUE: Within Functional Limits  RLE   RLE :  (Limited 2' to R hip wound)  LLE   LLE : Within Functional Limits  Activity Tolerance:  Activity Tolerance  Endurance: Tolerates 10 - 20 min exercise with multiple rests, Decreased tolerance for upright activites  Treatments:  Therapeutic Exercise  Therapeutic Exercise Performed: Yes  Therapeutic Exercise Activity 1:  (Pt performed the following ex's; sitting B heel/toe raises, B LAQ, B mini hip flexion each x 25 reps with rest periods between sets. Supine exercises; B resisted hip abd/add, B resisted hip flex/extn  B SAQ each x 15 reps with rest periods between sets)    Outcome Measures:  [facility] Basic Mobility  Turning from your back to your side while in a flat bed without using bedrails: A lot  Moving from lying on your back to sitting on the side of a flat bed without using bedrails: A lot  Moving to and from bed to chair (including a wheelchair): A lot  Standing up from a chair using your arms (e.g. wheelchair or bedside chair): A lot  To walk in hospital room: Total  Climbing 3-5 steps with railing: Total  Basic Mobility - Total Score: 10    Education Documentation  No documentation found.  Education Comments  No comments found.        OP EDUCATION:       Encounter Problems       Encounter Problems (Active)       Balance       STG - Maintains static standing balance with upper extremity support x 1' maintaining weight bearing status  (Progressing)       Start:  02/14/24    Expected End:  02/22/24            STG - Maintains dynamic sitting balance with upper extremity  support x 10'  (Progressing)       Start:  02/14/24    Expected End:  02/22/24               Pain - Adult          Transfers       STG - Patient will perform bed mobility independently  (Progressing)       Start:  02/14/24    Expected End:  02/22/24            STG - Patient will transfer sit to and from stand min A, maintaining weight bearing status  (Progressing)       Start:  02/14/24    Expected End:  02/22/24

## 2024-02-25 NOTE — CARE PLAN
The patient's goals for the shift include      The clinical goals for the shift include pt will not complain of pain this shift    Over the shift, the patient did not make progress toward the following goals. Barriers to progression include patient complained of little pain this shift. Recommendations to address these barriers include continue with plan to send to rehab.

## 2024-02-25 NOTE — PROGRESS NOTES
Blanca Hamilton is a 48 y.o. female on day 11 of admission presenting with Wound dehiscence.    Subjective   Interval History: no fever, no new complaints        Review of Systems    Objective   Range of Vitals (last 24 hours)  Heart Rate:  [83-87]   Temp:  [35.8 °C (96.4 °F)-36.4 °C (97.5 °F)]   Resp:  [12-18]   BP: (108-125)/(78-89)   SpO2:  [96 %-98 %]   Daily Weight  02/14/24 : 89.4 kg (197 lb 1.6 oz)    Body mass index is 29.11 kg/m².    Physical Exam  Constitutional:       Appearance: Normal appearance.   HENT:      Head: Normocephalic and atraumatic.      Mouth/Throat:      Mouth: Mucous membranes are moist.      Pharynx: Oropharynx is clear.   Eyes:      Pupils: Pupils are equal, round, and reactive to light.   Cardiovascular:      Rate and Rhythm: Normal rate and regular rhythm.      Heart sounds: Normal heart sounds.   Pulmonary:      Effort: Pulmonary effort is normal.      Breath sounds: Normal breath sounds.   Abdominal:      General: Abdomen is flat. Bowel sounds are normal.      Palpations: Abdomen is soft.   Musculoskeletal:      Cervical back: Normal range of motion.      Comments: Rt hip dressing, no cellulitis   Neurological:      Mental Status: She is alert.         Antibiotics  sodium chloride 0.9 % bolus 3,120 mL  acetaminophen (Tylenol) tablet 975 mg  ARIPiprazole (Abilify) tablet 15 mg  aspirin chewable tablet 81 mg  ceFAZolin in dextrose (iso-os) (Ancef) IVPB 2 g  famotidine (Pepcid) tablet 40 mg  ferrous sulfate (325 mg ferrous sulfate) tablet 1 tablet  lactulose 20 gram/30 mL oral solution 20 g  lisinopril tablet 10 mg  loratadine (Claritin) tablet 10 mg  magnesium oxide (Mag-Ox) tablet 400 mg  metoprolol succinate XL (Toprol-XL) 24 hr tablet 25 mg  montelukast (Singulair) tablet 10 mg  nicotine (Nicoderm CQ) 21 mg/24 hr patch 1 patch  PARoxetine (Paxil) tablet 40 mg  polyethylene glycol (Glycolax, Miralax) packet 17 g  simvastatin (Zocor) tablet 20 mg  traZODone (Desyrel) tablet 100 mg  zinc  oxide 40 % ointment 1 Application  piperacillin-tazobactam-dextrose (Zosyn) IV 3.375 g  sodium chloride 0.9% infusion  oxyCODONE (Roxicodone) immediate release tablet 5 mg  oxyCODONE (Roxicodone) immediate release tablet 10 mg  HYDROmorphone (Dilaudid) injection 0.4 mg  vancomycin (Vancocin) in dextrose 5 % water (D5W) 500 mL IV 1,500 mg  atorvastatin (Lipitor) tablet    ceFAZolin in dextrose (iso-os) (Ancef) IVPB 1 g  enoxaparin (Lovenox) syringe 40 mg  ceFAZolin in dextrose (iso-os) (Ancef) IVPB 2 g  ondansetron (Zofran) injection 4 mg  lactated Ringer's infusion  oxygen (O2) therapy  lactated Ringer's infusion  oxyCODONE (Roxicodone) immediate release tablet 5 mg  HYDROmorphone (Dilaudid) injection 0.25 mg  HYDROmorphone (Dilaudid) injection 0.5 mg  ondansetron (Zofran) injection 4 mg  droperidol (Inapsine) injection 0.625 mg  rocuronium (ZeMuron) injection  - Omnicell Override Pull  ondansetron (Zofran) injection  - Omnicell Override Pull  dexAMETHasone (Decadron) injection  - Omnicell Override Pull  lidocaine (cardiac) (Xylocaine) injection  - Omnicell Override Pull  propofol (Diprivan) injection  - Omnicell Override Pull  fentaNYL PF (Sublimaze) injection  - Omnicell Override Pull  lactated Ringer's infusion  ceFAZolin (Ancef) injection  - Omnicell Override Pull  ceFAZolin (Ancef) injection  - Omnicell Override Pull  tranexamic acid (Cyklokapron) injection  - Omnicell Override Pull  tranexamic acid (Cyklokapron) injection  - Omnicell Override Pull  povidone-iodine (Betadine) soap  - Omnicell Override Pull  povidone-iodine (Betadine) topical solution  - Omnicell Override Pull  ePHEDrine injection  - Omnicell Override Pull  vasopressin (Vasostrict) injection  - Omnicell Override Pull  propofol (Diprivan) injection  - Omnicell Override Pull  dexAMETHasone (Decadron) injection  - Omnicell Override Pull  gentamicin (Garamycin) injection  - Omnicell Override Pull  vancomycin (Vancocin) vial for injection  - Omnicell  Override Pull  HYDROmorphone (Dilaudid) injection  - Omnicell Override Pull  vancomycin (Vancocin) vial for injection  sugammadex (Bridion) injection  - Omnicell Override Pull  gentamicin (Garamycin) injection  sodium chloride 0.9 % irrigation solution  lactated Ringer's irrigation solution  lactated Ringer's bolus 500 mL  lactated Ringer's bolus 500 mL  lactated Ringer's infusion  oseltamivir (Tamiflu) capsule 75 mg  flu vaccine, quadrivalent, high-dose, preservative free, age 65y+ (FLUZONE)  lactated Ringer's bolus 1,000 mL  midodrine (Proamatine) tablet 5 mg  lactated Ringer's bolus 500 mL  magnesium oxide (Mag-Ox) tablet 800 mg  polyethylene glycol (Glycolax, Miralax) packet 17 g  bisacodyl (Dulcolax) suppository 10 mg      Relevant Results  Labs  Results from last 72 hours   Lab Units 02/25/24  0508 02/24/24  0458 02/23/24  1604   WBC AUTO x10*3/uL 5.9 6.2 7.0   HEMOGLOBIN g/dL 8.6* 8.1* 8.2*   HEMATOCRIT % 27.4* 26.3* 25.8*   PLATELETS AUTO x10*3/uL 270 239 240       Results from last 72 hours   Lab Units 02/25/24  0508 02/24/24  0458 02/23/24  0501   SODIUM mmol/L 133* 133* 136   POTASSIUM mmol/L 4.3 4.1 3.9   CHLORIDE mmol/L 102 102 103   CO2 mmol/L 28 26 29   BUN mg/dL 3* 4* 5*   CREATININE mg/dL 0.27* 0.24* 0.24*   GLUCOSE mg/dL 77 77 80   CALCIUM mg/dL 7.7* 7.4* 7.3*   ANION GAP mmol/L 7* 9* 8*   EGFR mL/min/1.73m*2 >90 >90 >90   PHOSPHORUS mg/dL  --  3.0 3.3       Results from last 72 hours   Lab Units 02/25/24  0508 02/24/24  0458 02/23/24  0501   ALK PHOS U/L 111* 101 93   BILIRUBIN TOTAL mg/dL 0.3 0.3 0.2   PROTEIN TOTAL g/dL 4.8* 4.4* 4.2*   ALT U/L <3* <3* <3*   AST U/L 6* 6* 7*   ALBUMIN g/dL 2.3* 2.2* 2.1*       Estimated Creatinine Clearance: 125 mL/min (A) (by C-G formula based on SCr of 0.27 mg/dL (L)).  C-Reactive Protein   Date Value Ref Range Status   02/20/2024 0.42 <1.00 mg/dL Final   02/19/2024 0.68 <1.00 mg/dL Final   02/18/2024 1.13 (H) <1.00 mg/dL Final      Microbiology  Reviewed  Imaging  reviewed        Assessment/Plan     Right hip prosthesis infection sp I&D with partial hardware exchange, MSSA on the cultures, sp repeat I&D    Recommendations :  Continue Cefazolin, plan on 6 weeks from the date of the last surgery, weekly labs  Discussed with the medical team     I spent minutes in the professional and overall care of this patient.      Frank Modi MD

## 2024-02-26 LAB
ALBUMIN SERPL BCP-MCNC: 2.3 G/DL (ref 3.4–5)
ANION GAP SERPL CALC-SCNC: 9 MMOL/L (ref 10–20)
BUN SERPL-MCNC: 4 MG/DL (ref 6–23)
CALCIUM SERPL-MCNC: 7.8 MG/DL (ref 8.6–10.3)
CHLORIDE SERPL-SCNC: 104 MMOL/L (ref 98–107)
CO2 SERPL-SCNC: 28 MMOL/L (ref 21–32)
CREAT SERPL-MCNC: 0.25 MG/DL (ref 0.5–1.05)
EGFRCR SERPLBLD CKD-EPI 2021: >90 ML/MIN/1.73M*2
ERYTHROCYTE [DISTWIDTH] IN BLOOD BY AUTOMATED COUNT: 16.9 % (ref 11.5–14.5)
GLUCOSE SERPL-MCNC: 81 MG/DL (ref 74–99)
HCT VFR BLD AUTO: 27.1 % (ref 36–46)
HGB BLD-MCNC: 8.4 G/DL (ref 12–16)
MAGNESIUM SERPL-MCNC: 1.67 MG/DL (ref 1.6–2.4)
MCH RBC QN AUTO: 28.9 PG (ref 26–34)
MCHC RBC AUTO-ENTMCNC: 31 G/DL (ref 32–36)
MCV RBC AUTO: 93 FL (ref 80–100)
NRBC BLD-RTO: 0 /100 WBCS (ref 0–0)
PHOSPHATE SERPL-MCNC: 3.6 MG/DL (ref 2.5–4.9)
PLATELET # BLD AUTO: 279 X10*3/UL (ref 150–450)
POTASSIUM SERPL-SCNC: 4.1 MMOL/L (ref 3.5–5.3)
RBC # BLD AUTO: 2.91 X10*6/UL (ref 4–5.2)
SODIUM SERPL-SCNC: 137 MMOL/L (ref 136–145)
WBC # BLD AUTO: 4.7 X10*3/UL (ref 4.4–11.3)

## 2024-02-26 PROCEDURE — 80069 RENAL FUNCTION PANEL: CPT

## 2024-02-26 PROCEDURE — 2500000001 HC RX 250 WO HCPCS SELF ADMINISTERED DRUGS (ALT 637 FOR MEDICARE OP)

## 2024-02-26 PROCEDURE — 99233 SBSQ HOSP IP/OBS HIGH 50: CPT

## 2024-02-26 PROCEDURE — 85027 COMPLETE CBC AUTOMATED: CPT

## 2024-02-26 PROCEDURE — S4991 NICOTINE PATCH NONLEGEND: HCPCS

## 2024-02-26 PROCEDURE — 99232 SBSQ HOSP IP/OBS MODERATE 35: CPT | Performed by: NURSE PRACTITIONER

## 2024-02-26 PROCEDURE — 1200000002 HC GENERAL ROOM WITH TELEMETRY DAILY

## 2024-02-26 PROCEDURE — 2500000004 HC RX 250 GENERAL PHARMACY W/ HCPCS (ALT 636 FOR OP/ED)

## 2024-02-26 PROCEDURE — 83735 ASSAY OF MAGNESIUM: CPT

## 2024-02-26 PROCEDURE — 2500000002 HC RX 250 W HCPCS SELF ADMINISTERED DRUGS (ALT 637 FOR MEDICARE OP, ALT 636 FOR OP/ED): Performed by: INTERNAL MEDICINE

## 2024-02-26 PROCEDURE — 2500000002 HC RX 250 W HCPCS SELF ADMINISTERED DRUGS (ALT 637 FOR MEDICARE OP, ALT 636 FOR OP/ED)

## 2024-02-26 RX ADMIN — CEFAZOLIN SODIUM 2 G: 2 INJECTION, SOLUTION INTRAVENOUS at 05:08

## 2024-02-26 RX ADMIN — SODIUM CHLORIDE, POTASSIUM CHLORIDE, SODIUM LACTATE AND CALCIUM CHLORIDE 100 ML/HR: 600; 310; 30; 20 INJECTION, SOLUTION INTRAVENOUS at 09:13

## 2024-02-26 RX ADMIN — Medication 800 MG: at 09:03

## 2024-02-26 RX ADMIN — Medication 1 APPLICATION: at 09:04

## 2024-02-26 RX ADMIN — PAROXETINE 40 MG: 20 TABLET, FILM COATED ORAL at 09:03

## 2024-02-26 RX ADMIN — ACETAMINOPHEN 975 MG: 325 TABLET ORAL at 05:08

## 2024-02-26 RX ADMIN — LORATADINE 10 MG: 10 TABLET ORAL at 09:03

## 2024-02-26 RX ADMIN — SIMVASTATIN 20 MG: 20 TABLET, FILM COATED ORAL at 22:01

## 2024-02-26 RX ADMIN — ACETAMINOPHEN 975 MG: 325 TABLET ORAL at 22:01

## 2024-02-26 RX ADMIN — NICOTINE 1 PATCH: 21 PATCH, EXTENDED RELEASE TRANSDERMAL at 09:02

## 2024-02-26 RX ADMIN — MONTELUKAST 10 MG: 10 TABLET, FILM COATED ORAL at 09:02

## 2024-02-26 RX ADMIN — CEFAZOLIN SODIUM 2 G: 2 INJECTION, SOLUTION INTRAVENOUS at 13:23

## 2024-02-26 RX ADMIN — TRAZODONE HYDROCHLORIDE 100 MG: 50 TABLET ORAL at 22:01

## 2024-02-26 RX ADMIN — FAMOTIDINE 40 MG: 20 TABLET ORAL at 09:03

## 2024-02-26 RX ADMIN — ENOXAPARIN SODIUM 40 MG: 40 INJECTION SUBCUTANEOUS at 16:27

## 2024-02-26 RX ADMIN — ARIPIPRAZOLE 15 MG: 5 TABLET ORAL at 22:01

## 2024-02-26 RX ADMIN — ACETAMINOPHEN 975 MG: 325 TABLET ORAL at 13:23

## 2024-02-26 RX ADMIN — CEFAZOLIN SODIUM 2 G: 2 INJECTION, SOLUTION INTRAVENOUS at 22:01

## 2024-02-26 RX ADMIN — FAMOTIDINE 40 MG: 20 TABLET ORAL at 22:01

## 2024-02-26 RX ADMIN — FERROUS SULFATE TAB 325 MG (65 MG ELEMENTAL FE) 1 TABLET: 325 (65 FE) TAB at 05:08

## 2024-02-26 RX ADMIN — FLUTICASONE PROPIONATE 2 SPRAY: 50 SPRAY, METERED NASAL at 09:04

## 2024-02-26 RX ADMIN — Medication 1 APPLICATION: at 21:00

## 2024-02-26 RX ADMIN — OSELTAMAVIR PHOSPHATE 75 MG: 75 CAPSULE ORAL at 09:03

## 2024-02-26 RX ADMIN — SODIUM CHLORIDE, POTASSIUM CHLORIDE, SODIUM LACTATE AND CALCIUM CHLORIDE 100 ML/HR: 600; 310; 30; 20 INJECTION, SOLUTION INTRAVENOUS at 19:46

## 2024-02-26 ASSESSMENT — COGNITIVE AND FUNCTIONAL STATUS - GENERAL
DAILY ACTIVITIY SCORE: 19
TURNING FROM BACK TO SIDE WHILE IN FLAT BAD: A LITTLE
MOVING TO AND FROM BED TO CHAIR: A LOT
DRESSING REGULAR LOWER BODY CLOTHING: A LITTLE
DRESSING REGULAR UPPER BODY CLOTHING: A LITTLE
CLIMB 3 TO 5 STEPS WITH RAILING: TOTAL
MOBILITY SCORE: 11
WALKING IN HOSPITAL ROOM: TOTAL
TOILETING: A LITTLE
STANDING UP FROM CHAIR USING ARMS: TOTAL
PERSONAL GROOMING: A LITTLE
MOVING FROM LYING ON BACK TO SITTING ON SIDE OF FLAT BED WITH BEDRAILS: A LITTLE
HELP NEEDED FOR BATHING: A LITTLE

## 2024-02-26 ASSESSMENT — PAIN SCALES - GENERAL
PAINLEVEL_OUTOF10: 0 - NO PAIN
PAINLEVEL_OUTOF10: 0 - NO PAIN
PAINLEVEL_OUTOF10: 1

## 2024-02-26 NOTE — PROGRESS NOTES
"Blanca Hamilton is a 48 y.o. female on day 12 of admission presenting with Wound dehiscence.    Subjective   No acute overnight events.  Pain controlled.  Denies chest pain shortness of breath.  Denies numbness/tingling RLE.      Objective     Physical Exam  HENT:      Head: Normocephalic and atraumatic.   Eyes:      Extraocular Movements: Extraocular movements intact.      Conjunctiva/sclera: Conjunctivae normal.      Pupils: Pupils are equal, round, and reactive to light.   Cardiovascular:      Rate and Rhythm: Normal rate and regular rhythm.      Pulses: Normal pulses.   Pulmonary:      Breath sounds: Normal breath sounds.   Abdominal:      General: Bowel sounds are normal.      Palpations: Abdomen is soft.   Musculoskeletal:      Comments: Right hip dressing C/D/I, superficial and deep hemovac drains to compressed suction with sanguinous output, Prevena VAC in place to continuous suction, no output, leg and foot warm and well perfused, motion and sensations intact     Skin:     General: Skin is warm and dry.      Capillary Refill: Capillary refill takes less than 2 seconds.   Neurological:      General: No focal deficit present.      Mental Status: She is alert and oriented to person, place, and time.       Last Recorded Vitals  Blood pressure 122/79, pulse 79, temperature 36.1 °C (97 °F), temperature source Temporal, resp. rate 18, height 1.753 m (5' 9\"), weight 89.4 kg (197 lb 1.6 oz), SpO2 99 %.  Intake/Output last 3 Shifts:  I/O last 3 completed shifts:  In: 680 (7.6 mL/kg) [P.O.:680]  Out: 4910 (54.9 mL/kg) [Urine:4750 (1.5 mL/kg/hr); Drains:160]  Weight: 89.4 kg     Relevant Results  Lower extremity venous duplex bilateral    Result Date: 2/15/2024          [place] [address]  Tel [phone] and Fax [phone]  Vascular Lab Report [facility] US LOWER EXTREMITY VENOUS DUPLEX BILATERAL  Patient Name:      BLANCA HAMILTON          Reading Physician:  [id number] Loza       "                                                       MD Study Date:        2/14/2024            Ordering Physician: [id number] GEORGE AMADOR MRN/PID:           [id number]             Technologist:       Rosa Pierre [facility] Accession#:        [id number]         Technologist 2:     Hattie Raymond Date of Birth/Age: 1975 / 48      Encounter#:         [id number]                    years Gender:            F Admission Status:  Inpatient            Location Performed: [facility]  Diagnosis/ICD: Localized (leg) edema-R60.0 CPT Codes:     64173 Peripheral venous duplex scan for DVT complete  CONCLUSIONS: Right Lower Venous: No evidence of acute deep vein thrombus visualized in the right lower extremity. Left Lower Venous: No evidence of acute deep vein thrombus visualized in the left lower extremity.  Additional Findings: Technically difficult study due to patient movement and inability to position.  Imaging & Doppler Findings:  Right                 Compressible Thrombus        Flow Distal External Iliac     Yes        None   Spontaneous/Phasic CFV                       Yes        None   Spontaneous/Phasic PFV                       Yes        None FV Proximal               Yes        None   Spontaneous/Phasic FV Mid                    Yes        None FV Distal                 Yes        None Popliteal                 Yes        None   Spontaneous/Phasic Peroneal                  Yes        None PTV                       Yes        None  Left                  Compress Thrombus        Flow Distal External Iliac   Yes      None   Spontaneous/Phasic CFV                     Yes      None   Spontaneous/Phasic PFV                     Yes      None FV Proximal             Yes      None   Spontaneous/Phasic FV Mid                  Yes      None FV Distal               Yes      None Popliteal               Yes      None   Spontaneous/Phasic Peroneal                 Yes      None PTV                     Yes      None  [id number] Prieto Loza MD Electronically signed by [id number] Prieto Loza MD on 2/15/2024 at 5:44:38 PM  ** Final **     Bedside PICC Imaging    Result Date: 2/8/2024  These images are not reportable by radiology and will not be interpreted by  Radiologists.    ECG 12 lead    Result Date: 2/7/2024  Atrial flutter with 2:1 AV conduction Nonspecific intraventricular block Left ventricular hypertrophy ( R in aVL , Castaner product ) Inferior infarct , age undetermined Abnormal ECG When compared with ECG of 11-JAN-2024 23:16, Significant changes have occurred See ED provider note for full interpretation and clinical correlation Confirmed by Vega Bruce ([id number]) on 2/7/2024 10:40:33 PM    XR hip right with pelvis when performed 2 or 3 views    Result Date: 2/7/2024  Interpreted By:  Richard Garcia, STUDY: XR HIP RIGHT WITH PELVIS WHEN PERFORMED 2 OR 3 VIEWS; ;  2/7/2024 9:25 am   INDICATION: Signs/Symptoms:s/p revision oanh.   COMPARISON: 01/26/2024   ACCESSION NUMBER(S): [id number]   ORDERING CLINICIAN: JON HERNANDEZ   FINDINGS: Total right hip arthroplasty with interval placement of antibiotic impregnated beads. Partially visualized left hip arthroplasty.       Interval placement of antibiotic impregnated beads in the right hip.     MACRO: None   Signed by: Richard Garcia 2/7/2024 5:00 PM Dictation workstation:   [id number]    XR abdomen 1 view    Result Date: 2/5/2024  Interpreted By:  Richard Garcia, STUDY: XR ABDOMEN 1 VIEW;  2/4/2024 6:47 pm   INDICATION: Signs/Symptoms:Lactic acidosis and emesis.   COMPARISON: None.   ACCESSION NUMBER(S): [id number]   ORDERING CLINICIAN: TABITHA MAYNARD   FINDINGS: Nonobstructive bowel gas pattern. Partially visualized bilateral hip arthroplasties.       1.  Nonobstructive bowel gas pattern.   MACRO: None   Signed by: Richard Garcia 2/5/2024 4:12 PM Dictation workstation:   [id number]    XR hip right with pelvis when  performed 2 or 3 views    Result Date: 1/27/2024  Interpreted By:  Yoile Tate, STUDY: Single view pelvis. Right hip, two views.   INDICATION: Signs/Symptoms:POST OP-RIGHT TOTAL HIP.   COMPARISON: 10/17/2023.   ACCESSION NUMBER(S): [id number]   ORDERING CLINICIAN: ETHEL MORALES   FINDINGS: Status post right total hip arthroplasty with cerclage wire fixation of the proximal femur. Hardware is intact without perihardware fractures or lucencies.   There is fragmentation of the greater trochanter which is likely postsurgical in etiology with attention on follow-up recommended.   Right acetabular protrusio. No malalignment.   Changes of left hip arthroplasty noted with prominent osteolysis of the medial acetabular wall similar to prior.       1. As above.   MACRO: None.   Signed by: Yolie Tate 1/27/2024 10:30 AM Dictation workstation:   [id number]     Scheduled medications  acetaminophen, 975 mg, oral, q8h  ARIPiprazole, 15 mg, oral, Nightly  [Held by provider] aspirin, 81 mg, oral, BID  ceFAZolin, 2 g, intravenous, q8h  enoxaparin, 40 mg, subcutaneous, q24h  famotidine, 40 mg, oral, BID  ferrous sulfate (325 mg ferrous sulfate), 1 tablet, oral, Daily  fluticasone, 2 spray, Each Nostril, Daily  [Held by provider] lisinopril, 10 mg, oral, q AM  loratadine, 10 mg, oral, Daily  magnesium oxide, 800 mg, oral, Daily  [Held by provider] metoprolol succinate XL, 25 mg, oral, Daily  montelukast, 10 mg, oral, q AM  nicotine, 1 patch, transdermal, Daily  oseltamivir, 75 mg, oral, Daily  PARoxetine, 40 mg, oral, Daily  polyethylene glycol, 17 g, oral, BID  simvastatin, 20 mg, oral, Nightly  traZODone, 100 mg, oral, Nightly  zinc oxide, 1 Application, Topical, BID      Continuous medications  lactated Ringer's, 100 mL/hr, Last Rate: 100 mL/hr (02/26/24 0913)      PRN medications  PRN medications: HYDROmorphone, midodrine, ondansetron, oxyCODONE, oxyCODONE    Results for orders placed or performed during the hospital  encounter of 02/14/24 (from the past 24 hour(s))   CBC   Result Value Ref Range    WBC 4.7 4.4 - 11.3 x10*3/uL    nRBC 0.0 0.0 - 0.0 /100 WBCs    RBC 2.91 (L) 4.00 - 5.20 x10*6/uL    Hemoglobin 8.4 (L) 12.0 - 16.0 g/dL    Hematocrit 27.1 (L) 36.0 - 46.0 %    MCV 93 80 - 100 fL    MCH 28.9 26.0 - 34.0 pg    MCHC 31.0 (L) 32.0 - 36.0 g/dL    RDW 16.9 (H) 11.5 - 14.5 %    Platelets 279 150 - 450 x10*3/uL   Renal Function Panel   Result Value Ref Range    Glucose 81 74 - 99 mg/dL    Sodium 137 136 - 145 mmol/L    Potassium 4.1 3.5 - 5.3 mmol/L    Chloride 104 98 - 107 mmol/L    Bicarbonate 28 21 - 32 mmol/L    Anion Gap 9 (L) 10 - 20 mmol/L    Urea Nitrogen 4 (L) 6 - 23 mg/dL    Creatinine 0.25 (L) 0.50 - 1.05 mg/dL    eGFR >90 >60 mL/min/1.73m*2    Calcium 7.8 (L) 8.6 - 10.3 mg/dL    Phosphorus 3.6 2.5 - 4.9 mg/dL    Albumin 2.3 (L) 3.4 - 5.0 g/dL   Magnesium   Result Value Ref Range    Magnesium 1.67 1.60 - 2.40 mg/dL       Assessment/Plan   Principal Problem:    Wound dehiscence  Active Problems:    Class 1 obesity with body mass index (BMI) of 30.0 to 30.9 in adult    Sinusitis    Transfusion history    Arthritis    Hyponatremia    48 year female POD 6 from right hip revision.     - AM labs reviewed   - toe touch weight bearing for transfers other, otherwise NWB RLE  - HOLD DVT prophylaxis until hemoglobin stable   - PT evaluation  - monitor and document hemovac drain output every shift -> will remove drain when no output  - maintain two pillows in between legs when in bed  - will need PICC line placed and 6 weeks of Ancef from recent OR date per ID recs  - ortho will continue to follow     Discussed with Dr. Jalloh         I spent 30 minutes in the professional and overall care of this patient.      Ester Fitch, APRN-CNP

## 2024-02-26 NOTE — PROGRESS NOTES
02/26/24 1037   Discharge Planning   Living Arrangements Other (Comment)   Support Systems Friends/neighbors;/   Assistance Needed From [facility] for SNF, Patient A&0x3, max assist with walker for ambulation, room air, wound vac at facility   Type of Residence Skilled nursing facility   Do you have animals or pets at home? Yes   Type of Animals or Pets cat   Who is requesting discharge planning? Provider   Home or Post Acute Services Post acute facilities (Rehab/SNF/etc)   Type of Post Acute Facility Services Skilled nursing   Patient expects to be discharged to: Return to [facility] SNF, precert needed-started 2/25   Does the patient need discharge transport arranged? Yes   RoundTrip coordination needed? Yes   Has discharge transport been arranged? No   Patient Choice   Provider Choice list and CMS website (https://medicare.gov/care-compare#search) for post-acute Quality and Resource Measure Data were provided and reviewed with: Patient   Patient / Family choosing to utilize agency / facility established prior to hospitalization Yes

## 2024-02-26 NOTE — PROGRESS NOTES
"Blanca Hamilton is a 48 y.o. female on day 12 of admission presenting with Wound dehiscence.    Subjective   Patient was seen and examined, no acute event. Patient slightly tearful this morning and anxious to leave hospital. She has worries about her cat and family/friends, as she has not seen them in weeks. Expressed interest in pet and music therapy. She denies fever, chest pain, nausea, vomiting, abdominal pain, leg pain or worsening leg swelling    Objective     Physical Exam  HENT:      Head: Normocephalic.      Nose: Nose normal.   Cardiovascular:      Rate and Rhythm: Normal rate and regular rhythm.      Pulses: Normal pulses.      Heart sounds: Normal heart sounds.   Pulmonary:      Comments: Decrease breath sound in both lungs  Abdominal:      General: Abdomen is flat. Bowel sounds are normal. There is no distension.      Palpations: Abdomen is soft. There is no mass.      Tenderness: There is no abdominal tenderness.   Musculoskeletal:         General: Tenderness present.      Cervical back: Normal range of motion.      Right lower leg: Edema present.      Left lower leg: Edema present.      Comments: Surgical site clean and dry, with surgical packing in place. Deep and surgical drains placed and appropriately draining.  Tightly wrapped without overt signs of hematoma formation or seepage. Serosanguineous drainage noted, about 80cc in both drains   Skin:     Findings: Bruising present. No rash.   Neurological:      General: No focal deficit present.      Mental Status: She is alert.      Cranial Nerves: No cranial nerve deficit.         Last Recorded Vitals  Blood pressure 116/78, pulse 97, temperature 36.2 °C (97.2 °F), temperature source Temporal, resp. rate 19, height 1.753 m (5' 9\"), weight 89.4 kg (197 lb 1.6 oz), SpO2 97 %.  Intake/Output last 3 Shifts:  I/O last 3 completed shifts:  In: 680 (7.6 mL/kg) [P.O.:680]  Out: 4910 (54.9 mL/kg) [Urine:4750 (1.5 mL/kg/hr); Drains:160]  Weight: 89.4 kg "     Relevant Results     Results for orders placed or performed during the hospital encounter of 02/14/24 (from the past 24 hour(s))   CBC   Result Value Ref Range    WBC 4.7 4.4 - 11.3 x10*3/uL    nRBC 0.0 0.0 - 0.0 /100 WBCs    RBC 2.91 (L) 4.00 - 5.20 x10*6/uL    Hemoglobin 8.4 (L) 12.0 - 16.0 g/dL    Hematocrit 27.1 (L) 36.0 - 46.0 %    MCV 93 80 - 100 fL    MCH 28.9 26.0 - 34.0 pg    MCHC 31.0 (L) 32.0 - 36.0 g/dL    RDW 16.9 (H) 11.5 - 14.5 %    Platelets 279 150 - 450 x10*3/uL   Renal Function Panel   Result Value Ref Range    Glucose 81 74 - 99 mg/dL    Sodium 137 136 - 145 mmol/L    Potassium 4.1 3.5 - 5.3 mmol/L    Chloride 104 98 - 107 mmol/L    Bicarbonate 28 21 - 32 mmol/L    Anion Gap 9 (L) 10 - 20 mmol/L    Urea Nitrogen 4 (L) 6 - 23 mg/dL    Creatinine 0.25 (L) 0.50 - 1.05 mg/dL    eGFR >90 >60 mL/min/1.73m*2    Calcium 7.8 (L) 8.6 - 10.3 mg/dL    Phosphorus 3.6 2.5 - 4.9 mg/dL    Albumin 2.3 (L) 3.4 - 5.0 g/dL   Magnesium   Result Value Ref Range    Magnesium 1.67 1.60 - 2.40 mg/dL       Current Facility-Administered Medications:     acetaminophen (Tylenol) tablet 975 mg, 975 mg, oral, q8h, Barry D Reiman, DO, 975 mg at 02/26/24 1323    ARIPiprazole (Abilify) tablet 15 mg, 15 mg, oral, Nightly, Barry D Reiman, DO, 15 mg at 02/25/24 2038    [Held by provider] aspirin chewable tablet 81 mg, 81 mg, oral, BID, Barry D Reiman, DO    ceFAZolin in dextrose (iso-os) (Ancef) IVPB 2 g, 2 g, intravenous, q8h, Barry D Reiman, DO, Stopped at 02/26/24 1353    enoxaparin (Lovenox) syringe 40 mg, 40 mg, subcutaneous, q24h, aBrry Bledsoe DO, 40 mg at 02/25/24 2038    famotidine (Pepcid) tablet 40 mg, 40 mg, oral, BID, Barry Bledsoe DO, 40 mg at 02/26/24 0903    ferrous sulfate (325 mg ferrous sulfate) tablet 1 tablet, 1 tablet, oral, Daily, Barry Bledsoe DO, 1 tablet at 02/26/24 0508    fluticasone (Flonase) nasal spray 2 spray, 2 spray, Each Nostril, Daily, Dylan Casas DO, 2 spray at  02/26/24 0904    HYDROmorphone (Dilaudid) injection 0.4 mg, 0.4 mg, intravenous, q3h PRN, Barry D Reiman, DO    lactated Ringer's infusion, 100 mL/hr, intravenous, Continuous, Barry D Reiman, DO, Last Rate: 100 mL/hr at 02/26/24 0913, 100 mL/hr at 02/26/24 0913    [Held by provider] lisinopril tablet 10 mg, 10 mg, oral, q AM, Barry D Reiman, DO    loratadine (Claritin) tablet 10 mg, 10 mg, oral, Daily, Barry D Reiman, DO, 10 mg at 02/26/24 0903    magnesium oxide (Mag-Ox) tablet 800 mg, 800 mg, oral, Daily, Barry D Reiman, DO, 800 mg at 02/26/24 0903    [Held by provider] metoprolol succinate XL (Toprol-XL) 24 hr tablet 25 mg, 25 mg, oral, Daily, Barry D Reiman, DO, 25 mg at 02/20/24 0909    midodrine (Proamatine) tablet 5 mg, 5 mg, oral, BID PRN, Barry D Reiman, DO    montelukast (Singulair) tablet 10 mg, 10 mg, oral, q AM, Barry D Reiman, DO, 10 mg at 02/26/24 0902    nicotine (Nicoderm CQ) 21 mg/24 hr patch 1 patch, 1 patch, transdermal, Daily, Barry D Reiman, DO, 1 patch at 02/26/24 0902    ondansetron (Zofran) injection 4 mg, 4 mg, intravenous, q6h PRN, Barry D Reiman, DO, 4 mg at 02/25/24 1840    oseltamivir (Tamiflu) capsule 75 mg, 75 mg, oral, Daily, Frank Modi MD, 75 mg at 02/26/24 0903    oxyCODONE (Roxicodone) immediate release tablet 10 mg, 10 mg, oral, q6h PRN, Barry D Reiman, DO, 10 mg at 02/24/24 0500    oxyCODONE (Roxicodone) immediate release tablet 5 mg, 5 mg, oral, q6h PRN, Barry D Reiman, DO, 5 mg at 02/22/24 2034    PARoxetine (Paxil) tablet 40 mg, 40 mg, oral, Daily, Barry D Reiman, DO, 40 mg at 02/26/24 0903    polyethylene glycol (Glycolax, Miralax) packet 17 g, 17 g, oral, BID, Barry D Reiman, DO, 17 g at 02/24/24 0815    simvastatin (Zocor) tablet 20 mg, 20 mg, oral, Nightly, Barry D Reiman, DO, 20 mg at 02/25/24 2038    traZODone (Desyrel) tablet 100 mg, 100 mg, oral, Nightly, Barry D Reiman, DO, 100 mg at 02/25/24 2038    zinc oxide 40 % ointment 1  Application, 1 Application, Topical, BID, Barry Bledsoe, DO, 1 Application at 02/26/24 0904        Assessment/Plan   Blanca Hamilton is a 48 y.o. 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 Initially met Sepsis criteria. Admitted for further management of dehiscence and SSI.      Acute Medical Issues:  # Sepsis secondary to reccurent post op wound infection (MSSA) of right hip arthroplasty   # Hx right total hip arthroplasty w/ revision (1/5/24) for prosthetic fx  # Hx hardware removal for prosthetic joint infection (2/6/24)  -OR 2/20/24 right hip revision  -Discharged on 2/11 with wound vac + PICC line for 6-week course of cefazolin              -Previous tissue/Wound Culture (2/6): MSSA, only resistant to Tetracycline   -2/13/24: 2/4 SIRS criteria on admission. S/p sepsis fluids in ED. Lactate 0.7. Hgb stable.  -pain control: scheduled tylenol, oxy 5 mod, oxy 10 severe, dilaudid 0.4 PRN breakthrough  -toe touch weight bearing for transfers other, otherwise NWB RLE  -DO NOT LAY on right hip, maintain two pillows in between legs when in bed  -Blood Cx (2/14): Negative x2  -PT/OT: moderate intensity   -Pet and music therapy  -Cont Lovenox, holding ASA   -Cont Ancef 2g TID (2/20/2024 - 4/2/2024)  -Drains to be removed before discharge, ortho hopes for less than 20cc per drain before removal     Resolved Issues  #Hypotension-- continue to hold metoprolol and lisinopril for now     Chronic Medical Issues:  # GERD: c/w Pepcid   # HTN: Holding Metoprolol and lisinopril in setting of hypotension  # HLD: c/w Simvastatin  # Depression: c/w Abilify + Paroxetine +Trazodone  # Chronic anemia: iron supplement  # Tobacco use: nicotine patch     Fluids: None  Electrolytes: replete as needed  Nutrition: Regular, supplements  GI PPX: None   DVT PPX: Holding Lovenox      Access: PICC Line, PIV's  Antibiotics: Cefazolin  2g TID (2/15 - )  Oxygenation: Room Air     Dispo: Admitted onto the floor for postoperative wound dehiscence and surgical site infection. OR on 2/20/24 for right hip revision. Once drains removed by ortho, pt will need pre-cert for [place]. Will need 6w Cefazolin from date of last surgery along with weekly labs.       Barry Bledsoe, DO  PGY-1

## 2024-02-26 NOTE — CARE PLAN
The patient's goals for the shift include      The clinical goals for the shift include Pt will have pain controlled this shift    Over the shift, the patient did not make progress toward the following goals. Barriers to progression include patient did not complain of pain this shift. Recommendations to address these barriers include continue with plan of care.

## 2024-02-26 NOTE — PROGRESS NOTES
Blanca Hamilton is a 48 y.o. female on day 12 of admission presenting with Wound dehiscence.    Subjective   Interval History: no fever, no new complaints        Review of Systems    Objective   Range of Vitals (last 24 hours)  Heart Rate:  [79-97]   Temp:  [35.7 °C (96.2 °F)-36.2 °C (97.2 °F)]   Resp:  [15-19]   BP: (116-122)/(78-80)   SpO2:  [94 %-99 %]   Daily Weight  02/14/24 : 89.4 kg (197 lb 1.6 oz)    Body mass index is 29.11 kg/m².    Physical Exam  Constitutional:       Appearance: Normal appearance.   HENT:      Head: Normocephalic and atraumatic.      Mouth/Throat:      Mouth: Mucous membranes are moist.      Pharynx: Oropharynx is clear.   Eyes:      Pupils: Pupils are equal, round, and reactive to light.   Cardiovascular:      Rate and Rhythm: Normal rate and regular rhythm.      Heart sounds: Normal heart sounds.   Pulmonary:      Effort: Pulmonary effort is normal.      Breath sounds: Normal breath sounds.   Abdominal:      General: Abdomen is flat. Bowel sounds are normal.      Palpations: Abdomen is soft.   Musculoskeletal:      Cervical back: Normal range of motion.      Comments: Rt hip dressing, no cellulitis   Neurological:      Mental Status: She is alert.         Antibiotics  sodium chloride 0.9 % bolus 3,120 mL  acetaminophen (Tylenol) tablet 975 mg  ARIPiprazole (Abilify) tablet 15 mg  aspirin chewable tablet 81 mg  ceFAZolin in dextrose (iso-os) (Ancef) IVPB 2 g  famotidine (Pepcid) tablet 40 mg  ferrous sulfate (325 mg ferrous sulfate) tablet 1 tablet  lactulose 20 gram/30 mL oral solution 20 g  lisinopril tablet 10 mg  loratadine (Claritin) tablet 10 mg  magnesium oxide (Mag-Ox) tablet 400 mg  metoprolol succinate XL (Toprol-XL) 24 hr tablet 25 mg  montelukast (Singulair) tablet 10 mg  nicotine (Nicoderm CQ) 21 mg/24 hr patch 1 patch  PARoxetine (Paxil) tablet 40 mg  polyethylene glycol (Glycolax, Miralax) packet 17 g  simvastatin (Zocor) tablet 20 mg  traZODone (Desyrel) tablet 100 mg  zinc  oxide 40 % ointment 1 Application  piperacillin-tazobactam-dextrose (Zosyn) IV 3.375 g  sodium chloride 0.9% infusion  oxyCODONE (Roxicodone) immediate release tablet 5 mg  oxyCODONE (Roxicodone) immediate release tablet 10 mg  HYDROmorphone (Dilaudid) injection 0.4 mg  vancomycin (Vancocin) in dextrose 5 % water (D5W) 500 mL IV 1,500 mg  atorvastatin (Lipitor) tablet    ceFAZolin in dextrose (iso-os) (Ancef) IVPB 1 g  enoxaparin (Lovenox) syringe 40 mg  ceFAZolin in dextrose (iso-os) (Ancef) IVPB 2 g  ondansetron (Zofran) injection 4 mg  lactated Ringer's infusion  oxygen (O2) therapy  lactated Ringer's infusion  oxyCODONE (Roxicodone) immediate release tablet 5 mg  HYDROmorphone (Dilaudid) injection 0.25 mg  HYDROmorphone (Dilaudid) injection 0.5 mg  ondansetron (Zofran) injection 4 mg  droperidol (Inapsine) injection 0.625 mg  rocuronium (ZeMuron) injection  - Omnicell Override Pull  ondansetron (Zofran) injection  - Omnicell Override Pull  dexAMETHasone (Decadron) injection  - Omnicell Override Pull  lidocaine (cardiac) (Xylocaine) injection  - Omnicell Override Pull  propofol (Diprivan) injection  - Omnicell Override Pull  fentaNYL PF (Sublimaze) injection  - Omnicell Override Pull  lactated Ringer's infusion  ceFAZolin (Ancef) injection  - Omnicell Override Pull  ceFAZolin (Ancef) injection  - Omnicell Override Pull  tranexamic acid (Cyklokapron) injection  - Omnicell Override Pull  tranexamic acid (Cyklokapron) injection  - Omnicell Override Pull  povidone-iodine (Betadine) soap  - Omnicell Override Pull  povidone-iodine (Betadine) topical solution  - Omnicell Override Pull  ePHEDrine injection  - Omnicell Override Pull  vasopressin (Vasostrict) injection  - Omnicell Override Pull  propofol (Diprivan) injection  - Omnicell Override Pull  dexAMETHasone (Decadron) injection  - Omnicell Override Pull  gentamicin (Garamycin) injection  - Omnicell Override Pull  vancomycin (Vancocin) vial for injection  - Omnicell  Override Pull  HYDROmorphone (Dilaudid) injection  - Omnicell Override Pull  vancomycin (Vancocin) vial for injection  sugammadex (Bridion) injection  - Omnicell Override Pull  gentamicin (Garamycin) injection  sodium chloride 0.9 % irrigation solution  lactated Ringer's irrigation solution  lactated Ringer's bolus 500 mL  lactated Ringer's bolus 500 mL  lactated Ringer's infusion  oseltamivir (Tamiflu) capsule 75 mg  flu vaccine, quadrivalent, high-dose, preservative free, age 65y+ (FLUZONE)  lactated Ringer's bolus 1,000 mL  midodrine (Proamatine) tablet 5 mg  lactated Ringer's bolus 500 mL  magnesium oxide (Mag-Ox) tablet 800 mg  polyethylene glycol (Glycolax, Miralax) packet 17 g  bisacodyl (Dulcolax) suppository 10 mg      Relevant Results  Labs  Results from last 72 hours   Lab Units 02/26/24 0515 02/25/24  0508 02/24/24  0458   WBC AUTO x10*3/uL 4.7 5.9 6.2   HEMOGLOBIN g/dL 8.4* 8.6* 8.1*   HEMATOCRIT % 27.1* 27.4* 26.3*   PLATELETS AUTO x10*3/uL 279 270 239       Results from last 72 hours   Lab Units 02/26/24 0515 02/25/24  0508 02/24/24  0458   SODIUM mmol/L 137 133* 133*   POTASSIUM mmol/L 4.1 4.3 4.1   CHLORIDE mmol/L 104 102 102   CO2 mmol/L 28 28 26   BUN mg/dL 4* 3* 4*   CREATININE mg/dL 0.25* 0.27* 0.24*   GLUCOSE mg/dL 81 77 77   CALCIUM mg/dL 7.8* 7.7* 7.4*   ANION GAP mmol/L 9* 7* 9*   EGFR mL/min/1.73m*2 >90 >90 >90   PHOSPHORUS mg/dL 3.6  --  3.0       Results from last 72 hours   Lab Units 02/26/24 0515 02/25/24  0508 02/24/24  0458   ALK PHOS U/L  --  111* 101   BILIRUBIN TOTAL mg/dL  --  0.3 0.3   PROTEIN TOTAL g/dL  --  4.8* 4.4*   ALT U/L  --  <3* <3*   AST U/L  --  6* 6*   ALBUMIN g/dL 2.3* 2.3* 2.2*       Estimated Creatinine Clearance: 125 mL/min (A) (by C-G formula based on SCr of 0.25 mg/dL (L)).  C-Reactive Protein   Date Value Ref Range Status   02/20/2024 0.42 <1.00 mg/dL Final   02/19/2024 0.68 <1.00 mg/dL Final   02/18/2024 1.13 (H) <1.00 mg/dL Final      Microbiology  Reviewed  Imaging  reviewed        Assessment/Plan     Right hip prosthesis infection sp I&D with partial hardware exchange, MSSA on the cultures, sp repeat I&D    Recommendations :  Continue Cefazolin, plan on 6 weeks from the date of the last surgery, weekly labs  Discussed with the medical team     I spent minutes in the professional and overall care of this patient.      Frank Modi MD

## 2024-02-27 LAB
ALBUMIN SERPL BCP-MCNC: 2.4 G/DL (ref 3.4–5)
ALP SERPL-CCNC: 113 U/L (ref 33–110)
ALT SERPL W P-5'-P-CCNC: <3 U/L (ref 7–45)
ANION GAP SERPL CALC-SCNC: 9 MMOL/L (ref 10–20)
AST SERPL W P-5'-P-CCNC: 6 U/L (ref 9–39)
BILIRUB SERPL-MCNC: 0.2 MG/DL (ref 0–1.2)
BUN SERPL-MCNC: 5 MG/DL (ref 6–23)
CALCIUM SERPL-MCNC: 7.7 MG/DL (ref 8.6–10.3)
CHLORIDE SERPL-SCNC: 102 MMOL/L (ref 98–107)
CO2 SERPL-SCNC: 25 MMOL/L (ref 21–32)
CREAT SERPL-MCNC: 0.24 MG/DL (ref 0.5–1.05)
CRP SERPL-MCNC: 2.15 MG/DL
EGFRCR SERPLBLD CKD-EPI 2021: >90 ML/MIN/1.73M*2
ERYTHROCYTE [DISTWIDTH] IN BLOOD BY AUTOMATED COUNT: 16.5 % (ref 11.5–14.5)
ERYTHROCYTE [SEDIMENTATION RATE] IN BLOOD BY WESTERGREN METHOD: 14 MM/H (ref 0–20)
GLUCOSE SERPL-MCNC: 77 MG/DL (ref 74–99)
HCT VFR BLD AUTO: 27.8 % (ref 36–46)
HGB BLD-MCNC: 8.5 G/DL (ref 12–16)
MCH RBC QN AUTO: 28.6 PG (ref 26–34)
MCHC RBC AUTO-ENTMCNC: 30.6 G/DL (ref 32–36)
MCV RBC AUTO: 94 FL (ref 80–100)
NRBC BLD-RTO: 0 /100 WBCS (ref 0–0)
PLATELET # BLD AUTO: 307 X10*3/UL (ref 150–450)
POTASSIUM SERPL-SCNC: 4.3 MMOL/L (ref 3.5–5.3)
PROT SERPL-MCNC: 4.8 G/DL (ref 6.4–8.2)
RBC # BLD AUTO: 2.97 X10*6/UL (ref 4–5.2)
SODIUM SERPL-SCNC: 132 MMOL/L (ref 136–145)
WBC # BLD AUTO: 5 X10*3/UL (ref 4.4–11.3)

## 2024-02-27 PROCEDURE — 2500000004 HC RX 250 GENERAL PHARMACY W/ HCPCS (ALT 636 FOR OP/ED)

## 2024-02-27 PROCEDURE — 2500000001 HC RX 250 WO HCPCS SELF ADMINISTERED DRUGS (ALT 637 FOR MEDICARE OP)

## 2024-02-27 PROCEDURE — 1200000002 HC GENERAL ROOM WITH TELEMETRY DAILY

## 2024-02-27 PROCEDURE — 2500000002 HC RX 250 W HCPCS SELF ADMINISTERED DRUGS (ALT 637 FOR MEDICARE OP, ALT 636 FOR OP/ED)

## 2024-02-27 PROCEDURE — 2500000001 HC RX 250 WO HCPCS SELF ADMINISTERED DRUGS (ALT 637 FOR MEDICARE OP): Performed by: INTERNAL MEDICINE

## 2024-02-27 PROCEDURE — 86140 C-REACTIVE PROTEIN: CPT

## 2024-02-27 PROCEDURE — 97530 THERAPEUTIC ACTIVITIES: CPT | Mod: GO,CO

## 2024-02-27 PROCEDURE — 80053 COMPREHEN METABOLIC PANEL: CPT

## 2024-02-27 PROCEDURE — 97535 SELF CARE MNGMENT TRAINING: CPT | Mod: GO,CO

## 2024-02-27 PROCEDURE — 85027 COMPLETE CBC AUTOMATED: CPT

## 2024-02-27 PROCEDURE — 99232 SBSQ HOSP IP/OBS MODERATE 35: CPT

## 2024-02-27 PROCEDURE — 99232 SBSQ HOSP IP/OBS MODERATE 35: CPT | Performed by: NURSE PRACTITIONER

## 2024-02-27 PROCEDURE — 85652 RBC SED RATE AUTOMATED: CPT

## 2024-02-27 PROCEDURE — 2500000002 HC RX 250 W HCPCS SELF ADMINISTERED DRUGS (ALT 637 FOR MEDICARE OP, ALT 636 FOR OP/ED): Performed by: INTERNAL MEDICINE

## 2024-02-27 PROCEDURE — S4991 NICOTINE PATCH NONLEGEND: HCPCS

## 2024-02-27 RX ORDER — CALCIUM CARBONATE 200(500)MG
500 TABLET,CHEWABLE ORAL 3 TIMES DAILY PRN
Status: DISCONTINUED | OUTPATIENT
Start: 2024-02-27 | End: 2024-03-05 | Stop reason: HOSPADM

## 2024-02-27 RX ADMIN — FAMOTIDINE 40 MG: 20 TABLET ORAL at 20:32

## 2024-02-27 RX ADMIN — ACETAMINOPHEN 975 MG: 325 TABLET ORAL at 20:32

## 2024-02-27 RX ADMIN — PAROXETINE 40 MG: 20 TABLET, FILM COATED ORAL at 08:38

## 2024-02-27 RX ADMIN — ENOXAPARIN SODIUM 40 MG: 40 INJECTION SUBCUTANEOUS at 16:03

## 2024-02-27 RX ADMIN — ARIPIPRAZOLE 15 MG: 5 TABLET ORAL at 20:32

## 2024-02-27 RX ADMIN — MONTELUKAST 10 MG: 10 TABLET, FILM COATED ORAL at 08:38

## 2024-02-27 RX ADMIN — OSELTAMAVIR PHOSPHATE 75 MG: 75 CAPSULE ORAL at 08:38

## 2024-02-27 RX ADMIN — CEFAZOLIN SODIUM 2 G: 2 INJECTION, SOLUTION INTRAVENOUS at 20:32

## 2024-02-27 RX ADMIN — NICOTINE 1 PATCH: 21 PATCH, EXTENDED RELEASE TRANSDERMAL at 08:39

## 2024-02-27 RX ADMIN — Medication 1 APPLICATION: at 08:39

## 2024-02-27 RX ADMIN — CALCIUM CARBONATE (ANTACID) CHEW TAB 500 MG 500 MG: 500 CHEW TAB at 16:04

## 2024-02-27 RX ADMIN — TRAZODONE HYDROCHLORIDE 100 MG: 50 TABLET ORAL at 20:32

## 2024-02-27 RX ADMIN — FERROUS SULFATE TAB 325 MG (65 MG ELEMENTAL FE) 1 TABLET: 325 (65 FE) TAB at 04:51

## 2024-02-27 RX ADMIN — CEFAZOLIN SODIUM 2 G: 2 INJECTION, SOLUTION INTRAVENOUS at 04:51

## 2024-02-27 RX ADMIN — Medication 800 MG: at 08:38

## 2024-02-27 RX ADMIN — LORATADINE 10 MG: 10 TABLET ORAL at 08:39

## 2024-02-27 RX ADMIN — ONDANSETRON 4 MG: 2 INJECTION INTRAMUSCULAR; INTRAVENOUS at 16:00

## 2024-02-27 RX ADMIN — FLUTICASONE PROPIONATE 2 SPRAY: 50 SPRAY, METERED NASAL at 08:39

## 2024-02-27 RX ADMIN — SODIUM CHLORIDE, POTASSIUM CHLORIDE, SODIUM LACTATE AND CALCIUM CHLORIDE 100 ML/HR: 600; 310; 30; 20 INJECTION, SOLUTION INTRAVENOUS at 08:40

## 2024-02-27 RX ADMIN — CALCIUM CARBONATE (ANTACID) CHEW TAB 500 MG 500 MG: 500 CHEW TAB at 18:52

## 2024-02-27 RX ADMIN — SIMVASTATIN 20 MG: 20 TABLET, FILM COATED ORAL at 20:33

## 2024-02-27 RX ADMIN — ACETAMINOPHEN 975 MG: 325 TABLET ORAL at 14:12

## 2024-02-27 RX ADMIN — CEFAZOLIN SODIUM 2 G: 2 INJECTION, SOLUTION INTRAVENOUS at 14:12

## 2024-02-27 RX ADMIN — ACETAMINOPHEN 975 MG: 325 TABLET ORAL at 04:51

## 2024-02-27 RX ADMIN — Medication 1 APPLICATION: at 21:00

## 2024-02-27 RX ADMIN — FAMOTIDINE 40 MG: 20 TABLET ORAL at 08:39

## 2024-02-27 ASSESSMENT — COGNITIVE AND FUNCTIONAL STATUS - GENERAL
CLIMB 3 TO 5 STEPS WITH RAILING: TOTAL
WALKING IN HOSPITAL ROOM: TOTAL
MOBILITY SCORE: 12
DRESSING REGULAR LOWER BODY CLOTHING: A LITTLE
PERSONAL GROOMING: A LITTLE
HELP NEEDED FOR BATHING: A LITTLE
HELP NEEDED FOR BATHING: A LITTLE
PERSONAL GROOMING: A LITTLE
MOVING TO AND FROM BED TO CHAIR: A LOT
STANDING UP FROM CHAIR USING ARMS: A LOT
TOILETING: A LOT
MOVING FROM LYING ON BACK TO SITTING ON SIDE OF FLAT BED WITH BEDRAILS: A LITTLE
DAILY ACTIVITIY SCORE: 18
TOILETING: A LOT
TURNING FROM BACK TO SIDE WHILE IN FLAT BAD: A LITTLE
DRESSING REGULAR UPPER BODY CLOTHING: A LITTLE
DRESSING REGULAR UPPER BODY CLOTHING: A LITTLE
DRESSING REGULAR LOWER BODY CLOTHING: A LOT
DAILY ACTIVITIY SCORE: 17

## 2024-02-27 ASSESSMENT — PAIN SCALES - GENERAL
PAINLEVEL_OUTOF10: 0 - NO PAIN

## 2024-02-27 ASSESSMENT — PAIN - FUNCTIONAL ASSESSMENT
PAIN_FUNCTIONAL_ASSESSMENT: 0-10
PAIN_FUNCTIONAL_ASSESSMENT: 0-10

## 2024-02-27 ASSESSMENT — ACTIVITIES OF DAILY LIVING (ADL): HOME_MANAGEMENT_TIME_ENTRY: 15

## 2024-02-27 NOTE — PROGRESS NOTES
"Blanca Hamilton is a 48 y.o. female on day 13 of admission presenting with Wound dehiscence.    Subjective   Patient was seen and examined seated upright in bed, no acute events overnight. Patient in better mood this morning. Pain remains well controlled and denies needing medication for breakthrough. She denies fever, chest pain, nausea, vomiting, abdominal pain, leg pain or worsening leg swelling    Objective     Physical Exam  HENT:      Head: Normocephalic.      Nose: Nose normal.   Cardiovascular:      Rate and Rhythm: Normal rate and regular rhythm.      Pulses: Normal pulses.      Heart sounds: Normal heart sounds.   Pulmonary:      Comments: Decrease breath sound in both lungs  Abdominal:      General: Abdomen is flat. Bowel sounds are normal. There is no distension.      Palpations: Abdomen is soft. There is no mass.      Tenderness: There is no abdominal tenderness.   Musculoskeletal:         General: Tenderness present.      Cervical back: Normal range of motion.      Right lower leg: Edema present.      Left lower leg: Edema present.      Comments: Surgical site clean and dry, with surgical packing and vac in place. Deep and surgical drains placed and draining serosang. Continues to have significant output in drains, appear to have been emptied this morning. Incision is stable, tightly wrapped without overt signs of hematoma formation or seepage. VAC has had 0 output.   Skin:     Findings: Bruising present. No rash.   Neurological:      General: No focal deficit present.      Mental Status: She is alert.      Cranial Nerves: No cranial nerve deficit.         Last Recorded Vitals  Blood pressure 132/61, pulse 87, temperature 36.5 °C (97.7 °F), temperature source Temporal, resp. rate 18, height 1.753 m (5' 9\"), weight 89.4 kg (197 lb 1.6 oz), SpO2 95 %.  Intake/Output last 3 Shifts:  I/O last 3 completed shifts:  In: 940 (10.5 mL/kg) [P.O.:240; IV Piggyback:700]  Out: 2990 (33.4 mL/kg) [Urine:2600 (0.8 " mL/kg/hr); Drains:390]  Weight: 89.4 kg     Relevant Results     Results for orders placed or performed during the hospital encounter of 02/14/24 (from the past 24 hour(s))   CBC   Result Value Ref Range    WBC 5.0 4.4 - 11.3 x10*3/uL    nRBC 0.0 0.0 - 0.0 /100 WBCs    RBC 2.97 (L) 4.00 - 5.20 x10*6/uL    Hemoglobin 8.5 (L) 12.0 - 16.0 g/dL    Hematocrit 27.8 (L) 36.0 - 46.0 %    MCV 94 80 - 100 fL    MCH 28.6 26.0 - 34.0 pg    MCHC 30.6 (L) 32.0 - 36.0 g/dL    RDW 16.5 (H) 11.5 - 14.5 %    Platelets 307 150 - 450 x10*3/uL   Comprehensive Metabolic Panel   Result Value Ref Range    Glucose 77 74 - 99 mg/dL    Sodium 132 (L) 136 - 145 mmol/L    Potassium 4.3 3.5 - 5.3 mmol/L    Chloride 102 98 - 107 mmol/L    Bicarbonate 25 21 - 32 mmol/L    Anion Gap 9 (L) 10 - 20 mmol/L    Urea Nitrogen 5 (L) 6 - 23 mg/dL    Creatinine 0.24 (L) 0.50 - 1.05 mg/dL    eGFR >90 >60 mL/min/1.73m*2    Calcium 7.7 (L) 8.6 - 10.3 mg/dL    Albumin 2.4 (L) 3.4 - 5.0 g/dL    Alkaline Phosphatase 113 (H) 33 - 110 U/L    Total Protein 4.8 (L) 6.4 - 8.2 g/dL    AST 6 (L) 9 - 39 U/L    Bilirubin, Total 0.2 0.0 - 1.2 mg/dL    ALT <3 (L) 7 - 45 U/L   Sedimentation rate, automated   Result Value Ref Range    Sedimentation Rate 14 0 - 20 mm/h   C-reactive protein   Result Value Ref Range    C-Reactive Protein 2.15 (H) <1.00 mg/dL       Current Facility-Administered Medications:     acetaminophen (Tylenol) tablet 975 mg, 975 mg, oral, q8h, Barry Bledsoe DO, 975 mg at 02/27/24 0451    ARIPiprazole (Abilify) tablet 15 mg, 15 mg, oral, Nightly, Barry Gamboaiman, DO, 15 mg at 02/26/24 2201    [Held by provider] aspirin chewable tablet 81 mg, 81 mg, oral, BID, Barry Gamboaiman, DO    ceFAZolin in dextrose (iso-os) (Ancef) IVPB 2 g, 2 g, intravenous, q8h, Barry Bledsoe DO, Last Rate: 0 mL/hr at 02/26/24 2231, 2 g at 02/27/24 0451    [Held by provider] enoxaparin (Lovenox) syringe 40 mg, 40 mg, subcutaneous, q24h, Barry Bledsoe DO, 40 mg at  02/26/24 1627    famotidine (Pepcid) tablet 40 mg, 40 mg, oral, BID, Barry D Reiman, DO, 40 mg at 02/27/24 0839    ferrous sulfate (325 mg ferrous sulfate) tablet 1 tablet, 1 tablet, oral, Daily, Barry D Reiman, DO, 1 tablet at 02/27/24 0451    fluticasone (Flonase) nasal spray 2 spray, 2 spray, Each Nostril, Daily, Dylan Casas, DO, 2 spray at 02/27/24 0839    HYDROmorphone (Dilaudid) injection 0.4 mg, 0.4 mg, intravenous, q3h PRN, Barry D Reiman, DO    lactated Ringer's infusion, 100 mL/hr, intravenous, Continuous, Barry D Reiman, DO, Last Rate: 100 mL/hr at 02/27/24 0840, 100 mL/hr at 02/27/24 0840    [Held by provider] lisinopril tablet 10 mg, 10 mg, oral, q AM, Barry D Reiman, DO    loratadine (Claritin) tablet 10 mg, 10 mg, oral, Daily, Barry D Reiman, DO, 10 mg at 02/27/24 0839    magnesium oxide (Mag-Ox) tablet 800 mg, 800 mg, oral, Daily, Barry D Reiman, DO, 800 mg at 02/27/24 0838    [Held by provider] metoprolol succinate XL (Toprol-XL) 24 hr tablet 25 mg, 25 mg, oral, Daily, Barry D Reiman, DO, 25 mg at 02/20/24 0909    midodrine (Proamatine) tablet 5 mg, 5 mg, oral, BID PRN, Barry D Reiman, DO    montelukast (Singulair) tablet 10 mg, 10 mg, oral, q AM, Barry D Reiman, DO, 10 mg at 02/27/24 0838    nicotine (Nicoderm CQ) 21 mg/24 hr patch 1 patch, 1 patch, transdermal, Daily, Barry D Reiman, DO, 1 patch at 02/27/24 0839    ondansetron (Zofran) injection 4 mg, 4 mg, intravenous, q6h PRN, Barry D Reiman, DO, 4 mg at 02/25/24 1840    oseltamivir (Tamiflu) capsule 75 mg, 75 mg, oral, Daily, Frank Modi MD, 75 mg at 02/27/24 0838    oxyCODONE (Roxicodone) immediate release tablet 10 mg, 10 mg, oral, q6h PRN, Barry Bledsoe DO, 10 mg at 02/24/24 0500    oxyCODONE (Roxicodone) immediate release tablet 5 mg, 5 mg, oral, q6h PRN, Barry Bledsoe DO, 5 mg at 02/22/24 2034    PARoxetine (Paxil) tablet 40 mg, 40 mg, oral, Daily, Barry Bledsoe DO, 40 mg at 02/27/24 0838     polyethylene glycol (Glycolax, Miralax) packet 17 g, 17 g, oral, BID, Barry D Reiman, DO, 17 g at 02/24/24 0815    simvastatin (Zocor) tablet 20 mg, 20 mg, oral, Nightly, Barry D Reiman, DO, 20 mg at 02/26/24 2201    traZODone (Desyrel) tablet 100 mg, 100 mg, oral, Nightly, Barry D Reiman, DO, 100 mg at 02/26/24 2201    zinc oxide 40 % ointment 1 Application, 1 Application, Topical, BID, Barry D Reiman, DO, 1 Application at 02/27/24 0839        Assessment/Plan   Blanca Hamilton is a 48 y.o. 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 Initially met Sepsis criteria. Admitted for further management of dehiscence and SSI.      Acute Medical Issues:  # Sepsis secondary to reccurent post op wound infection (MSSA) of right hip arthroplasty   # Hx right total hip arthroplasty w/ revision (1/5/24) for prosthetic fx  # Hx hardware removal for prosthetic joint infection (2/6/24)  -OR 2/20/24 right hip revision with Dr. Jalloh  -Discharged on 2/11 with wound vac + PICC line for 6-week course of cefazolin              -Previous tissue/Wound Culture (2/6): MSSA, only resistant to Tetracycline   -pain control: scheduled tylenol, oxy 5 mod, oxy 10 severe, dilaudid 0.4 PRN breakthrough  -toe touch weight bearing for transfers other, otherwise NWB RLE  -DO NOT LAY on right hip, maintain two pillows in between legs when in bed  -Blood Cx (2/14) no growth, final report  -PT/OT: moderate intensity   -Pet and music therapy  -Cont Ancef 2g TID (2/20/2024 - 4/2/2024)  -On discharge, will need 6w Ancef until 4/2 with weekly labs  -Holding ASA and Lovenox in setting of copious drain output, must balance risk of VTE  -With complex case and inability for outside facility to care for drains appropriately, plan to keep patient in house to monitor drain output before removal. Once output decreases, can consider removing drains      Resolved Issues  #Flu B exposure: Asymptomatic, roommate tested positive. Cont Tamiflu, to receive last dose 2/29  #Hypotension-- continue to hold metoprolol and lisinopril for now     Chronic Medical Issues:  # GERD: c/w Pepcid   # HTN: Holding Metoprolol and lisinopril in setting of hypotension  # HLD: c/w Simvastatin  # Depression: c/w Abilify + Paroxetine +Trazodone  # Chronic anemia: iron supplement  # Tobacco use: nicotine patch     Fluids: None  Electrolytes: replete as needed  Nutrition: Regular, supplements  GI PPX: None   DVT PPX: Holding all AC in setting of copious drain output     Access: PICC Line, PIV's  Antibiotics: Cefazolin 2g TID (2/15 - )  Oxygenation: Room Air     Dispo: Admitted onto the floor for postoperative wound dehiscence and surgical site infection. OR on 2/20/24 for right hip revision. With complex case and inability for outside facility to care for drains appropriately, plan to keep patient in house to monitor drain output before removal. Will need pre-cert on discharge.      Barry Bledsoe, DO  PGY-1

## 2024-02-27 NOTE — PROGRESS NOTES
"Blanca Hamilton is a 48 y.o. female on day 13 of admission presenting with Wound dehiscence.    Subjective   No acute overnight events.  Pain controlled.  Tolerating diet.      Objective     Physical Exam  Vitals reviewed.   HENT:      Head: Normocephalic and atraumatic.   Eyes:      Extraocular Movements: Extraocular movements intact.      Conjunctiva/sclera: Conjunctivae normal.      Pupils: Pupils are equal, round, and reactive to light.   Cardiovascular:      Rate and Rhythm: Normal rate and regular rhythm.      Pulses: Normal pulses.   Pulmonary:      Breath sounds: Normal breath sounds.   Abdominal:      General: Bowel sounds are normal.      Palpations: Abdomen is soft.   Musculoskeletal:      Comments: Right hip dressing C/D/I, superficial and deep hemovac drains to compressed suction with sanguinous output, some SS drainage from drain site, prevena VAC in place to continuous suction, no output, leg and foot warm and well perfused, motion and sensations intact      Skin:     General: Skin is warm and dry.      Capillary Refill: Capillary refill takes less than 2 seconds.   Neurological:      General: No focal deficit present.      Mental Status: She is alert and oriented to person, place, and time.         Last Recorded Vitals  Blood pressure 132/61, pulse 87, temperature 36.5 °C (97.7 °F), temperature source Temporal, resp. rate 18, height 1.753 m (5' 9\"), weight 89.4 kg (197 lb 1.6 oz), SpO2 95 %.  Intake/Output last 3 Shifts:  I/O last 3 completed shifts:  In: 940 (10.5 mL/kg) [P.O.:240; IV Piggyback:700]  Out: 2990 (33.4 mL/kg) [Urine:2600 (0.8 mL/kg/hr); Drains:390]  Weight: 89.4 kg     Relevant Results  Lower extremity venous duplex bilateral    Result Date: 2/15/2024          [place] [id number]  Tel [phone] and Fax [phone]  Vascular Lab Report [facility] LOWER EXTREMITY VENOUS DUPLEX BILATERAL  Patient Name:      BLANCA HAMILTON          Reading Physician:  " [id number] Prieto Loza MD Study Date:        2/14/2024            Ordering Physician: [id number] GEORGE AMADOR MRN/PID:           [id number]             Technologist:       Rosa Pierre [facility] Accession#:        [id number]         Technologist 2:     Hattie Raymond Date of Birth/Age: 1975 / 48      Encounter#:         [id number]                    years Gender:            F Admission Status:  Inpatient            Location Performed: [facility]  Diagnosis/ICD: Localized (leg) edema-R60.0 CPT Codes:     49184 Peripheral venous duplex scan for DVT complete  CONCLUSIONS: Right Lower Venous: No evidence of acute deep vein thrombus visualized in the right lower extremity. Left Lower Venous: No evidence of acute deep vein thrombus visualized in the left lower extremity.  Additional Findings: Technically difficult study due to patient movement and inability to position.  Imaging & Doppler Findings:  Right                 Compressible Thrombus        Flow Distal External Iliac     Yes        None   Spontaneous/Phasic CFV                       Yes        None   Spontaneous/Phasic PFV                       Yes        None FV Proximal               Yes        None   Spontaneous/Phasic FV Mid                    Yes        None FV Distal                 Yes        None Popliteal                 Yes        None   Spontaneous/Phasic Peroneal                  Yes        None PTV                       Yes        None  Left                  Compress Thrombus        Flow Distal External Iliac   Yes      None   Spontaneous/Phasic CFV                     Yes      None   Spontaneous/Phasic PFV                     Yes      None FV Proximal             Yes      None   Spontaneous/Phasic FV Mid                  Yes      None FV Distal               Yes      None Popliteal               Yes      None    Spontaneous/Phasic Peroneal                Yes      None PTV                     Yes      None  [id number] Prieto Loza MD Electronically signed by [id number] Prieto Loza MD on 2/15/2024 at 5:44:38 PM  ** Final **     Bedside PICC Imaging    Result Date: 2/8/2024  These images are not reportable by radiology and will not be interpreted by  Radiologists.    ECG 12 lead    Result Date: 2/7/2024  Atrial flutter with 2:1 AV conduction Nonspecific intraventricular block Left ventricular hypertrophy ( R in aVL , Lyndon product ) Inferior infarct , age undetermined Abnormal ECG When compared with ECG of 11-JAN-2024 23:16, Significant changes have occurred See ED provider note for full interpretation and clinical correlation Confirmed by Vega Bruce ([id number]) on 2/7/2024 10:40:33 PM    XR hip right with pelvis when performed 2 or 3 views    Result Date: 2/7/2024  Interpreted By:  Richard Garcia, STUDY: XR HIP RIGHT WITH PELVIS WHEN PERFORMED 2 OR 3 VIEWS; ;  2/7/2024 9:25 am   INDICATION: Signs/Symptoms:s/p revision oanh.   COMPARISON: 01/26/2024   ACCESSION NUMBER(S): [id number]   ORDERING CLINICIAN: JON HERNANDEZ   FINDINGS: Total right hip arthroplasty with interval placement of antibiotic impregnated beads. Partially visualized left hip arthroplasty.       Interval placement of antibiotic impregnated beads in the right hip.     MACRO: None   Signed by: Richard Garcia 2/7/2024 5:00 PM Dictation workstation:   [id number]    XR abdomen 1 view    Result Date: 2/5/2024  Interpreted By:  Richard Garcia, STUDY: XR ABDOMEN 1 VIEW;  2/4/2024 6:47 pm   INDICATION: Signs/Symptoms:Lactic acidosis and emesis.   COMPARISON: None.   ACCESSION NUMBER(S): [id number]   ORDERING CLINICIAN: TABITHA MAYNARD   FINDINGS: Nonobstructive bowel gas pattern. Partially visualized bilateral hip arthroplasties.       1.  Nonobstructive bowel gas pattern.   MACRO: None   Signed by: Richard Garcia 2/5/2024 4:12 PM Dictation workstation:    [id number]      Scheduled medications  acetaminophen, 975 mg, oral, q8h  ARIPiprazole, 15 mg, oral, Nightly  [Held by provider] aspirin, 81 mg, oral, BID  ceFAZolin, 2 g, intravenous, q8h  enoxaparin, 40 mg, subcutaneous, q24h  famotidine, 40 mg, oral, BID  ferrous sulfate (325 mg ferrous sulfate), 1 tablet, oral, Daily  fluticasone, 2 spray, Each Nostril, Daily  [Held by provider] lisinopril, 10 mg, oral, q AM  loratadine, 10 mg, oral, Daily  magnesium oxide, 800 mg, oral, Daily  [Held by provider] metoprolol succinate XL, 25 mg, oral, Daily  montelukast, 10 mg, oral, q AM  nicotine, 1 patch, transdermal, Daily  oseltamivir, 75 mg, oral, Daily  PARoxetine, 40 mg, oral, Daily  polyethylene glycol, 17 g, oral, BID  simvastatin, 20 mg, oral, Nightly  traZODone, 100 mg, oral, Nightly  zinc oxide, 1 Application, Topical, BID      Continuous medications  lactated Ringer's, 100 mL/hr, Last Rate: 100 mL/hr (02/26/24 1946)      PRN medications  PRN medications: HYDROmorphone, midodrine, ondansetron, oxyCODONE, oxyCODONE    Results for orders placed or performed during the hospital encounter of 02/14/24 (from the past 24 hour(s))   CBC   Result Value Ref Range    WBC 5.0 4.4 - 11.3 x10*3/uL    nRBC 0.0 0.0 - 0.0 /100 WBCs    RBC 2.97 (L) 4.00 - 5.20 x10*6/uL    Hemoglobin 8.5 (L) 12.0 - 16.0 g/dL    Hematocrit 27.8 (L) 36.0 - 46.0 %    MCV 94 80 - 100 fL    MCH 28.6 26.0 - 34.0 pg    MCHC 30.6 (L) 32.0 - 36.0 g/dL    RDW 16.5 (H) 11.5 - 14.5 %    Platelets 307 150 - 450 x10*3/uL   Comprehensive Metabolic Panel   Result Value Ref Range    Glucose 77 74 - 99 mg/dL    Sodium 132 (L) 136 - 145 mmol/L    Potassium 4.3 3.5 - 5.3 mmol/L    Chloride 102 98 - 107 mmol/L    Bicarbonate 25 21 - 32 mmol/L    Anion Gap 9 (L) 10 - 20 mmol/L    Urea Nitrogen 5 (L) 6 - 23 mg/dL    Creatinine 0.24 (L) 0.50 - 1.05 mg/dL    eGFR >90 >60 mL/min/1.73m*2    Calcium 7.7 (L) 8.6 - 10.3 mg/dL    Albumin 2.4 (L) 3.4 - 5.0 g/dL    Alkaline  Phosphatase 113 (H) 33 - 110 U/L    Total Protein 4.8 (L) 6.4 - 8.2 g/dL    AST 6 (L) 9 - 39 U/L    Bilirubin, Total 0.2 0.0 - 1.2 mg/dL    ALT <3 (L) 7 - 45 U/L   Sedimentation rate, automated   Result Value Ref Range    Sedimentation Rate 14 0 - 20 mm/h   C-reactive protein   Result Value Ref Range    C-Reactive Protein 2.15 (H) <1.00 mg/dL       Assessment/Plan   Principal Problem:    Wound dehiscence  Active Problems:    Class 1 obesity with body mass index (BMI) of 30.0 to 30.9 in adult    Sinusitis    Transfusion history    Arthritis    Hyponatremia    48 year female POD 6 from right hip revision.     - AM labs reviewed   - toe touch weight bearing for transfers other, otherwise NWB RLE  - HOLD DVT prophylaxis until hemoglobin stable   - PT evaluation  - monitor and document hemovac drains output every shift (100 mL and 290 mL)-> will remove drain when no output  - maintain two pillows in between legs when in bed, do not cross or extend right leg over to left, ok to lay on right side  - will need PICC line placed and 6 weeks of Ancef from recent OR date per ID recs  - ortho will continue to follow      Discussed with Dr. Jalloh         I spent 30 minutes in the professional and overall care of this patient.    DEVON Bruno-CNP

## 2024-02-27 NOTE — NURSING NOTE
Vac Prevena seen to be maintaining -125mmHg suction, no drainage in vac cannister, dressing fully intact.  Plan of care d/w her RN, Nikia who reports patient will not be discharged until drainage from hemovacs x 2 is decreased.  Blanca in good spirits and denies any discomfort or needs now.

## 2024-02-27 NOTE — CARE PLAN
The patient's goals for the shift include      The clinical goals for the shift include not report pain this shift    Over the shift, the patient did not make progress toward the following goals. Barriers to progression include patient still reports some pain but less than is previous days. Recommendations to address these barriers include continue pain management and plan to go to rehab.

## 2024-02-27 NOTE — PROGRESS NOTES
Occupational Therapy    Occupational Therapy Treatment    Name: Blanca Hamilton  MRN: [id number]  : 1975  Date: 24  Time Calculation  Start Time: 1350  Stop Time: 1415  Time Calculation (min): 25 min    Assessment:  OT Assessment: Pt continues to present with decreased endurance, decreased ADL, decreased functional mobility. Continued skilled OT recommended to maximize pt safety and indepedence in order to return to PLOF.  Prognosis: Good  Barriers to Discharge: None  Evaluation/Treatment Tolerance: Patient limited by fatigue  Medical Staff Made Aware: Yes  End of Session Communication: Bedside nurse  End of Session Patient Position: Bed, 3 rail up, Alarm on  Plan:  Treatment Interventions: ADL retraining, Functional transfer training, Patient/family training, Compensatory technique education, Equipment evaluation/education  OT Frequency: 3 times per week  OT Discharge Recommendations: Moderate intensity level of continued care  Equipment Recommended upon Discharge: Wheeled walker, Wheelchair  OT Recommended Transfer Status: Assist of 1  OT - OK to Discharge: Yes    Subjective   Previous Visit Info:  OT Last Visit  OT Received On: 24  General:  General  Reason for Referral: 49 yo female referred to PT for R hip wound dehiscence, impaired mobility,  Referred By: Brando Mascorro  Past Medical History Relevant to Rehab: Anxiety, Arthritis, Class 1 obesity with body mass index (BMI) of 30.0 to 30.9 in adult (2024), Depression, GERD (gastroesophageal reflux disease), HTN (hypertension), Hyperlipidemia; R total hip arthroplasty on , but had to get subsequent revision on  for prosthetic fracture. 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 Cultures were positive for MSSA and patient was started on cefazolin. Then underwent ALEXA revision and partial hardware removal with Dr. Jalloh on 24. Patient was discharged to SNF on  with wound vac, a PICC line for 6  weeks of cefazolin, and ASA for DVT ppx.  Family/Caregiver Present: No  Prior to Session Communication: Bedside nurse  Patient Position Received: Bed, 3 rail up, Alarm off, not on at start of session  Preferred Learning Style: verbal, kinesthetic  General Comment: Pt pleasant and cooperative, demonstrated good participation in course of session.  Precautions:  Falls Risk, RLE TTWB, Hip precautions     Pain Assessment:  Pain Assessment  Pain Assessment: 0-10  Pain Score: 0 - No pain     Objective   Activities of Daily Living: LE Dressing  LE Dressing: Yes  LE Dressing Adaptive Equipment: Reacher, Sock aide  Sock Level of Assistance: Minimum assistance  LE Dressing Where Assessed: Edge of bed  LE Dressing Comments: Additional time and problem solving assist provided to patient.    Functional Standing Tolerance:     Bed Mobility/Transfers: Bed Mobility  Bed Mobility: Yes  Bed Mobility 1  Bed Mobility 1: Supine to sitting, Sitting to supine  Level of Assistance 1: Minimum assistance, Moderate assistance  Bed Mobility Comments 1: Use of bed rail and increased time on task required.  Bed Mobility 2  Bed Mobility  2: Rolling right, Rolling left  Level of Assistance 2: Minimum assistance    Outcome Measures:  [facility] Daily Activity  Putting on and taking off regular lower body clothing: A lot  Bathing (including washing, rinsing, drying): A little  Putting on and taking off regular upper body clothing: A little  Toileting, which includes using toilet, bedpan or urinal: A lot  Taking care of personal grooming such as brushing teeth: A little  Eating Meals: None  Daily Activity - Total Score: 17    Education Documentation  Body Mechanics, taught by SARITA Landaverde at 2/27/2024  3:09 PM.  Learner: Patient  Readiness: Acceptance  Method: Explanation, Demonstration  Response: Verbalizes Understanding, Demonstrated Understanding    Precautions, taught by SARITA Landaverde at 2/27/2024  3:09 PM.  Learner: Patient  Readiness:  Acceptance  Method: Explanation, Demonstration  Response: Verbalizes Understanding, Demonstrated Understanding    ADL Training, taught by SARITA Landaverde at 2/27/2024  3:09 PM.  Learner: Patient  Readiness: Acceptance  Method: Explanation, Demonstration  Response: Verbalizes Understanding, Demonstrated Understanding    Education Comments  Pt cooperative and compliant to instruction.    Goals:  Encounter Problems       Encounter Problems (Active)       OT Goals       Pt will increase endurance to tolerate 15min of OOB activity with no more than 1 rest break in order to increase ability to engage in ADL completion.  (Progressing)       Start:  02/15/24    Expected End:  02/29/24            Pt will demo and/or verbalize 2-3 energy conservation techniques to incorporate into functional mobility or ADL to improve performance and increase independence.  (Progressing)       Start:  02/15/24    Expected End:  02/29/24            Pt will demo increased functional mobility/pivot transfer independence to tolerate tasks necessary to complete ADL routine.  (Progressing)       Start:  02/15/24    Expected End:  02/29/24            Pt will demo ADL routine and meaningful daily activities using modifications as needed  (Progressing)       Start:  02/15/24    Expected End:  02/29/24

## 2024-02-27 NOTE — PROGRESS NOTES
Blanca Hamilton is a 48 y.o. female on day 13 of admission presenting with Wound dehiscence.    Subjective   Interval History: no fever, no new complaints        Review of Systems    Objective   Range of Vitals (last 24 hours)  Heart Rate:  []   Temp:  [35.9 °C (96.7 °F)-36.5 °C (97.7 °F)]   Resp:  [17-20]   BP: (104-132)/(61-85)   SpO2:  [95 %-98 %]   Daily Weight  02/14/24 : 89.4 kg (197 lb 1.6 oz)    Body mass index is 29.11 kg/m².    Physical Exam  Constitutional:       Appearance: Normal appearance.   HENT:      Head: Normocephalic and atraumatic.      Mouth/Throat:      Mouth: Mucous membranes are moist.      Pharynx: Oropharynx is clear.   Eyes:      Pupils: Pupils are equal, round, and reactive to light.   Cardiovascular:      Rate and Rhythm: Normal rate and regular rhythm.      Heart sounds: Normal heart sounds.   Pulmonary:      Effort: Pulmonary effort is normal.      Breath sounds: Normal breath sounds.   Abdominal:      General: Abdomen is flat. Bowel sounds are normal.      Palpations: Abdomen is soft.   Musculoskeletal:      Cervical back: Normal range of motion.      Comments: Rt hip dressing, no cellulitis   Neurological:      Mental Status: She is alert.         Antibiotics  sodium chloride 0.9 % bolus 3,120 mL  acetaminophen (Tylenol) tablet 975 mg  ARIPiprazole (Abilify) tablet 15 mg  aspirin chewable tablet 81 mg  ceFAZolin in dextrose (iso-os) (Ancef) IVPB 2 g  famotidine (Pepcid) tablet 40 mg  ferrous sulfate (325 mg ferrous sulfate) tablet 1 tablet  lactulose 20 gram/30 mL oral solution 20 g  lisinopril tablet 10 mg  loratadine (Claritin) tablet 10 mg  magnesium oxide (Mag-Ox) tablet 400 mg  metoprolol succinate XL (Toprol-XL) 24 hr tablet 25 mg  montelukast (Singulair) tablet 10 mg  nicotine (Nicoderm CQ) 21 mg/24 hr patch 1 patch  PARoxetine (Paxil) tablet 40 mg  polyethylene glycol (Glycolax, Miralax) packet 17 g  simvastatin (Zocor) tablet 20 mg  traZODone (Desyrel) tablet 100 mg  zinc  oxide 40 % ointment 1 Application  piperacillin-tazobactam-dextrose (Zosyn) IV 3.375 g  sodium chloride 0.9% infusion  oxyCODONE (Roxicodone) immediate release tablet 5 mg  oxyCODONE (Roxicodone) immediate release tablet 10 mg  HYDROmorphone (Dilaudid) injection 0.4 mg  vancomycin (Vancocin) in dextrose 5 % water (D5W) 500 mL IV 1,500 mg  atorvastatin (Lipitor) tablet    ceFAZolin in dextrose (iso-os) (Ancef) IVPB 1 g  enoxaparin (Lovenox) syringe 40 mg  ceFAZolin in dextrose (iso-os) (Ancef) IVPB 2 g  ondansetron (Zofran) injection 4 mg  lactated Ringer's infusion  oxygen (O2) therapy  lactated Ringer's infusion  oxyCODONE (Roxicodone) immediate release tablet 5 mg  HYDROmorphone (Dilaudid) injection 0.25 mg  HYDROmorphone (Dilaudid) injection 0.5 mg  ondansetron (Zofran) injection 4 mg  droperidol (Inapsine) injection 0.625 mg  rocuronium (ZeMuron) injection  - Omnicell Override Pull  ondansetron (Zofran) injection  - Omnicell Override Pull  dexAMETHasone (Decadron) injection  - Omnicell Override Pull  lidocaine (cardiac) (Xylocaine) injection  - Omnicell Override Pull  propofol (Diprivan) injection  - Omnicell Override Pull  fentaNYL PF (Sublimaze) injection  - Omnicell Override Pull  lactated Ringer's infusion  ceFAZolin (Ancef) injection  - Omnicell Override Pull  ceFAZolin (Ancef) injection  - Omnicell Override Pull  tranexamic acid (Cyklokapron) injection  - Omnicell Override Pull  tranexamic acid (Cyklokapron) injection  - Omnicell Override Pull  povidone-iodine (Betadine) soap  - Omnicell Override Pull  povidone-iodine (Betadine) topical solution  - Omnicell Override Pull  ePHEDrine injection  - Omnicell Override Pull  vasopressin (Vasostrict) injection  - Omnicell Override Pull  propofol (Diprivan) injection  - Omnicell Override Pull  dexAMETHasone (Decadron) injection  - Omnicell Override Pull  gentamicin (Garamycin) injection  - Omnicell Override Pull  vancomycin (Vancocin) vial for injection  - Omnicell  Override Pull  HYDROmorphone (Dilaudid) injection  - Omnicell Override Pull  vancomycin (Vancocin) vial for injection  sugammadex (Bridion) injection  - Omnicell Override Pull  gentamicin (Garamycin) injection  sodium chloride 0.9 % irrigation solution  lactated Ringer's irrigation solution  lactated Ringer's bolus 500 mL  lactated Ringer's bolus 500 mL  lactated Ringer's infusion  oseltamivir (Tamiflu) capsule 75 mg  flu vaccine, quadrivalent, high-dose, preservative free, age 65y+ (FLUZONE)  lactated Ringer's bolus 1,000 mL  midodrine (Proamatine) tablet 5 mg  lactated Ringer's bolus 500 mL  magnesium oxide (Mag-Ox) tablet 800 mg  polyethylene glycol (Glycolax, Miralax) packet 17 g  bisacodyl (Dulcolax) suppository 10 mg      Relevant Results  Labs  Results from last 72 hours   Lab Units 02/27/24  0500 02/26/24  0515 02/25/24  0508   WBC AUTO x10*3/uL 5.0 4.7 5.9   HEMOGLOBIN g/dL 8.5* 8.4* 8.6*   HEMATOCRIT % 27.8* 27.1* 27.4*   PLATELETS AUTO x10*3/uL 307 279 270       Results from last 72 hours   Lab Units 02/27/24  0500 02/26/24  0515 02/25/24  0508   SODIUM mmol/L 132* 137 133*   POTASSIUM mmol/L 4.3 4.1 4.3   CHLORIDE mmol/L 102 104 102   CO2 mmol/L 25 28 28   BUN mg/dL 5* 4* 3*   CREATININE mg/dL 0.24* 0.25* 0.27*   GLUCOSE mg/dL 77 81 77   CALCIUM mg/dL 7.7* 7.8* 7.7*   ANION GAP mmol/L 9* 9* 7*   EGFR mL/min/1.73m*2 >90 >90 >90   PHOSPHORUS mg/dL  --  3.6  --        Results from last 72 hours   Lab Units 02/27/24  0500 02/26/24  0515 02/25/24  0508   ALK PHOS U/L 113*  --  111*   BILIRUBIN TOTAL mg/dL 0.2  --  0.3   PROTEIN TOTAL g/dL 4.8*  --  4.8*   ALT U/L <3*  --  <3*   AST U/L 6*  --  6*   ALBUMIN g/dL 2.4* 2.3* 2.3*       Estimated Creatinine Clearance: 125 mL/min (A) (by C-G formula based on SCr of 0.24 mg/dL (L)).  C-Reactive Protein   Date Value Ref Range Status   02/27/2024 2.15 (H) <1.00 mg/dL Final   02/20/2024 0.42 <1.00 mg/dL Final   02/19/2024 0.68 <1.00 mg/dL Final      Microbiology  Reviewed  Imaging  reviewed        Assessment/Plan     Right hip prosthesis infection sp I&D with partial hardware exchange, MSSA on the cultures, sp repeat I&D    Recommendations :  Continue Cefazolin, plan on 6 weeks from the date of the last surgery, weekly labs  Discussed with the medical team     I spent minutes in the professional and overall care of this patient.      Frank Modi MD

## 2024-02-27 NOTE — PROGRESS NOTES
Patient seen and examined.  She continues to have significant output from her drains however the incision is stable and VAC has had 0 output.  Goal for her is to decompress drainage internally in order to allow incisional healing.  This has been a challenge after her previous surgeries.  At this point she is doing better however drainage picked up in the last night.  Would prefer to keep her off anticoagulation if possible in order to reduce drainage however I am aware this must be balanced with risk of VTE especially given her limited amount of mobility.  She has had significant issues after her last 2 discharges and I am apprehensive as to whether facility could appropriately care for her wound and drain issues if we let her go at this point.  Would prefer to continue to monitor her in-house until we are able to get drain output to taper off and remove the drains.

## 2024-02-28 LAB
ALBUMIN SERPL BCP-MCNC: 2.5 G/DL (ref 3.4–5)
ALP SERPL-CCNC: 123 U/L (ref 33–110)
ALT SERPL W P-5'-P-CCNC: <3 U/L (ref 7–45)
ANION GAP SERPL CALC-SCNC: 10 MMOL/L (ref 10–20)
AST SERPL W P-5'-P-CCNC: 8 U/L (ref 9–39)
BILIRUB SERPL-MCNC: 0.2 MG/DL (ref 0–1.2)
BUN SERPL-MCNC: 4 MG/DL (ref 6–23)
CALCIUM SERPL-MCNC: 7.9 MG/DL (ref 8.6–10.3)
CHLORIDE SERPL-SCNC: 103 MMOL/L (ref 98–107)
CO2 SERPL-SCNC: 27 MMOL/L (ref 21–32)
CREAT SERPL-MCNC: 0.25 MG/DL (ref 0.5–1.05)
EGFRCR SERPLBLD CKD-EPI 2021: >90 ML/MIN/1.73M*2
ERYTHROCYTE [DISTWIDTH] IN BLOOD BY AUTOMATED COUNT: 16.4 % (ref 11.5–14.5)
GLUCOSE SERPL-MCNC: 78 MG/DL (ref 74–99)
HCT VFR BLD AUTO: 29.1 % (ref 36–46)
HGB BLD-MCNC: 9.1 G/DL (ref 12–16)
MCH RBC QN AUTO: 28.9 PG (ref 26–34)
MCHC RBC AUTO-ENTMCNC: 31.3 G/DL (ref 32–36)
MCV RBC AUTO: 92 FL (ref 80–100)
NRBC BLD-RTO: 0 /100 WBCS (ref 0–0)
PLATELET # BLD AUTO: 348 X10*3/UL (ref 150–450)
POTASSIUM SERPL-SCNC: 4.2 MMOL/L (ref 3.5–5.3)
PROT SERPL-MCNC: 5.1 G/DL (ref 6.4–8.2)
RBC # BLD AUTO: 3.15 X10*6/UL (ref 4–5.2)
SODIUM SERPL-SCNC: 136 MMOL/L (ref 136–145)
WBC # BLD AUTO: 4.6 X10*3/UL (ref 4.4–11.3)

## 2024-02-28 PROCEDURE — 2500000001 HC RX 250 WO HCPCS SELF ADMINISTERED DRUGS (ALT 637 FOR MEDICARE OP)

## 2024-02-28 PROCEDURE — 2500000004 HC RX 250 GENERAL PHARMACY W/ HCPCS (ALT 636 FOR OP/ED)

## 2024-02-28 PROCEDURE — 97110 THERAPEUTIC EXERCISES: CPT | Mod: GP

## 2024-02-28 PROCEDURE — 85027 COMPLETE CBC AUTOMATED: CPT

## 2024-02-28 PROCEDURE — 1200000002 HC GENERAL ROOM WITH TELEMETRY DAILY

## 2024-02-28 PROCEDURE — 80053 COMPREHEN METABOLIC PANEL: CPT

## 2024-02-28 PROCEDURE — 2500000002 HC RX 250 W HCPCS SELF ADMINISTERED DRUGS (ALT 637 FOR MEDICARE OP, ALT 636 FOR OP/ED)

## 2024-02-28 PROCEDURE — S4991 NICOTINE PATCH NONLEGEND: HCPCS

## 2024-02-28 PROCEDURE — 99232 SBSQ HOSP IP/OBS MODERATE 35: CPT

## 2024-02-28 PROCEDURE — 2500000002 HC RX 250 W HCPCS SELF ADMINISTERED DRUGS (ALT 637 FOR MEDICARE OP, ALT 636 FOR OP/ED): Performed by: INTERNAL MEDICINE

## 2024-02-28 PROCEDURE — 97530 THERAPEUTIC ACTIVITIES: CPT | Mod: GP

## 2024-02-28 RX ORDER — METOPROLOL SUCCINATE 25 MG/1
25 TABLET, EXTENDED RELEASE ORAL DAILY
Status: DISCONTINUED | OUTPATIENT
Start: 2024-02-28 | End: 2024-03-05 | Stop reason: HOSPADM

## 2024-02-28 RX ADMIN — NICOTINE 1 PATCH: 21 PATCH, EXTENDED RELEASE TRANSDERMAL at 09:09

## 2024-02-28 RX ADMIN — ENOXAPARIN SODIUM 40 MG: 40 INJECTION SUBCUTANEOUS at 17:40

## 2024-02-28 RX ADMIN — OSELTAMAVIR PHOSPHATE 75 MG: 75 CAPSULE ORAL at 09:11

## 2024-02-28 RX ADMIN — FLUTICASONE PROPIONATE 2 SPRAY: 50 SPRAY, METERED NASAL at 09:10

## 2024-02-28 RX ADMIN — FAMOTIDINE 40 MG: 20 TABLET ORAL at 09:11

## 2024-02-28 RX ADMIN — FAMOTIDINE 40 MG: 20 TABLET ORAL at 20:45

## 2024-02-28 RX ADMIN — METOPROLOL SUCCINATE 25 MG: 25 TABLET, EXTENDED RELEASE ORAL at 12:33

## 2024-02-28 RX ADMIN — TRAZODONE HYDROCHLORIDE 100 MG: 50 TABLET ORAL at 20:44

## 2024-02-28 RX ADMIN — Medication 1 APPLICATION: at 09:26

## 2024-02-28 RX ADMIN — CEFAZOLIN SODIUM 2 G: 2 INJECTION, SOLUTION INTRAVENOUS at 12:30

## 2024-02-28 RX ADMIN — SODIUM CHLORIDE, POTASSIUM CHLORIDE, SODIUM LACTATE AND CALCIUM CHLORIDE 100 ML/HR: 600; 310; 30; 20 INJECTION, SOLUTION INTRAVENOUS at 20:51

## 2024-02-28 RX ADMIN — ARIPIPRAZOLE 15 MG: 5 TABLET ORAL at 20:44

## 2024-02-28 RX ADMIN — MONTELUKAST 10 MG: 10 TABLET, FILM COATED ORAL at 09:11

## 2024-02-28 RX ADMIN — CEFAZOLIN SODIUM 2 G: 2 INJECTION, SOLUTION INTRAVENOUS at 05:26

## 2024-02-28 RX ADMIN — SIMVASTATIN 20 MG: 20 TABLET, FILM COATED ORAL at 20:45

## 2024-02-28 RX ADMIN — PAROXETINE 40 MG: 20 TABLET, FILM COATED ORAL at 09:11

## 2024-02-28 RX ADMIN — ACETAMINOPHEN 975 MG: 325 TABLET ORAL at 05:26

## 2024-02-28 RX ADMIN — SODIUM CHLORIDE, POTASSIUM CHLORIDE, SODIUM LACTATE AND CALCIUM CHLORIDE 100 ML/HR: 600; 310; 30; 20 INJECTION, SOLUTION INTRAVENOUS at 09:54

## 2024-02-28 RX ADMIN — LORATADINE 10 MG: 10 TABLET ORAL at 09:11

## 2024-02-28 RX ADMIN — Medication 800 MG: at 09:11

## 2024-02-28 RX ADMIN — Medication 1 APPLICATION: at 20:50

## 2024-02-28 RX ADMIN — FERROUS SULFATE TAB 325 MG (65 MG ELEMENTAL FE) 1 TABLET: 325 (65 FE) TAB at 05:26

## 2024-02-28 RX ADMIN — CEFAZOLIN SODIUM 2 G: 2 INJECTION, SOLUTION INTRAVENOUS at 20:46

## 2024-02-28 RX ADMIN — ACETAMINOPHEN 975 MG: 325 TABLET ORAL at 12:31

## 2024-02-28 RX ADMIN — ACETAMINOPHEN 975 MG: 325 TABLET ORAL at 20:44

## 2024-02-28 ASSESSMENT — COGNITIVE AND FUNCTIONAL STATUS - GENERAL
STANDING UP FROM CHAIR USING ARMS: A LOT
MOVING FROM LYING ON BACK TO SITTING ON SIDE OF FLAT BED WITH BEDRAILS: A LITTLE
PERSONAL GROOMING: A LITTLE
TURNING FROM BACK TO SIDE WHILE IN FLAT BAD: A LITTLE
MOVING FROM LYING ON BACK TO SITTING ON SIDE OF FLAT BED WITH BEDRAILS: A LITTLE
DRESSING REGULAR LOWER BODY CLOTHING: A LOT
HELP NEEDED FOR BATHING: A LITTLE
MOVING TO AND FROM BED TO CHAIR: A LOT
WALKING IN HOSPITAL ROOM: TOTAL
WALKING IN HOSPITAL ROOM: TOTAL
TOILETING: A LOT
DAILY ACTIVITIY SCORE: 17
CLIMB 3 TO 5 STEPS WITH RAILING: TOTAL
TURNING FROM BACK TO SIDE WHILE IN FLAT BAD: A LITTLE
DRESSING REGULAR UPPER BODY CLOTHING: A LITTLE
MOVING TO AND FROM BED TO CHAIR: A LOT
STANDING UP FROM CHAIR USING ARMS: A LOT
CLIMB 3 TO 5 STEPS WITH RAILING: TOTAL
MOBILITY SCORE: 12
MOBILITY SCORE: 12

## 2024-02-28 ASSESSMENT — PAIN - FUNCTIONAL ASSESSMENT
PAIN_FUNCTIONAL_ASSESSMENT: 0-10

## 2024-02-28 ASSESSMENT — PAIN SCALES - GENERAL
PAINLEVEL_OUTOF10: 0 - NO PAIN
PAINLEVEL_OUTOF10: 3
PAINLEVEL_OUTOF10: 0 - NO PAIN

## 2024-02-28 NOTE — CARE PLAN
The patient's goals for the shift include  to get some rest and watch my show on tv.  Joe doo is on next.     The clinical goals for the shift include to reamin HDS through the shift.

## 2024-02-28 NOTE — PROGRESS NOTES
Physical Therapy    Physical Therapy Treatment    Patient Name: Blanca Hamilton  MRN: [id number]  Today's Date: 2/28/2024  Time Calculation  Start Time: 1037  Stop Time: 1102  Time Calculation (min): 25 min       Assessment/Plan   PT Assessment  PT Assessment Results: Decreased mobility, Orthopedic restrictions  Rehab Prognosis: Good  Evaluation/Treatment Tolerance: Patient limited by fatigue  Medical Staff Made Aware: Yes  Strengths: Ability to acquire knowledge, Support of Caregivers  Barriers to Participation: Comorbidities  End of Session Communication: Bedside nurse  Assessment Comment: Patient continues to decline transfer training despite encouragement and education. Discussed importance of continued mobility and motivated. Mod intensity recs  End of Session Patient Position: Bed, 3 rail up, Alarm on  PT Plan  Inpatient/Swing Bed or Outpatient: Inpatient  PT Plan  Treatment/Interventions: Bed mobility, Transfer training, Neuromuscular re-education, Strengthening, Endurance training, Therapeutic exercise  PT Plan: Skilled PT  PT Frequency: 3 times per week  PT Discharge Recommendations: Moderate intensity level of continued care  Equipment Recommended upon Discharge: Wheeled walker, Wheelchair  PT Recommended Transfer Status: Assist x1  PT - OK to Discharge: Yes (per PT POC)      General Visit Information:   PT  Visit  PT Received On: 02/28/24  Response to Previous Treatment: Patient with no complaints from previous session.  General  Reason for Referral: 49 yo female referred to PT for R hip wound dehiscence, impaired mobility, hip revision on 2/20 with wound vac and drain placement  Referred By: Brando Mascorro  Past Medical History Relevant to Rehab: Anxiety, Arthritis, Class 1 obesity with body mass index (BMI) of 30.0 to 30.9 in adult (02/02/2024), Depression, GERD (gastroesophageal reflux disease), HTN (hypertension), Hyperlipidemia; R total hip arthroplasty on 1/2/4, but had to get subsequent revision on 1/5 for  "prosthetic fracture. 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 Cultures were positive for MSSA and patient was started on cefazolin. Then underwent ALEXA revision and partial hardware removal with Dr. Jalloh on 2/6/24. Patient was discharged to SNF on 2/11 with wound vac, a PICC line for 6 weeks of cefazolin, and ASA for DVT ppx.  Family/Caregiver Present: No  Prior to Session Communication: Bedside nurse  Patient Position Received: Bed, 3 rail up, Alarm on  General Comment: Patient pleasant and agreeable to PT tx    Subjective   Precautions:  Precautions  Medical Precautions: Infection precautions  Post-Surgical Precautions: Right hip precautions (R hip drains and wound vac)  Vital Signs:  Vital Signs  Heart Rate: (!) 116  SpO2: 97 %    Objective   Pain:  Pain Assessment  Pain Assessment: 0-10  Pain Score: 0 - No pain  Cognition:  Cognition  Overall Cognitive Status: Within Functional Limits  Orientation Level: Oriented X4  Postural Control:  Postural Control  Postural Control: Within Functional Limits  Static Sitting Balance  Static Sitting-Balance Support: No upper extremity supported, Feet supported  Static Sitting-Level of Assistance: Close supervision    Activity Tolerance:  Activity Tolerance  Endurance: Tolerates 10 - 20 min exercise with multiple rests  Treatments:  Therapeutic Exercise  Therapeutic Exercise Performed: Yes  Therapeutic Exercise Activity 1: sitting: ankle pumps B 2 x 20; LAQ B 2 x 20; Marching L 2 x 20  Therapeutic Exercise Activity 2: supine: quad sets with 2\" hold 2 x 20; glut sets with 2\" hold 2 x 20    Bed Mobility  Bed Mobility: Yes  Bed Mobility 1  Bed Mobility 1: Supine to sitting  Level of Assistance 1: Modified independent (with HOB elevated and use of rail)  Bed Mobility 2  Bed Mobility  2: Sitting to supine  Level of Assistance 2: Minimum assistance  Bed Mobility Comments 2: B LE assist       Transfers  Transfer: No (Educated patient on role and " "importance of standing trials, discussed weight beraing status and use of walker for support. Patient states \"we'll do it next time\")         Outcome Measures:  [facility] Basic Mobility  Turning from your back to your side while in a flat bed without using bedrails: A little  Moving from lying on your back to sitting on the side of a flat bed without using bedrails: A little  Moving to and from bed to chair (including a wheelchair): A lot  Standing up from a chair using your arms (e.g. wheelchair or bedside chair): A lot  To walk in hospital room: Total  Climbing 3-5 steps with railing: Total  Basic Mobility - Total Score: 12    Education Documentation  Precautions, taught by Amy Montero PT at 2/28/2024 11:37 AM.  Learner: Patient  Readiness: Acceptance  Method: Explanation  Response: Verbalizes Understanding    Body Mechanics, taught by Amy Montero PT at 2/28/2024 11:37 AM.  Learner: Patient  Readiness: Acceptance  Method: Explanation  Response: Verbalizes Understanding    Mobility Training, taught by Amy Montero PT at 2/28/2024 11:37 AM.  Learner: Patient  Readiness: Acceptance  Method: Explanation  Response: Verbalizes Understanding    Education Comments  No comments found.        OP EDUCATION:       Encounter Problems       Encounter Problems (Active)       Balance       STG - Maintains static standing balance with upper extremity support x 1' maintaining weight bearing status  (Progressing)       Start:  02/14/24    Expected End:  03/14/24            STG - Maintains dynamic sitting balance with upper extremity support x 10'  (Progressing)       Start:  02/14/24    Expected End:  03/14/24               Pain - Adult          Transfers       STG - Patient will perform bed mobility independently  (Progressing)       Start:  02/14/24    Expected End:  03/14/24            STG - Patient will transfer sit to and from stand min A, maintaining weight bearing status  (Progressing)       Start:  02/14/24    " Expected End:  03/14/24

## 2024-02-28 NOTE — NURSING NOTE
Prevena wound vac changed with Yaneli COLINDRES at bedside.  Wound cleansed with NS, Cavilon no sting barrier to yariel wound skin, vac applied, intact and to suction. Blanca tolerated well.  Drains cleansed with NS and new drain sponges applied per Yaneli COLINDRES. Sonja SINGH and Yumi CALDWELL updated.

## 2024-02-28 NOTE — PROGRESS NOTES
Blanca Hamilton is a 48 y.o. female on day 14 of admission presenting with Wound dehiscence.    Subjective   Interval History: no fever, no new complaints        Review of Systems    Objective   Range of Vitals (last 24 hours)  Heart Rate:  []   Temp:  [36.1 °C (97 °F)-37 °C (98.6 °F)]   Resp:  [15-23]   BP: (113-148)/(71-98)   SpO2:  [93 %-97 %]   Daily Weight  02/14/24 : 89.4 kg (197 lb 1.6 oz)    Body mass index is 29.11 kg/m².    Physical Exam  Constitutional:       Appearance: Normal appearance.   HENT:      Head: Normocephalic and atraumatic.      Mouth/Throat:      Mouth: Mucous membranes are moist.      Pharynx: Oropharynx is clear.   Eyes:      Pupils: Pupils are equal, round, and reactive to light.   Cardiovascular:      Rate and Rhythm: Normal rate and regular rhythm.      Heart sounds: Normal heart sounds.   Pulmonary:      Effort: Pulmonary effort is normal.      Breath sounds: Normal breath sounds.   Abdominal:      General: Abdomen is flat. Bowel sounds are normal.      Palpations: Abdomen is soft.   Musculoskeletal:      Cervical back: Normal range of motion.      Comments: Rt hip dressing, no cellulitis   Neurological:      Mental Status: She is alert.         Antibiotics  sodium chloride 0.9 % bolus 3,120 mL  acetaminophen (Tylenol) tablet 975 mg  ARIPiprazole (Abilify) tablet 15 mg  aspirin chewable tablet 81 mg  ceFAZolin in dextrose (iso-os) (Ancef) IVPB 2 g  famotidine (Pepcid) tablet 40 mg  ferrous sulfate (325 mg ferrous sulfate) tablet 1 tablet  lactulose 20 gram/30 mL oral solution 20 g  lisinopril tablet 10 mg  loratadine (Claritin) tablet 10 mg  magnesium oxide (Mag-Ox) tablet 400 mg  metoprolol succinate XL (Toprol-XL) 24 hr tablet 25 mg  montelukast (Singulair) tablet 10 mg  nicotine (Nicoderm CQ) 21 mg/24 hr patch 1 patch  PARoxetine (Paxil) tablet 40 mg  polyethylene glycol (Glycolax, Miralax) packet 17 g  simvastatin (Zocor) tablet 20 mg  traZODone (Desyrel) tablet 100 mg  zinc  oxide 40 % ointment 1 Application  piperacillin-tazobactam-dextrose (Zosyn) IV 3.375 g  sodium chloride 0.9% infusion  oxyCODONE (Roxicodone) immediate release tablet 5 mg  oxyCODONE (Roxicodone) immediate release tablet 10 mg  HYDROmorphone (Dilaudid) injection 0.4 mg  vancomycin (Vancocin) in dextrose 5 % water (D5W) 500 mL IV 1,500 mg  atorvastatin (Lipitor) tablet    ceFAZolin in dextrose (iso-os) (Ancef) IVPB 1 g  enoxaparin (Lovenox) syringe 40 mg  ceFAZolin in dextrose (iso-os) (Ancef) IVPB 2 g  ondansetron (Zofran) injection 4 mg  lactated Ringer's infusion  oxygen (O2) therapy  lactated Ringer's infusion  oxyCODONE (Roxicodone) immediate release tablet 5 mg  HYDROmorphone (Dilaudid) injection 0.25 mg  HYDROmorphone (Dilaudid) injection 0.5 mg  ondansetron (Zofran) injection 4 mg  droperidol (Inapsine) injection 0.625 mg  rocuronium (ZeMuron) injection  - Omnicell Override Pull  ondansetron (Zofran) injection  - Omnicell Override Pull  dexAMETHasone (Decadron) injection  - Omnicell Override Pull  lidocaine (cardiac) (Xylocaine) injection  - Omnicell Override Pull  propofol (Diprivan) injection  - Omnicell Override Pull  fentaNYL PF (Sublimaze) injection  - Omnicell Override Pull  lactated Ringer's infusion  ceFAZolin (Ancef) injection  - Omnicell Override Pull  ceFAZolin (Ancef) injection  - Omnicell Override Pull  tranexamic acid (Cyklokapron) injection  - Omnicell Override Pull  tranexamic acid (Cyklokapron) injection  - Omnicell Override Pull  povidone-iodine (Betadine) soap  - Omnicell Override Pull  povidone-iodine (Betadine) topical solution  - Omnicell Override Pull  ePHEDrine injection  - Omnicell Override Pull  vasopressin (Vasostrict) injection  - Omnicell Override Pull  propofol (Diprivan) injection  - Omnicell Override Pull  dexAMETHasone (Decadron) injection  - Omnicell Override Pull  gentamicin (Garamycin) injection  - Omnicell Override Pull  vancomycin (Vancocin) vial for injection  - Omnicell  Override Pull  HYDROmorphone (Dilaudid) injection  - Omnicell Override Pull  vancomycin (Vancocin) vial for injection  sugammadex (Bridion) injection  - Omnicell Override Pull  gentamicin (Garamycin) injection  sodium chloride 0.9 % irrigation solution  lactated Ringer's irrigation solution  lactated Ringer's bolus 500 mL  lactated Ringer's bolus 500 mL  lactated Ringer's infusion  oseltamivir (Tamiflu) capsule 75 mg  flu vaccine, quadrivalent, high-dose, preservative free, age 65y+ (FLUZONE)  lactated Ringer's bolus 1,000 mL  midodrine (Proamatine) tablet 5 mg  lactated Ringer's bolus 500 mL  magnesium oxide (Mag-Ox) tablet 800 mg  polyethylene glycol (Glycolax, Miralax) packet 17 g  bisacodyl (Dulcolax) suppository 10 mg      Relevant Results  Labs  Results from last 72 hours   Lab Units 02/28/24 0539 02/27/24  0500 02/26/24  0515   WBC AUTO x10*3/uL 4.6 5.0 4.7   HEMOGLOBIN g/dL 9.1* 8.5* 8.4*   HEMATOCRIT % 29.1* 27.8* 27.1*   PLATELETS AUTO x10*3/uL 348 307 279       Results from last 72 hours   Lab Units 02/28/24 0539 02/27/24  0500 02/26/24  0515   SODIUM mmol/L 136 132* 137   POTASSIUM mmol/L 4.2 4.3 4.1   CHLORIDE mmol/L 103 102 104   CO2 mmol/L 27 25 28   BUN mg/dL 4* 5* 4*   CREATININE mg/dL 0.25* 0.24* 0.25*   GLUCOSE mg/dL 78 77 81   CALCIUM mg/dL 7.9* 7.7* 7.8*   ANION GAP mmol/L 10 9* 9*   EGFR mL/min/1.73m*2 >90 >90 >90   PHOSPHORUS mg/dL  --   --  3.6       Results from last 72 hours   Lab Units 02/28/24 0539 02/27/24  0500 02/26/24  0515   ALK PHOS U/L 123* 113*  --    BILIRUBIN TOTAL mg/dL 0.2 0.2  --    PROTEIN TOTAL g/dL 5.1* 4.8*  --    ALT U/L <3* <3*  --    AST U/L 8* 6*  --    ALBUMIN g/dL 2.5* 2.4* 2.3*       Estimated Creatinine Clearance: 125 mL/min (A) (by C-G formula based on SCr of 0.25 mg/dL (L)).  C-Reactive Protein   Date Value Ref Range Status   02/27/2024 2.15 (H) <1.00 mg/dL Final   02/20/2024 0.42 <1.00 mg/dL Final   02/19/2024 0.68 <1.00 mg/dL Final      Microbiology  Reviewed  Imaging  reviewed        Assessment/Plan     Right hip prosthesis infection sp I&D with partial hardware exchange, MSSA on the cultures, sp repeat I&D    Recommendations :  Continue Cefazolin, plan on 6 weeks from the date of the last surgery, weekly labs  Discussed with the medical team     I spent minutes in the professional and overall care of this patient.      Frank Modi MD

## 2024-02-28 NOTE — PROGRESS NOTES
24 1110   Discharge Planning   Living Arrangements Other (Comment)  ([facility])   Support Systems Friends/neighbors;/   Assistance Needed From [facility] for SNF, A&0x3, max assist with walker for ambulation, room air, wound vac at facility   Type of Residence Skilled nursing facility   Do you have animals or pets at home? Yes   Type of Animals or Pets cat   Who is requesting discharge planning? Provider   Home or Post Acute Services Post acute facilities (Rehab/SNF/etc)   Type of Post Acute Facility Services Skilled nursing   Patient expects to be discharged to: Return to [facility], auth obtained and expires today, patient not medically ready today, upon d/c per ortho plan to transition to prevena wound vac and will need 6 weeks IV ancef 2gm  TID from , PICC in place., will need new precert   Does the patient need discharge transport arranged? Yes   RoundTrip coordination needed? Yes   Has discharge transport been arranged? No   Patient Choice   Provider Choice list and CMS website (https://medicare.gov/care-compare#search) for post-acute Quality and Resource Measure Data were provided and reviewed with: Patient   Patient / Family choosing to utilize agency / facility established prior to hospitalization Yes     3/1/24 @ 1048: Plan to return to [facility] SNF, auth , patient not medically ready today, upon d/c per ortho plan to transition to prevena wound vac and will need 6 weeks IV ancef 2gm TID from , PICC in place., will need new precert, not yet started, on room air.     3/4/24 @ 3279: Plan remains to return to [facility] SNF, patient not medically ready today, upon d/c per ortho plan to transition to prevena wound vac and will need 6 weeks IV ancef 2gm TID from , PICC in place., will need new precert, request made to CNc to submit for new auth today.  Chiara tomorrow.      3/5/24 @ 1523: [facility], Precert submitted, auth obtained, ortho transitioned patient to prevena wound vac, orders complete and sent by CNC in [facility], transport confirmed for 1700 pickup, patient, Rn, facility notified of d/c time. Number or nurse to nurse report provided to SAMANTHA Islas.

## 2024-02-28 NOTE — CARE PLAN
The patient's goals for the shift include  remain comfortable throughout shift.    The clinical goals for the shift include Pt vanna remain HDDS throughout shift.

## 2024-02-28 NOTE — PROGRESS NOTES
"Blanca Hamilton is a 48 y.o. female on day 14 of admission presenting with Wound dehiscence.    Subjective   Patient was seen and examined seated upright in bed, no acute events overnight. Pain remains well controlled and denies needing medication for breakthrough. Eager for pet and music therapy. She denies fever, chest pain, nausea, vomiting, abdominal pain, leg pain or worsening leg swelling    Objective     Physical Exam  HENT:      Head: Normocephalic.      Nose: Nose normal.   Cardiovascular:      Rate and Rhythm: Normal rate and regular rhythm.      Pulses: Normal pulses.      Heart sounds: Normal heart sounds.   Pulmonary:      Comments: Decrease breath sound in both lungs  Abdominal:      General: Abdomen is flat. Bowel sounds are normal. There is no distension.      Palpations: Abdomen is soft. There is no mass.      Tenderness: There is no abdominal tenderness.   Musculoskeletal:         General: Tenderness present.      Cervical back: Normal range of motion.      Right lower leg: Edema present.      Left lower leg: Edema present.      Comments: Surgical site clean and dry, with surgical packing and vac in place. Deep and surgical drains placed and draining serosang. Continues to have significant output in drains, appear to have been emptied this morning. Incision is stable, tightly wrapped without overt signs of hematoma formation or seepage. VAC has had 0 output.   Skin:     Findings: Bruising present. No rash.   Neurological:      General: No focal deficit present.      Mental Status: She is alert.      Cranial Nerves: No cranial nerve deficit.         Last Recorded Vitals  Blood pressure (!) 133/97, pulse 101, temperature 37 °C (98.6 °F), temperature source Temporal, resp. rate 23, height 1.753 m (5' 9\"), weight 89.4 kg (197 lb 1.6 oz), SpO2 97 %.  Intake/Output last 3 Shifts:  I/O last 3 completed shifts:  In: 1140 (12.8 mL/kg) [P.O.:840; IV Piggyback:300]  Out: 3115 (34.8 mL/kg) [Urine:2700 (0.8 " mL/kg/hr); Drains:415]  Weight: 89.4 kg     Relevant Results     Results for orders placed or performed during the hospital encounter of 02/14/24 (from the past 24 hour(s))   CBC   Result Value Ref Range    WBC 4.6 4.4 - 11.3 x10*3/uL    nRBC 0.0 0.0 - 0.0 /100 WBCs    RBC 3.15 (L) 4.00 - 5.20 x10*6/uL    Hemoglobin 9.1 (L) 12.0 - 16.0 g/dL    Hematocrit 29.1 (L) 36.0 - 46.0 %    MCV 92 80 - 100 fL    MCH 28.9 26.0 - 34.0 pg    MCHC 31.3 (L) 32.0 - 36.0 g/dL    RDW 16.4 (H) 11.5 - 14.5 %    Platelets 348 150 - 450 x10*3/uL   Comprehensive Metabolic Panel   Result Value Ref Range    Glucose 78 74 - 99 mg/dL    Sodium 136 136 - 145 mmol/L    Potassium 4.2 3.5 - 5.3 mmol/L    Chloride 103 98 - 107 mmol/L    Bicarbonate 27 21 - 32 mmol/L    Anion Gap 10 10 - 20 mmol/L    Urea Nitrogen 4 (L) 6 - 23 mg/dL    Creatinine 0.25 (L) 0.50 - 1.05 mg/dL    eGFR >90 >60 mL/min/1.73m*2    Calcium 7.9 (L) 8.6 - 10.3 mg/dL    Albumin 2.5 (L) 3.4 - 5.0 g/dL    Alkaline Phosphatase 123 (H) 33 - 110 U/L    Total Protein 5.1 (L) 6.4 - 8.2 g/dL    AST 8 (L) 9 - 39 U/L    Bilirubin, Total 0.2 0.0 - 1.2 mg/dL    ALT <3 (L) 7 - 45 U/L       Current Facility-Administered Medications:     acetaminophen (Tylenol) tablet 975 mg, 975 mg, oral, q8h, Barry BRENDA Reiman, DO, 975 mg at 02/28/24 0526    ARIPiprazole (Abilify) tablet 15 mg, 15 mg, oral, Nightly, Barry D Reiman, DO, 15 mg at 02/27/24 2032    [Held by provider] aspirin chewable tablet 81 mg, 81 mg, oral, BID, Barry Bledsoe DO    calcium carbonate (Tums) chewable tablet 500 mg, 500 mg, oral, TID PRN, Lele Killian DO, 500 mg at 02/27/24 1852    ceFAZolin in dextrose (iso-os) (Ancef) IVPB 2 g, 2 g, intravenous, q8h, Barry Bledsoe DO, Stopped at 02/28/24 0556    enoxaparin (Lovenox) syringe 40 mg, 40 mg, subcutaneous, q24h, Barry Bledsoe DO, 40 mg at 02/27/24 1603    famotidine (Pepcid) tablet 40 mg, 40 mg, oral, BID, Barry Bledsoe DO, 40 mg at 02/28/24 0911    ferrous  sulfate (325 mg ferrous sulfate) tablet 1 tablet, 1 tablet, oral, Daily, Barry D Reiman, DO, 1 tablet at 02/28/24 0526    fluticasone (Flonase) nasal spray 2 spray, 2 spray, Each Nostril, Daily, Dylan Casas DO, 2 spray at 02/28/24 0910    HYDROmorphone (Dilaudid) injection 0.4 mg, 0.4 mg, intravenous, q3h PRN, Barry D Reiman, DO    lactated Ringer's infusion, 100 mL/hr, intravenous, Continuous, Barry D Reiman, DO, Last Rate: 100 mL/hr at 02/28/24 0954, 100 mL/hr at 02/28/24 0954    [Held by provider] lisinopril tablet 10 mg, 10 mg, oral, q AM, Barry D Reiman, DO    loratadine (Claritin) tablet 10 mg, 10 mg, oral, Daily, Barry D Reiman, DO, 10 mg at 02/28/24 0911    magnesium oxide (Mag-Ox) tablet 800 mg, 800 mg, oral, Daily, Barry D Reiman, DO, 800 mg at 02/28/24 0911    [Held by provider] metoprolol succinate XL (Toprol-XL) 24 hr tablet 25 mg, 25 mg, oral, Daily, Barry D Reiman, DO, 25 mg at 02/20/24 0909    midodrine (Proamatine) tablet 5 mg, 5 mg, oral, BID PRN, Barry D Reiman, DO    montelukast (Singulair) tablet 10 mg, 10 mg, oral, q AM, Barry D Reiman, DO, 10 mg at 02/28/24 0911    nicotine (Nicoderm CQ) 21 mg/24 hr patch 1 patch, 1 patch, transdermal, Daily, Barry D Reiman, DO, 1 patch at 02/28/24 0909    ondansetron (Zofran) injection 4 mg, 4 mg, intravenous, q6h PRN, Barry D Reiman, DO, 4 mg at 02/27/24 1600    oseltamivir (Tamiflu) capsule 75 mg, 75 mg, oral, Daily, Frank Modi MD, 75 mg at 02/28/24 0911    oxyCODONE (Roxicodone) immediate release tablet 10 mg, 10 mg, oral, q6h PRN, Barry D Reiman, DO, 10 mg at 02/24/24 0500    oxyCODONE (Roxicodone) immediate release tablet 5 mg, 5 mg, oral, q6h PRN, Barry D Reiman, DO, 5 mg at 02/22/24 2034    PARoxetine (Paxil) tablet 40 mg, 40 mg, oral, Daily, Barry D Reiman, DO, 40 mg at 02/28/24 0911    polyethylene glycol (Glycolax, Miralax) packet 17 g, 17 g, oral, BID, Barry D Reiman, DO, 17 g at 02/24/24 0815    simvastatin  (Zocor) tablet 20 mg, 20 mg, oral, Nightly, Barry D Reiman, DO, 20 mg at 02/27/24 2033    traZODone (Desyrel) tablet 100 mg, 100 mg, oral, Nightly, Barry D Reiman, DO, 100 mg at 02/27/24 2032    zinc oxide 40 % ointment 1 Application, 1 Application, Topical, BID, Barry D Reiman, DO, 1 Application at 02/28/24 0926        Assessment/Plan   Blanca Hamilton is a 48 y.o. 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 Initially met Sepsis criteria. Admitted for further management of dehiscence and SSI.      Acute Medical Issues:  # Sepsis secondary to reccurent post op wound infection (MSSA) of right hip arthroplasty   # Hx right total hip arthroplasty w/ revision (1/5/24) for prosthetic fx  # Hx hardware removal for prosthetic joint infection (2/6/24)  -OR 2/20/24 right hip revision with Dr. Jalloh  -Discharged on 2/11 with wound vac + PICC line for 6-week course of cefazolin              -Previous tissue/Wound Culture (2/6): MSSA, only resistant to Tetracycline   -pain control: scheduled tylenol, oxy 5 mod, oxy 10 severe, dilaudid 0.4 PRN breakthrough  -toe touch weight bearing for transfers other, otherwise NWB RLE  -DO NOT LAY on right hip, maintain two pillows in between legs when in bed  -Blood Cx (2/14) no growth, final report  -PT/OT: moderate intensity   -Cont Ancef 2g TID (2/20/2024 - 4/2/2024)  -On discharge, will need 6w Ancef until 4/2 with weekly labs  -Holding ASA in setting of copious drain output, must balance risk of VTE  -With complex case and inability for outside facility to care for drains appropriately, plan to keep patient in house to monitor drain output before removal. Once output decreases, can consider removing drains    # Depression, worsened with complicated hospital stay  -c/w Abilify + Paroxetine +Trazodone  -Pet and music therapy while inpatient  -Encourage conversation and  visit from family/friends    Resolved Issues  #Flu B exposure: Asymptomatic, roommate tested positive. Cont Tamiflu, to receive last dose 2/29     Chronic Medical Issues:  # GERD: c/w Pepcid   # HTN: Restarted home Metoprolol today. Holding home lisinopril  # HLD: c/w Simvastatin  # Chronic anemia: iron supplement  # Tobacco use: nicotine patch     Fluids: None  Electrolytes: Replete as needed  Nutrition: Regular, supplements  GI PPX: None   DVT PPX: Holding ASA in setting of increased drain output     Access: PICC Line, PIV's  Antibiotics: Cefazolin 2g TID (2/20 - 4/2)  Oxygenation: Room Air     Dispo: Admitted onto the floor for postoperative wound dehiscence and surgical site infection. OR on 2/20/24 for right hip revision. With complex case and inability for outside facility to care for drains appropriately, plan to keep patient in house to monitor drain output before removal. Will need pre-cert on discharge.      Barry Bledsoe, DO  PGY-1

## 2024-02-29 LAB
ALBUMIN SERPL BCP-MCNC: 2.4 G/DL (ref 3.4–5)
ALP SERPL-CCNC: 121 U/L (ref 33–110)
ALT SERPL W P-5'-P-CCNC: <3 U/L (ref 7–45)
ANION GAP SERPL CALC-SCNC: 9 MMOL/L (ref 10–20)
AST SERPL W P-5'-P-CCNC: 7 U/L (ref 9–39)
BILIRUB SERPL-MCNC: 0.2 MG/DL (ref 0–1.2)
BUN SERPL-MCNC: 5 MG/DL (ref 6–23)
CALCIUM SERPL-MCNC: 7.6 MG/DL (ref 8.6–10.3)
CHLORIDE SERPL-SCNC: 101 MMOL/L (ref 98–107)
CO2 SERPL-SCNC: 26 MMOL/L (ref 21–32)
CREAT SERPL-MCNC: 0.27 MG/DL (ref 0.5–1.05)
EGFRCR SERPLBLD CKD-EPI 2021: >90 ML/MIN/1.73M*2
ERYTHROCYTE [DISTWIDTH] IN BLOOD BY AUTOMATED COUNT: 16 % (ref 11.5–14.5)
GLUCOSE SERPL-MCNC: 82 MG/DL (ref 74–99)
HCT VFR BLD AUTO: 27.8 % (ref 36–46)
HGB BLD-MCNC: 8.6 G/DL (ref 12–16)
MCH RBC QN AUTO: 28.7 PG (ref 26–34)
MCHC RBC AUTO-ENTMCNC: 30.9 G/DL (ref 32–36)
MCV RBC AUTO: 93 FL (ref 80–100)
NRBC BLD-RTO: 0 /100 WBCS (ref 0–0)
PLATELET # BLD AUTO: 341 X10*3/UL (ref 150–450)
POTASSIUM SERPL-SCNC: 3.9 MMOL/L (ref 3.5–5.3)
PROT SERPL-MCNC: 4.7 G/DL (ref 6.4–8.2)
RBC # BLD AUTO: 3 X10*6/UL (ref 4–5.2)
SODIUM SERPL-SCNC: 132 MMOL/L (ref 136–145)
WBC # BLD AUTO: 5 X10*3/UL (ref 4.4–11.3)

## 2024-02-29 PROCEDURE — 99232 SBSQ HOSP IP/OBS MODERATE 35: CPT | Performed by: NURSE PRACTITIONER

## 2024-02-29 PROCEDURE — 85027 COMPLETE CBC AUTOMATED: CPT

## 2024-02-29 PROCEDURE — 1200000002 HC GENERAL ROOM WITH TELEMETRY DAILY

## 2024-02-29 PROCEDURE — 99232 SBSQ HOSP IP/OBS MODERATE 35: CPT | Performed by: INTERNAL MEDICINE

## 2024-02-29 PROCEDURE — 2500000004 HC RX 250 GENERAL PHARMACY W/ HCPCS (ALT 636 FOR OP/ED)

## 2024-02-29 PROCEDURE — 97530 THERAPEUTIC ACTIVITIES: CPT | Mod: GO

## 2024-02-29 PROCEDURE — 2500000001 HC RX 250 WO HCPCS SELF ADMINISTERED DRUGS (ALT 637 FOR MEDICARE OP): Performed by: INTERNAL MEDICINE

## 2024-02-29 PROCEDURE — 2500000001 HC RX 250 WO HCPCS SELF ADMINISTERED DRUGS (ALT 637 FOR MEDICARE OP)

## 2024-02-29 PROCEDURE — 2500000002 HC RX 250 W HCPCS SELF ADMINISTERED DRUGS (ALT 637 FOR MEDICARE OP, ALT 636 FOR OP/ED): Performed by: INTERNAL MEDICINE

## 2024-02-29 PROCEDURE — S4991 NICOTINE PATCH NONLEGEND: HCPCS

## 2024-02-29 PROCEDURE — 2500000002 HC RX 250 W HCPCS SELF ADMINISTERED DRUGS (ALT 637 FOR MEDICARE OP, ALT 636 FOR OP/ED)

## 2024-02-29 PROCEDURE — 97535 SELF CARE MNGMENT TRAINING: CPT | Mod: GO

## 2024-02-29 PROCEDURE — 97110 THERAPEUTIC EXERCISES: CPT | Mod: GP,CQ

## 2024-02-29 PROCEDURE — 80053 COMPREHEN METABOLIC PANEL: CPT

## 2024-02-29 RX ADMIN — SODIUM CHLORIDE, POTASSIUM CHLORIDE, SODIUM LACTATE AND CALCIUM CHLORIDE 100 ML/HR: 600; 310; 30; 20 INJECTION, SOLUTION INTRAVENOUS at 23:49

## 2024-02-29 RX ADMIN — PAROXETINE 40 MG: 20 TABLET, FILM COATED ORAL at 09:07

## 2024-02-29 RX ADMIN — FAMOTIDINE 40 MG: 20 TABLET ORAL at 09:07

## 2024-02-29 RX ADMIN — FLUTICASONE PROPIONATE 2 SPRAY: 50 SPRAY, METERED NASAL at 09:07

## 2024-02-29 RX ADMIN — LORATADINE 10 MG: 10 TABLET ORAL at 09:07

## 2024-02-29 RX ADMIN — TRAZODONE HYDROCHLORIDE 100 MG: 50 TABLET ORAL at 20:20

## 2024-02-29 RX ADMIN — CEFAZOLIN SODIUM 2 G: 2 INJECTION, SOLUTION INTRAVENOUS at 20:20

## 2024-02-29 RX ADMIN — ACETAMINOPHEN 975 MG: 325 TABLET ORAL at 12:59

## 2024-02-29 RX ADMIN — Medication 800 MG: at 09:07

## 2024-02-29 RX ADMIN — SIMVASTATIN 20 MG: 20 TABLET, FILM COATED ORAL at 20:20

## 2024-02-29 RX ADMIN — CEFAZOLIN SODIUM 2 G: 2 INJECTION, SOLUTION INTRAVENOUS at 05:21

## 2024-02-29 RX ADMIN — FERROUS SULFATE TAB 325 MG (65 MG ELEMENTAL FE) 1 TABLET: 325 (65 FE) TAB at 05:20

## 2024-02-29 RX ADMIN — METOPROLOL SUCCINATE 25 MG: 25 TABLET, EXTENDED RELEASE ORAL at 09:07

## 2024-02-29 RX ADMIN — OSELTAMAVIR PHOSPHATE 75 MG: 75 CAPSULE ORAL at 09:07

## 2024-02-29 RX ADMIN — ACETAMINOPHEN 975 MG: 325 TABLET ORAL at 20:20

## 2024-02-29 RX ADMIN — CALCIUM CARBONATE (ANTACID) CHEW TAB 500 MG 500 MG: 500 CHEW TAB at 11:49

## 2024-02-29 RX ADMIN — ONDANSETRON 4 MG: 2 INJECTION INTRAMUSCULAR; INTRAVENOUS at 20:41

## 2024-02-29 RX ADMIN — Medication 1 APPLICATION: at 20:46

## 2024-02-29 RX ADMIN — ENOXAPARIN SODIUM 40 MG: 40 INJECTION SUBCUTANEOUS at 16:00

## 2024-02-29 RX ADMIN — ARIPIPRAZOLE 15 MG: 5 TABLET ORAL at 20:20

## 2024-02-29 RX ADMIN — FAMOTIDINE 40 MG: 20 TABLET ORAL at 20:20

## 2024-02-29 RX ADMIN — Medication 1 APPLICATION: at 09:07

## 2024-02-29 RX ADMIN — NICOTINE 1 PATCH: 21 PATCH, EXTENDED RELEASE TRANSDERMAL at 09:07

## 2024-02-29 RX ADMIN — ACETAMINOPHEN 975 MG: 325 TABLET ORAL at 05:21

## 2024-02-29 RX ADMIN — MONTELUKAST 10 MG: 10 TABLET, FILM COATED ORAL at 09:07

## 2024-02-29 RX ADMIN — CEFAZOLIN SODIUM 2 G: 2 INJECTION, SOLUTION INTRAVENOUS at 12:59

## 2024-02-29 RX ADMIN — SODIUM CHLORIDE, POTASSIUM CHLORIDE, SODIUM LACTATE AND CALCIUM CHLORIDE 100 ML/HR: 600; 310; 30; 20 INJECTION, SOLUTION INTRAVENOUS at 09:08

## 2024-02-29 ASSESSMENT — PAIN - FUNCTIONAL ASSESSMENT
PAIN_FUNCTIONAL_ASSESSMENT: 0-10

## 2024-02-29 ASSESSMENT — PAIN SCALES - GENERAL
PAINLEVEL_OUTOF10: 0 - NO PAIN
PAINLEVEL_OUTOF10: 0 - NO PAIN
PAINLEVEL_OUTOF10: 4
PAINLEVEL_OUTOF10: 2

## 2024-02-29 ASSESSMENT — COGNITIVE AND FUNCTIONAL STATUS - GENERAL
STANDING UP FROM CHAIR USING ARMS: TOTAL
HELP NEEDED FOR BATHING: A LOT
MOVING TO AND FROM BED TO CHAIR: TOTAL
DAILY ACTIVITIY SCORE: 15
MOVING FROM LYING ON BACK TO SITTING ON SIDE OF FLAT BED WITH BEDRAILS: A LITTLE
DRESSING REGULAR UPPER BODY CLOTHING: A LITTLE
PERSONAL GROOMING: A LITTLE
DRESSING REGULAR LOWER BODY CLOTHING: TOTAL
CLIMB 3 TO 5 STEPS WITH RAILING: TOTAL
MOBILITY SCORE: 9
TOILETING: A LOT
TURNING FROM BACK TO SIDE WHILE IN FLAT BAD: A LOT
WALKING IN HOSPITAL ROOM: TOTAL

## 2024-02-29 ASSESSMENT — PAIN DESCRIPTION - LOCATION: LOCATION: HIP

## 2024-02-29 ASSESSMENT — ACTIVITIES OF DAILY LIVING (ADL): HOME_MANAGEMENT_TIME_ENTRY: 13

## 2024-02-29 NOTE — CARE PLAN
The patient's goals for the shift include      The clinical goals for the shift include Pt will remain HDS this shift.    Over the shift, the patient did remain HDS, monitored and medicated as ordered this shift.

## 2024-02-29 NOTE — PROGRESS NOTES
Occupational Therapy    RE-CHECK: Occupational Therapy Treatment    Name: Blanca Hamilton  MRN: [id number]  : 1975  Date: 24  Time Calculation  Start Time: 1058  Stop Time: 1121  Time Calculation (min): 23 min    Assessment:  OT Assessment: OT RE-CHECK COMPLETED: pt still demonstrating deficits in functional mobility and ADL but demonstrating improvements from OT sessions. Goals updated this date.  Prognosis: Good  Barriers to Discharge: None  Evaluation/Treatment Tolerance: Patient limited by fatigue  Medical Staff Made Aware: Yes  End of Session Communication: Bedside nurse  End of Session Patient Position: Bed, 3 rail up, Alarm on  Plan:  Treatment Interventions: ADL retraining, Functional transfer training, Patient/family training, Compensatory technique education, Equipment evaluation/education  OT Frequency: 3 times per week  OT Discharge Recommendations: Moderate intensity level of continued care  Equipment Recommended upon Discharge: Wheeled walker, Wheelchair  OT Recommended Transfer Status: Assist of 1  OT - OK to Discharge: Yes    Subjective   Previous Visit Info:  OT Last Visit  OT Received On: 24  General:  General  Reason for Referral: 49 yo female referred to OT for R hip wound dehiscence, impaired mobility, hip revision on  with wound vac and drain placement  Referred By: Brando Mascorro  Past Medical History Relevant to Rehab: Anxiety, Arthritis, Class 1 obesity with body mass index (BMI) of 30.0 to 30.9 in adult (2024), Depression, GERD (gastroesophageal reflux disease), HTN (hypertension), Hyperlipidemia; R total hip arthroplasty on , but had to get subsequent revision on  for prosthetic fracture. 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 Cultures were positive for MSSA and patient was started on cefazolin. Then underwent ALEXA revision and partial hardware removal with Dr. Jalloh on 24. Patient was discharged to SNF on  with  wound vac, a PICC line for 6 weeks of cefazolin, and ASA for DVT ppx.  Missed Visit: Yes  Prior to Session Communication: Bedside nurse  Patient Position Received: Alarm on (sitting EOB)  General Comment: Pt pleasant, cooperative, agreeable to therapy.  Precautions:  LE Weight Bearing Status: Right Toe-Touch Weight Bearing  Medical Precautions: Infection precautions, Fall precautions  Post-Surgical Precautions: Right hip precautions (R hip drains, wound vac)     Pain Assessment:  Pain Assessment  Pain Assessment: 0-10  Pain Score: 0 - No pain     Objective   Activities of Daily Living: Grooming  Grooming Level of Assistance: Setup  Grooming Where Assessed: Edge of bed  Grooming Comments: Completed oral care routine with supplies provided in static sitting, increased time needed to complete    UE Bathing  UE Bathing Level of Assistance: Setup, Distant supervision  UE Bathing Where Assessed: Edge of bed  UE Bathing Comments: Completed overhead hair washing/combing/drying, required rest break d/t fatigue with dynamic sitting     Bed Mobility/Transfers: Bed Mobility  Bed Mobility: Yes  Bed Mobility 1  Bed Mobility 1: Sitting to supine  Level of Assistance 1: Minimum assistance  Bed Mobility Comments 1: Assist to maneuver BLE over EOB, pt able to position trunk/square up to center of bed  Bed Mobility 2  Bed Mobility  2: Scooting  Level of Assistance 2: Close supervision, Minimal verbal cues  Bed Mobility Comments 2: Pt in trendelenburg position, used bed rails to scoot towards HOB with cues for technique/positioning/hand placement    Transfers  Transfer: Yes  Transfer 1  Transfer From 1: Sit to  Transfer to 1: Stand  Technique 1: Sit to stand, Stand to sit  Transfer Device 1: Walker  Transfer Level of Assistance 1: Maximum assistance  Trials/Comments 1: Two trials/attempts; Unable to achieve full stance with bed elevated but able to lift bottom from EOB and tolerate position ~10 seconds while maintaining WBing  precautions.    Sitting Balance:  Static Sitting Balance  Static Sitting-Balance Support: Bilateral upper extremity supported  Static Sitting-Level of Assistance: Distant supervision  Dynamic Sitting Balance  Dynamic Sitting-Balance Support: Feet supported  Dynamic Sitting-Balance: Reaching for objects, Lateral lean, Forward lean  Dynamic Sitting-Comments: Supervision during ADL for safety    Outcome Measures:  [facility] Daily Activity  Putting on and taking off regular lower body clothing: Total  Bathing (including washing, rinsing, drying): A lot  Putting on and taking off regular upper body clothing: A little  Toileting, which includes using toilet, bedpan or urinal: A lot  Taking care of personal grooming such as brushing teeth: A little  Eating Meals: None  Daily Activity - Total Score: 15      Education Documentation  Body Mechanics, taught by Ross Summers OT at 2/29/2024 11:56 AM.  Learner: Patient  Readiness: Acceptance  Method: Explanation  Response: Verbalizes Understanding    Precautions, taught by Ross Summers OT at 2/29/2024 11:56 AM.  Learner: Patient  Readiness: Acceptance  Method: Explanation  Response: Verbalizes Understanding    ADL Training, taught by Ross Summers OT at 2/29/2024 11:56 AM.  Learner: Patient  Readiness: Acceptance  Method: Explanation  Response: Verbalizes Understanding    Education Comments  No comments found.      Goals:  Encounter Problems       Encounter Problems (Active)       OT Goals       Pt will increase endurance to tolerate 15min of OOB activity with no more than 1 rest break in order to increase ability to engage in ADL completion.  (Progressing)       Start:  02/15/24    Expected End:  03/14/24            Pt will demo and/or verbalize 2-3 energy conservation techniques to incorporate into functional mobility or ADL to improve performance and increase independence.  (Progressing)       Start:  02/15/24    Expected End:  03/14/24            Pt will demo  increased functional mobility/pivot transfer independence to tolerate tasks necessary to complete ADL routine.  (Progressing)       Start:  02/15/24    Expected End:  03/14/24            Pt will demo ADL routine and meaningful daily activities using modifications as needed  (Progressing)       Start:  02/15/24    Expected End:  03/14/24

## 2024-02-29 NOTE — PROGRESS NOTES
Blanca Hamilton is a 48 y.o. female on day 15 of admission presenting with Wound dehiscence.    Subjective   Interval History: no fever, no new complaints        Review of Systems    Objective   Range of Vitals (last 24 hours)  Heart Rate:  []   Temp:  [35.8 °C (96.4 °F)-37 °C (98.6 °F)]   Resp:  [16-21]   BP: (103-120)/(67-84)   Weight:  [84.9 kg (187 lb 3.2 oz)]   SpO2:  [96 %-98 %]   Daily Weight  02/29/24 : 84.9 kg (187 lb 3.2 oz)    Body mass index is 27.64 kg/m².    Physical Exam  Constitutional:       Appearance: Normal appearance.   HENT:      Head: Normocephalic and atraumatic.      Mouth/Throat:      Mouth: Mucous membranes are moist.      Pharynx: Oropharynx is clear.   Eyes:      Pupils: Pupils are equal, round, and reactive to light.   Cardiovascular:      Rate and Rhythm: Normal rate and regular rhythm.      Heart sounds: Normal heart sounds.   Pulmonary:      Effort: Pulmonary effort is normal.      Breath sounds: Normal breath sounds.   Abdominal:      General: Abdomen is flat. Bowel sounds are normal.      Palpations: Abdomen is soft.   Musculoskeletal:      Cervical back: Normal range of motion.      Comments: Rt hip dressing, no cellulitis   Neurological:      Mental Status: She is alert.         Antibiotics  sodium chloride 0.9 % bolus 3,120 mL  acetaminophen (Tylenol) tablet 975 mg  ARIPiprazole (Abilify) tablet 15 mg  aspirin chewable tablet 81 mg  ceFAZolin in dextrose (iso-os) (Ancef) IVPB 2 g  famotidine (Pepcid) tablet 40 mg  ferrous sulfate (325 mg ferrous sulfate) tablet 1 tablet  lactulose 20 gram/30 mL oral solution 20 g  lisinopril tablet 10 mg  loratadine (Claritin) tablet 10 mg  magnesium oxide (Mag-Ox) tablet 400 mg  metoprolol succinate XL (Toprol-XL) 24 hr tablet 25 mg  montelukast (Singulair) tablet 10 mg  nicotine (Nicoderm CQ) 21 mg/24 hr patch 1 patch  PARoxetine (Paxil) tablet 40 mg  polyethylene glycol (Glycolax, Miralax) packet 17 g  simvastatin (Zocor) tablet 20  mg  traZODone (Desyrel) tablet 100 mg  zinc oxide 40 % ointment 1 Application  piperacillin-tazobactam-dextrose (Zosyn) IV 3.375 g  sodium chloride 0.9% infusion  oxyCODONE (Roxicodone) immediate release tablet 5 mg  oxyCODONE (Roxicodone) immediate release tablet 10 mg  HYDROmorphone (Dilaudid) injection 0.4 mg  vancomycin (Vancocin) in dextrose 5 % water (D5W) 500 mL IV 1,500 mg  atorvastatin (Lipitor) tablet    ceFAZolin in dextrose (iso-os) (Ancef) IVPB 1 g  enoxaparin (Lovenox) syringe 40 mg  ceFAZolin in dextrose (iso-os) (Ancef) IVPB 2 g  ondansetron (Zofran) injection 4 mg  lactated Ringer's infusion  oxygen (O2) therapy  lactated Ringer's infusion  oxyCODONE (Roxicodone) immediate release tablet 5 mg  HYDROmorphone (Dilaudid) injection 0.25 mg  HYDROmorphone (Dilaudid) injection 0.5 mg  ondansetron (Zofran) injection 4 mg  droperidol (Inapsine) injection 0.625 mg  rocuronium (ZeMuron) injection  - Omnicell Override Pull  ondansetron (Zofran) injection  - Omnicell Override Pull  dexAMETHasone (Decadron) injection  - Omnicell Override Pull  lidocaine (cardiac) (Xylocaine) injection  - Omnicell Override Pull  propofol (Diprivan) injection  - Omnicell Override Pull  fentaNYL PF (Sublimaze) injection  - Omnicell Override Pull  lactated Ringer's infusion  ceFAZolin (Ancef) injection  - Omnicell Override Pull  ceFAZolin (Ancef) injection  - Omnicell Override Pull  tranexamic acid (Cyklokapron) injection  - Omnicell Override Pull  tranexamic acid (Cyklokapron) injection  - Omnicell Override Pull  povidone-iodine (Betadine) soap  - Omnicell Override Pull  povidone-iodine (Betadine) topical solution  - Omnicell Override Pull  ePHEDrine injection  - Omnicell Override Pull  vasopressin (Vasostrict) injection  - Omnicell Override Pull  propofol (Diprivan) injection  - Omnicell Override Pull  dexAMETHasone (Decadron) injection  - Omnicell Override Pull  gentamicin (Garamycin) injection  - Omnicell Override  Pull  vancomycin (Vancocin) vial for injection  - Omnicell Override Pull  HYDROmorphone (Dilaudid) injection  - Omnicell Override Pull  vancomycin (Vancocin) vial for injection  sugammadex (Bridion) injection  - Omnicell Override Pull  gentamicin (Garamycin) injection  sodium chloride 0.9 % irrigation solution  lactated Ringer's irrigation solution  lactated Ringer's bolus 500 mL  lactated Ringer's bolus 500 mL  lactated Ringer's infusion  oseltamivir (Tamiflu) capsule 75 mg  flu vaccine, quadrivalent, high-dose, preservative free, age 65y+ (FLUZONE)  lactated Ringer's bolus 1,000 mL  midodrine (Proamatine) tablet 5 mg  lactated Ringer's bolus 500 mL  magnesium oxide (Mag-Ox) tablet 800 mg  polyethylene glycol (Glycolax, Miralax) packet 17 g  bisacodyl (Dulcolax) suppository 10 mg      Relevant Results  Labs  Results from last 72 hours   Lab Units 02/29/24 0533 02/28/24  0539 02/27/24  0500   WBC AUTO x10*3/uL 5.0 4.6 5.0   HEMOGLOBIN g/dL 8.6* 9.1* 8.5*   HEMATOCRIT % 27.8* 29.1* 27.8*   PLATELETS AUTO x10*3/uL 341 348 307       Results from last 72 hours   Lab Units 02/29/24 0533 02/28/24  0539 02/27/24  0500   SODIUM mmol/L 132* 136 132*   POTASSIUM mmol/L 3.9 4.2 4.3   CHLORIDE mmol/L 101 103 102   CO2 mmol/L 26 27 25   BUN mg/dL 5* 4* 5*   CREATININE mg/dL 0.27* 0.25* 0.24*   GLUCOSE mg/dL 82 78 77   CALCIUM mg/dL 7.6* 7.9* 7.7*   ANION GAP mmol/L 9* 10 9*   EGFR mL/min/1.73m*2 >90 >90 >90       Results from last 72 hours   Lab Units 02/29/24 0533 02/28/24  0539 02/27/24  0500   ALK PHOS U/L 121* 123* 113*   BILIRUBIN TOTAL mg/dL 0.2 0.2 0.2   PROTEIN TOTAL g/dL 4.7* 5.1* 4.8*   ALT U/L <3* <3* <3*   AST U/L 7* 8* 6*   ALBUMIN g/dL 2.4* 2.5* 2.4*       Estimated Creatinine Clearance: 125 mL/min (A) (by C-G formula based on SCr of 0.27 mg/dL (L)).  C-Reactive Protein   Date Value Ref Range Status   02/27/2024 2.15 (H) <1.00 mg/dL Final   02/20/2024 0.42 <1.00 mg/dL Final   02/19/2024 0.68 <1.00 mg/dL Final      Microbiology  Reviewed  Imaging  reviewed        Assessment/Plan     Right hip prosthesis infection sp I&D with partial hardware exchange, MSSA on the cultures, sp repeat I&D    Recommendations :  Continue Cefazolin, plan on 6 weeks from the date of the last surgery, weekly labs  Discussed with the medical team     I spent minutes in the professional and overall care of this patient.      Frank Modi MD

## 2024-02-29 NOTE — PROGRESS NOTES
"Blanca Hamilton is a 48 y.o. female on day 15 of admission presenting with Wound dehiscence.    Subjective   No acute overnight events.  Pain controlled.  Was able to stand at the bedside with assistance today.  Did well.  Drains still with significant output (325 mL/24 hours).      Objective     Physical Exam  Vitals reviewed.   HENT:      Head: Normocephalic and atraumatic.   Eyes:      Extraocular Movements: Extraocular movements intact.      Conjunctiva/sclera: Conjunctivae normal.      Pupils: Pupils are equal, round, and reactive to light.   Cardiovascular:      Rate and Rhythm: Normal rate and regular rhythm.      Pulses: Normal pulses.   Pulmonary:      Breath sounds: Normal breath sounds.   Abdominal:      General: Bowel sounds are normal.      Palpations: Abdomen is soft.   Musculoskeletal:      Comments: Right hip dressing C/D/I, superficial and deep hemovac drains to compressed suction with sanguinous output, prevena VAC in place to continuous suction, no output, leg and foot warm and well perfused, motion and sensations intact    Skin:     General: Skin is warm and dry.      Capillary Refill: Capillary refill takes less than 2 seconds.   Neurological:      General: No focal deficit present.      Mental Status: She is alert and oriented to person, place, and time.       Last Recorded Vitals  Blood pressure 120/84, pulse 87, temperature 36.3 °C (97.3 °F), temperature source Temporal, resp. rate 16, height 1.753 m (5' 9\"), weight 84.9 kg (187 lb 3.2 oz), SpO2 98 %.  Intake/Output last 3 Shifts:  I/O last 3 completed shifts:  In: 3343 (39.4 mL/kg) [P.O.:1182; I.V.:1861 (21.9 mL/kg); IV Piggyback:300]  Out: 2870 (33.8 mL/kg) [Urine:2475 (0.8 mL/kg/hr); Drains:395]  Dosing Weight: 84.9 kg     Relevant Results  Electrocardiogram, 12-lead PRN ACS symptoms    Result Date: 2/28/2024  Normal sinus rhythm Normal ECG When compared with ECG of 02-FEB-2024 15:03, Significant changes have occurred    Lower extremity venous " duplex bilateral    Result Date: 2/15/2024          [facility] [address] [address]  Tel [phone] and Fax [phone]  Vascular Lab Report [facility] LOWER EXTREMITY VENOUS DUPLEX BILATERAL  Patient Name:      ZULEIKA KAMARA          Reading Physician:  [id number] Prieto Loza MD Study Date:        2/14/2024            Ordering Physician: [id number] AMADOR MRN/PID:           [id number]             Technologist:       Rosa Pierre [facility] Accession#:        [id number]         Technologist 2:     Hattie Raymond Date of Birth/Age: 1975 / 48      Encounter#:         [id number]                    years Gender:            F Admission Status:  Inpatient            Location Performed: [facility]  Diagnosis/ICD: Localized (leg) edema-R60.0 CPT Codes:     14188 Peripheral venous duplex scan for DVT complete  CONCLUSIONS: Right Lower Venous: No evidence of acute deep vein thrombus visualized in the right lower extremity. Left Lower Venous: No evidence of acute deep vein thrombus visualized in the left lower extremity.  Additional Findings: Technically difficult study due to patient movement and inability to position.  Imaging & Doppler Findings:  Right                 Compressible Thrombus        Flow Distal External Iliac     Yes        None   Spontaneous/Phasic CFV                       Yes        None   Spontaneous/Phasic PFV                       Yes        None FV Proximal               Yes        None   Spontaneous/Phasic FV Mid                    Yes        None FV Distal                 Yes        None Popliteal                 Yes        None   Spontaneous/Phasic Peroneal                  Yes        None PTV                       Yes        None  Left                  Compress Thrombus        Flow Distal External Iliac   Yes      None   Spontaneous/Phasic  CFV                     Yes      None   Spontaneous/Phasic PFV                     Yes      None FV Proximal             Yes      None   Spontaneous/Phasic FV Mid                  Yes      None FV Distal               Yes      None Popliteal               Yes      None   Spontaneous/Phasic Peroneal                Yes      None PTV                     Yes      None  [id number] Prieto Loza MD Electronically signed by [id number] Prieto Loza MD on 2/15/2024 at 5:44:38 PM  ** Final **     Bedside PICC Imaging    Result Date: 2/8/2024  These images are not reportable by radiology and will not be interpreted by  Radiologists.    ECG 12 lead    Result Date: 2/7/2024  Atrial flutter with 2:1 AV conduction Nonspecific intraventricular block Left ventricular hypertrophy ( R in aVL , [place] product ) Inferior infarct , age undetermined Abnormal ECG When compared with ECG of 11-JAN-2024 23:16, Significant changes have occurred See ED provider note for full interpretation and clinical correlation Confirmed by Vega Bruce ([id number]) on 2/7/2024 10:40:33 PM    XR hip right with pelvis when performed 2 or 3 views    Result Date: 2/7/2024  Interpreted By:  Richard Garcia, STUDY: XR HIP RIGHT WITH PELVIS WHEN PERFORMED 2 OR 3 VIEWS; ;  2/7/2024 9:25 am   INDICATION: Signs/Symptoms:s/p revision oanh.   COMPARISON: 01/26/2024   ACCESSION NUMBER(S): [id number]   ORDERING CLINICIAN: JON HERNANDEZ   FINDINGS: Total right hip arthroplasty with interval placement of antibiotic impregnated beads. Partially visualized left hip arthroplasty.       Interval placement of antibiotic impregnated beads in the right hip.     MACRO: None   Signed by: Richard Garcia 2/7/2024 5:00 PM Dictation workstation:   [id number]    XR abdomen 1 view    Result Date: 2/5/2024  Interpreted By:  Richard Garcia, STUDY: XR ABDOMEN 1 VIEW;  2/4/2024 6:47 pm   INDICATION: Signs/Symptoms:Lactic acidosis and emesis.   COMPARISON: None.   ACCESSION NUMBER(S):  [id number]   ORDERING CLINICIAN: TABITHA MAYNARD   FINDINGS: Nonobstructive bowel gas pattern. Partially visualized bilateral hip arthroplasties.       1.  Nonobstructive bowel gas pattern.   MACRO: None   Signed by: Richard Garcia 2/5/2024 4:12 PM Dictation workstation:   [id number]      Scheduled medications  acetaminophen, 975 mg, oral, q8h  ARIPiprazole, 15 mg, oral, Nightly  [Held by provider] aspirin, 81 mg, oral, BID  ceFAZolin, 2 g, intravenous, q8h  enoxaparin, 40 mg, subcutaneous, q24h  famotidine, 40 mg, oral, BID  ferrous sulfate (325 mg ferrous sulfate), 1 tablet, oral, Daily  fluticasone, 2 spray, Each Nostril, Daily  [Held by provider] lisinopril, 10 mg, oral, q AM  loratadine, 10 mg, oral, Daily  magnesium oxide, 800 mg, oral, Daily  metoprolol succinate XL, 25 mg, oral, Daily  montelukast, 10 mg, oral, q AM  nicotine, 1 patch, transdermal, Daily  oseltamivir, 75 mg, oral, Daily  PARoxetine, 40 mg, oral, Daily  polyethylene glycol, 17 g, oral, BID  simvastatin, 20 mg, oral, Nightly  traZODone, 100 mg, oral, Nightly  zinc oxide, 1 Application, Topical, BID      Continuous medications  lactated Ringer's, 100 mL/hr, Last Rate: 100 mL/hr (02/28/24 2051)      PRN medications  PRN medications: calcium carbonate, HYDROmorphone, midodrine, ondansetron, oxyCODONE, oxyCODONE  Results for orders placed or performed during the hospital encounter of 02/14/24 (from the past 24 hour(s))   CBC   Result Value Ref Range    WBC 5.0 4.4 - 11.3 x10*3/uL    nRBC 0.0 0.0 - 0.0 /100 WBCs    RBC 3.00 (L) 4.00 - 5.20 x10*6/uL    Hemoglobin 8.6 (L) 12.0 - 16.0 g/dL    Hematocrit 27.8 (L) 36.0 - 46.0 %    MCV 93 80 - 100 fL    MCH 28.7 26.0 - 34.0 pg    MCHC 30.9 (L) 32.0 - 36.0 g/dL    RDW 16.0 (H) 11.5 - 14.5 %    Platelets 341 150 - 450 x10*3/uL   Comprehensive Metabolic Panel   Result Value Ref Range    Glucose 82 74 - 99 mg/dL    Sodium 132 (L) 136 - 145 mmol/L    Potassium 3.9 3.5 - 5.3 mmol/L    Chloride 101 98 - 107  mmol/L    Bicarbonate 26 21 - 32 mmol/L    Anion Gap 9 (L) 10 - 20 mmol/L    Urea Nitrogen 5 (L) 6 - 23 mg/dL    Creatinine 0.27 (L) 0.50 - 1.05 mg/dL    eGFR >90 >60 mL/min/1.73m*2    Calcium 7.6 (L) 8.6 - 10.3 mg/dL    Albumin 2.4 (L) 3.4 - 5.0 g/dL    Alkaline Phosphatase 121 (H) 33 - 110 U/L    Total Protein 4.7 (L) 6.4 - 8.2 g/dL    AST 7 (L) 9 - 39 U/L    Bilirubin, Total 0.2 0.0 - 1.2 mg/dL    ALT <3 (L) 7 - 45 U/L             This patient has a central line   Reason for the central line remaining today?  IV ABX    Assessment/Plan   Principal Problem:    Wound dehiscence  Active Problems:    Class 1 obesity with body mass index (BMI) of 30.0 to 30.9 in adult    Sinusitis    Transfusion history    Arthritis    Hyponatremia    48 year female POD 8 from right hip revision.     - AM labs reviewed   - toe touch weight bearing for transfers other, otherwise NWB RLE  - On Lovenox for DVT prophylaxis   - PT evaluation  - monitor and document hemovac drains output every shift (120 mL and 255 mL -> total 355 mL/24 hours)-> will remove drain when no output  - maintain two pillows in between legs when in bed, do not cross or extend right leg over to left, ok to lay on right side  - 6 weeks of Ancef from recent OR date per ID recs  - ortho will continue to follow      Discussed with Dr. Jalloh           I spent 30 minutes in the professional and overall care of this patient.    Ester Fitch, APRN-CNP

## 2024-02-29 NOTE — CARE PLAN
The clinical goals for the shift include pt will remain hemodynamically stable and comfortable.    Problem: Safety  Goal: Patient will be injury free during hospitalization  Outcome: Progressing  Goal: I will remain free of falls  Outcome: Progressing  Problem: Skin  Goal: Decreased wound size/increased tissue granulation at next dressing change  Outcome: Progressing  Flowsheets (Taken 2/29/2024 0314)  Decreased wound size/increased tissue granulation at next dressing change:   Promote sleep for wound healing   Protective dressings over bony prominences  Goal: Participates in plan/prevention/treatment measures  Outcome: Progressing  Goal: Prevent/manage excess moisture  Outcome: Progressing  Goal: Prevent/minimize sheer/friction injuries  Outcome: Progressing  Goal: Promote/optimize nutrition  Outcome: Progressing  Goal: Promote skin healing  Outcome: Progressing  Problem: Pain - Adult  Goal: Verbalizes/displays adequate comfort level or baseline comfort level  Outcome: Progressing  Problem: Safety - Adult  Goal: Free from fall injury  Outcome: Progressing  Problem: Discharge Planning  Goal: Discharge to home or other facility with appropriate resources  Outcome: Progressing  Problem: Chronic Conditions and Co-morbidities  Goal: Patient's chronic conditions and co-morbidity symptoms are monitored and maintained or improved  Outcome: Progressing  Problem: Fall/Injury  Goal: Not fall by end of shift  Outcome: Progressing  Goal: Be free from injury by end of the shift  Outcome: Progressing  Goal: Verbalize understanding of personal risk factors for fall in the hospital  Outcome: Progressing  Goal: Verbalize understanding of risk factor reduction measures to prevent injury from fall in the home  Outcome: Progressing  Goal: Use assistive devices by end of the shift  Outcome: Progressing  Goal: Pace activities to prevent fatigue by end of the shift  Outcome: Progressing  Problem: Pain  Goal: Takes deep breaths with  improved pain control throughout the shift  Outcome: Progressing  Goal: Turns in bed with improved pain control throughout the shift  Outcome: Progressing  Goal: Walks with improved pain control throughout the shift  Outcome: Progressing  Goal: Performs ADL's with improved pain control throughout shift  Outcome: Progressing  Goal: Participates in PT with improved pain control throughout the shift  Outcome: Progressing  Goal: Free from opioid side effects throughout the shift  Outcome: Progressing  Goal: Free from acute confusion related to pain meds throughout the shift  Outcome: Progressing

## 2024-02-29 NOTE — PROGRESS NOTES
Physical Therapy    Physical Therapy Treatment    Patient Name: Blanca Hamilton  MRN: [id number]  Today's Date: 2/29/2024  Time Calculation  Start Time: 1003  Stop Time: 1026  Time Calculation (min): 23 min       Assessment/Plan   PT Assessment  PT Assessment Results: Decreased mobility, Orthopedic restrictions  End of Session Communication: Bedside nurse  End of Session Patient Position: Bed, 2 rail up, Alarm on (sitting up EOB with foam pillow back support, call light and nursing awre an dok with patient sitting EOB. patient is AXOX3, able to portia nurse.)  PT Plan  Inpatient/Swing Bed or Outpatient: Inpatient  PT Plan  Treatment/Interventions: Bed mobility, Transfer training, Neuromuscular re-education, Strengthening, Endurance training, Therapeutic exercise  PT Plan: Skilled PT  PT Frequency: 3 times per week  PT Discharge Recommendations: Moderate intensity level of continued care  Equipment Recommended upon Discharge: Wheeled walker, Wheelchair  PT Recommended Transfer Status: Assist x1  PT - OK to Discharge: Yes      General Visit Information:   PT  Visit  PT Received On: 02/29/24  General  Reason for Referral: 49 yo female referred to PT for R hip wound dehiscence, impaired mobility, hip revision on 2/20 with wound vac and drain placement  Referred By: Brando Mascorro  Past Medical History Relevant to Rehab: Anxiety, Arthritis, Class 1 obesity with body mass index (BMI) of 30.0 to 30.9 in adult (02/02/2024), Depression, GERD (gastroesophageal reflux disease), HTN (hypertension), Hyperlipidemia; R total hip arthroplasty on 1/2/4, but had to get subsequent revision on 1/5 for prosthetic fracture. 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 Cultures were positive for MSSA and patient was started on cefazolin. Then underwent ALEXA revision and partial hardware removal with Dr. Jalloh on 2/6/24. Patient was discharged to SNF on 2/11 with wound vac, a PICC line for 6 weeks of cefazolin, and  "ASA for DVT ppx.  Prior to Session Communication: Bedside nurse  Patient Position Received: Bed, 3 rail up, Alarm on  Preferred Learning Style: verbal, kinesthetic  General Comment: Patient pleasant and agreeable to PT tx (pateint states \"maybe I will try ti stand tomorrow, not today, but maybe tomorrow\" self limiting at the start)    Subjective   Precautions:     Vital Signs:       Objective   Pain:  Pain Assessment  Pain Assessment: 0-10  Pain Score: 0 - No pain  Cognition:  Cognition  Overall Cognitive Status: Within Functional Limits  Orientation Level: Oriented X4  Postural Control:  Postural Control  Postural Control: Impaired  Head Control: poor, forward head  Trunk Control: Fair, still forward  Righting Reactions: slightly delayed  Extremity/Trunk Assessments:    Activity Tolerance:  Activity Tolerance  Endurance: Tolerates 30 min exercise with multiple rests  Treatments:  Therapeutic Exercise  Therapeutic Exercise Performed: Yes  Therapeutic Exercise Activity 1: sitting: ankle pumps B 2 x 20; LAQ B 2 x 20; Marching L 2 x 20  Therapeutic Exercise Activity 2: long sitting ankle pumps, quad sets, heel slides with CGA bilat LE's, glute sets, sitting heel/toe raises, long arc quads, all x12 reps for strengthening and endurance building         Balance/Neuromuscular Re-Education  Balance/Neuromuscular Re-Education Activity Performed: Yes  Balance/Neuromuscular Re-Education Activity 1: static/dynamic sitting EOB wit and wihtout UE support x15 minues with SBA for cores and sitting balance training.              Bed Mobility  Bed Mobility: Yes  Bed Mobility 1  Bed Mobility 1: Supine to sitting  Level of Assistance 1: Modified independent, Minimum assistance  Bed Mobility 2  Bed Mobility  2: Scooting  Level of Assistance 2: Minimum assistance    Ambulation/Gait Training  Ambulation/Gait Training Performed: No  Transfers  Transfer: No    Stairs  Stairs: No                Outcome Measures:  [facility] Basic " Mobility  Turning from your back to your side while in a flat bed without using bedrails: A little  Moving from lying on your back to sitting on the side of a flat bed without using bedrails: A lot  Moving to and from bed to chair (including a wheelchair): Total  Standing up from a chair using your arms (e.g. wheelchair or bedside chair): Total  To walk in hospital room: Total  Climbing 3-5 steps with railing: Total  Basic Mobility - Total Score: 9    Education Documentation  Body Mechanics, taught by Adelina Duncan PTA at 2/29/2024 11:14 AM.  Learner: Patient  Readiness: Acceptance  Method: Explanation  Response: Verbalizes Understanding    Precautions, taught by Adelina Duncan PTA at 2/29/2024 11:14 AM.  Learner: Patient  Readiness: Acceptance  Method: Explanation  Response: Verbalizes Understanding    ADL Training, taught by Adelina Duncan PTA at 2/29/2024 11:14 AM.  Learner: Patient  Readiness: Acceptance  Method: Explanation  Response: Verbalizes Understanding    Precautions, taught by Adelina Duncan PTA at 2/29/2024 11:14 AM.  Learner: Patient  Readiness: Acceptance  Method: Explanation  Response: Verbalizes Understanding    Body Mechanics, taught by Adelina Duncan PTA at 2/29/2024 11:14 AM.  Learner: Patient  Readiness: Acceptance  Method: Explanation  Response: Verbalizes Understanding    Mobility Training, taught by Adelina Duncan PTA at 2/29/2024 11:14 AM.  Learner: Patient  Readiness: Acceptance  Method: Explanation  Response: Verbalizes Understanding    Education Comments  No comments found.        OP EDUCATION:       Encounter Problems       Encounter Problems (Active)       Balance       STG - Maintains static standing balance with upper extremity support x 1' maintaining weight bearing status  (Progressing)       Start:  02/14/24    Expected End:  03/04/24            STG - Maintains dynamic sitting balance with upper extremity support x 10'  (Progressing)       Start:  02/14/24    Expected End:   03/04/24               Pain - Adult          Transfers       STG - Patient will perform bed mobility independently  (Progressing)       Start:  02/14/24    Expected End:  03/04/24            STG - Patient will transfer sit to and from stand min A, maintaining weight bearing status  (Progressing)       Start:  02/14/24    Expected End:  03/04/24

## 2024-02-29 NOTE — PROGRESS NOTES
"Blanca Hamilton is a 48 y.o. female on day 15 of admission presenting with Wound dehiscence.    Subjective   Patient was seen and examined seated upright in bed, no acute events overnight. Pain remains well controlled and denies needing medication for breakthrough. Discussed not having music therapy as option at [facility], but pet therapy to see patient this week. She denies fever, chest pain, nausea, vomiting, abdominal pain, leg pain or worsening leg swelling    Objective     Physical Exam  HENT:      Head: Normocephalic.      Nose: Nose normal.   Cardiovascular:      Rate and Rhythm: Normal rate and regular rhythm.      Pulses: Normal pulses.      Heart sounds: Normal heart sounds.   Pulmonary:      Comments: Decrease breath sound in both lungs  Abdominal:      General: Abdomen is flat. Bowel sounds are normal. There is no distension.      Palpations: Abdomen is soft. There is no mass.      Tenderness: There is no abdominal tenderness.   Musculoskeletal:         General: Tenderness present.      Cervical back: Normal range of motion.      Right lower leg: Edema present.      Left lower leg: Edema present.      Comments: Surgical site clean and dry, with surgical packing and vac in place. Deep and surgical drains placed and draining serosang. Continues to have significant output in drains, appear to have been emptied this morning. Incision is stable, tightly wrapped without overt signs of hematoma formation or seepage. VAC has had 0 output.   Skin:     Findings: Bruising present. No rash.   Neurological:      General: No focal deficit present.      Mental Status: She is alert.      Cranial Nerves: No cranial nerve deficit.         Last Recorded Vitals  Blood pressure 106/67, pulse 99, temperature 36.6 °C (97.9 °F), temperature source Temporal, resp. rate 16, height 1.753 m (5' 9\"), weight 84.9 kg (187 lb 3.2 oz), SpO2 96 %.  Intake/Output last 3 Shifts:  I/O last 3 completed shifts:  In: 3343 (39.4 mL/kg) [P.O.:1182; " I.V.:1861 (21.9 mL/kg); IV Piggyback:300]  Out: 2870 (33.8 mL/kg) [Urine:2475 (0.8 mL/kg/hr); Drains:395]  Dosing Weight: 84.9 kg     Relevant Results     Results for orders placed or performed during the hospital encounter of 02/14/24 (from the past 24 hour(s))   CBC   Result Value Ref Range    WBC 5.0 4.4 - 11.3 x10*3/uL    nRBC 0.0 0.0 - 0.0 /100 WBCs    RBC 3.00 (L) 4.00 - 5.20 x10*6/uL    Hemoglobin 8.6 (L) 12.0 - 16.0 g/dL    Hematocrit 27.8 (L) 36.0 - 46.0 %    MCV 93 80 - 100 fL    MCH 28.7 26.0 - 34.0 pg    MCHC 30.9 (L) 32.0 - 36.0 g/dL    RDW 16.0 (H) 11.5 - 14.5 %    Platelets 341 150 - 450 x10*3/uL   Comprehensive Metabolic Panel   Result Value Ref Range    Glucose 82 74 - 99 mg/dL    Sodium 132 (L) 136 - 145 mmol/L    Potassium 3.9 3.5 - 5.3 mmol/L    Chloride 101 98 - 107 mmol/L    Bicarbonate 26 21 - 32 mmol/L    Anion Gap 9 (L) 10 - 20 mmol/L    Urea Nitrogen 5 (L) 6 - 23 mg/dL    Creatinine 0.27 (L) 0.50 - 1.05 mg/dL    eGFR >90 >60 mL/min/1.73m*2    Calcium 7.6 (L) 8.6 - 10.3 mg/dL    Albumin 2.4 (L) 3.4 - 5.0 g/dL    Alkaline Phosphatase 121 (H) 33 - 110 U/L    Total Protein 4.7 (L) 6.4 - 8.2 g/dL    AST 7 (L) 9 - 39 U/L    Bilirubin, Total 0.2 0.0 - 1.2 mg/dL    ALT <3 (L) 7 - 45 U/L       Current Facility-Administered Medications:     acetaminophen (Tylenol) tablet 975 mg, 975 mg, oral, q8h, Barry Bledsoe DO, 975 mg at 02/29/24 0521    ARIPiprazole (Abilify) tablet 15 mg, 15 mg, oral, Nightly, Barry Bledsoe DO, 15 mg at 02/28/24 2044    [Held by provider] aspirin chewable tablet 81 mg, 81 mg, oral, BID, Barry Chirinosn DO    calcium carbonate (Tums) chewable tablet 500 mg, 500 mg, oral, TID PRN, Lele Killian DO, 500 mg at 02/29/24 1149    ceFAZolin in dextrose (iso-os) (Ancef) IVPB 2 g, 2 g, intravenous, q8h, Barry Bledsoe DO, Stopped at 02/29/24 0551    enoxaparin (Lovenox) syringe 40 mg, 40 mg, subcutaneous, q24h, Barry Bledsoe DO, 40 mg at 02/28/24 1740    famotidine  (Pepcid) tablet 40 mg, 40 mg, oral, BID, Barry D Reiman, DO, 40 mg at 02/29/24 0907    ferrous sulfate (325 mg ferrous sulfate) tablet 1 tablet, 1 tablet, oral, Daily, Barry D Reiman, DO, 1 tablet at 02/29/24 0520    fluticasone (Flonase) nasal spray 2 spray, 2 spray, Each Nostril, Daily, Dylan Casas, DO, 2 spray at 02/29/24 0907    HYDROmorphone (Dilaudid) injection 0.4 mg, 0.4 mg, intravenous, q3h PRN, Barry D Reiman, DO    lactated Ringer's infusion, 100 mL/hr, intravenous, Continuous, Barry D Reiman, DO, Last Rate: 100 mL/hr at 02/29/24 0908, 100 mL/hr at 02/29/24 0908    [Held by provider] lisinopril tablet 10 mg, 10 mg, oral, q AM, Barry D Reiman, DO    loratadine (Claritin) tablet 10 mg, 10 mg, oral, Daily, Barry D Reiman, DO, 10 mg at 02/29/24 0907    magnesium oxide (Mag-Ox) tablet 800 mg, 800 mg, oral, Daily, Barry D Reiman, DO, 800 mg at 02/29/24 0907    metoprolol succinate XL (Toprol-XL) 24 hr tablet 25 mg, 25 mg, oral, Daily, Barry D Reiman, DO, 25 mg at 02/29/24 0907    midodrine (Proamatine) tablet 5 mg, 5 mg, oral, BID PRN, Barry D Reiman, DO    montelukast (Singulair) tablet 10 mg, 10 mg, oral, q AM, Barry D Reiman, DO, 10 mg at 02/29/24 0907    nicotine (Nicoderm CQ) 21 mg/24 hr patch 1 patch, 1 patch, transdermal, Daily, Barry D Reiman, DO, 1 patch at 02/29/24 0907    ondansetron (Zofran) injection 4 mg, 4 mg, intravenous, q6h PRN, Barry D Reiman, DO, 4 mg at 02/27/24 1600    oseltamivir (Tamiflu) capsule 75 mg, 75 mg, oral, Daily, Frank Modi MD, 75 mg at 02/29/24 0907    oxyCODONE (Roxicodone) immediate release tablet 10 mg, 10 mg, oral, q6h PRN, Barry D Reiman, DO, 10 mg at 02/24/24 0500    oxyCODONE (Roxicodone) immediate release tablet 5 mg, 5 mg, oral, q6h PRN, Barry Gamboaiman, DO, 5 mg at 02/22/24 2034    PARoxetine (Paxil) tablet 40 mg, 40 mg, oral, Daily, Barry BRENDA Reiman, DO, 40 mg at 02/29/24 0907    polyethylene glycol (Glycolax, Miralax) packet 17 g,  17 g, oral, BID, Barry D Reiman, DO, 17 g at 02/24/24 0815    simvastatin (Zocor) tablet 20 mg, 20 mg, oral, Nightly, Barry D Reiman, DO, 20 mg at 02/28/24 2045    traZODone (Desyrel) tablet 100 mg, 100 mg, oral, Nightly, Barry D Reiman, DO, 100 mg at 02/28/24 2044    zinc oxide 40 % ointment 1 Application, 1 Application, Topical, BID, Barry D Reiman, DO, 1 Application at 02/29/24 0907        Assessment/Plan   Blanca Hamilton is a 48 y.o. 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 Initially met Sepsis criteria. Admitted for further management of dehiscence and SSI.      Acute Medical Issues:  # Sepsis secondary to reccurent post op wound infection (MSSA) of right hip arthroplasty   # Hx right total hip arthroplasty w/ revision (1/5/24) for prosthetic fx  # Hx hardware removal for prosthetic joint infection (2/6/24)  -OR 2/20/24 right hip revision with Dr. Jalloh  -Discharged on 2/11 with wound vac + PICC line for 6-week course of cefazolin              -Previous tissue/Wound Culture (2/6): MSSA, only resistant to Tetracycline   -pain control: scheduled tylenol, oxy 5 mod, oxy 10 severe, dilaudid 0.4 PRN breakthrough  -toe touch weight bearing for transfers other, otherwise NWB RLE  -DO NOT LAY on right hip, maintain two pillows in between legs when in bed  -Blood Cx (2/14) no growth, final report  -PT/OT: moderate intensity   -Cont Ancef 2g TID (2/20/2024 - 4/2/2024)  -On discharge, will need 6w Ancef until 4/2 with weekly labs  -Holding ASA in setting of copious drain output, must balance risk of VTE  -With complex case and inability for outside facility to care for drains appropriately, plan to keep patient in house to monitor drain output before removal. Once output decreases, can consider removing drains    # Depression, worsened with complicated hospital stay  -c/w Abilify + Paroxetine  +Trazodone  -Music therapy no longer offered at [facility] due to staffing issues  -Pet therapy to see patient this week  -Encourage conversation and visit from family/friends    # Flu B exposure  -Asymptomatic, roommate tested positive  -Tamiflu 2/21-3/1  -Cont Tamiflu for total of 10 days, to receive last dose 3/1     Chronic Medical Issues:  # GERD: c/w Pepcid   # HTN: Restarted home Metoprolol today. Holding home lisinopril  # HLD: c/w Simvastatin  # Chronic anemia: iron supplement  # Tobacco use: nicotine patch     Fluids: None  Electrolytes: Replete as needed  Nutrition: Regular, supplements  GI PPX: None   DVT PPX: Holding ASA in setting of increased drain output     Access: PICC Line, PIV's  Antibiotics: Cefazolin 2g TID (2/20 - 4/2)  Oxygenation: Room Air     Dispo: Admitted onto the floor for postoperative wound dehiscence and surgical site infection. OR on 2/20/24 for right hip revision. With complex case and inability for outside facility to care for drains appropriately, plan to keep patient in house to monitor drain output before removal. Will need pre-cert on discharge.      Barry Bledsoe, DO  PGY-1

## 2024-02-29 NOTE — PROGRESS NOTES
"   Nutrition Assessment  - follow up        Patient is a 48 y.o. female presenting with: Wound dehiscence,        Nutrition History:  Food and Nutrient History: Pt eating % of meals per nursing documentation.  Pt is consuming Ensure HP, unsure if she has received Anival- discussed with RN.  Energy Intake: Good > 75 %  No Known Allergies     Anthropometrics:  Height: 175.3 cm (5' 9\")   Weight: 84.9 kg (187 lb 3.2 oz)   BMI (Calculated): 27.63             Weight History:   Wt Readings from Last 10 Encounters:   02/29/24 84.9 kg (187 lb 3.2 oz)   02/02/24 83.3 kg (183 lb 10.3 oz)   01/12/24 90.2 kg (198 lb 13.7 oz)   01/11/24 108 kg (238 lb)   01/09/24 104 kg (230 lb)   01/02/24 89 kg (196 lb 3.4 oz)   02/18/20 90.1 kg (198 lb 10.2 oz)          Nutrition Focused Physical Exam Findings:    Subcutaneous Fat Loss:      Muscle Wasting:     Edema:  Edema: none  Edema Location: none noted  Physical Findings:  Skin: Positive (R leg wound vac)    Nutrition Significant Labs:  CBC Trend:   Results from last 7 days   Lab Units 02/29/24  0533 02/28/24  0539 02/27/24  0500 02/26/24  0515   WBC AUTO x10*3/uL 5.0 4.6 5.0 4.7   RBC AUTO x10*6/uL 3.00* 3.15* 2.97* 2.91*   HEMOGLOBIN g/dL 8.6* 9.1* 8.5* 8.4*   HEMATOCRIT % 27.8* 29.1* 27.8* 27.1*   MCV fL 93 92 94 93   PLATELETS AUTO x10*3/uL 341 348 307 279    , BMP Trend:   Results from last 7 days   Lab Units 02/29/24  0533 02/28/24  0539 02/27/24  0500 02/26/24  0515   GLUCOSE mg/dL 82 78 77 81   CALCIUM mg/dL 7.6* 7.9* 7.7* 7.8*   SODIUM mmol/L 132* 136 132* 137   POTASSIUM mmol/L 3.9 4.2 4.3 4.1   CO2 mmol/L 26 27 25 28   CHLORIDE mmol/L 101 103 102 104   BUN mg/dL 5* 4* 5* 4*   CREATININE mg/dL 0.27* 0.25* 0.24* 0.25*    , A1C:No results found for: \"HGBA1C\", BG POCT trend:    , Renal Lab Trend:   Results from last 7 days   Lab Units 02/29/24  0533 02/28/24  0539 02/27/24  0500 02/26/24  0515   POTASSIUM mmol/L 3.9 4.2 4.3 4.1   PHOSPHORUS mg/dL  --   --   --  3.6   SODIUM " mmol/L 132* 136 132* 137   MAGNESIUM mg/dL  --   --   --  1.67   EGFR mL/min/1.73m*2 >90 >90 >90 >90   BUN mg/dL 5* 4* 5* 4*   CREATININE mg/dL 0.27* 0.25* 0.24* 0.25*        Nutrition Specific Medications:      Current Facility-Administered Medications:     acetaminophen (Tylenol) tablet 975 mg, 975 mg, oral, q8h, Barry Gamboaiman, DO, 975 mg at 02/29/24 1259    ARIPiprazole (Abilify) tablet 15 mg, 15 mg, oral, Nightly, Barry Gamboaiman, DO, 15 mg at 02/28/24 2044    [Held by provider] aspirin chewable tablet 81 mg, 81 mg, oral, BID, Barry GUTIERREZ Reiman, DO    calcium carbonate (Tums) chewable tablet 500 mg, 500 mg, oral, TID PRN, Lele Killian DO, 500 mg at 02/29/24 1149    ceFAZolin in dextrose (iso-os) (Ancef) IVPB 2 g, 2 g, intravenous, q8h, Barry Gamboaiman, DO, Last Rate: 0 mL/hr at 02/29/24 0551, 2 g at 02/29/24 1259    enoxaparin (Lovenox) syringe 40 mg, 40 mg, subcutaneous, q24h, Barry Gamboaiman, DO, 40 mg at 02/28/24 1740    famotidine (Pepcid) tablet 40 mg, 40 mg, oral, BID, Barry Gamboaiman, DO, 40 mg at 02/29/24 0907    ferrous sulfate (325 mg ferrous sulfate) tablet 1 tablet, 1 tablet, oral, Daily, Barry Gamboaimamatt, DO, 1 tablet at 02/29/24 0520    fluticasone (Flonase) nasal spray 2 spray, 2 spray, Each Nostril, Daily, Dylan Casas DO, 2 spray at 02/29/24 0907    HYDROmorphone (Dilaudid) injection 0.4 mg, 0.4 mg, intravenous, q3h PRN, Barry Gamboaiman, DO    lactated Ringer's infusion, 100 mL/hr, intravenous, Continuous, Barry D Reiman, DO, Last Rate: 100 mL/hr at 02/29/24 0908, 100 mL/hr at 02/29/24 0908    [Held by provider] lisinopril tablet 10 mg, 10 mg, oral, q AM, Barry D Reiman, DO    loratadine (Claritin) tablet 10 mg, 10 mg, oral, Daily, Barry D Reiman, DO, 10 mg at 02/29/24 0907    magnesium oxide (Mag-Ox) tablet 800 mg, 800 mg, oral, Daily, Barry D Reiman, DO, 800 mg at 02/29/24 0907    metoprolol succinate XL (Toprol-XL) 24 hr tablet 25 mg, 25 mg, oral, Daily, Barry D  Reiman, DO, 25 mg at 02/29/24 0907    midodrine (Proamatine) tablet 5 mg, 5 mg, oral, BID PRN, Barry D Reiman, DO    montelukast (Singulair) tablet 10 mg, 10 mg, oral, q AM, Barry D Reiman, DO, 10 mg at 02/29/24 0907    nicotine (Nicoderm CQ) 21 mg/24 hr patch 1 patch, 1 patch, transdermal, Daily, Barry D Reiman, DO, 1 patch at 02/29/24 0907    ondansetron (Zofran) injection 4 mg, 4 mg, intravenous, q6h PRN, Barry D Reiman, DO, 4 mg at 02/27/24 1600    oseltamivir (Tamiflu) capsule 75 mg, 75 mg, oral, Daily, Barry D Reiman, DO, 75 mg at 02/29/24 0907    oxyCODONE (Roxicodone) immediate release tablet 10 mg, 10 mg, oral, q6h PRN, Barry D Reiman, DO, 10 mg at 02/24/24 0500    oxyCODONE (Roxicodone) immediate release tablet 5 mg, 5 mg, oral, q6h PRN, Barry D Reiman, DO, 5 mg at 02/22/24 2034    PARoxetine (Paxil) tablet 40 mg, 40 mg, oral, Daily, Barry D Reiman, DO, 40 mg at 02/29/24 0907    polyethylene glycol (Glycolax, Miralax) packet 17 g, 17 g, oral, BID, Barry D Reiman, DO, 17 g at 02/24/24 0815    simvastatin (Zocor) tablet 20 mg, 20 mg, oral, Nightly, Barry D Reiman, DO, 20 mg at 02/28/24 2045    traZODone (Desyrel) tablet 100 mg, 100 mg, oral, Nightly, Barry D Reiman, DO, 100 mg at 02/28/24 2044    zinc oxide 40 % ointment 1 Application, 1 Application, Topical, BID, Barry D Reiman, DO, 1 Application at 02/29/24 0907   Nursing Data Per flowsheet:   Stool Appearance: Soft (02/25/24 1300)  Gastrointestinal  Gastrointestinal (WDL): Within Defined Limits  Abdomen Inspection: Soft  Abdominal Tenderness: Soft, No guarding  Bowel Sounds: All quadrants  Bowel Sounds (All Quadrants): Active    Intake/Output Summary (Last 24 hours) at 2/29/2024 1321  Last data filed at 2/29/2024 1118  Gross per 24 hour   Intake 3471 ml   Output 1580 ml   Net 1891 ml       Pain Score: 4   Dietary Orders (From admission, onward)       Start     Ordered    02/22/24 1339  Oral nutritional supplements  Until  discontinued        Question Answer Comment   Deliver with Dinner    Select supplement: Anival        02/22/24 1339    02/20/24 1702  Adult diet Regular  Diet effective now        Question:  Diet type  Answer:  Regular    02/20/24 1702    02/16/24 1501  Oral nutritional supplements  Until discontinued        Comments: Chocolate   Question Answer Comment   Deliver with Breakfast    Select supplement: Ensure High Protein        02/16/24 1500                     Estimated Needs:      Method for Estimating Needs: 1975 kcal (30 kcal/kg/IBW)     Method for Estimating Needs: 79 g (1.2 g/kg/IBW)     Method for Estimating Needs: 1 mL/kcal or fluid per medical team.       Nutrition Diagnosis        Nutrition Diagnosis  Patient has Nutrition Diagnosis: Yes  Diagnosis Status (1): Ongoing  Nutrition Diagnosis 1: Increased nutrient needs  Related to (1): healing needs  As Evidenced by (1): surgical wound  Additional Assessment Information (1): ONS provided for additional protein source       Nutrition Interventions/Recommendations   Nutrition Prescription:  Individualized Nutrition Prescription Provided for : diet, fluids    Nutrition Interventions:   Food and/or Nutrient Delivery Interventions  Additional Interventions: Ensure HP daily, Anival 1 packet daily= 250 kcal, 19 g protein      Coordination of Care: SAMANTHA Tobias  Nutrition Education:   Education Documentation  No documentation found.         Recommendations:  Continue with current diet  Weights 2-3 x/week  MVI with minerals daily          Nutrition Monitoring and Evaluation     Food/Nutrient Related History Monitoring  Monitoring and Evaluation Plan: Energy intake  Energy Intake: Estimated energy intake  Criteria: Pt will consume >/= 75% of meals provided    Body Composition/Growth/Weight History  Monitoring and Evaluation Plan: Weight  Weight: Measured weight  Criteria: Pt will maintain current weight of 84.9 kg +/- 2 kg    Biochemical Data, Medical Tests and  Procedures  Monitoring and Evaluation Plan: Electrolyte/renal panel  Electrolyte and Renal Panel: BUN, Calcium, serum, Creatinine, Magnesium, Phosphorus, Potassium, Sodium  Criteria: RFP, Mg wnl                 Time Spent (min): 30 minutes

## 2024-03-01 LAB
ATRIAL RATE: 90 BPM
P AXIS: 41 DEGREES
P OFFSET: 197 MS
P ONSET: 145 MS
PR INTERVAL: 150 MS
Q ONSET: 220 MS
QRS COUNT: 15 BEATS
QRS DURATION: 80 MS
QT INTERVAL: 354 MS
QTC CALCULATION(BAZETT): 433 MS
QTC FREDERICIA: 405 MS
R AXIS: 19 DEGREES
T AXIS: 36 DEGREES
T OFFSET: 397 MS
VENTRICULAR RATE: 90 BPM

## 2024-03-01 PROCEDURE — 97530 THERAPEUTIC ACTIVITIES: CPT | Mod: GO

## 2024-03-01 PROCEDURE — 2500000001 HC RX 250 WO HCPCS SELF ADMINISTERED DRUGS (ALT 637 FOR MEDICARE OP)

## 2024-03-01 PROCEDURE — S4991 NICOTINE PATCH NONLEGEND: HCPCS

## 2024-03-01 PROCEDURE — 2500000002 HC RX 250 W HCPCS SELF ADMINISTERED DRUGS (ALT 637 FOR MEDICARE OP, ALT 636 FOR OP/ED)

## 2024-03-01 PROCEDURE — 99232 SBSQ HOSP IP/OBS MODERATE 35: CPT

## 2024-03-01 PROCEDURE — 2500000004 HC RX 250 GENERAL PHARMACY W/ HCPCS (ALT 636 FOR OP/ED)

## 2024-03-01 PROCEDURE — 1200000002 HC GENERAL ROOM WITH TELEMETRY DAILY

## 2024-03-01 PROCEDURE — 2500000001 HC RX 250 WO HCPCS SELF ADMINISTERED DRUGS (ALT 637 FOR MEDICARE OP): Performed by: INTERNAL MEDICINE

## 2024-03-01 PROCEDURE — 97535 SELF CARE MNGMENT TRAINING: CPT | Mod: GO

## 2024-03-01 PROCEDURE — 99232 SBSQ HOSP IP/OBS MODERATE 35: CPT | Performed by: NURSE PRACTITIONER

## 2024-03-01 RX ORDER — AMOXICILLIN 250 MG
1 CAPSULE ORAL 2 TIMES DAILY
Status: CANCELLED | OUTPATIENT
Start: 2024-03-01

## 2024-03-01 RX ADMIN — PAROXETINE 40 MG: 20 TABLET, FILM COATED ORAL at 09:18

## 2024-03-01 RX ADMIN — FAMOTIDINE 40 MG: 20 TABLET ORAL at 09:18

## 2024-03-01 RX ADMIN — ARIPIPRAZOLE 15 MG: 5 TABLET ORAL at 20:47

## 2024-03-01 RX ADMIN — Medication 800 MG: at 09:18

## 2024-03-01 RX ADMIN — Medication 1 APPLICATION: at 09:19

## 2024-03-01 RX ADMIN — TRAZODONE HYDROCHLORIDE 100 MG: 50 TABLET ORAL at 20:44

## 2024-03-01 RX ADMIN — SODIUM CHLORIDE, POTASSIUM CHLORIDE, SODIUM LACTATE AND CALCIUM CHLORIDE 100 ML/HR: 600; 310; 30; 20 INJECTION, SOLUTION INTRAVENOUS at 20:49

## 2024-03-01 RX ADMIN — FERROUS SULFATE TAB 325 MG (65 MG ELEMENTAL FE) 1 TABLET: 325 (65 FE) TAB at 04:30

## 2024-03-01 RX ADMIN — ACETAMINOPHEN 975 MG: 325 TABLET ORAL at 04:30

## 2024-03-01 RX ADMIN — ACETAMINOPHEN 975 MG: 325 TABLET ORAL at 20:45

## 2024-03-01 RX ADMIN — ACETAMINOPHEN 975 MG: 325 TABLET ORAL at 14:09

## 2024-03-01 RX ADMIN — Medication 1 APPLICATION: at 20:51

## 2024-03-01 RX ADMIN — SODIUM CHLORIDE, POTASSIUM CHLORIDE, SODIUM LACTATE AND CALCIUM CHLORIDE 100 ML/HR: 600; 310; 30; 20 INJECTION, SOLUTION INTRAVENOUS at 09:24

## 2024-03-01 RX ADMIN — CEFAZOLIN SODIUM 2 G: 2 INJECTION, SOLUTION INTRAVENOUS at 04:30

## 2024-03-01 RX ADMIN — FAMOTIDINE 40 MG: 20 TABLET ORAL at 20:43

## 2024-03-01 RX ADMIN — OSELTAMAVIR PHOSPHATE 75 MG: 75 CAPSULE ORAL at 09:18

## 2024-03-01 RX ADMIN — ENOXAPARIN SODIUM 40 MG: 40 INJECTION SUBCUTANEOUS at 15:47

## 2024-03-01 RX ADMIN — CEFAZOLIN SODIUM 2 G: 2 INJECTION, SOLUTION INTRAVENOUS at 20:46

## 2024-03-01 RX ADMIN — NICOTINE 1 PATCH: 21 PATCH, EXTENDED RELEASE TRANSDERMAL at 09:18

## 2024-03-01 RX ADMIN — CEFAZOLIN SODIUM 2 G: 2 INJECTION, SOLUTION INTRAVENOUS at 14:09

## 2024-03-01 RX ADMIN — FLUTICASONE PROPIONATE 2 SPRAY: 50 SPRAY, METERED NASAL at 09:19

## 2024-03-01 RX ADMIN — METOPROLOL SUCCINATE 25 MG: 25 TABLET, EXTENDED RELEASE ORAL at 09:18

## 2024-03-01 RX ADMIN — SIMVASTATIN 20 MG: 20 TABLET, FILM COATED ORAL at 20:43

## 2024-03-01 RX ADMIN — LORATADINE 10 MG: 10 TABLET ORAL at 09:18

## 2024-03-01 RX ADMIN — CALCIUM CARBONATE (ANTACID) CHEW TAB 500 MG 500 MG: 500 CHEW TAB at 15:52

## 2024-03-01 RX ADMIN — MONTELUKAST 10 MG: 10 TABLET, FILM COATED ORAL at 09:18

## 2024-03-01 ASSESSMENT — COGNITIVE AND FUNCTIONAL STATUS - GENERAL
HELP NEEDED FOR BATHING: A LOT
MOVING FROM LYING ON BACK TO SITTING ON SIDE OF FLAT BED WITH BEDRAILS: A LITTLE
DAILY ACTIVITIY SCORE: 16
DAILY ACTIVITIY SCORE: 15
WALKING IN HOSPITAL ROOM: A LOT
HELP NEEDED FOR BATHING: A LOT
STANDING UP FROM CHAIR USING ARMS: A LOT
TOILETING: TOTAL
PERSONAL GROOMING: A LITTLE
DRESSING REGULAR LOWER BODY CLOTHING: A LOT
MOVING TO AND FROM BED TO CHAIR: A LITTLE
PERSONAL GROOMING: A LITTLE
DRESSING REGULAR LOWER BODY CLOTHING: A LOT
DRESSING REGULAR UPPER BODY CLOTHING: A LITTLE
TOILETING: A LOT
TURNING FROM BACK TO SIDE WHILE IN FLAT BAD: A LITTLE
DRESSING REGULAR UPPER BODY CLOTHING: A LITTLE
MOBILITY SCORE: 14
CLIMB 3 TO 5 STEPS WITH RAILING: TOTAL

## 2024-03-01 ASSESSMENT — PAIN SCALES - GENERAL
PAINLEVEL_OUTOF10: 0 - NO PAIN
PAINLEVEL_OUTOF10: 0 - NO PAIN

## 2024-03-01 ASSESSMENT — PAIN - FUNCTIONAL ASSESSMENT
PAIN_FUNCTIONAL_ASSESSMENT: 0-10

## 2024-03-01 ASSESSMENT — PAIN DESCRIPTION - LOCATION: LOCATION: HIP

## 2024-03-01 ASSESSMENT — ACTIVITIES OF DAILY LIVING (ADL): HOME_MANAGEMENT_TIME_ENTRY: 10

## 2024-03-01 NOTE — PROGRESS NOTES
Occupational Therapy    Occupational Therapy Treatment    Name: Blanca Hamilton  MRN: [id number]  : 1975  Date: 24  Time Calculation  Start Time: 1334  Stop Time: 1358  Time Calculation (min): 24 min    Assessment:  OT Assessment: Pt continues to demonstrate progress towards OT goals, would still benefit from continued skilled OT to maximize safety and independence.  Prognosis: Good  Barriers to Discharge: None  Evaluation/Treatment Tolerance: Patient limited by fatigue  Medical Staff Made Aware: Yes  End of Session Communication: Bedside nurse  End of Session Patient Position: Bed, 3 rail up, Alarm on  Plan:  Treatment Interventions: ADL retraining, Functional transfer training, Patient/family training, Compensatory technique education, Equipment evaluation/education  OT Frequency: 3 times per week  OT Discharge Recommendations: Moderate intensity level of continued care  Equipment Recommended upon Discharge: Wheeled walker, Wheelchair  OT Recommended Transfer Status: Assist of 1  OT - OK to Discharge: Yes    Subjective   Previous Visit Info:  OT Last Visit  OT Received On: 24  General:  General  Reason for Referral: 49 yo female referred to OT for R hip wound dehiscence, impaired mobility, hip revision on  with wound vac and drain placement  Referred By: Brando Mascorro  Past Medical History Relevant to Rehab: Anxiety, Arthritis, Class 1 obesity with body mass index (BMI) of 30.0 to 30.9 in adult (2024), Depression, GERD (gastroesophageal reflux disease), HTN (hypertension), Hyperlipidemia; R total hip arthroplasty on , but had to get subsequent revision on  for prosthetic fracture. 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 Cultures were positive for MSSA and patient was started on cefazolin. Then underwent ALEXA revision and partial hardware removal with Dr. Jalloh on 24. Patient was discharged to SNF on  with wound vac, a PICC line for 6  weeks of cefazolin, and ASA for DVT ppx.  Prior to Session Communication: Bedside nurse  Patient Position Received: Bed, 3 rail up, Alarm on  General Comment: Pt pleasant, cooperative, agreeable to therapy.  Precautions:  LE Weight Bearing Status: Right Toe-Touch Weight Bearing  Medical Precautions: Fall precautions  Post-Surgical Precautions: Right hip precautions (R hip drains, wound vac)       Pain Assessment:  Pain Assessment  Pain Assessment: 0-10  Pain Score: 0 - No pain     Objective   Activities of Daily Living: Grooming  Grooming Level of Assistance: Setup  Grooming Where Assessed: Edge of bed  Grooming Comments: Completed oral care in static, unsupported sitting ~5 minutes; targeted increased sitting balance, increased postural strength, increased ADL performance.     Bed Mobility/Transfers: Bed Mobility  Bed Mobility: Yes  Bed Mobility 1  Bed Mobility 1: Supine to sitting  Level of Assistance 1: Contact guard  Bed Mobility Comments 1: With elevated HOB, use of bed rails. CGA for general safety, line management  Bed Mobility 2  Bed Mobility  2: Sitting to supine  Level of Assistance 2: Minimum assistance  Bed Mobility Comments 2: Assist to maneuver BLE over EOB  Bed Mobility 3  Bed Mobility 3: Scooting  Level of Assistance 3: Maximum assistance  Bed Mobility Comments 3: bed in trendelenburg position, pt able to assist in scooting towards HOB    Transfers  Transfer: No (Pt declined after max encouragement provided)    Sitting Balance:  Static Sitting Balance  Static Sitting-Balance Support: Feet supported, No upper extremity supported  Static Sitting-Level of Assistance: Distant supervision  Static Sitting-Comment/Number of Minutes: Sat EOB ~15 minutes, targeted increase activity tolerance, increased postural strength.    Outcome Measures:  [facility] Daily Activity  Putting on and taking off regular lower body clothing: A lot  Bathing (including washing, rinsing, drying): A lot  Putting on and taking off  regular upper body clothing: A little  Toileting, which includes using toilet, bedpan or urinal: Total  Taking care of personal grooming such as brushing teeth: A little  Eating Meals: None  Daily Activity - Total Score: 15        Education Documentation  Body Mechanics, taught by Ross Summers OT at 3/1/2024  2:28 PM.  Learner: Patient  Readiness: Acceptance  Method: Explanation  Response: Verbalizes Understanding    Precautions, taught by Ross Summers OT at 3/1/2024  2:28 PM.  Learner: Patient  Readiness: Acceptance  Method: Explanation  Response: Verbalizes Understanding    ADL Training, taught by Ross Summers OT at 3/1/2024  2:28 PM.  Learner: Patient  Readiness: Acceptance  Method: Explanation  Response: Verbalizes Understanding    Education Comments  No comments found.      Goals:  Encounter Problems       Encounter Problems (Active)       OT Goals       Pt will increase endurance to tolerate 15min of OOB activity with no more than 1 rest break in order to increase ability to engage in ADL completion.  (Progressing)       Start:  02/15/24    Expected End:  03/14/24            Pt will demo and/or verbalize 2-3 energy conservation techniques to incorporate into functional mobility or ADL to improve performance and increase independence.  (Progressing)       Start:  02/15/24    Expected End:  03/14/24            Pt will demo increased functional mobility/pivot transfer independence to tolerate tasks necessary to complete ADL routine.  (Progressing)       Start:  02/15/24    Expected End:  03/14/24            Pt will demo ADL routine and meaningful daily activities using modifications as needed  (Progressing)       Start:  02/15/24    Expected End:  03/14/24

## 2024-03-01 NOTE — PROGRESS NOTES
"Blanca Hamilton is a 48 y.o. female on day 16 of admission presenting with Wound dehiscence.    Subjective   No acute overnight events.  Pain controlled.  Deep drain with < 100/24 hours.    Objective     Physical Exam  Vitals reviewed.   HENT:      Head: Normocephalic and atraumatic.   Eyes:      Extraocular Movements: Extraocular movements intact.      Conjunctiva/sclera: Conjunctivae normal.      Pupils: Pupils are equal, round, and reactive to light.   Cardiovascular:      Rate and Rhythm: Normal rate and regular rhythm.      Pulses: Normal pulses.   Pulmonary:      Breath sounds: Normal breath sounds.   Abdominal:      General: Bowel sounds are normal.      Palpations: Abdomen is soft.   Musculoskeletal:      Comments: Right hip dressing C/D/I, superficial and deep hemovac drains to compressed suction with sanguinous output, prevena VAC in place to continuous suction, no output, leg and foot warm and well perfused, motion and sensations intact    Skin:     General: Skin is warm and dry.      Capillary Refill: Capillary refill takes less than 2 seconds.   Neurological:      General: No focal deficit present.      Mental Status: She is alert and oriented to person, place, and time.       Last Recorded Vitals  Blood pressure 101/60, pulse 85, temperature 36.4 °C (97.5 °F), temperature source Temporal, resp. rate 13, height 1.753 m (5' 9\"), weight 84.9 kg (187 lb 3.2 oz), SpO2 100 %.  Intake/Output last 3 Shifts:  I/O last 3 completed shifts:  In: 2766 (32.6 mL/kg) [P.O.:1162; I.V.:1404 (16.5 mL/kg); IV Piggyback:200]  Out: 3520 (41.5 mL/kg) [Urine:3125 (1 mL/kg/hr); Drains:395]  Dosing Weight: 84.9 kg     Relevant Results  Electrocardiogram, 12-lead PRN ACS symptoms    Result Date: 2/28/2024  Normal sinus rhythm Normal ECG When compared with ECG of 02-FEB-2024 15:03, Significant changes have occurred    Lower extremity venous duplex bilateral    Result Date: 2/15/2024          [address] " [place] [address]  Tel [phone] and Fax [phone]  Vascular Lab Report [facility] US LOWER EXTREMITY VENOUS DUPLEX BILATERAL  Patient Name:      ZULEIKA PAUL KAMARA          Reading Physician:  [id number] Prieto Loza MD Study Date:        2/14/2024            Ordering Physician: [id number] GEORGE AMADOR MRN/PID:           [id number]             Technologist:       Rosa Pierre [facility] Accession#:        [id number]         Technologist 2:     Hattie Raymond Date of Birth/Age: 1975 / 48      Encounter#:         [id number]                    years Gender:            F Admission Status:  Inpatient            Location Performed: [facility]  Diagnosis/ICD: Localized (leg) edema-R60.0 CPT Codes:     64321 Peripheral venous duplex scan for DVT complete  CONCLUSIONS: Right Lower Venous: No evidence of acute deep vein thrombus visualized in the right lower extremity. Left Lower Venous: No evidence of acute deep vein thrombus visualized in the left lower extremity.  Additional Findings: Technically difficult study due to patient movement and inability to position.  Imaging & Doppler Findings:  Right                 Compressible Thrombus        Flow Distal External Iliac     Yes        None   Spontaneous/Phasic CFV                       Yes        None   Spontaneous/Phasic PFV                       Yes        None FV Proximal               Yes        None   Spontaneous/Phasic FV Mid                    Yes        None FV Distal                 Yes        None Popliteal                 Yes        None   Spontaneous/Phasic Peroneal                  Yes        None PTV                       Yes        None  Left                  Compress Thrombus        Flow Distal External Iliac   Yes      None   Spontaneous/Phasic CFV                     Yes      None   Spontaneous/Phasic PFV                     Yes       None FV Proximal             Yes      None   Spontaneous/Phasic FV Mid                  Yes      None FV Distal               Yes      None Popliteal               Yes      None   Spontaneous/Phasic Peroneal                Yes      None PTV                     Yes      None  [id number] Prieto Loza MD Electronically signed by [id number] Prieto Loza MD on 2/15/2024 at 5:44:38 PM  ** Final **     Bedside PICC Imaging    Result Date: 2/8/2024  These images are not reportable by radiology and will not be interpreted by  Radiologists.    ECG 12 lead    Result Date: 2/7/2024  Atrial flutter with 2:1 AV conduction Nonspecific intraventricular block Left ventricular hypertrophy ( R in aVL , Lyndon product ) Inferior infarct , age undetermined Abnormal ECG When compared with ECG of 11-JAN-2024 23:16, Significant changes have occurred See ED provider note for full interpretation and clinical correlation Confirmed by Vega Bruce ([id number]) on 2/7/2024 10:40:33 PM    XR hip right with pelvis when performed 2 or 3 views    Result Date: 2/7/2024  Interpreted By:  Richard Garcia, STUDY: XR HIP RIGHT WITH PELVIS WHEN PERFORMED 2 OR 3 VIEWS; ;  2/7/2024 9:25 am   INDICATION: Signs/Symptoms:s/p revision oanh.   COMPARISON: 01/26/2024   ACCESSION NUMBER(S): [id number]   ORDERING CLINICIAN: JNO HERNANDEZ   FINDINGS: Total right hip arthroplasty with interval placement of antibiotic impregnated beads. Partially visualized left hip arthroplasty.       Interval placement of antibiotic impregnated beads in the right hip.     MACRO: None   Signed by: Richard Garcia 2/7/2024 5:00 PM Dictation workstation:   [id number]    XR abdomen 1 view    Result Date: 2/5/2024  Interpreted By:  Richard Garcia, STUDY: XR ABDOMEN 1 VIEW;  2/4/2024 6:47 pm   INDICATION: Signs/Symptoms:Lactic acidosis and emesis.   COMPARISON: None.   ACCESSION NUMBER(S): [id number]   ORDERING CLINICIAN: TABITHA MAYNARD   FINDINGS: Nonobstructive bowel gas pattern.  Partially visualized bilateral hip arthroplasties.       1.  Nonobstructive bowel gas pattern.   MACRO: None   Signed by: Richard Garcia 2/5/2024 4:12 PM Dictation workstation:   [id number]      Scheduled medications  acetaminophen, 975 mg, oral, q8h  ARIPiprazole, 15 mg, oral, Nightly  [Held by provider] aspirin, 81 mg, oral, BID  ceFAZolin, 2 g, intravenous, q8h  enoxaparin, 40 mg, subcutaneous, q24h  famotidine, 40 mg, oral, BID  ferrous sulfate (325 mg ferrous sulfate), 1 tablet, oral, Daily  fluticasone, 2 spray, Each Nostril, Daily  [Held by provider] lisinopril, 10 mg, oral, q AM  loratadine, 10 mg, oral, Daily  magnesium oxide, 800 mg, oral, Daily  metoprolol succinate XL, 25 mg, oral, Daily  montelukast, 10 mg, oral, q AM  nicotine, 1 patch, transdermal, Daily  PARoxetine, 40 mg, oral, Daily  polyethylene glycol, 17 g, oral, BID  simvastatin, 20 mg, oral, Nightly  traZODone, 100 mg, oral, Nightly  zinc oxide, 1 Application, Topical, BID      Continuous medications  lactated Ringer's, 100 mL/hr, Last Rate: 100 mL/hr (03/01/24 0924)      PRN medications  PRN medications: calcium carbonate, HYDROmorphone, midodrine, ondansetron, oxyCODONE, oxyCODONE  No results found for this or any previous visit (from the past 24 hour(s)).            This patient has a central line   Reason for the central line remaining today?  IV ABX    Assessment/Plan   Principal Problem:    Wound dehiscence  Active Problems:    Class 1 obesity with body mass index (BMI) of 30.0 to 30.9 in adult    Sinusitis    Transfusion history    Arthritis    Hyponatremia    48 year female POD 8 from right hip revision.     - AM labs reviewed   - toe touch weight bearing for transfers other, otherwise NWB RLE  - On Lovenox for DVT prophylaxis   - PT evaluation  - deep drain number 1 removed today -> continue to monitor qshift outpatient of superficial drain number 2  - maintain two pillows in between legs when in bed, do not cross or extend right  leg over to left, ok to lay on right side  - 6 weeks of Ancef from recent OR date per ID recs  - ortho will continue to follow      Discussed with Dr. Jalloh           I spent 30 minutes in the professional and overall care of this patient.    DEVON Bruno-CNP

## 2024-03-01 NOTE — PROGRESS NOTES
"Blanca Hamilton is a 48 y.o. female on day 16 of admission presenting with Wound dehiscence.    Subjective   Patient was seen and examined seated upright in bed, no acute events overnight. Pain remains well controlled and denies needing medication for breakthrough. She denies fever, chest pain, nausea, vomiting, abdominal pain, leg pain or worsening leg swelling    Objective     Physical Exam  HENT:      Head: Normocephalic.      Nose: Nose normal.   Cardiovascular:      Rate and Rhythm: Normal rate and regular rhythm.      Pulses: Normal pulses.      Heart sounds: Normal heart sounds.   Pulmonary:      Comments: Decrease breath sound in both lungs  Abdominal:      General: Abdomen is flat. Bowel sounds are normal. There is no distension.      Palpations: Abdomen is soft. There is no mass.      Tenderness: There is no abdominal tenderness.   Musculoskeletal:         General: Tenderness present.      Cervical back: Normal range of motion.      Right lower leg: Edema present.      Left lower leg: Edema present.      Comments: Surgical site clean and dry, with surgical packing and vac in place. Superficial drains in place and draining serosang. Continues to have steady output in drains. Incision is stable, tightly wrapped without overt signs of hematoma formation or seepage. VAC has had 0 output.   Skin:     Findings: Bruising present. No rash.   Neurological:      General: No focal deficit present.      Mental Status: She is alert.      Cranial Nerves: No cranial nerve deficit.         Last Recorded Vitals  Blood pressure 101/60, pulse 85, temperature 36.4 °C (97.5 °F), temperature source Temporal, resp. rate 13, height 1.753 m (5' 9\"), weight 84.9 kg (187 lb 3.2 oz), SpO2 100 %.  Intake/Output last 3 Shifts:  I/O last 3 completed shifts:  In: 2766 (32.6 mL/kg) [P.O.:1162; I.V.:1404 (16.5 mL/kg); IV Piggyback:200]  Out: 3520 (41.5 mL/kg) [Urine:3125 (1 mL/kg/hr); Drains:395]  Dosing Weight: 84.9 kg     Relevant Results   "   No results found for this or any previous visit (from the past 24 hour(s)).      Current Facility-Administered Medications:     acetaminophen (Tylenol) tablet 975 mg, 975 mg, oral, q8h, Barry GUTIERREZ Reiman, DO, 975 mg at 03/01/24 0430    ARIPiprazole (Abilify) tablet 15 mg, 15 mg, oral, Nightly, Barry Gamboaiman, DO, 15 mg at 02/29/24 2020    [Held by provider] aspirin chewable tablet 81 mg, 81 mg, oral, BID, Barry D Reiman, DO    calcium carbonate (Tums) chewable tablet 500 mg, 500 mg, oral, TID PRN, Lele Killian DO, 500 mg at 02/29/24 1149    ceFAZolin in dextrose (iso-os) (Ancef) IVPB 2 g, 2 g, intravenous, q8h, Barry Gamboaiman, DO, Stopped at 03/01/24 0500    enoxaparin (Lovenox) syringe 40 mg, 40 mg, subcutaneous, q24h, Barry GUTIERREZ Reiman, DO, 40 mg at 02/29/24 1600    famotidine (Pepcid) tablet 40 mg, 40 mg, oral, BID, Barry Gamboaiman, DO, 40 mg at 03/01/24 0918    ferrous sulfate (325 mg ferrous sulfate) tablet 1 tablet, 1 tablet, oral, Daily, Barry Gamboaiman, DO, 1 tablet at 03/01/24 0430    fluticasone (Flonase) nasal spray 2 spray, 2 spray, Each Nostril, Daily, Dylan Casas DO, 2 spray at 03/01/24 0919    HYDROmorphone (Dilaudid) injection 0.4 mg, 0.4 mg, intravenous, q3h PRN, Barry Gamboaiman, DO    lactated Ringer's infusion, 100 mL/hr, intravenous, Continuous, Barry Gamboaiman, DO, Last Rate: 100 mL/hr at 03/01/24 0924, 100 mL/hr at 03/01/24 0924    [Held by provider] lisinopril tablet 10 mg, 10 mg, oral, q AM, Barry Gamboaiman, DO    loratadine (Claritin) tablet 10 mg, 10 mg, oral, Daily, Barry D Reiman, DO, 10 mg at 03/01/24 0918    magnesium oxide (Mag-Ox) tablet 800 mg, 800 mg, oral, Daily, Barry D Reiman, DO, 800 mg at 03/01/24 0918    metoprolol succinate XL (Toprol-XL) 24 hr tablet 25 mg, 25 mg, oral, Daily, Barry D Reiman, DO, 25 mg at 03/01/24 0918    midodrine (Proamatine) tablet 5 mg, 5 mg, oral, BID PRN, Barry D Reiman, DO    montelukast (Singulair) tablet 10 mg, 10 mg,  oral, q AM, Barry D Reiman, DO, 10 mg at 03/01/24 0918    nicotine (Nicoderm CQ) 21 mg/24 hr patch 1 patch, 1 patch, transdermal, Daily, Barry D Reiman, DO, 1 patch at 03/01/24 0918    ondansetron (Zofran) injection 4 mg, 4 mg, intravenous, q6h PRN, Barry D Reiman, DO, 4 mg at 02/29/24 2041    oxyCODONE (Roxicodone) immediate release tablet 10 mg, 10 mg, oral, q6h PRN, Barry D Reiman, DO, 10 mg at 02/24/24 0500    oxyCODONE (Roxicodone) immediate release tablet 5 mg, 5 mg, oral, q6h PRN, Barry D Reiman, DO, 5 mg at 02/22/24 2034    PARoxetine (Paxil) tablet 40 mg, 40 mg, oral, Daily, Barry D Reiman, DO, 40 mg at 03/01/24 0918    polyethylene glycol (Glycolax, Miralax) packet 17 g, 17 g, oral, BID, Barry D Reiman, DO, 17 g at 02/24/24 0815    simvastatin (Zocor) tablet 20 mg, 20 mg, oral, Nightly, Barry D Reiman, DO, 20 mg at 02/29/24 2020    traZODone (Desyrel) tablet 100 mg, 100 mg, oral, Nightly, Barry D Reiman, DO, 100 mg at 02/29/24 2020    zinc oxide 40 % ointment 1 Application, 1 Application, Topical, BID, Barry D Reiman, DO, 1 Application at 03/01/24 0919        Assessment/Plan   Blanca Hamilton is a 48 y.o. 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 Initially met Sepsis criteria. Admitted for further management of dehiscence and SSI.      Acute Medical Issues:  # Sepsis secondary to reccurent post op wound infection (MSSA) of right hip arthroplasty   # Hx right total hip arthroplasty w/ revision (1/5/24) for prosthetic fx  # Hx hardware removal for prosthetic joint infection (2/6/24)  -OR 2/20/24 right hip revision with Dr. Jalloh  -Discharged on 2/11 with wound vac + PICC line for 6-week course of cefazolin              -Previous tissue/Wound Culture (2/6): MSSA, only resistant to Tetracycline   -pain control: scheduled tylenol, oxy 5 mod, oxy 10 severe, dilaudid 0.4 PRN  breakthrough  -toe touch weight bearing for transfers other, otherwise NWB RLE  -Blood Cx (2/14) no growth, final report  -PT/OT: moderate intensity   -Cont Ancef 2g TID (2/20/2024 - 4/2/2024)  -On discharge, will need 6w Ancef until 4/2 with weekly labs  -Holding ASA, cont LVX for DVT ppx  -Maintain two pillows in between legs when in bed, do not cross or extend right leg over to left, ok to lay on right side  -Deep drain number 1 removed today -> continue to monitor qshift outpatient of superficial drain number 2    # Depression, worsened with complicated hospital stay  -c/w Abilify + Paroxetine +Trazodone  -[facility] therapy no longer offered at [facility] due to staffing issues  -Pet therapy to see patient this week  -Encourage conversation and visit from family/friends    # Flu B exposure  -Asymptomatic, roommate tested positive  -Tamiflu 2/21-3/1  -Cont Tamiflu for total of 10 days, to receive last dose 3/1     Chronic Medical Issues:  # GERD: c/w Pepcid   # HTN: Restarted home Metoprolol today. Holding home lisinopril  # HLD: c/w Simvastatin  # Chronic anemia: iron supplement  # Tobacco use: nicotine patch     Fluids: None  Electrolytes: Replete as needed  Nutrition: Regular, supplements  GI PPX: None   DVT PPX: Holding ASA in setting of increased drain output     Access: PICC Line, PIV's  Antibiotics: Cefazolin 2g TID (2/20 - 4/2)  Oxygenation: Room Air     Dispo: Admitted onto the floor for postoperative wound dehiscence and surgical site infection. OR on 2/20/24 for right hip revision. With complex case and inability for outside facility to care for drains appropriately, plan to keep patient in house to monitor drain output before removal. Will need pre-cert on discharge. First drain removed today by ortho, continue to monitor output for removal of second.      Barry Bledsoe, DO  PGY-1

## 2024-03-01 NOTE — CARE PLAN
The patient's goals for the shift include      The clinical goals for the shift include pain will be controlled    Over the shift, the patient did not make progress toward the following goals. Barriers to progression include patient did not complain of pain this shift. Recommendations to address these barriers include continue with plan of care.

## 2024-03-01 NOTE — CARE PLAN
The patient's goals for the shift include      The clinical goals for the shift include pt will remain HDS this shift.    Over the shift, the patient did make progress. VS stable. One drain removed by Ortho. Monitored and medicated as ordered this shift.     inability to perform BADLs

## 2024-03-01 NOTE — PROGRESS NOTES
Blanca Hamilton is a 48 y.o. female on day 16 of admission presenting with Wound dehiscence.    Subjective   Interval History: no fever, no new complaints        Review of Systems    Objective   Range of Vitals (last 24 hours)  Heart Rate:  [71-85]   Temp:  [36.2 °C (97.2 °F)-37 °C (98.6 °F)]   Resp:  [13-16]   BP: ()/(60-77)   SpO2:  [98 %-100 %]   Daily Weight  02/29/24 : 84.9 kg (187 lb 3.2 oz)    Body mass index is 27.64 kg/m².    Physical Exam  Constitutional:       Appearance: Normal appearance.   HENT:      Head: Normocephalic and atraumatic.      Mouth/Throat:      Mouth: Mucous membranes are moist.      Pharynx: Oropharynx is clear.   Eyes:      Pupils: Pupils are equal, round, and reactive to light.   Cardiovascular:      Rate and Rhythm: Normal rate and regular rhythm.      Heart sounds: Normal heart sounds.   Pulmonary:      Effort: Pulmonary effort is normal.      Breath sounds: Normal breath sounds.   Abdominal:      General: Abdomen is flat. Bowel sounds are normal.      Palpations: Abdomen is soft.   Musculoskeletal:      Cervical back: Normal range of motion.      Comments: Rt hip dressing, no cellulitis   Neurological:      Mental Status: She is alert.         Antibiotics  sodium chloride 0.9 % bolus 3,120 mL  acetaminophen (Tylenol) tablet 975 mg  ARIPiprazole (Abilify) tablet 15 mg  aspirin chewable tablet 81 mg  ceFAZolin in dextrose (iso-os) (Ancef) IVPB 2 g  famotidine (Pepcid) tablet 40 mg  ferrous sulfate (325 mg ferrous sulfate) tablet 1 tablet  lactulose 20 gram/30 mL oral solution 20 g  lisinopril tablet 10 mg  loratadine (Claritin) tablet 10 mg  magnesium oxide (Mag-Ox) tablet 400 mg  metoprolol succinate XL (Toprol-XL) 24 hr tablet 25 mg  montelukast (Singulair) tablet 10 mg  nicotine (Nicoderm CQ) 21 mg/24 hr patch 1 patch  PARoxetine (Paxil) tablet 40 mg  polyethylene glycol (Glycolax, Miralax) packet 17 g  simvastatin (Zocor) tablet 20 mg  traZODone (Desyrel) tablet 100 mg  zinc  oxide 40 % ointment 1 Application  piperacillin-tazobactam-dextrose (Zosyn) IV 3.375 g  sodium chloride 0.9% infusion  oxyCODONE (Roxicodone) immediate release tablet 5 mg  oxyCODONE (Roxicodone) immediate release tablet 10 mg  HYDROmorphone (Dilaudid) injection 0.4 mg  vancomycin (Vancocin) in dextrose 5 % water (D5W) 500 mL IV 1,500 mg  atorvastatin (Lipitor) tablet    ceFAZolin in dextrose (iso-os) (Ancef) IVPB 1 g  enoxaparin (Lovenox) syringe 40 mg  ceFAZolin in dextrose (iso-os) (Ancef) IVPB 2 g  ondansetron (Zofran) injection 4 mg  lactated Ringer's infusion  oxygen (O2) therapy  lactated Ringer's infusion  oxyCODONE (Roxicodone) immediate release tablet 5 mg  HYDROmorphone (Dilaudid) injection 0.25 mg  HYDROmorphone (Dilaudid) injection 0.5 mg  ondansetron (Zofran) injection 4 mg  droperidol (Inapsine) injection 0.625 mg  rocuronium (ZeMuron) injection  - Omnicell Override Pull  ondansetron (Zofran) injection  - Omnicell Override Pull  dexAMETHasone (Decadron) injection  - Omnicell Override Pull  lidocaine (cardiac) (Xylocaine) injection  - Omnicell Override Pull  propofol (Diprivan) injection  - Omnicell Override Pull  fentaNYL PF (Sublimaze) injection  - Omnicell Override Pull  lactated Ringer's infusion  ceFAZolin (Ancef) injection  - Omnicell Override Pull  ceFAZolin (Ancef) injection  - Omnicell Override Pull  tranexamic acid (Cyklokapron) injection  - Omnicell Override Pull  tranexamic acid (Cyklokapron) injection  - Omnicell Override Pull  povidone-iodine (Betadine) soap  - Omnicell Override Pull  povidone-iodine (Betadine) topical solution  - Omnicell Override Pull  ePHEDrine injection  - Omnicell Override Pull  vasopressin (Vasostrict) injection  - Omnicell Override Pull  propofol (Diprivan) injection  - Omnicell Override Pull  dexAMETHasone (Decadron) injection  - Omnicell Override Pull  gentamicin (Garamycin) injection  - Omnicell Override Pull  vancomycin (Vancocin) vial for injection  - Omnicell  Override Pull  HYDROmorphone (Dilaudid) injection  - Omnicell Override Pull  vancomycin (Vancocin) vial for injection  sugammadex (Bridion) injection  - Omnicell Override Pull  gentamicin (Garamycin) injection  sodium chloride 0.9 % irrigation solution  lactated Ringer's irrigation solution  lactated Ringer's bolus 500 mL  lactated Ringer's bolus 500 mL  lactated Ringer's infusion  oseltamivir (Tamiflu) capsule 75 mg  flu vaccine, quadrivalent, high-dose, preservative free, age 65y+ (FLUZONE)  lactated Ringer's bolus 1,000 mL  midodrine (Proamatine) tablet 5 mg  lactated Ringer's bolus 500 mL  magnesium oxide (Mag-Ox) tablet 800 mg  polyethylene glycol (Glycolax, Miralax) packet 17 g  bisacodyl (Dulcolax) suppository 10 mg      Relevant Results  Labs  Results from last 72 hours   Lab Units 02/29/24  0533 02/28/24  0539   WBC AUTO x10*3/uL 5.0 4.6   HEMOGLOBIN g/dL 8.6* 9.1*   HEMATOCRIT % 27.8* 29.1*   PLATELETS AUTO x10*3/uL 341 348       Results from last 72 hours   Lab Units 02/29/24  0533 02/28/24  0539   SODIUM mmol/L 132* 136   POTASSIUM mmol/L 3.9 4.2   CHLORIDE mmol/L 101 103   CO2 mmol/L 26 27   BUN mg/dL 5* 4*   CREATININE mg/dL 0.27* 0.25*   GLUCOSE mg/dL 82 78   CALCIUM mg/dL 7.6* 7.9*   ANION GAP mmol/L 9* 10   EGFR mL/min/1.73m*2 >90 >90       Results from last 72 hours   Lab Units 02/29/24  0533 02/28/24  0539   ALK PHOS U/L 121* 123*   BILIRUBIN TOTAL mg/dL 0.2 0.2   PROTEIN TOTAL g/dL 4.7* 5.1*   ALT U/L <3* <3*   AST U/L 7* 8*   ALBUMIN g/dL 2.4* 2.5*       Estimated Creatinine Clearance: 125 mL/min (A) (by C-G formula based on SCr of 0.27 mg/dL (L)).  C-Reactive Protein   Date Value Ref Range Status   02/27/2024 2.15 (H) <1.00 mg/dL Final   02/20/2024 0.42 <1.00 mg/dL Final   02/19/2024 0.68 <1.00 mg/dL Final     Microbiology  Reviewed  Imaging  reviewed        Assessment/Plan     Right hip prosthesis infection sp I&D with partial hardware exchange, MSSA on the cultures, sp repeat  I&D    Recommendations :  Continue Cefazolin, plan on 6 weeks from the date of the last surgery, weekly labs  Discussed with the medical team     I spent minutes in the professional and overall care of this patient.      Frank Modi MD

## 2024-03-02 LAB
ALBUMIN SERPL BCP-MCNC: 2.3 G/DL (ref 3.4–5)
ALP SERPL-CCNC: 115 U/L (ref 33–110)
ALT SERPL W P-5'-P-CCNC: <3 U/L (ref 7–45)
ANION GAP SERPL CALC-SCNC: 8 MMOL/L (ref 10–20)
AST SERPL W P-5'-P-CCNC: 7 U/L (ref 9–39)
BILIRUB SERPL-MCNC: 0.1 MG/DL (ref 0–1.2)
BUN SERPL-MCNC: 6 MG/DL (ref 6–23)
CALCIUM SERPL-MCNC: 7.6 MG/DL (ref 8.6–10.3)
CHLORIDE SERPL-SCNC: 105 MMOL/L (ref 98–107)
CO2 SERPL-SCNC: 28 MMOL/L (ref 21–32)
CREAT SERPL-MCNC: 0.34 MG/DL (ref 0.5–1.05)
EGFRCR SERPLBLD CKD-EPI 2021: >90 ML/MIN/1.73M*2
ERYTHROCYTE [DISTWIDTH] IN BLOOD BY AUTOMATED COUNT: 15.9 % (ref 11.5–14.5)
GLUCOSE SERPL-MCNC: 81 MG/DL (ref 74–99)
HCT VFR BLD AUTO: 27.5 % (ref 36–46)
HGB BLD-MCNC: 8.4 G/DL (ref 12–16)
MAGNESIUM SERPL-MCNC: 1.76 MG/DL (ref 1.6–2.4)
MCH RBC QN AUTO: 28.6 PG (ref 26–34)
MCHC RBC AUTO-ENTMCNC: 30.5 G/DL (ref 32–36)
MCV RBC AUTO: 94 FL (ref 80–100)
NRBC BLD-RTO: 0 /100 WBCS (ref 0–0)
PHOSPHATE SERPL-MCNC: 3.8 MG/DL (ref 2.5–4.9)
PLATELET # BLD AUTO: 339 X10*3/UL (ref 150–450)
POTASSIUM SERPL-SCNC: 4.2 MMOL/L (ref 3.5–5.3)
PROT SERPL-MCNC: 4.5 G/DL (ref 6.4–8.2)
RBC # BLD AUTO: 2.94 X10*6/UL (ref 4–5.2)
SODIUM SERPL-SCNC: 137 MMOL/L (ref 136–145)
WBC # BLD AUTO: 4.7 X10*3/UL (ref 4.4–11.3)

## 2024-03-02 PROCEDURE — 2500000004 HC RX 250 GENERAL PHARMACY W/ HCPCS (ALT 636 FOR OP/ED)

## 2024-03-02 PROCEDURE — 80053 COMPREHEN METABOLIC PANEL: CPT

## 2024-03-02 PROCEDURE — 83735 ASSAY OF MAGNESIUM: CPT

## 2024-03-02 PROCEDURE — 2500000001 HC RX 250 WO HCPCS SELF ADMINISTERED DRUGS (ALT 637 FOR MEDICARE OP)

## 2024-03-02 PROCEDURE — 2500000002 HC RX 250 W HCPCS SELF ADMINISTERED DRUGS (ALT 637 FOR MEDICARE OP, ALT 636 FOR OP/ED)

## 2024-03-02 PROCEDURE — 85027 COMPLETE CBC AUTOMATED: CPT

## 2024-03-02 PROCEDURE — 1200000002 HC GENERAL ROOM WITH TELEMETRY DAILY

## 2024-03-02 PROCEDURE — 99232 SBSQ HOSP IP/OBS MODERATE 35: CPT

## 2024-03-02 PROCEDURE — 2500000001 HC RX 250 WO HCPCS SELF ADMINISTERED DRUGS (ALT 637 FOR MEDICARE OP): Performed by: INTERNAL MEDICINE

## 2024-03-02 PROCEDURE — S4991 NICOTINE PATCH NONLEGEND: HCPCS

## 2024-03-02 PROCEDURE — 84100 ASSAY OF PHOSPHORUS: CPT

## 2024-03-02 RX ADMIN — ACETAMINOPHEN 975 MG: 325 TABLET ORAL at 20:37

## 2024-03-02 RX ADMIN — Medication 800 MG: at 08:43

## 2024-03-02 RX ADMIN — CALCIUM CARBONATE (ANTACID) CHEW TAB 500 MG 500 MG: 500 CHEW TAB at 18:20

## 2024-03-02 RX ADMIN — PAROXETINE 40 MG: 20 TABLET, FILM COATED ORAL at 08:43

## 2024-03-02 RX ADMIN — Medication 1 APPLICATION: at 08:44

## 2024-03-02 RX ADMIN — NICOTINE 1 PATCH: 21 PATCH, EXTENDED RELEASE TRANSDERMAL at 08:44

## 2024-03-02 RX ADMIN — CEFAZOLIN SODIUM 2 G: 2 INJECTION, SOLUTION INTRAVENOUS at 04:46

## 2024-03-02 RX ADMIN — METOPROLOL SUCCINATE 25 MG: 25 TABLET, EXTENDED RELEASE ORAL at 08:43

## 2024-03-02 RX ADMIN — FERROUS SULFATE TAB 325 MG (65 MG ELEMENTAL FE) 1 TABLET: 325 (65 FE) TAB at 08:43

## 2024-03-02 RX ADMIN — ACETAMINOPHEN 975 MG: 325 TABLET ORAL at 13:12

## 2024-03-02 RX ADMIN — LORATADINE 10 MG: 10 TABLET ORAL at 08:43

## 2024-03-02 RX ADMIN — SIMVASTATIN 20 MG: 20 TABLET, FILM COATED ORAL at 20:02

## 2024-03-02 RX ADMIN — FAMOTIDINE 40 MG: 20 TABLET ORAL at 08:43

## 2024-03-02 RX ADMIN — ACETAMINOPHEN 975 MG: 325 TABLET ORAL at 04:46

## 2024-03-02 RX ADMIN — FLUTICASONE PROPIONATE 2 SPRAY: 50 SPRAY, METERED NASAL at 08:44

## 2024-03-02 RX ADMIN — TRAZODONE HYDROCHLORIDE 100 MG: 50 TABLET ORAL at 20:02

## 2024-03-02 RX ADMIN — Medication 1 APPLICATION: at 21:00

## 2024-03-02 RX ADMIN — CEFAZOLIN SODIUM 2 G: 2 INJECTION, SOLUTION INTRAVENOUS at 21:30

## 2024-03-02 RX ADMIN — SODIUM CHLORIDE, POTASSIUM CHLORIDE, SODIUM LACTATE AND CALCIUM CHLORIDE 100 ML/HR: 600; 310; 30; 20 INJECTION, SOLUTION INTRAVENOUS at 09:32

## 2024-03-02 RX ADMIN — ARIPIPRAZOLE 15 MG: 5 TABLET ORAL at 20:02

## 2024-03-02 RX ADMIN — MONTELUKAST 10 MG: 10 TABLET, FILM COATED ORAL at 08:44

## 2024-03-02 RX ADMIN — FAMOTIDINE 40 MG: 20 TABLET ORAL at 20:02

## 2024-03-02 RX ADMIN — CEFAZOLIN SODIUM 2 G: 2 INJECTION, SOLUTION INTRAVENOUS at 13:11

## 2024-03-02 RX ADMIN — SODIUM CHLORIDE, POTASSIUM CHLORIDE, SODIUM LACTATE AND CALCIUM CHLORIDE 100 ML/HR: 600; 310; 30; 20 INJECTION, SOLUTION INTRAVENOUS at 20:11

## 2024-03-02 RX ADMIN — ENOXAPARIN SODIUM 40 MG: 40 INJECTION SUBCUTANEOUS at 15:30

## 2024-03-02 ASSESSMENT — COGNITIVE AND FUNCTIONAL STATUS - GENERAL
MOVING FROM LYING ON BACK TO SITTING ON SIDE OF FLAT BED WITH BEDRAILS: A LITTLE
DRESSING REGULAR UPPER BODY CLOTHING: A LITTLE
HELP NEEDED FOR BATHING: A LOT
TURNING FROM BACK TO SIDE WHILE IN FLAT BAD: A LITTLE
DRESSING REGULAR LOWER BODY CLOTHING: A LOT
MOVING FROM LYING ON BACK TO SITTING ON SIDE OF FLAT BED WITH BEDRAILS: A LITTLE
DRESSING REGULAR LOWER BODY CLOTHING: A LOT
MOBILITY SCORE: 15
TOILETING: A LOT
HELP NEEDED FOR BATHING: A LOT
CLIMB 3 TO 5 STEPS WITH RAILING: TOTAL
TOILETING: A LITTLE
STANDING UP FROM CHAIR USING ARMS: A LOT
DAILY ACTIVITIY SCORE: 15
CLIMB 3 TO 5 STEPS WITH RAILING: A LOT
PERSONAL GROOMING: A LITTLE
WALKING IN HOSPITAL ROOM: A LOT
DRESSING REGULAR UPPER BODY CLOTHING: A LITTLE
EATING MEALS: A LITTLE
MOVING TO AND FROM BED TO CHAIR: A LITTLE
MOBILITY SCORE: 14
DAILY ACTIVITIY SCORE: 16
WALKING IN HOSPITAL ROOM: A LOT
MOVING TO AND FROM BED TO CHAIR: A LITTLE
PERSONAL GROOMING: A LITTLE
STANDING UP FROM CHAIR USING ARMS: A LOT
EATING MEALS: A LITTLE
TURNING FROM BACK TO SIDE WHILE IN FLAT BAD: A LITTLE

## 2024-03-02 ASSESSMENT — PAIN - FUNCTIONAL ASSESSMENT: PAIN_FUNCTIONAL_ASSESSMENT: 0-10

## 2024-03-02 ASSESSMENT — PAIN SCALES - GENERAL
PAINLEVEL_OUTOF10: 0 - NO PAIN
PAINLEVEL_OUTOF10: 0 - NO PAIN

## 2024-03-02 NOTE — NURSING NOTE
When Nurse Gwendolyn and I went in to do bedside report, 2nd drain that was in hip was found on the floor. The site was covered with sterile 4x4. Ortho was notified.

## 2024-03-02 NOTE — PROGRESS NOTES
"Blanca Hamilton is a 48 y.o. female on day 17 of admission presenting with Wound dehiscence.    Subjective   Patient was seen and examined seated upright in bed.  Orthopedic surgery removed deep drain yesterday, and subsequently superficial drain \"fell out\".  Nursing staff notified orthopedics, who recommended no intervention.  Several dressing changes were done overnight due to increased serosanguineous output.  Pain remains well controlled and denies needing medication for breakthrough. She denies fever, chest pain, nausea, vomiting, abdominal pain, leg pain or worsening leg swelling    Objective     Physical Exam  HENT:      Head: Normocephalic.      Nose: Nose normal.   Cardiovascular:      Rate and Rhythm: Normal rate and regular rhythm.      Pulses: Normal pulses.      Heart sounds: Normal heart sounds.   Pulmonary:      Comments: Decrease breath sound in both lungs  Abdominal:      General: Abdomen is flat. Bowel sounds are normal. There is no distension.      Palpations: Abdomen is soft. There is no mass.      Tenderness: There is no abdominal tenderness.   Musculoskeletal:         General: Tenderness present.      Cervical back: Normal range of motion.      Right lower leg: Edema present.      Left lower leg: Edema present.      Comments: Surgical site clean and dry, with surgical packing and dressing.  Incision is covered and stable, tightly wrapped without overt signs of hematoma formation there is some mild proximal serosanguineous drainage noted at the right lateral hip area, near greater trochanter.   Skin:     Findings: Bruising present. No rash.   Neurological:      General: No focal deficit present.      Mental Status: She is alert.      Cranial Nerves: No cranial nerve deficit.         Last Recorded Vitals  Blood pressure 104/67, pulse 84, temperature 36.5 °C (97.7 °F), temperature source Temporal, resp. rate 14, height 1.753 m (5' 9\"), weight 84.9 kg (187 lb 3.2 oz), SpO2 96 %.  Intake/Output last 3 " Shifts:  I/O last 3 completed shifts:  In: 5748.3 (67.7 mL/kg) [P.O.:1040; I.V.:4508.3 (53.1 mL/kg); IV Piggyback:200]  Out: 2930 (34.5 mL/kg) [Urine:2750 (0.9 mL/kg/hr); Drains:180]  Dosing Weight: 84.9 kg     Relevant Results     Results for orders placed or performed during the hospital encounter of 02/14/24 (from the past 24 hour(s))   CBC   Result Value Ref Range    WBC 4.7 4.4 - 11.3 x10*3/uL    nRBC 0.0 0.0 - 0.0 /100 WBCs    RBC 2.94 (L) 4.00 - 5.20 x10*6/uL    Hemoglobin 8.4 (L) 12.0 - 16.0 g/dL    Hematocrit 27.5 (L) 36.0 - 46.0 %    MCV 94 80 - 100 fL    MCH 28.6 26.0 - 34.0 pg    MCHC 30.5 (L) 32.0 - 36.0 g/dL    RDW 15.9 (H) 11.5 - 14.5 %    Platelets 339 150 - 450 x10*3/uL   Comprehensive Metabolic Panel   Result Value Ref Range    Glucose 81 74 - 99 mg/dL    Sodium 137 136 - 145 mmol/L    Potassium 4.2 3.5 - 5.3 mmol/L    Chloride 105 98 - 107 mmol/L    Bicarbonate 28 21 - 32 mmol/L    Anion Gap 8 (L) 10 - 20 mmol/L    Urea Nitrogen 6 6 - 23 mg/dL    Creatinine 0.34 (L) 0.50 - 1.05 mg/dL    eGFR >90 >60 mL/min/1.73m*2    Calcium 7.6 (L) 8.6 - 10.3 mg/dL    Albumin 2.3 (L) 3.4 - 5.0 g/dL    Alkaline Phosphatase 115 (H) 33 - 110 U/L    Total Protein 4.5 (L) 6.4 - 8.2 g/dL    AST 7 (L) 9 - 39 U/L    Bilirubin, Total 0.1 0.0 - 1.2 mg/dL    ALT <3 (L) 7 - 45 U/L   Phosphorus   Result Value Ref Range    Phosphorus 3.8 2.5 - 4.9 mg/dL   Magnesium   Result Value Ref Range    Magnesium 1.76 1.60 - 2.40 mg/dL         Current Facility-Administered Medications:     acetaminophen (Tylenol) tablet 975 mg, 975 mg, oral, q8h, Barry Bledsoe DO, 975 mg at 03/02/24 0446    ARIPiprazole (Abilify) tablet 15 mg, 15 mg, oral, Nightly, Barry Bledsoe DO, 15 mg at 03/01/24 2047    [Held by provider] aspirin chewable tablet 81 mg, 81 mg, oral, BID, Barry Bledsoe DO    calcium carbonate (Tums) chewable tablet 500 mg, 500 mg, oral, TID PRN, Lele Killian DO, 500 mg at 03/01/24 1552    ceFAZolin in dextrose  (iso-os) (Ancef) IVPB 2 g, 2 g, intravenous, q8h, Barry D Reiman, DO, Stopped at 03/02/24 0516    enoxaparin (Lovenox) syringe 40 mg, 40 mg, subcutaneous, q24h, Barry D Reiman, DO, 40 mg at 03/01/24 1547    famotidine (Pepcid) tablet 40 mg, 40 mg, oral, BID, Barry D Reiman, DO, 40 mg at 03/02/24 0843    ferrous sulfate (325 mg ferrous sulfate) tablet 1 tablet, 1 tablet, oral, Daily, Barry D Reiman, DO, 1 tablet at 03/02/24 0843    fluticasone (Flonase) nasal spray 2 spray, 2 spray, Each Nostril, Daily, Dylan Casas, DO, 2 spray at 03/02/24 0844    HYDROmorphone (Dilaudid) injection 0.4 mg, 0.4 mg, intravenous, q3h PRN, Barry D Reiman, DO    lactated Ringer's infusion, 100 mL/hr, intravenous, Continuous, Barry D Reiman, DO, Last Rate: 100 mL/hr at 03/02/24 0932, 100 mL/hr at 03/02/24 0932    [Held by provider] lisinopril tablet 10 mg, 10 mg, oral, q AM, Barry D Reiman, DO    loratadine (Claritin) tablet 10 mg, 10 mg, oral, Daily, Barry D Reiman, DO, 10 mg at 03/02/24 0843    magnesium oxide (Mag-Ox) tablet 800 mg, 800 mg, oral, Daily, Barry D Reiman, DO, 800 mg at 03/02/24 0843    metoprolol succinate XL (Toprol-XL) 24 hr tablet 25 mg, 25 mg, oral, Daily, Barry D Reiman, DO, 25 mg at 03/02/24 0843    midodrine (Proamatine) tablet 5 mg, 5 mg, oral, BID PRN, Barry D Reiman, DO    montelukast (Singulair) tablet 10 mg, 10 mg, oral, q AM, Barry D Reiman, DO, 10 mg at 03/02/24 0844    nicotine (Nicoderm CQ) 21 mg/24 hr patch 1 patch, 1 patch, transdermal, Daily, Barry Gamboaiman, DO, 1 patch at 03/02/24 0844    ondansetron (Zofran) injection 4 mg, 4 mg, intravenous, q6h PRN, Barry Gamboaiman, DO, 4 mg at 02/29/24 2041    oxyCODONE (Roxicodone) immediate release tablet 10 mg, 10 mg, oral, q6h PRN, Barry Gamboaiman, DO, 10 mg at 02/24/24 0500    oxyCODONE (Roxicodone) immediate release tablet 5 mg, 5 mg, oral, q6h PRN, Barry Gamboaiman, DO, 5 mg at 02/22/24 2034    PARoxetine (Paxil) tablet 40 mg,  40 mg, oral, Daily, Barry D Reiman, DO, 40 mg at 03/02/24 0843    polyethylene glycol (Glycolax, Miralax) packet 17 g, 17 g, oral, BID, Barry D Reiman, DO, 17 g at 02/24/24 0815    simvastatin (Zocor) tablet 20 mg, 20 mg, oral, Nightly, Barry D Reiman, DO, 20 mg at 03/01/24 2043    traZODone (Desyrel) tablet 100 mg, 100 mg, oral, Nightly, Barry D Reiman, DO, 100 mg at 03/01/24 2044    zinc oxide 40 % ointment 1 Application, 1 Application, Topical, BID, Barry D Reiman, DO, 1 Application at 03/02/24 0844        Assessment/Plan   Blanca Hamilton is a 48 y.o. 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 Initially met Sepsis criteria. Admitted for further management of dehiscence and SSI.      Acute Medical Issues:  # Sepsis secondary to reccurent post op wound infection (MSSA) of right hip arthroplasty   # Hx right total hip arthroplasty w/ revision (1/5/24) for prosthetic fx  # Hx hardware removal for prosthetic joint infection (2/6/24)  -OR 2/20/24 right hip revision with Dr. Jalloh  -Discharged on 2/11 with wound vac + PICC line for 6-week course of cefazolin              -Previous tissue/Wound Culture (2/6): MSSA, only resistant to Tetracycline   -pain control: scheduled tylenol, oxy 5 mod, oxy 10 severe, dilaudid 0.4 PRN breakthrough  -toe touch weight bearing for transfers other, otherwise NWB RLE  -Blood Cx (2/14) no growth, final report  -Cont Ancef 2g TID (2/20/2024 - 4/2/2024) with weekly labs  -Holding ASA  -Cont LVX for DVT ppx  -Maintain two pillows in between legs when in bed, do not cross or extend right leg over to left, ok to lay on right side  -Deep drain removed 3/1 by ortho, with plan to remove superficial at a later date  -Superficial drain incidentally found on the floor 3/1, nursing staff notified orthopedics  -Continue to monitor, appreciate orthopedic recs    # Depression,  worsened with complicated hospital stay  -c/w Abilify + Paroxetine +Trazodone  -Music therapy no longer offered at [facility] due to staffing issues  -Pet therapy to see patient this week  -Encourage conversation and visit from family/friends    # Flu B exposure  -Asymptomatic, roommate tested positive  -Tamiflu 2/21-3/1  -Cont Tamiflu for total of 10 days, to receive last dose 3/1     Chronic Medical Issues:  # GERD: c/w Pepcid   # HTN: Restarted home Metoprolol today. Holding home lisinopril  # HLD: c/w Simvastatin  # Chronic anemia: iron supplement  # Tobacco use: nicotine patch     Fluids: None  Electrolytes: Replete as needed  Nutrition: Regular, supplements  GI PPX: None   DVT PPX: Holding ASA in setting of increased drain output     Access: PICC Line, PIV's  Antibiotics: Cefazolin 2g TID (2/20 - 4/2)  Oxygenation: Room Air     Dispo: Admitted onto the floor for postoperative wound dehiscence and surgical site infection. OR on 2/20/24 for right hip revision. Will need pre-cert on discharge. First drain removed today by ortho 3/1, with second drain incidentally found on floor on 3/1. Ortho following and aware, appreciate recs regarding drains and wound output.      Barry Bledsoe, DO  PGY-1

## 2024-03-02 NOTE — NURSING NOTE
Prevena dressing changed rt dressing leaking, alarming per Micheline RN request for assist.  Notified Ortho NP, order to change, changed without incident, vac intact and to suction.

## 2024-03-02 NOTE — PROGRESS NOTES
Blanca Hamilton is a 48 y.o. female on day 17 of admission presenting with Wound dehiscence.    Subjective   Interval History: no fever, no new complaints        Review of Systems    Objective   Range of Vitals (last 24 hours)  Heart Rate:  [80-87]   Temp:  [36.4 °C (97.5 °F)-37.3 °C (99.1 °F)]   Resp:  [14-18]   BP: (104-108)/(64-71)   SpO2:  [96 %-97 %]   Daily Weight  02/29/24 : 84.9 kg (187 lb 3.2 oz)    Body mass index is 27.64 kg/m².    Physical Exam  Constitutional:       Appearance: Normal appearance.   HENT:      Head: Normocephalic and atraumatic.      Mouth/Throat:      Mouth: Mucous membranes are moist.      Pharynx: Oropharynx is clear.   Eyes:      Pupils: Pupils are equal, round, and reactive to light.   Cardiovascular:      Rate and Rhythm: Normal rate and regular rhythm.      Heart sounds: Normal heart sounds.   Pulmonary:      Effort: Pulmonary effort is normal.      Breath sounds: Normal breath sounds.   Abdominal:      General: Abdomen is flat. Bowel sounds are normal.      Palpations: Abdomen is soft.   Musculoskeletal:      Cervical back: Normal range of motion.      Comments: Rt hip dressing, no cellulitis   Neurological:      Mental Status: She is alert.         Antibiotics  sodium chloride 0.9 % bolus 3,120 mL  acetaminophen (Tylenol) tablet 975 mg  ARIPiprazole (Abilify) tablet 15 mg  aspirin chewable tablet 81 mg  ceFAZolin in dextrose (iso-os) (Ancef) IVPB 2 g  famotidine (Pepcid) tablet 40 mg  ferrous sulfate (325 mg ferrous sulfate) tablet 1 tablet  lactulose 20 gram/30 mL oral solution 20 g  lisinopril tablet 10 mg  loratadine (Claritin) tablet 10 mg  magnesium oxide (Mag-Ox) tablet 400 mg  metoprolol succinate XL (Toprol-XL) 24 hr tablet 25 mg  montelukast (Singulair) tablet 10 mg  nicotine (Nicoderm CQ) 21 mg/24 hr patch 1 patch  PARoxetine (Paxil) tablet 40 mg  polyethylene glycol (Glycolax, Miralax) packet 17 g  simvastatin (Zocor) tablet 20 mg  traZODone (Desyrel) tablet 100 mg  zinc  oxide 40 % ointment 1 Application  piperacillin-tazobactam-dextrose (Zosyn) IV 3.375 g  sodium chloride 0.9% infusion  oxyCODONE (Roxicodone) immediate release tablet 5 mg  oxyCODONE (Roxicodone) immediate release tablet 10 mg  HYDROmorphone (Dilaudid) injection 0.4 mg  vancomycin (Vancocin) in dextrose 5 % water (D5W) 500 mL IV 1,500 mg  atorvastatin (Lipitor) tablet    ceFAZolin in dextrose (iso-os) (Ancef) IVPB 1 g  enoxaparin (Lovenox) syringe 40 mg  ceFAZolin in dextrose (iso-os) (Ancef) IVPB 2 g  ondansetron (Zofran) injection 4 mg  lactated Ringer's infusion  oxygen (O2) therapy  lactated Ringer's infusion  oxyCODONE (Roxicodone) immediate release tablet 5 mg  HYDROmorphone (Dilaudid) injection 0.25 mg  HYDROmorphone (Dilaudid) injection 0.5 mg  ondansetron (Zofran) injection 4 mg  droperidol (Inapsine) injection 0.625 mg  rocuronium (ZeMuron) injection  - Omnicell Override Pull  ondansetron (Zofran) injection  - Omnicell Override Pull  dexAMETHasone (Decadron) injection  - Omnicell Override Pull  lidocaine (cardiac) (Xylocaine) injection  - Omnicell Override Pull  propofol (Diprivan) injection  - Omnicell Override Pull  fentaNYL PF (Sublimaze) injection  - Omnicell Override Pull  lactated Ringer's infusion  ceFAZolin (Ancef) injection  - Omnicell Override Pull  ceFAZolin (Ancef) injection  - Omnicell Override Pull  tranexamic acid (Cyklokapron) injection  - Omnicell Override Pull  tranexamic acid (Cyklokapron) injection  - Omnicell Override Pull  povidone-iodine (Betadine) soap  - Omnicell Override Pull  povidone-iodine (Betadine) topical solution  - Omnicell Override Pull  ePHEDrine injection  - Omnicell Override Pull  vasopressin (Vasostrict) injection  - Omnicell Override Pull  propofol (Diprivan) injection  - Omnicell Override Pull  dexAMETHasone (Decadron) injection  - Omnicell Override Pull  gentamicin (Garamycin) injection  - Omnicell Override Pull  vancomycin (Vancocin) vial for injection  - Omnicell  Override Pull  HYDROmorphone (Dilaudid) injection  - Omnicell Override Pull  vancomycin (Vancocin) vial for injection  sugammadex (Bridion) injection  - Omnicell Override Pull  gentamicin (Garamycin) injection  sodium chloride 0.9 % irrigation solution  lactated Ringer's irrigation solution  lactated Ringer's bolus 500 mL  lactated Ringer's bolus 500 mL  lactated Ringer's infusion  oseltamivir (Tamiflu) capsule 75 mg  flu vaccine, quadrivalent, high-dose, preservative free, age 65y+ (FLUZONE)  lactated Ringer's bolus 1,000 mL  midodrine (Proamatine) tablet 5 mg  lactated Ringer's bolus 500 mL  magnesium oxide (Mag-Ox) tablet 800 mg  polyethylene glycol (Glycolax, Miralax) packet 17 g  bisacodyl (Dulcolax) suppository 10 mg      Relevant Results  Labs  Results from last 72 hours   Lab Units 03/02/24  0618 02/29/24  0533   WBC AUTO x10*3/uL 4.7 5.0   HEMOGLOBIN g/dL 8.4* 8.6*   HEMATOCRIT % 27.5* 27.8*   PLATELETS AUTO x10*3/uL 339 341       Results from last 72 hours   Lab Units 03/02/24  0618 02/29/24  0533   SODIUM mmol/L 137 132*   POTASSIUM mmol/L 4.2 3.9   CHLORIDE mmol/L 105 101   CO2 mmol/L 28 26   BUN mg/dL 6 5*   CREATININE mg/dL 0.34* 0.27*   GLUCOSE mg/dL 81 82   CALCIUM mg/dL 7.6* 7.6*   ANION GAP mmol/L 8* 9*   EGFR mL/min/1.73m*2 >90 >90   PHOSPHORUS mg/dL 3.8  --        Results from last 72 hours   Lab Units 03/02/24  0618 02/29/24  0533   ALK PHOS U/L 115* 121*   BILIRUBIN TOTAL mg/dL 0.1 0.2   PROTEIN TOTAL g/dL 4.5* 4.7*   ALT U/L <3* <3*   AST U/L 7* 7*   ALBUMIN g/dL 2.3* 2.4*       Estimated Creatinine Clearance: 125 mL/min (A) (by C-G formula based on SCr of 0.34 mg/dL (L)).  C-Reactive Protein   Date Value Ref Range Status   02/27/2024 2.15 (H) <1.00 mg/dL Final   02/20/2024 0.42 <1.00 mg/dL Final   02/19/2024 0.68 <1.00 mg/dL Final     Microbiology  Reviewed  Imaging  reviewed        Assessment/Plan     Right hip prosthesis infection sp I&D with partial hardware exchange, MSSA on the cultures,  sp repeat I&D    Recommendations :  Continue Cefazolin, plan on 6 weeks from the date of the last surgery, weekly labs  Discussed with the medical team     I spent minutes in the professional and overall care of this patient.      Frank Modi MD

## 2024-03-02 NOTE — NURSING NOTE
Throughout my shift, sterile 4x4 on R hip was changed twice, both times containing a small amount of serosanguineous drainage. Ester Fitch NP notified

## 2024-03-03 PROCEDURE — 2500000004 HC RX 250 GENERAL PHARMACY W/ HCPCS (ALT 636 FOR OP/ED)

## 2024-03-03 PROCEDURE — 1200000002 HC GENERAL ROOM WITH TELEMETRY DAILY

## 2024-03-03 PROCEDURE — 2500000001 HC RX 250 WO HCPCS SELF ADMINISTERED DRUGS (ALT 637 FOR MEDICARE OP)

## 2024-03-03 PROCEDURE — 2500000002 HC RX 250 W HCPCS SELF ADMINISTERED DRUGS (ALT 637 FOR MEDICARE OP, ALT 636 FOR OP/ED)

## 2024-03-03 PROCEDURE — S4991 NICOTINE PATCH NONLEGEND: HCPCS

## 2024-03-03 PROCEDURE — 2500000001 HC RX 250 WO HCPCS SELF ADMINISTERED DRUGS (ALT 637 FOR MEDICARE OP): Performed by: INTERNAL MEDICINE

## 2024-03-03 PROCEDURE — 99232 SBSQ HOSP IP/OBS MODERATE 35: CPT

## 2024-03-03 RX ADMIN — LORATADINE 10 MG: 10 TABLET ORAL at 08:46

## 2024-03-03 RX ADMIN — Medication 1 APPLICATION: at 22:33

## 2024-03-03 RX ADMIN — CEFAZOLIN SODIUM 2 G: 2 INJECTION, SOLUTION INTRAVENOUS at 04:37

## 2024-03-03 RX ADMIN — ONDANSETRON 4 MG: 2 INJECTION INTRAMUSCULAR; INTRAVENOUS at 18:34

## 2024-03-03 RX ADMIN — FAMOTIDINE 40 MG: 20 TABLET ORAL at 08:46

## 2024-03-03 RX ADMIN — METOPROLOL SUCCINATE 25 MG: 25 TABLET, EXTENDED RELEASE ORAL at 08:46

## 2024-03-03 RX ADMIN — ACETAMINOPHEN 975 MG: 325 TABLET ORAL at 14:55

## 2024-03-03 RX ADMIN — CEFAZOLIN SODIUM 2 G: 2 INJECTION, SOLUTION INTRAVENOUS at 21:30

## 2024-03-03 RX ADMIN — FLUTICASONE PROPIONATE 2 SPRAY: 50 SPRAY, METERED NASAL at 08:47

## 2024-03-03 RX ADMIN — ARIPIPRAZOLE 15 MG: 5 TABLET ORAL at 20:30

## 2024-03-03 RX ADMIN — CEFAZOLIN SODIUM 2 G: 2 INJECTION, SOLUTION INTRAVENOUS at 14:55

## 2024-03-03 RX ADMIN — CALCIUM CARBONATE (ANTACID) CHEW TAB 500 MG 500 MG: 500 CHEW TAB at 18:34

## 2024-03-03 RX ADMIN — NICOTINE 1 PATCH: 21 PATCH, EXTENDED RELEASE TRANSDERMAL at 08:46

## 2024-03-03 RX ADMIN — POLYETHYLENE GLYCOL 3350 17 G: 17 POWDER, FOR SOLUTION ORAL at 08:47

## 2024-03-03 RX ADMIN — FAMOTIDINE 40 MG: 20 TABLET ORAL at 20:30

## 2024-03-03 RX ADMIN — Medication 1 APPLICATION: at 08:49

## 2024-03-03 RX ADMIN — FERROUS SULFATE TAB 325 MG (65 MG ELEMENTAL FE) 1 TABLET: 325 (65 FE) TAB at 08:46

## 2024-03-03 RX ADMIN — ENOXAPARIN SODIUM 40 MG: 40 INJECTION SUBCUTANEOUS at 18:10

## 2024-03-03 RX ADMIN — MONTELUKAST 10 MG: 10 TABLET, FILM COATED ORAL at 08:46

## 2024-03-03 RX ADMIN — TRAZODONE HYDROCHLORIDE 100 MG: 50 TABLET ORAL at 20:30

## 2024-03-03 RX ADMIN — PAROXETINE 40 MG: 20 TABLET, FILM COATED ORAL at 08:46

## 2024-03-03 RX ADMIN — ACETAMINOPHEN 975 MG: 325 TABLET ORAL at 22:33

## 2024-03-03 RX ADMIN — Medication 800 MG: at 08:46

## 2024-03-03 RX ADMIN — SIMVASTATIN 20 MG: 20 TABLET, FILM COATED ORAL at 20:30

## 2024-03-03 ASSESSMENT — COGNITIVE AND FUNCTIONAL STATUS - GENERAL
EATING MEALS: A LITTLE
EATING MEALS: A LITTLE
TURNING FROM BACK TO SIDE WHILE IN FLAT BAD: A LITTLE
TOILETING: A LITTLE
MOVING FROM LYING ON BACK TO SITTING ON SIDE OF FLAT BED WITH BEDRAILS: A LITTLE
HELP NEEDED FOR BATHING: A LOT
STANDING UP FROM CHAIR USING ARMS: A LOT
DRESSING REGULAR UPPER BODY CLOTHING: A LOT
DAILY ACTIVITIY SCORE: 14
CLIMB 3 TO 5 STEPS WITH RAILING: A LOT
TOILETING: A LOT
PERSONAL GROOMING: A LITTLE
MOBILITY SCORE: 15
DRESSING REGULAR LOWER BODY CLOTHING: A LOT
HELP NEEDED FOR BATHING: A LOT
DAILY ACTIVITIY SCORE: 16
DRESSING REGULAR UPPER BODY CLOTHING: A LITTLE
CLIMB 3 TO 5 STEPS WITH RAILING: A LOT
MOVING TO AND FROM BED TO CHAIR: A LITTLE
PERSONAL GROOMING: A LITTLE
TURNING FROM BACK TO SIDE WHILE IN FLAT BAD: A LITTLE
WALKING IN HOSPITAL ROOM: A LOT
WALKING IN HOSPITAL ROOM: A LOT
STANDING UP FROM CHAIR USING ARMS: A LOT
MOBILITY SCORE: 14
MOVING TO AND FROM BED TO CHAIR: A LOT
DRESSING REGULAR LOWER BODY CLOTHING: A LOT
MOVING FROM LYING ON BACK TO SITTING ON SIDE OF FLAT BED WITH BEDRAILS: A LITTLE

## 2024-03-03 ASSESSMENT — PAIN - FUNCTIONAL ASSESSMENT: PAIN_FUNCTIONAL_ASSESSMENT: 0-10

## 2024-03-03 ASSESSMENT — PAIN SCALES - GENERAL
PAINLEVEL_OUTOF10: 0 - NO PAIN
PAINLEVEL_OUTOF10: 0 - NO PAIN

## 2024-03-03 NOTE — CARE PLAN
The clinical goals for the shift include Patient will remain injury free throughout shift.      Problem: Safety  Goal: Patient will be injury free during hospitalization  Outcome: Progressing     Problem: Skin  Goal: Promote skin healing  Outcome: Progressing  Flowsheets (Taken 3/3/2024 0897)  Promote skin healing: Turn/reposition every 2 hours/use positioning/transfer devices

## 2024-03-03 NOTE — PROGRESS NOTES
"Blanca Hamilton is a 48 y.o. female on day 18 of admission presenting with Wound dehiscence.    Subjective   Patient was seen and examined seated upright in bed. Patient remains without either superficial or deep drain after removal. Pain remains well controlled and denies needing medication for breakthrough. She denies fever, chest pain, nausea, vomiting, abdominal pain, leg pain or worsening leg swelling    Objective     Physical Exam  HENT:      Head: Normocephalic.      Nose: Nose normal.   Cardiovascular:      Rate and Rhythm: Normal rate and regular rhythm.      Pulses: Normal pulses.      Heart sounds: Normal heart sounds.   Pulmonary:      Comments: Decrease breath sound in both lungs  Abdominal:      General: Abdomen is flat. Bowel sounds are normal. There is no distension.      Palpations: Abdomen is soft. There is no mass.      Tenderness: There is no abdominal tenderness.   Musculoskeletal:         General: Tenderness present.      Cervical back: Normal range of motion.      Right lower leg: Edema present.      Left lower leg: Edema present.      Comments: Surgical site clean and dry, with surgical packing and dressing.  Incision is covered and stable, tightly wrapped without overt signs of hematoma formation there is some mild proximal serosanguineous drainage noted at the right lateral hip area, near greater trochanter.   Skin:     Findings: Bruising present. No rash.   Neurological:      General: No focal deficit present.      Mental Status: She is alert.      Cranial Nerves: No cranial nerve deficit.         Last Recorded Vitals  Blood pressure 105/63, pulse 83, temperature 36.6 °C (97.9 °F), temperature source Tympanic, resp. rate 15, height 1.753 m (5' 9\"), weight 84.9 kg (187 lb 3.2 oz), SpO2 98 %.  Intake/Output last 3 Shifts:  I/O last 3 completed shifts:  In: 7121.7 (83.9 mL/kg) [P.O.:1080; I.V.:5741.7 (67.6 mL/kg); IV Piggyback:300]  Out: 2800 (33 mL/kg) [Urine:2800 (0.9 mL/kg/hr)]  Dosing Weight: " 84.9 kg     Relevant Results    Current Facility-Administered Medications:     acetaminophen (Tylenol) tablet 975 mg, 975 mg, oral, q8h, Barry Gamboaiman, DO, 975 mg at 03/02/24 2037    ARIPiprazole (Abilify) tablet 15 mg, 15 mg, oral, Nightly, Barry Gamboaiman, DO, 15 mg at 03/02/24 2002    [Held by provider] aspirin chewable tablet 81 mg, 81 mg, oral, BID, Barry GUTIERREZ Reiman, DO    calcium carbonate (Tums) chewable tablet 500 mg, 500 mg, oral, TID PRN, Lele Killian, DO, 500 mg at 03/02/24 1820    ceFAZolin in dextrose (iso-os) (Ancef) IVPB 2 g, 2 g, intravenous, q8h, Barry Gamboaiman, DO, Stopped at 03/03/24 0507    enoxaparin (Lovenox) syringe 40 mg, 40 mg, subcutaneous, q24h, Barry Gamboaiman, DO, 40 mg at 03/02/24 1530    famotidine (Pepcid) tablet 40 mg, 40 mg, oral, BID, Barry Gamboaiman, DO, 40 mg at 03/03/24 0846    ferrous sulfate (325 mg ferrous sulfate) tablet 1 tablet, 1 tablet, oral, Daily, Barry Gamboaiman, DO, 1 tablet at 03/03/24 0846    fluticasone (Flonase) nasal spray 2 spray, 2 spray, Each Nostril, Daily, Dylan Casas DO, 2 spray at 03/03/24 0847    HYDROmorphone (Dilaudid) injection 0.4 mg, 0.4 mg, intravenous, q3h PRN, Barry Gamboaiman, DO    lactated Ringer's infusion, 100 mL/hr, intravenous, Continuous, Barry Gamboaiman, DO, Last Rate: 100 mL/hr at 03/02/24 2011, 100 mL/hr at 03/02/24 2011    [Held by provider] lisinopril tablet 10 mg, 10 mg, oral, q AM, Barry Gamboaiman, DO    loratadine (Claritin) tablet 10 mg, 10 mg, oral, Daily, Barry D Reiman, DO, 10 mg at 03/03/24 0846    magnesium oxide (Mag-Ox) tablet 800 mg, 800 mg, oral, Daily, Barry D Reiman, DO, 800 mg at 03/03/24 0846    metoprolol succinate XL (Toprol-XL) 24 hr tablet 25 mg, 25 mg, oral, Daily, Barry D Reiman, DO, 25 mg at 03/03/24 0846    midodrine (Proamatine) tablet 5 mg, 5 mg, oral, BID PRN, Barry D Reiman, DO    montelukast (Singulair) tablet 10 mg, 10 mg, oral, q AM, Barry D Reiman, DO, 10 mg at 03/03/24  0846    nicotine (Nicoderm CQ) 21 mg/24 hr patch 1 patch, 1 patch, transdermal, Daily, Barry D Reiman, DO, 1 patch at 03/03/24 0846    ondansetron (Zofran) injection 4 mg, 4 mg, intravenous, q6h PRN, Barry D Reiman, DO, 4 mg at 02/29/24 2041    oxyCODONE (Roxicodone) immediate release tablet 10 mg, 10 mg, oral, q6h PRN, Barry D Reiman, DO, 10 mg at 02/24/24 0500    oxyCODONE (Roxicodone) immediate release tablet 5 mg, 5 mg, oral, q6h PRN, Barry D Reiman, DO, 5 mg at 02/22/24 2034    PARoxetine (Paxil) tablet 40 mg, 40 mg, oral, Daily, Barry D Reiman, DO, 40 mg at 03/03/24 0846    polyethylene glycol (Glycolax, Miralax) packet 17 g, 17 g, oral, BID, Barry D Reiman, DO, 17 g at 03/03/24 0847    simvastatin (Zocor) tablet 20 mg, 20 mg, oral, Nightly, Barry D Reiman, DO, 20 mg at 03/02/24 2002    traZODone (Desyrel) tablet 100 mg, 100 mg, oral, Nightly, Barry D Reiman, DO, 100 mg at 03/02/24 2002    zinc oxide 40 % ointment 1 Application, 1 Application, Topical, BID, Barry D Reiman, DO, 1 Application at 03/03/24 0849    Lower extremity venous duplex bilateral  Result Date: 2/15/2024          [place] [id number]  Tel [phone] and Fax [phone]  Vascular Lab Report [facility] LOWER EXTREMITY VENOUS DUPLEX BILATERAL  Patient Name:      ZULEIKA Mcnamara Physician:  [id number] Prieto Loza MD Study Date:        2/14/2024            Ordering Physician: [id number] GEORGE AMADOR MRN/PID:           [id number]             Technologist:       Rosa Pierre S Accession#:        [id number]         Technologist 2:     Hattie Raymond Date of Birth/Age: 1975 / 48      Encounter#:         [id number]                    years Gender:            F Admission Status:  Inpatient            Location Performed: [facility]  Diagnosis/ICD:  Localized (leg) edema-R60.0 CPT Codes:     00034 Peripheral venous duplex scan for DVT complete  CONCLUSIONS: Right Lower Venous: No evidence of acute deep vein thrombus visualized in the right lower extremity. Left Lower Venous: No evidence of acute deep vein thrombus visualized in the left lower extremity.  Additional Findings: Technically difficult study due to patient movement and inability to position.  Imaging & Doppler Findings:  Right                 Compressible Thrombus        Flow Distal External Iliac     Yes        None   Spontaneous/Phasic CFV                       Yes        None   Spontaneous/Phasic PFV                       Yes        None FV Proximal               Yes        None   Spontaneous/Phasic FV Mid                    Yes        None FV Distal                 Yes        None Popliteal                 Yes        None   Spontaneous/Phasic Peroneal                  Yes        None PTV                       Yes        None  Left                  Compress Thrombus        Flow Distal External Iliac   Yes      None   Spontaneous/Phasic CFV                     Yes      None   Spontaneous/Phasic PFV                     Yes      None FV Proximal             Yes      None   Spontaneous/Phasic FV Mid                  Yes      None FV Distal               Yes      None Popliteal               Yes      None   Spontaneous/Phasic Peroneal                Yes      None PTV                     Yes      None  [id number] Prieto Loza MD Electronically signed by [id number] Prieto Loza MD on 2/15/2024 at 5:44:38 PM  ** Final **     ECG 12 lead  Result Date: 2/7/2024  Atrial flutter with 2:1 AV conduction Nonspecific intraventricular block Left ventricular hypertrophy ( R in aVL , [place] product ) Inferior infarct , age undetermined Abnormal ECG When compared with ECG of 11-JAN-2024 23:16, Significant changes have occurred See ED provider note for full interpretation and clinical correlation Confirmed by Vega Bruce  ([id number]) on 2/7/2024 10:40:33 PM    XR hip right with pelvis when performed 2 or 3 views  Result Date: 2/7/2024  Interpreted By:  Richard Garcia, STUDY: XR HIP RIGHT WITH PELVIS WHEN PERFORMED 2 OR 3 VIEWS; ;  2/7/2024 9:25 am   INDICATION: Signs/Symptoms:s/p revision oanh.   COMPARISON: 01/26/2024   ACCESSION NUMBER(S): [id number]   ORDERING CLINICIAN: JON HERNANDEZ   FINDINGS: Total right hip arthroplasty with interval placement of antibiotic impregnated beads. Partially visualized left hip arthroplasty.     Interval placement of antibiotic impregnated beads in the right hip.     MACRO: None   Signed by: Richard Garcia 2/7/2024 5:00 PM Dictation workstation:   [id number]      Assessment/Plan   Blanca Hamilton is a 48 y.o. 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 Initially met Sepsis criteria. Admitted for further management of dehiscence and SSI.      Acute Medical Issues:  # Sepsis secondary to reccurent post op wound infection (MSSA) of right hip arthroplasty   # Hx right total hip arthroplasty w/ revision (1/5/24) for prosthetic fx  # Hx hardware removal for prosthetic joint infection (2/6/24)  -OR 2/20/24 right hip revision with Dr. Hernandez  -Discharged on 2/11 with wound vac + PICC line for 6-week course of cefazolin              -Previous tissue/Wound Culture (2/6): MSSA, only resistant to Tetracycline   -pain control: scheduled tylenol, oxy 5 mod, oxy 10 severe, dilaudid 0.4 PRN breakthrough  -toe touch weight bearing for transfers other, otherwise NWB RLE  -Blood Cx (2/14) no growth, final report  -Cont Ancef 2g TID (2/20/2024 - 4/2/2024) with weekly labs  -Holding ASA  -Cont LVX for DVT ppx  -Maintain two pillows in between legs when in bed, do not cross or extend right leg over to left, ok to lay on right side  -s/p deep and superficial drain removal 3/1  -Continue to monitor incision  and output, appreciate orthopedic recs    # Depression, worsened with complicated hospital stay  -c/w Abilify + Paroxetine +Trazodone  -Pet therapy   -Encourage conversation and visit from family/friends    # Flu B exposure  -Asymptomatic, roommate tested positive  -Completion of Tamiflu 2/21-3/1     Chronic Medical Issues:  # GERD: c/w Pepcid   # HTN: Restarted home Metoprolol today. Holding home lisinopril  # HLD: c/w Simvastatin  # Chronic anemia: iron supplement  # Tobacco use: nicotine patch     Fluids: None  Electrolytes: Replete as needed  Nutrition: Regular, supplements  GI PPX: None   DVT PPX: Holding ASA in setting of increased drain output     Access: PICC Line, PIV's  Antibiotics: Cefazolin 2g TID (2/20 - 4/2)  Oxygenation: Room Air     Dispo: Admitted onto the floor for postoperative wound dehiscence and surgical site infection. OR on 2/20/24 for right hip revision. Will need pre-cert on discharge.  Status post removal of both superficial and deep drains.  Continue to monitor surgical site and output.  Appreciate Ortho recommendations regarding discharge planning.    Barry Bledsoe, DO  PGY-1

## 2024-03-03 NOTE — PROGRESS NOTES
Blanca Hamilton is a 48 y.o. female on day 18 of admission presenting with Wound dehiscence.    Subjective   Interval History: no fever, no new complaints        Review of Systems    Objective   Range of Vitals (last 24 hours)  Heart Rate:  [75-86]   Temp:  [36.5 °C (97.7 °F)-36.8 °C (98.2 °F)]   Resp:  [14-16]   BP: ()/(61-85)   SpO2:  [96 %-98 %]   Daily Weight  02/29/24 : 84.9 kg (187 lb 3.2 oz)    Body mass index is 27.64 kg/m².    Physical Exam  Constitutional:       Appearance: Normal appearance.   HENT:      Head: Normocephalic and atraumatic.      Mouth/Throat:      Mouth: Mucous membranes are moist.      Pharynx: Oropharynx is clear.   Eyes:      Pupils: Pupils are equal, round, and reactive to light.   Cardiovascular:      Rate and Rhythm: Normal rate and regular rhythm.      Heart sounds: Normal heart sounds.   Pulmonary:      Effort: Pulmonary effort is normal.      Breath sounds: Normal breath sounds.   Abdominal:      General: Abdomen is flat. Bowel sounds are normal.      Palpations: Abdomen is soft.   Musculoskeletal:      Cervical back: Normal range of motion.      Comments: Rt hip dressing, no cellulitis   Neurological:      Mental Status: She is alert.         Antibiotics  sodium chloride 0.9 % bolus 3,120 mL  acetaminophen (Tylenol) tablet 975 mg  ARIPiprazole (Abilify) tablet 15 mg  aspirin chewable tablet 81 mg  ceFAZolin in dextrose (iso-os) (Ancef) IVPB 2 g  famotidine (Pepcid) tablet 40 mg  ferrous sulfate (325 mg ferrous sulfate) tablet 1 tablet  lactulose 20 gram/30 mL oral solution 20 g  lisinopril tablet 10 mg  loratadine (Claritin) tablet 10 mg  magnesium oxide (Mag-Ox) tablet 400 mg  metoprolol succinate XL (Toprol-XL) 24 hr tablet 25 mg  montelukast (Singulair) tablet 10 mg  nicotine (Nicoderm CQ) 21 mg/24 hr patch 1 patch  PARoxetine (Paxil) tablet 40 mg  polyethylene glycol (Glycolax, Miralax) packet 17 g  simvastatin (Zocor) tablet 20 mg  traZODone (Desyrel) tablet 100 mg  zinc  oxide 40 % ointment 1 Application  piperacillin-tazobactam-dextrose (Zosyn) IV 3.375 g  sodium chloride 0.9% infusion  oxyCODONE (Roxicodone) immediate release tablet 5 mg  oxyCODONE (Roxicodone) immediate release tablet 10 mg  HYDROmorphone (Dilaudid) injection 0.4 mg  vancomycin (Vancocin) in dextrose 5 % water (D5W) 500 mL IV 1,500 mg  atorvastatin (Lipitor) tablet    ceFAZolin in dextrose (iso-os) (Ancef) IVPB 1 g  enoxaparin (Lovenox) syringe 40 mg  ceFAZolin in dextrose (iso-os) (Ancef) IVPB 2 g  ondansetron (Zofran) injection 4 mg  lactated Ringer's infusion  oxygen (O2) therapy  lactated Ringer's infusion  oxyCODONE (Roxicodone) immediate release tablet 5 mg  HYDROmorphone (Dilaudid) injection 0.25 mg  HYDROmorphone (Dilaudid) injection 0.5 mg  ondansetron (Zofran) injection 4 mg  droperidol (Inapsine) injection 0.625 mg  rocuronium (ZeMuron) injection  - Omnicell Override Pull  ondansetron (Zofran) injection  - Omnicell Override Pull  dexAMETHasone (Decadron) injection  - Omnicell Override Pull  lidocaine (cardiac) (Xylocaine) injection  - Omnicell Override Pull  propofol (Diprivan) injection  - Omnicell Override Pull  fentaNYL PF (Sublimaze) injection  - Omnicell Override Pull  lactated Ringer's infusion  ceFAZolin (Ancef) injection  - Omnicell Override Pull  ceFAZolin (Ancef) injection  - Omnicell Override Pull  tranexamic acid (Cyklokapron) injection  - Omnicell Override Pull  tranexamic acid (Cyklokapron) injection  - Omnicell Override Pull  povidone-iodine (Betadine) soap  - Omnicell Override Pull  povidone-iodine (Betadine) topical solution  - Omnicell Override Pull  ePHEDrine injection  - Omnicell Override Pull  vasopressin (Vasostrict) injection  - Omnicell Override Pull  propofol (Diprivan) injection  - Omnicell Override Pull  dexAMETHasone (Decadron) injection  - Omnicell Override Pull  gentamicin (Garamycin) injection  - Omnicell Override Pull  vancomycin (Vancocin) vial for injection  - Omnicell  Override Pull  HYDROmorphone (Dilaudid) injection  - Omnicell Override Pull  vancomycin (Vancocin) vial for injection  sugammadex (Bridion) injection  - Omnicell Override Pull  gentamicin (Garamycin) injection  sodium chloride 0.9 % irrigation solution  lactated Ringer's irrigation solution  lactated Ringer's bolus 500 mL  lactated Ringer's bolus 500 mL  lactated Ringer's infusion  oseltamivir (Tamiflu) capsule 75 mg  flu vaccine, quadrivalent, high-dose, preservative free, age 65y+ (FLUZONE)  lactated Ringer's bolus 1,000 mL  midodrine (Proamatine) tablet 5 mg  lactated Ringer's bolus 500 mL  magnesium oxide (Mag-Ox) tablet 800 mg  polyethylene glycol (Glycolax, Miralax) packet 17 g  bisacodyl (Dulcolax) suppository 10 mg      Relevant Results  Labs  Results from last 72 hours   Lab Units 03/02/24  0618   WBC AUTO x10*3/uL 4.7   HEMOGLOBIN g/dL 8.4*   HEMATOCRIT % 27.5*   PLATELETS AUTO x10*3/uL 339       Results from last 72 hours   Lab Units 03/02/24  0618   SODIUM mmol/L 137   POTASSIUM mmol/L 4.2   CHLORIDE mmol/L 105   CO2 mmol/L 28   BUN mg/dL 6   CREATININE mg/dL 0.34*   GLUCOSE mg/dL 81   CALCIUM mg/dL 7.6*   ANION GAP mmol/L 8*   EGFR mL/min/1.73m*2 >90   PHOSPHORUS mg/dL 3.8       Results from last 72 hours   Lab Units 03/02/24  0618   ALK PHOS U/L 115*   BILIRUBIN TOTAL mg/dL 0.1   PROTEIN TOTAL g/dL 4.5*   ALT U/L <3*   AST U/L 7*   ALBUMIN g/dL 2.3*       Estimated Creatinine Clearance: 125 mL/min (A) (by C-G formula based on SCr of 0.34 mg/dL (L)).  C-Reactive Protein   Date Value Ref Range Status   02/27/2024 2.15 (H) <1.00 mg/dL Final   02/20/2024 0.42 <1.00 mg/dL Final   02/19/2024 0.68 <1.00 mg/dL Final     Microbiology  Reviewed  Imaging  reviewed        Assessment/Plan     Right hip prosthesis infection sp I&D with partial hardware exchange, MSSA on the cultures, sp repeat I&D    Recommendations :  Continue Cefazolin, plan on 6 weeks from the date of the last surgery, weekly labs  Discussed with  the medical team     I spent minutes in the professional and overall care of this patient.      Frank Modi MD   TERESA (acute kidney injury)

## 2024-03-04 LAB
ALBUMIN SERPL BCP-MCNC: 2.6 G/DL (ref 3.4–5)
ALP SERPL-CCNC: 122 U/L (ref 33–110)
ALT SERPL W P-5'-P-CCNC: <3 U/L (ref 7–45)
ANION GAP SERPL CALC-SCNC: 11 MMOL/L (ref 10–20)
AST SERPL W P-5'-P-CCNC: 8 U/L (ref 9–39)
BILIRUB SERPL-MCNC: 0.2 MG/DL (ref 0–1.2)
BUN SERPL-MCNC: 4 MG/DL (ref 6–23)
CALCIUM SERPL-MCNC: 7.7 MG/DL (ref 8.6–10.3)
CHLORIDE SERPL-SCNC: 102 MMOL/L (ref 98–107)
CO2 SERPL-SCNC: 26 MMOL/L (ref 21–32)
CREAT SERPL-MCNC: 0.3 MG/DL (ref 0.5–1.05)
CRP SERPL-MCNC: 1.24 MG/DL
EGFRCR SERPLBLD CKD-EPI 2021: >90 ML/MIN/1.73M*2
ERYTHROCYTE [DISTWIDTH] IN BLOOD BY AUTOMATED COUNT: 15.9 % (ref 11.5–14.5)
ERYTHROCYTE [SEDIMENTATION RATE] IN BLOOD BY WESTERGREN METHOD: 20 MM/H (ref 0–20)
GLUCOSE SERPL-MCNC: 113 MG/DL (ref 74–99)
HCT VFR BLD AUTO: 31.1 % (ref 36–46)
HGB BLD-MCNC: 9.4 G/DL (ref 12–16)
MCH RBC QN AUTO: 28.5 PG (ref 26–34)
MCHC RBC AUTO-ENTMCNC: 30.2 G/DL (ref 32–36)
MCV RBC AUTO: 94 FL (ref 80–100)
NRBC BLD-RTO: 0 /100 WBCS (ref 0–0)
PLATELET # BLD AUTO: 345 X10*3/UL (ref 150–450)
POTASSIUM SERPL-SCNC: 3.9 MMOL/L (ref 3.5–5.3)
PROT SERPL-MCNC: 5.2 G/DL (ref 6.4–8.2)
RBC # BLD AUTO: 3.3 X10*6/UL (ref 4–5.2)
SODIUM SERPL-SCNC: 135 MMOL/L (ref 136–145)
WBC # BLD AUTO: 6 X10*3/UL (ref 4.4–11.3)

## 2024-03-04 PROCEDURE — 2500000004 HC RX 250 GENERAL PHARMACY W/ HCPCS (ALT 636 FOR OP/ED)

## 2024-03-04 PROCEDURE — 2500000001 HC RX 250 WO HCPCS SELF ADMINISTERED DRUGS (ALT 637 FOR MEDICARE OP)

## 2024-03-04 PROCEDURE — 84075 ASSAY ALKALINE PHOSPHATASE: CPT

## 2024-03-04 PROCEDURE — 97535 SELF CARE MNGMENT TRAINING: CPT | Mod: GO,CO

## 2024-03-04 PROCEDURE — 99232 SBSQ HOSP IP/OBS MODERATE 35: CPT

## 2024-03-04 PROCEDURE — 85652 RBC SED RATE AUTOMATED: CPT

## 2024-03-04 PROCEDURE — 1200000002 HC GENERAL ROOM WITH TELEMETRY DAILY

## 2024-03-04 PROCEDURE — 97110 THERAPEUTIC EXERCISES: CPT | Mod: GP

## 2024-03-04 PROCEDURE — S4991 NICOTINE PATCH NONLEGEND: HCPCS

## 2024-03-04 PROCEDURE — 85027 COMPLETE CBC AUTOMATED: CPT

## 2024-03-04 PROCEDURE — 99232 SBSQ HOSP IP/OBS MODERATE 35: CPT | Performed by: NURSE PRACTITIONER

## 2024-03-04 PROCEDURE — 86140 C-REACTIVE PROTEIN: CPT

## 2024-03-04 PROCEDURE — 2500000002 HC RX 250 W HCPCS SELF ADMINISTERED DRUGS (ALT 637 FOR MEDICARE OP, ALT 636 FOR OP/ED)

## 2024-03-04 RX ADMIN — FLUTICASONE PROPIONATE 2 SPRAY: 50 SPRAY, METERED NASAL at 10:38

## 2024-03-04 RX ADMIN — METOPROLOL SUCCINATE 25 MG: 25 TABLET, EXTENDED RELEASE ORAL at 09:10

## 2024-03-04 RX ADMIN — MONTELUKAST 10 MG: 10 TABLET, FILM COATED ORAL at 10:37

## 2024-03-04 RX ADMIN — ENOXAPARIN SODIUM 40 MG: 40 INJECTION SUBCUTANEOUS at 17:06

## 2024-03-04 RX ADMIN — FAMOTIDINE 40 MG: 20 TABLET ORAL at 09:10

## 2024-03-04 RX ADMIN — ACETAMINOPHEN 975 MG: 325 TABLET ORAL at 13:41

## 2024-03-04 RX ADMIN — NICOTINE 1 PATCH: 21 PATCH, EXTENDED RELEASE TRANSDERMAL at 09:11

## 2024-03-04 RX ADMIN — ARIPIPRAZOLE 15 MG: 5 TABLET ORAL at 20:50

## 2024-03-04 RX ADMIN — PAROXETINE 40 MG: 20 TABLET, FILM COATED ORAL at 09:10

## 2024-03-04 RX ADMIN — ACETAMINOPHEN 975 MG: 325 TABLET ORAL at 20:50

## 2024-03-04 RX ADMIN — FAMOTIDINE 40 MG: 20 TABLET ORAL at 20:50

## 2024-03-04 RX ADMIN — SODIUM CHLORIDE, POTASSIUM CHLORIDE, SODIUM LACTATE AND CALCIUM CHLORIDE 100 ML/HR: 600; 310; 30; 20 INJECTION, SOLUTION INTRAVENOUS at 05:14

## 2024-03-04 RX ADMIN — TRAZODONE HYDROCHLORIDE 100 MG: 50 TABLET ORAL at 20:50

## 2024-03-04 RX ADMIN — CEFAZOLIN SODIUM 2 G: 2 INJECTION, SOLUTION INTRAVENOUS at 22:05

## 2024-03-04 RX ADMIN — ACETAMINOPHEN 975 MG: 325 TABLET ORAL at 05:06

## 2024-03-04 RX ADMIN — LORATADINE 10 MG: 10 TABLET ORAL at 09:11

## 2024-03-04 RX ADMIN — SODIUM CHLORIDE, POTASSIUM CHLORIDE, SODIUM LACTATE AND CALCIUM CHLORIDE 100 ML/HR: 600; 310; 30; 20 INJECTION, SOLUTION INTRAVENOUS at 18:32

## 2024-03-04 RX ADMIN — SIMVASTATIN 20 MG: 20 TABLET, FILM COATED ORAL at 20:50

## 2024-03-04 RX ADMIN — Medication 800 MG: at 09:10

## 2024-03-04 RX ADMIN — NICOTINE 1 PATCH: 21 PATCH, EXTENDED RELEASE TRANSDERMAL at 09:00

## 2024-03-04 RX ADMIN — CEFAZOLIN SODIUM 2 G: 2 INJECTION, SOLUTION INTRAVENOUS at 13:41

## 2024-03-04 RX ADMIN — CEFAZOLIN SODIUM 2 G: 2 INJECTION, SOLUTION INTRAVENOUS at 05:06

## 2024-03-04 RX ADMIN — FERROUS SULFATE TAB 325 MG (65 MG ELEMENTAL FE) 1 TABLET: 325 (65 FE) TAB at 09:11

## 2024-03-04 ASSESSMENT — COGNITIVE AND FUNCTIONAL STATUS - GENERAL
CLIMB 3 TO 5 STEPS WITH RAILING: TOTAL
MOVING TO AND FROM BED TO CHAIR: A LOT
WALKING IN HOSPITAL ROOM: TOTAL
CLIMB 3 TO 5 STEPS WITH RAILING: A LOT
STANDING UP FROM CHAIR USING ARMS: A LOT
MOVING FROM LYING ON BACK TO SITTING ON SIDE OF FLAT BED WITH BEDRAILS: A LITTLE
EATING MEALS: A LITTLE
DRESSING REGULAR LOWER BODY CLOTHING: A LOT
EATING MEALS: A LITTLE
DRESSING REGULAR UPPER BODY CLOTHING: A LOT
DAILY ACTIVITIY SCORE: 14
MOBILITY SCORE: 14
TOILETING: A LOT
DAILY ACTIVITIY SCORE: 14
STANDING UP FROM CHAIR USING ARMS: A LOT
DRESSING REGULAR UPPER BODY CLOTHING: A LOT
DRESSING REGULAR LOWER BODY CLOTHING: A LOT
PERSONAL GROOMING: A LITTLE
HELP NEEDED FOR BATHING: A LOT
CLIMB 3 TO 5 STEPS WITH RAILING: TOTAL
HELP NEEDED FOR BATHING: A LOT
PERSONAL GROOMING: A LITTLE
DRESSING REGULAR LOWER BODY CLOTHING: A LOT
MOBILITY SCORE: 14
WALKING IN HOSPITAL ROOM: A LOT
TURNING FROM BACK TO SIDE WHILE IN FLAT BAD: A LITTLE
MOVING TO AND FROM BED TO CHAIR: A LOT
EATING MEALS: A LITTLE
HELP NEEDED FOR BATHING: A LOT
DRESSING REGULAR UPPER BODY CLOTHING: A LOT
MOVING TO AND FROM BED TO CHAIR: A LOT
STANDING UP FROM CHAIR USING ARMS: A LOT
PERSONAL GROOMING: A LITTLE
DAILY ACTIVITIY SCORE: 14
MOBILITY SCORE: 14
TOILETING: A LOT
TOILETING: A LOT
WALKING IN HOSPITAL ROOM: TOTAL

## 2024-03-04 ASSESSMENT — PAIN SCALES - GENERAL
PAINLEVEL_OUTOF10: 0 - NO PAIN

## 2024-03-04 ASSESSMENT — PAIN - FUNCTIONAL ASSESSMENT
PAIN_FUNCTIONAL_ASSESSMENT: 0-10

## 2024-03-04 ASSESSMENT — ACTIVITIES OF DAILY LIVING (ADL): HOME_MANAGEMENT_TIME_ENTRY: 17

## 2024-03-04 NOTE — CARE PLAN
The patient's goals for the shift include      The clinical goals for the shift include Patient will remain HDS during this shift    Over the shift, the patient did make progress toward the following goals.

## 2024-03-04 NOTE — CARE PLAN
The clinical goals for the shift include Pt will remain HDS throughout shift.      Problem: Safety  Goal: Patient will be injury free during hospitalization  Outcome: Progressing     Problem: Skin  Goal: Promote skin healing  Outcome: Progressing  Flowsheets (Taken 3/4/2024 1825)  Promote skin healing: Turn/reposition every 2 hours/use positioning/transfer devices     Problem: Pain - Adult  Goal: Verbalizes/displays adequate comfort level or baseline comfort level  Outcome: Progressing     Problem: Safety - Adult  Goal: Free from fall injury  Outcome: Progressing     Problem: Discharge Planning  Goal: Discharge to home or other facility with appropriate resources  Outcome: Progressing     Problem: Fall/Injury  Goal: Not fall by end of shift  Outcome: Progressing     Problem: Pain  Goal: Takes deep breaths with improved pain control throughout the shift  Outcome: Progressing

## 2024-03-04 NOTE — PROGRESS NOTES
Occupational Therapy    Occupational Therapy Treatment    Name: Blanca Hamilton  MRN: [id number]  : 1975  Date: 24  Time Calculation  Start Time: 1336  Stop Time: 1353  Time Calculation (min): 17 min    Assessment:  OT Assessment: Pt continues to present with decreased endurance, decreased ADL, decreased functional mobility. Continued skilled OT recommended to maximize pt safety and indepedence in order to return to PLOF.  Prognosis: Good  Barriers to Discharge: None  Evaluation/Treatment Tolerance: Patient limited by fatigue  Medical Staff Made Aware: Yes  End of Session Communication: Bedside nurse  End of Session Patient Position: Bed, 3 rail up, Alarm on    Plan:  Treatment Interventions: ADL retraining, Endurance training, UE strengthening/ROM, Patient/family training  OT Frequency: 3 times per week  OT Discharge Recommendations: Moderate intensity level of continued care  Equipment Recommended upon Discharge: Wheeled walker, Wheelchair  OT Recommended Transfer Status: Assist of 1  OT - OK to Discharge: Yes    Subjective   Previous Visit Info:  OT Last Visit  OT Received On: 24    General:  General  Reason for Referral: 49 yo female referred to PT for R hip wound dehiscence, impaired mobility, hip revision on  with wound vac, drains have been removed  Referred By: Brando Mascorro  Past Medical History Relevant to Rehab: Anxiety, Arthritis, Class 1 obesity with body mass index (BMI) of 30.0 to 30.9 in adult (2024), Depression, GERD (gastroesophageal reflux disease), HTN (hypertension), Hyperlipidemia; R total hip arthroplasty on , but had to get subsequent revision on  for prosthetic fracture. 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 Cultures were positive for MSSA and patient was started on cefazolin. Then underwent ALEXA revision and partial hardware removal with Dr. Jalloh on 24. Patient was discharged to SNF on  with wound vac, a PICC  line for 6 weeks of cefazolin, and ASA for DVT ppx.  Family/Caregiver Present: No  Prior to Session Communication: Bedside nurse  Patient Position Received: Bed, 3 rail up, Alarm on  Preferred Learning Style: verbal, kinesthetic  General Comment: Pt pleasant and cooperative, tolerated treatment well.    Precautions:  LE Weight Bearing Status: Right Toe-Touch Weight Bearing (For transfers only, otherwise NWB)  Medical Precautions: Fall precautions (Cardiac telemetry, IV, purewick external catheter.)  Post-Surgical Precautions: Right hip precautions     Pain Assessment:  Pain Assessment  Pain Assessment: 0-10  Pain Score: 0 - No pain     Objective   Activities of Daily Living: LE Dressing  LE Dressing: Yes  LE Dressing Adaptive Equipment: Reacher, Sock aide  Pants Level of Assistance: Moderate assistance  Sock Level of Assistance: Minimum assistance  LE Dressing Where Assessed: Edge of bed  LE Dressing Comments: \Increased time with mod cues to problem solve donning pants with tools.     Bed Mobility/Transfers: Bed Mobility  Bed Mobility: Yes  Bed Mobility 1  Bed Mobility 1: Supine to sitting  Level of Assistance 1: Modified independent  Bed Mobility Comments 1: Increased time on task required.  Bed Mobility 2  Bed Mobility  2: Sitting to supine  Level of Assistance 2: Contact guard  Bed Mobility Comments 2: Assist with legs needed d/t fatigue onset.    Transfers  Transfer: No    Outcome Measures:  [facility] Daily Activity  Putting on and taking off regular lower body clothing: A lot  Bathing (including washing, rinsing, drying): A lot  Putting on and taking off regular upper body clothing: A lot  Toileting, which includes using toilet, bedpan or urinal: A lot  Taking care of personal grooming such as brushing teeth: A little  Eating Meals: A little  Daily Activity - Total Score: 14    Education Documentation  Body Mechanics, taught by SARITA Landaverde at 3/4/2024  2:05 PM.  Learner: Patient  Readiness:  Acceptance  Method: Explanation, Demonstration  Response: Verbalizes Understanding, Demonstrated Understanding, Needs Reinforcement    Precautions, taught by SARITA Landaverde at 3/4/2024  2:05 PM.  Learner: Patient  Readiness: Acceptance  Method: Explanation, Demonstration  Response: Verbalizes Understanding, Demonstrated Understanding, Needs Reinforcement    ADL Training, taught by SARITA Landaverde at 3/4/2024  2:05 PM.  Learner: Patient  Readiness: Acceptance  Method: Explanation, Demonstration  Response: Verbalizes Understanding, Demonstrated Understanding, Needs Reinforcement    Education Comments  Pt pleasant and cooperative. Compliant with instruction.    Goals:  Encounter Problems       Encounter Problems (Active)       OT Goals       Pt will increase endurance to tolerate 15min of OOB activity with no more than 1 rest break in order to increase ability to engage in ADL completion.  (Progressing)       Start:  02/15/24    Expected End:  03/14/24            Pt will demo and/or verbalize 2-3 energy conservation techniques to incorporate into functional mobility or ADL to improve performance and increase independence.  (Progressing)       Start:  02/15/24    Expected End:  03/14/24            Pt will demo increased functional mobility/pivot transfer independence to tolerate tasks necessary to complete ADL routine.  (Progressing)       Start:  02/15/24    Expected End:  03/14/24            Pt will demo ADL routine and meaningful daily activities using modifications as needed  (Progressing)       Start:  02/15/24    Expected End:  03/14/24

## 2024-03-04 NOTE — PROGRESS NOTES
"Blanca Hamilton is a 48 y.o. female on day 19 of admission presenting with Wound dehiscence.    Subjective   Patient was seen and examined seated upright in bed. Eager to make progress with therapy and discharge from hospital. Drains remain out, minimal wound drainage.  Pain remains well controlled and denies needing medication for breakthrough. She denies fever, chest pain, nausea, vomiting, abdominal pain, leg pain or worsening leg swelling    Objective     Physical Exam  HENT:      Head: Normocephalic.      Nose: Nose normal.   Cardiovascular:      Rate and Rhythm: Normal rate and regular rhythm.      Pulses: Normal pulses.      Heart sounds: Normal heart sounds.   Pulmonary:      Comments: Decrease breath sound in both lungs  Abdominal:      General: Abdomen is flat. Bowel sounds are normal. There is no distension.      Palpations: Abdomen is soft. There is no mass.      Tenderness: There is no abdominal tenderness.   Musculoskeletal:         General: Tenderness present.      Cervical back: Normal range of motion.      Right lower leg: Edema present.      Left lower leg: Edema present.      Comments: Surgical site clean and dry, with surgical packing and dressing.  Incision is covered and stable, tightly wrapped without overt signs of hematoma formation there is some mild proximal serosanguineous drainage noted at the right lateral hip area, near greater trochanter.   Skin:     Findings: Bruising present. No rash.   Neurological:      General: No focal deficit present.      Mental Status: She is alert.      Cranial Nerves: No cranial nerve deficit.         Last Recorded Vitals  Blood pressure 99/64, pulse 85, temperature 36.8 °C (98.2 °F), temperature source Temporal, resp. rate 10, height 1.753 m (5' 9\"), weight 84.9 kg (187 lb 3.2 oz), SpO2 96 %.  Intake/Output last 3 Shifts:  I/O last 3 completed shifts:  In: 1740 (20.5 mL/kg) [P.O.:1440; IV Piggyback:300]  Out: 3100 (36.5 mL/kg) [Urine:3100 (1 mL/kg/hr)]  Dosing " Weight: 84.9 kg     Relevant Results    Current Facility-Administered Medications:     acetaminophen (Tylenol) tablet 975 mg, 975 mg, oral, q8h, Barry Gamboaiman, DO, 975 mg at 03/04/24 0506    ARIPiprazole (Abilify) tablet 15 mg, 15 mg, oral, Nightly, Barry Gamboaiman, DO, 15 mg at 03/03/24 2030    [Held by provider] aspirin chewable tablet 81 mg, 81 mg, oral, BID, Barry GUTIERREZ Reiman, DO    calcium carbonate (Tums) chewable tablet 500 mg, 500 mg, oral, TID PRN, Lele Killian, DO, 500 mg at 03/03/24 1834    ceFAZolin in dextrose (iso-os) (Ancef) IVPB 2 g, 2 g, intravenous, q8h, Barry Gamboaimamatt, DO, Stopped at 03/04/24 0536    enoxaparin (Lovenox) syringe 40 mg, 40 mg, subcutaneous, q24h, Barry Gamboaiman, DO, 40 mg at 03/03/24 1810    famotidine (Pepcid) tablet 40 mg, 40 mg, oral, BID, Barry Gamboaiman, DO, 40 mg at 03/04/24 0910    ferrous sulfate (325 mg ferrous sulfate) tablet 1 tablet, 1 tablet, oral, Daily, Barry Gamboaiman, DO, 1 tablet at 03/04/24 0911    fluticasone (Flonase) nasal spray 2 spray, 2 spray, Each Nostril, Daily, Dylan Casas DO, 2 spray at 03/04/24 1038    HYDROmorphone (Dilaudid) injection 0.4 mg, 0.4 mg, intravenous, q3h PRN, Barry Gamboaiman, DO    lactated Ringer's infusion, 100 mL/hr, intravenous, Continuous, Barry Chirinosn, DO, Last Rate: 100 mL/hr at 03/04/24 0514, 100 mL/hr at 03/04/24 0514    [Held by provider] lisinopril tablet 10 mg, 10 mg, oral, q AM, Barry Gamboaiman, DO    loratadine (Claritin) tablet 10 mg, 10 mg, oral, Daily, Barry Gamboaiman, DO, 10 mg at 03/04/24 0911    magnesium oxide (Mag-Ox) tablet 800 mg, 800 mg, oral, Daily, Barry D Reiman, DO, 800 mg at 03/04/24 0910    metoprolol succinate XL (Toprol-XL) 24 hr tablet 25 mg, 25 mg, oral, Daily, Barry D Reiman, DO, 25 mg at 03/04/24 0910    midodrine (Proamatine) tablet 5 mg, 5 mg, oral, BID PRN, Barry D Reiman, DO    montelukast (Singulair) tablet 10 mg, 10 mg, oral, q AM, Barry D Reiman, DO, 10 mg at  03/04/24 1037    nicotine (Nicoderm CQ) 21 mg/24 hr patch 1 patch, 1 patch, transdermal, Daily, Barry D Reiman, DO, 1 patch at 03/04/24 0911    ondansetron (Zofran) injection 4 mg, 4 mg, intravenous, q6h PRN, Barry D Reiman, DO, 4 mg at 03/03/24 1834    oxyCODONE (Roxicodone) immediate release tablet 10 mg, 10 mg, oral, q6h PRN, Barry D Reiman, DO, 10 mg at 02/24/24 0500    oxyCODONE (Roxicodone) immediate release tablet 5 mg, 5 mg, oral, q6h PRN, Barry D Reiman, DO, 5 mg at 02/22/24 2034    PARoxetine (Paxil) tablet 40 mg, 40 mg, oral, Daily, Barry D Reiman, DO, 40 mg at 03/04/24 0910    polyethylene glycol (Glycolax, Miralax) packet 17 g, 17 g, oral, BID, Barry D Reiman, DO, 17 g at 03/03/24 0847    simvastatin (Zocor) tablet 20 mg, 20 mg, oral, Nightly, Barry D Reiman, DO, 20 mg at 03/03/24 2030    traZODone (Desyrel) tablet 100 mg, 100 mg, oral, Nightly, Barry D Reiman, DO, 100 mg at 03/03/24 2030    Lower extremity venous duplex bilateral  Result Date: 2/15/2024          [place] [id number]  Tel [phone] and Fax [phone]  Vascular Lab Report [facility] LOWER EXTREMITY VENOUS DUPLEX BILATERAL  Patient Name:      ZULEIKA Mcnamara Physician:  [id number] Prieto Loza MD Study Date:        2/14/2024            Ordering Physician: [id number] AMADOR MRN/PID:           [id number]             Technologist:       Rosa Pierre S Accession#:        [id number]         Technologist 2:     Hattie Raymond Date of Birth/Age: 1975 / 48      Encounter#:         [id number]                    years Gender:            F Admission Status:  Inpatient            Location Performed: [facility]  Diagnosis/ICD: Localized (leg) edema-R60.0 CPT Codes:     33829 Peripheral venous duplex scan for DVT complete  CONCLUSIONS: Right  Lower Venous: No evidence of acute deep vein thrombus visualized in the right lower extremity. Left Lower Venous: No evidence of acute deep vein thrombus visualized in the left lower extremity.  Additional Findings: Technically difficult study due to patient movement and inability to position.  Imaging & Doppler Findings:  Right                 Compressible Thrombus        Flow Distal External Iliac     Yes        None   Spontaneous/Phasic CFV                       Yes        None   Spontaneous/Phasic PFV                       Yes        None FV Proximal               Yes        None   Spontaneous/Phasic FV Mid                    Yes        None FV Distal                 Yes        None Popliteal                 Yes        None   Spontaneous/Phasic Peroneal                  Yes        None PTV                       Yes        None  Left                  Compress Thrombus        Flow Distal External Iliac   Yes      None   Spontaneous/Phasic CFV                     Yes      None   Spontaneous/Phasic PFV                     Yes      None FV Proximal             Yes      None   Spontaneous/Phasic FV Mid                  Yes      None FV Distal               Yes      None Popliteal               Yes      None   Spontaneous/Phasic Peroneal                Yes      None PTV                     Yes      None  [id number] Prieto Loza MD Electronically signed by [id number] Prieto Loza MD on 2/15/2024 at 5:44:38 PM  ** Final **     ECG 12 lead  Result Date: 2/7/2024  Atrial flutter with 2:1 AV conduction Nonspecific intraventricular block Left ventricular hypertrophy ( R in aVL , Lyndon product ) Inferior infarct , age undetermined Abnormal ECG When compared with ECG of 11-JAN-2024 23:16, Significant changes have occurred See ED provider note for full interpretation and clinical correlation Confirmed by Vega Bruce ([id number]) on 2/7/2024 10:40:33 PM    XR hip right with pelvis when performed 2 or 3 views  Result Date:  2/7/2024  Interpreted By:  Richard Garcia, STUDY: XR HIP RIGHT WITH PELVIS WHEN PERFORMED 2 OR 3 VIEWS; ;  2/7/2024 9:25 am   INDICATION: Signs/Symptoms:s/p revision oanh.   COMPARISON: 01/26/2024   ACCESSION NUMBER(S): [id number]   ORDERING CLINICIAN: JON HERNANDEZ   FINDINGS: Total right hip arthroplasty with interval placement of antibiotic impregnated beads. Partially visualized left hip arthroplasty.     Interval placement of antibiotic impregnated beads in the right hip.     MACRO: None   Signed by: Richard Garcia 2/7/2024 5:00 PM Dictation workstation:   [id number]      Assessment/Plan   Blanca Hamilton is a 48 y.o. 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 Initially met Sepsis criteria. Admitted for further management of dehiscence and SSI.      Acute Medical Issues:  # Sepsis secondary to reccurent post op wound infection (MSSA) of right hip arthroplasty   # Hx right total hip arthroplasty w/ revision (1/5/24) for prosthetic fx  # Hx hardware removal for prosthetic joint infection (2/6/24)  -OR 2/20/24 right hip revision with Dr. Hernandez  -Discharged on 2/11 with wound vac + PICC line for 6-week course of cefazolin              -Previous tissue/Wound Culture (2/6): MSSA, only resistant to Tetracycline   -pain control: scheduled tylenol, oxy 5 mod, oxy 10 severe, dilaudid 0.4 PRN breakthrough  -Blood Cx (2/14) no growth, final report  -Cont Ancef 2g TID (2/20/2024 - 4/2/2024) with weekly labs  -Holding ASA  -Cont LVX for DVT ppx, can be continued on discharge for a total of 30 days (to end 3/19)  -Maintain two pillows in between legs when in bed, do not cross or extend right leg over to left, ok to lay on right side  -s/p deep and superficial drain removal 3/1  -will plan to change Prevena VAC tomorrow 3/5 with wound care, then safe to discharge  -follow up with Dr. Hernandez in 1 week in  clinic    # Depression, worsened with complicated hospital stay  -c/w Abilify + Paroxetine +Trazodone  -Pet therapy   -Encourage conversation and visit from family/friends    # Flu B exposure  -Asymptomatic, roommate tested positive  -Completion of Tamiflu 2/21-3/1     Chronic Medical Issues:  # GERD: c/w Pepcid   # HTN: Restarted home Metoprolol today. Holding home lisinopril  # HLD: c/w Simvastatin  # Chronic anemia: iron supplement  # Tobacco use: nicotine patch     Fluids: None  Electrolytes: Replete as needed  Nutrition: Regular, supplements  GI PPX: None   DVT PPX: LVX until 3/19     Access: PICC Line, PIV's  Antibiotics: Cefazolin 2g TID (2/20 - 4/2)  Oxygenation: Room Air     Dispo: Admitted onto the floor for postoperative wound dehiscence and surgical site infection. OR on 2/20/24 for right hip revision.  Orthopedic surgery planning for Prevena change tomorrow with wound care.  After Prevena change, the patient will be medically cleared for discharge.  Will need antibiotic and anticoagulation on discharge.        Barry Bledsoe, DO  PGY-1

## 2024-03-04 NOTE — PROGRESS NOTES
Blanca Hamilton is a 48 y.o. female on day 19 of admission presenting with Wound dehiscence.    Subjective   Interval History: no fever, no new complaints        Review of Systems    Objective   Range of Vitals (last 24 hours)  Heart Rate:  [69-85]   Temp:  [36.8 °C (98.2 °F)-37 °C (98.6 °F)]   Resp:  [10-14]   BP: ()/(64-87)   SpO2:  [95 %-96 %]   Daily Weight  02/29/24 : 84.9 kg (187 lb 3.2 oz)    Body mass index is 27.64 kg/m².    Physical Exam  Constitutional:       Appearance: Normal appearance.   HENT:      Head: Normocephalic and atraumatic.      Mouth/Throat:      Mouth: Mucous membranes are moist.      Pharynx: Oropharynx is clear.   Eyes:      Pupils: Pupils are equal, round, and reactive to light.   Cardiovascular:      Rate and Rhythm: Normal rate and regular rhythm.      Heart sounds: Normal heart sounds.   Pulmonary:      Effort: Pulmonary effort is normal.      Breath sounds: Normal breath sounds.   Abdominal:      General: Abdomen is flat. Bowel sounds are normal.      Palpations: Abdomen is soft.   Musculoskeletal:      Cervical back: Normal range of motion.      Comments: Rt hip dressing, no cellulitis   Neurological:      Mental Status: She is alert.         Antibiotics  sodium chloride 0.9 % bolus 3,120 mL  acetaminophen (Tylenol) tablet 975 mg  ARIPiprazole (Abilify) tablet 15 mg  aspirin chewable tablet 81 mg  ceFAZolin in dextrose (iso-os) (Ancef) IVPB 2 g  famotidine (Pepcid) tablet 40 mg  ferrous sulfate (325 mg ferrous sulfate) tablet 1 tablet  lactulose 20 gram/30 mL oral solution 20 g  lisinopril tablet 10 mg  loratadine (Claritin) tablet 10 mg  magnesium oxide (Mag-Ox) tablet 400 mg  metoprolol succinate XL (Toprol-XL) 24 hr tablet 25 mg  montelukast (Singulair) tablet 10 mg  nicotine (Nicoderm CQ) 21 mg/24 hr patch 1 patch  PARoxetine (Paxil) tablet 40 mg  polyethylene glycol (Glycolax, Miralax) packet 17 g  simvastatin (Zocor) tablet 20 mg  traZODone (Desyrel) tablet 100 mg  zinc  oxide 40 % ointment 1 Application  piperacillin-tazobactam-dextrose (Zosyn) IV 3.375 g  sodium chloride 0.9% infusion  oxyCODONE (Roxicodone) immediate release tablet 5 mg  oxyCODONE (Roxicodone) immediate release tablet 10 mg  HYDROmorphone (Dilaudid) injection 0.4 mg  vancomycin (Vancocin) in dextrose 5 % water (D5W) 500 mL IV 1,500 mg  atorvastatin (Lipitor) tablet    ceFAZolin in dextrose (iso-os) (Ancef) IVPB 1 g  enoxaparin (Lovenox) syringe 40 mg  ceFAZolin in dextrose (iso-os) (Ancef) IVPB 2 g  ondansetron (Zofran) injection 4 mg  lactated Ringer's infusion  oxygen (O2) therapy  lactated Ringer's infusion  oxyCODONE (Roxicodone) immediate release tablet 5 mg  HYDROmorphone (Dilaudid) injection 0.25 mg  HYDROmorphone (Dilaudid) injection 0.5 mg  ondansetron (Zofran) injection 4 mg  droperidol (Inapsine) injection 0.625 mg  rocuronium (ZeMuron) injection  - Omnicell Override Pull  ondansetron (Zofran) injection  - Omnicell Override Pull  dexAMETHasone (Decadron) injection  - Omnicell Override Pull  lidocaine (cardiac) (Xylocaine) injection  - Omnicell Override Pull  propofol (Diprivan) injection  - Omnicell Override Pull  fentaNYL PF (Sublimaze) injection  - Omnicell Override Pull  lactated Ringer's infusion  ceFAZolin (Ancef) injection  - Omnicell Override Pull  ceFAZolin (Ancef) injection  - Omnicell Override Pull  tranexamic acid (Cyklokapron) injection  - Omnicell Override Pull  tranexamic acid (Cyklokapron) injection  - Omnicell Override Pull  povidone-iodine (Betadine) soap  - Omnicell Override Pull  povidone-iodine (Betadine) topical solution  - Omnicell Override Pull  ePHEDrine injection  - Omnicell Override Pull  vasopressin (Vasostrict) injection  - Omnicell Override Pull  propofol (Diprivan) injection  - Omnicell Override Pull  dexAMETHasone (Decadron) injection  - Omnicell Override Pull  gentamicin (Garamycin) injection  - Omnicell Override Pull  vancomycin (Vancocin) vial for injection  - Omnicell  Override Pull  HYDROmorphone (Dilaudid) injection  - Omnicell Override Pull  vancomycin (Vancocin) vial for injection  sugammadex (Bridion) injection  - Omnicell Override Pull  gentamicin (Garamycin) injection  sodium chloride 0.9 % irrigation solution  lactated Ringer's irrigation solution  lactated Ringer's bolus 500 mL  lactated Ringer's bolus 500 mL  lactated Ringer's infusion  oseltamivir (Tamiflu) capsule 75 mg  flu vaccine, quadrivalent, high-dose, preservative free, age 65y+ (FLUZONE)  lactated Ringer's bolus 1,000 mL  midodrine (Proamatine) tablet 5 mg  lactated Ringer's bolus 500 mL  magnesium oxide (Mag-Ox) tablet 800 mg  polyethylene glycol (Glycolax, Miralax) packet 17 g  bisacodyl (Dulcolax) suppository 10 mg      Relevant Results  Labs  Results from last 72 hours   Lab Units 03/04/24  0521 03/02/24  0618   WBC AUTO x10*3/uL 6.0 4.7   HEMOGLOBIN g/dL 9.4* 8.4*   HEMATOCRIT % 31.1* 27.5*   PLATELETS AUTO x10*3/uL 345 339       Results from last 72 hours   Lab Units 03/04/24  0521 03/02/24  0618   SODIUM mmol/L 135* 137   POTASSIUM mmol/L 3.9 4.2   CHLORIDE mmol/L 102 105   CO2 mmol/L 26 28   BUN mg/dL 4* 6   CREATININE mg/dL 0.30* 0.34*   GLUCOSE mg/dL 113* 81   CALCIUM mg/dL 7.7* 7.6*   ANION GAP mmol/L 11 8*   EGFR mL/min/1.73m*2 >90 >90   PHOSPHORUS mg/dL  --  3.8       Results from last 72 hours   Lab Units 03/04/24  0521 03/02/24  0618   ALK PHOS U/L 122* 115*   BILIRUBIN TOTAL mg/dL 0.2 0.1   PROTEIN TOTAL g/dL 5.2* 4.5*   ALT U/L <3* <3*   AST U/L 8* 7*   ALBUMIN g/dL 2.6* 2.3*       Estimated Creatinine Clearance: 125 mL/min (A) (by C-G formula based on SCr of 0.3 mg/dL (L)).  C-Reactive Protein   Date Value Ref Range Status   03/04/2024 1.24 (H) <1.00 mg/dL Final   02/27/2024 2.15 (H) <1.00 mg/dL Final   02/20/2024 0.42 <1.00 mg/dL Final     Microbiology  Reviewed  Imaging  reviewed        Assessment/Plan     Right hip prosthesis infection sp I&D with partial hardware exchange, MSSA on the  cultures, sp repeat I&D    Recommendations :  Continue Cefazolin, plan on 6 weeks from the date of the last surgery, weekly labs  Discussed with the medical team     I spent minutes in the professional and overall care of this patient.      Frank Modi MD

## 2024-03-04 NOTE — PROGRESS NOTES
Physical Therapy    Physical Therapy Treatment    Patient Name: Blanca Hamilton  MRN: [id number]  Today's Date: 3/4/2024  Time Calculation  Start Time: 1300  Stop Time: 1312  Time Calculation (min): 12 min       Assessment/Plan   PT Assessment  PT Assessment Results: Decreased mobility, Orthopedic restrictions  Rehab Prognosis: Good  Evaluation/Treatment Tolerance: Patient tolerated treatment well  Medical Staff Made Aware: Yes  Strengths: Ability to acquire knowledge  Barriers to Participation: Comorbidities  End of Session Communication: Bedside nurse  Assessment Comment: Patient motivated to improve, has been agreeable to transfer training, maintains weight bearing status well. Continue to rec mod intensity intervention.  End of Session Patient Position: Bed, 3 rail up, Alarm on  PT Plan  Inpatient/Swing Bed or Outpatient: Inpatient  PT Plan  Treatment/Interventions: Bed mobility, Transfer training, Balance training, Strengthening, Endurance training  PT Plan: Skilled PT  PT Frequency: 3 times per week  PT Discharge Recommendations: Moderate intensity level of continued care  Equipment Recommended upon Discharge: Wheeled walker, Wheelchair  PT Recommended Transfer Status: Assist x2  PT - OK to Discharge: Yes (per PT POC)      General Visit Information:   PT  Visit  PT Received On: 03/04/24  Response to Previous Treatment: Patient with no complaints from previous session.  General  Reason for Referral: 49 yo female referred to PT for R hip wound dehiscence, impaired mobility, hip revision on 2/20 with wound vac, drains have been removed  Referred By: Brando Mascorro  Past Medical History Relevant to Rehab: Anxiety, Arthritis, Class 1 obesity with body mass index (BMI) of 30.0 to 30.9 in adult (02/02/2024), Depression, GERD (gastroesophageal reflux disease), HTN (hypertension), Hyperlipidemia; R total hip arthroplasty on 1/2/4, but had to get subsequent revision on 1/5 for prosthetic fracture. 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 Cultures were positive for MSSA and patient was started on cefazolin. Then underwent ALEXA revision and partial hardware removal with Dr. Jalloh on 2/6/24. Patient was discharged to SNF on 2/11 with wound vac, a PICC line for 6 weeks of cefazolin, and ASA for DVT ppx.  Family/Caregiver Present: No  Prior to Session Communication: Bedside nurse  Patient Position Received: Bed, 3 rail up, Alarm on  General Comment: Patient pleasant, cooperative and eager to improve    Subjective   Precautions:  Precautions  LE Weight Bearing Status: Right Toe-Touch Weight Bearing (transfers only, otherwise NWB)  Medical Precautions: Fall precautions (IV, external cath, wound vac, tele)  Post-Surgical Precautions: Right hip precautions    Objective   Pain:  Pain Assessment  Pain Assessment: 0-10  Pain Score: 0 - No pain  Cognition:  Cognition  Overall Cognitive Status: Within Functional Limits  Orientation Level: Oriented X4  Postural Control:  Postural Control  Postural Control: Within Functional Limits  Static Sitting Balance  Static Sitting-Balance Support: No upper extremity supported, Feet supported  Static Sitting-Level of Assistance: Distant supervision  Static Standing Balance  Static Standing-Balance Support: Bilateral upper extremity supported  Static Standing-Level of Assistance: Maximum assistance  Static Standing-Comment/Number of Minutes:  (< 10\" each trial)    Activity Tolerance:  Activity Tolerance  Endurance: Tolerates 10 - 20 min exercise with multiple rests  Treatments:  Therapeutic Exercise  Therapeutic Exercise Performed: Yes  Therapeutic Exercise Activity 1: sitting: ankle pumps B x 20; LAQ B x 20; Marching L x 20              Bed Mobility  Bed Mobility: Yes  Bed Mobility 1  Bed Mobility 1: Supine to sitting, Sitting to supine  Level of Assistance 1: Modified independent  Bed Mobility 2  Bed Mobility  2:  (moving toward HOB in seated position with use of B " "UE)  Level of Assistance 2: Distant supervision       Transfers  Transfer: Yes  Transfer 1  Transfer From 1: Sit to, Stand to  Transfer to 1: Sit, Stand  Technique 1: Sit to stand, Stand to sit  Transfer Device 1: Walker  Transfer Level of Assistance 1: Maximum assistance  Trials/Comments 1: Maintains weight bearing status well, maintains standing <10\", 3 trials completed    Outcome Measures:  [facility] Basic Mobility  Turning from your back to your side while in a flat bed without using bedrails: None  Moving from lying on your back to sitting on the side of a flat bed without using bedrails: None  Moving to and from bed to chair (including a wheelchair): A lot  Standing up from a chair using your arms (e.g. wheelchair or bedside chair): A lot  To walk in hospital room: Total  Climbing 3-5 steps with railing: Total  Basic Mobility - Total Score: 14    Education Documentation  Precautions, taught by Amy Montero PT at 3/4/2024  1:34 PM.  Learner: Patient  Readiness: Acceptance  Method: Explanation  Response: Verbalizes Understanding  Comment: Discussed hip precautions and weight bearing precautions; patient verbalized understanding and recalled all precautions without cues    Body Mechanics, taught by Amy Montero PT at 3/4/2024  1:34 PM.  Learner: Patient  Readiness: Acceptance  Method: Explanation  Response: Verbalizes Understanding  Comment: Discussed hip precautions and weight bearing precautions; patient verbalized understanding and recalled all precautions without cues    Mobility Training, taught by Amy Montero PT at 3/4/2024  1:34 PM.  Learner: Patient  Readiness: Acceptance  Method: Explanation  Response: Verbalizes Understanding  Comment: Discussed hip precautions and weight bearing precautions; patient verbalized understanding and recalled all precautions without cues    Education Comments  No comments found.        OP EDUCATION:       Encounter Problems       Encounter Problems (Active)       " Balance       STG - Maintains static standing balance with upper extremity support x 1' maintaining weight bearing status  (Progressing)       Start:  02/14/24    Expected End:  03/04/24            STG - Maintains dynamic sitting balance with upper extremity support x 10'  (Progressing)       Start:  02/14/24    Expected End:  03/04/24               Pain - Adult          Transfers       STG - Patient will perform bed mobility independently  (Progressing)       Start:  02/14/24    Expected End:  03/04/24            STG - Patient will transfer sit to and from stand min A, maintaining weight bearing status  (Progressing)       Start:  02/14/24    Expected End:  03/04/24

## 2024-03-04 NOTE — PROGRESS NOTES
"Blanca Hamilton is a 48 y.o. female on day 19 of admission presenting with Wound dehiscence.    Subjective   No acute overnight events.  Pain controlled.  Superficial drain fell out over the weekend.  Tolerating PO intake.  Anxious to leave hospital and start rehab.    Objective     Physical Exam  Vitals reviewed.   HENT:      Head: Normocephalic and atraumatic.   Eyes:      Extraocular Movements: Extraocular movements intact.      Conjunctiva/sclera: Conjunctivae normal.      Pupils: Pupils are equal, round, and reactive to light.   Cardiovascular:      Rate and Rhythm: Normal rate and regular rhythm.      Pulses: Normal pulses.   Pulmonary:      Breath sounds: Normal breath sounds.   Abdominal:      General: Bowel sounds are normal.      Palpations: Abdomen is soft.   Musculoskeletal:      Comments: Right hip dressing C/D/I, prevena VAC in place to continuous suction, no output, leg and foot warm and well perfused, motion and sensations intact    Skin:     General: Skin is warm and dry.      Capillary Refill: Capillary refill takes less than 2 seconds.   Neurological:      General: No focal deficit present.      Mental Status: She is alert and oriented to person, place, and time.       Last Recorded Vitals  Blood pressure 99/64, pulse 85, temperature 36.8 °C (98.2 °F), temperature source Temporal, resp. rate 10, height 1.753 m (5' 9\"), weight 84.9 kg (187 lb 3.2 oz), SpO2 96 %.  Intake/Output last 3 Shifts:  I/O last 3 completed shifts:  In: 1740 (20.5 mL/kg) [P.O.:1440; IV Piggyback:300]  Out: 3100 (36.5 mL/kg) [Urine:3100 (1 mL/kg/hr)]  Dosing Weight: 84.9 kg     Relevant Results  Electrocardiogram, 12-lead PRN ACS symptoms    Result Date: 2/28/2024  Normal sinus rhythm Normal ECG When compared with ECG of 02-FEB-2024 15:03, Significant changes have occurred    Lower extremity venous duplex bilateral    Result Date: 2/15/2024          [place] [id number]  Tel [phone] " and Fax [phone]  Vascular Lab Report [facility] US LOWER EXTREMITY VENOUS DUPLEX BILATERAL  Patient Name:      ZULEIKA KAMARA          Reading Physician:  [id number] Prieto Loza MD Study Date:        2/14/2024            Ordering Physician: [id number] GEORGE AMADOR MRN/PID:           [id number]             Technologist:       Rosa Pierre [facility] Accession#:        [id number]         Technologist 2:     Hattie Sextonadriana Date of Birth/Age: 1975 / 48      Encounter#:         [id number]                    years Gender:            F Admission Status:  Inpatient            Location Performed: [facility]  Diagnosis/ICD: Localized (leg) edema-R60.0 CPT Codes:     18625 Peripheral venous duplex scan for DVT complete  CONCLUSIONS: Right Lower Venous: No evidence of acute deep vein thrombus visualized in the right lower extremity. Left Lower Venous: No evidence of acute deep vein thrombus visualized in the left lower extremity.  Additional Findings: Technically difficult study due to patient movement and inability to position.  Imaging & Doppler Findings:  Right                 Compressible Thrombus        Flow Distal External Iliac     Yes        None   Spontaneous/Phasic CFV                       Yes        None   Spontaneous/Phasic PFV                       Yes        None FV Proximal               Yes        None   Spontaneous/Phasic FV Mid                    Yes        None FV Distal                 Yes        None Popliteal                 Yes        None   Spontaneous/Phasic Peroneal                  Yes        None PTV                       Yes        None  Left                  Compress Thrombus        Flow Distal External Iliac   Yes      None   Spontaneous/Phasic CFV                     Yes      None   Spontaneous/Phasic PFV                     Yes      None FV Proximal             Yes      None    Spontaneous/Phasic FV Mid                  Yes      None FV Distal               Yes      None Popliteal               Yes      None   Spontaneous/Phasic Peroneal                Yes      None PTV                     Yes      None  [id number] Prieto Loza MD Electronically signed by [id number] Prieto Loza MD on 2/15/2024 at 5:44:38 PM  ** Final **     Bedside PICC Imaging    Result Date: 2/8/2024  These images are not reportable by radiology and will not be interpreted by  Radiologists.    ECG 12 lead    Result Date: 2/7/2024  Atrial flutter with 2:1 AV conduction Nonspecific intraventricular block Left ventricular hypertrophy ( R in aVL , Lyndon product ) Inferior infarct , age undetermined Abnormal ECG When compared with ECG of 11-JAN-2024 23:16, Significant changes have occurred See ED provider note for full interpretation and clinical correlation Confirmed by Vega Bruce ([id number]) on 2/7/2024 10:40:33 PM    XR hip right with pelvis when performed 2 or 3 views    Result Date: 2/7/2024  Interpreted By:  Richard Garcia, STUDY: XR HIP RIGHT WITH PELVIS WHEN PERFORMED 2 OR 3 VIEWS; ;  2/7/2024 9:25 am   INDICATION: Signs/Symptoms:s/p revision oanh.   COMPARISON: 01/26/2024   ACCESSION NUMBER(S): [id number]   ORDERING CLINICIAN: JON HERNANDEZ   FINDINGS: Total right hip arthroplasty with interval placement of antibiotic impregnated beads. Partially visualized left hip arthroplasty.       Interval placement of antibiotic impregnated beads in the right hip.     MACRO: None   Signed by: Richard Garcia 2/7/2024 5:00 PM Dictation workstation:   [id number]    XR abdomen 1 view    Result Date: 2/5/2024  Interpreted By:  Richard aGrcia, STUDY: XR ABDOMEN 1 VIEW;  2/4/2024 6:47 pm   INDICATION: Signs/Symptoms:Lactic acidosis and emesis.   COMPARISON: None.   ACCESSION NUMBER(S): [id number]   ORDERING CLINICIAN: TABITHA MAYNARD   FINDINGS: Nonobstructive bowel gas pattern. Partially visualized bilateral hip arthroplasties.        1.  Nonobstructive bowel gas pattern.   MACRO: None   Signed by: Richard Garcia 2/5/2024 4:12 PM Dictation workstation:   [id number]      Scheduled medications  acetaminophen, 975 mg, oral, q8h  ARIPiprazole, 15 mg, oral, Nightly  [Held by provider] aspirin, 81 mg, oral, BID  ceFAZolin, 2 g, intravenous, q8h  enoxaparin, 40 mg, subcutaneous, q24h  famotidine, 40 mg, oral, BID  ferrous sulfate (325 mg ferrous sulfate), 1 tablet, oral, Daily  fluticasone, 2 spray, Each Nostril, Daily  [Held by provider] lisinopril, 10 mg, oral, q AM  loratadine, 10 mg, oral, Daily  magnesium oxide, 800 mg, oral, Daily  metoprolol succinate XL, 25 mg, oral, Daily  montelukast, 10 mg, oral, q AM  nicotine, 1 patch, transdermal, Daily  PARoxetine, 40 mg, oral, Daily  polyethylene glycol, 17 g, oral, BID  simvastatin, 20 mg, oral, Nightly  traZODone, 100 mg, oral, Nightly      Continuous medications  lactated Ringer's, 100 mL/hr, Last Rate: 100 mL/hr (03/04/24 0514)      PRN medications  PRN medications: calcium carbonate, HYDROmorphone, midodrine, ondansetron, oxyCODONE, oxyCODONE  Results for orders placed or performed during the hospital encounter of 02/14/24 (from the past 24 hour(s))   CBC   Result Value Ref Range    WBC 6.0 4.4 - 11.3 x10*3/uL    nRBC 0.0 0.0 - 0.0 /100 WBCs    RBC 3.30 (L) 4.00 - 5.20 x10*6/uL    Hemoglobin 9.4 (L) 12.0 - 16.0 g/dL    Hematocrit 31.1 (L) 36.0 - 46.0 %    MCV 94 80 - 100 fL    MCH 28.5 26.0 - 34.0 pg    MCHC 30.2 (L) 32.0 - 36.0 g/dL    RDW 15.9 (H) 11.5 - 14.5 %    Platelets 345 150 - 450 x10*3/uL   Comprehensive Metabolic Panel   Result Value Ref Range    Glucose 113 (H) 74 - 99 mg/dL    Sodium 135 (L) 136 - 145 mmol/L    Potassium 3.9 3.5 - 5.3 mmol/L    Chloride 102 98 - 107 mmol/L    Bicarbonate 26 21 - 32 mmol/L    Anion Gap 11 10 - 20 mmol/L    Urea Nitrogen 4 (L) 6 - 23 mg/dL    Creatinine 0.30 (L) 0.50 - 1.05 mg/dL    eGFR >90 >60 mL/min/1.73m*2    Calcium 7.7 (L) 8.6 - 10.3 mg/dL     Albumin 2.6 (L) 3.4 - 5.0 g/dL    Alkaline Phosphatase 122 (H) 33 - 110 U/L    Total Protein 5.2 (L) 6.4 - 8.2 g/dL    AST 8 (L) 9 - 39 U/L    Bilirubin, Total 0.2 0.0 - 1.2 mg/dL    ALT <3 (L) 7 - 45 U/L   Sedimentation rate, automated   Result Value Ref Range    Sedimentation Rate 20 0 - 20 mm/h   C-reactive protein   Result Value Ref Range    C-Reactive Protein 1.24 (H) <1.00 mg/dL               This patient has a central line   Reason for the central line remaining today?  IV ABX    Assessment/Plan   Principal Problem:    Wound dehiscence  Active Problems:    Class 1 obesity with body mass index (BMI) of 30.0 to 30.9 in adult    Sinusitis    Transfusion history    Arthritis    Hyponatremia    48 year female POD 11 from right hip revision.     - AM labs reviewed   - toe touch weight bearing for transfers other, otherwise NWB RLE  - On Lovenox for DVT prophylaxis   - PT evaluation  - all drains removed  - will plan to change Prevena VAC tomorrow with wound care  - maintain two pillows in between legs when in bed, do not cross or extend right leg over to left, ok to lay on right side  - 6 weeks of Ancef from recent OR date per ID recs  - patient ok for discharge after VAC change tomorrow and follow up with Dr. Jalloh in 1 week in clinic  - ortho will continue to follow while in house     Discussed with Dr. Jalloh           I spent 30 minutes in the professional and overall care of this patient.    DEVON Bruno-CNP

## 2024-03-05 ENCOUNTER — APPOINTMENT (OUTPATIENT)
Dept: ORTHOPEDIC SURGERY | Facility: CLINIC | Age: 49
End: 2024-03-05
Payer: COMMERCIAL

## 2024-03-05 VITALS
WEIGHT: 187.2 LBS | RESPIRATION RATE: 18 BRPM | DIASTOLIC BLOOD PRESSURE: 70 MMHG | HEART RATE: 91 BPM | SYSTOLIC BLOOD PRESSURE: 121 MMHG | TEMPERATURE: 96.3 F | HEIGHT: 69 IN | OXYGEN SATURATION: 96 % | BODY MASS INDEX: 27.73 KG/M2

## 2024-03-05 PROBLEM — M19.90 ARTHRITIS: Status: RESOLVED | Noted: 2024-02-20 | Resolved: 2024-03-05

## 2024-03-05 PROBLEM — E66.811 CLASS 1 OBESITY WITH BODY MASS INDEX (BMI) OF 30.0 TO 30.9 IN ADULT: Status: RESOLVED | Noted: 2024-02-02 | Resolved: 2024-03-05

## 2024-03-05 PROBLEM — E87.1 HYPONATREMIA: Status: RESOLVED | Noted: 2024-02-20 | Resolved: 2024-03-05

## 2024-03-05 PROBLEM — T81.30XA WOUND DEHISCENCE: Status: RESOLVED | Noted: 2024-02-14 | Resolved: 2024-03-05

## 2024-03-05 PROBLEM — E66.9 CLASS 1 OBESITY WITH BODY MASS INDEX (BMI) OF 30.0 TO 30.9 IN ADULT: Status: RESOLVED | Noted: 2024-02-02 | Resolved: 2024-03-05

## 2024-03-05 PROBLEM — Z92.89 TRANSFUSION HISTORY: Status: RESOLVED | Noted: 2024-02-20 | Resolved: 2024-03-05

## 2024-03-05 PROCEDURE — 99239 HOSP IP/OBS DSCHRG MGMT >30: CPT

## 2024-03-05 PROCEDURE — S4991 NICOTINE PATCH NONLEGEND: HCPCS

## 2024-03-05 PROCEDURE — 2500000001 HC RX 250 WO HCPCS SELF ADMINISTERED DRUGS (ALT 637 FOR MEDICARE OP)

## 2024-03-05 PROCEDURE — 99232 SBSQ HOSP IP/OBS MODERATE 35: CPT | Performed by: NURSE PRACTITIONER

## 2024-03-05 PROCEDURE — 2500000004 HC RX 250 GENERAL PHARMACY W/ HCPCS (ALT 636 FOR OP/ED)

## 2024-03-05 PROCEDURE — 2500000002 HC RX 250 W HCPCS SELF ADMINISTERED DRUGS (ALT 637 FOR MEDICARE OP, ALT 636 FOR OP/ED)

## 2024-03-05 RX ORDER — ENOXAPARIN SODIUM 100 MG/ML
40 INJECTION SUBCUTANEOUS EVERY 24 HOURS
Qty: 14 EACH | Refills: 0
Start: 2024-03-05 | End: 2024-03-19

## 2024-03-05 RX ORDER — CEFAZOLIN 1 G/1
2 INJECTION, POWDER, FOR SOLUTION INTRAVENOUS EVERY 8 HOURS
Qty: 180000 MG | Refills: 0 | Status: SHIPPED
Start: 2024-03-05 | End: 2024-04-04

## 2024-03-05 RX ADMIN — SODIUM CHLORIDE, POTASSIUM CHLORIDE, SODIUM LACTATE AND CALCIUM CHLORIDE 100 ML/HR: 600; 310; 30; 20 INJECTION, SOLUTION INTRAVENOUS at 05:13

## 2024-03-05 RX ADMIN — FAMOTIDINE 40 MG: 20 TABLET ORAL at 09:11

## 2024-03-05 RX ADMIN — ACETAMINOPHEN 975 MG: 325 TABLET ORAL at 05:13

## 2024-03-05 RX ADMIN — PAROXETINE 40 MG: 20 TABLET, FILM COATED ORAL at 09:12

## 2024-03-05 RX ADMIN — ACETAMINOPHEN 975 MG: 325 TABLET ORAL at 14:26

## 2024-03-05 RX ADMIN — METOPROLOL SUCCINATE 25 MG: 25 TABLET, EXTENDED RELEASE ORAL at 09:12

## 2024-03-05 RX ADMIN — LORATADINE 10 MG: 10 TABLET ORAL at 09:12

## 2024-03-05 RX ADMIN — CEFAZOLIN SODIUM 2 G: 2 INJECTION, SOLUTION INTRAVENOUS at 14:26

## 2024-03-05 RX ADMIN — MONTELUKAST 10 MG: 10 TABLET, FILM COATED ORAL at 09:16

## 2024-03-05 RX ADMIN — Medication 800 MG: at 09:11

## 2024-03-05 RX ADMIN — FERROUS SULFATE TAB 325 MG (65 MG ELEMENTAL FE) 1 TABLET: 325 (65 FE) TAB at 09:11

## 2024-03-05 RX ADMIN — ENOXAPARIN SODIUM 40 MG: 40 INJECTION SUBCUTANEOUS at 14:28

## 2024-03-05 RX ADMIN — FLUTICASONE PROPIONATE 2 SPRAY: 50 SPRAY, METERED NASAL at 09:14

## 2024-03-05 RX ADMIN — NICOTINE 1 PATCH: 21 PATCH, EXTENDED RELEASE TRANSDERMAL at 09:12

## 2024-03-05 RX ADMIN — CEFAZOLIN SODIUM 2 G: 2 INJECTION, SOLUTION INTRAVENOUS at 05:13

## 2024-03-05 ASSESSMENT — COGNITIVE AND FUNCTIONAL STATUS - GENERAL
HELP NEEDED FOR BATHING: A LOT
DAILY ACTIVITIY SCORE: 14
CLIMB 3 TO 5 STEPS WITH RAILING: TOTAL
MOVING TO AND FROM BED TO CHAIR: A LOT
MOBILITY SCORE: 14
WALKING IN HOSPITAL ROOM: TOTAL
DRESSING REGULAR UPPER BODY CLOTHING: A LOT
DRESSING REGULAR LOWER BODY CLOTHING: A LOT
EATING MEALS: A LITTLE
PERSONAL GROOMING: A LITTLE
STANDING UP FROM CHAIR USING ARMS: A LOT
TOILETING: A LOT

## 2024-03-05 ASSESSMENT — PAIN - FUNCTIONAL ASSESSMENT: PAIN_FUNCTIONAL_ASSESSMENT: 0-10

## 2024-03-05 ASSESSMENT — PAIN SCALES - GENERAL: PAINLEVEL_OUTOF10: 0 - NO PAIN

## 2024-03-05 NOTE — NURSING NOTE
Changed Prevena dressing per Yaneli NP, at bedside to visualize wound.  Clean, dry wound, staples intact, proximal end of wound with slight space between edges, no drainage- approximately 4 cm noted, mid and distal end well approximated. 2 drain site wounds-distal site scant serosanguinous crusty drainage- proximal site scant tan drainage.  Orders obtained, care provided.   The patient was called to talk about the scheduling of the EGD procedure the that Is needing to be schedule in 2 weeks.  The patient was not available to talk.  So a message was left for the patient to call back to schedule.

## 2024-03-05 NOTE — PROGRESS NOTES
Patient seen and examined this morning, 2 weeks out from I&D and wound closure right hip.  Drains have been discontinued, the sites are clean and dry now.  She has had no drainage from the incision VAC is clean and intact.  Plan to continue VAC treatment over the next several weeks.  She is cleared for discharge with careful instructions on wound care to the facility.

## 2024-03-05 NOTE — PROGRESS NOTES
Blanca Hamilton is a 48 y.o. female on day 20 of admission presenting with Wound dehiscence.    Subjective   Interval History: no fever, no new complaints        Review of Systems    Objective   Range of Vitals (last 24 hours)  Heart Rate:  [80-91]   Temp:  [36 °C (96.8 °F)-36.7 °C (98.1 °F)]   Resp:  [17-18]   BP: ()/(58-71)   SpO2:  [95 %-99 %]   Daily Weight  02/29/24 : 84.9 kg (187 lb 3.2 oz)    Body mass index is 27.64 kg/m².    Physical Exam  Constitutional:       Appearance: Normal appearance.   HENT:      Head: Normocephalic and atraumatic.      Mouth/Throat:      Mouth: Mucous membranes are moist.      Pharynx: Oropharynx is clear.   Eyes:      Pupils: Pupils are equal, round, and reactive to light.   Cardiovascular:      Rate and Rhythm: Normal rate and regular rhythm.      Heart sounds: Normal heart sounds.   Pulmonary:      Effort: Pulmonary effort is normal.      Breath sounds: Normal breath sounds.   Abdominal:      General: Abdomen is flat. Bowel sounds are normal.      Palpations: Abdomen is soft.   Musculoskeletal:      Cervical back: Normal range of motion.      Comments: Rt hip dressing, no cellulitis   Neurological:      Mental Status: She is alert.         Antibiotics  sodium chloride 0.9 % bolus 3,120 mL  acetaminophen (Tylenol) tablet 975 mg  ARIPiprazole (Abilify) tablet 15 mg  aspirin chewable tablet 81 mg  ceFAZolin in dextrose (iso-os) (Ancef) IVPB 2 g  famotidine (Pepcid) tablet 40 mg  ferrous sulfate (325 mg ferrous sulfate) tablet 1 tablet  lactulose 20 gram/30 mL oral solution 20 g  lisinopril tablet 10 mg  loratadine (Claritin) tablet 10 mg  magnesium oxide (Mag-Ox) tablet 400 mg  metoprolol succinate XL (Toprol-XL) 24 hr tablet 25 mg  montelukast (Singulair) tablet 10 mg  nicotine (Nicoderm CQ) 21 mg/24 hr patch 1 patch  PARoxetine (Paxil) tablet 40 mg  polyethylene glycol (Glycolax, Miralax) packet 17 g  simvastatin (Zocor) tablet 20 mg  traZODone (Desyrel) tablet 100 mg  zinc  oxide 40 % ointment 1 Application  piperacillin-tazobactam-dextrose (Zosyn) IV 3.375 g  sodium chloride 0.9% infusion  oxyCODONE (Roxicodone) immediate release tablet 5 mg  oxyCODONE (Roxicodone) immediate release tablet 10 mg  HYDROmorphone (Dilaudid) injection 0.4 mg  vancomycin (Vancocin) in dextrose 5 % water (D5W) 500 mL IV 1,500 mg  atorvastatin (Lipitor) tablet    ceFAZolin in dextrose (iso-os) (Ancef) IVPB 1 g  enoxaparin (Lovenox) syringe 40 mg  ceFAZolin in dextrose (iso-os) (Ancef) IVPB 2 g  ondansetron (Zofran) injection 4 mg  lactated Ringer's infusion  oxygen (O2) therapy  lactated Ringer's infusion  oxyCODONE (Roxicodone) immediate release tablet 5 mg  HYDROmorphone (Dilaudid) injection 0.25 mg  HYDROmorphone (Dilaudid) injection 0.5 mg  ondansetron (Zofran) injection 4 mg  droperidol (Inapsine) injection 0.625 mg  rocuronium (ZeMuron) injection  - Omnicell Override Pull  ondansetron (Zofran) injection  - Omnicell Override Pull  dexAMETHasone (Decadron) injection  - Omnicell Override Pull  lidocaine (cardiac) (Xylocaine) injection  - Omnicell Override Pull  propofol (Diprivan) injection  - Omnicell Override Pull  fentaNYL PF (Sublimaze) injection  - Omnicell Override Pull  lactated Ringer's infusion  ceFAZolin (Ancef) injection  - Omnicell Override Pull  ceFAZolin (Ancef) injection  - Omnicell Override Pull  tranexamic acid (Cyklokapron) injection  - Omnicell Override Pull  tranexamic acid (Cyklokapron) injection  - Omnicell Override Pull  povidone-iodine (Betadine) soap  - Omnicell Override Pull  povidone-iodine (Betadine) topical solution  - Omnicell Override Pull  ePHEDrine injection  - Omnicell Override Pull  vasopressin (Vasostrict) injection  - Omnicell Override Pull  propofol (Diprivan) injection  - Omnicell Override Pull  dexAMETHasone (Decadron) injection  - Omnicell Override Pull  gentamicin (Garamycin) injection  - Omnicell Override Pull  vancomycin (Vancocin) vial for injection  - Omnicell  Override Pull  HYDROmorphone (Dilaudid) injection  - Omnicell Override Pull  vancomycin (Vancocin) vial for injection  sugammadex (Bridion) injection  - Omnicell Override Pull  gentamicin (Garamycin) injection  sodium chloride 0.9 % irrigation solution  lactated Ringer's irrigation solution  lactated Ringer's bolus 500 mL  lactated Ringer's bolus 500 mL  lactated Ringer's infusion  oseltamivir (Tamiflu) capsule 75 mg  flu vaccine, quadrivalent, high-dose, preservative free, age 65y+ (FLUZONE)  lactated Ringer's bolus 1,000 mL  midodrine (Proamatine) tablet 5 mg  lactated Ringer's bolus 500 mL  magnesium oxide (Mag-Ox) tablet 800 mg  polyethylene glycol (Glycolax, Miralax) packet 17 g  bisacodyl (Dulcolax) suppository 10 mg      Relevant Results  Labs  Results from last 72 hours   Lab Units 03/04/24  0521   WBC AUTO x10*3/uL 6.0   HEMOGLOBIN g/dL 9.4*   HEMATOCRIT % 31.1*   PLATELETS AUTO x10*3/uL 345       Results from last 72 hours   Lab Units 03/04/24  0521   SODIUM mmol/L 135*   POTASSIUM mmol/L 3.9   CHLORIDE mmol/L 102   CO2 mmol/L 26   BUN mg/dL 4*   CREATININE mg/dL 0.30*   GLUCOSE mg/dL 113*   CALCIUM mg/dL 7.7*   ANION GAP mmol/L 11   EGFR mL/min/1.73m*2 >90       Results from last 72 hours   Lab Units 03/04/24  0521   ALK PHOS U/L 122*   BILIRUBIN TOTAL mg/dL 0.2   PROTEIN TOTAL g/dL 5.2*   ALT U/L <3*   AST U/L 8*   ALBUMIN g/dL 2.6*       Estimated Creatinine Clearance: 125 mL/min (A) (by C-G formula based on SCr of 0.3 mg/dL (L)).  C-Reactive Protein   Date Value Ref Range Status   03/04/2024 1.24 (H) <1.00 mg/dL Final   02/27/2024 2.15 (H) <1.00 mg/dL Final   02/20/2024 0.42 <1.00 mg/dL Final     Microbiology  Reviewed  Imaging  reviewed        Assessment/Plan     Right hip prosthesis infection sp I&D with partial hardware exchange, MSSA on the cultures, sp repeat I&D    Recommendations :  Continue Cefazolin, plan on 6 weeks from the date of the last surgery, weekly labs  Discussed with the medical  team     I spent minutes in the professional and overall care of this patient.      Frank Modi MD

## 2024-03-05 NOTE — PROGRESS NOTES
"Blanca Hamilton is a 48 y.o. female on day 20 of admission presenting with Wound dehiscence.    Subjective   No acute overnight events.  Pain controlled. Tolerating PO intake.  Anxious to leave hospital and start rehab.  Planning to change Prevena dressing today.    Objective     Physical Exam  Vitals reviewed.   HENT:      Head: Normocephalic and atraumatic.   Eyes:      Extraocular Movements: Extraocular movements intact.      Conjunctiva/sclera: Conjunctivae normal.      Pupils: Pupils are equal, round, and reactive to light.   Cardiovascular:      Rate and Rhythm: Normal rate and regular rhythm.      Pulses: Normal pulses.   Pulmonary:      Breath sounds: Normal breath sounds.   Abdominal:      General: Bowel sounds are normal.      Palpations: Abdomen is soft.   Musculoskeletal:      Comments: Right hip dressing C/D/I, prevena VAC in place to continuous suction, no output, leg and foot warm and well perfused, motion and sensations intact    Skin:     General: Skin is warm and dry.      Capillary Refill: Capillary refill takes less than 2 seconds.   Neurological:      General: No focal deficit present.      Mental Status: She is alert and oriented to person, place, and time.       Last Recorded Vitals  Blood pressure 108/71, pulse 80, temperature 36.6 °C (97.9 °F), resp. rate 18, height 1.753 m (5' 9\"), weight 84.9 kg (187 lb 3.2 oz), SpO2 99 %.  Intake/Output last 3 Shifts:  I/O last 3 completed shifts:  In: 6995 (82.4 mL/kg) [P.O.:1080; I.V.:5615 (66.1 mL/kg); IV Piggyback:300]  Out: 775 (9.1 mL/kg) [Urine:775 (0.3 mL/kg/hr)]  Dosing Weight: 84.9 kg     Relevant Results  Electrocardiogram, 12-lead PRN ACS symptoms    Result Date: 2/28/2024  Normal sinus rhythm Normal ECG When compared with ECG of 02-FEB-2024 15:03, Significant changes have occurred    Lower extremity venous duplex bilateral    Result Date: 2/15/2024          [place] [id number]  Tel [phone] and Fax " [phone]  Vascular Lab Report [facility] US LOWER EXTREMITY VENOUS DUPLEX BILATERAL  Patient Name:      ZULEIKA KAMARA          Reading Physician:  [id number] Prieto Loza MD Study Date:        2/14/2024            Ordering Physician: [id number] GEORGE AMADOR MRN/PID:           [id number]             Technologist:       Rosa Pierre [facility] Accession#:        [id number]         Technologist 2:     Hattie Raymond Date of Birth/Age: 1975 / 48      Encounter#:         [id number]                    years Gender:            F Admission Status:  Inpatient            Location Performed: [facility]  Diagnosis/ICD: Localized (leg) edema-R60.0 CPT Codes:     15313 Peripheral venous duplex scan for DVT complete  CONCLUSIONS: Right Lower Venous: No evidence of acute deep vein thrombus visualized in the right lower extremity. Left Lower Venous: No evidence of acute deep vein thrombus visualized in the left lower extremity.  Additional Findings: Technically difficult study due to patient movement and inability to position.  Imaging & Doppler Findings:  Right                 Compressible Thrombus        Flow Distal External Iliac     Yes        None   Spontaneous/Phasic CFV                       Yes        None   Spontaneous/Phasic PFV                       Yes        None FV Proximal               Yes        None   Spontaneous/Phasic FV Mid                    Yes        None FV Distal                 Yes        None Popliteal                 Yes        None   Spontaneous/Phasic Peroneal                  Yes        None PTV                       Yes        None  Left                  Compress Thrombus        Flow Distal External Iliac   Yes      None   Spontaneous/Phasic CFV                     Yes      None   Spontaneous/Phasic PFV                     Yes      None FV Proximal             Yes      None    Spontaneous/Phasic FV Mid                  Yes      None FV Distal               Yes      None Popliteal               Yes      None   Spontaneous/Phasic Peroneal                Yes      None PTV                     Yes      None  [id number] Prieto Loza MD Electronically signed by [id number] Prieto Loza MD on 2/15/2024 at 5:44:38 PM  ** Final **     Bedside PICC Imaging    Result Date: 2/8/2024  These images are not reportable by radiology and will not be interpreted by  Radiologists.    ECG 12 lead    Result Date: 2/7/2024  Atrial flutter with 2:1 AV conduction Nonspecific intraventricular block Left ventricular hypertrophy ( R in aVL , Lyndon product ) Inferior infarct , age undetermined Abnormal ECG When compared with ECG of 11-JAN-2024 23:16, Significant changes have occurred See ED provider note for full interpretation and clinical correlation Confirmed by Vega Bruce ([id number]) on 2/7/2024 10:40:33 PM    XR hip right with pelvis when performed 2 or 3 views    Result Date: 2/7/2024  Interpreted By:  Richard Garcia, STUDY: XR HIP RIGHT WITH PELVIS WHEN PERFORMED 2 OR 3 VIEWS; ;  2/7/2024 9:25 am   INDICATION: Signs/Symptoms:s/p revision oanh.   COMPARISON: 01/26/2024   ACCESSION NUMBER(S): [id number]   ORDERING CLINICIAN: JON HERNANDEZ   FINDINGS: Total right hip arthroplasty with interval placement of antibiotic impregnated beads. Partially visualized left hip arthroplasty.       Interval placement of antibiotic impregnated beads in the right hip.     MACRO: None   Signed by: Richard Garcia 2/7/2024 5:00 PM Dictation workstation:   [id number]    XR abdomen 1 view    Result Date: 2/5/2024  Interpreted By:  Richard Garcia, STUDY: XR ABDOMEN 1 VIEW;  2/4/2024 6:47 pm   INDICATION: Signs/Symptoms:Lactic acidosis and emesis.   COMPARISON: None.   ACCESSION NUMBER(S): [id number]   ORDERING CLINICIAN: TABITHA MAYNARD   FINDINGS: Nonobstructive bowel gas pattern. Partially visualized bilateral hip arthroplasties.        1.  Nonobstructive bowel gas pattern.   MACRO: None   Signed by: Richard Garcia 2/5/2024 4:12 PM Dictation workstation:   [id number]      Scheduled medications  acetaminophen, 975 mg, oral, q8h  ARIPiprazole, 15 mg, oral, Nightly  [Held by provider] aspirin, 81 mg, oral, BID  ceFAZolin, 2 g, intravenous, q8h  enoxaparin, 40 mg, subcutaneous, q24h  famotidine, 40 mg, oral, BID  ferrous sulfate (325 mg ferrous sulfate), 1 tablet, oral, Daily  fluticasone, 2 spray, Each Nostril, Daily  [Held by provider] lisinopril, 10 mg, oral, q AM  loratadine, 10 mg, oral, Daily  magnesium oxide, 800 mg, oral, Daily  metoprolol succinate XL, 25 mg, oral, Daily  montelukast, 10 mg, oral, q AM  nicotine, 1 patch, transdermal, Daily  PARoxetine, 40 mg, oral, Daily  polyethylene glycol, 17 g, oral, BID  simvastatin, 20 mg, oral, Nightly  traZODone, 100 mg, oral, Nightly      Continuous medications  lactated Ringer's, 100 mL/hr, Last Rate: 100 mL/hr (03/05/24 0513)      PRN medications  PRN medications: calcium carbonate, HYDROmorphone, midodrine, ondansetron, oxyCODONE, oxyCODONE  No results found for this or any previous visit (from the past 24 hour(s)).              This patient has a central line   Reason for the central line remaining today?  IV ABX    Assessment/Plan   Principal Problem:    Wound dehiscence  Active Problems:    Class 1 obesity with body mass index (BMI) of 30.0 to 30.9 in adult    Sinusitis    Transfusion history    Arthritis    Hyponatremia    48 year female POD 12 from right hip revision.     - Lab holiday today  - toe touch weight bearing for transfers other, otherwise NWB RLE  - On Lovenox for DVT prophylaxis   - PT evaluation  - all drains removed  - Prevena VAC changed with wound care  - maintain two pillows in between legs when in bed, do not cross or extend right leg over to left, ok to lay on right side  - 6 weeks of Ancef from recent OR date per ID recs  - patient ok for discharge, follow up with  Dr. Jalloh in 1 week in clinic  - ortho will continue to follow while in house     Discussed with Dr. Jalloh           I spent 30 minutes in the professional and overall care of this patient.    Ester Fitch, DEVON-CNP

## 2024-03-05 NOTE — CARE PLAN
The patient's goals for the shift include      The clinical goals for the shift include Patient will remain HDS during this shift    Over the shift, the patient not make progress toward the following goals.

## 2024-03-05 NOTE — CARE PLAN
The patient's goals for the shift include      The clinical goals for the shift include patient will remain HDS    Over the shift, the patient did not make progress toward the following goals. Barriers to progression include none . Recommendations to address these barriers include continue with plan of care.

## 2024-03-05 NOTE — DISCHARGE INSTRUCTIONS
Wounds from drains right thigh:  Proximal- Cleanse with NS, Medihoney, Aquacel, Mepilex. Can switch to xeroform and 2x2 when drainage stops.  Distal- Xeroform, 2x2, paper tape.  Until healed.

## 2024-03-05 NOTE — DISCHARGE SUMMARY
Discharge Diagnosis  Wound dehiscence  Right hip joint infection    Issues Requiring Follow-Up  Orthopedics: Please follow-up with your outpatient orthopedic surgery team for follow-up regarding your right hip revision.  Recommend follow-up with Dr. Jalloh in 1 week  Wound care: Plan to continue VAC treatment over the next several weeks, please follow-up with wound care at facility  PCP: Please follow-up with your primary care provider or facility provider regarding your recent hospitalization and medication adjustments.  It is recommended you obtain weekly labs while continuing with antibiotic treatment    Discharge Meds     Your medication list        START taking these medications        Instructions Last Dose Given Next Dose Due   ceFAZolin 1 gram injection  Commonly known as: Ancef      Infuse 2,000 mg (2 g) into a venous catheter every 8 hours. Ending 4/2/2024       enoxaparin 40 mg/0.4 mL syringe  Commonly known as: Lovenox      Inject 0.4 mL (40 mg) under the skin once every 24 hours for 14 days.              CONTINUE taking these medications        Instructions Last Dose Given Next Dose Due   Abilify 15 mg tablet  Generic drug: ARIPiprazole           atorvastatin 10 mg tablet  Commonly known as: Lipitor           ferrous sulfate (325 mg ferrous sulfate) tablet           heparin LockFlush(Porcine)(PF) 10 unit/mL injection  Generic drug: heparin flush           magnesium oxide 400 mg (241.3 mg magnesium) tablet  Commonly known as: Mag-Ox      Take 1 tablet (400 mg) by mouth once daily.       metoprolol succinate XL 25 mg 24 hr tablet  Commonly known as: Toprol-XL      Take 1 tablet (25 mg) by mouth once daily. Do not crush or chew. Do not start before January 14, 2024.       montelukast 10 mg tablet  Commonly known as: Singulair           nicotine 21 mg/24 hr patch  Commonly known as: Nicoderm CQ      Place 1 patch over 24 hours on the skin once daily.       omeprazole 40 mg DR capsule  Commonly known as:  PriLOSEC           PARoxetine CR 37.5 mg 24 hr tablet  Commonly known as: Paxil-CR           polyethylene glycol 17 gram packet  Commonly known as: Glycolax, Miralax      Take 17 g by mouth once daily.       traZODone 50 mg tablet  Commonly known as: Desyrel           ZyrTEC 10 mg tablet  Generic drug: cetirizine                  STOP taking these medications      aspirin 81 mg chewable tablet        ceFAZolin in dextrose 5 % 2 gram/100 mL solution  Commonly known as: Ancef        lisinopril 10 mg tablet        oxyCODONE-acetaminophen 5-325 mg tablet  Commonly known as: Percocet        zinc oxide 40 % ointment ointment                  Where to Get Your Medications        These medications were sent to  [facility]  [address] , [facility] [address]      Phone: [phone]   ceFAZolin 1 gram injection       Information about where to get these medications is not yet available    Ask your nurse or doctor about these medications  enoxaparin 40 mg/0.4 mL syringe         Test Results Pending At Discharge  Pending Labs       No current pending labs.            Hospital Course  Blanca Hamilton is a 48 y.o. female with PMHx of recent R ALEXA revision and hardware removal for PJI on 2/6, GERD, HTN, and depression, who presented to [facility] ED with wound dehiscence. Pt initially underwent R total hip arthroplasty on 1/2/4, but had subsequent revision on 1/5 for prosthetic fracture. 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 Cultures were positive for MSSA and patient was started on cefazolin.  At this time the patient underwent ALEXA revision and partial hardware removal with Dr. Jalloh on 2/6/24. Patient was discharged to SNF on 2/11 with wound vac, a PICC line for 6 weeks of cefazolin, and ASA for DVT ppx.    Upon most recent presentation to ED on 2/14/2024, patient found to have surgical site infection with wound dehiscence of the right hip.  Admitted to the medical  floor for further management.  Orthopedic surgery was consulted.  Wound care was consulted.  Prior to surgery, wound care placed wound VAC with appropriate suction.  The wound drained consistent serosanguineous fluid.  Without improvement in conservative management, the patient was taken to the OR on 2/20 for revision of the right hip.      Postoperatively, the patient became anemic due to acute blood loss.  The patient was consented and transfused 1 unit of blood with an appropriate incremental increase in her hemoglobin.  The patient was fluid bolused as needed for hypotension.  No further complications ensued, and the patient's pain was controlled with a scheduled and as needed pain regimen. The patient was continued on Ancef in the postoperative period, which she is supposed to complete a 6-week course.  The clock starts from the day of most recent surgery, on 2/20/2024. So she will continue with Ancef 2g TID through 4/2/24.  Per ID, it is also important she obtain weekly labs. She was also treated with Tamiflu for 10 days due to Flu B exposure, although the patient remained asymptomatic throughout her stay.    In the postoperative period, the patient continued to have increased drain output. ASA was held, and LVX was continued with close monitoring.  Superficial and deep drains were removed on 3/1.  Prevena wound VAC was changed on 3/5.  For anticoagulation, the patient will be continued on Lovenox for a total of 30 days.  She will remain on Lovenox until 3/19.    At the time of discharge, patient's pain was controlled with oral analgesia, patient was urinating, having BMs, sleeping, and eating well. She was restarted on all home medications. Based on PT/OT's recommendation, patient was discharged with scripts and follow up appointments.  The patient will need close follow-up with orthopedic surgery in 1 week.  The patient will be discharged with a prescription for Lovenox for DVT prophylaxis.  She will also  continue to receive antibiotics for 6 weeks as stated above.  Discharge plan was discussed with the patient and all of the patient's questions were answered.  Patient agreeable and understanding with plan.  She will be discharged to the [facility], where she initially presented from.    Pertinent Physical Exam At Time of Discharge  Physical Exam  Constitutional:       General: She is not in acute distress.     Comments: Frail appearing   HENT:      Head: Normocephalic and atraumatic.      Nose: Nose normal.      Mouth/Throat:      Mouth: Mucous membranes are dry.   Cardiovascular:      Rate and Rhythm: Normal rate and regular rhythm.      Heart sounds: Normal heart sounds.   Pulmonary:      Effort: Pulmonary effort is normal.      Breath sounds: Normal breath sounds.      Comments: Decrease breath sound in both lungs  Abdominal:      General: Bowel sounds are normal. There is no distension.      Palpations: Abdomen is soft. There is no mass.      Tenderness: There is no abdominal tenderness.   Musculoskeletal:         General: Swelling and tenderness present. Normal range of motion.      Cervical back: Normal range of motion. No rigidity.      Right lower leg: Edema present.      Left lower leg: Edema present.      Comments: Right hip dressing clean and dry. Prevena VAC in place to continuous suction, no output. Site tight to suction, wrapped without overt signs of hematoma formation. Minimal proximal serosanguineous drainage noted at the right lateral hip area, near greater trochanter. Leg and DP warm and well perfused, motion and sensations intact    Skin:     General: Skin is warm.   Neurological:      General: No focal deficit present.      Mental Status: She is alert and oriented to person, place, and time. Mental status is at baseline.   Psychiatric:         Mood and Affect: Mood normal.         Behavior: Behavior normal.         Thought Content: Thought content normal.         Judgment: Judgment normal.            Barry Bledsoe, DO  PGY 1

## 2024-03-15 ENCOUNTER — APPOINTMENT (OUTPATIENT)
Dept: ORTHOPEDIC SURGERY | Facility: CLINIC | Age: 49
End: 2024-03-15
Payer: COMMERCIAL

## 2024-03-19 ENCOUNTER — APPOINTMENT (OUTPATIENT)
Dept: ORTHOPEDIC SURGERY | Facility: CLINIC | Age: 49
End: 2024-03-19
Payer: COMMERCIAL

## 2024-03-20 NOTE — DOCUMENTATION CLARIFICATION NOTE
"    PATIENT:               ZULEIKA KAMARA  ACCT #:                  [id number]  MRN:                       [id number]  :                       1975  ADMIT DATE:       2024 12:17 AM  DISCH DATE:        3/5/2024 7:30 PM  RESPONDING PROVIDER #:        [id number]          PROVIDER RESPONSE TEXT:    Sepsis was a differential diagnosis and ruled out after study    CDI QUERY TEXT:    UH_CV Sepsis        Instruction:  Based on your assessment of the patient and the clinical information, please provide the requested documentation by clicking on the appropriate radio button and enter any additional information if prompted.    Question: Sepsis was documented in the medical record. Based on the documentation and the clinical information, can the diagnosis be further clarified as      When answering this query, please exercise your independent professional judgment. The fact that a question is being asked, does not imply that any particular answer is desired or expected.    The patient's clinical indicators include:  Clinical Information: 48 year old female presents with wound dehiscence.  Work-up reveals 2/4 SIRS on admission with noted sepsis secondary to recurrent post op wound infection of right hip arthroplasty.    Documented Diagnosis:    DPN 24 - \"Right lateral hip arthroplasty surgical site with long incision and staples.  Incision with proximal dehiscence of 5 cm.  There is noticeable cloudy, malodorous drainage near the proximal area of dehiscence.\"    DPN 24 - \" Sepsis secondary to recurrent post op wound infection (MSSA) of right hip arthroplasty  --Admitted onto the floor for postoperative wound dehiscence and surgical site infection. OR on 24 for right hip revision.\"    DPN 24 - \"presented for suspected wound dehiscence. Initially met Sepsis criteria. \"    ID Note 3/4/24 - \"Right hip prosthesis infection sp I&D with partial hardware exchange, MSSA on the cultures\"    DS:  \"Admitted with joint " "infection and wound dehiscence following recent R ALEXA followed by subsequent revision and hardware explantation. Cx have been + for MSSA. She is currently on cefazolin. \"    Clinical Indicators:  -Vital Signs: 2/14/24 -  36.6, 99/63, 102, 20, 100% ra  -WBC:  2/14/24 - 12.1  -Microbiology Results: 2/2/24 - MSSA  tissue/biopsy of wound  -Absolute Neutrophil: 2/14/24 - 8.64  -Lactic acid:  2/14/24 - 0.7  -BUN/Creat:  2/14/24 - 7/0.29  -Blood cultures: 2/14/24 - no growth x 4 days  -Bilirubin: 2/14/24 - 0.4  -MAP: 2/14/24 -  80  -[place] Coma Scale: n/a   Triage note states A and O x 4  -PAO2/FIO2: presents on room air  -Procalcitonin:  2/16/24 - 0.11  -Platelets:  2/14/24 - 316  -Other clinical indicators: n/a    Treatment: ID consult, Ortho consult, IV fluid bolus, IV Vanco, IV Zosyn, Discharged on Cefazolin    Risk Factors: recent Right ALEXA revision  Options provided:  -- Sepsis was a differential diagnosis and ruled out after study  -- Sepsis with other organ dysfunction, Please specify sepsis associated organ dysfunction below  -- Other - I will add my own diagnosis  -- Refer to Clinical Documentation Reviewer    Query created by: Ester Shelley on 3/18/2024 4:49 PM      Electronically signed by:  ANSLEY VÁSQUEZ DO 3/20/2024 4:29 PM          "

## 2024-03-22 ENCOUNTER — APPOINTMENT (OUTPATIENT)
Dept: ORTHOPEDIC SURGERY | Facility: CLINIC | Age: 49
End: 2024-03-22
Payer: COMMERCIAL

## 2024-03-26 ENCOUNTER — APPOINTMENT (OUTPATIENT)
Dept: ORTHOPEDIC SURGERY | Facility: CLINIC | Age: 49
End: 2024-03-26
Payer: COMMERCIAL

## 2024-04-02 ENCOUNTER — APPOINTMENT (OUTPATIENT)
Dept: ORTHOPEDIC SURGERY | Facility: CLINIC | Age: 49
End: 2024-04-02
Payer: COMMERCIAL
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
## 2024-06-04 DIAGNOSIS — Z96.641 STATUS POST RIGHT HIP REPLACEMENT: ICD-10-CM

## 2024-06-18 ENCOUNTER — APPOINTMENT (OUTPATIENT)
Dept: ORTHOPEDIC SURGERY | Facility: CLINIC | Age: 49
End: 2024-06-18
Payer: COMMERCIAL

## 2024-08-30 ENCOUNTER — APPOINTMENT (OUTPATIENT)
Dept: ORTHOPEDIC SURGERY | Facility: CLINIC | Age: 49
End: 2024-08-30
Payer: COMMERCIAL

## 2024-09-06 ENCOUNTER — APPOINTMENT (OUTPATIENT)
Dept: ORTHOPEDIC SURGERY | Facility: CLINIC | Age: 49
End: 2024-09-06
Payer: COMMERCIAL

## 2024-09-06 ENCOUNTER — HOSPITAL ENCOUNTER (OUTPATIENT)
Dept: RADIOLOGY | Facility: HOSPITAL | Age: 49
Discharge: HOME | End: 2024-09-06
Payer: COMMERCIAL

## 2024-09-06 DIAGNOSIS — Z96.641 STATUS POST RIGHT HIP REPLACEMENT: Primary | ICD-10-CM

## 2024-09-06 DIAGNOSIS — Z96.641 STATUS POST RIGHT HIP REPLACEMENT: ICD-10-CM

## 2024-09-06 PROCEDURE — 99024 POSTOP FOLLOW-UP VISIT: CPT

## 2024-09-06 PROCEDURE — 73502 X-RAY EXAM HIP UNI 2-3 VIEWS: CPT | Mod: RT

## 2024-09-06 NOTE — PROGRESS NOTES
Chief Complaint   Patient presents with    Right Hip - Follow-up     -2/20/24 revision RT ALEXA           This is a 48 y.o. female who is 6 months out from right total hip.  Pain is under control with current medications.  No drainage from her incision no fevers or chills.  Progressing well with physical therapy and appropriately improving in function.  Patient states that her left knee is the main issue preventing her from fully regaining her normal ambulation.  She states it is bone-on-bone on the left side and steroid injections do not work for her.  She has no complaints about her hips.  No other new issues or symptoms.    Right hip examination: Surgical incision well healed no erythema no drainage.  no pain with range of motion neurovascular tact distally    X-rays of the hip were reviewed independently interpreted by me today, the show stable total hip arthroplasty no fracture dislocation or loosening    Impression plan: 48 y.o. female 6 months out from right total hip revision arthroplasty.  We discussed continuing physical therapy and home exercise program. Pain medications to be used as needed. Assistive devices as needed per PT. Activity as tolerated weightbearing as tolerated.  She is no longer taking pain medication.  I had an in-depth discussion with this patient about nonsurgical management of knee pain and she is at the point where she would like to discuss surgery with Dr. Jalloh.  I encouraged her to set an appointment at her earliest convenience to speak with him and discuss surgical options.

## 2024-09-27 ENCOUNTER — APPOINTMENT (OUTPATIENT)
Dept: ORTHOPEDIC SURGERY | Facility: CLINIC | Age: 49
End: 2024-09-27
Payer: COMMERCIAL

## 2024-09-27 ENCOUNTER — HOSPITAL ENCOUNTER (OUTPATIENT)
Dept: RADIOLOGY | Facility: HOSPITAL | Age: 49
Discharge: HOME | End: 2024-09-27
Payer: COMMERCIAL

## 2024-09-27 DIAGNOSIS — G89.29 CHRONIC PAIN OF LEFT KNEE: Primary | ICD-10-CM

## 2024-09-27 DIAGNOSIS — M25.562 CHRONIC PAIN OF LEFT KNEE: ICD-10-CM

## 2024-09-27 DIAGNOSIS — G89.29 CHRONIC PAIN OF LEFT KNEE: ICD-10-CM

## 2024-09-27 DIAGNOSIS — M25.562 CHRONIC PAIN OF LEFT KNEE: Primary | ICD-10-CM

## 2024-09-27 PROCEDURE — 73564 X-RAY EXAM KNEE 4 OR MORE: CPT | Mod: LT

## 2024-09-27 PROCEDURE — 99214 OFFICE O/P EST MOD 30 MIN: CPT | Performed by: ORTHOPAEDIC SURGERY

## 2024-09-27 ASSESSMENT — PAIN - FUNCTIONAL ASSESSMENT: PAIN_FUNCTIONAL_ASSESSMENT: 0-10

## 2024-09-27 ASSESSMENT — PAIN SCALES - GENERAL: PAINLEVEL_OUTOF10: 4

## 2024-09-27 NOTE — PROGRESS NOTES
This is a consultation from Dr. Henry Olmos DO for   Chief Complaint   Patient presents with    Left Knee - Pain       This is a 48 y.o. female who presents for evaluation of left knee pain.  Taken care of her in the past for her right hip.  Regarding her knee, patient's had knee pain and problems for a long time.  Years ago she had a fracture in her knee which is treated surgically.  Later that the hardware was removed.  She has had increasing pain over the last several years, sharp stabbing pain over the anterior and lateral knee associated with stiffness and swelling.  She has had injections but they have not really helped.  She is tried anti-inflammatories.  Significantly worse for her and bothering her a lot.  No numbness tingling fevers chills or shooting down her leg.    Physical Exam    There has been no interval change in this patient's past medical, surgical, medications, allergies, family history or social history since the most recent visit to a provider within our department. 14 point review of systems was performed, reviewed, and negative except for pertinent positives documented in the history of present illness.     Constitutional: well developed, well nourished female in no acute distress  Psychiatric: normal mood, appropriate affect  Eyes: sclera anicteric  HENT: normocephalic/atraumatic  CV: regular rate and rhythm   Respiratory: non labored breathing  Integumentary: no rash  Neurological: moves all extremities    Left knee exam: skin intact no lacerations or abrations.  Well-healed previous surgical incision 1+ effusion.  Tender lateral joint line. negative log roll negative patellar grind. ROM 5-90 stable to varus and valgus stress at 0 and 30 degrees. negative lachman negative posterior drawer negative susan. 5/5 ehl/fhl/gs/ta. silt s/s/sp/dp/t. 2+ dp/pt        Xrays were ordered by me, they were reviewed and independently interpreted by me today, they show severe degenerative changes  bone-on-bone arthritis in the lateral compartment, some deformity from previous lateral plateau fracture.    Procedures      Impression/Plan: This is a 48 y.o. female with severe left knee arthritis.  I had an in depth discussion with the patient regarding treatment options for arthritis and their relative risks and benefits. We reviewed surgical and nonsurgical option for treatment. Treatments include anti inflammatory medications, physical therapy, weight loss, activity modification, use of assistive devices, injection therapies. We discussed current prescriptions and risks and benefits of continuation of prescription medication as apporpriate. We discussed that arthritis is often progressive over time, an in end stage arthritis surgical interventions can be considered, including arthroplasty. All questions were answered and the patient voiced their understanding.  Radiographically she would be a candidate for total knee, we discussed the situation.  She unfortunately had very severe complications from her right total hip.  Because of this I am hesitant to proceed with any additional elective surgery on her.  I recommend she seek a second opinion regarding this and referral information was given for that today.    BMI Readings from Last 1 Encounters:   02/29/24 27.64 kg/m²      Lab Results   Component Value Date    CREATININE 0.30 (L) 03/04/2024     Tobacco Use: High Risk (9/27/2024)    Patient History     Smoking Tobacco Use: Every Day     Smokeless Tobacco Use: Never     Passive Exposure: Not on file      MELD 3.0: 14 at 2/25/2024  5:08 AM  MELD-Na: 7 at 2/25/2024  5:08 AM  Calculated from:  Serum Creatinine: 0.27 mg/dL (Using min of 1 mg/dL) at 2/25/2024  5:08 AM  Serum Sodium: 133 mmol/L at 2/25/2024  5:08 AM  Total Bilirubin: 0.3 mg/dL (Using min of 1 mg/dL) at 2/25/2024  5:08 AM  Serum Albumin: 2.3 g/dL at 2/25/2024  5:08 AM  INR(ratio): 1.1 at 2/23/2024  8:57 AM  Age at listing (hypothetical): 48  "years  Sex: Female at 2/25/2024  5:08 AM       No results found for: \"HGBA1C\"  Lab Results   Component Value Date    STAPHMRSASCR No Staphylococcus aureus isolated 02/08/2024     "

## 2024-10-30 ENCOUNTER — APPOINTMENT (OUTPATIENT)
Dept: ORTHOPEDIC SURGERY | Facility: CLINIC | Age: 49
End: 2024-10-30
Payer: COMMERCIAL

## 2024-11-27 ENCOUNTER — APPOINTMENT (OUTPATIENT)
Dept: ORTHOPEDIC SURGERY | Facility: CLINIC | Age: 49
End: 2024-11-27
Payer: COMMERCIAL

## 2024-11-27 DIAGNOSIS — M25.551 RIGHT HIP PAIN: ICD-10-CM

## 2024-11-27 DIAGNOSIS — G89.29 CHRONIC PAIN OF LEFT KNEE: ICD-10-CM

## 2024-11-27 DIAGNOSIS — M25.562 CHRONIC PAIN OF LEFT KNEE: ICD-10-CM

## 2024-11-27 PROCEDURE — 99204 OFFICE O/P NEW MOD 45 MIN: CPT | Performed by: STUDENT IN AN ORGANIZED HEALTH CARE EDUCATION/TRAINING PROGRAM
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
## 2025-01-03 ENCOUNTER — APPOINTMENT (OUTPATIENT)
Dept: PAIN MEDICINE | Facility: CLINIC | Age: 50
End: 2025-01-03
Payer: COMMERCIAL

## 2025-01-13 ENCOUNTER — APPOINTMENT (OUTPATIENT)
Dept: PAIN MEDICINE | Facility: CLINIC | Age: 50
End: 2025-01-13
Payer: COMMERCIAL

## 2025-06-12 ENCOUNTER — APPOINTMENT (OUTPATIENT)
Dept: RADIOLOGY | Facility: HOSPITAL | Age: 50
End: 2025-06-12
Payer: COMMERCIAL

## 2025-06-12 ENCOUNTER — HOSPITAL ENCOUNTER (EMERGENCY)
Facility: HOSPITAL | Age: 50
Discharge: HOME | End: 2025-06-12
Payer: COMMERCIAL

## 2025-06-12 VITALS
RESPIRATION RATE: 18 BRPM | BODY MASS INDEX: 33.33 KG/M2 | HEIGHT: 69 IN | HEART RATE: 90 BPM | TEMPERATURE: 97.6 F | DIASTOLIC BLOOD PRESSURE: 96 MMHG | SYSTOLIC BLOOD PRESSURE: 135 MMHG | OXYGEN SATURATION: 96 % | WEIGHT: 225 LBS

## 2025-06-12 DIAGNOSIS — L03.115 CELLULITIS OF RIGHT LOWER EXTREMITY: Primary | ICD-10-CM

## 2025-06-12 LAB
ALBUMIN SERPL BCP-MCNC: 3.7 G/DL (ref 3.4–5)
ALP SERPL-CCNC: 110 U/L (ref 33–110)
ALT SERPL W P-5'-P-CCNC: 10 U/L (ref 7–45)
ANION GAP SERPL CALC-SCNC: 11 MMOL/L (ref 10–20)
AST SERPL W P-5'-P-CCNC: 13 U/L (ref 9–39)
BASOPHILS # BLD AUTO: 0.03 X10*3/UL (ref 0–0.1)
BASOPHILS NFR BLD AUTO: 0.4 %
BILIRUB SERPL-MCNC: 0.3 MG/DL (ref 0–1.2)
BUN SERPL-MCNC: 7 MG/DL (ref 6–23)
CALCIUM SERPL-MCNC: 9 MG/DL (ref 8.6–10.3)
CHLORIDE SERPL-SCNC: 102 MMOL/L (ref 98–107)
CO2 SERPL-SCNC: 28 MMOL/L (ref 21–32)
CREAT SERPL-MCNC: 0.54 MG/DL (ref 0.5–1.05)
EGFRCR SERPLBLD CKD-EPI 2021: >90 ML/MIN/1.73M*2
EOSINOPHIL # BLD AUTO: 0.24 X10*3/UL (ref 0–0.7)
EOSINOPHIL NFR BLD AUTO: 2.9 %
ERYTHROCYTE [DISTWIDTH] IN BLOOD BY AUTOMATED COUNT: 17.7 % (ref 11.5–14.5)
GLUCOSE SERPL-MCNC: 85 MG/DL (ref 74–99)
HCT VFR BLD AUTO: 34.6 % (ref 36–46)
HGB BLD-MCNC: 10.3 G/DL (ref 12–16)
IMM GRANULOCYTES # BLD AUTO: 0.04 X10*3/UL (ref 0–0.7)
IMM GRANULOCYTES NFR BLD AUTO: 0.5 % (ref 0–0.9)
LACTATE SERPL-SCNC: 1.2 MMOL/L (ref 0.4–2)
LYMPHOCYTES # BLD AUTO: 1.55 X10*3/UL (ref 1.2–4.8)
LYMPHOCYTES NFR BLD AUTO: 18.8 %
MCH RBC QN AUTO: 24.4 PG (ref 26–34)
MCHC RBC AUTO-ENTMCNC: 29.8 G/DL (ref 32–36)
MCV RBC AUTO: 82 FL (ref 80–100)
MONOCYTES # BLD AUTO: 0.64 X10*3/UL (ref 0.1–1)
MONOCYTES NFR BLD AUTO: 7.8 %
NEUTROPHILS # BLD AUTO: 5.75 X10*3/UL (ref 1.2–7.7)
NEUTROPHILS NFR BLD AUTO: 69.6 %
NRBC BLD-RTO: 0 /100 WBCS (ref 0–0)
PLATELET # BLD AUTO: 293 X10*3/UL (ref 150–450)
POTASSIUM SERPL-SCNC: 4.2 MMOL/L (ref 3.5–5.3)
PROT SERPL-MCNC: 7.1 G/DL (ref 6.4–8.2)
RBC # BLD AUTO: 4.22 X10*6/UL (ref 4–5.2)
SODIUM SERPL-SCNC: 137 MMOL/L (ref 136–145)
WBC # BLD AUTO: 8.3 X10*3/UL (ref 4.4–11.3)

## 2025-06-12 PROCEDURE — 93971 EXTREMITY STUDY: CPT

## 2025-06-12 PROCEDURE — 93971 EXTREMITY STUDY: CPT | Performed by: RADIOLOGY

## 2025-06-12 PROCEDURE — 80053 COMPREHEN METABOLIC PANEL: CPT

## 2025-06-12 PROCEDURE — 85025 COMPLETE CBC W/AUTO DIFF WBC: CPT

## 2025-06-12 PROCEDURE — 83605 ASSAY OF LACTIC ACID: CPT

## 2025-06-12 PROCEDURE — 96365 THER/PROPH/DIAG IV INF INIT: CPT

## 2025-06-12 PROCEDURE — 2500000004 HC RX 250 GENERAL PHARMACY W/ HCPCS (ALT 636 FOR OP/ED)

## 2025-06-12 PROCEDURE — 96366 THER/PROPH/DIAG IV INF ADDON: CPT

## 2025-06-12 PROCEDURE — 99284 EMERGENCY DEPT VISIT MOD MDM: CPT | Mod: 25

## 2025-06-12 RX ORDER — DOXYCYCLINE 100 MG/1
100 TABLET ORAL 2 TIMES DAILY
Qty: 14 TABLET | Refills: 0 | Status: SHIPPED | OUTPATIENT
Start: 2025-06-12 | End: 2025-06-19

## 2025-06-12 RX ORDER — VANCOMYCIN HYDROCHLORIDE 1 G/20ML
INJECTION, POWDER, LYOPHILIZED, FOR SOLUTION INTRAVENOUS DAILY PRN
Status: DISCONTINUED | OUTPATIENT
Start: 2025-06-12 | End: 2025-06-13 | Stop reason: HOSPADM

## 2025-06-12 RX ADMIN — VANCOMYCIN HYDROCHLORIDE 1500 MG: 1.5 INJECTION, POWDER, LYOPHILIZED, FOR SOLUTION INTRAVENOUS at 20:30

## 2025-06-12 ASSESSMENT — PAIN SCALES - GENERAL
PAINLEVEL_OUTOF10: 0 - NO PAIN

## 2025-06-12 ASSESSMENT — PAIN - FUNCTIONAL ASSESSMENT: PAIN_FUNCTIONAL_ASSESSMENT: 0-10

## 2025-06-12 NOTE — ED TRIAGE NOTES
Pt states having rash to lower R leg, itching and now it is an open wound from itching so much. Pt states she had the same thing a few months ago and they gave her some cream that cleared it up

## 2025-06-12 NOTE — ED PROVIDER NOTES
"Limitations to history: None  Independent Historians: Family  External Records Reviewed: HIE, OARRS, outpatient notes, inpatient notes, paper charts if needed    History of Present Illness:  Patient is a 49-year-old female with a known past medical history positive for hypertension, hyperlipidemia, GERD, CHARITY, cellulitis presents to ED chief complaint of right lower extremity redness, pain, swelling, open wound.  Patient reports she has a past medical history positive for cellulitis, reports that \"previously her doctor has given her a cream which has helped \".  Patient denies any systemic symptoms of fevers, chills, nausea, vomiting, diarrhea.  Patient is alert and oriented x 3 upon examination otherwise no acute distress.      Denies HA, C/P, SOB, ABD pain, Nausea, Vomiting, Diarrhea, Weakness, Dizziness, Fever, Chills.    PMFSH:   As per HPI, otherwise nurses notes reviewed in EMR    Physical Exam:  Appearance: Alert, oriented x3, supine on exam table with head elevated, cooperative, in no acute distress. Well nourished & well hydrated.      Skin: Right lower extremity is erythematous, edematous with a open wound with purulent drainage.  Intact, dry skin, no lesions, rash, petechiae or purpura.     Eyes: PERRLA, EOMs intact, Conjunctiva pink with no redness or exudates. No scleral icterus.     Ears: Hearing grossly intact.      Nose: Nares patent, no epistaxis.     Mouth: Dentition without concerning abnormalities. no obstruction of posterior pharynx.     Neck: Supple, without meningismus. Trachea at midline.     Pulmonary: Clear bilaterally with good chest wall excursion. No rales, rhonchi or wheezing. No accessory muscle use or stridor. Talking in full sentences.     Cardiac: Normal S1, S2 without murmur, rub, gallop or extrasystole.     Abdomen: Soft, nontender to light and deep palpation to all quadrants, normoactive bowel sounds.  No palpable organomegaly.  No rebound or guarding.     Genitourinary: Physical " exam deferred.     Musculoskeletal: Right lower extremity is erythematous, edematous with a open wound with purulent drainage. Normal gait. Full range of motion to all extremities. Rest of the exam reveals no pain on palpation, instability, or deformity. Pulses full and equal. No cyanosis or clubbing. capillary refill <2 seconds to all examined digits.     Neurological:  Cranial nerves II through XII are grossly intact, normal sensation, no weakness, no focal findings identified.      Psychiatric: Appropriate mood and affect.    Labs Reviewed   CBC WITH AUTO DIFFERENTIAL - Abnormal       Result Value    WBC 8.3      nRBC 0.0      RBC 4.22      Hemoglobin 10.3 (*)     Hematocrit 34.6 (*)     MCV 82      MCH 24.4 (*)     MCHC 29.8 (*)     RDW 17.7 (*)     Platelets 293      Neutrophils % 69.6      Immature Granulocytes %, Automated 0.5      Lymphocytes % 18.8      Monocytes % 7.8      Eosinophils % 2.9      Basophils % 0.4      Neutrophils Absolute 5.75      Immature Granulocytes Absolute, Automated 0.04      Lymphocytes Absolute 1.55      Monocytes Absolute 0.64      Eosinophils Absolute 0.24      Basophils Absolute 0.03     COMPREHENSIVE METABOLIC PANEL - Normal    Glucose 85      Sodium 137      Potassium 4.2      Chloride 102      Bicarbonate 28      Anion Gap 11      Urea Nitrogen 7      Creatinine 0.54      eGFR >90      Calcium 9.0      Albumin 3.7      Alkaline Phosphatase 110      Total Protein 7.1      AST 13      Bilirubin, Total 0.3      ALT 10     LACTATE - Normal    Lactate 1.2      Narrative:     Venipuncture immediately after or during the administration of Metamizole may lead to falsely low results. Testing should be performed immediately prior to Metamizole dosing.      Lower extremity venous duplex right    (Results Pending)      Labs Reviewed   CBC WITH AUTO DIFFERENTIAL - Abnormal       Result Value    WBC 8.3      nRBC 0.0      RBC 4.22      Hemoglobin 10.3 (*)     Hematocrit 34.6 (*)     MCV 82       MCH 24.4 (*)     MCHC 29.8 (*)     RDW 17.7 (*)     Platelets 293      Neutrophils % 69.6      Immature Granulocytes %, Automated 0.5      Lymphocytes % 18.8      Monocytes % 7.8      Eosinophils % 2.9      Basophils % 0.4      Neutrophils Absolute 5.75      Immature Granulocytes Absolute, Automated 0.04      Lymphocytes Absolute 1.55      Monocytes Absolute 0.64      Eosinophils Absolute 0.24      Basophils Absolute 0.03     COMPREHENSIVE METABOLIC PANEL - Normal    Glucose 85      Sodium 137      Potassium 4.2      Chloride 102      Bicarbonate 28      Anion Gap 11      Urea Nitrogen 7      Creatinine 0.54      eGFR >90      Calcium 9.0      Albumin 3.7      Alkaline Phosphatase 110      Total Protein 7.1      AST 13      Bilirubin, Total 0.3      ALT 10     LACTATE - Normal    Lactate 1.2      Narrative:     Venipuncture immediately after or during the administration of Metamizole may lead to falsely low results. Testing should be performed immediately prior to Metamizole dosing.      Lower extremity venous duplex right   Final Result   No sonographic evidence of deep vein thrombosis within the evaluated   veins of the right lower extremity.             MACRO:   None        Signed by: Gt Patton 6/12/2025 8:53 PM   Dictation workstation:   [id number]                   Repeat Evaluation below    Summary:  Medical Decision Making:   Patient presented as described in HPI. Patient case including ROS, PE, and treatment and plan discussed with ED attending if attached as cosigner. Due to patients presentation orders completed include as documented.  Patient evaluated for complaints of right lower extremity, redness, swelling, possible infection.  Patient was found to be afebrile, mildly tachycardic, nonhypoxic.  Lab work in ED revealed no evidence of leukocytosis present, no electrolyte abnormalities, lactate level 1.2.  Ultrasound imaging negative for DVT.  Patient was given IV vancomycin.  Patient reports she  does not want to be admitted for further evaluation, and treatment for cellulitis, reports that she wants to go home.  Patient will be sent home with prescription for doxycycline, advised to follow-up with primary care provider.  Patient aware that if she develops worsening signs and or symptoms of cellulitis, fevers, chills, to return to ED for further evaluation treatment otherwise follow-up with primary care provider as referred.  Patient was advised to follow up with PCP or recommended provider in 2-3 days for another evaluation and exam. I advised patient/guardian to return or go to closest emergency room immediately if symptoms change, get worse, new symptoms develop prior to follow up. If there is no improvement in symptoms in the next 24 hours they are advised to return for further evaluation and exam. I also explained the plan and treatment course. Patient/guardian is in agreement with plan, treatment course, and follow up and states verbally that they will comply.    Diagnoses as of 06/12/25 2016   Cellulitis of right lower extremity        Tests/Medications/Escalations of Care considered but not given:    Patient care discussed with: N/A  Social Determinants affecting care: N/A    Final diagnosis and disposition as documented in impression    Homegoing. I discussed the differential; results and discharge plan with the patient and/or family/friend/caregiver if present.  I emphasized the importance of follow-up with the physician I referred them to in the timeframe recommended.  I explained reasons for the patient to return to the Emergency Department. They agreed that if they feel their condition is worsening or if they have any other concern they should call 911 immediately for further assistance. I gave the patient an opportunity to ask all questions they had and answered all of them accordingly. They understand return precautions and discharge instructions. The patient and/or family/friend/caregiver  expressed understanding verbally and that they would comply.       Disposition:  Discharge         This note has been transcribed using voice recognition and may contain grammatical errors, misplaced words, incorrect words, incorrect phrases or other errors.     DEVON Marcos-BENEDICT  06/12/25 2044       CHINTAN Marcos  06/12/25 2055       CHINTAN Marcos  06/12/25 2120

## (undated) DEVICE — SUTURE, ETHILON, 3-0, 18 IN, PS1, BLACK

## (undated) DEVICE — SYRINGE, 60 CC, LUER LOCK, MONOJECT, W/O CAP, LF

## (undated) DEVICE — DRESSING, PREVENA, PEEL AND PLACE, 35CM

## (undated) DEVICE — DRESSING, MEPILEX BORDER, POST-OP AG, 4 X 12 IN

## (undated) DEVICE — DRAPE, SHEET, 17 X 23 IN

## (undated) DEVICE — WOUND VAC, INFOV.A.C,  500ML CANNISTER, W/O GEL

## (undated) DEVICE — SUTURE, CTD, VICRYL, 2-0, UND, BR, CT-2

## (undated) DEVICE — SKIN CLOSURE SYS, PREMIERPRO EXOFIN, 1-4CM X 22CM, 1.75G TUBE

## (undated) DEVICE — SUTURE, ETHIBOND XTRA, 5 CCS, GRN/BR, LF

## (undated) DEVICE — APPLICATOR, CHLORAPREP, W/ORANGE TINT, 26ML

## (undated) DEVICE — DRAPE PACK, TOTAL HIP, CUSTOM, GEAUGA

## (undated) DEVICE — WOUND VAC KIT, W/CANNISTER, 500ML, 5/PK

## (undated) DEVICE — TIP, SUCTION, YANKAUER, FLEXIBLE

## (undated) DEVICE — SOLUTION, IRRIGATION, STERILE WATER, 1000 ML, POUR BOTTLE

## (undated) DEVICE — PREP KIT, INTRAMEDULLARY, TOTAL HIP

## (undated) DEVICE — BLADE, OSCILLATOR 19.5 X 86 X 1.27MM

## (undated) DEVICE — SUTURE, VICRYL, 1, 24 IN, CTD, UNDYED

## (undated) DEVICE — SUTURE, QUILL, BARBED, PDO, 2, 24 X 24CM, T8 36MM TAPER POINT, 1/2 CIRCLE

## (undated) DEVICE — HOOD, SURGICAL, FLYTE HYBRID

## (undated) DEVICE — RETRIEVER, SUTURE, HEWSON

## (undated) DEVICE — SUTURE, V-LOC, 3-0, 23IN, P-12, 90 ABS

## (undated) DEVICE — SPONGE, LAP, XRAY DECT, 18IN X 18IN, W/MASTER DMT, STERILE

## (undated) DEVICE — NEEDLE, SPINAL, 20 G X 3.5 IN, YELLOW HUB

## (undated) DEVICE — SUTURE, ETHIBOND, 2, 30 IN, CT2, GREEN

## (undated) DEVICE — CATHETER TRAY, SURESTEP, 14FR, PRECONNECTED DRAIN BAG

## (undated) DEVICE — GLOVE, SURGICAL, PROTEGRITY, NEU-THERA, 8.0, PF, LATEX

## (undated) DEVICE — COVER, TABLE, 44X90

## (undated) DEVICE — SYRINGE, 60 CC, IRRIGATION, BULB, CONTRO-BULB, PAPER POUCH

## (undated) DEVICE — WOUND SYSTEM, DEBRIDEMENT & CLEANING, O.R DUOPAK

## (undated) DEVICE — Device

## (undated) DEVICE — SOLUTION, INJECTION, SODIUM CHLORIDE, 0.9%, 250ML, USP, LIMITED

## (undated) DEVICE — SYRINGE, 30 CC, LUER LOCK

## (undated) DEVICE — HOOD, SURGICAL, FLYTE, T7 PLUS, PEEL AWAY SHIELD

## (undated) DEVICE — IRRIGATION SYSTEM, WOUND, PULSAVAC PLUS

## (undated) DEVICE — DRAPE, C-ARM IMAGE

## (undated) DEVICE — SOLUTION, IRRIGATION, SODIUM CHLORIDE 0.9%, 1000 ML, POUR BOTTLE

## (undated) DEVICE — DRAPE, SHEET, UTILITY, NON ABSORBENT, 18 X 26 IN, LF

## (undated) DEVICE — DRAINAGE SET, CLOSED WOUND, MED-LG, PVC, 3/16 IN, DAVOL, 15 FR, 400 CC

## (undated) DEVICE — SYSTEM KIT, INCISION, PREVENA PEEL AND PLACE, 13CM

## (undated) DEVICE — COVER, PLASTIC, MAYO STAND, 29.5IN X 55.5IN

## (undated) DEVICE — WOUND LAVAGE, BACTISURE

## (undated) DEVICE — SEALER, BIPOLAR, AQUA MANTYS 6.0

## (undated) DEVICE — DRAIN, CHANNEL, HUBLESS, 1/4 ROUND FULL FLUTE, 19 FR/W 1/4" TROCAR"

## (undated) DEVICE — RESERVOIR, WOUND, W/TROCAR, PVC, MEDIUM, 400CC, DAVOL, 1/8 IN, 10FR

## (undated) DEVICE — IRRIGATION SYSTEM, WOUND, SURGIPHOR, 450ML, STERILE

## (undated) DEVICE — AQUA MANTYS, MALLEABLE/LIGHT

## (undated) DEVICE — TRAY, FOLEY, DRAIN BAG, SURESTEP, 16FR, W/STATLOCK

## (undated) DEVICE — CERAMIC V40 FEMORAL HEAD
Type: IMPLANTABLE DEVICE | Site: HIP | Status: NON-FUNCTIONAL
Brand: BIOLOX

## (undated) DEVICE — STAPLER, SKIN, PLUS, WIDE, 35

## (undated) DEVICE — IMPLANTABLE DEVICE
Type: IMPLANTABLE DEVICE | Site: HIP | Status: NON-FUNCTIONAL
Brand: CABLE-READY®

## (undated) DEVICE — STAPLER, PROXIMATE SKIN, REGULAR 35, STERILE

## (undated) DEVICE — KIT, MINOR, DOUBLE BASIN

## (undated) DEVICE — SOLUTION, IRRIGATION, USP, SODIUM CHLORIDE 0.9%, 3000 ML

## (undated) DEVICE — THERAPY UNIT, 14-DAY PREVENA PLUS 125

## (undated) DEVICE — DRAPE, INCISE, ANTIMICROBIAL, IOBAN 2, STERI DRAPE, 23 X 33 IN, DISPOSABLE, STERILE